# Patient Record
Sex: MALE | Race: WHITE | Employment: OTHER | URBAN - NONMETROPOLITAN AREA
[De-identification: names, ages, dates, MRNs, and addresses within clinical notes are randomized per-mention and may not be internally consistent; named-entity substitution may affect disease eponyms.]

---

## 2018-01-04 ENCOUNTER — OFFICE VISIT (OUTPATIENT)
Dept: FAMILY MEDICINE CLINIC | Age: 68
End: 2018-01-04
Payer: MEDICARE

## 2018-01-04 VITALS
DIASTOLIC BLOOD PRESSURE: 80 MMHG | HEART RATE: 58 BPM | OXYGEN SATURATION: 94 % | RESPIRATION RATE: 18 BRPM | TEMPERATURE: 98.2 F | WEIGHT: 158 LBS | SYSTOLIC BLOOD PRESSURE: 132 MMHG

## 2018-01-04 DIAGNOSIS — M79.10 MYALGIA: ICD-10-CM

## 2018-01-04 DIAGNOSIS — Z12.5 ENCOUNTER FOR PROSTATE CANCER SCREENING: ICD-10-CM

## 2018-01-04 DIAGNOSIS — I25.10 CORONARY ARTERY DISEASE INVOLVING NATIVE CORONARY ARTERY OF NATIVE HEART WITHOUT ANGINA PECTORIS: Primary | ICD-10-CM

## 2018-01-04 DIAGNOSIS — E78.01 FAMILIAL HYPERCHOLESTEROLEMIA: ICD-10-CM

## 2018-01-04 DIAGNOSIS — I10 ESSENTIAL (PRIMARY) HYPERTENSION: ICD-10-CM

## 2018-01-04 DIAGNOSIS — Z23 NEED FOR PROPHYLACTIC VACCINATION AGAINST STREPTOCOCCUS PNEUMONIAE (PNEUMOCOCCUS): ICD-10-CM

## 2018-01-04 LAB
ALBUMIN SERPL-MCNC: 4.5 G/DL (ref 3.5–5.2)
ALP BLD-CCNC: 137 U/L (ref 40–130)
ALT SERPL-CCNC: 28 U/L (ref 5–41)
ANION GAP SERPL CALCULATED.3IONS-SCNC: 10 MMOL/L (ref 7–19)
AST SERPL-CCNC: 26 U/L (ref 5–40)
BILIRUB SERPL-MCNC: 0.6 MG/DL (ref 0.2–1.2)
BUN BLDV-MCNC: 12 MG/DL (ref 8–23)
CALCIUM SERPL-MCNC: 9.2 MG/DL (ref 8.8–10.2)
CHLORIDE BLD-SCNC: 102 MMOL/L (ref 98–111)
CHOLESTEROL, TOTAL: 100 MG/DL (ref 160–199)
CO2: 29 MMOL/L (ref 22–29)
CREAT SERPL-MCNC: 1.3 MG/DL (ref 0.5–1.2)
GFR NON-AFRICAN AMERICAN: 55
GLUCOSE BLD-MCNC: 91 MG/DL (ref 74–109)
HDLC SERPL-MCNC: 41 MG/DL (ref 55–121)
LDL CHOLESTEROL CALCULATED: 41 MG/DL
POTASSIUM SERPL-SCNC: 5 MMOL/L (ref 3.5–5)
PROSTATE SPECIFIC ANTIGEN: 0.73 NG/ML (ref 0–4)
SODIUM BLD-SCNC: 141 MMOL/L (ref 136–145)
TOTAL CK: 77 U/L (ref 39–308)
TOTAL PROTEIN: 7.6 G/DL (ref 6.6–8.7)
TRIGL SERPL-MCNC: 88 MG/DL (ref 0–149)

## 2018-01-04 PROCEDURE — G8427 DOCREV CUR MEDS BY ELIG CLIN: HCPCS | Performed by: FAMILY MEDICINE

## 2018-01-04 PROCEDURE — 90670 PCV13 VACCINE IM: CPT | Performed by: FAMILY MEDICINE

## 2018-01-04 PROCEDURE — G8484 FLU IMMUNIZE NO ADMIN: HCPCS | Performed by: FAMILY MEDICINE

## 2018-01-04 PROCEDURE — 4004F PT TOBACCO SCREEN RCVD TLK: CPT | Performed by: FAMILY MEDICINE

## 2018-01-04 PROCEDURE — G8598 ASA/ANTIPLAT THER USED: HCPCS | Performed by: FAMILY MEDICINE

## 2018-01-04 PROCEDURE — G8421 BMI NOT CALCULATED: HCPCS | Performed by: FAMILY MEDICINE

## 2018-01-04 PROCEDURE — 3017F COLORECTAL CA SCREEN DOC REV: CPT | Performed by: FAMILY MEDICINE

## 2018-01-04 PROCEDURE — G0009 ADMIN PNEUMOCOCCAL VACCINE: HCPCS | Performed by: FAMILY MEDICINE

## 2018-01-04 PROCEDURE — 4040F PNEUMOC VAC/ADMIN/RCVD: CPT | Performed by: FAMILY MEDICINE

## 2018-01-04 PROCEDURE — 99204 OFFICE O/P NEW MOD 45 MIN: CPT | Performed by: FAMILY MEDICINE

## 2018-01-04 PROCEDURE — 1123F ACP DISCUSS/DSCN MKR DOCD: CPT | Performed by: FAMILY MEDICINE

## 2018-01-04 RX ORDER — NITROGLYCERIN 0.4 MG/1
0.4 TABLET SUBLINGUAL
COMMUNITY
Start: 2016-12-28 | End: 2018-07-10 | Stop reason: SDUPTHER

## 2018-01-04 RX ORDER — RAMIPRIL 10 MG/1
10 CAPSULE ORAL DAILY
Qty: 90 CAPSULE | Refills: 3 | Status: SHIPPED | OUTPATIENT
Start: 2018-01-04 | End: 2018-10-19 | Stop reason: SDUPTHER

## 2018-01-04 RX ORDER — ASPIRIN 81 MG/1
81 TABLET ORAL
COMMUNITY

## 2018-01-04 RX ORDER — ATORVASTATIN CALCIUM 80 MG/1
80 TABLET, FILM COATED ORAL DAILY
Qty: 90 TABLET | Refills: 3 | Status: SHIPPED | OUTPATIENT
Start: 2018-01-04 | End: 2019-07-11 | Stop reason: SDUPTHER

## 2018-01-04 RX ORDER — ATORVASTATIN CALCIUM 80 MG/1
80 TABLET, FILM COATED ORAL
COMMUNITY
Start: 2016-12-28 | End: 2018-01-04 | Stop reason: SDUPTHER

## 2018-01-04 RX ORDER — METOPROLOL SUCCINATE 25 MG/1
12.5 TABLET, EXTENDED RELEASE ORAL
COMMUNITY
Start: 2016-12-28 | End: 2018-01-04 | Stop reason: SDUPTHER

## 2018-01-04 RX ORDER — RAMIPRIL 10 MG/1
10 CAPSULE ORAL
COMMUNITY
Start: 2016-12-28 | End: 2018-01-04 | Stop reason: SDUPTHER

## 2018-01-04 RX ORDER — METOPROLOL SUCCINATE 25 MG/1
25 TABLET, EXTENDED RELEASE ORAL DAILY
Qty: 90 TABLET | Refills: 3 | Status: SHIPPED | OUTPATIENT
Start: 2018-01-04 | End: 2018-07-10 | Stop reason: SDUPTHER

## 2018-01-04 ASSESSMENT — ENCOUNTER SYMPTOMS
ANAL BLEEDING: 0
ABDOMINAL PAIN: 0
COUGH: 0
NAUSEA: 0
DIARRHEA: 0
CONSTIPATION: 0
SHORTNESS OF BREATH: 0
CHEST TIGHTNESS: 0

## 2018-01-04 NOTE — PROGRESS NOTES
date.      2. Familial hypercholesterolemia  He has been having some increased myalgias lately. We'll continue with Lipitor at full dose and check a CK level  - atorvastatin (LIPITOR) 80 MG tablet; Take 1 tablet by mouth daily  Dispense: 90 tablet; Refill: 3  - Comprehensive Metabolic Panel; Future  - Lipid Panel; Future    3. Essential (primary) hypertension  Stable  - metoprolol succinate (TOPROL XL) 25 MG extended release tablet; Take 1 tablet by mouth daily  Dispense: 90 tablet; Refill: 3  - ramipril (ALTACE) 10 MG capsule; Take 1 capsule by mouth daily  Dispense: 90 capsule; Refill: 3    4. Need for prophylactic vaccination against Streptococcus pneumoniae (pneumococcus)  - Pneumococcal conjugate vaccine 13-valent IM (PREVNAR 13)    5. Myalgia    - CK; Future    6. Encounter for prostate cancer screening    - Psa screening; Future      We discussed colonoscopy. He will consider. Discussed risks and benefits of a colonoscopy. Discussed benefits of colon cancer screening. He understands. Spent 5 minutes discussing smoking cessation. Discussed medication options including nicotine replacement, Wellbutrin, and Chantix. Discussed calling [employer] for further assistance. Recommended picking a quit date. Return in about 6 months (around 7/4/2018) for AWV. Discussed use, benefit, and side effects of prescribed medications. All patient questions answered. Pt voiced understanding. Reviewed health maintenance. Instructed to continue current medications, diet and exercise. Patient agreed with treatment plan. Follow up as directed.

## 2018-04-06 ENCOUNTER — OFFICE VISIT (OUTPATIENT)
Dept: FAMILY MEDICINE CLINIC | Age: 68
End: 2018-04-06
Payer: MEDICARE

## 2018-04-06 VITALS
HEIGHT: 70 IN | TEMPERATURE: 98.7 F | OXYGEN SATURATION: 97 % | HEART RATE: 71 BPM | BODY MASS INDEX: 23.45 KG/M2 | DIASTOLIC BLOOD PRESSURE: 72 MMHG | WEIGHT: 163.8 LBS | SYSTOLIC BLOOD PRESSURE: 134 MMHG

## 2018-04-06 DIAGNOSIS — H61.21 IMPACTED CERUMEN OF RIGHT EAR: Primary | ICD-10-CM

## 2018-04-06 PROCEDURE — 99213 OFFICE O/P EST LOW 20 MIN: CPT | Performed by: CLINICAL NURSE SPECIALIST

## 2018-04-06 PROCEDURE — 1123F ACP DISCUSS/DSCN MKR DOCD: CPT | Performed by: CLINICAL NURSE SPECIALIST

## 2018-04-06 PROCEDURE — G8427 DOCREV CUR MEDS BY ELIG CLIN: HCPCS | Performed by: CLINICAL NURSE SPECIALIST

## 2018-04-06 PROCEDURE — 4004F PT TOBACCO SCREEN RCVD TLK: CPT | Performed by: CLINICAL NURSE SPECIALIST

## 2018-04-06 PROCEDURE — 69209 REMOVE IMPACTED EAR WAX UNI: CPT | Performed by: CLINICAL NURSE SPECIALIST

## 2018-04-06 PROCEDURE — G8420 CALC BMI NORM PARAMETERS: HCPCS | Performed by: CLINICAL NURSE SPECIALIST

## 2018-04-06 PROCEDURE — 3017F COLORECTAL CA SCREEN DOC REV: CPT | Performed by: CLINICAL NURSE SPECIALIST

## 2018-04-06 PROCEDURE — 4040F PNEUMOC VAC/ADMIN/RCVD: CPT | Performed by: CLINICAL NURSE SPECIALIST

## 2018-04-06 PROCEDURE — G8598 ASA/ANTIPLAT THER USED: HCPCS | Performed by: CLINICAL NURSE SPECIALIST

## 2018-04-06 ASSESSMENT — ENCOUNTER SYMPTOMS
CHEST TIGHTNESS: 0
SHORTNESS OF BREATH: 0
VOMITING: 0
BACK PAIN: 1
COUGH: 0
EYE PAIN: 0
WHEEZING: 0
RHINORRHEA: 0
FACIAL SWELLING: 0
SINUS PRESSURE: 0
EYE DISCHARGE: 0
SORE THROAT: 0
NAUSEA: 0

## 2018-07-10 ENCOUNTER — OFFICE VISIT (OUTPATIENT)
Dept: FAMILY MEDICINE CLINIC | Age: 68
End: 2018-07-10
Payer: MEDICARE

## 2018-07-10 VITALS
OXYGEN SATURATION: 96 % | HEIGHT: 69 IN | DIASTOLIC BLOOD PRESSURE: 80 MMHG | TEMPERATURE: 98.2 F | SYSTOLIC BLOOD PRESSURE: 132 MMHG | HEART RATE: 48 BPM | WEIGHT: 166 LBS | BODY MASS INDEX: 24.59 KG/M2

## 2018-07-10 DIAGNOSIS — I10 ESSENTIAL (PRIMARY) HYPERTENSION: ICD-10-CM

## 2018-07-10 DIAGNOSIS — R53.83 OTHER FATIGUE: ICD-10-CM

## 2018-07-10 DIAGNOSIS — E78.01 FAMILIAL HYPERCHOLESTEROLEMIA: ICD-10-CM

## 2018-07-10 DIAGNOSIS — I25.10 CORONARY ARTERY DISEASE INVOLVING NATIVE CORONARY ARTERY OF NATIVE HEART WITHOUT ANGINA PECTORIS: ICD-10-CM

## 2018-07-10 DIAGNOSIS — Z12.5 ENCOUNTER FOR PROSTATE CANCER SCREENING: ICD-10-CM

## 2018-07-10 DIAGNOSIS — Z00.00 ANNUAL PHYSICAL EXAM: Primary | ICD-10-CM

## 2018-07-10 PROCEDURE — G8427 DOCREV CUR MEDS BY ELIG CLIN: HCPCS | Performed by: FAMILY MEDICINE

## 2018-07-10 PROCEDURE — 1101F PT FALLS ASSESS-DOCD LE1/YR: CPT | Performed by: FAMILY MEDICINE

## 2018-07-10 PROCEDURE — 1123F ACP DISCUSS/DSCN MKR DOCD: CPT | Performed by: FAMILY MEDICINE

## 2018-07-10 PROCEDURE — 3017F COLORECTAL CA SCREEN DOC REV: CPT | Performed by: FAMILY MEDICINE

## 2018-07-10 PROCEDURE — 99213 OFFICE O/P EST LOW 20 MIN: CPT | Performed by: FAMILY MEDICINE

## 2018-07-10 PROCEDURE — 93000 ELECTROCARDIOGRAM COMPLETE: CPT | Performed by: FAMILY MEDICINE

## 2018-07-10 PROCEDURE — G0439 PPPS, SUBSEQ VISIT: HCPCS | Performed by: FAMILY MEDICINE

## 2018-07-10 PROCEDURE — G8420 CALC BMI NORM PARAMETERS: HCPCS | Performed by: FAMILY MEDICINE

## 2018-07-10 PROCEDURE — G8598 ASA/ANTIPLAT THER USED: HCPCS | Performed by: FAMILY MEDICINE

## 2018-07-10 PROCEDURE — 1036F TOBACCO NON-USER: CPT | Performed by: FAMILY MEDICINE

## 2018-07-10 PROCEDURE — 4040F PNEUMOC VAC/ADMIN/RCVD: CPT | Performed by: FAMILY MEDICINE

## 2018-07-10 RX ORDER — METOPROLOL SUCCINATE 25 MG/1
12.5 TABLET, EXTENDED RELEASE ORAL DAILY
Qty: 90 TABLET | Refills: 3
Start: 2018-07-10 | End: 2019-05-21 | Stop reason: SDUPTHER

## 2018-07-10 RX ORDER — ALBUTEROL SULFATE 90 UG/1
2 AEROSOL, METERED RESPIRATORY (INHALATION) EVERY 6 HOURS PRN
Qty: 1 INHALER | Refills: 3 | Status: SHIPPED | OUTPATIENT
Start: 2018-07-10 | End: 2020-03-25 | Stop reason: SDUPTHER

## 2018-07-10 RX ORDER — NITROGLYCERIN 0.4 MG/1
0.4 TABLET SUBLINGUAL EVERY 5 MIN PRN
Qty: 25 TABLET | Refills: 5 | Status: SHIPPED | OUTPATIENT
Start: 2018-07-10 | End: 2020-01-10 | Stop reason: SDUPTHER

## 2018-07-10 RX ORDER — NITROGLYCERIN 0.4 MG/1
0.4 TABLET SUBLINGUAL EVERY 5 MIN PRN
Qty: 25 TABLET | Status: CANCELLED | OUTPATIENT
Start: 2018-07-10

## 2018-07-10 ASSESSMENT — LIFESTYLE VARIABLES: HOW OFTEN DO YOU HAVE A DRINK CONTAINING ALCOHOL: 0

## 2018-07-10 ASSESSMENT — PATIENT HEALTH QUESTIONNAIRE - PHQ9: SUM OF ALL RESPONSES TO PHQ QUESTIONS 1-9: 0

## 2018-07-10 ASSESSMENT — ANXIETY QUESTIONNAIRES: GAD7 TOTAL SCORE: 0

## 2018-07-10 NOTE — PATIENT INSTRUCTIONS
person to make decisions about your care if you are not able to. Most people ask a close friend or family member. Talk to this person about the kinds of treatments you want and those that you do not want. Make sure this person understands your wishes. These legal papers are simple to change. Tell your doctor what you want to change, and ask him or her to make a note in your medical file. Give your family updated copies of the papers. Where can you learn more? Go to [email or web address] org and sign in to your [email or web address] account. Enter P184 in the [employer] box to learn more about \"Advance Care Planning: Care Instructions. \"     If you do not have an account, please click on the \"Sign Up Now\" link. Current as of: September 24, 2016  Content Version: 11.5  © 1968-5071 Healthwise, [employer]. Care instructions adapted under license by [facility] ([facility]). If you have questions about a medical condition or this instruction, always ask your healthcare professional. [place] [address] any warranty or liability for your use of this information. Learning About Living Patriciaha Ano  What is a living will? A living will is a legal form you use to write down the kind of care you want at the end of your life. It is used by the health professionals who will treat you if you aren't able to decide for yourself. If you put your wishes in writing, your loved ones and others will know what kind of care you want. They won't need to guess. This can ease your mind and be helpful to others. A living will is not the same as an estate or property will. An estate will explains what you want to happen with your money and property after you die. Is a living will a legal document? A living will is a legal document. Each state has its own laws about living [place]. If you move to another state, make sure that your living will is legal in the state where you now live.  Or you might use a universal things would you still want to be able to do after you receive life-support methods? Would you want to be able to walk? To speak? To eat on your own? To live without the help of machines? · If you have a choice, where do you want to be cared for? In your home? At a hospital or nursing home? · Do you want certain Nondenominational practices performed if you become very ill? · If you have a choice at the end of your life, where would you prefer to die? At home? In a hospital or nursing home? Somewhere else? · Would you prefer to be buried or cremated? · Do you want your organs to be donated after you die? What should you do with your living will? · Make sure that your family members and your health care agent have copies of your living will. · Give your doctor a copy of your living will to keep in your medical record. If you have more than one doctor, make sure that each one has a copy. · You may want to put a copy of your living will where it can be easily found. Where can you learn more? Go to https://AppSpotrpeBackspaces.GreenGoose!. org and sign in to your [employer] account. Enter J027 in the Aledia box to learn more about \"Learning About Living [place]. \"     If you do not have an account, please click on the \"Sign Up Now\" link. Current as of: September 24, 2016  Content Version: 11.5  © 6612-9400 [employer]. Care instructions adapted under license by [facility] ([facility]). If you have questions about a medical condition or this instruction, always ask your healthcare professional. [place] [address] any warranty or liability for your use of this information. Learning About Durable Power of  for Health Care  What is a durable power of  for health care? A durable power of  for health care is one type of the legal forms called advance directives.  It lets you decide who you want to make treatment decisions for you if you cannot speak or decide for other tips for preventing falls. o Look at the floor in each room  o When he walks through room do you have to walk around furniture? - Ask someone to move the furniture to clear your past  o You have throw rugs on the floor? - Remove the rugs or use double-sided tape or nonslip backing on the rugs so they dont slip  o Are papers, magazines, books, shoes, boxes, blankets, towels, or other objects on the floor?  -  things that are on the floor. Always keep objects off the floor  o Do you have to walk over or around cords or wires (like cords from lamps, extension cords, or telephone cords)? - Coil or tape cords and wires next to the wall so you cant trip over them. Have an  put in another outlet. o Are papers, shoes, books, or other objects on the stairs? -  things on the stairs. Always keep objects off the stairs. o Are some steps broken or uneven?   - Fix loose or uneven steps. o Are you missing a light over the stairway?   - Have a  or an  put in an overhead light at the top and bottom of the stairs. o Has the stairway light bulb burned out?   - Have a friend or family member change the light bulb.   o Do you have only one light switch for your stairs (only at the top or at the bottom of the stairs)? - Have a  or an  put in a light switch at the top and bottom of the stairs. You can get light switches that glow  o Are the handrails loose or broken? Is there a handrail on only one side of the stairs? - Fix loose handrails or put in new ones. Make sure handrails are on both sides of the stairs and are as long as the stairs. o Is the carpet on the steps loose or torn? - Make sure the carpet is firmly attached to every step or remove the carpet and attach non-slip rubber treads on the stairs. o Look at your kitchen and eating Look at your kitchen and eating area. Are the things you use often on high shelves?    - Move items in your

## 2018-07-10 NOTE — PROGRESS NOTES
[facility]  [address] [place] [address]  Dept: [phone]  Dept Fax: [phone]  Loc: [phone]    Bang Paige is a 70 y.o. male who presents today for his medical conditions/complaints as noted below. Bang Paige is here for Medicare AWV (pt c/o sob upon exertion )        HPI:   CC: Here today to discuss the following:    He has noticed he has increased dyspnea on exertion. He had influenza B in March and hasn't fully recovered. He does continue to smoke daily. He also reports a recurrent nonproductive cough. Overall he does feel like he is doing better. No chest pain or palpitations. Hypertension  Compliant with medications. No adverse effects from medication. No lightheadedness, palpitations, or chest pain. Hyperlipidemia  Tolerating current cholesterol medication without side effects. No body aches. Attempting to reduce processed sugar and cholesterol from diet. Coronary Artery Disease  Currently no active angina symptoms. No chest pain with exertion. No orthopnea.             HPI    Past Medical History:   Diagnosis Date    Heart attack     two    Mini stroke ([address]     2      Past Surgical History:   Procedure Laterality Date    APPENDECTOMY      BACK SURGERY      TESTICLE REMOVAL      Just one       Family History   Problem Relation Age of Onset    Coronary Art Dis Father     Coronary Art Dis Brother        Social History   Substance Use Topics    Smoking status: Former Smoker     Packs/day: 0.50     Types: Cigarettes    Smokeless tobacco: Never Used    Alcohol use No      Current Outpatient Prescriptions   Medication Sig Dispense Refill    albuterol sulfate HFA (VENTOLIN HFA) 108 (90 Base) MCG/ACT inhaler Inhale 2 puffs into the lungs every 6 hours as needed for Wheezing 1 Inhaler 3    metoprolol succinate (TOPROL XL) 25 MG extended release tablet Take 0.5 tablets by mouth daily 90 tablet 3    Dispense: 90 tablet; Refill: 3    2. Familial hypercholesterolemia  Lab Results   Component Value Date    CHOL 100 (L) 01/04/2018     Lab Results   Component Value Date    TRIG 88 01/04/2018     Lab Results   Component Value Date    HDL 41 (L) 01/04/2018     Lab Results   Component Value Date    LDLCALC 41 01/04/2018     No results found for: LABVLDL, VLDL  No results found for: CHOLHDLRATIO  Stable  - Lipid Panel; Future    3. Encounter for prostate cancer screening    - Psa screening; Future    4. Other fatigue    - Comprehensive Metabolic Panel; Future  - TSH without Reflex; Future            Return in about 6 months (around 1/10/2019) for Routine follow up - 15 minute visit. Discussed use, benefit, and side effects of prescribed medications. All patient questions answered. Pt voiced understanding. Reviewed health maintenance. Instructed to continue current medications, diet and exercise. Patient agreed with treatment plan. Follow up as directed.

## 2018-07-10 NOTE — PROGRESS NOTES
Vitals:    07/10/18 1252   BP: 132/80   Pulse: (!) 48   Temp: 98.2 °F (36.8 °C)   SpO2: 96%   Weight: 166 lb (75.3 kg)   Height: 5' 9\" (1.753 m)           The following problems were reviewed today and where indicated follow up appointments were made and/or referrals ordered. Risk Factor Screenings with Interventions     Fall Risk:  2 or more falls in past year?: no  Fall with injury in past year?: no  Fall Risk Interventions:    · None indicated    Depression:  PHQ-2 Score: 0  Depression Interventions:  · None indicated    Anxiety:  Anxiety Score: 0  Anxiety Interventions:  · None indicated    Cognitive: Words recalled: 3  Clock Drawing Test (CDT) Score: Normal  Cognitive Impairment Interventions:  · None indicated    Substance Abuse:  Social History     Social History Main Topics    Smoking status: Current Every Day Smoker     Packs/day: 0.50     Types: Cigarettes    Smokeless tobacco: Never Used    Alcohol use No    Drug use: No    Sexual activity: Not on file     Audit Questionnaire: Screen for Alcohol Misuse  How often do you have a drink containing alcohol?: Never  Substance Abuse Interventions:  · None indicated    Health Risk Assessment:     General  In general, how would you say your health is?: Very Good  In the past 7 days, have you experienced any of the following?: None of These  Do you get the social and emotional support that you need?: Yes  Do you have a Living Will?: (!) No  General Health Risk Interventions:  · No Living Will: additional information provided    Health Habits/Nutrition  Do you exercise for at least 20 minutes 2-3 times per week?: Yes  Have you lost any weight without trying in the past 3 months?: No  Do you eat fewer than 2 meals per day?: No  Have you seen a dentist within the past year?: (!) No  Body mass index is 24.51 kg/m².   Health Habits/Nutrition Interventions:  · Dental exam overdue:  patient encouraged to make appointment with his/her dentist    Hearing/Vision  Do you or your family notice any trouble with your hearing?: (!) Yes  Do you have difficulty driving, watching TV, or doing any of your daily activities because of your eyesight?: No  Have you had an eye exam within the past year?: (!) No  Hearing/Vision Interventions:  · Vision concerns:  patient encouraged to make appointment with his/her eye specialist  Offered referral to audiology. He will consider and call me back. Safety  Do you have working smoke detectors?: (!) No  Have all throw rugs been removed or fastened?: Yes  Do you have non-slip mats in all bathtubs?: Yes  Do all of your stairways have a railing or banister?: Yes  Are your doorways, halls and stairs free of clutter?: Yes  Do you always fasten your seatbelt when you are in a car?: Yes  Safety Interventions:  · Home safety tips provided    ADLs  In the past 7 days, did you need help from others to perform any of the following everyday activities?: None  In the past 7 days, did you need help from others to take care of any of the following?: None  ADL Interventions:  · None indicated    Personalized Preventive Plan   Current Health Maintenance Status  Immunization History   Administered Date(s) Administered    Pneumococcal 13-valent Conjugate (Loretha Cashton) 01/04/2018        Health Maintenance   Topic Date Due    AAA screen  1950    Hepatitis C screen  1950    DTaP/Tdap/Td vaccine (1 - Tdap) 05/13/1968    Shingles Vaccine (1 of 2 - 2 Dose Series) 05/13/1999    Colon cancer screen colonoscopy  05/13/1999    Flu vaccine (1) 10/01/2018 (Originally 9/1/2018)    Pneumococcal low/med risk (2 of 2 - PPSV23) 01/04/2019    Potassium monitoring  01/04/2019    Creatinine monitoring  01/04/2019    Lipid screen  01/04/2023       Recommendations for Preventive Services Due: see orders.   Recommended screening schedule for the next 5-10 years is provided to the patient in written form: see Patient Instructions/AVS.

## 2019-01-07 DIAGNOSIS — R53.83 OTHER FATIGUE: ICD-10-CM

## 2019-01-07 DIAGNOSIS — E78.01 FAMILIAL HYPERCHOLESTEROLEMIA: ICD-10-CM

## 2019-01-07 DIAGNOSIS — Z12.5 ENCOUNTER FOR PROSTATE CANCER SCREENING: ICD-10-CM

## 2019-01-07 LAB
ALBUMIN SERPL-MCNC: 3.9 G/DL (ref 3.5–5.2)
ALP BLD-CCNC: 130 U/L (ref 40–130)
ALT SERPL-CCNC: 22 U/L (ref 5–41)
ANION GAP SERPL CALCULATED.3IONS-SCNC: 13 MMOL/L (ref 7–19)
AST SERPL-CCNC: 17 U/L (ref 5–40)
BILIRUB SERPL-MCNC: 0.4 MG/DL (ref 0.2–1.2)
BUN BLDV-MCNC: 15 MG/DL (ref 8–23)
CALCIUM SERPL-MCNC: 8.8 MG/DL (ref 8.8–10.2)
CHLORIDE BLD-SCNC: 104 MMOL/L (ref 98–111)
CHOLESTEROL, TOTAL: 132 MG/DL (ref 160–199)
CO2: 25 MMOL/L (ref 22–29)
CREAT SERPL-MCNC: 1.5 MG/DL (ref 0.5–1.2)
GFR NON-AFRICAN AMERICAN: 46
GLUCOSE BLD-MCNC: 100 MG/DL (ref 74–109)
HDLC SERPL-MCNC: 34 MG/DL (ref 55–121)
LDL CHOLESTEROL CALCULATED: 73 MG/DL
POTASSIUM SERPL-SCNC: 4.5 MMOL/L (ref 3.5–5)
PROSTATE SPECIFIC ANTIGEN: 0.79 NG/ML (ref 0–4)
SODIUM BLD-SCNC: 142 MMOL/L (ref 136–145)
TOTAL PROTEIN: 7.2 G/DL (ref 6.6–8.7)
TRIGL SERPL-MCNC: 125 MG/DL (ref 0–149)
TSH SERPL DL<=0.05 MIU/L-ACNC: 2.81 UIU/ML (ref 0.27–4.2)

## 2019-01-10 ENCOUNTER — OFFICE VISIT (OUTPATIENT)
Dept: FAMILY MEDICINE CLINIC | Age: 69
End: 2019-01-10
Payer: MEDICARE

## 2019-01-10 VITALS
HEART RATE: 82 BPM | TEMPERATURE: 98.5 F | SYSTOLIC BLOOD PRESSURE: 166 MMHG | OXYGEN SATURATION: 98 % | WEIGHT: 176 LBS | DIASTOLIC BLOOD PRESSURE: 86 MMHG | BODY MASS INDEX: 25.99 KG/M2

## 2019-01-10 DIAGNOSIS — R79.9 ABNORMAL FINDING OF BLOOD CHEMISTRY: ICD-10-CM

## 2019-01-10 DIAGNOSIS — I10 ESSENTIAL (PRIMARY) HYPERTENSION: ICD-10-CM

## 2019-01-10 DIAGNOSIS — I25.10 CORONARY ARTERY DISEASE INVOLVING NATIVE CORONARY ARTERY OF NATIVE HEART WITHOUT ANGINA PECTORIS: ICD-10-CM

## 2019-01-10 DIAGNOSIS — K21.9 GASTROESOPHAGEAL REFLUX DISEASE WITHOUT ESOPHAGITIS: ICD-10-CM

## 2019-01-10 DIAGNOSIS — N18.30 CKD (CHRONIC KIDNEY DISEASE), STAGE III (HCC): ICD-10-CM

## 2019-01-10 DIAGNOSIS — M79.641 BILATERAL HAND PAIN: ICD-10-CM

## 2019-01-10 DIAGNOSIS — M79.642 BILATERAL HAND PAIN: ICD-10-CM

## 2019-01-10 DIAGNOSIS — E78.01 FAMILIAL HYPERCHOLESTEROLEMIA: Primary | ICD-10-CM

## 2019-01-10 DIAGNOSIS — Z23 NEED FOR PNEUMOCOCCAL VACCINE: ICD-10-CM

## 2019-01-10 PROCEDURE — 4040F PNEUMOC VAC/ADMIN/RCVD: CPT | Performed by: FAMILY MEDICINE

## 2019-01-10 PROCEDURE — G0009 ADMIN PNEUMOCOCCAL VACCINE: HCPCS | Performed by: FAMILY MEDICINE

## 2019-01-10 PROCEDURE — 1101F PT FALLS ASSESS-DOCD LE1/YR: CPT | Performed by: FAMILY MEDICINE

## 2019-01-10 PROCEDURE — 99214 OFFICE O/P EST MOD 30 MIN: CPT | Performed by: FAMILY MEDICINE

## 2019-01-10 PROCEDURE — 90732 PPSV23 VACC 2 YRS+ SUBQ/IM: CPT | Performed by: FAMILY MEDICINE

## 2019-01-10 PROCEDURE — 1036F TOBACCO NON-USER: CPT | Performed by: FAMILY MEDICINE

## 2019-01-10 PROCEDURE — G8598 ASA/ANTIPLAT THER USED: HCPCS | Performed by: FAMILY MEDICINE

## 2019-01-10 PROCEDURE — 3017F COLORECTAL CA SCREEN DOC REV: CPT | Performed by: FAMILY MEDICINE

## 2019-01-10 PROCEDURE — G8419 CALC BMI OUT NRM PARAM NOF/U: HCPCS | Performed by: FAMILY MEDICINE

## 2019-01-10 PROCEDURE — 1123F ACP DISCUSS/DSCN MKR DOCD: CPT | Performed by: FAMILY MEDICINE

## 2019-01-10 PROCEDURE — G8484 FLU IMMUNIZE NO ADMIN: HCPCS | Performed by: FAMILY MEDICINE

## 2019-01-10 PROCEDURE — G8427 DOCREV CUR MEDS BY ELIG CLIN: HCPCS | Performed by: FAMILY MEDICINE

## 2019-01-12 PROBLEM — N18.30 CKD (CHRONIC KIDNEY DISEASE), STAGE III (HCC): Status: ACTIVE | Noted: 2019-01-12

## 2019-03-10 DIAGNOSIS — I10 ESSENTIAL (PRIMARY) HYPERTENSION: ICD-10-CM

## 2019-03-11 RX ORDER — RAMIPRIL 10 MG/1
CAPSULE ORAL
Qty: 90 CAPSULE | Refills: 1 | Status: SHIPPED | OUTPATIENT
Start: 2019-03-11 | End: 2019-11-25 | Stop reason: SDUPTHER

## 2019-03-27 ENCOUNTER — APPOINTMENT (OUTPATIENT)
Dept: CT IMAGING | Facility: HOSPITAL | Age: 69
End: 2019-03-27

## 2019-03-27 ENCOUNTER — APPOINTMENT (OUTPATIENT)
Dept: GENERAL RADIOLOGY | Facility: HOSPITAL | Age: 69
End: 2019-03-27

## 2019-03-27 ENCOUNTER — HOSPITAL ENCOUNTER (EMERGENCY)
Facility: HOSPITAL | Age: 69
Discharge: HOME | End: 2019-03-27
Admitting: EMERGENCY MEDICINE

## 2019-03-27 ENCOUNTER — OFFICE VISIT (OUTPATIENT)
Dept: URGENT CARE | Age: 69
End: 2019-03-27

## 2019-03-27 VITALS
HEART RATE: 60 BPM | HEIGHT: 70 IN | RESPIRATION RATE: 16 BRPM | OXYGEN SATURATION: 98 % | TEMPERATURE: 97.8 F | WEIGHT: 172.6 LBS | SYSTOLIC BLOOD PRESSURE: 102 MMHG | DIASTOLIC BLOOD PRESSURE: 54 MMHG | BODY MASS INDEX: 24.71 KG/M2

## 2019-03-27 VITALS
HEIGHT: 69 IN | TEMPERATURE: 97.9 F | BODY MASS INDEX: 25.48 KG/M2 | HEART RATE: 50 BPM | SYSTOLIC BLOOD PRESSURE: 112 MMHG | WEIGHT: 172 LBS | RESPIRATION RATE: 14 BRPM | OXYGEN SATURATION: 98 % | DIASTOLIC BLOOD PRESSURE: 59 MMHG

## 2019-03-27 DIAGNOSIS — R55 NEAR SYNCOPE: ICD-10-CM

## 2019-03-27 DIAGNOSIS — R05.9 COUGH: Primary | ICD-10-CM

## 2019-03-27 DIAGNOSIS — R51.9 NONINTRACTABLE HEADACHE, UNSPECIFIED CHRONICITY PATTERN, UNSPECIFIED HEADACHE TYPE: ICD-10-CM

## 2019-03-27 DIAGNOSIS — R53.1 WEAKNESS: Primary | ICD-10-CM

## 2019-03-27 LAB
ALBUMIN SERPL-MCNC: 4.1 G/DL (ref 3.5–5)
ALBUMIN/GLOB SERPL: 1.2 G/DL (ref 1.1–2.5)
ALP SERPL-CCNC: 106 U/L (ref 24–120)
ALT SERPL W P-5'-P-CCNC: <15 U/L (ref 0–54)
ANION GAP SERPL CALCULATED.3IONS-SCNC: 9 MMOL/L (ref 4–13)
APTT PPP: 28 SECONDS (ref 24.1–34.8)
AST SERPL-CCNC: 25 U/L (ref 7–45)
BASOPHILS # BLD AUTO: 0.04 10*3/MM3 (ref 0–0.2)
BASOPHILS NFR BLD AUTO: 0.5 % (ref 0–2)
BILIRUB SERPL-MCNC: 0.6 MG/DL (ref 0.1–1)
BUN BLD-MCNC: 23 MG/DL (ref 5–21)
BUN/CREAT SERPL: 14.3 (ref 7–25)
CALCIUM SPEC-SCNC: 9 MG/DL (ref 8.4–10.4)
CHLORIDE SERPL-SCNC: 105 MMOL/L (ref 98–110)
CO2 SERPL-SCNC: 23 MMOL/L (ref 24–31)
CREAT BLD-MCNC: 1.61 MG/DL (ref 0.5–1.4)
DEPRECATED RDW RBC AUTO: 42.7 FL (ref 40–54)
EOSINOPHIL # BLD AUTO: 0.28 10*3/MM3 (ref 0–0.7)
EOSINOPHIL NFR BLD AUTO: 3.8 % (ref 0–4)
ERYTHROCYTE [DISTWIDTH] IN BLOOD BY AUTOMATED COUNT: 13 % (ref 12–15)
FLUAV AG NPH QL: NEGATIVE
FLUBV AG NPH QL IA: NEGATIVE
GFR SERPL CREATININE-BSD FRML MDRD: 43 ML/MIN/1.73
GLOBULIN UR ELPH-MCNC: 3.4 GM/DL
GLUCOSE BLD-MCNC: 122 MG/DL (ref 70–100)
HCT VFR BLD AUTO: 41 % (ref 40–52)
HGB BLD-MCNC: 14.2 G/DL (ref 14–18)
IMM GRANULOCYTES # BLD AUTO: 0.01 10*3/MM3 (ref 0–0.05)
IMM GRANULOCYTES NFR BLD AUTO: 0.1 % (ref 0–5)
INR PPP: 0.88 (ref 0.91–1.09)
LYMPHOCYTES # BLD AUTO: 2.55 10*3/MM3 (ref 0.72–4.86)
LYMPHOCYTES NFR BLD AUTO: 34.7 % (ref 15–45)
MCH RBC QN AUTO: 31.1 PG (ref 28–32)
MCHC RBC AUTO-ENTMCNC: 34.6 G/DL (ref 33–36)
MCV RBC AUTO: 89.9 FL (ref 82–95)
MONOCYTES # BLD AUTO: 0.58 10*3/MM3 (ref 0.19–1.3)
MONOCYTES NFR BLD AUTO: 7.9 % (ref 4–12)
NEUTROPHILS # BLD AUTO: 3.88 10*3/MM3 (ref 1.87–8.4)
NEUTROPHILS NFR BLD AUTO: 53 % (ref 39–78)
NRBC BLD AUTO-RTO: 0 /100 WBC (ref 0–0)
PLATELET # BLD AUTO: 211 10*3/MM3 (ref 130–400)
PMV BLD AUTO: 10.4 FL (ref 6–12)
POTASSIUM BLD-SCNC: 4.4 MMOL/L (ref 3.5–5.3)
PROT SERPL-MCNC: 7.5 G/DL (ref 6.3–8.7)
PROTHROMBIN TIME: 12.2 SECONDS (ref 11.9–14.6)
RBC # BLD AUTO: 4.56 10*6/MM3 (ref 4.8–5.9)
SODIUM BLD-SCNC: 137 MMOL/L (ref 135–145)
TROPONIN I SERPL-MCNC: <0.012 NG/ML (ref 0–0.03)
WBC NRBC COR # BLD: 7.34 10*3/MM3 (ref 4.8–10.8)

## 2019-03-27 PROCEDURE — 84484 ASSAY OF TROPONIN QUANT: CPT | Performed by: NURSE PRACTITIONER

## 2019-03-27 PROCEDURE — 80053 COMPREHEN METABOLIC PANEL: CPT | Performed by: NURSE PRACTITIONER

## 2019-03-27 PROCEDURE — 85025 COMPLETE CBC W/AUTO DIFF WBC: CPT | Performed by: NURSE PRACTITIONER

## 2019-03-27 PROCEDURE — 99283 EMERGENCY DEPT VISIT LOW MDM: CPT

## 2019-03-27 PROCEDURE — 71045 X-RAY EXAM CHEST 1 VIEW: CPT

## 2019-03-27 PROCEDURE — 85610 PROTHROMBIN TIME: CPT | Performed by: NURSE PRACTITIONER

## 2019-03-27 PROCEDURE — 87804 INFLUENZA ASSAY W/OPTIC: CPT | Performed by: NURSE PRACTITIONER

## 2019-03-27 PROCEDURE — 93005 ELECTROCARDIOGRAM TRACING: CPT | Performed by: NURSE PRACTITIONER

## 2019-03-27 PROCEDURE — 70450 CT HEAD/BRAIN W/O DYE: CPT

## 2019-03-27 PROCEDURE — 93010 ELECTROCARDIOGRAM REPORT: CPT | Performed by: INTERNAL MEDICINE

## 2019-03-27 PROCEDURE — 99284 EMERGENCY DEPT VISIT MOD MDM: CPT

## 2019-03-27 PROCEDURE — 85730 THROMBOPLASTIN TIME PARTIAL: CPT | Performed by: NURSE PRACTITIONER

## 2019-03-27 RX ORDER — BENZONATATE 200 MG/1
200 CAPSULE ORAL 3 TIMES DAILY PRN
Qty: 15 CAPSULE | Refills: 0 | Status: SHIPPED | OUTPATIENT
Start: 2019-03-27

## 2019-03-27 RX ORDER — ACETAMINOPHEN 500 MG
1000 TABLET ORAL ONCE
Status: COMPLETED | OUTPATIENT
Start: 2019-03-27 | End: 2019-03-27

## 2019-03-27 RX ORDER — CEFDINIR 300 MG/1
300 CAPSULE ORAL 2 TIMES DAILY
Qty: 20 CAPSULE | Refills: 0 | Status: SHIPPED | OUTPATIENT
Start: 2019-03-27 | End: 2019-04-06

## 2019-03-27 RX ADMIN — ACETAMINOPHEN 1000 MG: 500 TABLET ORAL at 18:11

## 2019-05-21 DIAGNOSIS — I10 ESSENTIAL (PRIMARY) HYPERTENSION: ICD-10-CM

## 2019-05-22 RX ORDER — METOPROLOL SUCCINATE 25 MG/1
12.5 TABLET, EXTENDED RELEASE ORAL DAILY
Qty: 90 TABLET | Refills: 3 | Status: SHIPPED | OUTPATIENT
Start: 2019-05-22 | End: 2020-06-19

## 2019-07-05 DIAGNOSIS — N18.30 CKD (CHRONIC KIDNEY DISEASE), STAGE III (HCC): ICD-10-CM

## 2019-07-05 DIAGNOSIS — R79.9 ABNORMAL FINDING OF BLOOD CHEMISTRY: ICD-10-CM

## 2019-07-05 LAB
ALBUMIN SERPL-MCNC: 4.1 G/DL (ref 3.5–5.2)
ALP BLD-CCNC: 138 U/L (ref 40–130)
ALT SERPL-CCNC: 15 U/L (ref 5–41)
ANION GAP SERPL CALCULATED.3IONS-SCNC: 12 MMOL/L (ref 7–19)
AST SERPL-CCNC: 17 U/L (ref 5–40)
BILIRUB SERPL-MCNC: 0.3 MG/DL (ref 0.2–1.2)
BILIRUBIN URINE: NEGATIVE
BLOOD, URINE: NEGATIVE
BUN BLDV-MCNC: 16 MG/DL (ref 8–23)
CALCIUM SERPL-MCNC: 9.3 MG/DL (ref 8.8–10.2)
CHLORIDE BLD-SCNC: 105 MMOL/L (ref 98–111)
CLARITY: CLEAR
CO2: 23 MMOL/L (ref 22–29)
COLOR: YELLOW
CREAT SERPL-MCNC: 1.3 MG/DL (ref 0.5–1.2)
FERRITIN: 97 NG/ML (ref 30–400)
GFR NON-AFRICAN AMERICAN: 55
GLUCOSE BLD-MCNC: 105 MG/DL (ref 74–109)
GLUCOSE URINE: NEGATIVE MG/DL
IRON: 75 UG/DL (ref 59–158)
KETONES, URINE: NEGATIVE MG/DL
LEUKOCYTE ESTERASE, URINE: NEGATIVE
NITRITE, URINE: NEGATIVE
PARATHYROID HORMONE INTACT: 59.6 PG/ML (ref 15–65)
PH UA: 6 (ref 5–8)
PHOSPHORUS: 2.4 MG/DL (ref 2.5–4.5)
POTASSIUM SERPL-SCNC: 5 MMOL/L (ref 3.5–5)
PROTEIN UA: NEGATIVE MG/DL
SODIUM BLD-SCNC: 140 MMOL/L (ref 136–145)
SPECIFIC GRAVITY UA: 1.03 (ref 1–1.03)
TOTAL PROTEIN: 7 G/DL (ref 6.6–8.7)
URIC ACID, SERUM: 6.5 MG/DL (ref 3.4–7)
UROBILINOGEN, URINE: 0.2 E.U./DL

## 2019-07-06 LAB — TRANSFERRIN: 214 MG/DL (ref 200–400)

## 2019-07-11 ENCOUNTER — HOSPITAL ENCOUNTER (OUTPATIENT)
Dept: NON INVASIVE DIAGNOSTICS | Age: 69
Discharge: HOME | End: 2019-07-11
Payer: MEDICARE

## 2019-07-11 ENCOUNTER — OFFICE VISIT (OUTPATIENT)
Dept: FAMILY MEDICINE CLINIC | Age: 69
End: 2019-07-11
Payer: MEDICARE

## 2019-07-11 VITALS
HEIGHT: 70 IN | TEMPERATURE: 98.9 F | BODY MASS INDEX: 23.77 KG/M2 | OXYGEN SATURATION: 98 % | DIASTOLIC BLOOD PRESSURE: 80 MMHG | HEART RATE: 78 BPM | WEIGHT: 166 LBS | SYSTOLIC BLOOD PRESSURE: 128 MMHG

## 2019-07-11 DIAGNOSIS — Z00.00 ANNUAL PHYSICAL EXAM: Primary | ICD-10-CM

## 2019-07-11 DIAGNOSIS — I65.23 BILATERAL CAROTID ARTERY STENOSIS: ICD-10-CM

## 2019-07-11 DIAGNOSIS — I25.10 CORONARY ARTERY DISEASE INVOLVING NATIVE CORONARY ARTERY OF NATIVE HEART WITHOUT ANGINA PECTORIS: ICD-10-CM

## 2019-07-11 DIAGNOSIS — Z12.5 ENCOUNTER FOR PROSTATE CANCER SCREENING: ICD-10-CM

## 2019-07-11 DIAGNOSIS — I10 ESSENTIAL (PRIMARY) HYPERTENSION: ICD-10-CM

## 2019-07-11 DIAGNOSIS — E78.01 FAMILIAL HYPERCHOLESTEROLEMIA: ICD-10-CM

## 2019-07-11 DIAGNOSIS — K21.9 GASTROESOPHAGEAL REFLUX DISEASE WITHOUT ESOPHAGITIS: ICD-10-CM

## 2019-07-11 DIAGNOSIS — Z53.20 COLON CANCER SCREENING DECLINED: ICD-10-CM

## 2019-07-11 DIAGNOSIS — N18.30 CKD (CHRONIC KIDNEY DISEASE), STAGE III (HCC): ICD-10-CM

## 2019-07-11 PROCEDURE — 3017F COLORECTAL CA SCREEN DOC REV: CPT | Performed by: FAMILY MEDICINE

## 2019-07-11 PROCEDURE — 4040F PNEUMOC VAC/ADMIN/RCVD: CPT | Performed by: FAMILY MEDICINE

## 2019-07-11 PROCEDURE — G8427 DOCREV CUR MEDS BY ELIG CLIN: HCPCS | Performed by: FAMILY MEDICINE

## 2019-07-11 PROCEDURE — G8420 CALC BMI NORM PARAMETERS: HCPCS | Performed by: FAMILY MEDICINE

## 2019-07-11 PROCEDURE — 1123F ACP DISCUSS/DSCN MKR DOCD: CPT | Performed by: FAMILY MEDICINE

## 2019-07-11 PROCEDURE — 1036F TOBACCO NON-USER: CPT | Performed by: FAMILY MEDICINE

## 2019-07-11 PROCEDURE — G8598 ASA/ANTIPLAT THER USED: HCPCS | Performed by: FAMILY MEDICINE

## 2019-07-11 PROCEDURE — G0439 PPPS, SUBSEQ VISIT: HCPCS | Performed by: FAMILY MEDICINE

## 2019-07-11 PROCEDURE — 93880 EXTRACRANIAL BILAT STUDY: CPT

## 2019-07-11 PROCEDURE — 99214 OFFICE O/P EST MOD 30 MIN: CPT | Performed by: FAMILY MEDICINE

## 2019-07-11 RX ORDER — ATORVASTATIN CALCIUM 80 MG/1
80 TABLET, FILM COATED ORAL DAILY
Qty: 90 TABLET | Refills: 3 | Status: SHIPPED | OUTPATIENT
Start: 2019-07-11 | End: 2019-07-11 | Stop reason: SDUPTHER

## 2019-07-11 RX ORDER — ATORVASTATIN CALCIUM 80 MG/1
80 TABLET, FILM COATED ORAL DAILY
Qty: 90 TABLET | Refills: 3 | Status: SHIPPED | OUTPATIENT
Start: 2019-07-11 | End: 2020-07-10 | Stop reason: SDUPTHER

## 2019-07-11 ASSESSMENT — PATIENT HEALTH QUESTIONNAIRE - PHQ9
SUM OF ALL RESPONSES TO PHQ QUESTIONS 1-9: 0
SUM OF ALL RESPONSES TO PHQ QUESTIONS 1-9: 0

## 2019-07-11 ASSESSMENT — LIFESTYLE VARIABLES
AUDIT-C TOTAL SCORE: 1
HOW OFTEN DO YOU HAVE SIX OR MORE DRINKS ON ONE OCCASION: 0
HOW MANY STANDARD DRINKS CONTAINING ALCOHOL DO YOU HAVE ON A TYPICAL DAY: 0
HOW OFTEN DURING THE LAST YEAR HAVE YOU BEEN UNABLE TO REMEMBER WHAT HAPPENED THE NIGHT BEFORE BECAUSE YOU HAD BEEN DRINKING: 0
HAS A RELATIVE, FRIEND, DOCTOR, OR ANOTHER HEALTH PROFESSIONAL EXPRESSED CONCERN ABOUT YOUR DRINKING OR SUGGESTED YOU CUT DOWN: 0
HOW OFTEN DO YOU HAVE A DRINK CONTAINING ALCOHOL: 1
HOW OFTEN DURING THE LAST YEAR HAVE YOU FOUND THAT YOU WERE NOT ABLE TO STOP DRINKING ONCE YOU HAD STARTED: 0
HOW OFTEN DURING THE LAST YEAR HAVE YOU FAILED TO DO WHAT WAS NORMALLY EXPECTED FROM YOU BECAUSE OF DRINKING: 0
HAVE YOU OR SOMEONE ELSE BEEN INJURED AS A RESULT OF YOUR DRINKING: 0
HOW OFTEN DURING THE LAST YEAR HAVE YOU NEEDED AN ALCOHOLIC DRINK FIRST THING IN THE MORNING TO GET YOURSELF GOING AFTER A NIGHT OF HEAVY DRINKING: 0
HOW OFTEN DURING THE LAST YEAR HAVE YOU HAD A FEELING OF GUILT OR REMORSE AFTER DRINKING: 0
AUDIT TOTAL SCORE: 1

## 2019-07-11 NOTE — PROGRESS NOTES
on file     Attends meetings of clubs or organizations: Not on file     Relationship status: Not on file    Intimate partner violence:     Fear of current or ex partner: Not on file     Emotionally abused: Not on file     Physically abused: Not on file     Forced sexual activity: Not on file   Other Topics Concern    Not on file   Social History Narrative    Not on file     Audit Questionnaire: Screen for Alcohol Misuse  How often do you have a drink containing alcohol?: Monthly or less  How many standard drinks containing alcohol do you have on a typical day when drinking?: One or two  How often do you have six or more drinks on one occasion?: Never  Audit-C Score: 1  During the past year, how often have you found that you were not able to stop drinking once you had started?: Never  During the past year, how often have you failed to do what was normally expected of you because of drinking?: Never  During the past year, how often have you needed a drink in the morning to get yourself going after a heavy drinking session?: Never  During the past year, how often have you had a feeling of guilt or remorse after drinking?: Never  During the past year, have you been unable to remember what happened the night before because you had been drinking?: Never  Have you or someone else been injured as a result of your drinking?: No  Has a relative or friend, doctor or health worker been concerned about your drinking or suggested you cut down?: No  Total Score: 1  Substance Abuse Interventions:  · Not indicated    Health Risk Assessment:     General  In general, how would you say your health is?: Good  In the past 7 days, have you experienced any of the following?  New or Increased Pain, New or Increased Fatigue, Loneliness, Social Isolation, Stress or Anger?: None of These  Do you get the social and emotional support that you need?: Yes  Do you have a Living Will?: (!) No  General Health Risk Interventions:  · Provided

## 2019-07-11 NOTE — PATIENT INSTRUCTIONS
laws are unclear, your health history is complicated, or your family can't agree on what should be in your living will. · You can change your living will at any time. Some people find that their wishes about end-of-life care change as their health changes. · In addition to making a living will, think about completing a medical power of  form. This form lets you name the person you want to make end-of-life treatment decisions for you (your \"health care agent\") if you're not able to. Many hospitals and nursing homes will give you the forms you need to complete a living will and a medical power of . · Your living will is used only if you can't make or communicate decisions for yourself anymore. If you become able to make decisions again, you can accept or refuse any treatment, no matter what you wrote in your living will. · Your state may offer an online registry. This is a place where you can store your living will online so the doctors and nurses who need to treat you can find it right away. What should you think about when creating a living will? Talk about your end-of-life wishes with your family members and your doctor. Let them know what you want. That way the people making decisions for you won't be surprised by your choices. Think about these questions as you make your living will:  · Do you know enough about life support methods that might be used? If not, talk to your doctor so you know what might be done if you can't breathe on your own, your heart stops, or you're unable to swallow. · What things would you still want to be able to do after you receive life-support methods? Would you want to be able to walk? To speak? To eat on your own? To live without the help of machines? · If you have a choice, where do you want to be cared for? In your home? At a hospital or nursing home? · Do you want certain Quaker practices performed if you become very ill?   · If you have a choice at the

## 2019-08-30 ENCOUNTER — OFFICE VISIT (OUTPATIENT)
Dept: URGENT CARE | Age: 69
End: 2019-08-30
Payer: MEDICARE

## 2019-08-30 VITALS
RESPIRATION RATE: 16 BRPM | TEMPERATURE: 97.8 F | HEART RATE: 54 BPM | HEIGHT: 70 IN | SYSTOLIC BLOOD PRESSURE: 171 MMHG | WEIGHT: 162 LBS | DIASTOLIC BLOOD PRESSURE: 78 MMHG | BODY MASS INDEX: 23.19 KG/M2 | OXYGEN SATURATION: 98 %

## 2019-08-30 DIAGNOSIS — H66.91 ACUTE RIGHT OTITIS MEDIA: ICD-10-CM

## 2019-08-30 DIAGNOSIS — H61.21 HEARING LOSS OF RIGHT EAR DUE TO CERUMEN IMPACTION: Primary | ICD-10-CM

## 2019-08-30 PROCEDURE — 99213 OFFICE O/P EST LOW 20 MIN: CPT | Performed by: NURSE PRACTITIONER

## 2019-08-30 PROCEDURE — 69209 REMOVE IMPACTED EAR WAX UNI: CPT | Performed by: NURSE PRACTITIONER

## 2019-08-30 PROCEDURE — 69210 REMOVE IMPACTED EAR WAX UNI: CPT | Performed by: NURSE PRACTITIONER

## 2019-08-30 RX ORDER — AMOXICILLIN 500 MG/1
500 CAPSULE ORAL 2 TIMES DAILY
Qty: 20 CAPSULE | Refills: 0 | Status: SHIPPED | OUTPATIENT
Start: 2019-08-30 | End: 2019-09-09

## 2019-08-30 ASSESSMENT — ENCOUNTER SYMPTOMS
COUGH: 0
RHINORRHEA: 1
EYES NEGATIVE: 1
ALLERGIC/IMMUNOLOGIC NEGATIVE: 1
SINUS PRESSURE: 0
SORE THROAT: 0

## 2019-08-30 NOTE — PROGRESS NOTES
pain 25 tablet 5    aspirin EC 81 MG EC tablet Take 81 mg by mouth       No current facility-administered medications for this visit. No Known Allergies    Health Maintenance   Topic Date Due    AAA screen  1950    Hepatitis C screen  1950    DTaP/Tdap/Td vaccine (1 - Tdap) 05/13/1968    Shingles Vaccine (1 of 2) 05/13/1999    Colon cancer screen colonoscopy  05/13/1999    Flu vaccine (1) 09/01/2019    Potassium monitoring  07/05/2020    Creatinine monitoring  07/05/2020    Annual Wellness Visit (AWV)  07/10/2020    Lipid screen  01/07/2024    Pneumococcal 65+ years Vaccine  Completed       Subjective:     Review of Systems   Constitutional: Negative for activity change, appetite change, chills and fever. HENT: Positive for hearing loss and rhinorrhea. Negative for congestion, ear discharge, ear pain, sinus pressure and sore throat. Right ear   Eyes: Negative. Respiratory: Negative for cough. Cardiovascular: Negative. Endocrine: Negative. Skin: Negative for rash. Allergic/Immunologic: Negative. Neurological: Negative for headaches. Hematological: Negative. Psychiatric/Behavioral: Negative.        :Objective      Physical Exam   Constitutional: He is oriented to person, place, and time. Vital signs are normal. He appears well-developed and well-nourished. He is active and cooperative. No distress. HENT:   Head: Normocephalic. Right Ear: External ear normal. Tympanic membrane is erythematous and bulging. A middle ear effusion is present. Left Ear: Hearing, tympanic membrane, external ear and ear canal normal.   Nose: Nose normal. Right sinus exhibits no maxillary sinus tenderness and no frontal sinus tenderness. Left sinus exhibits no maxillary sinus tenderness and no frontal sinus tenderness. Mouth/Throat: Uvula is midline, oropharynx is clear and moist and mucous membranes are normal. Tonsils are 0 on the right. Tonsils are 0 on the left.  No tonsillar exudate. Right ear with complete cerumen impaction, complete removal of cerumen with warm water irrigation  Right TM red and bulging, poor cone of light noted after removal of cerumen   Eyes: Pupils are equal, round, and reactive to light. Conjunctivae and lids are normal.   Neck: Neck supple. Cardiovascular: Normal rate, regular rhythm, S1 normal, S2 normal and normal heart sounds. Exam reveals no gallop and no friction rub. No murmur heard. Pulmonary/Chest: Effort normal and breath sounds normal. No respiratory distress. He has no wheezes. He has no rhonchi. He has no rales. He exhibits no tenderness. Abdominal: Soft. Normal appearance. Musculoskeletal: Normal range of motion. He exhibits no edema, tenderness or deformity. Neurological: He is alert and oriented to person, place, and time. He has normal strength. Skin: Skin is warm, dry and intact. Capillary refill takes less than 2 seconds. No cyanosis. Psychiatric: He has a normal mood and affect. His speech is normal and behavior is normal.   Nursing note and vitals reviewed. BP (!) 171/78   Pulse 54   Temp 97.8 °F (36.6 °C)   Resp 16   Ht 5' 9.5\" (1.765 m)   Wt 162 lb (73.5 kg)   SpO2 98%   BMI 23.58 kg/m²     :Assessment       Diagnosis Orders   1. Hearing loss of right ear due to cerumen impaction  amoxicillin (AMOXIL) 500 MG capsule   2. Acute right otitis media  amoxicillin (AMOXIL) 500 MG capsule       :Plan    No orders of the defined types were placed in this encounter. Return if symptoms worsen or fail to improve.     Orders Placed This Encounter   Medications    amoxicillin (AMOXIL) 500 MG capsule     Sig: Take 1 capsule by mouth 2 times daily for 10 days     Dispense:  20 capsule     Refill:  0        Patient Instructions     Plenty of fluids  Rest  OTC Tylenol or Motrin as needed  Complete antibiotic as prescribed  Follow-up with PCP or return to  as needed for worsening or unresolved symptoms    Patient Education        Ear Infection (Otitis Media): Care Instructions  Your Care Instructions    An ear infection may start with a cold and affect the middle ear (otitis media). It can hurt a lot. Most ear infections clear up on their own in a couple of days. Most often you will not need antibiotics. This is because many ear infections are caused by a virus. Antibiotics don't work against a virus. Regular doses of pain medicines are the best way to reduce your fever and help you feel better. Follow-up care is a key part of your treatment and safety. Be sure to make and go to all appointments, and call your doctor if you are having problems. It's also a good idea to know your test results and keep a list of the medicines you take. How can you care for yourself at home? · Take pain medicines exactly as directed. ? If the doctor gave you a prescription medicine for pain, take it as prescribed. ? If you are not taking a prescription pain medicine, take an over-the-counter medicine, such as acetaminophen (Tylenol), ibuprofen (Advil, Motrin), or naproxen (Aleve). Read and follow all instructions on the label. ? Do not take two or more pain medicines at the same time unless the doctor told you to. Many pain medicines have acetaminophen, which is Tylenol. Too much acetaminophen (Tylenol) can be harmful. · Plan to take a full dose of pain reliever before bedtime. Getting enough sleep will help you get better. · Try a warm, moist washcloth on the ear. It may help relieve pain. · If your doctor prescribed antibiotics, take them as directed. Do not stop taking them just because you feel better. You need to take the full course of antibiotics. When should you call for help?   Call your doctor now or seek immediate medical care if:    · You have new or increasing ear pain.     · You have new or increasing pus or blood draining from your ear.     · You have a fever with a stiff neck or a severe headache.    Watch closely for the ear two times a day for up to 5 days. ? Once the wax is loose and soft, all that is usually needed to remove it from the ear canal is a gentle, warm shower. Direct the water into the ear, then tip your head to let the earwax drain out. Dry your ear thoroughly with a hair dryer set on low. Hold the dryer several inches from your ear. ? If the warm mineral oil and shower do not work, use an over-the-counter wax softener. Read and follow all instructions on the label. After using the wax softener, use an ear syringe to gently flush the ear. Make sure the flushing solution is body temperature. Cool or hot fluids in the ear can cause dizziness. When should you call for help? Call your doctor now or seek immediate medical care if:    · Pus or blood drains from your ear.     · Your ears are ringing or feel full.     · You have a loss of hearing.    Watch closely for changes in your health, and be sure to contact your doctor if:    · You have pain or reduced hearing after 1 week of home treatment.     · You have any new symptoms, such as nausea or balance problems. Where can you learn more? Go to https://5173.com.[employer]. org and sign in to your [employer] account. Enter A989 in the [facility] box to learn more about \"Earwax Blockage: Care Instructions. \"     If you do not have an account, please click on the \"Sign Up Now\" link. Current as of: September 23, 2018  Content Version: 12.1  © 4927-7186 Healthwise, Incorporated. Care instructions adapted under license by [facility] [place] ([facility]). If you have questions about a medical condition or this instruction, always ask your healthcare professional. [place] [address] any warranty or liability for your use of this information. Patient given educational materials- see patient instructions. Discussed use, benefit, and side effects of prescribedmedications. All patient questions answered. Pt voiced understanding.

## 2019-08-30 NOTE — PATIENT INSTRUCTIONS
loosen the earwax with warm mineral oil. You also can try hydrogen peroxide mixed with an equal amount of room temperature water. Place 2 drops of the fluid, warmed to body temperature, in the ear two times a day for up to 5 days. ? Once the wax is loose and soft, all that is usually needed to remove it from the ear canal is a gentle, warm shower. Direct the water into the ear, then tip your head to let the earwax drain out. Dry your ear thoroughly with a hair dryer set on low. Hold the dryer several inches from your ear. ? If the warm mineral oil and shower do not work, use an over-the-counter wax softener. Read and follow all instructions on the label. After using the wax softener, use an ear syringe to gently flush the ear. Make sure the flushing solution is body temperature. Cool or hot fluids in the ear can cause dizziness. When should you call for help? Call your doctor now or seek immediate medical care if:    · Pus or blood drains from your ear.     · Your ears are ringing or feel full.     · You have a loss of hearing.    Watch closely for changes in your health, and be sure to contact your doctor if:    · You have pain or reduced hearing after 1 week of home treatment.     · You have any new symptoms, such as nausea or balance problems. Where can you learn more? Go to https://Dexter[employer]buzzing and Teads.[employer]rocket. org and sign in to your [employer] account. Enter N797 in the [facility] box to learn more about \"Earwax Blockage: Care Instructions. \"     If you do not have an account, please click on the \"Sign Up Now\" link. Current as of: September 23, 2018  Content Version: 12.1  © 3441-2062 Healthwise, Incorporated. Care instructions adapted under license by [facility] [employer] [facility] ([facility]). If you have questions about a medical condition or this instruction, always ask your healthcare professional. [address] any warranty or liability for your use of this information.

## 2019-09-17 ENCOUNTER — OFFICE VISIT (OUTPATIENT)
Dept: FAMILY MEDICINE CLINIC | Age: 69
End: 2019-09-17
Payer: MEDICARE

## 2019-09-17 ENCOUNTER — TELEPHONE (OUTPATIENT)
Dept: FAMILY MEDICINE CLINIC | Age: 69
End: 2019-09-17

## 2019-09-17 ENCOUNTER — TELEPHONE (OUTPATIENT)
Dept: OTOLARYNGOLOGY | Age: 69
End: 2019-09-17

## 2019-09-17 VITALS
DIASTOLIC BLOOD PRESSURE: 64 MMHG | SYSTOLIC BLOOD PRESSURE: 108 MMHG | OXYGEN SATURATION: 98 % | TEMPERATURE: 97.8 F | HEIGHT: 69 IN | HEART RATE: 60 BPM | RESPIRATION RATE: 18 BRPM | WEIGHT: 165 LBS | BODY MASS INDEX: 24.44 KG/M2

## 2019-09-17 DIAGNOSIS — H91.91 HEARING LOSS OF RIGHT EAR, UNSPECIFIED HEARING LOSS TYPE: ICD-10-CM

## 2019-09-17 DIAGNOSIS — I10 ESSENTIAL (PRIMARY) HYPERTENSION: ICD-10-CM

## 2019-09-17 DIAGNOSIS — H91.92 HEARING LOSS OF LEFT EAR, UNSPECIFIED HEARING LOSS TYPE: ICD-10-CM

## 2019-09-17 DIAGNOSIS — H72.91 RUPTURE OF RIGHT TYMPANIC MEMBRANE: Primary | ICD-10-CM

## 2019-09-17 PROCEDURE — 1036F TOBACCO NON-USER: CPT | Performed by: NURSE PRACTITIONER

## 2019-09-17 PROCEDURE — 4040F PNEUMOC VAC/ADMIN/RCVD: CPT | Performed by: NURSE PRACTITIONER

## 2019-09-17 PROCEDURE — 1123F ACP DISCUSS/DSCN MKR DOCD: CPT | Performed by: NURSE PRACTITIONER

## 2019-09-17 PROCEDURE — 3017F COLORECTAL CA SCREEN DOC REV: CPT | Performed by: NURSE PRACTITIONER

## 2019-09-17 PROCEDURE — G8420 CALC BMI NORM PARAMETERS: HCPCS | Performed by: NURSE PRACTITIONER

## 2019-09-17 PROCEDURE — G8598 ASA/ANTIPLAT THER USED: HCPCS | Performed by: NURSE PRACTITIONER

## 2019-09-17 PROCEDURE — 99213 OFFICE O/P EST LOW 20 MIN: CPT | Performed by: NURSE PRACTITIONER

## 2019-09-17 PROCEDURE — G8427 DOCREV CUR MEDS BY ELIG CLIN: HCPCS | Performed by: NURSE PRACTITIONER

## 2019-09-17 RX ORDER — FLUTICASONE PROPIONATE 50 MCG
2 SPRAY, SUSPENSION (ML) NASAL DAILY
Qty: 1 BOTTLE | Refills: 3 | Status: SHIPPED | OUTPATIENT
Start: 2019-09-17 | End: 2021-07-15 | Stop reason: SDUPTHER

## 2019-09-17 RX ORDER — OFLOXACIN 3 MG/ML
5 SOLUTION AURICULAR (OTIC) 2 TIMES DAILY
Qty: 10 ML | Refills: 0 | Status: SHIPPED | OUTPATIENT
Start: 2019-09-17 | End: 2019-09-27

## 2019-09-17 ASSESSMENT — ENCOUNTER SYMPTOMS
WHEEZING: 0
ABDOMINAL PAIN: 0
SHORTNESS OF BREATH: 0
NAUSEA: 0
SORE THROAT: 0
COUGH: 0
DIARRHEA: 0
CHEST TIGHTNESS: 0

## 2019-09-18 ENCOUNTER — TELEPHONE (OUTPATIENT)
Dept: OTOLARYNGOLOGY | Age: 69
End: 2019-09-18

## 2019-09-18 ENCOUNTER — TELEPHONE (OUTPATIENT)
Dept: FAMILY MEDICINE CLINIC | Age: 69
End: 2019-09-18

## 2019-09-18 NOTE — TELEPHONE ENCOUNTER
Mariano Ajayellie left a VM on the backline stating she wanted to get Raymond Landaverde in next week instead of the 15th. Left her a message to call the office.

## 2019-09-23 ENCOUNTER — TELEPHONE (OUTPATIENT)
Dept: FAMILY MEDICINE CLINIC | Age: 69
End: 2019-09-23

## 2019-09-24 ENCOUNTER — OFFICE VISIT (OUTPATIENT)
Dept: OTOLARYNGOLOGY | Age: 69
End: 2019-09-24
Payer: MEDICARE

## 2019-09-24 ENCOUNTER — PROCEDURE VISIT (OUTPATIENT)
Dept: OTOLARYNGOLOGY | Age: 69
End: 2019-09-24
Payer: MEDICARE

## 2019-09-24 VITALS
BODY MASS INDEX: 23.72 KG/M2 | HEART RATE: 61 BPM | WEIGHT: 160.13 LBS | DIASTOLIC BLOOD PRESSURE: 68 MMHG | SYSTOLIC BLOOD PRESSURE: 110 MMHG | HEIGHT: 69 IN | TEMPERATURE: 98.2 F

## 2019-09-24 DIAGNOSIS — H90.6 MIXED CONDUCTIVE AND SENSORINEURAL HEARING LOSS OF BOTH EARS: Primary | ICD-10-CM

## 2019-09-24 DIAGNOSIS — H69.83 EUSTACHIAN TUBE DYSFUNCTION, BILATERAL: ICD-10-CM

## 2019-09-24 DIAGNOSIS — H90.6 MIXED HEARING LOSS, BILATERAL: ICD-10-CM

## 2019-09-24 DIAGNOSIS — H65.493 CHRONIC MIDDLE EAR EFFUSION, BILATERAL: Primary | ICD-10-CM

## 2019-09-24 PROCEDURE — 92553 AUDIOMETRY AIR & BONE: CPT | Performed by: AUDIOLOGIST

## 2019-09-24 PROCEDURE — 92511 NASOPHARYNGOSCOPY: CPT | Performed by: OTOLARYNGOLOGY

## 2019-09-24 PROCEDURE — G8598 ASA/ANTIPLAT THER USED: HCPCS | Performed by: OTOLARYNGOLOGY

## 2019-09-24 PROCEDURE — 4040F PNEUMOC VAC/ADMIN/RCVD: CPT | Performed by: OTOLARYNGOLOGY

## 2019-09-24 PROCEDURE — 99202 OFFICE O/P NEW SF 15 MIN: CPT | Performed by: OTOLARYNGOLOGY

## 2019-09-24 PROCEDURE — G8427 DOCREV CUR MEDS BY ELIG CLIN: HCPCS | Performed by: OTOLARYNGOLOGY

## 2019-09-24 PROCEDURE — 92567 TYMPANOMETRY: CPT | Performed by: AUDIOLOGIST

## 2019-09-24 PROCEDURE — G8420 CALC BMI NORM PARAMETERS: HCPCS | Performed by: OTOLARYNGOLOGY

## 2019-09-24 PROCEDURE — 1123F ACP DISCUSS/DSCN MKR DOCD: CPT | Performed by: OTOLARYNGOLOGY

## 2019-09-24 PROCEDURE — 1036F TOBACCO NON-USER: CPT | Performed by: OTOLARYNGOLOGY

## 2019-09-24 PROCEDURE — 3017F COLORECTAL CA SCREEN DOC REV: CPT | Performed by: OTOLARYNGOLOGY

## 2019-09-24 NOTE — PROGRESS NOTES
None    Intimate partner violence:     Fear of current or ex partner: None     Emotionally abused: None     Physically abused: None     Forced sexual activity: None   Other Topics Concern    None   Social History Narrative    None     Past Medical History:   Diagnosis Date    Heart attack ([address]     two    Mini stroke ([address]     2     Past Surgical History:   Procedure Laterality Date    APPENDECTOMY      BACK SURGERY      TESTICLE REMOVAL      Just one         REVIEW OF SYSTEMS:  all other systems reviewed and are negative  General Health: see HPI/ problem list  Vestibular: denies related complaints  Ears: ear pain No Bilateral drainage No  Bilateral wax impaction History of wax accumulations in the past ear popping/ fullness Yes  Intermittent symptoms  Hearing: no change Right, hearing loss: Yes and worse:  Yes and hearing loss worse on left  Tinnitus: Yes Bilateral and constant  Nose: congestion: Yes  Allergic/ Immunologic: seasonal allergies: Yes  Throat: denies related complaints  Neck: lump(s)/ mass: No and thyroid problem: No       Comments:     PHYSICAL EXAM:    /68   Pulse 61   Temp 98.2 °F (36.8 °C)   Ht 5' 9\" (1.753 m)   Wt 160 lb 2 oz (72.6 kg)   BMI 23.65 kg/m²   Body mass index is 23.65 kg/m².     General Appearance: well developed  and well nourished  Head/ Face: normocephalic and atraumatic  Vocal Quality: hyponasal  Ears: Right Ear: External: external ears normal Otoscopy Ear Canal: canal clear Otoscopy TM: TM's dull, TM's sluggish and Mild attic retraction Left Ear: External: external ears normal Otoscopy Ear Canal: canal clear Otoscopy TM: TM's dull and TM's sluggish  Hearing: Rinne A<B: Left, Rinne A<B: Right, Air R<L and see audiogram  Nose: septum deviated Yes and Posterior spur to left and  Left and see endoscopy report  Oral:lips: normal teeth: edentulous full Oropharynx:normal tongue: normal   Tonsils: s/p tonsillectomy  Neck: negative and supple  Neuro: alert and oriented x3 and cranial nerves II- XII grossly intact  Psych/ Mood: cooperative and no depression, anxiety or agitation    Assessment & Plan:    Problem List Items Addressed This Visit        ENT Problems    Chronic middle ear effusion, bilateral - Primary     Bilateral middle ear fluid. By history right side mainly more long-standing. Mixed hearing loss with significant conductive component in both ears. Recommend BMT which can be performed as office procedure. Relevant Orders    [place] Nasopharyngoscopy (Completed)    Mixed hearing loss, bilateral     Significant sensorineural component seen likely due to patient's history of occupational noise exposure having worked for many years in a [address] [place] [address] [employer]. Large conductive component due to middle ear fluid  We will repeat hearing testing after both tubes have been placed               Orders Placed This Encounter   Procedures    OH Nasopharyngoscopy       No orders of the defined types were placed in this encounter. Please note that this chart was generated using dragon dictation software. Although every effort was made to ensure the accuracy of this automated transcription, some errors in transcription may have occurred.

## 2019-09-24 NOTE — ASSESSMENT & PLAN NOTE
Bilateral middle ear fluid. By history right side mainly more long-standing. Mixed hearing loss with significant conductive component in both ears. Recommend BMT which can be performed as office procedure.

## 2019-10-04 ENCOUNTER — TELEPHONE (OUTPATIENT)
Dept: FAMILY MEDICINE CLINIC | Age: 69
End: 2019-10-04

## 2019-10-08 ENCOUNTER — PROCEDURE VISIT (OUTPATIENT)
Dept: OTOLARYNGOLOGY | Age: 69
End: 2019-10-08
Payer: MEDICARE

## 2019-10-08 DIAGNOSIS — H65.493 CHRONIC MIDDLE EAR EFFUSION, BILATERAL: ICD-10-CM

## 2019-10-08 PROCEDURE — 69433 CREATE EARDRUM OPENING: CPT | Performed by: OTOLARYNGOLOGY

## 2019-10-21 ENCOUNTER — PROCEDURE VISIT (OUTPATIENT)
Dept: OTOLARYNGOLOGY | Age: 69
End: 2019-10-21
Payer: MEDICARE

## 2019-10-21 VITALS
TEMPERATURE: 98.4 F | DIASTOLIC BLOOD PRESSURE: 60 MMHG | SYSTOLIC BLOOD PRESSURE: 90 MMHG | HEIGHT: 69 IN | BODY MASS INDEX: 24.14 KG/M2 | HEART RATE: 55 BPM | WEIGHT: 163 LBS

## 2019-10-21 DIAGNOSIS — H65.493 CHRONIC MIDDLE EAR EFFUSION, BILATERAL: ICD-10-CM

## 2019-10-21 DIAGNOSIS — H90.6 MIXED HEARING LOSS, BILATERAL: ICD-10-CM

## 2019-10-21 PROCEDURE — G8598 ASA/ANTIPLAT THER USED: HCPCS | Performed by: OTOLARYNGOLOGY

## 2019-10-21 PROCEDURE — G8484 FLU IMMUNIZE NO ADMIN: HCPCS | Performed by: OTOLARYNGOLOGY

## 2019-10-21 PROCEDURE — G8427 DOCREV CUR MEDS BY ELIG CLIN: HCPCS | Performed by: OTOLARYNGOLOGY

## 2019-10-21 PROCEDURE — G8420 CALC BMI NORM PARAMETERS: HCPCS | Performed by: OTOLARYNGOLOGY

## 2019-10-21 PROCEDURE — 1036F TOBACCO NON-USER: CPT | Performed by: OTOLARYNGOLOGY

## 2019-10-21 PROCEDURE — 99212 OFFICE O/P EST SF 10 MIN: CPT | Performed by: OTOLARYNGOLOGY

## 2019-10-21 PROCEDURE — 1123F ACP DISCUSS/DSCN MKR DOCD: CPT | Performed by: OTOLARYNGOLOGY

## 2019-10-21 PROCEDURE — 4040F PNEUMOC VAC/ADMIN/RCVD: CPT | Performed by: OTOLARYNGOLOGY

## 2019-10-21 PROCEDURE — 3017F COLORECTAL CA SCREEN DOC REV: CPT | Performed by: OTOLARYNGOLOGY

## 2019-11-05 ENCOUNTER — OFFICE VISIT (OUTPATIENT)
Dept: OTOLARYNGOLOGY | Age: 69
End: 2019-11-05
Payer: MEDICARE

## 2019-11-05 ENCOUNTER — PROCEDURE VISIT (OUTPATIENT)
Dept: OTOLARYNGOLOGY | Age: 69
End: 2019-11-05
Payer: MEDICARE

## 2019-11-05 VITALS
BODY MASS INDEX: 25.03 KG/M2 | SYSTOLIC BLOOD PRESSURE: 100 MMHG | DIASTOLIC BLOOD PRESSURE: 60 MMHG | WEIGHT: 169 LBS | HEIGHT: 69 IN

## 2019-11-05 DIAGNOSIS — H90.3 SENSORINEURAL HEARING LOSS (SNHL) OF BOTH EARS: ICD-10-CM

## 2019-11-05 DIAGNOSIS — H65.493 CHRONIC MIDDLE EAR EFFUSION, BILATERAL: ICD-10-CM

## 2019-11-05 DIAGNOSIS — H65.493 CHRONIC MIDDLE EAR EFFUSION, BILATERAL: Primary | ICD-10-CM

## 2019-11-05 DIAGNOSIS — H90.6 MIXED HEARING LOSS, BILATERAL: ICD-10-CM

## 2019-11-05 PROCEDURE — 1036F TOBACCO NON-USER: CPT | Performed by: OTOLARYNGOLOGY

## 2019-11-05 PROCEDURE — G8484 FLU IMMUNIZE NO ADMIN: HCPCS | Performed by: OTOLARYNGOLOGY

## 2019-11-05 PROCEDURE — 92567 TYMPANOMETRY: CPT | Performed by: AUDIOLOGIST

## 2019-11-05 PROCEDURE — 92552 PURE TONE AUDIOMETRY AIR: CPT | Performed by: AUDIOLOGIST

## 2019-11-05 PROCEDURE — G8598 ASA/ANTIPLAT THER USED: HCPCS | Performed by: OTOLARYNGOLOGY

## 2019-11-05 PROCEDURE — 1123F ACP DISCUSS/DSCN MKR DOCD: CPT | Performed by: OTOLARYNGOLOGY

## 2019-11-05 PROCEDURE — G8420 CALC BMI NORM PARAMETERS: HCPCS | Performed by: OTOLARYNGOLOGY

## 2019-11-05 PROCEDURE — 4040F PNEUMOC VAC/ADMIN/RCVD: CPT | Performed by: OTOLARYNGOLOGY

## 2019-11-05 PROCEDURE — G8427 DOCREV CUR MEDS BY ELIG CLIN: HCPCS | Performed by: OTOLARYNGOLOGY

## 2019-11-05 PROCEDURE — 99212 OFFICE O/P EST SF 10 MIN: CPT | Performed by: OTOLARYNGOLOGY

## 2019-11-05 PROCEDURE — 3017F COLORECTAL CA SCREEN DOC REV: CPT | Performed by: OTOLARYNGOLOGY

## 2019-11-25 DIAGNOSIS — I10 ESSENTIAL (PRIMARY) HYPERTENSION: ICD-10-CM

## 2019-11-25 RX ORDER — RAMIPRIL 10 MG/1
CAPSULE ORAL
Qty: 90 CAPSULE | Refills: 0 | Status: SHIPPED | OUTPATIENT
Start: 2019-11-25 | End: 2020-06-16 | Stop reason: SDUPTHER

## 2020-01-08 DIAGNOSIS — E78.01 FAMILIAL HYPERCHOLESTEROLEMIA: ICD-10-CM

## 2020-01-08 DIAGNOSIS — Z12.5 ENCOUNTER FOR PROSTATE CANCER SCREENING: ICD-10-CM

## 2020-01-08 LAB
ALBUMIN SERPL-MCNC: 4.2 G/DL (ref 3.5–5.2)
ALP BLD-CCNC: 149 U/L (ref 40–130)
ALT SERPL-CCNC: 12 U/L (ref 5–41)
ANION GAP SERPL CALCULATED.3IONS-SCNC: 13 MMOL/L (ref 7–19)
AST SERPL-CCNC: 14 U/L (ref 5–40)
BILIRUB SERPL-MCNC: 0.3 MG/DL (ref 0.2–1.2)
BUN BLDV-MCNC: 17 MG/DL (ref 8–23)
CALCIUM SERPL-MCNC: 9 MG/DL (ref 8.8–10.2)
CHLORIDE BLD-SCNC: 105 MMOL/L (ref 98–111)
CHOLESTEROL, TOTAL: 118 MG/DL (ref 160–199)
CO2: 23 MMOL/L (ref 22–29)
CREAT SERPL-MCNC: 1.4 MG/DL (ref 0.5–1.2)
GFR NON-AFRICAN AMERICAN: 50
GLUCOSE BLD-MCNC: 104 MG/DL (ref 74–109)
HDLC SERPL-MCNC: 36 MG/DL (ref 55–121)
LDL CHOLESTEROL CALCULATED: 58 MG/DL
POTASSIUM SERPL-SCNC: 4.2 MMOL/L (ref 3.5–5)
PROSTATE SPECIFIC ANTIGEN: 1 NG/ML (ref 0–4)
SODIUM BLD-SCNC: 141 MMOL/L (ref 136–145)
TOTAL PROTEIN: 7.7 G/DL (ref 6.6–8.7)
TRIGL SERPL-MCNC: 121 MG/DL (ref 0–149)

## 2020-01-10 ENCOUNTER — OFFICE VISIT (OUTPATIENT)
Dept: FAMILY MEDICINE CLINIC | Age: 70
End: 2020-01-10
Payer: MEDICARE

## 2020-01-10 VITALS
HEART RATE: 90 BPM | DIASTOLIC BLOOD PRESSURE: 82 MMHG | BODY MASS INDEX: 25.03 KG/M2 | TEMPERATURE: 98.8 F | OXYGEN SATURATION: 98 % | WEIGHT: 169.5 LBS | SYSTOLIC BLOOD PRESSURE: 132 MMHG

## 2020-01-10 PROCEDURE — 1036F TOBACCO NON-USER: CPT | Performed by: FAMILY MEDICINE

## 2020-01-10 PROCEDURE — 99214 OFFICE O/P EST MOD 30 MIN: CPT | Performed by: FAMILY MEDICINE

## 2020-01-10 PROCEDURE — G8484 FLU IMMUNIZE NO ADMIN: HCPCS | Performed by: FAMILY MEDICINE

## 2020-01-10 PROCEDURE — 3017F COLORECTAL CA SCREEN DOC REV: CPT | Performed by: FAMILY MEDICINE

## 2020-01-10 PROCEDURE — G8427 DOCREV CUR MEDS BY ELIG CLIN: HCPCS | Performed by: FAMILY MEDICINE

## 2020-01-10 PROCEDURE — 1123F ACP DISCUSS/DSCN MKR DOCD: CPT | Performed by: FAMILY MEDICINE

## 2020-01-10 PROCEDURE — 4040F PNEUMOC VAC/ADMIN/RCVD: CPT | Performed by: FAMILY MEDICINE

## 2020-01-10 PROCEDURE — G8417 CALC BMI ABV UP PARAM F/U: HCPCS | Performed by: FAMILY MEDICINE

## 2020-01-10 RX ORDER — NITROGLYCERIN 0.4 MG/1
0.4 TABLET SUBLINGUAL EVERY 5 MIN PRN
Qty: 25 TABLET | Refills: 5 | Status: SHIPPED | OUTPATIENT
Start: 2020-01-10 | End: 2021-07-15 | Stop reason: SDUPTHER

## 2020-01-10 RX ORDER — TRAZODONE HYDROCHLORIDE 50 MG/1
TABLET ORAL
Qty: 60 TABLET | Refills: 5 | Status: SHIPPED | OUTPATIENT
Start: 2020-01-10 | End: 2020-03-11 | Stop reason: SDUPTHER

## 2020-01-10 NOTE — PATIENT INSTRUCTIONS
We are committed to providing you with the best care possible. In order to help us achieve these goals please remember to bring all medications, herbal products, and over the counter supplements with you to each visit. If your provider has ordered testing for you, please be sure to follow up with our office if you have not received results within 7 days after the testing took place. *If you receive a survey after visiting one of our offices, please take time to share your experience concerning your physician office visit. These surveys are confidential and no health information about you is shared.   We are eager to improve for you and we are counting on your feedback to help make that happen.    _______________________________________________________________

## 2020-01-10 NOTE — PROGRESS NOTES
quit: 2018     Years since quittin.9    Smokeless tobacco: Never Used   Substance Use Topics    Alcohol use: Yes     Comment: occasionally     Current Outpatient Medications   Medication Sig Dispense Refill    nitroGLYCERIN (NITROSTAT) 0.4 MG SL tablet Place 1 tablet under the tongue every 5 minutes as needed for Chest pain 25 tablet 5    traZODone (DESYREL) 50 MG tablet 1-2 tablets po qhs prn insomnia. 60 tablet 5    ramipril (ALTACE) 10 MG capsule TAKE 1 CAPSULE EVERY DAY 90 capsule 0    fluticasone (FLONASE) 50 MCG/ACT nasal spray 2 sprays by Each Nostril route daily 1 Bottle 3    atorvastatin (LIPITOR) 80 MG tablet Take 1 tablet by mouth daily 90 tablet 3    metoprolol succinate (TOPROL XL) 25 MG extended release tablet Take 0.5 tablets by mouth daily 90 tablet 3    albuterol sulfate HFA (VENTOLIN HFA) 108 (90 Base) MCG/ACT inhaler Inhale 2 puffs into the lungs every 6 hours as needed for Wheezing 1 Inhaler 3    aspirin EC 81 MG EC tablet Take 81 mg by mouth       No current facility-administered medications for this visit. No Known Allergies    Health Maintenance   Topic Date Due    AAA screen  1950    Hepatitis C screen  1950    Diabetes screen  1989    Colon cancer screen colonoscopy  1999    Flu vaccine (1) 2019    DTaP/Tdap/Td vaccine (1 - Tdap) 01/10/2021 (Originally 1961)    Shingles Vaccine (1 of 2) 01/10/2021 (Originally 1999)    Annual Wellness Visit (AWV)  07/10/2020    Lipid screen  2021    Potassium monitoring  2021    Creatinine monitoring  2021    Pneumococcal 65+ years Vaccine  Completed       Subjective:      Review of Systems  Review of Systems   Constitutional: Negative for chills and fever. HENT: Negative for congestion. Respiratory: Negative for cough, chest tightness and shortness of breath. Cardiovascular: Negative for chest pain, palpitations and leg swelling.    Gastrointestinal: Negative for abdominal pain, anal bleeding, constipation, diarrhea and nausea. Genitourinary: Negative for difficulty urinating. Psychiatric/Behavioral: Negative. SeeHPI for visit specific review of symptoms. All others negative      Objective:   /82   Pulse 90   Temp 98.8 °F (37.1 °C)   Wt 169 lb 8 oz (76.9 kg)   SpO2 98%   BMI 25.03 kg/m²   Physical Exam  Physical Exam   Constitutional: He appears well-developed. Does not appear ill. Eyes: Pupils are equal, round, and reactive to light. Conjunctiva and Lids normal.  Neck: Normal range of motion. Neck supple. No masses. Neck Symmetric. Normal tracheal position. No thyroid enlargement  Cardiovascular: Normal rate and regular rhythm. Exam reveals no friction rub. Carotid arteries: no bruit observed. No murmur heard. Respiratory:  Effort normal and breath sounds normal. No respiratory distress. No wheezes. No rales. No use of accessory muscles or intercostal retractions. Abdominal: Soft. Bowel sounds are normal. exhibits no distension. There is no tenderness. There is no rebound and no guarding. Musculoskeletal: exhibits no edema. Normal gait. Neurological: alert. Psychiatric: normal mood and affect. His behavior is normal. Normal judgement and insight observed.       Recent Results (from the past 672 hour(s))   Psa screening    Collection Time: 01/08/20 10:45 AM   Result Value Ref Range    PSA 1.00 0.00 - 4.00 ng/mL   Lipid Panel    Collection Time: 01/08/20 10:45 AM   Result Value Ref Range    Cholesterol, Total 118 (L) 160 - 199 mg/dL    Triglycerides 121 0 - 149 mg/dL    HDL 36 (L) 55 - 121 mg/dL    LDL Calculated 58 <100 mg/dL   Comprehensive Metabolic Panel    Collection Time: 01/08/20 10:45 AM   Result Value Ref Range    Sodium 141 136 - 145 mmol/L    Potassium 4.2 3.5 - 5.0 mmol/L    Chloride 105 98 - 111 mmol/L    CO2 23 22 - 29 mmol/L    Anion Gap 13 7 - 19 mmol/L    Glucose 104 74 - 109 mg/dL    BUN 17 8 - 23 mg/dL    CREATININE 1.4 (H) 0.5 - 1.2 mg/dL    GFR Non-African American 50 (A) >60    Calcium 9.0 8.8 - 10.2 mg/dL    Total Protein 7.7 6.6 - 8.7 g/dL    Alb 4.2 3.5 - 5.2 g/dL    Total Bilirubin 0.3 0.2 - 1.2 mg/dL    Alkaline Phosphatase 149 (H) 40 - 130 U/L    ALT 12 5 - 41 U/L    AST 14 5 - 40 U/L               Assessment & Plan: The following diagnoses and conditions are stable with no further orders unless indicated:  1. Familial hypercholesterolemia  Lab Results   Component Value Date    CHOL 118 (L) 01/08/2020    CHOL 132 (L) 01/07/2019    CHOL 100 (L) 01/04/2018     Lab Results   Component Value Date    TRIG 121 01/08/2020    TRIG 125 01/07/2019    TRIG 88 01/04/2018     Lab Results   Component Value Date    HDL 36 (L) 01/08/2020    HDL 34 (L) 01/07/2019    HDL 41 (L) 01/04/2018     Lab Results   Component Value Date    LDLCALC 58 01/08/2020    LDLCALC 73 01/07/2019    LDLCALC 41 01/04/2018     No results found for: LABVLDL, VLDL  No results found for: CHOLHDLRATIO  Cholesterol improved since last visit. Encouraged proper diet. Continue with Lipitor    2. Essential (primary) hypertension  BP Readings from Last 3 Encounters:   01/10/20 132/82   11/05/19 100/60   10/21/19 90/60     Stable    3. Coronary artery disease involving native coronary artery of native heart without angina pectoris  Has not needed nitroglycerin since his last visit. Continue with cardiovascular risk reduction. 4. CKD (chronic kidney disease), stage III [facility])  Lab Results   Component Value Date    CREATININE 1.4 (H) 01/08/2020     Lab Results   Component Value Date    BUN 17 01/08/2020         Reinforced goals of adequate hydration. Recommended he avoid NSAIDs such as naproxen and ibuprofen. Check the following labs prior to next visit  - Comprehensive Metabolic Panel; Future  - CBC Auto Differential; Future  - PTH, Intact; Future  - Phosphorus; Future  - Iron; Future  - Ferritin; Future  - Uric Acid; Future  - Urinalysis;  Future  -

## 2020-03-11 ENCOUNTER — PATIENT MESSAGE (OUTPATIENT)
Dept: FAMILY MEDICINE CLINIC | Age: 70
End: 2020-03-11

## 2020-03-11 RX ORDER — TRAZODONE HYDROCHLORIDE 50 MG/1
TABLET ORAL
Qty: 180 TABLET | Refills: 1 | Status: SHIPPED | OUTPATIENT
Start: 2020-03-11 | End: 2020-12-18

## 2020-03-24 ENCOUNTER — PATIENT MESSAGE (OUTPATIENT)
Dept: FAMILY MEDICINE CLINIC | Age: 70
End: 2020-03-24

## 2020-03-25 RX ORDER — ALBUTEROL SULFATE 90 UG/1
2 AEROSOL, METERED RESPIRATORY (INHALATION) EVERY 6 HOURS PRN
Qty: 3 INHALER | Refills: 2 | Status: SHIPPED | OUTPATIENT
Start: 2020-03-25 | End: 2021-07-15 | Stop reason: SDUPTHER

## 2020-03-25 NOTE — TELEPHONE ENCOUNTER
From: Arabella Malave  To: Diogo Webber MD  Sent: 3/24/2020 5:42 PM CDT  Subject: Prescription Question    I need a new prescription for my Ventolin HFA inhaler Sent to [address] please.  Thank you

## 2020-05-05 ENCOUNTER — OFFICE VISIT (OUTPATIENT)
Dept: OTOLARYNGOLOGY | Age: 70
End: 2020-05-05
Payer: MEDICARE

## 2020-05-05 VITALS
HEIGHT: 70 IN | SYSTOLIC BLOOD PRESSURE: 138 MMHG | WEIGHT: 170 LBS | BODY MASS INDEX: 24.34 KG/M2 | DIASTOLIC BLOOD PRESSURE: 70 MMHG

## 2020-05-05 PROBLEM — Z96.22 S/P BILATERAL MYRINGOTOMY WITH TUBE PLACEMENT: Status: ACTIVE | Noted: 2020-05-05

## 2020-05-05 PROBLEM — H65.493 CHRONIC MIDDLE EAR EFFUSION, BILATERAL: Status: RESOLVED | Noted: 2019-09-24 | Resolved: 2020-05-05

## 2020-05-05 PROCEDURE — 1036F TOBACCO NON-USER: CPT | Performed by: OTOLARYNGOLOGY

## 2020-05-05 PROCEDURE — 3017F COLORECTAL CA SCREEN DOC REV: CPT | Performed by: OTOLARYNGOLOGY

## 2020-05-05 PROCEDURE — 1123F ACP DISCUSS/DSCN MKR DOCD: CPT | Performed by: OTOLARYNGOLOGY

## 2020-05-05 PROCEDURE — 4040F PNEUMOC VAC/ADMIN/RCVD: CPT | Performed by: OTOLARYNGOLOGY

## 2020-05-05 PROCEDURE — 99212 OFFICE O/P EST SF 10 MIN: CPT | Performed by: OTOLARYNGOLOGY

## 2020-05-05 PROCEDURE — G8427 DOCREV CUR MEDS BY ELIG CLIN: HCPCS | Performed by: OTOLARYNGOLOGY

## 2020-05-05 PROCEDURE — G8420 CALC BMI NORM PARAMETERS: HCPCS | Performed by: OTOLARYNGOLOGY

## 2020-05-05 NOTE — ASSESSMENT & PLAN NOTE
Status post BMT here in office October 2019  Tube still patent dry in good position and apparently functioning well

## 2020-05-05 NOTE — PROGRESS NOTES
TESTICLE REMOVAL      Just one         REVIEW OF SYSTEMS:  all other systems reviewed and are negative  General Health: no change in health status since last visit  Ears: ear pain No Bilateral recent drainage No  Bilateral       Comments:     PHYSICAL EXAM:    /70   Ht 5' 10\" (1.778 m)   Wt 170 lb (77.1 kg)   BMI 24.39 kg/m²   Body mass index is 24.39 kg/m². General Appearance: well developed , well nourished and no distress  Head/ Face: normocephalic and atraumatic  Vocal Quality: good/ normal  Ears: Right Ear: External: external ears normal Otoscopy Ear Canal: cerumen Otoscopy TM: TM's normal and ear tubes:  patent dry good position Left Ear: External: external ears normal Otoscopy Ear Canal: cerumen Otoscopy TM: TM's normal and ear tubes:  patent dry good position  Hearing: grossly intact  Psych/ Mood: cooperative and no depression, anxiety or agitation    Assessment & Plan:    Problem List Items Addressed This Visit        ENT Problems    RESOLVED: Chronic middle ear effusion, bilateral     Both middle ear spaces remain clear status post BMT            Other    S/p bilateral myringotomy with tube placement     Status post BMT here in office October 2019  Tube still patent dry in good position and apparently functioning well               No orders of the defined types were placed in this encounter. No orders of the defined types were placed in this encounter. Please note that this chart was generated using dragon dictation software. Although every effort was made to ensure the accuracy of this automated transcription, some errors in transcription may have occurred.

## 2020-06-16 RX ORDER — RAMIPRIL 10 MG/1
CAPSULE ORAL
Qty: 90 CAPSULE | Refills: 0 | Status: SHIPPED | OUTPATIENT
Start: 2020-06-16 | End: 2020-09-11

## 2020-06-19 RX ORDER — METOPROLOL SUCCINATE 25 MG/1
TABLET, EXTENDED RELEASE ORAL
Qty: 45 TABLET | Refills: 3 | Status: SHIPPED | OUTPATIENT
Start: 2020-06-19 | End: 2021-07-15 | Stop reason: SDUPTHER

## 2020-07-06 DIAGNOSIS — R79.9 ABNORMAL FINDING OF BLOOD CHEMISTRY, UNSPECIFIED: ICD-10-CM

## 2020-07-06 DIAGNOSIS — Z11.59 NEED FOR HEPATITIS C SCREENING TEST: ICD-10-CM

## 2020-07-06 DIAGNOSIS — N18.30 CKD (CHRONIC KIDNEY DISEASE), STAGE III (HCC): ICD-10-CM

## 2020-07-06 LAB
ALBUMIN SERPL-MCNC: 4.2 G/DL (ref 3.5–5.2)
ALP BLD-CCNC: 150 U/L (ref 40–130)
ALT SERPL-CCNC: 18 U/L (ref 5–41)
ANION GAP SERPL CALCULATED.3IONS-SCNC: 15 MMOL/L (ref 7–19)
AST SERPL-CCNC: 17 U/L (ref 5–40)
BASOPHILS ABSOLUTE: 0 K/UL (ref 0–0.2)
BASOPHILS RELATIVE PERCENT: 0.5 % (ref 0–1)
BILIRUB SERPL-MCNC: 0.4 MG/DL (ref 0.2–1.2)
BILIRUBIN URINE: NEGATIVE
BLOOD, URINE: NEGATIVE
BUN BLDV-MCNC: 25 MG/DL (ref 8–23)
CALCIUM SERPL-MCNC: 8.7 MG/DL (ref 8.8–10.2)
CHLORIDE BLD-SCNC: 104 MMOL/L (ref 98–111)
CLARITY: CLEAR
CO2: 19 MMOL/L (ref 22–29)
COLOR: YELLOW
CREAT SERPL-MCNC: 1.4 MG/DL (ref 0.5–1.2)
EOSINOPHILS ABSOLUTE: 0.2 K/UL (ref 0–0.6)
EOSINOPHILS RELATIVE PERCENT: 3 % (ref 0–5)
FERRITIN: 138.4 NG/ML (ref 30–400)
GFR NON-AFRICAN AMERICAN: 50
GLUCOSE BLD-MCNC: 91 MG/DL (ref 74–109)
GLUCOSE URINE: NEGATIVE MG/DL
HCT VFR BLD CALC: 40.8 % (ref 42–52)
HEMOGLOBIN: 13.5 G/DL (ref 14–18)
HEPATITIS C ANTIBODY INTERPRETATION: NORMAL
IMMATURE GRANULOCYTES #: 0 K/UL
IRON: 101 UG/DL (ref 59–158)
KETONES, URINE: NEGATIVE MG/DL
LEUKOCYTE ESTERASE, URINE: NEGATIVE
LYMPHOCYTES ABSOLUTE: 2 K/UL (ref 1.1–4.5)
LYMPHOCYTES RELATIVE PERCENT: 35.7 % (ref 20–40)
MCH RBC QN AUTO: 30.8 PG (ref 27–31)
MCHC RBC AUTO-ENTMCNC: 33.1 G/DL (ref 33–37)
MCV RBC AUTO: 93.2 FL (ref 80–94)
MONOCYTES ABSOLUTE: 0.5 K/UL (ref 0–0.9)
MONOCYTES RELATIVE PERCENT: 8 % (ref 0–10)
NEUTROPHILS ABSOLUTE: 3 K/UL (ref 1.5–7.5)
NEUTROPHILS RELATIVE PERCENT: 52.6 % (ref 50–65)
NITRITE, URINE: NEGATIVE
PARATHYROID HORMONE INTACT: 71.2 PG/ML (ref 15–65)
PDW BLD-RTO: 13.2 % (ref 11.5–14.5)
PH UA: 6 (ref 5–8)
PHOSPHORUS: 2.2 MG/DL (ref 2.5–4.5)
PLATELET # BLD: 174 K/UL (ref 130–400)
PMV BLD AUTO: 10.2 FL (ref 9.4–12.4)
POTASSIUM SERPL-SCNC: 4.4 MMOL/L (ref 3.5–5)
PROTEIN UA: ABNORMAL MG/DL
RBC # BLD: 4.38 M/UL (ref 4.7–6.1)
SODIUM BLD-SCNC: 138 MMOL/L (ref 136–145)
SPECIFIC GRAVITY UA: 1.02 (ref 1–1.03)
TOTAL PROTEIN: 7.1 G/DL (ref 6.6–8.7)
URIC ACID, SERUM: 8.3 MG/DL (ref 3.4–7)
UROBILINOGEN, URINE: 0.2 E.U./DL
WBC # BLD: 5.7 K/UL (ref 4.8–10.8)

## 2020-07-08 LAB — TRANSFERRIN: 206 MG/DL (ref 200–400)

## 2020-07-10 ENCOUNTER — VIRTUAL VISIT (OUTPATIENT)
Dept: FAMILY MEDICINE CLINIC | Age: 70
End: 2020-07-10
Payer: MEDICARE

## 2020-07-10 ENCOUNTER — VIRTUAL VISIT (OUTPATIENT)
Dept: FAMILY MEDICINE CLINIC | Age: 70
End: 2020-07-10

## 2020-07-10 VITALS
SYSTOLIC BLOOD PRESSURE: 118 MMHG | HEIGHT: 69 IN | WEIGHT: 168 LBS | BODY MASS INDEX: 24.88 KG/M2 | DIASTOLIC BLOOD PRESSURE: 68 MMHG | OXYGEN SATURATION: 99 % | HEART RATE: 72 BPM

## 2020-07-10 PROCEDURE — G0439 PPPS, SUBSEQ VISIT: HCPCS | Performed by: FAMILY MEDICINE

## 2020-07-10 PROCEDURE — 99443 PR PHYS/QHP TELEPHONE EVALUATION 21-30 MIN: CPT | Performed by: FAMILY MEDICINE

## 2020-07-10 PROCEDURE — 3017F COLORECTAL CA SCREEN DOC REV: CPT | Performed by: FAMILY MEDICINE

## 2020-07-10 PROCEDURE — 4040F PNEUMOC VAC/ADMIN/RCVD: CPT | Performed by: FAMILY MEDICINE

## 2020-07-10 PROCEDURE — 1123F ACP DISCUSS/DSCN MKR DOCD: CPT | Performed by: FAMILY MEDICINE

## 2020-07-10 RX ORDER — ATORVASTATIN CALCIUM 80 MG/1
80 TABLET, FILM COATED ORAL DAILY
Qty: 90 TABLET | Refills: 3 | Status: SHIPPED | OUTPATIENT
Start: 2020-07-10 | End: 2021-07-15 | Stop reason: SDUPTHER

## 2020-07-10 ASSESSMENT — PATIENT HEALTH QUESTIONNAIRE - PHQ9
SUM OF ALL RESPONSES TO PHQ QUESTIONS 1-9: 0
SUM OF ALL RESPONSES TO PHQ QUESTIONS 1-9: 0

## 2020-07-10 NOTE — PATIENT INSTRUCTIONS
We are committed to providing you with the best care possible. In order to help us achieve these goals please remember to bring all medications, herbal products, and over the counter supplements with you to each visit. If your provider has ordered testing for you, please be sure to follow up with our office if you have not received results within 7 days after the testing took place. *If you receive a survey after visiting one of our offices, please take time to share your experience concerning your physician office visit. These surveys are confidential and no health information about you is shared. We are eager to improve for you and we are counting on your feedback to help make that happen.  _______________________________________________________________         Advance Care Planning: Care Instructions  Your Care Instructions    It can be hard to live with an illness that cannot be cured. But if your health is getting worse, you may want to make decisions about end-of-life care. Planning for the end of your life does not mean that you are giving up. It is a way to make sure that your wishes are met. Clearly stating your wishes can make it easier for your loved ones. Making plans while you are still able may also ease your mind and make your final days less stressful and more meaningful. Follow-up care is a key part of your treatment and safety. Be sure to make and go to all appointments, and call your doctor if you are having problems. It's also a good idea to know your test results and keep a list of the medicines you take. What can you do to plan for the end of life? · Visit:  https://[place].gov/consumer-protection/livingwills  · You can bring these issues up with your doctor. You do not need to wait until your doctor starts the conversation. You might start with \"I would not be willing to live with . Maki Pee Maki Pee Maki Pee \" When you complete this sentence it helps your doctor understand your wishes.   · Talk openly and honestly with your doctor. This is the best way to understand the decisions you will need to make as your health changes. Know that you can always change your mind. · Ask your doctor about commonly used life-support measures. These include tube feedings, breathing machines, and fluids given through a vein (IV). Understanding these treatments will help you decide whether you want them. · You may choose to have these life-supporting treatments for a limited time. This allows a trial period to see whether they will help you. You may also decide that you want your doctor to take only certain measures to keep you alive. It is important to spell out these conditions so that your doctor and family understand them. · Talk to your doctor about how long you are likely to live. He or she may be able to give you an idea of what usually happens with your specific illness. · Think about preparing papers that state your wishes. This way there will not be any confusion about what you want. You can change your instructions at any time. Which papers should you prepare? Advance directives are legal papers that tell doctors how you want to be cared for at the end of your life. You do not need a  to write these papers. Ask your doctor or your state health department for information on how to write your advance directives. They may have the forms for each of these types of papers. Make sure your doctor has a copy of these on file, and give a copy to a family member or close friend. · Consider a do-not-resuscitate order (DNR). This order asks that no extra treatments be done if your heart stops or you stop breathing. Extra treatments may include cardiopulmonary resuscitation (CPR), electrical shock to restart your heart, or a machine to breathe for you. If you decide to have a DNR order, ask your doctor to explain and write it. Place the order in your home where everyone can easily see it. · Consider a living will.  A living will explains your wishes about life support and other treatments at the end of your life if you become unable to speak for yourself. Living hinson tell doctors to use or not use treatments that would keep you alive. You must have one or two witnesses or a notary present when you sign this form. · Consider a durable power of  for health care. This allows you to name a person to make decisions about your care if you are not able to. Most people ask a close friend or family member. Talk to this person about the kinds of treatments you want and those that you do not want. Make sure this person understands your wishes. These legal papers are simple to change. Tell your doctor what you want to change, and ask him or her to make a note in your medical file. Give your family updated copies of the papers. Where can you learn more? Go to https://chpepiceweb.[employer] org and sign in to your A[employer] account. Enter P184 in the Sellobuy box to learn more about \"Advance Care Planning: Care Instructions. \"     If you do not have an account, please click on the \"Sign Up Now\" link. Current as of: September 24, 2016  Content Version: 11.5  © 8697-6583 Healthwise, [employer]. Care instructions adapted under license by [facility] ([facility]). If you have questions about a medical condition or this instruction, always ask your healthcare professional. [place] [address] any warranty or liability for your use of this information. Learning About Living Perroy  What is a living will? A living will is a legal form you use to write down the kind of care you want at the end of your life. It is used by the health professionals who will treat you if you aren't able to decide for yourself. If you put your wishes in writing, your loved ones and others will know what kind of care you want. They won't need to guess. This can ease your mind and be helpful to others.   A living will is not the same as an estate or property will. An estate will explains what you want to happen with your money and property after you die. Is a living will a legal document? A living will is a legal document. Each state has its own laws about living hinson. If you move to another state, make sure that your living will is legal in the state where you now live. Or you might use a universal form that has been approved by many states. This kind of form can sometimes be completed and stored online. Your electronic copy will then be available wherever you have a connection to the Internet. In most cases, doctors will respect your wishes even if you have a form from a different state. · You don't need an  to complete a living will. But legal advice can be helpful if your state's laws are unclear, your health history is complicated, or your family can't agree on what should be in your living will. · You can change your living will at any time. Some people find that their wishes about end-of-life care change as their health changes. · In addition to making a living will, think about completing a medical power of  form. This form lets you name the person you want to make end-of-life treatment decisions for you (your \"health care agent\") if you're not able to. Many hospitals and nursing homes will give you the forms you need to complete a living will and a medical power of . · Your living will is used only if you can't make or communicate decisions for yourself anymore. If you become able to make decisions again, you can accept or refuse any treatment, no matter what you wrote in your living will. · Your state may offer an online registry. This is a place where you can store your living will online so the doctors and nurses who need to treat you can find it right away. What should you think about when creating a living will? Talk about your end-of-life wishes with your family members and your doctor.  Let them know what you want. That way the people making decisions for you won't be surprised by your choices. Think about these questions as you make your living will:  · Do you know enough about life support methods that might be used? If not, talk to your doctor so you know what might be done if you can't breathe on your own, your heart stops, or you're unable to swallow. · What things would you still want to be able to do after you receive life-support methods? Would you want to be able to walk? To speak? To eat on your own? To live without the help of machines? · If you have a choice, where do you want to be cared for? In your home? At a hospital or nursing home? · Do you want certain Restorationism practices performed if you become very ill? · If you have a choice at the end of your life, where would you prefer to die? At home? In a hospital or nursing home? Somewhere else? · Would you prefer to be buried or cremated? · Do you want your organs to be donated after you die? What should you do with your living will? · Make sure that your family members and your health care agent have copies of your living will. · Give your doctor a copy of your living will to keep in your medical record. If you have more than one doctor, make sure that each one has a copy. · You may want to put a copy of your living will where it can be easily found. Where can you learn more? Go to https://Cash'o [employer] Butcherpepauletteewsera.Postcron. org and sign in to your [employer] account. Enter M897 in the [facility] [place] box to learn more about \"Learning About Living Perroy. \"     If you do not have an account, please click on the \"Sign Up Now\" link. Current as of: September 24, 2016  Content Version: 11.5  © 2099-8315 Healthwise, Incorporated. Care instructions adapted under license by [facility] ([facility]).  If you have questions about a medical condition or this instruction, always ask your healthcare professional. James Richards any warranty or liability for your use of this information. Learning About Durable Power of  for Health Care  What is a durable power of  for health care? A durable power of  for health care is one type of the legal forms called advance directives. It lets you decide who you want to make treatment decisions for you if you cannot speak or decide for yourself. The person you choose is called your health care agent. Another type of advance directive is a living will. It lets you write down what kinds of treatment or life support you want or do not want. What should you think about when choosing a health care agent? Choose your health care agent carefully. This person may or may not be a family member. Talk to the person before you make your final decision. Make sure he or she is comfortable with this responsibility. It's a good idea to choose someone who:  · Is at least 25years old. · Knows you well and understands what makes life meaningful for you. · Understands your Gnosticism and moral values. · Will do what you want, not what he or she wants. · Will be able to make difficult choices at a stressful time. · Will be able to refuse or stop treatment, if that is what you would want, even if you could die. · Will be firm and confident with health professionals if needed. · Will ask questions to get necessary information. · Lives near you or agrees to travel to you if needed. Your family may help you make medical decisions while you can still be part of that process. But it is important to choose one person to be your health care agent in case you are not able to make decisions for yourself. If you don't fill out the legal form and name a health care agent, the decisions your family can make may be limited. Who will make decisions for you if you do not have a health care agent?   If you don't have a health care agent or a living will, your family members may disagree about your medical care. And then some medical professionals who may not know you as well might have to make decisions for you. In some cases, a  makes the decisions. When you name a health care agent, it is very clear who has the power to make health decisions for you. How do you name a health care agent? You name your health care agent on a legal form. It is usually called a durable power of  for health care. Ask your hospital, state bar association, or office on aging where to find these forms. You must sign the form to make it legal. Some states require you to get the form notarized. This means that a person called a  watches you sign the form and then he or she signs the form. Some states also require that two or more witnesses sign the form. Be sure to tell your family members and doctors who your health care agent is. Keep your forms in a safe place. But make sure that your loved ones know where the forms are. This could be in your desk where you keep other important papers. Make sure your doctor has a copy of your forms. Where can you learn more? Go to https://chpepiceweb.Abloomy. org and sign in to your [employer] account. Enter [id number] in the Plibber box to learn more about \"Learning About Durable Power of  for Health Care. \"     If you do not have an account, please click on the \"Sign Up Now\" link. Current as of: September 24, 2016  Content Version: 11.5  © 3108-4576 Healthwise, Incorporated. Care instructions adapted under license by [facility] ([facility]). If you have questions about a medical condition or this instruction, always ask your healthcare professional. [place] [address] any warranty or liability for your use of this information. _______________________________________________________________    Home Safety Tips    Each year, thousands of older Americans fall at home. Many of them are seriously injured, and some are disabled.  In , more than 8,500 people over age 72  because of falls. Falls are often due to hazards that are easy to overlook but easy to fix. This checklist will help you find and fix those hazards in your home. The checklist asks about hazards found in each room of your home. For each hazard, the checklist tells you how to fix the problem. At the end of the checklist, you will find other tips for preventing falls. o Look at the floor in each room  o When he walks through room do you have to walk around furniture? - Ask someone to move the furniture to clear your past  o You have throw rugs on the floor? - Remove the rugs or use double-sided tape or nonslip backing on the rugs so they dont slip  o Are papers, magazines, books, shoes, boxes, blankets, towels, or other objects on the floor?  -  things that are on the floor. Always keep objects off the floor  o Do you have to walk over or around cords or wires (like cords from lamps, extension cords, or telephone cords)? - Coil or tape cords and wires next to the wall so you cant trip over them. Have an  put in another outlet. o Are papers, shoes, books, or other objects on the stairs? -  things on the stairs. Always keep objects off the stairs. o Are some steps broken or uneven?   - Fix loose or uneven steps. o Are you missing a light over the stairway?   - Have a  or an  put in an overhead light at the top and bottom of the stairs. o Has the stairway light bulb burned out?   - Have a friend or family member change the light bulb.   o Do you have only one light switch for your stairs (only at the top or at the bottom of the stairs)? - Have a  or an  put in a light switch at the top and bottom of the stairs. You can get light switches that glow  o Are the handrails loose or broken? Is there a handrail on only one side of the stairs? - Fix loose handrails or put in new ones.  Make sure handrails are on both sides of the stairs and are as long as the stairs. o Is the carpet on the steps loose or torn? - Make sure the carpet is firmly attached to every step or remove the carpet and attach non-slip rubber treads on the stairs. o Look at your kitchen and eating Look at your kitchen and eating area. Are the things you use often on high shelves? - Move items in your cabinets. Keep things you use often on the lower shelves (about waist high). - Is your step stool unsteady? - Get a new, steady step stool with a bar to hold on to. Never use a chair as a step stool.   - Is the light near the bed hard to reach?   - Place a lamp close to the bed where it is easy to reach.  o Is the path from your bed to the bathroom dark?   - Put in a night-light so you can see where youre walking. Some nightlights go on by themselves after dark  o Is the tub or shower floor slippery?   - Put a non-slip rubber mat or selfstick strips on the floor of the tub or shower. o Do you have some support when you get in and out of the tub or up from the toilet?   - Have a  or a marquis put in a grab bar inside the tub and next to the toilet.  o Exercise regularly. Exercise makes you stronger and improves your balance and coordination. o Have your doctor or pharmacist look at all the medicines you take, even over-the-counter medicines. Some medicines can make you sleepy or dizzy. o Have your vision checked at least once a year by an eye doctor. Poor vision can increase your risk of falling.   o Get up slowly after you sit or lie down.  o Wear sturdy shoes with thin, non-slip soles. Avoid slippers and running shoes with thick soles. o Improve the lighting in your home. Use brighter light bulbs (at least 60 [place]). Use lamp shades or frosted bulbs to reduce glare. o Use reflecting tape at the top and bottom of the stairs so you can see them better.    o Paint doorsills a different color to prevent tripping.  o Keep emergency numbers in large print near each phone.   o Put a phone near the floor in case you fall and cant get up.   o Think about wearing an alarm device that will bring help in case you fall and cant get up.    www.cdc.gov/safeusa

## 2020-07-10 NOTE — PROGRESS NOTES
Suellen De Luna is a 69 y.o. male evaluated via telephone on 7/10/2020. Consent:  He and/or health care decision maker is aware that that he may receive a bill for this telephone service, depending on his insurance coverage, and has provided verbal consent to proceed: Yes      Documentation:  I communicated with the patient and/or health care decision maker about the following:  Details of this discussion including any medical advice provided:    S: Hyperlipidemia  Tolerating current cholesterol medication without side effects. No body aches. Attempting to reduce processed sugar and cholesterol from diet. Hypertension  Compliant with medications. No adverse effects from medication. No lightheadedness, palpitations, or chest pain. Insomnia  Currently stable. Satisfied with current treatment regimen. No adverse effects from medication. Review of Systems   Constitutional: Negative for chills and fever. HENT: Negative for congestion. Respiratory: Negative for cough, chest tightness and shortness of breath. Cardiovascular: Negative for chest pain, palpitations and leg swelling. Gastrointestinal: Negative for abdominal pain, anal bleeding, constipation, diarrhea and nausea. Genitourinary: Negative for difficulty urinating. Psychiatric/Behavioral: Negative. O: no verbal distress    Assessment/Plan  1. Hypercholesterolemia  Lab Results   Component Value Date    CHOL 118 (L) 01/08/2020    CHOL 132 (L) 01/07/2019    CHOL 100 (L) 01/04/2018     Lab Results   Component Value Date    TRIG 121 01/08/2020    TRIG 125 01/07/2019    TRIG 88 01/04/2018     Lab Results   Component Value Date    HDL 36 (L) 01/08/2020    HDL 34 (L) 01/07/2019    HDL 41 (L) 01/04/2018     Lab Results   Component Value Date    LDLCALC 58 01/08/2020    LDLCALC 73 01/07/2019    LDLCALC 41 01/04/2018     No results found for: LABVLDL, VLDL  No results found for: CHOLHDLRATIO  Recheck next visit    2.  CKD (chronic quit: 2018     Years since quittin.4    Smokeless tobacco: Never Used   Substance Use Topics    Alcohol use: Yes     Comment: occasionally     Current Outpatient Medications   Medication Sig Dispense Refill    atorvastatin (LIPITOR) 80 MG tablet Take 1 tablet by mouth daily 90 tablet 3    metoprolol succinate (TOPROL XL) 25 MG extended release tablet TAKE 1/2 TABLET EVERY DAY 45 tablet 3    ramipril (ALTACE) 10 MG capsule TAKE 1 CAPSULE EVERY DAY 90 capsule 0    albuterol sulfate HFA (VENTOLIN HFA) 108 (90 Base) MCG/ACT inhaler Inhale 2 puffs into the lungs every 6 hours as needed for Wheezing 3 Inhaler 2    traZODone (DESYREL) 50 MG tablet 1-2 tablets po qhs prn insomnia. 180 tablet 1    nitroGLYCERIN (NITROSTAT) 0.4 MG SL tablet Place 1 tablet under the tongue every 5 minutes as needed for Chest pain 25 tablet 5    fluticasone (FLONASE) 50 MCG/ACT nasal spray 2 sprays by Each Nostril route daily 1 Bottle 3    aspirin EC 81 MG EC tablet Take 81 mg by mouth       No current facility-administered medications for this visit.       No Known Allergies    Recent Results (from the past 672 hour(s))   Hepatitis C Antibody    Collection Time: 20 11:17 AM   Result Value Ref Range    Hep C Ab Interp Non-Reactive    Transferrin    Collection Time: 20 11:17 AM   Result Value Ref Range    Transferrin 206 200 - 400 mg/dL   Urinalysis    Collection Time: 20 11:17 AM   Result Value Ref Range    Color, UA YELLOW Straw/Yellow    Clarity, UA Clear Clear    Glucose, Ur Negative Negative mg/dL    Bilirubin Urine Negative Negative    Ketones, Urine Negative Negative mg/dL    Specific Gravity, UA 1.025 1.005 - 1.030    Blood, Urine Negative Negative    pH, UA 6.0 5.0 - 8.0    Protein, UA TRACE (A) Negative mg/dL    Urobilinogen, Urine 0.2 <2.0 E.U./dL    Nitrite, Urine Negative Negative    Leukocyte Esterase, Urine Negative Negative   Uric Acid    Collection Time: 20 11:17 AM   Result Value Ref

## 2020-07-10 NOTE — PROGRESS NOTES
providing care):   Patient Care Team:  Drew Vega MD as PCP - General (Family Medicine)  Drew Vega MD as PCP - [facility] Empaneled Provider  Leena White MD as Consulting Physician (Otolaryngology)    Wt Readings from Last 3 Encounters:   07/10/20 168 lb (76.2 kg)   20 170 lb (77.1 kg)   01/10/20 169 lb 8 oz (76.9 kg)     Vitals:    07/10/20 1326   BP: 118/68   Pulse: 72   SpO2: 99%   Weight: 168 lb (76.2 kg)   Height: 5' 9\" (1.753 m)           The following problems were reviewed today and where indicated follow up appointments were made and/or referrals ordered.     Risk Factor Screenings with Interventions     Fall Risk:  2 or more falls in past year?: no  Fall with injury in past year?: no  Fall Risk Interventions:    · Not indicated    Depression:  PHQ-2 Score: 0  Depression Interventions:  · Not indicated    Anxiety:     Anxiety Interventions:  · Not indicated    Cognitive:     Cognitive Impairment Interventions:  · Not indicated    Substance Abuse:  Social History     Socioeconomic History    Marital status:      Spouse name: Not on file    Number of children: Not on file    Years of education: Not on file    Highest education level: Not on file   Occupational History    Not on file   Social Needs    Financial resource strain: Not on file    Food insecurity     Worry: Not on file     Inability: Not on file    Transportation needs     Medical: Not on file     Non-medical: Not on file   Tobacco Use    Smoking status: Former Smoker     Packs/day: 0.50     Years: 55.00     Pack years: 27.50     Types: Cigarettes     Last attempt to quit: 2018     Years since quittin.4    Smokeless tobacco: Never Used   Substance and Sexual Activity    Alcohol use: Yes     Comment: occasionally    Drug use: No    Sexual activity: Not on file   Lifestyle    Physical activity     Days per week: Not on file     Minutes per session: Not on file    Stress: Not on file   Relationships    Social connections     Talks on phone: Not on file     Gets together: Not on file     Attends Adventism service: Not on file     Active member of club or organization: Not on file     Attends meetings of clubs or organizations: Not on file     Relationship status: Not on file    Intimate partner violence     Fear of current or ex partner: Not on file     Emotionally abused: Not on file     Physically abused: Not on file     Forced sexual activity: Not on file   Other Topics Concern    Not on file   Social History Narrative    Not on file        Substance Abuse Interventions:  · Not indicated    Health Risk Assessment:     General  In general, how would you say your health is?: Good  In the past 7 days, have you experienced any of the following? New or Increased Pain, New or Increased Fatigue, Loneliness, Social Isolation, Stress or Anger?: None of These  Do you get the social and emotional support that you need?: Yes  Do you have a Living Will?: (!) No  General Health Risk Interventions:  · Given a handout on how to complete a living will. Directed to the website Visit:  https://[place].gov/consumer-protection/livingwills for assistance as well. ·     Health Habits/Nutrition  Do you exercise for at least 20 minutes 2-3 times per week?: Yes  Have you lost any weight without trying in the past 3 months?: No  Do you eat fewer than 2 meals per day?: No  Have you seen a dentist within the past year?: Yes  Body mass index is 24.81 kg/m².   Health Habits/Nutrition Interventions:  · Not indicated    Hearing/Vision  Do you or your family notice any trouble with your hearing?: No  Do you have difficulty driving, watching TV, or doing any of your daily activities because of your eyesight?: No  Have you had an eye exam within the past year?: (!) No  Hearing/Vision Interventions:  · Recommended eye exam    Safety  Do you have working smoke detectors?: (!) No  Have all throw rugs been removed or fastened?: (!) No  Do you have

## 2020-09-11 RX ORDER — RAMIPRIL 10 MG/1
CAPSULE ORAL
Qty: 90 CAPSULE | Refills: 0 | Status: SHIPPED | OUTPATIENT
Start: 2020-09-11 | End: 2021-01-14 | Stop reason: SDUPTHER

## 2020-11-12 ENCOUNTER — OFFICE VISIT (OUTPATIENT)
Dept: OTOLARYNGOLOGY | Age: 70
End: 2020-11-12
Payer: MEDICARE

## 2020-11-12 ENCOUNTER — PROCEDURE VISIT (OUTPATIENT)
Dept: OTOLARYNGOLOGY | Age: 70
End: 2020-11-12
Payer: MEDICARE

## 2020-11-12 VITALS
HEIGHT: 69 IN | SYSTOLIC BLOOD PRESSURE: 140 MMHG | WEIGHT: 168 LBS | BODY MASS INDEX: 24.88 KG/M2 | DIASTOLIC BLOOD PRESSURE: 82 MMHG

## 2020-11-12 PROBLEM — H90.3 BILATERAL SENSORINEURAL HEARING LOSS: Status: ACTIVE | Noted: 2020-11-12

## 2020-11-12 PROCEDURE — 92552 PURE TONE AUDIOMETRY AIR: CPT | Performed by: AUDIOLOGIST

## 2020-11-12 PROCEDURE — 1123F ACP DISCUSS/DSCN MKR DOCD: CPT | Performed by: OTOLARYNGOLOGY

## 2020-11-12 PROCEDURE — 99212 OFFICE O/P EST SF 10 MIN: CPT | Performed by: OTOLARYNGOLOGY

## 2020-11-12 PROCEDURE — G8484 FLU IMMUNIZE NO ADMIN: HCPCS | Performed by: OTOLARYNGOLOGY

## 2020-11-12 PROCEDURE — G8427 DOCREV CUR MEDS BY ELIG CLIN: HCPCS | Performed by: OTOLARYNGOLOGY

## 2020-11-12 PROCEDURE — 3017F COLORECTAL CA SCREEN DOC REV: CPT | Performed by: OTOLARYNGOLOGY

## 2020-11-12 PROCEDURE — 4040F PNEUMOC VAC/ADMIN/RCVD: CPT | Performed by: OTOLARYNGOLOGY

## 2020-11-12 PROCEDURE — G8420 CALC BMI NORM PARAMETERS: HCPCS | Performed by: OTOLARYNGOLOGY

## 2020-11-12 PROCEDURE — 92567 TYMPANOMETRY: CPT | Performed by: AUDIOLOGIST

## 2020-11-12 PROCEDURE — 1036F TOBACCO NON-USER: CPT | Performed by: OTOLARYNGOLOGY

## 2020-11-12 NOTE — PROGRESS NOTES
69 y.o.  male presents today with chronic middle ear problems. He has had tubes placed in both ears and returns today for routine follow-up. He has no complaints of fullness pain or change in his hearing. No discharges reported.   Overall he feels he is doing well since the tubes were placed    Family History   Problem Relation Age of Onset    Coronary Art Dis Father     Coronary Art Dis Brother      Social History     Socioeconomic History    Marital status:      Spouse name: None    Number of children: None    Years of education: None    Highest education level: None   Occupational History    None   Social Needs    Financial resource strain: None    Food insecurity     Worry: None     Inability: None    Transportation needs     Medical: None     Non-medical: None   Tobacco Use    Smoking status: Former Smoker     Packs/day: 0.50     Years: 55.00     Pack years: 27.50     Types: Cigarettes     Last attempt to quit: 2018     Years since quittin.7    Smokeless tobacco: Never Used   Substance and Sexual Activity    Alcohol use: Yes     Comment: occasionally    Drug use: No    Sexual activity: None   Lifestyle    Physical activity     Days per week: None     Minutes per session: None    Stress: None   Relationships    Social connections     Talks on phone: None     Gets together: None     Attends Nondenominational service: None     Active member of club or organization: None     Attends meetings of clubs or organizations: None     Relationship status: None    Intimate partner violence     Fear of current or ex partner: None     Emotionally abused: None     Physically abused: None     Forced sexual activity: None   Other Topics Concern    None   Social History Narrative    None     Past Medical History:   Diagnosis Date    Heart attack ([address]     two    Mini stroke [address] [address]     2     Past Surgical History:   Procedure Laterality Date    APPENDECTOMY      BACK SURGERY      TESTICLE REMOVAL Just one         REVIEW OF SYSTEMS:  all other systems reviewed and are negative  General Health: no change in health status since last visit  Ears: ear pain No Bilateral recent drainage No  Bilateral ear popping/ fullness No  No complaints of fullness in either ear  Hearing: no change Bilateral       Comments:     PHYSICAL EXAM:    BP (!) 140/82   Ht 5' 9\" (1.753 m)   Wt 168 lb (76.2 kg)   BMI 24.81 kg/m²   Body mass index is 24.81 kg/m². General Appearance: well developed , well nourished and no distress  Head/ Face: normocephalic and atraumatic  Vocal Quality: good/ normal  Ears: Right Ear: External: external ears normal Otoscopy Ear Canal: canal clear Otoscopy TM: ear tubes:  patent dry good position Left Ear: External: external ears normal Otoscopy Ear Canal: canal clear Otoscopy TM: ear tubes:  patent dry good position  Hearing: see audiogram  Psych/ Mood: cooperative and no depression, anxiety or agitation    Assessment & Plan:    Problem List Items Addressed This Visit        ENT Problems    Bilateral sensorineural hearing loss     Stable audiogram.            Other    S/p bilateral myringotomy with tube placement     Tube still patent in good position and apparently functioning well               No orders of the defined types were placed in this encounter. No orders of the defined types were placed in this encounter. Please note that this chart was generated using dragon dictation software. Although every effort was made to ensure the accuracy of this automated transcription, some errors in transcription may have occurred.

## 2020-11-12 NOTE — PROGRESS NOTES
History   Nik Noel is a 69 y.o. male who presented to the clinic this date for a tube check and hearing evaluation. He denied problems or concerns since his last visit. Summary   Tympanometry consistent with patent PE tubes bilaterally. Pure tone testing indicates normal to severe SNHL bilaterally. When compared to audiogram obtained in November 2019, thresholds remained stable bilaterally. Results   Otoscopy:    Right: PE tube in TM   Left: PE tube in TM    Audiometry:    Right: Normal to severe precipitous SNHL (previously established)   Left: Normal to severe precipitous SNHL (previously established)         Tympanometry:     Right: Type B large volume   Left: Type B large volume    Plan   Results of today's testing were discussed with  Lorene Jain and the following recommendations were made:    1. Follow up with ENT as scheduled. 2. Monitor hearing yearly, sooner with changes. 3. Hearing aid evaluation as desired. 4. Hearing protection as warranted.         Audiogram and Acoustic Immittance

## 2020-12-18 RX ORDER — TRAZODONE HYDROCHLORIDE 50 MG/1
TABLET ORAL
Qty: 180 TABLET | Refills: 1 | Status: SHIPPED | OUTPATIENT
Start: 2020-12-18 | End: 2021-06-14

## 2021-01-07 DIAGNOSIS — I10 ESSENTIAL (PRIMARY) HYPERTENSION: ICD-10-CM

## 2021-01-07 DIAGNOSIS — E78.00 HYPERCHOLESTEROLEMIA: ICD-10-CM

## 2021-01-07 DIAGNOSIS — Z12.5 ENCOUNTER FOR PROSTATE CANCER SCREENING: ICD-10-CM

## 2021-01-07 LAB
ALBUMIN SERPL-MCNC: 4.2 G/DL (ref 3.5–5.2)
ALP BLD-CCNC: 153 U/L (ref 40–130)
ALT SERPL-CCNC: 10 U/L (ref 5–41)
ANION GAP SERPL CALCULATED.3IONS-SCNC: 8 MMOL/L (ref 7–19)
AST SERPL-CCNC: 13 U/L (ref 5–40)
BASOPHILS ABSOLUTE: 0 K/UL (ref 0–0.2)
BASOPHILS RELATIVE PERCENT: 0.5 % (ref 0–1)
BILIRUB SERPL-MCNC: 0.5 MG/DL (ref 0.2–1.2)
BUN BLDV-MCNC: 13 MG/DL (ref 8–23)
CALCIUM SERPL-MCNC: 9.4 MG/DL (ref 8.8–10.2)
CHLORIDE BLD-SCNC: 103 MMOL/L (ref 98–111)
CHOLESTEROL, TOTAL: 129 MG/DL (ref 160–199)
CO2: 28 MMOL/L (ref 22–29)
CREAT SERPL-MCNC: 1.3 MG/DL (ref 0.5–1.2)
EOSINOPHILS ABSOLUTE: 0.2 K/UL (ref 0–0.6)
EOSINOPHILS RELATIVE PERCENT: 2.9 % (ref 0–5)
GFR AFRICAN AMERICAN: >59
GFR NON-AFRICAN AMERICAN: 54
GLUCOSE BLD-MCNC: 98 MG/DL (ref 74–109)
HCT VFR BLD CALC: 43.8 % (ref 42–52)
HDLC SERPL-MCNC: 37 MG/DL (ref 55–121)
HEMOGLOBIN: 14.3 G/DL (ref 14–18)
IMMATURE GRANULOCYTES #: 0 K/UL
LDL CHOLESTEROL CALCULATED: 75 MG/DL
LYMPHOCYTES ABSOLUTE: 2 K/UL (ref 1.1–4.5)
LYMPHOCYTES RELATIVE PERCENT: 34.1 % (ref 20–40)
MCH RBC QN AUTO: 29.9 PG (ref 27–31)
MCHC RBC AUTO-ENTMCNC: 32.6 G/DL (ref 33–37)
MCV RBC AUTO: 91.4 FL (ref 80–94)
MONOCYTES ABSOLUTE: 0.4 K/UL (ref 0–0.9)
MONOCYTES RELATIVE PERCENT: 7.2 % (ref 0–10)
NEUTROPHILS ABSOLUTE: 3.2 K/UL (ref 1.5–7.5)
NEUTROPHILS RELATIVE PERCENT: 55 % (ref 50–65)
PDW BLD-RTO: 13.4 % (ref 11.5–14.5)
PLATELET # BLD: 192 K/UL (ref 130–400)
PMV BLD AUTO: 9.6 FL (ref 9.4–12.4)
POTASSIUM SERPL-SCNC: 4.1 MMOL/L (ref 3.5–5)
PROSTATE SPECIFIC ANTIGEN: 0.74 NG/ML (ref 0–4)
RBC # BLD: 4.79 M/UL (ref 4.7–6.1)
SODIUM BLD-SCNC: 139 MMOL/L (ref 136–145)
TOTAL PROTEIN: 7.6 G/DL (ref 6.6–8.7)
TRIGL SERPL-MCNC: 84 MG/DL (ref 0–149)
WBC # BLD: 5.9 K/UL (ref 4.8–10.8)

## 2021-01-14 ENCOUNTER — OFFICE VISIT (OUTPATIENT)
Dept: FAMILY MEDICINE CLINIC | Age: 71
End: 2021-01-14
Payer: MEDICARE

## 2021-01-14 VITALS
TEMPERATURE: 98.2 F | OXYGEN SATURATION: 98 % | WEIGHT: 158 LBS | HEART RATE: 64 BPM | SYSTOLIC BLOOD PRESSURE: 110 MMHG | BODY MASS INDEX: 23.33 KG/M2 | DIASTOLIC BLOOD PRESSURE: 66 MMHG

## 2021-01-14 DIAGNOSIS — I10 ESSENTIAL (PRIMARY) HYPERTENSION: ICD-10-CM

## 2021-01-14 DIAGNOSIS — F51.04 CHRONIC INSOMNIA: ICD-10-CM

## 2021-01-14 DIAGNOSIS — J30.89 CHRONIC NONSEASONAL ALLERGIC RHINITIS DUE TO POLLEN: ICD-10-CM

## 2021-01-14 DIAGNOSIS — E78.00 HYPERCHOLESTEROLEMIA: Primary | ICD-10-CM

## 2021-01-14 PROCEDURE — G8420 CALC BMI NORM PARAMETERS: HCPCS | Performed by: FAMILY MEDICINE

## 2021-01-14 PROCEDURE — 1123F ACP DISCUSS/DSCN MKR DOCD: CPT | Performed by: FAMILY MEDICINE

## 2021-01-14 PROCEDURE — G8427 DOCREV CUR MEDS BY ELIG CLIN: HCPCS | Performed by: FAMILY MEDICINE

## 2021-01-14 PROCEDURE — 1036F TOBACCO NON-USER: CPT | Performed by: FAMILY MEDICINE

## 2021-01-14 PROCEDURE — G8484 FLU IMMUNIZE NO ADMIN: HCPCS | Performed by: FAMILY MEDICINE

## 2021-01-14 PROCEDURE — 4040F PNEUMOC VAC/ADMIN/RCVD: CPT | Performed by: FAMILY MEDICINE

## 2021-01-14 PROCEDURE — 99214 OFFICE O/P EST MOD 30 MIN: CPT | Performed by: FAMILY MEDICINE

## 2021-01-14 PROCEDURE — 3017F COLORECTAL CA SCREEN DOC REV: CPT | Performed by: FAMILY MEDICINE

## 2021-01-14 RX ORDER — RAMIPRIL 10 MG/1
CAPSULE ORAL
Qty: 90 CAPSULE | Refills: 3 | Status: SHIPPED | OUTPATIENT
Start: 2021-01-14 | End: 2022-07-18 | Stop reason: SDUPTHER

## 2021-01-14 NOTE — PROGRESS NOTES
[facility] PHYSICIAN SERVICES  [facility] FAMILY MEDICINE  [address]  [facility] [place] [address]  Dept: [phone]  Dept Fax: [phone]  Loc: [phone]    Mari Norton is a 69 y.o. male who presents today for his medical conditions/complaints as noted below. Mari Norton is here for 6 Month Follow-Up        HPI:   CC: Here today to discuss the following:    Hypertension  Compliant with medications. No adverse effects from medication. No lightheadedness, palpitations, or chest pain. Hyperlipidemia  Tolerating current cholesterol medication without side effects. No body aches. Attempting to reduce processed sugar and cholesterol from diet. Insomnia  Currently stable. Satisfied with current treatment regimen. No adverse effects from medication. Allergies  Allergies are currently stable.             HPI    Past Medical History:   Diagnosis Date    Heart attack ([address]     two    Mini stroke [address] [address]     2      Past Surgical History:   Procedure Laterality Date    APPENDECTOMY      BACK SURGERY      TESTICLE REMOVAL      Just one       Family History   Problem Relation Age of Onset    Coronary Art Dis Father     Coronary Art Dis Brother        Social History     Tobacco Use    Smoking status: Former Smoker     Packs/day: 0.50     Years: 55.00     Pack years: 27.50     Types: Cigarettes     Quit date: 2018     Years since quittin.9    Smokeless tobacco: Never Used   Substance Use Topics    Alcohol use: Yes     Comment: occasionally     Current Outpatient Medications   Medication Sig Dispense Refill    ramipril (ALTACE) 10 MG capsule Take 1 capsule by mouth once daily 90 capsule 3    traZODone (DESYREL) 50 MG tablet TAKE 1 TO 2 TABLETS AT BEDTIME AS NEEDED FOR INSOMNIA 180 tablet 1    atorvastatin (LIPITOR) 80 MG tablet Take 1 tablet by mouth daily 90 tablet 3    metoprolol succinate (TOPROL XL) 25 MG extended release tablet TAKE 1/2 TABLET EVERY DAY 45 tablet 3    albuterol sulfate HFA (VENTOLIN HFA) 108 (90 Base) MCG/ACT inhaler Inhale 2 puffs into the lungs every 6 hours as needed for Wheezing 3 Inhaler 2    nitroGLYCERIN (NITROSTAT) 0.4 MG SL tablet Place 1 tablet under the tongue every 5 minutes as needed for Chest pain 25 tablet 5    fluticasone (FLONASE) 50 MCG/ACT nasal spray 2 sprays by Each Nostril route daily 1 Bottle 3    aspirin EC 81 MG EC tablet Take 81 mg by mouth       No current facility-administered medications for this visit. No Known Allergies    Health Maintenance   Topic Date Due    AAA screen  1950    COVID-19 Vaccine (1 of 2) 05/13/1965    Colon cancer screen colonoscopy  05/13/1999    DTaP/Tdap/Td vaccine (1 - Tdap) 01/14/2022 (Originally 5/13/1968)    Flu vaccine (1) 01/14/2022 (Originally 9/1/2020)    Shingles Vaccine (1 of 2) 01/14/2022 (Originally 5/13/1999)    Annual Wellness Visit (AWV)  07/11/2021    Lipid screen  01/07/2022    Potassium monitoring  01/07/2022    Creatinine monitoring  01/07/2022    Pneumococcal 65+ years Vaccine  Completed    Hepatitis C screen  Completed    Hepatitis A vaccine  Aged Out    Hepatitis B vaccine  Aged Out    Hib vaccine  Aged Out    Meningococcal (ACWY) vaccine  Aged Out       Subjective:      Review of Systems  Review of Systems   Constitutional: Negative for chills and fever. HENT: Negative for congestion. Respiratory: Negative for cough, chest tightness and shortness of breath. Cardiovascular: Negative for chest pain, palpitations and leg swelling. Gastrointestinal: Negative for abdominal pain, anal bleeding, constipation, diarrhea and nausea. Genitourinary: Negative for difficulty urinating. Psychiatric/Behavioral: Negative. SeeHPI for visit specific review of symptoms.   All others negative      Objective:   /66   Pulse 64   Temp 98.2 °F (36.8 °C)   Wt 158 lb (71.7 kg)   SpO2 98%   BMI 23.33 kg/m²   Physical Exam  Physical Exam   Constitutional: He appears well-developed. Does not appear ill. Eyes: Pupils are equal, round, and reactive to light. Conjunctiva and Lids normal.  Neck: Normal range of motion. Neck supple. No masses. Neck Symmetric. Normal tracheal position. No thyroid enlargement  Cardiovascular: Normal rate and regular rhythm. Exam reveals no friction rub. Carotid arteries: no bruit observed. No murmur heard. Respiratory:  Effort normal and breath sounds normal. No respiratory distress. No wheezes. No rales. No use of accessory muscles or intercostal retractions. Abdominal: Soft. Bowel sounds are normal. exhibits no distension. There is no tenderness. There is no rebound and no guarding. Musculoskeletal: exhibits no edema. Normal gait. Neurological: alert. Psychiatric: normal mood and affect. His behavior is normal. Normal judgement and insight observed.       Recent Results (from the past 672 hour(s))   CBC Auto Differential    Collection Time: 01/07/21  1:05 PM   Result Value Ref Range    WBC 5.9 4.8 - 10.8 K/uL    RBC 4.79 4.70 - 6.10 M/uL    Hemoglobin 14.3 14.0 - 18.0 g/dL    Hematocrit 43.8 42.0 - 52.0 %    MCV 91.4 80.0 - 94.0 fL    MCH 29.9 27.0 - 31.0 pg    MCHC 32.6 (L) 33.0 - 37.0 g/dL    RDW 13.4 11.5 - 14.5 %    Platelets 143 618 - 266 K/uL    MPV 9.6 9.4 - 12.4 fL    Neutrophils % 55.0 50.0 - 65.0 %    Lymphocytes % 34.1 20.0 - 40.0 %    Monocytes % 7.2 0.0 - 10.0 %    Eosinophils % 2.9 0.0 - 5.0 %    Basophils % 0.5 0.0 - 1.0 %    Neutrophils Absolute 3.2 1.5 - 7.5 K/uL    Immature Granulocytes # 0.0 K/uL    Lymphocytes Absolute 2.0 1.1 - 4.5 K/uL    Monocytes Absolute 0.40 0.00 - 0.90 K/uL    Eosinophils Absolute 0.20 0.00 - 0.60 K/uL    Basophils Absolute 0.00 0.00 - 0.20 K/uL   Lipid Panel    Collection Time: 01/07/21  1:05 PM   Result Value Ref Range    Cholesterol, Total 129 (L) 160 - 199 mg/dL    Triglycerides 84 0 - 149 mg/dL    HDL 37 (L) 55 - 121 mg/dL    LDL Calculated 75 <100 mg/dL   Comprehensive Metabolic Panel    Collection Time: 01/07/21  1:05 PM   Result Value Ref Range    Sodium 139 136 - 145 mmol/L    Potassium 4.1 3.5 - 5.0 mmol/L    Chloride 103 98 - 111 mmol/L    CO2 28 22 - 29 mmol/L    Anion Gap 8 7 - 19 mmol/L    Glucose 98 74 - 109 mg/dL    BUN 13 8 - 23 mg/dL    CREATININE 1.3 (H) 0.5 - 1.2 mg/dL    GFR Non-African American 54 (A) >60    GFR African American >59 >59    Calcium 9.4 8.8 - 10.2 mg/dL    Total Protein 7.6 6.6 - 8.7 g/dL    Alb 4.2 3.5 - 5.2 g/dL    Total Bilirubin 0.5 0.2 - 1.2 mg/dL    Alkaline Phosphatase 153 (H) 40 - 130 U/L    ALT 10 5 - 41 U/L    AST 13 5 - 40 U/L   Psa screening    Collection Time: 01/07/21  1:05 PM   Result Value Ref Range    PSA 0.74 0.00 - 4.00 ng/mL               Assessment & Plan: The following diagnoses and conditions are stable with no further orders unless indicated:  1. Essential (primary) hypertension  BP Readings from Last 3 Encounters:   01/14/21 110/66   11/12/20 (!) 140/82   07/10/20 118/68       Blood pressure stable- ramipril (ALTACE) 10 MG capsule; Take 1 capsule by mouth once daily  Dispense: 90 capsule; Refill: 3    2. Hypercholesterolemia  Lab Results   Component Value Date    CHOL 129 (L) 01/07/2021    CHOL 118 (L) 01/08/2020    CHOL 132 (L) 01/07/2019     Lab Results   Component Value Date    TRIG 84 01/07/2021    TRIG 121 01/08/2020    TRIG 125 01/07/2019     Lab Results   Component Value Date    HDL 37 (L) 01/07/2021    HDL 36 (L) 01/08/2020    HDL 34 (L) 01/07/2019     Lab Results   Component Value Date    LDLCALC 75 01/07/2021    LDLCALC 58 01/08/2020    LDLCALC 73 01/07/2019     No results found for: LABVLDL, VLDL  No results found for: CHOLHDLRATIO  Stable    3. Chronic insomnia  Stable    4. Chronic nonseasonal allergic rhinitis due to pollen  Stable            Return in about 6 months (around 7/14/2021) for AWV - 30 minute visit. Discussed use, benefit, and side effects of prescribed medications.   All patient questions answered. Pt voiced understanding. Reviewed health maintenance. Instructedto continue current medications, diet and exercise. Patient agreed with treatmentplan. Follow up as directed. Note dictated using Dragon Dictation software  Sometimes this dictation software makes erroneous transcriptions.

## 2021-05-13 ENCOUNTER — OFFICE VISIT (OUTPATIENT)
Dept: ENT CLINIC | Age: 71
End: 2021-05-13
Payer: MEDICARE

## 2021-05-13 VITALS
SYSTOLIC BLOOD PRESSURE: 134 MMHG | DIASTOLIC BLOOD PRESSURE: 72 MMHG | BODY MASS INDEX: 23.11 KG/M2 | WEIGHT: 156 LBS | HEIGHT: 69 IN

## 2021-05-13 DIAGNOSIS — H61.23 BILATERAL IMPACTED CERUMEN: Primary | ICD-10-CM

## 2021-05-13 PROCEDURE — 69210 REMOVE IMPACTED EAR WAX UNI: CPT | Performed by: PHYSICIAN ASSISTANT

## 2021-05-13 PROCEDURE — G8427 DOCREV CUR MEDS BY ELIG CLIN: HCPCS | Performed by: PHYSICIAN ASSISTANT

## 2021-05-13 PROCEDURE — 4040F PNEUMOC VAC/ADMIN/RCVD: CPT | Performed by: PHYSICIAN ASSISTANT

## 2021-05-13 PROCEDURE — G8420 CALC BMI NORM PARAMETERS: HCPCS | Performed by: PHYSICIAN ASSISTANT

## 2021-05-13 PROCEDURE — 3017F COLORECTAL CA SCREEN DOC REV: CPT | Performed by: PHYSICIAN ASSISTANT

## 2021-05-13 PROCEDURE — 1123F ACP DISCUSS/DSCN MKR DOCD: CPT | Performed by: PHYSICIAN ASSISTANT

## 2021-05-13 PROCEDURE — 1036F TOBACCO NON-USER: CPT | Performed by: PHYSICIAN ASSISTANT

## 2021-05-13 NOTE — PROGRESS NOTES
Mr. Ivonne Acosta is a pleasant 69-year-old  male that presents for a 6-month tube check due to myringotomy tube being present in the left ear. He reports that about a year and a half ago bilateral myotomy tubes were placed by Dr. Nallely Gonzalez. Since then the right tube has extruded with the patient still having the left side. Currently he denies any drainage or discomfort. Physical examination with the microscope revealed the patient to have a normal-appearing TM. He was noted to have a moderate amount of cerumen that also had occluded the myringotomy tube. With suction, the myringotomy tube was corrected and is fully patent. The peripheral cerumen was also removed without any complications. The tube appeared to be in proper position and functional.  Examination of the right side demonstrated the patient to have a normal external canal.  Examination of the TM revealed a perforation that appears to be chronic to the 12 o'clock position of approximate 10%. The area is dry and appears to be with no evidence of infection. In addition prior to this exam the cerumen impaction was removed from the right ear with assistance of suction and alligator graspers. Impression: Successful removal of cerumen impaction to both ears, doing well from myringotomy tube to the left ear chronic middle ear effusion    Plan: The patient is to follow-up in 6 months for reevaluation. He is reminded to call if he has any questions or problems.       Electronically signed by Niall Gabriel PA-C on 5/13/21 at 10:54 AM CDT

## 2021-06-12 DIAGNOSIS — F51.04 CHRONIC INSOMNIA: ICD-10-CM

## 2021-06-14 RX ORDER — TRAZODONE HYDROCHLORIDE 50 MG/1
TABLET ORAL
Qty: 180 TABLET | Refills: 1 | Status: SHIPPED | OUTPATIENT
Start: 2021-06-14 | End: 2022-01-19

## 2021-07-15 ENCOUNTER — OFFICE VISIT (OUTPATIENT)
Dept: FAMILY MEDICINE CLINIC | Age: 71
End: 2021-07-15
Payer: MEDICARE

## 2021-07-15 VITALS
WEIGHT: 154 LBS | TEMPERATURE: 98 F | HEIGHT: 70 IN | HEART RATE: 58 BPM | OXYGEN SATURATION: 99 % | BODY MASS INDEX: 22.05 KG/M2 | SYSTOLIC BLOOD PRESSURE: 132 MMHG | DIASTOLIC BLOOD PRESSURE: 82 MMHG

## 2021-07-15 DIAGNOSIS — F51.04 CHRONIC INSOMNIA: ICD-10-CM

## 2021-07-15 DIAGNOSIS — Z12.11 ENCOUNTER FOR SCREENING COLONOSCOPY: ICD-10-CM

## 2021-07-15 DIAGNOSIS — E78.00 HYPERCHOLESTEROLEMIA: ICD-10-CM

## 2021-07-15 DIAGNOSIS — J30.89 CHRONIC NONSEASONAL ALLERGIC RHINITIS DUE TO POLLEN: ICD-10-CM

## 2021-07-15 DIAGNOSIS — Z12.5 ENCOUNTER FOR PROSTATE CANCER SCREENING: ICD-10-CM

## 2021-07-15 DIAGNOSIS — I25.10 CORONARY ARTERY DISEASE INVOLVING NATIVE CORONARY ARTERY OF NATIVE HEART WITHOUT ANGINA PECTORIS: ICD-10-CM

## 2021-07-15 DIAGNOSIS — I10 ESSENTIAL (PRIMARY) HYPERTENSION: ICD-10-CM

## 2021-07-15 DIAGNOSIS — Z00.00 ANNUAL PHYSICAL EXAM: Primary | ICD-10-CM

## 2021-07-15 DIAGNOSIS — E78.01 FAMILIAL HYPERCHOLESTEROLEMIA: ICD-10-CM

## 2021-07-15 PROCEDURE — 1036F TOBACCO NON-USER: CPT | Performed by: FAMILY MEDICINE

## 2021-07-15 PROCEDURE — G8428 CUR MEDS NOT DOCUMENT: HCPCS | Performed by: FAMILY MEDICINE

## 2021-07-15 PROCEDURE — 3017F COLORECTAL CA SCREEN DOC REV: CPT | Performed by: FAMILY MEDICINE

## 2021-07-15 PROCEDURE — 99214 OFFICE O/P EST MOD 30 MIN: CPT | Performed by: FAMILY MEDICINE

## 2021-07-15 PROCEDURE — G0439 PPPS, SUBSEQ VISIT: HCPCS | Performed by: FAMILY MEDICINE

## 2021-07-15 PROCEDURE — 1123F ACP DISCUSS/DSCN MKR DOCD: CPT | Performed by: FAMILY MEDICINE

## 2021-07-15 PROCEDURE — G8420 CALC BMI NORM PARAMETERS: HCPCS | Performed by: FAMILY MEDICINE

## 2021-07-15 PROCEDURE — 4040F PNEUMOC VAC/ADMIN/RCVD: CPT | Performed by: FAMILY MEDICINE

## 2021-07-15 RX ORDER — NITROGLYCERIN 0.4 MG/1
0.4 TABLET SUBLINGUAL EVERY 5 MIN PRN
Qty: 25 TABLET | Refills: 5 | Status: SHIPPED | OUTPATIENT
Start: 2021-07-15

## 2021-07-15 RX ORDER — FLUTICASONE PROPIONATE 50 MCG
2 SPRAY, SUSPENSION (ML) NASAL DAILY
Qty: 3 BOTTLE | Refills: 3 | Status: SHIPPED | OUTPATIENT
Start: 2021-07-15 | End: 2022-07-18 | Stop reason: SDUPTHER

## 2021-07-15 RX ORDER — ALBUTEROL SULFATE 90 UG/1
2 AEROSOL, METERED RESPIRATORY (INHALATION) EVERY 6 HOURS PRN
Qty: 3 INHALER | Refills: 2 | Status: SHIPPED | OUTPATIENT
Start: 2021-07-15 | End: 2022-07-18 | Stop reason: SDUPTHER

## 2021-07-15 RX ORDER — METOPROLOL SUCCINATE 25 MG/1
12.5 TABLET, EXTENDED RELEASE ORAL DAILY
Qty: 45 TABLET | Refills: 3 | Status: SHIPPED | OUTPATIENT
Start: 2021-07-15 | End: 2022-07-18 | Stop reason: SDUPTHER

## 2021-07-15 RX ORDER — ATORVASTATIN CALCIUM 80 MG/1
80 TABLET, FILM COATED ORAL DAILY
Qty: 90 TABLET | Refills: 3 | Status: SHIPPED | OUTPATIENT
Start: 2021-07-15 | End: 2022-07-18 | Stop reason: SDUPTHER

## 2021-07-15 ASSESSMENT — PATIENT HEALTH QUESTIONNAIRE - PHQ9
SUM OF ALL RESPONSES TO PHQ QUESTIONS 1-9: 0
SUM OF ALL RESPONSES TO PHQ9 QUESTIONS 1 & 2: 0
SUM OF ALL RESPONSES TO PHQ QUESTIONS 1-9: 0
1. LITTLE INTEREST OR PLEASURE IN DOING THINGS: 0
2. FEELING DOWN, DEPRESSED OR HOPELESS: 0
SUM OF ALL RESPONSES TO PHQ QUESTIONS 1-9: 0

## 2021-07-15 ASSESSMENT — LIFESTYLE VARIABLES: HOW OFTEN DO YOU HAVE A DRINK CONTAINING ALCOHOL: 0

## 2021-07-15 NOTE — PROGRESS NOTES
Medicare Annual Wellness Visit - Subsequent    Name: Elvis Funes Date: 7/15/2021   MRN: [id number] Sex: Male   Age: 66 y.o. Ethnicity: Non-/Non    : 1950 Race: Joey Sena is here for   Chief Complaint   Patient presents with   [facility]        Screenings for behavioral, psychosocial and functional/safety risks, and cognitive dysfunction are all negative except as indicated below. These results, as well as other patient data from the [address] form, are documented in Flowsheets linked to this Encounter. No Known Allergies    Prior to Visit Medications    Medication Sig Taking?  Authorizing Provider   traZODone (DESYREL) 50 MG tablet TAKE 1 TO 2 TABLETS AT BEDTIME AS NEEDED FOR INSOMNIA Yes Soy Landon MD   ramipril (ALTACE) 10 MG capsule Take 1 capsule by mouth once daily Yes Soy Landon MD   atorvastatin (LIPITOR) 80 MG tablet Take 1 tablet by mouth daily Yes Soy Landon MD   metoprolol succinate (TOPROL XL) 25 MG extended release tablet TAKE 1/2 TABLET EVERY DAY Yes Soy Landon MD   albuterol sulfate HFA (VENTOLIN HFA) 108 (90 Base) MCG/ACT inhaler Inhale 2 puffs into the lungs every 6 hours as needed for Wheezing Yes Soy Landon MD   nitroGLYCERIN (NITROSTAT) 0.4 MG SL tablet Place 1 tablet under the tongue every 5 minutes as needed for Chest pain Yes Soy Landon MD   fluticasone (FLONASE) 50 MCG/ACT nasal spray 2 sprays by Each Nostril route daily Yes AGUSTINA Raymond   aspirin EC 81 MG EC tablet Take 81 mg by mouth Yes Historical Provider, MD       Past Medical History:   Diagnosis Date    Heart attack ([address]     two    Mini stroke ([facility] [address]     2       Past Surgical History:   Procedure Laterality Date    APPENDECTOMY      BACK SURGERY      TESTICLE REMOVAL      Just one    TYMPANOSTOMY TUBE PLACEMENT         Family History   Problem Relation Age of Onset    Coronary Art Dis Father     Coronary Art Dis Brother        CareTeam (Including outside providers/suppliers regularly involved in providing care):   Patient Care Team:  Hero Desouza MD as PCP - General (Family Medicine)  Hero Desouza MD as PCP - [facility] Empaneled Provider  Barbara Bales MD as Consulting Physician (Otolaryngology)    Wt Readings from Last 3 Encounters:   07/15/21 154 lb (69.9 kg)   05/13/21 156 lb (70.8 kg)   01/14/21 158 lb (71.7 kg)     Vitals:    07/15/21 1333   BP: 132/82   Pulse: 58   Temp: 98 °F (36.7 °C)   SpO2: 99%   Weight: 154 lb (69.9 kg)   Height: 5' 9.5\" (1.765 m)           The following problems were reviewed today and where indicated follow up appointments were made and/or referrals ordered.     Risk Factor Screenings with Interventions     Fall Risk:  2 or more falls in past year?: no  Fall with injury in past year?: no  Fall Risk Interventions:    · Not indicated    Depression:  PHQ-2 Score: 0  Depression Interventions:  · Not indicated    Anxiety:     Anxiety Interventions:  · Not indicated    Cognitive:  Clock Drawing Test (CDT) Score: Normal  Cognitive Impairment Interventions:  · Not indicated    Substance Abuse:  Social History     Socioeconomic History    Marital status:      Spouse name: Not on file    Number of children: Not on file    Years of education: Not on file    Highest education level: Not on file   Occupational History    Not on file   Tobacco Use    Smoking status: Former Smoker     Packs/day: 0.50     Years: 55.00     Pack years: 27.50     Types: Cigarettes     Quit date: 2/1/2018     Years since quitting: 3.4    Smokeless tobacco: Never Used   Substance and Sexual Activity    Alcohol use: Yes     Comment: occasionally    Drug use: No    Sexual activity: Not on file   Other Topics Concern    Not on file   Social History Narrative    Not on file     Social Determinants of Health     Financial Resource Strain:     Difficulty of Paying Living Expenses:    Food Insecurity:     Worried About [address] [facility] in the Last Year:    [address] in the Last Year:    Transportation Needs:     Lack of Transportation (Medical):  Lack of Transportation (Non-Medical):    Physical Activity:     Days of Exercise per Week:     Minutes of Exercise per Session:    Stress:     Feeling of Stress :    Social Connections:     Frequency of Communication with Friends and Family:     Frequency of Social Gatherings with Friends and Family:     Attends Yazidism Services:     Active Member of Clubs or Organizations:     Attends Club or Organization Meetings:     Marital Status:    Intimate Partner Violence:     Fear of Current or Ex-Partner:     Emotionally Abused:     Physically Abused:     Sexually Abused: Audit Questionnaire: Screen for Alcohol Misuse  How often do you have a drink containing alcohol?: Never  Substance Abuse Interventions:  · Not indicated    Health Risk Assessment:     General  In general, how would you say your health is?: Very Good  In the past 7 days, have you experienced any of the following? New or Increased Pain, New or Increased Fatigue, Loneliness, Social Isolation, Stress or Anger?: None of These  Do you get the social and emotional support that you need?: Yes  Do you have a Living Will?: (!) No  General Health Risk Interventions:  · Given a handout on how to complete a living will. Directed to the website Visit:  https://[employer].gov/consumer-protection/livingwills for assistance as well. ·     Health Habits/Nutrition  Do you exercise for at least 20 minutes 2-3 times per week?: Yes  Have you lost any weight without trying in the past 3 months?: No  Do you eat only one meal per day?: No  Have you seen the dentist within the past year?: (!) No  Body mass index is 22.42 kg/m².   Health Habits/Nutrition Interventions:  · Recommend he see a dentist    Hearing/Vision  Do you or your family notice any trouble with your hearing that hasn't been managed with hearing aids?: (!) Yes  Do you have difficulty driving, watching TV, or doing any of your daily activities because of your eyesight?: No  Have you had an eye exam within the past year?: (!) No  Hearing/Vision Interventions:  · Suggested he see a hearing specialist and an eye specialist    Safety  Do you have working smoke detectors?: Yes  Have all throw rugs been removed or fastened?: Yes  Do you have non-slip mats or surfaces in all bathtubs/showers?: Yes  Do all of your stairways have a railing or banister?: Yes  Are your doorways, halls and stairs free of clutter?: Yes  Do you always fasten your seatbelt when you are in a car?: Yes  Safety Interventions:  · Not indicated    ADLs  In the past 7 days, did you need help from others to perform any of the following everyday activities? Eating, dressing, grooming, bathing, toileting, or walking/balance?: None  In the past 7 days, did you need help from others to take care of any of the following?  Laundry, housekeeping, banking/finances, shopping, telephone use, food preparation, transportation, or taking medications?: None  ADL Interventions:  · Not indicated    Personalized Preventive Plan   Current Health Maintenance Status  Immunization History   Administered Date(s) Administered    Pneumococcal Conjugate 13-valent ([id number]) 01/04/2018    Pneumococcal Polysaccharide ([id number]) 01/10/2019        Health Maintenance   Topic Date Due    AAA screen  Never done    COVID-19 Vaccine (1) Never done    Colon cancer screen colonoscopy  Never done    Low dose CT lung screening  Never done    Annual Wellness Visit (AWV)  07/11/2021    DTaP/Tdap/Td vaccine (1 - Tdap) 01/14/2022 (Originally 5/13/1969)    Shingles Vaccine (1 of 2) 01/14/2022 (Originally 5/13/1999)    Flu vaccine (1) 09/01/2021    Lipid screen  01/07/2022    Potassium monitoring  01/07/2022    Creatinine monitoring  01/07/2022    Pneumococcal 65+ years Vaccine  Completed    Hepatitis C screen Completed    Hepatitis A vaccine  Aged Out    Hepatitis B vaccine  Aged Out    Hib vaccine  Aged Out    Meningococcal (ACWY) vaccine  Aged Out       Recommendations for IRIS-RFID Due: see orders.   Recommended screening schedule for the next 5-10 years is provided to the patient in written form: see Patient Instructions/AVS.

## 2021-07-15 NOTE — PROGRESS NOTES
[address] [id number] [address] [address]  Dept: [phone]  Dept Fax: [phone]  Loc: [phone]    Gaudencio Ni is a 66 y.o. male who presents today for his medical conditions/complaints as noted below. Gaudencio Ni is here for Medicare AWV        HPI:   CC: Here today to discuss the following: Allergies  Allergies are currently stable. Hypertension  Compliant with medications. No adverse effects from medication. No lightheadedness, palpitations, or chest pain. Insomnia  Currently stable. Satisfied with current treatment regimen. No adverse effects from medication. He is also due for colonoscopy. Hyperlipidemia  Tolerating current cholesterol medication without side effects. No body aches. Attempting to reduce processed sugar and cholesterol from diet. Coronary Artery Disease  Currently no active angina symptoms. No chest pain with exertion. No orthopnea.               HPI    Past Medical History:   Diagnosis Date    Heart attack ([address]     two    Mini stroke ([facility] [address]     2      Past Surgical History:   Procedure Laterality Date    APPENDECTOMY      BACK SURGERY      TESTICLE REMOVAL      Just one    TYMPANOSTOMY TUBE PLACEMENT         Family History   Problem Relation Age of Onset    Coronary Art Dis Father     Coronary Art Dis Brother        Social History     Tobacco Use    Smoking status: Former Smoker     Packs/day: 0.50     Years: 55.00     Pack years: 27.50     Types: Cigarettes     Quit date: 2/1/2018     Years since quitting: 3.4    Smokeless tobacco: Never Used   Substance Use Topics    Alcohol use: Yes     Comment: occasionally     Current Outpatient Medications   Medication Sig Dispense Refill    atorvastatin (LIPITOR) 80 MG tablet Take 1 tablet by mouth daily 90 tablet 3    metoprolol succinate (TOPROL XL) 25 MG extended release tablet Take 0.5 tablets by mouth daily 45 tablet 3    symptoms. All others negative      Objective:   /82   Pulse 58   Temp 98 °F (36.7 °C)   Ht 5' 9.5\" (1.765 m)   Wt 154 lb (69.9 kg)   SpO2 99%   BMI 22.42 kg/m²   Physical Exam  Physical Exam   Constitutional: He appears well-developed. Does not appear ill. Neck: Normal range of motion. Neck supple. No masses. Neck Symmetric. Normal tracheal position. No thyroid enlargement  Cardiovascular: Normal rate and regular rhythm. Exam reveals no friction rub. Carotid arteries: no bruit observed. No murmur heard. Respiratory:  Effort normal and breath sounds normal. No respiratory distress. No wheezes. No rales. No use of accessory muscles or intercostal retractions. Neurological: alert. Psychiatric: normal mood and affect. His behavior is normal. Normal judgement and insight observed. No results found for this or any previous visit (from the past 672 hour(s)). Assessment & Plan: The following diagnoses and conditions are stable with no further orders unless indicated:  1. Familial hypercholesterolemia  Lab Results   Component Value Date    CHOL 129 (L) 01/07/2021    CHOL 118 (L) 01/08/2020    CHOL 132 (L) 01/07/2019     Lab Results   Component Value Date    TRIG 84 01/07/2021    TRIG 121 01/08/2020    TRIG 125 01/07/2019     Lab Results   Component Value Date    HDL 37 (L) 01/07/2021    HDL 36 (L) 01/08/2020    HDL 34 (L) 01/07/2019     Lab Results   Component Value Date    LDLCALC 75 01/07/2021    LDLCALC 58 01/08/2020    [facility] 73 01/07/2019     No results found for: LABVLDL, VLDL  No results found for: CHOLHDLRATIO  Discussed lifestyle changes such as diet and exercise. Recommended eliminate processed food from diet such as sugar and fried foods. Recommended exercising at least 150 minutes/week. Try to do full body resistance training twice a week as well. Offered suggestions for calorie counting such as phone apps and online resources such as My fitness pal and Lose it. labs prior to appointment. Discussed use, benefit, and side effects of prescribed medications. All patient questions answered. Pt voiced understanding. Reviewed health maintenance. Instructedto continue current medications, diet and exercise. Patient agreed with treatmentplan. Follow up as directed. Note dictated using Dragon Dictation software  Sometimes this dictation software makes erroneous transcriptions.

## 2021-07-15 NOTE — PATIENT INSTRUCTIONS
We are committed to providing you with the best care possible. In order to help us achieve these goals please remember to bring all medications, herbal products, and over the counter supplements with you to each visit. If your provider has ordered testing for you, please be sure to follow up with our office if you have not received results within 7 days after the testing took place. *If you receive a survey after visiting one of our offices, please take time to share your experience concerning your physician office visit. These surveys are confidential and no health information about you is shared. We are eager to improve for you and we are counting on your feedback to help make that happen.  _______________________________________________________________         Advance Care Planning: Care Instructions  Your Care Instructions    It can be hard to live with an illness that cannot be cured. But if your health is getting worse, you may want to make decisions about end-of-life care. Planning for the end of your life does not mean that you are giving up. It is a way to make sure that your wishes are met. Clearly stating your wishes can make it easier for your loved ones. Making plans while you are still able may also ease your mind and make your final days less stressful and more meaningful. Follow-up care is a key part of your treatment and safety. Be sure to make and go to all appointments, and call your doctor if you are having problems. It's also a good idea to know your test results and keep a list of the medicines you take. What can you do to plan for the end of life? · Visit:  https://ag.[place].gov/consumer-protection/livingwills  · You can bring these issues up with your doctor. You do not need to wait until your doctor starts the conversation. You might start with \"I would not be willing to live with . Galt Hilt Seng Hilt Galt Hilt \" When you complete this sentence it helps your doctor understand your wishes.   · Talk openly and honestly with your doctor. This is the best way to understand the decisions you will need to make as your health changes. Know that you can always change your mind. · Ask your doctor about commonly used life-support measures. These include tube feedings, breathing machines, and fluids given through a vein (IV). Understanding these treatments will help you decide whether you want them. · You may choose to have these life-supporting treatments for a limited time. This allows a trial period to see whether they will help you. You may also decide that you want your doctor to take only certain measures to keep you alive. It is important to spell out these conditions so that your doctor and family understand them. · Talk to your doctor about how long you are likely to live. He or she may be able to give you an idea of what usually happens with your specific illness. · Think about preparing papers that state your wishes. This way there will not be any confusion about what you want. You can change your instructions at any time. Which papers should you prepare? Advance directives are legal papers that tell doctors how you want to be cared for at the end of your life. You do not need a  to write these papers. Ask your doctor or your state health department for information on how to write your advance directives. They may have the forms for each of these types of papers. Make sure your doctor has a copy of these on file, and give a copy to a family member or close friend. · Consider a do-not-resuscitate order (DNR). This order asks that no extra treatments be done if your heart stops or you stop breathing. Extra treatments may include cardiopulmonary resuscitation (CPR), electrical shock to restart your heart, or a machine to breathe for you. If you decide to have a DNR order, ask your doctor to explain and write it. Place the order in your home where everyone can easily see it. · Consider a living will.  A living will explains your wishes about life support and other treatments at the end of your life if you become unable to speak for yourself. Living hinson tell doctors to use or not use treatments that would keep you alive. You must have one or two witnesses or a notary present when you sign this form. · Consider a durable power of  for health care. This allows you to name a person to make decisions about your care if you are not able to. Most people ask a close friend or family member. Talk to this person about the kinds of treatments you want and those that you do not want. Make sure this person understands your wishes. These legal papers are simple to change. Tell your doctor what you want to change, and ask him or her to make a note in your medical file. Give your family updated copies of the papers. Where can you learn more? Go to https://[email or web address]. org and sign in to your Shotlst account. Enter P184 in the Snip2Code box to learn more about \"Advance Care Planning: Care Instructions. \"     If you do not have an account, please click on the \"Sign Up Now\" link. Current as of: September 24, 2016  Content Version: 11.5  © 8822-8990 Healthwise, [employer]. Care instructions adapted under license by [facility] ([facility]). If you have questions about a medical condition or this instruction, always ask your healthcare professional. [place] [address] any warranty or liability for your use of this information. Learning About Living Malen Young  What is a living will? A living will is a legal form you use to write down the kind of care you want at the end of your life. It is used by the health professionals who will treat you if you aren't able to decide for yourself. If you put your wishes in writing, your loved ones and others will know what kind of care you want. They won't need to guess. This can ease your mind and be helpful to others.   A living will is not the same as an estate or property will. An estate will explains what you want to happen with your money and property after you die. Is a living will a legal document? A living will is a legal document. Each state has its own laws about living hinson. If you move to another state, make sure that your living will is legal in the state where you now live. Or you might use a universal form that has been approved by many states. This kind of form can sometimes be completed and stored online. Your electronic copy will then be available wherever you have a connection to the Internet. In most cases, doctors will respect your wishes even if you have a form from a different state. · You don't need an  to complete a living will. But legal advice can be helpful if your state's laws are unclear, your health history is complicated, or your family can't agree on what should be in your living will. · You can change your living will at any time. Some people find that their wishes about end-of-life care change as their health changes. · In addition to making a living will, think about completing a medical power of  form. This form lets you name the person you want to make end-of-life treatment decisions for you (your \"health care agent\") if you're not able to. Many hospitals and nursing homes will give you the forms you need to complete a living will and a medical power of . · Your living will is used only if you can't make or communicate decisions for yourself anymore. If you become able to make decisions again, you can accept or refuse any treatment, no matter what you wrote in your living will. · Your state may offer an online registry. This is a place where you can store your living will online so the doctors and nurses who need to treat you can find it right away. What should you think about when creating a living will? Talk about your end-of-life wishes with your family members and your doctor.  Let them know what you want. That way the people making decisions for you won't be surprised by your choices. Think about these questions as you make your living will:  · Do you know enough about life support methods that might be used? If not, talk to your doctor so you know what might be done if you can't breathe on your own, your heart stops, or you're unable to swallow. · What things would you still want to be able to do after you receive life-support methods? Would you want to be able to walk? To speak? To eat on your own? To live without the help of machines? · If you have a choice, where do you want to be cared for? In your home? At a hospital or nursing home? · Do you want certain Protestant practices performed if you become very ill? · If you have a choice at the end of your life, where would you prefer to die? At home? In a hospital or nursing home? Somewhere else? · Would you prefer to be buried or cremated? · Do you want your organs to be donated after you die? What should you do with your living will? · Make sure that your family members and your health care agent have copies of your living will. · Give your doctor a copy of your living will to keep in your medical record. If you have more than one doctor, make sure that each one has a copy. · You may want to put a copy of your living will where it can be easily found. Where can you learn more? Go to https://MedicinapepauletteSepaton.Zynga. org and sign in to your Propanc account. Enter V519 in the Paired Health box to learn more about \"Learning About Living Saurabh Perera. \"     If you do not have an account, please click on the \"Sign Up Now\" link. Current as of: September 24, 2016  Content Version: 11.5  © 7161-9238 Healthwise, Incorporated. Care instructions adapted under license by [facility] ([facility]).  If you have questions about a medical condition or this instruction, always ask your healthcare professional. James Richards any your medical care. And then some medical professionals who may not know you as well might have to make decisions for you. In some cases, a  makes the decisions. When you name a health care agent, it is very clear who has the power to make health decisions for you. How do you name a health care agent? You name your health care agent on a legal form. It is usually called a durable power of  for health care. Ask your hospital, state bar association, or office on aging where to find these forms. You must sign the form to make it legal. Some states require you to get the form notarized. This means that a person called a  watches you sign the form and then he or she signs the form. Some states also require that two or more witnesses sign the form. Be sure to tell your family members and doctors who your health care agent is. Keep your forms in a safe place. But make sure that your loved ones know where the forms are. This could be in your desk where you keep other important papers. Make sure your doctor has a copy of your forms. Where can you learn more? Go to https://chpepiceweb.Fashiolista. org and sign in to your uBiome account. Enter [id number] in the LivePerson box to learn more about \"Learning About Durable Power of  for Health Care. \"     If you do not have an account, please click on the \"Sign Up Now\" link. Current as of: September 24, 2016  Content Version: 11.5  © 5874-4831 Healthwise, Incorporated. Care instructions adapted under license by [facility] ([facility]). If you have questions about a medical condition or this instruction, always ask your healthcare professional. [place] [address] any warranty or liability for your use of this information. _______________________________________________________________    Home Safety Tips    Each year, thousands of older Americans fall at home. Many of them are seriously injured, and some are disabled.  In , more than 8,500 people over age 72  because of falls. Falls are often due to hazards that are easy to overlook but easy to fix. This checklist will help you find and fix those hazards in your home. The checklist asks about hazards found in each room of your home. For each hazard, the checklist tells you how to fix the problem. At the end of the checklist, you will find other tips for preventing falls. o Look at the floor in each room  o When he walks through room do you have to walk around furniture? - Ask someone to move the furniture to clear your past  o You have throw rugs on the floor? - Remove the rugs or use double-sided tape or nonslip backing on the rugs so they dont slip  o Are papers, magazines, books, shoes, boxes, blankets, towels, or other objects on the floor?  -  things that are on the floor. Always keep objects off the floor  o Do you have to walk over or around cords or wires (like cords from lamps, extension cords, or telephone cords)? - Coil or tape cords and wires next to the wall so you cant trip over them. Have an  put in another outlet. o Are papers, shoes, books, or other objects on the stairs? -  things on the stairs. Always keep objects off the stairs. o Are some steps broken or uneven?   - Fix loose or uneven steps. o Are you missing a light over the stairway?   - Have a  or an  put in an overhead light at the top and bottom of the stairs. o Has the stairway light bulb burned out?   - Have a friend or family member change the light bulb.   o Do you have only one light switch for your stairs (only at the top or at the bottom of the stairs)? - Have a  or an  put in a light switch at the top and bottom of the stairs. You can get light switches that glow  o Are the handrails loose or broken? Is there a handrail on only one side of the stairs? - Fix loose handrails or put in new ones.  Make sure handrails are on both sides of the stairs and are as long as the stairs. o Is the carpet on the steps loose or torn? - Make sure the carpet is firmly attached to every step or remove the carpet and attach non-slip rubber treads on the stairs. o Look at your kitchen and eating Look at your kitchen and eating area. Are the things you use often on high shelves? - Move items in your cabinets. Keep things you use often on the lower shelves (about waist high). - Is your step stool unsteady? - Get a new, steady step stool with a bar to hold on to. Never use a chair as a step stool.   - Is the light near the bed hard to reach?   - Place a lamp close to the bed where it is easy to reach.  o Is the path from your bed to the bathroom dark?   - Put in a night-light so you can see where youre walking. Some nightlights go on by themselves after dark  o Is the tub or shower floor slippery?   - Put a non-slip rubber mat or selfstick strips on the floor of the tub or shower. o Do you have some support when you get in and out of the tub or up from the toilet?   - Have a  or a marquis put in a grab bar inside the tub and next to the toilet.  o Exercise regularly. Exercise makes you stronger and improves your balance and coordination. o Have your doctor or pharmacist look at all the medicines you take, even over-the-counter medicines. Some medicines can make you sleepy or dizzy. o Have your vision checked at least once a year by an eye doctor. Poor vision can increase your risk of falling.   o Get up slowly after you sit or lie down.  o Wear sturdy shoes with thin, non-slip soles. Avoid slippers and running shoes with thick soles. o Improve the lighting in your home. Use brighter light bulbs (at least 60 [place]). Use lamp shades or frosted bulbs to reduce glare. o Use reflecting tape at the top and bottom of the stairs so you can see them better.    o Paint doorsills a different color to prevent tripping.  o Keep emergency numbers in large print near each phone. o Put a phone near the floor in case you fall and cant get up.   o Think about wearing an alarm device that will bring help in case you fall and cant get up.    www.cdc.gov/safeusa    Recommend seeing an audiologist for hearing issues. Recommended getting an eye exam once a year.

## 2021-10-20 ENCOUNTER — OFFICE VISIT (OUTPATIENT)
Age: 71
End: 2021-10-20

## 2021-10-20 DIAGNOSIS — Z11.59 SCREENING FOR VIRAL DISEASE: Primary | ICD-10-CM

## 2021-10-20 LAB — SARS-COV-2, PCR: NOT DETECTED

## 2021-10-20 PROCEDURE — 99999 PR OFFICE/OUTPT VISIT,PROCEDURE ONLY: CPT | Performed by: NURSE PRACTITIONER

## 2021-10-22 ENCOUNTER — ANESTHESIA (OUTPATIENT)
Dept: ENDOSCOPY | Age: 71
End: 2021-10-22
Payer: MEDICARE

## 2021-10-22 ENCOUNTER — ANESTHESIA EVENT (OUTPATIENT)
Dept: ENDOSCOPY | Age: 71
End: 2021-10-22
Payer: MEDICARE

## 2021-10-22 ENCOUNTER — HOSPITAL ENCOUNTER (OUTPATIENT)
Age: 71
Setting detail: OUTPATIENT SURGERY
Discharge: HOME | End: 2021-10-22
Attending: INTERNAL MEDICINE | Admitting: INTERNAL MEDICINE
Payer: MEDICARE

## 2021-10-22 VITALS
WEIGHT: 165 LBS | OXYGEN SATURATION: 96 % | HEART RATE: 71 BPM | RESPIRATION RATE: 18 BRPM | HEIGHT: 70 IN | DIASTOLIC BLOOD PRESSURE: 71 MMHG | SYSTOLIC BLOOD PRESSURE: 129 MMHG | TEMPERATURE: 97 F | BODY MASS INDEX: 23.62 KG/M2

## 2021-10-22 VITALS
DIASTOLIC BLOOD PRESSURE: 75 MMHG | SYSTOLIC BLOOD PRESSURE: 142 MMHG | RESPIRATION RATE: 19 BRPM | OXYGEN SATURATION: 97 %

## 2021-10-22 PROCEDURE — 2709999900 HC NON-CHARGEABLE SUPPLY: Performed by: INTERNAL MEDICINE

## 2021-10-22 PROCEDURE — 6360000002 HC RX W HCPCS: Performed by: NURSE ANESTHETIST, CERTIFIED REGISTERED

## 2021-10-22 PROCEDURE — 7100000011 HC PHASE II RECOVERY - ADDTL 15 MIN: Performed by: INTERNAL MEDICINE

## 2021-10-22 PROCEDURE — 3700000001 HC ADD 15 MINUTES (ANESTHESIA): Performed by: INTERNAL MEDICINE

## 2021-10-22 PROCEDURE — 45385 COLONOSCOPY W/LESION REMOVAL: CPT | Performed by: INTERNAL MEDICINE

## 2021-10-22 PROCEDURE — 2500000003 HC RX 250 WO HCPCS: Performed by: NURSE ANESTHETIST, CERTIFIED REGISTERED

## 2021-10-22 PROCEDURE — 7100000010 HC PHASE II RECOVERY - FIRST 15 MIN: Performed by: INTERNAL MEDICINE

## 2021-10-22 PROCEDURE — 3609010600 HC COLONOSCOPY POLYPECTOMY SNARE/COLD BIOPSY: Performed by: INTERNAL MEDICINE

## 2021-10-22 PROCEDURE — 2580000003 HC RX 258: Performed by: INTERNAL MEDICINE

## 2021-10-22 PROCEDURE — 88305 TISSUE EXAM BY PATHOLOGIST: CPT

## 2021-10-22 PROCEDURE — 3700000000 HC ANESTHESIA ATTENDED CARE: Performed by: INTERNAL MEDICINE

## 2021-10-22 RX ORDER — GLYCOPYRROLATE 0.2 MG/ML
INJECTION INTRAMUSCULAR; INTRAVENOUS PRN
Status: DISCONTINUED | OUTPATIENT
Start: 2021-10-22 | End: 2021-10-22 | Stop reason: SDUPTHER

## 2021-10-22 RX ORDER — PROPOFOL 10 MG/ML
INJECTION, EMULSION INTRAVENOUS PRN
Status: DISCONTINUED | OUTPATIENT
Start: 2021-10-22 | End: 2021-10-22 | Stop reason: SDUPTHER

## 2021-10-22 RX ORDER — SODIUM CHLORIDE, SODIUM LACTATE, POTASSIUM CHLORIDE, CALCIUM CHLORIDE 600; 310; 30; 20 MG/100ML; MG/100ML; MG/100ML; MG/100ML
INJECTION, SOLUTION INTRAVENOUS CONTINUOUS
Status: DISCONTINUED | OUTPATIENT
Start: 2021-10-22 | End: 2021-10-22 | Stop reason: HOSPADM

## 2021-10-22 RX ORDER — LIDOCAINE HYDROCHLORIDE 10 MG/ML
INJECTION, SOLUTION EPIDURAL; INFILTRATION; INTRACAUDAL; PERINEURAL PRN
Status: DISCONTINUED | OUTPATIENT
Start: 2021-10-22 | End: 2021-10-22 | Stop reason: SDUPTHER

## 2021-10-22 RX ORDER — EPHEDRINE SULFATE/0.9% NACL/PF 50 MG/5 ML
SYRINGE (ML) INTRAVENOUS PRN
Status: DISCONTINUED | OUTPATIENT
Start: 2021-10-22 | End: 2021-10-22 | Stop reason: SDUPTHER

## 2021-10-22 RX ADMIN — LIDOCAINE HYDROCHLORIDE 50 MG: 10 INJECTION, SOLUTION EPIDURAL; INFILTRATION; INTRACAUDAL; PERINEURAL at 10:23

## 2021-10-22 RX ADMIN — GLYCOPYRROLATE 0.2 MG: 0.2 INJECTION, SOLUTION INTRAMUSCULAR; INTRAVENOUS at 10:22

## 2021-10-22 RX ADMIN — PROPOFOL 50 MG: 10 INJECTION, EMULSION INTRAVENOUS at 10:23

## 2021-10-22 RX ADMIN — PROPOFOL 50 MG: 10 INJECTION, EMULSION INTRAVENOUS at 10:40

## 2021-10-22 RX ADMIN — Medication 10 MG: at 10:30

## 2021-10-22 RX ADMIN — PROPOFOL 50 MG: 10 INJECTION, EMULSION INTRAVENOUS at 10:36

## 2021-10-22 RX ADMIN — PROPOFOL 50 MG: 10 INJECTION, EMULSION INTRAVENOUS at 10:28

## 2021-10-22 RX ADMIN — SODIUM CHLORIDE, SODIUM LACTATE, POTASSIUM CHLORIDE, AND CALCIUM CHLORIDE: 600; 310; 30; 20 INJECTION, SOLUTION INTRAVENOUS at 10:19

## 2021-10-22 RX ADMIN — PROPOFOL 50 MG: 10 INJECTION, EMULSION INTRAVENOUS at 10:26

## 2021-10-22 RX ADMIN — PROPOFOL 50 MG: 10 INJECTION, EMULSION INTRAVENOUS at 10:32

## 2021-10-22 ASSESSMENT — PAIN - FUNCTIONAL ASSESSMENT: PAIN_FUNCTIONAL_ASSESSMENT: 0-10

## 2021-10-22 NOTE — H&P
History:  Past Surgical History:   Procedure Laterality Date    APPENDECTOMY      BACK SURGERY      TESTICLE REMOVAL      Just one    TYMPANOSTOMY TUBE PLACEMENT         Social History:  Social History     Tobacco Use    Smoking status: Former Smoker     Packs/day: 0.50     Years: 55.00     Pack years: 27.50     Types: Cigarettes     Quit date: 2/1/2018     Years since quitting: 3.7    Smokeless tobacco: Never Used   Substance Use Topics    Alcohol use: Yes     Comment: occasionally    Drug use: No       Vital Signs: There were no vitals filed for this visit. Physical Exam:  Cardiac:  [x]WNL  []Comments:  Pulmonary:  [x]WNL   []Comments:  Neuro/Mental Status:  [x]WNL  []Comments:  Abdominal:  [x]WNL    []Comments:  Other:   []WNL  []Comments:    Informed Consent:  The risks and benefits of the procedure have been discussed with either the patient or if they cannot consent, their representative. Assessment:  Patient examined and appropriate for planned sedation and procedure. Plan:  Proceed with planned sedation and procedure as above.     Giovanny Delarosa MD

## 2021-10-22 NOTE — OP NOTE
Patient: Martha Ricketts : 1950  Med Rec#: [id number] Acc#: [id number]   Primary Care Provider Cassi Carvajal MD  f  Date of Procedure:  10/22/2021    Endoscopist: Bianka Irby MD    Referring Provider: Cassi Carvajal MD,     Operation Performed: Colonoscopy with snare polypectomy    Indications: screening    Anesthesia:  Sedation was administered by anesthesia who monitored the patient during the procedure. I met with Martha Ricketts prior to procedure. We discussed the procedure itself, and I have discussed the risks of endoscopy (including-- but not limited to-- pain, discomfort, bleeding potentially requiring second endoscopic procedure and/or blood transfusion, organ perforation requiring operative repair, damage to organs near the colon, infection, aspiration, cardiopulmonary/allergic reaction), benefits, indications to endoscopy. Additionally, we discussed options other than colonoscopy. The patient expressed understanding. All questions answered. The patient decided to proceed with the procedure. Signed informed consent was placed on the chart. Blood Loss: minimal    Withdrawal time: > 6 minutes  Bowel Prep: adequate     Complications: no immediate complications    DESCRIPTION OF PROCEDURE:     A time out was performed. After written informed consent was obtained, the patient was placed in the left lateral position. The perianal area was inspected, and a digital rectal exam was performed. A rectal exam was performed: normal tone, no palpable lesions. At this point, a forward viewing Olympus colonoscope was inserted into the anus and carefully advanced to the cecum. The cecum was identified by the ileocecal valve and the appendiceal orifice. The colonoscope was then slowly withdrawn with careful inspection of the mucosa in a linear and circumferential fashion. The scope was retroflexed in the rectum.  Suction was utilized during the procedure to remove as much air as possible from the bowel. The colonoscope was removed from the patient, and the procedure was terminated. Findings are listed below. Findings: The mucosa appeared normal throughout the entire examined colon     In the the cecum, a 5 mm sessile polyp was removed completely with cold snare polypectomy. In the the ascending colon, a 5 mm sessile polyp was removed completely with cold snare polypectomy. In the the sigmoid, a 7 mm sessile polyp was removed completely with cold snare polypectomy. Retroflexion in the rectum was normal and revealed no further abnormalities           Recommendations:  1. Repeat colonoscopy: pending pathology - max of 3 yrs for screening  2. Await biopsy results-you will receive a letter with your results within 7-10 days    Findings and recommendations were discussed w/ the patient. A copy of the images was provided.     Lisa Shannon MD  10/22/2021  10:39 AM

## 2021-10-22 NOTE — ANESTHESIA PRE PROCEDURE
disease), stage III ([facility] N18.30    Colon cancer screening declined Z53.20    Mixed hearing loss, bilateral H90.6    S/p bilateral myringotomy with tube placement Z96.22    Bilateral sensorineural hearing loss H90.3       Past Medical History:        Diagnosis Date    COPD (chronic obstructive pulmonary disease) ([facility]     Heart attack ([facility]     two    Hypertension     Mini stroke ([facility]     2       Past Surgical History:        Procedure Laterality Date    APPENDECTOMY      BACK SURGERY      TESTICLE REMOVAL      Just one    TYMPANOSTOMY TUBE PLACEMENT         Social History:    Social History     Tobacco Use    Smoking status: Current Every Day Smoker     Packs/day: 0.50     Years: 55.00     Pack years: 27.50     Types: Cigarettes     Last attempt to quit: 2/1/2018     Years since quitting: 3.7    Smokeless tobacco: Never Used   Substance Use Topics    Alcohol use: Yes     Comment: occasionally                                Ready to quit: Not Answered  Counseling given: Not Answered      Vital Signs (Current):   Vitals:    10/22/21 0853   BP: 135/70   Pulse: 58   Resp: 14   Temp: 98.2 °F (36.8 °C)   TempSrc: Tympanic   SpO2: 97%   Weight: 165 lb (74.8 kg)   Height: 5' 9.5\" (1.765 m)                                              BP Readings from Last 3 Encounters:   10/22/21 135/70   07/15/21 132/82   05/13/21 134/72       NPO Status: Time of last liquid consumption: 1230                        Time of last solid consumption: 1230                        Date of last liquid consumption: 10/21/21                        Date of last solid food consumption: 10/21/21    BMI:   Wt Readings from Last 3 Encounters:   10/22/21 165 lb (74.8 kg)   07/15/21 154 lb (69.9 kg)   05/13/21 156 lb (70.8 kg)     Body mass index is 24.02 kg/m².     CBC:   Lab Results   Component Value Date    WBC 5.9 01/07/2021    RBC 4.79 01/07/2021    HGB 14.3 01/07/2021    HCT 43.8 01/07/2021    MCV 91.4 01/07/2021    RDW 13.4 01/07/2021     01/07/2021       CMP:   Lab Results   Component Value Date     01/07/2021    K 4.1 01/07/2021     01/07/2021    CO2 28 01/07/2021    BUN 13 01/07/2021    CREATININE 1.3 01/07/2021    GFRAA >59 01/07/2021    LABGLOM 54 01/07/2021    GLUCOSE 98 01/07/2021    PROT 7.6 01/07/2021    CALCIUM 9.4 01/07/2021    BILITOT 0.5 01/07/2021    ALKPHOS 153 01/07/2021    AST 13 01/07/2021    ALT 10 01/07/2021       POC Tests: No results for input(s): POCGLU, POCNA, POCK, POCCL, POCBUN, POCHEMO, POCHCT in the last 72 hours. Coags: No results found for: PROTIME, INR, APTT    HCG (If Applicable): No results found for: PREGTESTUR, PREGSERUM, HCG, HCGQUANT     ABGs: No results found for: PHART, PO2ART, TCO5WWC, PNP4EJC, BEART, X6GHSNYS     Type & Screen (If Applicable):  No results found for: LABABO, LABRH    Drug/Infectious Status (If Applicable):  No results found for: HIV, HEPCAB    COVID-19 Screening (If Applicable):   Lab Results   Component Value Date    COVID19 Not Detected 10/20/2021           Anesthesia Evaluation    Airway: Mallampati: I  TM distance: >3 FB     Mouth opening: > = 3 FB Dental:          Pulmonary:   (+) COPD:                             Cardiovascular:    (+) hypertension:, past MI:, CAD:,                   Neuro/Psych:   (+) TIA,             GI/Hepatic/Renal: Neg GI/Hepatic/Renal ROS            Endo/Other: Negative Endo/Other ROS                    Abdominal:             Vascular: Other Findings:           Anesthesia Plan      general and TIVA     ASA 3       Induction: intravenous. Anesthetic plan and risks discussed with patient.                       AGUSTINA Gamez CRNA   10/22/2021

## 2021-10-22 NOTE — ANESTHESIA POSTPROCEDURE EVALUATION
Department of Anesthesiology  Postprocedure Note    Patient: Manuel Harris  MRN: [id number]  YOB: 1950  Date of evaluation: 10/22/2021  Time:  10:43 AM     Procedure Summary     Date: 10/22/21 Room / Location: [address]    Anesthesia Start: 3367 Anesthesia Stop: 9381    Procedure: COLONOSCOPY POLYPECTOMY SNARE/COLD BIOPSY (N/A Abdomen) Diagnosis: (SCREEN (OA))    Surgeons: Radha Cole MD Responsible Provider: AGUSTINA Gamez CRNA    Anesthesia Type: general, TIVA ASA Status: 3          Anesthesia Type: general, TIVA    Reshma Phase I: Reshma Score: 10    Reshma Phase II:      Last vitals: Reviewed and per EMR flowsheets.        Anesthesia Post Evaluation    Patient location during evaluation: bedside  Patient participation: complete - patient participated  Level of consciousness: awake and alert  Pain score: 0  Airway patency: patent  Nausea & Vomiting: no nausea and no vomiting  Complications: no  Cardiovascular status: blood pressure returned to baseline  Respiratory status: acceptable  Hydration status: stable

## 2021-11-15 ENCOUNTER — OFFICE VISIT (OUTPATIENT)
Dept: ENT CLINIC | Age: 71
End: 2021-11-15
Payer: MEDICARE

## 2021-11-15 VITALS — BODY MASS INDEX: 23.48 KG/M2 | WEIGHT: 164 LBS | HEIGHT: 70 IN | TEMPERATURE: 97.2 F

## 2021-11-15 DIAGNOSIS — S00.451A NON-PENETRATING FOREIGN BODY IN EAR CANAL, RIGHT, INITIAL ENCOUNTER: ICD-10-CM

## 2021-11-15 DIAGNOSIS — Z96.22 S/P BILATERAL MYRINGOTOMY WITH TUBE PLACEMENT: ICD-10-CM

## 2021-11-15 PROCEDURE — 1123F ACP DISCUSS/DSCN MKR DOCD: CPT | Performed by: OTOLARYNGOLOGY

## 2021-11-15 PROCEDURE — 3017F COLORECTAL CA SCREEN DOC REV: CPT | Performed by: OTOLARYNGOLOGY

## 2021-11-15 PROCEDURE — 69200 CLEAR OUTER EAR CANAL: CPT | Performed by: OTOLARYNGOLOGY

## 2021-11-15 PROCEDURE — 4004F PT TOBACCO SCREEN RCVD TLK: CPT | Performed by: OTOLARYNGOLOGY

## 2021-11-15 PROCEDURE — G8420 CALC BMI NORM PARAMETERS: HCPCS | Performed by: OTOLARYNGOLOGY

## 2021-11-15 PROCEDURE — 92504 EAR MICROSCOPY EXAMINATION: CPT | Performed by: OTOLARYNGOLOGY

## 2021-11-15 PROCEDURE — G8484 FLU IMMUNIZE NO ADMIN: HCPCS | Performed by: OTOLARYNGOLOGY

## 2021-11-15 PROCEDURE — G8427 DOCREV CUR MEDS BY ELIG CLIN: HCPCS | Performed by: OTOLARYNGOLOGY

## 2021-11-15 PROCEDURE — 4040F PNEUMOC VAC/ADMIN/RCVD: CPT | Performed by: OTOLARYNGOLOGY

## 2021-11-15 PROCEDURE — 99212 OFFICE O/P EST SF 10 MIN: CPT | Performed by: OTOLARYNGOLOGY

## 2021-11-15 NOTE — PROGRESS NOTES
66 y.o.  male presents today for routine follow-up and tube check. He reports no ear symptoms of any kind. He reports no problems or treatment for ear disease or infections since his last visit with me. Family History   Problem Relation Age of Onset    Coronary Art Dis Father     Coronary Art Dis Brother      Social History     Socioeconomic History    Marital status:      Spouse name: None    Number of children: None    Years of education: None    Highest education level: None   Occupational History    None   Tobacco Use    Smoking status: Current Every Day Smoker     Packs/day: 0.50     Years: 55.00     Pack years: 27.50     Types: Cigarettes     Last attempt to quit: 2/1/2018     Years since quitting: 3.7    Smokeless tobacco: Never Used   Vaping Use    Vaping Use: Never used   Substance and Sexual Activity    Alcohol use: Yes     Comment: occasionally    Drug use: No    Sexual activity: None   Other Topics Concern    None   Social History Narrative    None     Social Determinants of Health     Financial Resource Strain:     Difficulty of Paying Living Expenses: Not on file   Food Insecurity:     Worried About Running Out of Food in the Last Year: Not on file    Dragan of Food in the Last Year: Not on file   Transportation Needs:     Lack of Transportation (Medical): Not on file    Lack of Transportation (Non-Medical):  Not on file   Physical Activity:     Days of Exercise per Week: Not on file    Minutes of Exercise per Session: Not on file   Stress:     Feeling of Stress : Not on file   Social Connections:     Frequency of Communication with Friends and Family: Not on file    Frequency of Social Gatherings with Friends and Family: Not on file    Attends [employer] Services: Not on file    Active Member of Clubs or Organizations: Not on file    Attends Club or Organization Meetings: Not on file    Marital Status: Not on file   Intimate Partner Violence:     Fear of Current or Ex-Partner: Not on file    Emotionally Abused: Not on file    Physically Abused: Not on file    Sexually Abused: Not on file   Housing Stability:     Unable to Pay for Housing in the Last Year: Not on file    Number of Places Lived in the Last Year: Not on file    Unstable Housing in the Last Year: Not on file     Past Medical History:   Diagnosis Date    [facility] [facility]     Heart attack ([facility] [facility]     two    Hypertension     Mini stroke ([facility] [address] [facility]     2     Past Surgical History:   Procedure Laterality Date    APPENDECTOMY      BACK SURGERY      COLONOSCOPY N/A 10/22/2021    Dr Gayathri Cano-AP x 1, BCM x 1, 3 yr recall    TESTICLE REMOVAL      Just one    TYMPANOSTOMY TUBE PLACEMENT           REVIEW OF SYSTEMS:  all other systems reviewed and are negative  General Health: no change in health status since last visit  Ears: ear pain No Bilateral recent infection No  Bilateral recent drainage No  Bilateral  Hearing: no change Bilateral, wears hearing aids:  Yes and  Bilateral and patient is currently wearing a hearing aid       Comments:     PHYSICAL EXAM:    Temp 97.2 °F (36.2 °C)   Ht 5' 9.5\" (1.765 m)   Wt 164 lb (74.4 kg)   BMI 23.87 kg/m²   Body mass index is 23.87 kg/m².     General Appearance: well developed , well nourished and no distress  Head/ Face: normocephalic and atraumatic  Vocal Quality: good/ normal  Ears: Right Ear: External: external ears normal Otoscopy Ear Canal: cerumen impaction, extruded tube in canal and foreign body Otoscopy TM: TM's normal, TM's mobile and TM's intact Left Ear: External: external ears normal Otoscopy Ear Canal: extruded tube in canal Otoscopy TM: TM's normal, TM's mobile and TM's intact  Hearing: diminished  Neuro: alert and oriented x3 and cranial nerves II- XII grossly intact  Psych/ Mood: cooperative and no depression, anxiety or agitation    Assessment & Plan:    Problem List Items Addressed This Visit        ENT Problems Non-penetrating foreign body in ear canal, right, initial encounter     Hearing aid dome embedded in outer canal.  Removed atraumatically         Relevant Orders    86966 - AK REMV EXT CANAL FOREIGN BODY    AK EAR MICROSCOPY EXAMINATION       Other    S/p bilateral myringotomy with tube placement     Both tubes extruded  Underlying TMs intact  No evidence of conductive hearing loss in either ear         Relevant Orders    AK EAR MICROSCOPY EXAMINATION          Orders Placed This Encounter   Procedures    01978 - AK REMV EXT CANAL FOREIGN BODY     Under microscopic guidance the right ear canal was inspected. There was some type of foreign body embedded in cerumen. It was easily removed with forceps. On inspection it appeared to be the dome of a hearing aid. Beneath this there was an extruded tube which was also grasped with forceps and removed. Underlying TM was intact    AK EAR MICROSCOPY EXAMINATION     Office microscope utilized for extraction of right ear canal foreign body       No orders of the defined types were placed in this encounter. Please note that this chart was generated using dragon dictation software. Although every effort was made to ensure the accuracy of this automated transcription, some errors in transcription may have occurred.

## 2022-01-10 DIAGNOSIS — E78.01 FAMILIAL HYPERCHOLESTEROLEMIA: ICD-10-CM

## 2022-01-10 DIAGNOSIS — I10 ESSENTIAL (PRIMARY) HYPERTENSION: ICD-10-CM

## 2022-01-10 DIAGNOSIS — Z12.5 ENCOUNTER FOR PROSTATE CANCER SCREENING: ICD-10-CM

## 2022-01-10 LAB
ALBUMIN SERPL-MCNC: 4.2 G/DL (ref 3.5–5.2)
ALP BLD-CCNC: 132 U/L (ref 40–130)
ALT SERPL-CCNC: 13 U/L (ref 5–41)
ANION GAP SERPL CALCULATED.3IONS-SCNC: 8 MMOL/L (ref 7–19)
AST SERPL-CCNC: 14 U/L (ref 5–40)
BASOPHILS ABSOLUTE: 0 K/UL (ref 0–0.2)
BASOPHILS RELATIVE PERCENT: 0.6 % (ref 0–1)
BILIRUB SERPL-MCNC: 0.4 MG/DL (ref 0.2–1.2)
BUN BLDV-MCNC: 10 MG/DL (ref 8–23)
CALCIUM SERPL-MCNC: 9.6 MG/DL (ref 8.8–10.2)
CHLORIDE BLD-SCNC: 103 MMOL/L (ref 98–111)
CHOLESTEROL, TOTAL: 145 MG/DL (ref 160–199)
CO2: 29 MMOL/L (ref 22–29)
CREAT SERPL-MCNC: 1.3 MG/DL (ref 0.5–1.2)
EOSINOPHILS ABSOLUTE: 0.1 K/UL (ref 0–0.6)
EOSINOPHILS RELATIVE PERCENT: 1.3 % (ref 0–5)
GFR AFRICAN AMERICAN: >59
GFR NON-AFRICAN AMERICAN: 54
GLUCOSE BLD-MCNC: 110 MG/DL (ref 74–109)
HCT VFR BLD CALC: 45.5 % (ref 42–52)
HDLC SERPL-MCNC: 37 MG/DL (ref 55–121)
HEMOGLOBIN: 14.9 G/DL (ref 14–18)
IMMATURE GRANULOCYTES #: 0 K/UL
LDL CHOLESTEROL CALCULATED: 92 MG/DL
LYMPHOCYTES ABSOLUTE: 2 K/UL (ref 1.1–4.5)
LYMPHOCYTES RELATIVE PERCENT: 29.6 % (ref 20–40)
MCH RBC QN AUTO: 30.9 PG (ref 27–31)
MCHC RBC AUTO-ENTMCNC: 32.7 G/DL (ref 33–37)
MCV RBC AUTO: 94.4 FL (ref 80–94)
MONOCYTES ABSOLUTE: 0.4 K/UL (ref 0–0.9)
MONOCYTES RELATIVE PERCENT: 6.1 % (ref 0–10)
NEUTROPHILS ABSOLUTE: 4.2 K/UL (ref 1.5–7.5)
NEUTROPHILS RELATIVE PERCENT: 62.1 % (ref 50–65)
PDW BLD-RTO: 12.9 % (ref 11.5–14.5)
PLATELET # BLD: 189 K/UL (ref 130–400)
PMV BLD AUTO: 9.9 FL (ref 9.4–12.4)
POTASSIUM SERPL-SCNC: 4.8 MMOL/L (ref 3.5–5)
PROSTATE SPECIFIC ANTIGEN: 0.61 NG/ML (ref 0–4)
RBC # BLD: 4.82 M/UL (ref 4.7–6.1)
SODIUM BLD-SCNC: 140 MMOL/L (ref 136–145)
TOTAL PROTEIN: 7.5 G/DL (ref 6.6–8.7)
TRIGL SERPL-MCNC: 78 MG/DL (ref 0–149)
WBC # BLD: 6.7 K/UL (ref 4.8–10.8)

## 2022-01-17 ENCOUNTER — OFFICE VISIT (OUTPATIENT)
Dept: FAMILY MEDICINE CLINIC | Age: 72
End: 2022-01-17
Payer: MEDICARE

## 2022-01-17 VITALS
BODY MASS INDEX: 23.87 KG/M2 | DIASTOLIC BLOOD PRESSURE: 72 MMHG | SYSTOLIC BLOOD PRESSURE: 122 MMHG | WEIGHT: 164 LBS | TEMPERATURE: 97.9 F | HEART RATE: 76 BPM | OXYGEN SATURATION: 96 %

## 2022-01-17 DIAGNOSIS — J30.89 CHRONIC NONSEASONAL ALLERGIC RHINITIS DUE TO POLLEN: ICD-10-CM

## 2022-01-17 DIAGNOSIS — I65.23 ATHEROSCLEROSIS OF BOTH CAROTID ARTERIES: ICD-10-CM

## 2022-01-17 DIAGNOSIS — F51.04 CHRONIC INSOMNIA: ICD-10-CM

## 2022-01-17 DIAGNOSIS — N18.31 STAGE 3A CHRONIC KIDNEY DISEASE (HCC): ICD-10-CM

## 2022-01-17 DIAGNOSIS — E78.00 HYPERCHOLESTEROLEMIA: ICD-10-CM

## 2022-01-17 DIAGNOSIS — I10 ESSENTIAL (PRIMARY) HYPERTENSION: Primary | ICD-10-CM

## 2022-01-17 DIAGNOSIS — I25.10 CORONARY ARTERY DISEASE INVOLVING NATIVE CORONARY ARTERY OF NATIVE HEART WITHOUT ANGINA PECTORIS: ICD-10-CM

## 2022-01-17 PROCEDURE — 4004F PT TOBACCO SCREEN RCVD TLK: CPT | Performed by: FAMILY MEDICINE

## 2022-01-17 PROCEDURE — 99214 OFFICE O/P EST MOD 30 MIN: CPT | Performed by: FAMILY MEDICINE

## 2022-01-17 PROCEDURE — 4040F PNEUMOC VAC/ADMIN/RCVD: CPT | Performed by: FAMILY MEDICINE

## 2022-01-17 PROCEDURE — 3017F COLORECTAL CA SCREEN DOC REV: CPT | Performed by: FAMILY MEDICINE

## 2022-01-17 PROCEDURE — G8427 DOCREV CUR MEDS BY ELIG CLIN: HCPCS | Performed by: FAMILY MEDICINE

## 2022-01-17 PROCEDURE — G8420 CALC BMI NORM PARAMETERS: HCPCS | Performed by: FAMILY MEDICINE

## 2022-01-17 PROCEDURE — 1123F ACP DISCUSS/DSCN MKR DOCD: CPT | Performed by: FAMILY MEDICINE

## 2022-01-17 PROCEDURE — G8484 FLU IMMUNIZE NO ADMIN: HCPCS | Performed by: FAMILY MEDICINE

## 2022-01-17 PROCEDURE — 93000 ELECTROCARDIOGRAM COMPLETE: CPT | Performed by: FAMILY MEDICINE

## 2022-01-17 NOTE — PROGRESS NOTES
[address] [address] [place] [address]  Dept: [phone]  Dept Fax: [phone]  Loc: [phone]    Mimi Correa is a 66 y.o. male who presents today for his medical conditions/complaints as noted below. Mimi Correa is here for 6 Month Follow-Up        HPI:   CC: Here today to discuss the following:          Hypertension  Compliant with medications. No adverse effects from medication. No \ palpitations, or chest pain. Hyperlipidemia  Tolerating current cholesterol medication without side effects. No body aches. Attempting to reduce processed sugar and cholesterol from diet. Allergies  Allergies are currently stable. Says he has had dizziness since late November. Describes it more of a \"lightheadedness\". He also had blurry vision initially but that has not been recurrent. He has had no chest pain or palpitations. No shortness of breath. He has scheduled an appointment with his eye doctor. Seeing [facility] for middle ear issues. He has had myringotomy tubes placed in the past.. He is denying any ear pain or pressure today. He does have hearing loss and wears hearing aids.       HPI    Past Medical History:   Diagnosis Date    COPD (chronic obstructive pulmonary disease) ([address]     Heart attack ([address]     two    Hypertension     Mini stroke ([address]     2      Past Surgical History:   Procedure Laterality Date    APPENDECTOMY      BACK SURGERY      COLONOSCOPY N/A 10/22/2021    Dr Paty Cano-AP x 1, BCM x 1, 3 yr recall    TESTICLE REMOVAL      Just one    TYMPANOSTOMY TUBE PLACEMENT         Family History   Problem Relation Age of Onset    Coronary Art Dis Father     Coronary Art Dis Brother        Social History     Tobacco Use    Smoking status: Current Every Day Smoker     Packs/day: 0.50     Years: 55.00     Pack years: 27.50     Types: Cigarettes     Last attempt to quit: 2/1/2018     Years since quitting: 3.9    Smokeless tobacco: Never Used   Substance Use Topics    Alcohol use: Yes     Comment: occasionally     Current Outpatient Medications   Medication Sig Dispense Refill    atorvastatin (LIPITOR) 80 MG tablet Take 1 tablet by mouth daily 90 tablet 3    metoprolol succinate (TOPROL XL) 25 MG extended release tablet Take 0.5 tablets by mouth daily 45 tablet 3    fluticasone (FLONASE) 50 MCG/ACT nasal spray 2 sprays by Each Nostril route daily 3 Bottle 3    albuterol sulfate HFA (VENTOLIN HFA) 108 (90 Base) MCG/ACT inhaler Inhale 2 puffs into the lungs every 6 hours as needed for Wheezing 3 Inhaler 2    nitroGLYCERIN (NITROSTAT) 0.4 MG SL tablet Place 1 tablet under the tongue every 5 minutes as needed for Chest pain 25 tablet 5    traZODone (DESYREL) 50 MG tablet TAKE 1 TO 2 TABLETS AT BEDTIME AS NEEDED FOR INSOMNIA 180 tablet 1    ramipril (ALTACE) 10 MG capsule Take 1 capsule by mouth once daily 90 capsule 3    aspirin EC 81 MG EC tablet Take 81 mg by mouth       No current facility-administered medications for this visit. No Known Allergies    Health Maintenance   Topic Date Due    AAA screen  Never done    COVID-19 Vaccine (1) Never done    DTaP/Tdap/Td vaccine (1 - Tdap) Never done    Shingles Vaccine (1 of 2) Never done    Low dose CT lung screening  Never done    Flu vaccine (1) Never done    Depression Screen  07/15/2022    Annual Wellness Visit (AWV)  07/16/2022    Lipid screen  01/10/2023    Potassium monitoring  01/10/2023    Creatinine monitoring  01/10/2023    Colon cancer screen colonoscopy  10/22/2024    Pneumococcal 65+ years Vaccine  Completed    Hepatitis C screen  Completed    Hepatitis A vaccine  Aged Out    Hepatitis B vaccine  Aged Out    Hib vaccine  Aged Out    Meningococcal (ACWY) vaccine  Aged Out       Subjective:      Review of Systems      SeeHPI for visit specific review of symptoms.   All others negative      Objective:   /72   Pulse 76 Temp 97.9 °F (36.6 °C)   Wt 164 lb (74.4 kg)   SpO2 96%   BMI 23.87 kg/m²   Physical Exam      Recent Results (from the past 672 hour(s))   PSA screening    Collection Time: 01/10/22  1:53 PM   Result Value Ref Range    PSA 0.61 0.00 - 4.00 ng/mL   CBC Auto Differential    Collection Time: 01/10/22  1:53 PM   Result Value Ref Range    WBC 6.7 4.8 - 10.8 K/uL    RBC 4.82 4.70 - 6.10 M/uL    Hemoglobin 14.9 14.0 - 18.0 g/dL    Hematocrit 45.5 42.0 - 52.0 %    MCV 94.4 (H) 80.0 - 94.0 fL    MCH 30.9 27.0 - 31.0 pg    MCHC 32.7 (L) 33.0 - 37.0 g/dL    RDW 12.9 11.5 - 14.5 %    Platelets 502 814 - 402 K/uL    MPV 9.9 9.4 - 12.4 fL    Neutrophils % 62.1 50.0 - 65.0 %    Lymphocytes % 29.6 20.0 - 40.0 %    Monocytes % 6.1 0.0 - 10.0 %    Eosinophils % 1.3 0.0 - 5.0 %    Basophils % 0.6 0.0 - 1.0 %    Neutrophils Absolute 4.2 1.5 - 7.5 K/uL    Immature Granulocytes # 0.0 K/uL    Lymphocytes Absolute 2.0 1.1 - 4.5 K/uL    Monocytes Absolute 0.40 0.00 - 0.90 K/uL    Eosinophils Absolute 0.10 0.00 - 0.60 K/uL    Basophils Absolute 0.00 0.00 - 0.20 K/uL   Lipid Panel    Collection Time: 01/10/22  1:53 PM   Result Value Ref Range    Cholesterol, Total 145 (L) 160 - 199 mg/dL    Triglycerides 78 0 - 149 mg/dL    HDL 37 (L) 55 - 121 mg/dL    LDL Calculated 92 <100 mg/dL   Comprehensive Metabolic Panel    Collection Time: 01/10/22  1:53 PM   Result Value Ref Range    Sodium 140 136 - 145 mmol/L    Potassium 4.8 3.5 - 5.0 mmol/L    Chloride 103 98 - 111 mmol/L    CO2 29 22 - 29 mmol/L    Anion Gap 8 7 - 19 mmol/L    Glucose 110 (H) 74 - 109 mg/dL    BUN 10 8 - 23 mg/dL    CREATININE 1.3 (H) 0.5 - 1.2 mg/dL    GFR Non-African American 54 (A) >60    GFR African American >59 >59    Calcium 9.6 8.8 - 10.2 mg/dL    Total Protein 7.5 6.6 - 8.7 g/dL    Albumin 4.2 3.5 - 5.2 g/dL    Total Bilirubin 0.4 0.2 - 1.2 mg/dL    Alkaline Phosphatase 132 (H) 40 - 130 U/L    ALT 13 5 - 41 U/L    AST 14 5 - 40 U/L       EKG:    Normal sinus studies  - VL DUP CAROTID BILATERAL; Future    7. Coronary artery disease involving native coronary artery of native heart without angina pectoris  EKG shows no acute ST or T wave changes. He is having no chest pain or palpitations  - EKG 12 Lead            Return in about 6 months (around 7/17/2022) for AWV - 30 minute visit. Discussed use, benefit, and side effects of prescribed medications. All patient questions answered. Pt voiced understanding. Reviewed health maintenance. Instructedto continue current medications, diet and exercise. Patient agreed with treatmentplan. Follow up as directed. Note dictated using Dragon Dictation software  Sometimes this dictation software makes erroneous transcriptions.

## 2022-01-31 ENCOUNTER — HOSPITAL ENCOUNTER (OUTPATIENT)
Dept: VASCULAR LAB | Age: 72
Discharge: HOME | End: 2022-01-31
Payer: MEDICARE

## 2022-01-31 DIAGNOSIS — I65.23 ATHEROSCLEROSIS OF BOTH CAROTID ARTERIES: ICD-10-CM

## 2022-01-31 PROCEDURE — 93880 EXTRACRANIAL BILAT STUDY: CPT

## 2022-07-18 ENCOUNTER — OFFICE VISIT (OUTPATIENT)
Dept: FAMILY MEDICINE CLINIC | Age: 72
End: 2022-07-18
Payer: MEDICARE

## 2022-07-18 VITALS
HEIGHT: 70 IN | WEIGHT: 149 LBS | OXYGEN SATURATION: 98 % | HEART RATE: 48 BPM | TEMPERATURE: 98.7 F | DIASTOLIC BLOOD PRESSURE: 62 MMHG | BODY MASS INDEX: 21.33 KG/M2 | SYSTOLIC BLOOD PRESSURE: 122 MMHG

## 2022-07-18 DIAGNOSIS — Z00.00 ANNUAL PHYSICAL EXAM: Primary | ICD-10-CM

## 2022-07-18 DIAGNOSIS — E78.01 FAMILIAL HYPERCHOLESTEROLEMIA: ICD-10-CM

## 2022-07-18 DIAGNOSIS — N18.31 STAGE 3A CHRONIC KIDNEY DISEASE (HCC): ICD-10-CM

## 2022-07-18 DIAGNOSIS — F51.04 CHRONIC INSOMNIA: ICD-10-CM

## 2022-07-18 DIAGNOSIS — I25.10 CORONARY ARTERY DISEASE INVOLVING NATIVE CORONARY ARTERY OF NATIVE HEART WITHOUT ANGINA PECTORIS: ICD-10-CM

## 2022-07-18 DIAGNOSIS — I10 ESSENTIAL (PRIMARY) HYPERTENSION: ICD-10-CM

## 2022-07-18 DIAGNOSIS — J30.89 CHRONIC NONSEASONAL ALLERGIC RHINITIS DUE TO POLLEN: ICD-10-CM

## 2022-07-18 DIAGNOSIS — R79.9 ABNORMAL BLOOD CHEMISTRY: ICD-10-CM

## 2022-07-18 DIAGNOSIS — Z12.5 ENCOUNTER FOR PROSTATE CANCER SCREENING: ICD-10-CM

## 2022-07-18 PROCEDURE — 1123F ACP DISCUSS/DSCN MKR DOCD: CPT | Performed by: FAMILY MEDICINE

## 2022-07-18 PROCEDURE — G0439 PPPS, SUBSEQ VISIT: HCPCS | Performed by: FAMILY MEDICINE

## 2022-07-18 PROCEDURE — 99214 OFFICE O/P EST MOD 30 MIN: CPT | Performed by: FAMILY MEDICINE

## 2022-07-18 RX ORDER — ATORVASTATIN CALCIUM 80 MG/1
80 TABLET, FILM COATED ORAL DAILY
Qty: 90 TABLET | Refills: 3 | Status: SHIPPED | OUTPATIENT
Start: 2022-07-18

## 2022-07-18 RX ORDER — TRAZODONE HYDROCHLORIDE 50 MG/1
100 TABLET ORAL NIGHTLY PRN
Qty: 180 TABLET | Refills: 3 | Status: SHIPPED | OUTPATIENT
Start: 2022-07-18

## 2022-07-18 RX ORDER — FLUTICASONE PROPIONATE 50 MCG
2 SPRAY, SUSPENSION (ML) NASAL DAILY
Qty: 3 EACH | Refills: 3 | Status: SHIPPED | OUTPATIENT
Start: 2022-07-18

## 2022-07-18 RX ORDER — ALBUTEROL SULFATE 90 UG/1
2 AEROSOL, METERED RESPIRATORY (INHALATION) EVERY 6 HOURS PRN
Qty: 3 EACH | Refills: 2 | Status: SHIPPED | OUTPATIENT
Start: 2022-07-18

## 2022-07-18 RX ORDER — RAMIPRIL 10 MG/1
CAPSULE ORAL
Qty: 90 CAPSULE | Refills: 3 | Status: SHIPPED | OUTPATIENT
Start: 2022-07-18

## 2022-07-18 RX ORDER — METOPROLOL SUCCINATE 25 MG/1
12.5 TABLET, EXTENDED RELEASE ORAL DAILY
Qty: 45 TABLET | Refills: 3 | Status: SHIPPED | OUTPATIENT
Start: 2022-07-18

## 2022-07-18 ASSESSMENT — PATIENT HEALTH QUESTIONNAIRE - PHQ9
SUM OF ALL RESPONSES TO PHQ QUESTIONS 1-9: 0
SUM OF ALL RESPONSES TO PHQ QUESTIONS 1-9: 0
1. LITTLE INTEREST OR PLEASURE IN DOING THINGS: 0
2. FEELING DOWN, DEPRESSED OR HOPELESS: 0
SUM OF ALL RESPONSES TO PHQ9 QUESTIONS 1 & 2: 0
SUM OF ALL RESPONSES TO PHQ QUESTIONS 1-9: 0
SUM OF ALL RESPONSES TO PHQ QUESTIONS 1-9: 0

## 2022-07-18 ASSESSMENT — LIFESTYLE VARIABLES
HOW MANY STANDARD DRINKS CONTAINING ALCOHOL DO YOU HAVE ON A TYPICAL DAY: PATIENT DOES NOT DRINK
HOW OFTEN DO YOU HAVE A DRINK CONTAINING ALCOHOL: NEVER

## 2022-07-18 NOTE — PROGRESS NOTES
[facility] PHYSICIAN SERVICES  [facility]  [address] [facility] [address] [place] [address]  Dept: [phone]  Dept Fax: [phone]  Loc: [phone]    Silverio Souza is a 67 y.o. male who presents today for his medical conditions/complaints as noted below. Silverio Souza is here for Medicare AWV        HPI:   CC: Here today to discuss the following:    Hyperlipidemia  Tolerating current cholesterol medication without side effects. . Attempting to reduce processed sugar and cholesterol from diet. Hypertension  Compliant with medications. No adverse effects from medication. No lightheadedness, palpitations, or chest pain. Coronary Artery Disease  Currently no active angina symptoms. No chest pain with exertion. No orthopnea. Allergies  Allergies are currently stable. Insomnia  Currently stable. Satisfied with current treatment regimen. No adverse effects from medication. HPI    Subjective:      Review of Systems  Review of Systems   Constitutional: Negative for chills and fever. HENT: Negative for congestion. Respiratory: Negative for cough, chest tightness and shortness of breath. Cardiovascular: Negative for chest pain, palpitations and leg swelling. Gastrointestinal: Negative for abdominal pain, anal bleeding, constipation, diarrhea and nausea. Genitourinary: Negative for difficulty urinating. Psychiatric/Behavioral: Negative. SeeHPI for visit specific review of symptoms. All others negative      Objective:   /62   Pulse (!) 48   Temp 98.7 °F (37.1 °C)   Ht 5' 9.5\" (1.765 m)   Wt 149 lb (67.6 kg)   SpO2 98%   BMI 21.69 kg/m²   Physical Exam  Physical Exam   Constitutional: He appears well-developed. Does not appear ill. Neck: Neck supple. No masses. Neck Symmetric. Normal tracheal position. No thyroid enlargement  Cardiovascular: Normal rate and regular rhythm. Exam reveals no friction rub. No murmur heard.   Respiratory:  Effort normal and breath sounds normal. No respiratory distress. No wheezes. No rales. No use of accessory muscles or intercostal retractions. Neurological: alert. Psychiatric: normal mood and affect. His behavior is normal.       No results found for this or any previous visit (from the past 672 hour(s)). Carotid ultrasound January 2022:  Less than 50% stenosis in the right and left internal carotid. Assessment & Plan: The following diagnoses and conditions are stable with no further orders unless indicated:  1. Annual physical exam  Completed annual wellness    2. Familial hypercholesterolemia  Refilled medication check lipid panel prior to next visit  - Comprehensive Metabolic Panel; Future  - Lipid Panel; Future  - atorvastatin (LIPITOR) 80 MG tablet; Take 1 tablet by mouth in the morning. Dispense: 90 tablet; Refill: 3  Ordered lipid panel for next visit  3. Essential (primary) hypertension  Blood pressure stable  - CBC with Auto Differential; Future  - metoprolol succinate (TOPROL XL) 25 MG extended release tablet; Take 0.5 tablets by mouth in the morning. Dispense: 45 tablet; Refill: 3  - ramipril (ALTACE) 10 MG capsule; Take 1 capsule by mouth once daily  Dispense: 90 capsule; Refill: 3  Blood pressures are stable today. However, he does have some asymptomatic bradycardia. Blood pressures and heart rate have dropped with his weight loss. His weight loss is intentional as he has eliminated \"junk food\"  4. Coronary artery disease involving native coronary artery of native heart without angina pectoris  No chest pain or palpitations. Continue with aspirin 81 mg daily  ACE inhibitor and beta-blocker  High intensity statin    5. Stage 3a chronic kidney disease ([facility] [facility]  Lab Results   Component Value Date    CREATININE 1.3 (H) 01/10/2022     Lab Results   Component Value Date    BUN 10 01/10/2022         Reinforced goals of adequate hydration.  Recommended he avoid NSAIDs such as naproxen and ibuprofen. Recheck renal function prior to next visit    6. Chronic nonseasonal allergic rhinitis due to pollen  Chronic issue stable  - fluticasone (FLONASE) 50 MCG/ACT nasal spray; 2 sprays by Each Nostril route in the morning. Dispense: 3 each; Refill: 3    7. Chronic insomnia  Chronic issue stable  - traZODone (DESYREL) 50 MG tablet; Take 2 tablets by mouth nightly as needed for Sleep  Dispense: 180 tablet; Refill: 3    8. Encounter for prostate cancer screening  - PSA Screening; Future          Return in about 6 months (around 2023) for Routine follow up - 20 minutes. Discussed use, benefit, and side effects of prescribed medications. All patient questions answered. Pt voiced understanding. Reviewed health maintenance. Instructedto continue current medications, diet and exercise. Patient agreed with treatmentplan.  Follow up as directed.     _______________________________________________________________      Past Medical History:   Diagnosis Date    COPD (chronic obstructive pulmonary disease) ([facility]     Heart attack ([facility] [facility]     two    Hypertension     Mini stroke ([facility] [facility]     2      Past Surgical History:   Procedure Laterality Date    APPENDECTOMY      BACK SURGERY      COLONOSCOPY N/A 10/22/2021    Dr Mathias Brunner Jones-AP x 1, BCM x 1, 3 yr recall    TESTICLE REMOVAL      Just one    TYMPANOSTOMY TUBE PLACEMENT         Family History   Problem Relation Age of Onset    Coronary Art Dis Father     Coronary Art Dis Brother        Social History     Tobacco Use    Smoking status: Every Day     Packs/day: 0.50     Years: 55.00     Pack years: 27.50     Types: Cigarettes     Last attempt to quit: 2018     Years since quittin.4    Smokeless tobacco: Never   Substance Use Topics    Alcohol use: Yes     Comment: occasionally     Current Outpatient Medications   Medication Sig Dispense Refill    albuterol sulfate HFA (VENTOLIN HFA) 108 (90 Base) MCG/ACT inhaler Inhale 2 puffs into the lungs every 6 hours as needed for Wheezing 3 each 2    atorvastatin (LIPITOR) 80 MG tablet Take 1 tablet by mouth in the morning. 90 tablet 3    fluticasone (FLONASE) 50 MCG/ACT nasal spray 2 sprays by Each Nostril route in the morning. 3 each 3    metoprolol succinate (TOPROL XL) 25 MG extended release tablet Take 0.5 tablets by mouth in the morning. 45 tablet 3    ramipril (ALTACE) 10 MG capsule Take 1 capsule by mouth once daily 90 capsule 3    traZODone (DESYREL) 50 MG tablet Take 2 tablets by mouth nightly as needed for Sleep 180 tablet 3    aspirin EC 81 MG EC tablet Take 81 mg by mouth      nitroGLYCERIN (NITROSTAT) 0.4 MG SL tablet Place 1 tablet under the tongue every 5 minutes as needed for Chest pain 25 tablet 5     No current facility-administered medications for this visit. No Known Allergies    Health Maintenance   Topic Date Due    Low dose CT lung screening  Never done    AAA screen  Never done    Annual Wellness Visit (AWV)  07/16/2022    DTaP/Tdap/Td vaccine (1 - Tdap) 07/18/2023 (Originally 5/13/1969)    Shingles vaccine (1 of 2) 07/18/2023 (Originally 5/13/1999)    Flu vaccine (1) 09/01/2022    Lipids  01/10/2023    Depression Screen  07/18/2023    Colorectal Cancer Screen  10/22/2024    Pneumococcal 65+ years Vaccine  Completed    COVID-19 Vaccine  Completed    Hepatitis C screen  Completed    Hepatitis A vaccine  Aged Out    Hepatitis B vaccine  Aged Out    Hib vaccine  Aged Out    Meningococcal (ACWY) vaccine  Aged Out       _______________________________________________________________    Note dictated using [id number]  Sometimes this dictation software makes erroneous transcriptions.

## 2022-07-18 NOTE — PROGRESS NOTES
Medicare Annual Wellness Visit - Subsequent    Name: Isaias Joe Date: 2022   MRN: [id number] Sex: Male   Age: 67 y.o. Ethnicity: Non- / Non    : 1950 Race: White (non-)      Gabriella eL is here for   Chief Complaint   Patient presents with    Medicare AWV        Screenings for behavioral, psychosocial and functional/safety risks, and cognitive dysfunction are all negative except as indicated below. These results, as well as other patient data from the [address] form, are documented in Flowsheets linked to this Encounter. No Known Allergies    Prior to Visit Medications    Medication Sig Taking? Authorizing Provider   albuterol sulfate HFA (VENTOLIN HFA) 108 (90 Base) MCG/ACT inhaler Inhale 2 puffs into the lungs every 6 hours as needed for Wheezing Yes Gerhardt Bass, MD   atorvastatin (LIPITOR) 80 MG tablet Take 1 tablet by mouth in the morning. Yes Gerhardt Bass, MD   fluticasone [facility]) 50 MCG/ACT nasal spray 2 sprays by Each Nostril route in the morning. Yes Gerhardt Bass, MD   metoprolol succinate (TOPROL XL) 25 MG extended release tablet Take 0.5 tablets by mouth in the morning.  Yes Gerhardt Bass, MD   ramipril (ALTACE) 10 MG capsule Take 1 capsule by mouth once daily Yes Gerhardt Bass, MD   traZODone (DESYREL) 50 MG tablet Take 2 tablets by mouth nightly as needed for Sleep Yes Gerhardt Bass, MD   aspirin EC 81 MG EC tablet Take 81 mg by mouth Yes Historical Provider, MD   nitroGLYCERIN (NITROSTAT) 0.4 MG SL tablet Place 1 tablet under the tongue every 5 minutes as needed for Chest pain  Gerhardt Bass, MD         Past Medical History:   Diagnosis Date    COPD (chronic obstructive pulmonary disease) ([facility] [address]     Heart attack ([facility] [address]     two    Hypertension     Mini stroke ([facility] [address]     2         Past Surgical History:   Procedure Laterality Date    APPENDECTOMY      BACK SURGERY      COLONOSCOPY N/A 10/22/2021    Dr Mason Garcia x 1, [facility] x 1, 3 yr recall    TESTICLE REMOVAL      Just one    TYMPANOSTOMY TUBE PLACEMENT         Family History   Problem Relation Age of Onset    Coronary Art Dis Father     Coronary Art Dis Brother        CareTeam (Including outside providers/suppliers regularly involved in providing care):   Patient Care Team:  Julia Hoffman MD as PCP - General (Family Medicine)  Julia Hoffman MD as PCP - [facility] Empaneled Provider  Gentry Neri MD as Consulting Physician (Otolaryngology)    Wt Readings from Last 3 Encounters:   22 149 lb (67.6 kg)   22 164 lb (74.4 kg)   11/15/21 164 lb (74.4 kg)     Vitals:    22 1141   BP: 122/62   Pulse: (!) 48   Temp: 98.7 °F (37.1 °C)   SpO2: 98%   Weight: 149 lb (67.6 kg)   Height: 5' 9.5\" (1.765 m)           The following problems were reviewed today and where indicated follow up appointments were made and/or referrals ordered.     Risk Factor Screenings with Interventions     Fall Risk:  2 or more falls in past year?: no  Fall with injury in past year?: no  Fall Risk Interventions:    Not indicated    Depression:  PHQ-2 Score: 0  Depression Interventions:  Not indicated    Anxiety:     Anxiety Interventions:  Not indicated    Cognitive:  Clock Drawing Test (CDT): Normal  Cognitive Impairment Interventions:  Not indicated    Substance Abuse:  Social History     Socioeconomic History    Marital status:      Spouse name: Not on file    Number of children: Not on file    Years of education: Not on file    Highest education level: Not on file   Occupational History    Not on file   Tobacco Use    Smoking status: Every Day     Packs/day: 0.50     Years: 55.00     Pack years: 27.50     Types: Cigarettes     Last attempt to quit: 2018     Years since quittin.4    Smokeless tobacco: Never   Vaping Use    Vaping Use: Never used   Substance and Sexual Activity    Alcohol use: Yes     Comment: occasionally    Drug use: No    Sexual activity: Not on file   Other Topics Concern    Not on file   Social History Narrative    Not on file     Social Determinants of Health     Financial Resource Strain: Not on file   Food Insecurity: Not on file   Transportation Needs: Not on file   Physical Activity: Sufficiently Active    Days of Exercise per Week: 4 days    Minutes of Exercise per Session: 40 min   Stress: Not on file   Social Connections: Not on file   Intimate Partner Violence: Not on file   Housing Stability: Not on file        Substance Abuse Interventions:  Not indicated    Health Risk Assessment:     General  In general, how would you say your health is?: Very Good  In the past 7 days, have you experienced any of the following: New or Increased Pain, New or Increased Fatigue, Loneliness, Social Isolation, Stress or Anger?: No  Do you get the social and emotional support that you need?: Yes  General Health Risk Interventions:  Not indicated    Health Habits/Nutrition  On average, how many days per week do you engage in moderate to strenuous exercise (like a brisk walk)?: 4 days  On average, how many minutes do you engage in exercise at this level?: 40 min  Have you lost any weight without trying in the past 3 months?: No  Have you seen the dentist within the past year?: N/A - wear dentures  Body mass index is 21.69 kg/m².   Health Habits/Nutrition Interventions:  Not indicated    Hearing/Vision  Do you or your family notice any trouble with your hearing that hasn't been managed with hearing aids?: No  Do you have difficulty driving, watching TV, or doing any of your daily activities because of your eyesight?: No  Have you had an eye exam within the past year?: Yes  Hearing/Vision Interventions:  Not indicated    Safety  Do you have working smoke detectors?: Yes  Do you have any tripping hazards - loose or unsecured carpets or rugs?: No  Do you have any tripping hazards - clutter in doorways, halls, or stairs?: No  Do you have either shower bars, grab bars, non-slip mats or Instructions/AVS.

## 2022-07-18 NOTE — PATIENT INSTRUCTIONS
We are committed to providing you with the best care possible. In order to help us achieve these goals please remember to bring all medications, herbal products, and over the counter supplements with you to each visit. If your provider has ordered testing for you, please be sure to follow up with our office if you have not received results within 7 days after the testing took place. *If you receive a survey after visiting one of our offices, please take time to share your experience concerning your physician office visit. These surveys are confidential and no health information about you is shared. We are eager to improve for you and we are counting on your feedback to help make that happen.     _______________________________________________________________       Advance Care Planning: Care Instructions  Your Care Instructions    It can be hard to live with an illness that cannot be cured. But if your health is getting worse, you may want to make decisions about end-of-life care. Planning for the end of your life does not mean that you are giving up. It is a way to make sure that your wishes are met. Clearly stating your wishes can make it easier for your loved ones. Making plans while you are still able may also ease your mind and make your final days less stressful and more meaningful. Follow-up care is a key part of your treatment and safety. Be sure to make and go to all appointments, and call your doctor if you are having problems. It's also a good idea to know your test results and keep a list of the medicines you take. What can you do to plan for the end of life? Visit:  https://ag.[place].gov/consumer-protection/livingwills  You can bring these issues up with your doctor. You do not need to wait until your doctor starts the conversation. You might start with \"I would not be willing to live with . Wallace Menchaca \" When you complete this sentence it helps your doctor understand your wishes.   Talk openly and honestly with your doctor. This is the best way to understand the decisions you will need to make as your health changes. Know that you can always change your mind. Ask your doctor about commonly used life-support measures. These include tube feedings, breathing machines, and fluids given through a vein (IV). Understanding these treatments will help you decide whether you want them. You may choose to have these life-supporting treatments for a limited time. This allows a trial period to see whether they will help you. You may also decide that you want your doctor to take only certain measures to keep you alive. It is important to spell out these conditions so that your doctor and family understand them. Talk to your doctor about how long you are likely to live. He or she may be able to give you an idea of what usually happens with your specific illness. Think about preparing papers that state your wishes. This way there will not be any confusion about what you want. You can change your instructions at any time. Which papers should you prepare? Advance directives are legal papers that tell doctors how you want to be cared for at the end of your life. You do not need a  to write these papers. Ask your doctor or your state [facility] department for information on how to write your advance directives. They may have the forms for each of these types of papers. Make sure your doctor has a copy of these on file, and give a copy to a family member or close friend. Consider a do-not-resuscitate order (DNR). This order asks that no extra treatments be done if your heart stops or you stop breathing. Extra treatments may include cardiopulmonary resuscitation (CPR), electrical shock to restart your heart, or a machine to breathe for you. If you decide to have a DNR order, ask your doctor to explain and write it. Place the order in your home where everyone can easily see it. Consider a living will.  A living will explains your wishes about life support and other treatments at the end of your life if you become unable to speak for yourself. Living hinson tell doctors to use or not use treatments that would keep you alive. You must have one or two witnesses or a notary present when you sign this form. Consider a durable power of  for health care. This allows you to name a person to make decisions about your care if you are not able to. Most people ask a close friend or family member. Talk to this person about the kinds of treatments you want and those that you do not want. Make sure this person understands your wishes. These legal papers are simple to change. Tell your doctor what you want to change, and ask him or her to make a note in your medical file. Give your family updated copies of the papers. Where can you learn more? Go to https://chpepiceweb.Tesco. org and sign in to your [employer] account. Enter P184 in the [employer] box to learn more about \"Advance Care Planning: Care Instructions. \"     If you do not have an account, please click on the \"Sign Up Now\" link. Current as of: September 24, 2016  Content Version: 11.5  © 7772-5625 [employer]. Care instructions adapted under license by [facility] ([facility] [place]). If you have questions about a medical condition or this instruction, always ask your healthcare professional. [place] [address] any warranty or liability for your use of this information. Learning About Living Perroy  What is a living will? A living will is a legal form you use to write down the kind of care you want at the end of your life. It is used by the health professionals who will treat you if you aren't able to decide for yourself. If you put your wishes in writing, your loved ones and others will know what kind of care you want. They won't need to guess. This can ease your mind and be helpful to others.   A living will is not the same as an estate or property will. An estate will explains what you want to happen with your money and property after you die. Is a living will a legal document? A living will is a legal document. Each state has its own laws about living hinson. If you move to another state, make sure that your living will is legal in the state where you now live. Or you might use a universal form that has been approved by many states. This kind of form can sometimes be completed and stored online. Your electronic copy will then be available wherever you have a connection to the Internet. In most cases, doctors will respect your wishes even if you have a form from a different state. You don't need an  to complete a living will. But legal advice can be helpful if your state's laws are unclear, your health history is complicated, or your family can't agree on what should be in your living will. You can change your living will at any time. Some people find that their wishes about end-of-life care change as their health changes. In addition to making a living will, think about completing a medical power of  form. This form lets you name the person you want to make end-of-life treatment decisions for you (your \"health care agent\") if you're not able to. Many hospitals and nursing homes will give you the forms you need to complete a living will and a medical power of . Your living will is used only if you can't make or communicate decisions for yourself anymore. If you become able to make decisions again, you can accept or refuse any treatment, no matter what you wrote in your living will. Your state may offer an online registry. This is a place where you can store your living will online so the doctors and nurses who need to treat you can find it right away. What should you think about when creating a living will? Talk about your end-of-life wishes with your family members and your doctor.  Let them know what you want. That way the people making decisions for you won't be surprised by your choices. Think about these questions as you make your living will:  Do you know enough about life support methods that might be used? If not, talk to your doctor so you know what might be done if you can't breathe on your own, your heart stops, or you're unable to swallow. What things would you still want to be able to do after you receive life-support methods? Would you want to be able to walk? To speak? To eat on your own? To live without the help of machines? If you have a choice, where do you want to be cared for? In your home? At a hospital or nursing home? Do you want certain Muslim practices performed if you become very ill? If you have a choice at the end of your life, where would you prefer to die? At home? In a hospital or nursing home? Somewhere else? Would you prefer to be buried or cremated? Do you want your organs to be donated after you die? What should you do with your living will? Make sure that your family members and your health care agent have copies of your living will. Give your doctor a copy of your living will to keep in your medical record. If you have more than one doctor, make sure that each one has a copy. You may want to put a copy of your living will where it can be easily found. Where can you learn more? Go to https://Vidmindpepauletteeweb.eventblimp. org and sign in to your WeOwe account. Enter V937 in the [facility] box to learn more about \"Learning About Living Perromonie. \"     If you do not have an account, please click on the \"Sign Up Now\" link. Current as of: September 24, 2016  Content Version: 11.5  © 1234-3352 Healthwise, Incorporated. Care instructions adapted under license by [facility] ([facility]).  If you have questions about a medical condition or this instruction, always ask your healthcare professional. [address] any warranty or liability for your use of this information. Learning About Durable Power of  for Health Care  What is a durable power of  for health care? A durable power of  for health care is one type of the legal forms called advance directives. It lets you decide who you want to make treatment decisions for you if you cannot speak or decide for yourself. The person you choose is called your health care agent. Another type of advance directive is a living will. It lets you write down what kinds of treatment or life support you want or do not want. What should you think about when choosing a health care agent? Choose your health care agent carefully. This person may or may not be a family member. Talk to the person before you make your final decision. Make sure he or she is comfortable with this responsibility. It's a good idea to choose someone who:  Is at least 25years old. Knows you well and understands what makes life meaningful for you. Understands your Jewish and moral values. Will do what you want, not what he or she wants. Will be able to make difficult choices at a stressful time. Will be able to refuse or stop treatment, if that is what you would want, even if you could die. Will be firm and confident with health professionals if needed. Will ask questions to get necessary information. Lives near you or agrees to travel to you if needed. Your family may help you make medical decisions while you can still be part of that process. But it is important to choose one person to be your health care agent in case you are not able to make decisions for yourself. If you don't fill out the legal form and name a health care agent, the decisions your family can make may be limited. Who will make decisions for you if you do not have a health care agent? If you don't have a health care agent or a living will, your family members may disagree about your medical care.  And then some medical professionals who may not know you as well might have to make decisions for you. In some cases, a  makes the decisions. When you name a health care agent, it is very clear who has the power to make health decisions for you. How do you name a health care agent? You name your health care agent on a legal form. It is usually called a durable power of  for health care. Ask your hospital, state bar association, or office on aging where to find these forms. You must sign the form to make it legal. Some states require you to get the form notarized. This means that a person called a  watches you sign the form and then he or she signs the form. Some states also require that two or more witnesses sign the form. Be sure to tell your family members and doctors who your health care agent is. Keep your forms in a safe place. But make sure that your loved ones know where the forms are. This could be in your desk where you keep other important papers. Make sure your doctor has a copy of your forms. Where can you learn more? Go to https://chpepiceweb.VIPstore.com. org and sign in to your [employer] account. Enter [id number] in the FUJIAN HAIYUAN box to learn more about \"Learning About Durable Power of  for Health Care. \"     If you do not have an account, please click on the \"Sign Up Now\" link. Current as of: 2016  Content Version: 11.5  © 4330-1634 Healthwise, [employer]. Care instructions adapted under license by [facility] ([facility]). If you have questions about a medical condition or this instruction, always ask your healthcare professional. [address] any warranty or liability for your use of this information. _______________________________________________________________    Home Safety Tips    Each year, thousands of older Americans fall at home. Many of them are seriously injured, and some are disabled.  In , more than 8,500 people over age 72  because of falls. Falls are often due to hazards that are easy to overlook but easy to fix. This checklist will help you find and fix those hazards in your home. The checklist asks about hazards found in each room of your home. For each hazard, the checklist tells you how to fix the problem. At the end of the checklist, you will find other tips for preventing falls. Look at the floor in each room  When he walks through room do you have to walk around furniture? Ask someone to move the furniture to clear your past  You have throw rugs on the floor? Remove the rugs or use double-sided tape or nonslip backing on the rugs so they dont slip  Are papers, magazines, books, shoes, boxes, blankets, towels, or other objects on the floor?  things that are on the floor. Always keep objects off the floor  Do you have to walk over or around cords or wires (like cords from lamps, extension cords, or telephone cords)? Coil or tape cords and wires next to the wall so you cant trip over them. Have an  put in another outlet. Are papers, shoes, books, or other objects on the stairs?  things on the stairs. Always keep objects off the stairs. Are some steps broken or uneven? Fix loose or uneven steps. Are you missing a light over the stairway? Have a  or an  put in an overhead light at the top and bottom of the stairs. Has the stairway light bulb burned out? Have a friend or family member change the light bulb. Do you have only one light switch for your stairs (only at the top or at the bottom of the stairs)? Have a  or an  put in a light switch at the top and bottom of the stairs. You can get light switches that glow  Are the handrails loose or broken? Is there a handrail on only one side of the stairs? Fix loose handrails or put in new ones. Make sure handrails are on both sides of the stairs and are as long as the stairs.   Is the carpet on the steps loose device that will bring help in case you fall and cant get up.    www.cdc.gov/safeusa       _______________________________________________________________  Labs before next appointment:  Go to room 405 for labs prior to next visit. Be sure to fast for at least 10 hours prior to laboratory appointment. Would recommend getting labs about 1 week prior to the appointment time to ensure they are available at the time of the appointment. No appointment is necessary for laboratory work as the orders have already been placed.     Lab opens at 6:30 am and closes at 4:30 pm.

## 2022-07-24 DIAGNOSIS — I10 ESSENTIAL (PRIMARY) HYPERTENSION: ICD-10-CM

## 2022-07-25 RX ORDER — METOPROLOL SUCCINATE 25 MG/1
TABLET, EXTENDED RELEASE ORAL
Qty: 45 TABLET | Refills: 3 | OUTPATIENT
Start: 2022-07-25

## 2023-01-12 DIAGNOSIS — Z12.5 ENCOUNTER FOR PROSTATE CANCER SCREENING: ICD-10-CM

## 2023-01-12 DIAGNOSIS — E78.01 FAMILIAL HYPERCHOLESTEROLEMIA: ICD-10-CM

## 2023-01-12 DIAGNOSIS — I10 ESSENTIAL (PRIMARY) HYPERTENSION: ICD-10-CM

## 2023-01-12 DIAGNOSIS — R79.9 ABNORMAL BLOOD CHEMISTRY: ICD-10-CM

## 2023-01-12 DIAGNOSIS — N18.31 STAGE 3A CHRONIC KIDNEY DISEASE (HCC): ICD-10-CM

## 2023-01-12 LAB
ALBUMIN SERPL-MCNC: 4.3 G/DL (ref 3.5–5.2)
ALP BLD-CCNC: 134 U/L (ref 40–130)
ALT SERPL-CCNC: 10 U/L (ref 5–41)
ANION GAP SERPL CALCULATED.3IONS-SCNC: 5 MMOL/L (ref 7–19)
AST SERPL-CCNC: 15 U/L (ref 5–40)
BASOPHILS ABSOLUTE: 0 K/UL (ref 0–0.2)
BASOPHILS RELATIVE PERCENT: 0.4 % (ref 0–1)
BILIRUB SERPL-MCNC: 0.3 MG/DL (ref 0.2–1.2)
BILIRUBIN URINE: NEGATIVE
BLOOD, URINE: NEGATIVE
BUN BLDV-MCNC: 11 MG/DL (ref 8–23)
CALCIUM SERPL-MCNC: 9.4 MG/DL (ref 8.8–10.2)
CHLORIDE BLD-SCNC: 103 MMOL/L (ref 98–111)
CHOLESTEROL, TOTAL: 171 MG/DL (ref 160–199)
CLARITY: CLEAR
CO2: 30 MMOL/L (ref 22–29)
COLOR: YELLOW
CREAT SERPL-MCNC: 1.3 MG/DL (ref 0.5–1.2)
EOSINOPHILS ABSOLUTE: 0.2 K/UL (ref 0–0.6)
EOSINOPHILS RELATIVE PERCENT: 2.4 % (ref 0–5)
FERRITIN: 134.2 NG/ML (ref 30–400)
GFR SERPL CREATININE-BSD FRML MDRD: 58 ML/MIN/{1.73_M2}
GLUCOSE BLD-MCNC: 97 MG/DL (ref 74–109)
GLUCOSE URINE: NEGATIVE MG/DL
HCT VFR BLD CALC: 46.6 % (ref 42–52)
HDLC SERPL-MCNC: 38 MG/DL (ref 55–121)
HEMOGLOBIN: 14.9 G/DL (ref 14–18)
IMMATURE GRANULOCYTES #: 0 K/UL
IRON: 99 UG/DL (ref 59–158)
KETONES, URINE: NEGATIVE MG/DL
LDL CHOLESTEROL CALCULATED: 113 MG/DL
LEUKOCYTE ESTERASE, URINE: NEGATIVE
LYMPHOCYTES ABSOLUTE: 2 K/UL (ref 1.1–4.5)
LYMPHOCYTES RELATIVE PERCENT: 27.5 % (ref 20–40)
MCH RBC QN AUTO: 29.9 PG (ref 27–31)
MCHC RBC AUTO-ENTMCNC: 32 G/DL (ref 33–37)
MCV RBC AUTO: 93.6 FL (ref 80–94)
MONOCYTES ABSOLUTE: 0.4 K/UL (ref 0–0.9)
MONOCYTES RELATIVE PERCENT: 5.5 % (ref 0–10)
NEUTROPHILS ABSOLUTE: 4.7 K/UL (ref 1.5–7.5)
NEUTROPHILS RELATIVE PERCENT: 64.1 % (ref 50–65)
NITRITE, URINE: NEGATIVE
PARATHYROID HORMONE INTACT: 57.4 PG/ML (ref 15–65)
PDW BLD-RTO: 13.4 % (ref 11.5–14.5)
PH UA: 7.5 (ref 5–8)
PHOSPHORUS: 2.3 MG/DL (ref 2.5–4.5)
PLATELET # BLD: 201 K/UL (ref 130–400)
PMV BLD AUTO: 10.1 FL (ref 9.4–12.4)
POTASSIUM SERPL-SCNC: 4.8 MMOL/L (ref 3.5–5)
PROSTATE SPECIFIC ANTIGEN: 0.66 NG/ML (ref 0–4)
PROTEIN UA: NEGATIVE MG/DL
RBC # BLD: 4.98 M/UL (ref 4.7–6.1)
SODIUM BLD-SCNC: 138 MMOL/L (ref 136–145)
SPECIFIC GRAVITY UA: 1.01 (ref 1–1.03)
TOTAL PROTEIN: 7.1 G/DL (ref 6.6–8.7)
TRIGL SERPL-MCNC: 100 MG/DL (ref 0–149)
URIC ACID, SERUM: 6.1 MG/DL (ref 3.4–7)
UROBILINOGEN, URINE: 0.2 E.U./DL
WBC # BLD: 7.4 K/UL (ref 4.8–10.8)

## 2023-01-13 LAB — TRANSFERRIN: 224 MG/DL (ref 200–360)

## 2023-01-19 ENCOUNTER — OFFICE VISIT (OUTPATIENT)
Dept: PRIMARY CARE CLINIC | Age: 73
End: 2023-01-19
Payer: MEDICARE

## 2023-01-19 VITALS
DIASTOLIC BLOOD PRESSURE: 72 MMHG | OXYGEN SATURATION: 95 % | TEMPERATURE: 97.8 F | WEIGHT: 150 LBS | HEART RATE: 72 BPM | BODY MASS INDEX: 21.47 KG/M2 | HEIGHT: 70 IN | SYSTOLIC BLOOD PRESSURE: 118 MMHG | RESPIRATION RATE: 16 BRPM

## 2023-01-19 DIAGNOSIS — E83.39 HYPOPHOSPHATEMIA: ICD-10-CM

## 2023-01-19 DIAGNOSIS — F51.01 PRIMARY INSOMNIA: ICD-10-CM

## 2023-01-19 DIAGNOSIS — I10 ESSENTIAL (PRIMARY) HYPERTENSION: ICD-10-CM

## 2023-01-19 DIAGNOSIS — E78.00 HYPERCHOLESTEROLEMIA: Primary | ICD-10-CM

## 2023-01-19 DIAGNOSIS — N18.31 STAGE 3A CHRONIC KIDNEY DISEASE (HCC): ICD-10-CM

## 2023-01-19 DIAGNOSIS — J30.89 CHRONIC NONSEASONAL ALLERGIC RHINITIS DUE TO POLLEN: ICD-10-CM

## 2023-01-19 PROCEDURE — 3078F DIAST BP <80 MM HG: CPT | Performed by: FAMILY MEDICINE

## 2023-01-19 PROCEDURE — G8427 DOCREV CUR MEDS BY ELIG CLIN: HCPCS | Performed by: FAMILY MEDICINE

## 2023-01-19 PROCEDURE — 3074F SYST BP LT 130 MM HG: CPT | Performed by: FAMILY MEDICINE

## 2023-01-19 PROCEDURE — 3017F COLORECTAL CA SCREEN DOC REV: CPT | Performed by: FAMILY MEDICINE

## 2023-01-19 PROCEDURE — 1123F ACP DISCUSS/DSCN MKR DOCD: CPT | Performed by: FAMILY MEDICINE

## 2023-01-19 PROCEDURE — G8484 FLU IMMUNIZE NO ADMIN: HCPCS | Performed by: FAMILY MEDICINE

## 2023-01-19 PROCEDURE — G8420 CALC BMI NORM PARAMETERS: HCPCS | Performed by: FAMILY MEDICINE

## 2023-01-19 PROCEDURE — 99214 OFFICE O/P EST MOD 30 MIN: CPT | Performed by: FAMILY MEDICINE

## 2023-01-19 PROCEDURE — 4004F PT TOBACCO SCREEN RCVD TLK: CPT | Performed by: FAMILY MEDICINE

## 2023-01-19 ASSESSMENT — PATIENT HEALTH QUESTIONNAIRE - PHQ9
1. LITTLE INTEREST OR PLEASURE IN DOING THINGS: 0
SUM OF ALL RESPONSES TO PHQ QUESTIONS 1-9: 0
2. FEELING DOWN, DEPRESSED OR HOPELESS: 0
SUM OF ALL RESPONSES TO PHQ9 QUESTIONS 1 & 2: 0
SUM OF ALL RESPONSES TO PHQ QUESTIONS 1-9: 0

## 2023-01-19 NOTE — PROGRESS NOTES
[address] [facility]  [id number] [place] [address] James Vasquez [address]  Dept: [phone]  Dept Fax: [phone]  Loc: [phone]    Jeannette Sharp is a 67 y.o. male who presents today for his medical conditions/complaints as noted below. Jeannette Sharp is here for 6 Month Follow-Up        HPI:   CC: Here today to discuss the following:    Hyperlipidemia  Tolerating current cholesterol medication without side effects. . Attempting to reduce processed sugar and cholesterol from diet. Hypertension  Compliant with medications. No adverse effects from medication. No lightheadedness, palpitations, or chest pain. Insomnia  Currently stable. Satisfied with current treatment regimen. No adverse effects from medication. HPI    Subjective:      Review of Systems  Review of Systems   Constitutional: Negative for chills and fever. HENT: Negative for congestion. Respiratory: Negative for cough, chest tightness and shortness of breath. Cardiovascular: Negative for chest pain, palpitations and leg swelling. Gastrointestinal: Negative for abdominal pain, anal bleeding, constipation, diarrhea and nausea. SeeHPI for visit specific review of symptoms. All others negative      Objective:   /72   Pulse 72   Temp 97.8 °F (36.6 °C) (Temporal)   Resp 16   Ht 5' 9.5\" (1.765 m)   Wt 150 lb (68 kg)   SpO2 95%   BMI 21.83 kg/m²   Physical Exam    Physical Exam   Constitutional: He appears well. Does not appear ill. Neck: Neck supple. No masses. Neck Symmetric. Normal tracheal position. No thyroid enlargement  Cardiovascular: Normal rate and regular rhythm. Exam reveals no friction rub. No murmur heard. Respiratory:  Effort normal and breath sounds normal. No respiratory distress. No wheezes. No rales. No use of accessory muscles or intercostal retractions. Neurological: alert. Psychiatric: normal mood and affect.  His behavior is normal. Recent Results (from the past 672 hour(s))   PTH, Intact    Collection Time: 01/12/23 10:25 AM   Result Value Ref Range    PTH 57.4 15.0 - 65.0 pg/mL   Transferrin    Collection Time: 01/12/23 10:25 AM   Result Value Ref Range    Transferrin 224 200 - 360 mg/dL   Uric Acid    Collection Time: 01/12/23 10:25 AM   Result Value Ref Range    Uric Acid, Serum 6.1 3.4 - 7.0 mg/dL   Phosphorus    Collection Time: 01/12/23 10:25 AM   Result Value Ref Range    Phosphorus 2.3 (L) 2.5 - 4.5 mg/dL   Ferritin    Collection Time: 01/12/23 10:25 AM   Result Value Ref Range    Ferritin 134.2 30.0 - 400.0 ng/mL   Iron    Collection Time: 01/12/23 10:25 AM   Result Value Ref Range    Iron 99 59 - 158 ug/dL   CBC with Auto Differential    Collection Time: 01/12/23 10:25 AM   Result Value Ref Range    WBC 7.4 4.8 - 10.8 K/uL    RBC 4.98 4.70 - 6.10 M/uL    Hemoglobin 14.9 14.0 - 18.0 g/dL    Hematocrit 46.6 42.0 - 52.0 %    MCV 93.6 80.0 - 94.0 fL    MCH 29.9 27.0 - 31.0 pg    MCHC 32.0 (L) 33.0 - 37.0 g/dL    RDW 13.4 11.5 - 14.5 %    Platelets 291 975 - 540 K/uL    MPV 10.1 9.4 - 12.4 fL    Neutrophils % 64.1 50.0 - 65.0 %    Lymphocytes % 27.5 20.0 - 40.0 %    Monocytes % 5.5 0.0 - 10.0 %    Eosinophils % 2.4 0.0 - 5.0 %    Basophils % 0.4 0.0 - 1.0 %    Neutrophils Absolute 4.7 1.5 - 7.5 K/uL    Immature Granulocytes # 0.0 K/uL    Lymphocytes Absolute 2.0 1.1 - 4.5 K/uL    Monocytes Absolute 0.40 0.00 - 0.90 K/uL    Eosinophils Absolute 0.20 0.00 - 0.60 K/uL    Basophils Absolute 0.00 0.00 - 0.20 K/uL   Lipid Panel    Collection Time: 01/12/23 10:25 AM   Result Value Ref Range    Cholesterol, Total 171 160 - 199 mg/dL    Triglycerides 100 0 - 149 mg/dL    HDL 38 (L) 55 - 121 mg/dL    LDL Calculated 113 <100 mg/dL   Comprehensive Metabolic Panel    Collection Time: 01/12/23 10:25 AM   Result Value Ref Range    Sodium 138 136 - 145 mmol/L    Potassium 4.8 3.5 - 5.0 mmol/L    Chloride 103 98 - 111 mmol/L    CO2 30 (H) 22 - 29 mmol/L    Anion Gap 5 (L) 7 - 19 mmol/L    Glucose 97 74 - 109 mg/dL    BUN 11 8 - 23 mg/dL    Creatinine 1.3 (H) 0.5 - 1.2 mg/dL    Est Glom Filt Rate 58 (A) >60    Calcium 9.4 8.8 - 10.2 mg/dL    Total Protein 7.1 6.6 - 8.7 g/dL    Albumin 4.3 3.5 - 5.2 g/dL    Total Bilirubin 0.3 0.2 - 1.2 mg/dL    Alkaline Phosphatase 134 (H) 40 - 130 U/L    ALT 10 5 - 41 U/L    AST 15 5 - 40 U/L   PSA Screening    Collection Time: 01/12/23 10:25 AM   Result Value Ref Range    PSA 0.66 0.00 - 4.00 ng/mL   Urinalysis    Collection Time: 01/12/23 12:05 PM   Result Value Ref Range    Color, UA YELLOW Straw/Yellow    Clarity, UA Clear Clear    Glucose, Ur Negative Negative mg/dL    Bilirubin Urine Negative Negative    Ketones, Urine Negative Negative mg/dL    Specific Gravity, UA 1.014 1.005 - 1.030    Blood, Urine Negative Negative    pH, UA 7.5 5.0 - 8.0    Protein, UA Negative Negative mg/dL    Urobilinogen, Urine 0.2 <2.0 E.U./dL    Nitrite, Urine Negative Negative    Leukocyte Esterase, Urine Negative Negative               Assessment & Plan: The following diagnoses and conditions are stable with no further orders unless indicated:  1. Hypercholesterolemia  Current medication:  Atorvastatin 80 mg daily  Lab Results   Component Value Date    CHOL 171 01/12/2023    CHOL 145 (L) 01/10/2022    CHOL 129 (L) 01/07/2021     Lab Results   Component Value Date    TRIG 100 01/12/2023    TRIG 78 01/10/2022    TRIG 84 01/07/2021     Lab Results   Component Value Date    HDL 38 (L) 01/12/2023    HDL 37 (L) 01/10/2022    HDL 37 (L) 01/07/2021     Lab Results   Component Value Date    LDLCALC 113 01/12/2023    LDLCALC 92 01/10/2022    LDLCALC 75 01/07/2021     No results found for: LABVLDL, VLDL  No results found for: CHOLHDLRATIO  Continue with atorvastatin  2.  Stage 3a chronic kidney disease ([facility] [address]  Lab Results   Component Value Date    CREATININE 1.3 (H) 01/12/2023     Lab Results   Component Value Date    BUN 11 01/12/2023     GFR stable over the past year. Reinforced goals of adequate hydration. Recommended he avoid NSAIDs such as naproxen and ibuprofen. 3. Essential (primary) hypertension  Current medication:  Toprol-XL 25 mg: Half a tablet daily  Ramipril 10 mg daily    BP Readings from Last 3 Encounters:   23 118/72   22 122/62   22 122/72     Stable    4. Primary insomnia  Trazodone 50 m tablets at night as needed  Stable  5. Chronic nonseasonal allergic rhinitis due to pollen  Flonase  Stable  6. Hypophosphatemia  Given handout on foods high in phosphorus. No orders of the defined types were placed in this encounter. No follow-ups on file. Discussed use, benefit, and side effects of prescribed medications. All patient questions answered. Pt voiced understanding. Reviewed health maintenance. Instructedto continue current medications, diet and exercise. Patient agreed with treatmentplan.  Follow up as directed.     _______________________________________________________________      Past Medical History:   Diagnosis Date    COPD (chronic obstructive pulmonary disease) ([facility] [address]     Heart attack ([facility] [address]     two    Hypertension     Mini stroke     2      Past Surgical History:   Procedure Laterality Date    APPENDECTOMY      BACK SURGERY      COLONOSCOPY N/A 10/22/2021    Dr Kesha Cano-AP x 1, BCM x 1, 3 yr recall    TESTICLE REMOVAL      Just one    TYMPANOSTOMY TUBE PLACEMENT         Family History   Problem Relation Age of Onset    Coronary Art Dis Father     Coronary Art Dis Brother        Social History     Tobacco Use    Smoking status: Every Day     Packs/day: 0.50     Years: 55.00     Pack years: 27.50     Types: Cigarettes     Last attempt to quit: 2018     Years since quittin.9    Smokeless tobacco: Never   Substance Use Topics    Alcohol use: Yes     Comment: occasionally     Current Outpatient Medications   Medication Sig Dispense Refill    albuterol sulfate HFA (VENTOLIN HFA) 108 (90 Base) MCG/ACT inhaler Inhale 2 puffs into the lungs every 6 hours as needed for Wheezing 3 each 2    atorvastatin (LIPITOR) 80 MG tablet Take 1 tablet by mouth in the morning. 90 tablet 3    fluticasone (FLONASE) 50 MCG/ACT nasal spray 2 sprays by Each Nostril route in the morning. 3 each 3    metoprolol succinate (TOPROL XL) 25 MG extended release tablet Take 0.5 tablets by mouth in the morning. 45 tablet 3    ramipril (ALTACE) 10 MG capsule Take 1 capsule by mouth once daily 90 capsule 3    traZODone (DESYREL) 50 MG tablet Take 2 tablets by mouth nightly as needed for Sleep 180 tablet 3    nitroGLYCERIN (NITROSTAT) 0.4 MG SL tablet Place 1 tablet under the tongue every 5 minutes as needed for Chest pain 25 tablet 5    aspirin EC 81 MG EC tablet Take 81 mg by mouth       No current facility-administered medications for this visit. No Known Allergies    Health Maintenance   Topic Date Due    AAA screen  Never done    Flu vaccine (1) Never done    DTaP/Tdap/Td vaccine (1 - Tdap) 07/18/2023 (Originally 5/13/1969)    Shingles vaccine (1 of 2) 07/18/2023 (Originally 5/13/1999)    Depression Screen  07/18/2023    Annual Wellness Visit (AWV)  07/19/2023    Lipids  01/12/2024    GFR test (Diabetes, CKD 3-4, OR last GFR 15-59)  01/12/2024    Colorectal Cancer Screen  10/22/2024    Pneumococcal 65+ years Vaccine  Completed    COVID-19 Vaccine  Completed    Hepatitis C screen  Completed    Hepatitis A vaccine  Aged Out    Hib vaccine  Aged Out    Meningococcal (ACWY) vaccine  Aged Out       _______________________________________________________________    Note dictated using [address]  Sometimes this dictation software makes erroneous transcriptions.

## 2023-07-14 DIAGNOSIS — N18.31 STAGE 3A CHRONIC KIDNEY DISEASE (HCC): ICD-10-CM

## 2023-07-14 LAB
ALBUMIN SERPL-MCNC: 4.1 G/DL (ref 3.5–5.2)
ALP SERPL-CCNC: 132 U/L (ref 40–130)
ALT SERPL-CCNC: 10 U/L (ref 5–41)
ANION GAP SERPL CALCULATED.3IONS-SCNC: 7 MMOL/L (ref 7–19)
AST SERPL-CCNC: 14 U/L (ref 5–40)
BILIRUB SERPL-MCNC: 0.4 MG/DL (ref 0.2–1.2)
BUN SERPL-MCNC: 13 MG/DL (ref 8–23)
CALCIUM SERPL-MCNC: 9.3 MG/DL (ref 8.8–10.2)
CHLORIDE SERPL-SCNC: 105 MMOL/L (ref 98–111)
CO2 SERPL-SCNC: 28 MMOL/L (ref 22–29)
CREAT SERPL-MCNC: 1.4 MG/DL (ref 0.5–1.2)
GLUCOSE SERPL-MCNC: 98 MG/DL (ref 74–109)
POTASSIUM SERPL-SCNC: 4.4 MMOL/L (ref 3.5–5)
PROT SERPL-MCNC: 7.4 G/DL (ref 6.6–8.7)
SODIUM SERPL-SCNC: 140 MMOL/L (ref 136–145)

## 2023-07-20 ENCOUNTER — OFFICE VISIT (OUTPATIENT)
Dept: PRIMARY CARE CLINIC | Age: 73
End: 2023-07-20

## 2023-07-20 VITALS
DIASTOLIC BLOOD PRESSURE: 81 MMHG | TEMPERATURE: 97 F | SYSTOLIC BLOOD PRESSURE: 145 MMHG | OXYGEN SATURATION: 96 % | WEIGHT: 153 LBS | HEART RATE: 70 BPM | HEIGHT: 70 IN | BODY MASS INDEX: 21.9 KG/M2

## 2023-07-20 DIAGNOSIS — I10 ESSENTIAL (PRIMARY) HYPERTENSION: ICD-10-CM

## 2023-07-20 DIAGNOSIS — J30.89 CHRONIC NONSEASONAL ALLERGIC RHINITIS DUE TO POLLEN: ICD-10-CM

## 2023-07-20 DIAGNOSIS — E78.00 HYPERCHOLESTEROLEMIA: ICD-10-CM

## 2023-07-20 DIAGNOSIS — Z00.00 ANNUAL PHYSICAL EXAM: Primary | ICD-10-CM

## 2023-07-20 DIAGNOSIS — Z12.5 ENCOUNTER FOR PROSTATE CANCER SCREENING: ICD-10-CM

## 2023-07-20 DIAGNOSIS — F51.04 CHRONIC INSOMNIA: ICD-10-CM

## 2023-07-20 DIAGNOSIS — R53.83 OTHER FATIGUE: ICD-10-CM

## 2023-07-20 DIAGNOSIS — I25.10 CORONARY ARTERY DISEASE INVOLVING NATIVE CORONARY ARTERY OF NATIVE HEART WITHOUT ANGINA PECTORIS: ICD-10-CM

## 2023-07-20 DIAGNOSIS — I25.119 ATHEROSCLEROSIS OF NATIVE CORONARY ARTERY OF NATIVE HEART WITH ANGINA PECTORIS (HCC): ICD-10-CM

## 2023-07-20 DIAGNOSIS — R74.8 ELEVATED ALKALINE PHOSPHATASE LEVEL: ICD-10-CM

## 2023-07-20 DIAGNOSIS — N18.31 STAGE 3A CHRONIC KIDNEY DISEASE (HCC): ICD-10-CM

## 2023-07-20 PROBLEM — I20.9 ANGINA PECTORIS, UNSPECIFIED (HCC): Status: ACTIVE | Noted: 2023-07-20

## 2023-07-20 RX ORDER — TRAZODONE HYDROCHLORIDE 50 MG/1
100 TABLET ORAL NIGHTLY PRN
Qty: 180 TABLET | Refills: 3 | Status: SHIPPED | OUTPATIENT
Start: 2023-07-20

## 2023-07-20 RX ORDER — RAMIPRIL 10 MG/1
CAPSULE ORAL
Qty: 90 CAPSULE | Refills: 3 | Status: SHIPPED | OUTPATIENT
Start: 2023-07-20

## 2023-07-20 RX ORDER — ATORVASTATIN CALCIUM 80 MG/1
80 TABLET, FILM COATED ORAL DAILY
Qty: 90 TABLET | Refills: 3 | Status: SHIPPED | OUTPATIENT
Start: 2023-07-20

## 2023-07-20 RX ORDER — NITROGLYCERIN 0.4 MG/1
0.4 TABLET SUBLINGUAL EVERY 5 MIN PRN
Qty: 25 TABLET | Refills: 5 | Status: SHIPPED | OUTPATIENT
Start: 2023-07-20

## 2023-07-20 RX ORDER — METOPROLOL SUCCINATE 25 MG/1
12.5 TABLET, EXTENDED RELEASE ORAL DAILY
Qty: 45 TABLET | Refills: 3 | Status: SHIPPED | OUTPATIENT
Start: 2023-07-20

## 2023-07-20 SDOH — ECONOMIC STABILITY: INCOME INSECURITY: HOW HARD IS IT FOR YOU TO PAY FOR THE VERY BASICS LIKE FOOD, HOUSING, MEDICAL CARE, AND HEATING?: NOT HARD AT ALL

## 2023-07-20 SDOH — ECONOMIC STABILITY: FOOD INSECURITY: WITHIN THE PAST 12 MONTHS, THE FOOD YOU BOUGHT JUST DIDN'T LAST AND YOU DIDN'T HAVE MONEY TO GET MORE.: NEVER TRUE

## 2023-07-20 SDOH — ECONOMIC STABILITY: FOOD INSECURITY: WITHIN THE PAST 12 MONTHS, YOU WORRIED THAT YOUR FOOD WOULD RUN OUT BEFORE YOU GOT MONEY TO BUY MORE.: NEVER TRUE

## 2023-07-20 SDOH — ECONOMIC STABILITY: HOUSING INSECURITY
IN THE LAST 12 MONTHS, WAS THERE A TIME WHEN YOU DID NOT HAVE A STEADY PLACE TO SLEEP OR SLEPT IN A SHELTER (INCLUDING NOW)?: NO

## 2023-07-20 ASSESSMENT — PATIENT HEALTH QUESTIONNAIRE - PHQ9
SUM OF ALL RESPONSES TO PHQ QUESTIONS 1-9: 0
1. LITTLE INTEREST OR PLEASURE IN DOING THINGS: 0
SUM OF ALL RESPONSES TO PHQ QUESTIONS 1-9: 0
SUM OF ALL RESPONSES TO PHQ9 QUESTIONS 1 & 2: 0
SUM OF ALL RESPONSES TO PHQ QUESTIONS 1-9: 0
2. FEELING DOWN, DEPRESSED OR HOPELESS: 0
SUM OF ALL RESPONSES TO PHQ QUESTIONS 1-9: 0

## 2023-07-20 ASSESSMENT — LIFESTYLE VARIABLES
HOW OFTEN DO YOU HAVE A DRINK CONTAINING ALCOHOL: NEVER
HOW MANY STANDARD DRINKS CONTAINING ALCOHOL DO YOU HAVE ON A TYPICAL DAY: PATIENT DOES NOT DRINK

## 2023-07-20 NOTE — PROGRESS NOTES
albuterol sulfate HFA (VENTOLIN HFA) 108 (90 Base) MCG/ACT inhaler Inhale 2 puffs into the lungs every 6 hours as needed for Wheezing 3 each 2    fluticasone (FLONASE) 50 MCG/ACT nasal spray 2 sprays by Each Nostril route in the morning. 3 each 3    aspirin EC 81 MG EC tablet Take 1 tablet by mouth       No current facility-administered medications for this visit. Return in about 6 months (around 2024) for Routine follow up - 20 minutes. Discussed use, benefit, and side effects of prescribed medications. History, Medications, Allergies     No Known Allergies  _______________________________________________________________  Past Medical History:   Diagnosis Date    COPD (chronic obstructive pulmonary disease) ([address])     Heart attack ([address])     two    Hypertension     Mini stroke     2     Past Surgical History:   Procedure Laterality Date    APPENDECTOMY      BACK SURGERY      COLONOSCOPY N/A 10/22/2021    Dr Esperanza Cano-AP x 1, BCM x 1, 3 yr recall    TESTICLE REMOVAL      Just one    TYMPANOSTOMY TUBE PLACEMENT        Family History   Problem Relation Age of Onset    Coronary Art Dis Father     Coronary Art Dis Brother     Social History     Tobacco Use    Smoking status: Every Day     Packs/day: 0.50     Years: 55.00     Pack years: 27.50     Types: Cigarettes     Last attempt to quit: 2018     Years since quittin.4    Smokeless tobacco: Never   Substance Use Topics    Alcohol use: Yes     Comment: occasionally        _______________________________________________________________    Note dictated using Dragon Dictation software  Sometimes this dictation software makes erroneous transcriptions.
last GFR 15-59)  07/14/2024    Depression Screen  07/20/2024    Annual Wellness Visit (AWV)  07/20/2024    Colorectal Cancer Screen  10/22/2024    Pneumococcal 65+ years Vaccine  Completed    COVID-19 Vaccine  Completed    Hepatitis C screen  Completed    Hepatitis A vaccine  Aged Out    Hib vaccine  Aged Out    Meningococcal (ACWY) vaccine  Aged Out       Recommendations for [employer] Due: see orders.   Recommended screening schedule for the next 5-10 years is provided to the patient in written form: see Patient Instructions/AVS.

## 2023-09-01 ENCOUNTER — HOSPITAL ENCOUNTER (OUTPATIENT)
Dept: ULTRASOUND IMAGING | Age: 73
Discharge: HOME | End: 2023-09-01
Payer: MEDICARE

## 2023-09-01 DIAGNOSIS — N18.31 STAGE 3A CHRONIC KIDNEY DISEASE (HCC): ICD-10-CM

## 2023-09-01 DIAGNOSIS — R74.8 ELEVATED ALKALINE PHOSPHATASE LEVEL: ICD-10-CM

## 2023-09-01 PROCEDURE — 76705 ECHO EXAM OF ABDOMEN: CPT

## 2023-09-01 PROCEDURE — 76770 US EXAM ABDO BACK WALL COMP: CPT

## 2023-09-15 ENCOUNTER — TELEPHONE (OUTPATIENT)
Dept: PRIMARY CARE CLINIC | Age: 73
End: 2023-09-15

## 2023-09-15 NOTE — TELEPHONE ENCOUNTER
----- Message from Edmundo Rubio MD sent at 9/13/2023 10:42 AM CDT -----  Renal ultrasound shows no acute kidney disease. No evidence of kidney stone or mass.   Continue with adequate hydration  Would recommend checking blood pressure at least once a week and return to clinic if consistently greater than 130/80

## 2024-01-17 DIAGNOSIS — Z12.5 ENCOUNTER FOR PROSTATE CANCER SCREENING: ICD-10-CM

## 2024-01-17 DIAGNOSIS — E78.00 HYPERCHOLESTEROLEMIA: ICD-10-CM

## 2024-01-17 DIAGNOSIS — R53.83 OTHER FATIGUE: ICD-10-CM

## 2024-01-17 LAB
ALBUMIN SERPL-MCNC: 4.2 G/DL (ref 3.5–5.2)
ALP SERPL-CCNC: 116 U/L (ref 40–130)
ALT SERPL-CCNC: 13 U/L (ref 5–41)
ANION GAP SERPL CALCULATED.3IONS-SCNC: 13 MMOL/L (ref 7–19)
AST SERPL-CCNC: 17 U/L (ref 5–40)
BASOPHILS # BLD: 0 K/UL (ref 0–0.2)
BASOPHILS NFR BLD: 0.6 % (ref 0–1)
BILIRUB SERPL-MCNC: 0.4 MG/DL (ref 0.2–1.2)
BUN SERPL-MCNC: 15 MG/DL (ref 8–23)
CALCIUM SERPL-MCNC: 9.2 MG/DL (ref 8.8–10.2)
CHLORIDE SERPL-SCNC: 104 MMOL/L (ref 98–111)
CHOLEST SERPL-MCNC: 185 MG/DL (ref 160–199)
CO2 SERPL-SCNC: 25 MMOL/L (ref 22–29)
CREAT SERPL-MCNC: 1.3 MG/DL (ref 0.5–1.2)
EOSINOPHIL # BLD: 0.3 K/UL (ref 0–0.6)
EOSINOPHIL NFR BLD: 4.1 % (ref 0–5)
ERYTHROCYTE [DISTWIDTH] IN BLOOD BY AUTOMATED COUNT: 13.1 % (ref 11.5–14.5)
GLUCOSE SERPL-MCNC: 122 MG/DL (ref 74–109)
HCT VFR BLD AUTO: 45 % (ref 42–52)
HDLC SERPL-MCNC: 35 MG/DL (ref 55–121)
HGB BLD-MCNC: 14.5 G/DL (ref 14–18)
IMM GRANULOCYTES # BLD: 0 K/UL
LDLC SERPL CALC-MCNC: 134 MG/DL
LYMPHOCYTES # BLD: 2.2 K/UL (ref 1.1–4.5)
LYMPHOCYTES NFR BLD: 35.2 % (ref 20–40)
MCH RBC QN AUTO: 30.5 PG (ref 27–31)
MCHC RBC AUTO-ENTMCNC: 32.2 G/DL (ref 33–37)
MCV RBC AUTO: 94.7 FL (ref 80–94)
MONOCYTES # BLD: 0.5 K/UL (ref 0–0.9)
MONOCYTES NFR BLD: 7.3 % (ref 0–10)
NEUTROPHILS # BLD: 3.3 K/UL (ref 1.5–7.5)
NEUTS SEG NFR BLD: 52.6 % (ref 50–65)
PLATELET # BLD AUTO: 205 K/UL (ref 130–400)
PMV BLD AUTO: 10.3 FL (ref 9.4–12.4)
POTASSIUM SERPL-SCNC: 4.6 MMOL/L (ref 3.5–5)
PROT SERPL-MCNC: 7.4 G/DL (ref 6.6–8.7)
PSA SERPL-MCNC: 0.84 NG/ML (ref 0–4)
RBC # BLD AUTO: 4.75 M/UL (ref 4.7–6.1)
SODIUM SERPL-SCNC: 142 MMOL/L (ref 136–145)
TRIGL SERPL-MCNC: 81 MG/DL (ref 0–149)
WBC # BLD AUTO: 6.2 K/UL (ref 4.8–10.8)

## 2024-01-22 ENCOUNTER — OFFICE VISIT (OUTPATIENT)
Dept: PRIMARY CARE CLINIC | Age: 74
End: 2024-01-22
Payer: MEDICARE

## 2024-01-22 VITALS
BODY MASS INDEX: 22.1 KG/M2 | WEIGHT: 154 LBS | OXYGEN SATURATION: 96 % | TEMPERATURE: 96.9 F | HEART RATE: 64 BPM | SYSTOLIC BLOOD PRESSURE: 136 MMHG | DIASTOLIC BLOOD PRESSURE: 70 MMHG

## 2024-01-22 DIAGNOSIS — I25.119 ATHEROSCLEROSIS OF NATIVE CORONARY ARTERY OF NATIVE HEART WITH ANGINA PECTORIS (HCC): Primary | ICD-10-CM

## 2024-01-22 DIAGNOSIS — R73.9 HYPERGLYCEMIA: ICD-10-CM

## 2024-01-22 DIAGNOSIS — G43.909 MIGRAINE WITHOUT STATUS MIGRAINOSUS, NOT INTRACTABLE, UNSPECIFIED MIGRAINE TYPE: ICD-10-CM

## 2024-01-22 DIAGNOSIS — E78.01 FAMILIAL HYPERCHOLESTEROLEMIA: ICD-10-CM

## 2024-01-22 DIAGNOSIS — I10 ESSENTIAL (PRIMARY) HYPERTENSION: ICD-10-CM

## 2024-01-22 DIAGNOSIS — N18.31 STAGE 3A CHRONIC KIDNEY DISEASE (HCC): ICD-10-CM

## 2024-01-22 DIAGNOSIS — F51.01 PRIMARY INSOMNIA: ICD-10-CM

## 2024-01-22 PROBLEM — I20.9 ANGINA PECTORIS, UNSPECIFIED (HCC): Status: RESOLVED | Noted: 2023-07-20 | Resolved: 2024-01-22

## 2024-01-22 PROCEDURE — 3017F COLORECTAL CA SCREEN DOC REV: CPT | Performed by: FAMILY MEDICINE

## 2024-01-22 PROCEDURE — G8420 CALC BMI NORM PARAMETERS: HCPCS | Performed by: FAMILY MEDICINE

## 2024-01-22 PROCEDURE — G8484 FLU IMMUNIZE NO ADMIN: HCPCS | Performed by: FAMILY MEDICINE

## 2024-01-22 PROCEDURE — 3075F SYST BP GE 130 - 139MM HG: CPT | Performed by: FAMILY MEDICINE

## 2024-01-22 PROCEDURE — 3078F DIAST BP <80 MM HG: CPT | Performed by: FAMILY MEDICINE

## 2024-01-22 PROCEDURE — G8427 DOCREV CUR MEDS BY ELIG CLIN: HCPCS | Performed by: FAMILY MEDICINE

## 2024-01-22 PROCEDURE — 1123F ACP DISCUSS/DSCN MKR DOCD: CPT | Performed by: FAMILY MEDICINE

## 2024-01-22 PROCEDURE — 4004F PT TOBACCO SCREEN RCVD TLK: CPT | Performed by: FAMILY MEDICINE

## 2024-01-22 PROCEDURE — 99214 OFFICE O/P EST MOD 30 MIN: CPT | Performed by: FAMILY MEDICINE

## 2024-01-22 RX ORDER — RAMIPRIL 10 MG/1
10 CAPSULE ORAL 2 TIMES DAILY
Qty: 180 CAPSULE | Refills: 3 | Status: SHIPPED | OUTPATIENT
Start: 2024-01-22

## 2024-01-22 ASSESSMENT — ENCOUNTER SYMPTOMS
ANAL BLEEDING: 0
ABDOMINAL PAIN: 0
CHEST TIGHTNESS: 0
COUGH: 0
DIARRHEA: 0
CONSTIPATION: 0
SHORTNESS OF BREATH: 0
NAUSEA: 0

## 2024-01-22 ASSESSMENT — PATIENT HEALTH QUESTIONNAIRE - PHQ9
2. FEELING DOWN, DEPRESSED OR HOPELESS: 0
1. LITTLE INTEREST OR PLEASURE IN DOING THINGS: 0
SUM OF ALL RESPONSES TO PHQ QUESTIONS 1-9: 0
SUM OF ALL RESPONSES TO PHQ9 QUESTIONS 1 & 2: 0
SUM OF ALL RESPONSES TO PHQ QUESTIONS 1-9: 0

## 2024-01-22 NOTE — PROGRESS NOTES
[facility]  [address]  [address]  [place] [address]  Dept: [phone]  Dept Fax: [phone]  Loc: [phone]    Nick Jain is a 73 y.o. male who presents today for his medical conditions/complaints as noted below.  Nick Jain is here for 6 Month Follow-Up and Headache (Tried OTC med it seems to help but doesn't go away. Dull Headache)        Subjective:   CC:  Here today to discuss the following:    Coronary artery disease:  -Cardiologist: Dr. Eckert at [facility].  Last appointment 2016  - History of myocardial infarction in 2007 stenting to the right coronary artery.    Migraine headaches:  -Generally infrequent and only a few times per year.  -He has had more frequent headaches over the past week or so.  -Headache is located bilateral frontal.  No obvious trigger.  No associated nausea.  No blurry vision.  -No recent injury.  -Generally improved with taking over-the-counter anti-inflammatory such as ibuprofen and Aleve.  -Today, the headache is more manageable.    Hypertension  Compliant with medications.  No adverse effects from medication.  No lightheadedness, palpitations, or chest pain.      Hyperlipidemia  -States he forgot to take his cholesterol medication prior to his labs    States he has trazodone at home for insomnia but rarely takes it.  Overall he feels his sleep is well-controlled    Review of Systems   Constitutional:  Negative for chills and fever.   HENT:  Negative for congestion.    Respiratory:  Negative for cough, chest tightness and shortness of breath.    Cardiovascular:  Negative for chest pain, palpitations and leg swelling.   Gastrointestinal:  Negative for abdominal pain, anal bleeding, constipation, diarrhea and nausea.   Genitourinary:  Negative for difficulty urinating.   Neurological:  Positive for dizziness and headaches. Negative for tremors, seizures, syncope, speech difficulty, weakness and numbness.

## 2024-01-22 NOTE — PATIENT INSTRUCTIONS
Blood pressure  Increase ramipril from once a day to twice a day.     Cholesterol  Remember to take your atorvastatin for cholesterol    Placed referral to [facility] at [facility] before next appointment:  Go to room 405 for labs prior to next visit.   Be sure to fast for at least 10 hours prior to laboratory appointment.   Would recommend getting labs about 1 week prior to the appointment time to ensure they are available at the time of the appointment.   No appointment is necessary for laboratory work as the orders have already been placed.    Lab opens at 6:30 am and closes at 4:30 pm.                       Examine your lifestyle and the barriers to bad and good habits and how you can design your life to make better choices      Make it EASY to do the RIGHT THINGS.    1)  You can choose to Get 150 min/week of moderate exercise (can talk but can't sing) or 75 min/week of vigorous exercise (can't talk)   Examples: Walking, treadmill, bicycling, attending a gym.    2)  You can choose to Get 7-9 hours of sleep per night    Allow enough time each night to get adequate sleep.  Keep regular evening hours, same to bed and getting up every day.    3)  You can choose to Strength Train 2 x a week on non-consecutive days   Bodyweight exercises such push ups, sit ups, pilates or yoga   Purchase resistance bands to perform exercises   Utilize phone apps such as: [employer] or [employer]   Contact your local gym for a     4)  You can choose good nutrition.  Only eat your goal weight (in lbs) x 10 calories/day and get 5 servings of Vegetables/day   Choose to eat a healthy diet such as the Mediterranean diet.    Plant based diets reduce risk of heart attack/stroke and will help you feel full on less food.    Avoid highly processed foods and processed carbohydrates.   Utilize apps to track your food:  Examples: Loseit, Myfitnesspal, or CalorieCounter by [employer]   Choose to follow a program.

## 2024-03-12 ENCOUNTER — HOSPITAL ENCOUNTER (INPATIENT)
Facility: HOSPITAL | Age: 74
LOS: 1 days | Discharge: HOME | End: 2024-03-14
Attending: EMERGENCY MEDICINE | Admitting: FAMILY MEDICINE
Payer: MEDICARE

## 2024-03-12 DIAGNOSIS — I20.0 UNSTABLE ANGINA: Primary | ICD-10-CM

## 2024-03-12 DIAGNOSIS — I25.10 ARTERIOSCLEROSIS OF CORONARY ARTERY: ICD-10-CM

## 2024-03-12 DIAGNOSIS — I21.4 NSTEMI (NON-ST ELEVATED MYOCARDIAL INFARCTION): ICD-10-CM

## 2024-03-12 PROBLEM — Z95.5 PRESENCE OF STENT IN CORONARY ARTERY: Status: ACTIVE | Noted: 2024-03-12

## 2024-03-12 LAB
ALBUMIN SERPL-MCNC: 4.2 G/DL (ref 3.5–5.2)
ALBUMIN/GLOB SERPL: 1.2 G/DL
ALP SERPL-CCNC: 115 U/L (ref 39–117)
ALT SERPL W P-5'-P-CCNC: 25 U/L (ref 1–41)
ANION GAP SERPL CALCULATED.3IONS-SCNC: 8 MMOL/L (ref 5–15)
AST SERPL-CCNC: 19 U/L (ref 1–40)
BASOPHILS # BLD AUTO: 0.04 10*3/MM3 (ref 0–0.2)
BASOPHILS NFR BLD AUTO: 0.5 % (ref 0–1.5)
BILIRUB SERPL-MCNC: 0.2 MG/DL (ref 0–1.2)
BUN SERPL-MCNC: 12 MG/DL (ref 8–23)
BUN/CREAT SERPL: 9.8 (ref 7–25)
CALCIUM SPEC-SCNC: 8.7 MG/DL (ref 8.6–10.5)
CHLORIDE SERPL-SCNC: 103 MMOL/L (ref 98–107)
CO2 SERPL-SCNC: 27 MMOL/L (ref 22–29)
CREAT SERPL-MCNC: 1.23 MG/DL (ref 0.76–1.27)
DEPRECATED RDW RBC AUTO: 45.7 FL (ref 37–54)
EGFRCR SERPLBLD CKD-EPI 2021: 61.6 ML/MIN/1.73
EOSINOPHIL # BLD AUTO: 0.36 10*3/MM3 (ref 0–0.4)
EOSINOPHIL NFR BLD AUTO: 4.9 % (ref 0.3–6.2)
ERYTHROCYTE [DISTWIDTH] IN BLOOD BY AUTOMATED COUNT: 13.5 % (ref 12.3–15.4)
GEN 5 2HR TROPONIN T REFLEX: 52 NG/L
GLOBULIN UR ELPH-MCNC: 3.5 GM/DL
GLUCOSE SERPL-MCNC: 88 MG/DL (ref 65–99)
HCT VFR BLD AUTO: 42.3 % (ref 37.5–51)
HGB BLD-MCNC: 14 G/DL (ref 13–17.7)
HOLD SPECIMEN: NORMAL
HOLD SPECIMEN: NORMAL
IMM GRANULOCYTES # BLD AUTO: 0.01 10*3/MM3 (ref 0–0.05)
IMM GRANULOCYTES NFR BLD AUTO: 0.1 % (ref 0–0.5)
LYMPHOCYTES # BLD AUTO: 2.37 10*3/MM3 (ref 0.7–3.1)
LYMPHOCYTES NFR BLD AUTO: 32.5 % (ref 19.6–45.3)
MCH RBC QN AUTO: 30.6 PG (ref 26.6–33)
MCHC RBC AUTO-ENTMCNC: 33.1 G/DL (ref 31.5–35.7)
MCV RBC AUTO: 92.6 FL (ref 79–97)
MONOCYTES # BLD AUTO: 0.5 10*3/MM3 (ref 0.1–0.9)
MONOCYTES NFR BLD AUTO: 6.8 % (ref 5–12)
NEUTROPHILS NFR BLD AUTO: 4.02 10*3/MM3 (ref 1.7–7)
NEUTROPHILS NFR BLD AUTO: 55.2 % (ref 42.7–76)
NRBC BLD AUTO-RTO: 0 /100 WBC (ref 0–0.2)
PLATELET # BLD AUTO: 190 10*3/MM3 (ref 140–450)
PMV BLD AUTO: 10.3 FL (ref 6–12)
POTASSIUM SERPL-SCNC: 4.8 MMOL/L (ref 3.5–5.2)
PROT SERPL-MCNC: 7.7 G/DL (ref 6–8.5)
RBC # BLD AUTO: 4.57 10*6/MM3 (ref 4.14–5.8)
SODIUM SERPL-SCNC: 138 MMOL/L (ref 136–145)
TROPONIN T DELTA: 4 NG/L
TROPONIN T SERPL HS-MCNC: 48 NG/L
WBC NRBC COR # BLD AUTO: 7.3 10*3/MM3 (ref 3.4–10.8)
WHOLE BLOOD HOLD COAG: NORMAL
WHOLE BLOOD HOLD SPECIMEN: NORMAL

## 2024-03-12 PROCEDURE — 85025 COMPLETE CBC W/AUTO DIFF WBC: CPT

## 2024-03-12 PROCEDURE — 93005 ELECTROCARDIOGRAM TRACING: CPT | Performed by: EMERGENCY MEDICINE

## 2024-03-12 PROCEDURE — 99285 EMERGENCY DEPT VISIT HI MDM: CPT

## 2024-03-12 PROCEDURE — 93005 ELECTROCARDIOGRAM TRACING: CPT

## 2024-03-12 PROCEDURE — 93010 ELECTROCARDIOGRAM REPORT: CPT | Performed by: STUDENT IN AN ORGANIZED HEALTH CARE EDUCATION/TRAINING PROGRAM

## 2024-03-12 PROCEDURE — 84484 ASSAY OF TROPONIN QUANT: CPT | Performed by: EMERGENCY MEDICINE

## 2024-03-12 PROCEDURE — 36415 COLL VENOUS BLD VENIPUNCTURE: CPT

## 2024-03-12 PROCEDURE — 80053 COMPREHEN METABOLIC PANEL: CPT | Performed by: EMERGENCY MEDICINE

## 2024-03-12 RX ORDER — ALBUTEROL SULFATE 90 UG/1
2 AEROSOL, METERED RESPIRATORY (INHALATION) EVERY 4 HOURS PRN
Status: ON HOLD | COMMUNITY

## 2024-03-13 ENCOUNTER — APPOINTMENT (OUTPATIENT)
Dept: ULTRASOUND IMAGING | Facility: HOSPITAL | Age: 74
End: 2024-03-13
Payer: MEDICARE

## 2024-03-13 ENCOUNTER — APPOINTMENT (OUTPATIENT)
Dept: GENERAL RADIOLOGY | Facility: HOSPITAL | Age: 74
End: 2024-03-13
Payer: MEDICARE

## 2024-03-13 ENCOUNTER — APPOINTMENT (OUTPATIENT)
Dept: CT IMAGING | Facility: HOSPITAL | Age: 74
End: 2024-03-13
Payer: MEDICARE

## 2024-03-13 ENCOUNTER — APPOINTMENT (OUTPATIENT)
Dept: CARDIOLOGY | Facility: HOSPITAL | Age: 74
End: 2024-03-13
Payer: MEDICARE

## 2024-03-13 PROBLEM — Z72.0 TOBACCO ABUSE: Status: ACTIVE | Noted: 2024-03-13

## 2024-03-13 PROBLEM — J44.9 COPD (CHRONIC OBSTRUCTIVE PULMONARY DISEASE): Status: ACTIVE | Noted: 2024-03-13

## 2024-03-13 LAB
ANION GAP SERPL CALCULATED.3IONS-SCNC: 8 MMOL/L (ref 5–15)
BH CV ECHO MEAS - AO MAX PG: 5.6 MMHG
BH CV ECHO MEAS - AO MEAN PG: 3 MMHG
BH CV ECHO MEAS - AO ROOT DIAM: 3 CM
BH CV ECHO MEAS - AO V2 MAX: 118 CM/SEC
BH CV ECHO MEAS - AO V2 VTI: 24.1 CM
BH CV ECHO MEAS - AVA(I,D): 3.4 CM2
BH CV ECHO MEAS - EDV(CUBED): 42.9 ML
BH CV ECHO MEAS - EDV(MOD-SP2): 106 ML
BH CV ECHO MEAS - EDV(MOD-SP4): 112 ML
BH CV ECHO MEAS - EF(MOD-BP): 70 %
BH CV ECHO MEAS - EF(MOD-SP2): 72.8 %
BH CV ECHO MEAS - EF(MOD-SP4): 66.3 %
BH CV ECHO MEAS - ESV(CUBED): 24.4 ML
BH CV ECHO MEAS - ESV(MOD-SP2): 28.8 ML
BH CV ECHO MEAS - ESV(MOD-SP4): 37.7 ML
BH CV ECHO MEAS - FS: 17.1 %
BH CV ECHO MEAS - IVS/LVPW: 0.88 CM
BH CV ECHO MEAS - IVSD: 0.7 CM
BH CV ECHO MEAS - LA DIMENSION: 2.6 CM
BH CV ECHO MEAS - LAT PEAK E' VEL: 9.3 CM/SEC
BH CV ECHO MEAS - LV DIASTOLIC VOL/BSA (35-75): 60.3 CM2
BH CV ECHO MEAS - LV MASS(C)D: 68.9 GRAMS
BH CV ECHO MEAS - LV MAX PG: 3.8 MMHG
BH CV ECHO MEAS - LV MEAN PG: 2 MMHG
BH CV ECHO MEAS - LV SYSTOLIC VOL/BSA (12-30): 20.3 CM2
BH CV ECHO MEAS - LV V1 MAX: 97.1 CM/SEC
BH CV ECHO MEAS - LV V1 VTI: 21.4 CM
BH CV ECHO MEAS - LVIDD: 3.5 CM
BH CV ECHO MEAS - LVIDS: 2.9 CM
BH CV ECHO MEAS - LVOT AREA: 3.8 CM2
BH CV ECHO MEAS - LVOT DIAM: 2.2 CM
BH CV ECHO MEAS - LVPWD: 0.8 CM
BH CV ECHO MEAS - MED PEAK E' VEL: 6.6 CM/SEC
BH CV ECHO MEAS - MV A MAX VEL: 113 CM/SEC
BH CV ECHO MEAS - MV DEC TIME: 0.15 SEC
BH CV ECHO MEAS - MV E MAX VEL: 93 CM/SEC
BH CV ECHO MEAS - MV E/A: 0.82
BH CV ECHO MEAS - SI(MOD-SP2): 41.6 ML/M2
BH CV ECHO MEAS - SI(MOD-SP4): 40 ML/M2
BH CV ECHO MEAS - SV(LVOT): 81.3 ML
BH CV ECHO MEAS - SV(MOD-SP2): 77.2 ML
BH CV ECHO MEAS - SV(MOD-SP4): 74.3 ML
BH CV ECHO MEAS - TR MAX PG: 10.9 MMHG
BH CV ECHO MEAS - TR MAX VEL: 165 CM/SEC
BH CV ECHO MEASUREMENTS AVERAGE E/E' RATIO: 11.7
BUN SERPL-MCNC: 16 MG/DL (ref 8–23)
BUN/CREAT SERPL: 11.9 (ref 7–25)
CALCIUM SPEC-SCNC: 8.5 MG/DL (ref 8.6–10.5)
CHLORIDE SERPL-SCNC: 105 MMOL/L (ref 98–107)
CHOLEST SERPL-MCNC: 178 MG/DL (ref 0–200)
CO2 SERPL-SCNC: 28 MMOL/L (ref 22–29)
CREAT SERPL-MCNC: 1.34 MG/DL (ref 0.76–1.27)
DEPRECATED RDW RBC AUTO: 45.8 FL (ref 37–54)
EGFRCR SERPLBLD CKD-EPI 2021: 55.6 ML/MIN/1.73
ERYTHROCYTE [DISTWIDTH] IN BLOOD BY AUTOMATED COUNT: 13.5 % (ref 12.3–15.4)
GLUCOSE SERPL-MCNC: 96 MG/DL (ref 65–99)
HBA1C MFR BLD: 5.5 % (ref 4.8–5.6)
HCT VFR BLD AUTO: 39.7 % (ref 37.5–51)
HDLC SERPL-MCNC: 29 MG/DL (ref 40–60)
HGB BLD-MCNC: 13.3 G/DL (ref 13–17.7)
HOLD SPECIMEN: NORMAL
INR PPP: 0.95 (ref 0.91–1.09)
LDLC SERPL CALC-MCNC: 127 MG/DL (ref 0–100)
LDLC/HDLC SERPL: 4.3 {RATIO}
LEFT ATRIUM VOLUME INDEX: 14.1 ML/M2
LEFT ATRIUM VOLUME: 26.2 ML
MCH RBC QN AUTO: 31.1 PG (ref 26.6–33)
MCHC RBC AUTO-ENTMCNC: 33.5 G/DL (ref 31.5–35.7)
MCV RBC AUTO: 92.8 FL (ref 79–97)
PLATELET # BLD AUTO: 172 10*3/MM3 (ref 140–450)
PMV BLD AUTO: 10.4 FL (ref 6–12)
POTASSIUM SERPL-SCNC: 4.3 MMOL/L (ref 3.5–5.2)
PROTHROMBIN TIME: 13.1 SECONDS (ref 11.8–14.8)
QT INTERVAL: 416 MS
QTC INTERVAL: 394 MS
RBC # BLD AUTO: 4.28 10*6/MM3 (ref 4.14–5.8)
SODIUM SERPL-SCNC: 141 MMOL/L (ref 136–145)
TRIGL SERPL-MCNC: 121 MG/DL (ref 0–150)
TROPONIN T SERPL HS-MCNC: 68 NG/L
TROPONIN T SERPL HS-MCNC: 90 NG/L
TSH SERPL DL<=0.05 MIU/L-ACNC: 3.79 UIU/ML (ref 0.27–4.2)
VLDLC SERPL-MCNC: 22 MG/DL (ref 5–40)
WBC NRBC COR # BLD AUTO: 7.03 10*3/MM3 (ref 3.4–10.8)

## 2024-03-13 PROCEDURE — 99152 MOD SED SAME PHYS/QHP 5/>YRS: CPT | Performed by: INTERNAL MEDICINE

## 2024-03-13 PROCEDURE — 83036 HEMOGLOBIN GLYCOSYLATED A1C: CPT | Performed by: NURSE PRACTITIONER

## 2024-03-13 PROCEDURE — 80061 LIPID PANEL: CPT | Performed by: NURSE PRACTITIONER

## 2024-03-13 PROCEDURE — 25010000002 HEPARIN (PORCINE) 1000-0.9 UT/500ML-% SOLUTION: Performed by: INTERNAL MEDICINE

## 2024-03-13 PROCEDURE — 93970 EXTREMITY STUDY: CPT

## 2024-03-13 PROCEDURE — 71045 X-RAY EXAM CHEST 1 VIEW: CPT

## 2024-03-13 PROCEDURE — 25010000002 HEPARIN (PORCINE) 2000-0.9 UNIT/L-% SOLUTION: Performed by: INTERNAL MEDICINE

## 2024-03-13 PROCEDURE — 71250 CT THORAX DX C-: CPT

## 2024-03-13 PROCEDURE — 36415 COLL VENOUS BLD VENIPUNCTURE: CPT | Performed by: NURSE PRACTITIONER

## 2024-03-13 PROCEDURE — 85610 PROTHROMBIN TIME: CPT | Performed by: NURSE PRACTITIONER

## 2024-03-13 PROCEDURE — B2111ZZ FLUOROSCOPY OF MULTIPLE CORONARY ARTERIES USING LOW OSMOLAR CONTRAST: ICD-10-PCS | Performed by: INTERNAL MEDICINE

## 2024-03-13 PROCEDURE — 25810000003 SODIUM CHLORIDE 0.9 % SOLUTION: Performed by: NURSE PRACTITIONER

## 2024-03-13 PROCEDURE — 94799 UNLISTED PULMONARY SVC/PX: CPT

## 2024-03-13 PROCEDURE — 85027 COMPLETE CBC AUTOMATED: CPT | Performed by: NURSE PRACTITIONER

## 2024-03-13 PROCEDURE — 93970 EXTREMITY STUDY: CPT | Performed by: SURGERY

## 2024-03-13 PROCEDURE — C1760 CLOSURE DEV, VASC: HCPCS | Performed by: INTERNAL MEDICINE

## 2024-03-13 PROCEDURE — 93306 TTE W/DOPPLER COMPLETE: CPT

## 2024-03-13 PROCEDURE — 93306 TTE W/DOPPLER COMPLETE: CPT | Performed by: INTERNAL MEDICINE

## 2024-03-13 PROCEDURE — 25010000002 ENOXAPARIN PER 10 MG: Performed by: INTERNAL MEDICINE

## 2024-03-13 PROCEDURE — 25010000002 MIDAZOLAM PER 1 MG: Performed by: INTERNAL MEDICINE

## 2024-03-13 PROCEDURE — 94640 AIRWAY INHALATION TREATMENT: CPT

## 2024-03-13 PROCEDURE — 93458 L HRT ARTERY/VENTRICLE ANGIO: CPT | Performed by: INTERNAL MEDICINE

## 2024-03-13 PROCEDURE — 84443 ASSAY THYROID STIM HORMONE: CPT | Performed by: NURSE PRACTITIONER

## 2024-03-13 PROCEDURE — 84484 ASSAY OF TROPONIN QUANT: CPT | Performed by: NURSE PRACTITIONER

## 2024-03-13 PROCEDURE — 94761 N-INVAS EAR/PLS OXIMETRY MLT: CPT

## 2024-03-13 PROCEDURE — C1769 GUIDE WIRE: HCPCS | Performed by: INTERNAL MEDICINE

## 2024-03-13 PROCEDURE — 25010000002 HYDRALAZINE PER 20 MG: Performed by: INTERNAL MEDICINE

## 2024-03-13 PROCEDURE — 93880 EXTRACRANIAL BILAT STUDY: CPT

## 2024-03-13 PROCEDURE — 93880 EXTRACRANIAL BILAT STUDY: CPT | Performed by: SURGERY

## 2024-03-13 PROCEDURE — 99222 1ST HOSP IP/OBS MODERATE 55: CPT | Performed by: SURGERY

## 2024-03-13 PROCEDURE — 25510000001 PERFLUTREN 6.52 MG/ML SUSPENSION: Performed by: FAMILY MEDICINE

## 2024-03-13 PROCEDURE — 25010000002 ENOXAPARIN PER 10 MG: Performed by: NURSE PRACTITIONER

## 2024-03-13 PROCEDURE — 80048 BASIC METABOLIC PNL TOTAL CA: CPT | Performed by: NURSE PRACTITIONER

## 2024-03-13 PROCEDURE — 25510000001 IOPAMIDOL PER 1 ML: Performed by: INTERNAL MEDICINE

## 2024-03-13 PROCEDURE — 99222 1ST HOSP IP/OBS MODERATE 55: CPT | Performed by: INTERNAL MEDICINE

## 2024-03-13 PROCEDURE — C1894 INTRO/SHEATH, NON-LASER: HCPCS | Performed by: INTERNAL MEDICINE

## 2024-03-13 PROCEDURE — 25010000002 DIPHENHYDRAMINE PER 50 MG: Performed by: INTERNAL MEDICINE

## 2024-03-13 PROCEDURE — 4A023N7 MEASUREMENT OF CARDIAC SAMPLING AND PRESSURE, LEFT HEART, PERCUTANEOUS APPROACH: ICD-10-PCS | Performed by: INTERNAL MEDICINE

## 2024-03-13 PROCEDURE — 25010000002 FENTANYL CITRATE (PF) 50 MCG/ML SOLUTION: Performed by: INTERNAL MEDICINE

## 2024-03-13 PROCEDURE — B41F1ZZ FLUOROSCOPY OF RIGHT LOWER EXTREMITY ARTERIES USING LOW OSMOLAR CONTRAST: ICD-10-PCS | Performed by: INTERNAL MEDICINE

## 2024-03-13 RX ORDER — IPRATROPIUM BROMIDE AND ALBUTEROL SULFATE 2.5; .5 MG/3ML; MG/3ML
3 SOLUTION RESPIRATORY (INHALATION)
Status: DISCONTINUED | OUTPATIENT
Start: 2024-03-13 | End: 2024-03-13

## 2024-03-13 RX ORDER — NITROGLYCERIN 0.4 MG/1
0.4 TABLET SUBLINGUAL
Status: DISCONTINUED | OUTPATIENT
Start: 2024-03-13 | End: 2024-03-13 | Stop reason: SDUPTHER

## 2024-03-13 RX ORDER — HYDRALAZINE HYDROCHLORIDE 20 MG/ML
INJECTION INTRAMUSCULAR; INTRAVENOUS
Status: DISCONTINUED | OUTPATIENT
Start: 2024-03-13 | End: 2024-03-13 | Stop reason: HOSPADM

## 2024-03-13 RX ORDER — TEMAZEPAM 7.5 MG/1
7.5 CAPSULE ORAL NIGHTLY PRN
Status: DISCONTINUED | OUTPATIENT
Start: 2024-03-13 | End: 2024-03-14 | Stop reason: HOSPADM

## 2024-03-13 RX ORDER — NITROGLYCERIN 0.4 MG/1
0.4 TABLET SUBLINGUAL
Status: DISCONTINUED | OUTPATIENT
Start: 2024-03-12 | End: 2024-03-14 | Stop reason: HOSPADM

## 2024-03-13 RX ORDER — DIPHENHYDRAMINE HCL 25 MG
25 CAPSULE ORAL EVERY 6 HOURS PRN
Status: DISCONTINUED | OUTPATIENT
Start: 2024-03-13 | End: 2024-03-14 | Stop reason: HOSPADM

## 2024-03-13 RX ORDER — LISINOPRIL 20 MG/1
20 TABLET ORAL EVERY 12 HOURS SCHEDULED
Status: DISCONTINUED | OUTPATIENT
Start: 2024-03-13 | End: 2024-03-14 | Stop reason: HOSPADM

## 2024-03-13 RX ORDER — ASPIRIN 81 MG/1
81 TABLET ORAL DAILY
Status: DISCONTINUED | OUTPATIENT
Start: 2024-03-13 | End: 2024-03-14 | Stop reason: HOSPADM

## 2024-03-13 RX ORDER — FENTANYL CITRATE 50 UG/ML
INJECTION, SOLUTION INTRAMUSCULAR; INTRAVENOUS
Status: DISCONTINUED | OUTPATIENT
Start: 2024-03-13 | End: 2024-03-13 | Stop reason: HOSPADM

## 2024-03-13 RX ORDER — ASPIRIN 81 MG/1
TABLET, CHEWABLE ORAL
Status: DISCONTINUED | OUTPATIENT
Start: 2024-03-13 | End: 2024-03-13 | Stop reason: HOSPADM

## 2024-03-13 RX ORDER — BISACODYL 5 MG/1
5 TABLET, DELAYED RELEASE ORAL DAILY PRN
Status: DISCONTINUED | OUTPATIENT
Start: 2024-03-13 | End: 2024-03-14 | Stop reason: HOSPADM

## 2024-03-13 RX ORDER — ACETAMINOPHEN 325 MG/1
650 TABLET ORAL EVERY 4 HOURS PRN
Status: DISCONTINUED | OUTPATIENT
Start: 2024-03-13 | End: 2024-03-13 | Stop reason: SDUPTHER

## 2024-03-13 RX ORDER — MORPHINE SULFATE 2 MG/ML
2 INJECTION, SOLUTION INTRAMUSCULAR; INTRAVENOUS EVERY 4 HOURS PRN
Status: DISCONTINUED | OUTPATIENT
Start: 2024-03-13 | End: 2024-03-14

## 2024-03-13 RX ORDER — SODIUM CHLORIDE 9 MG/ML
75 INJECTION, SOLUTION INTRAVENOUS CONTINUOUS
Status: DISCONTINUED | OUTPATIENT
Start: 2024-03-13 | End: 2024-03-14 | Stop reason: HOSPADM

## 2024-03-13 RX ORDER — ONDANSETRON 4 MG/1
4 TABLET, ORALLY DISINTEGRATING ORAL EVERY 6 HOURS PRN
Status: DISCONTINUED | OUTPATIENT
Start: 2024-03-13 | End: 2024-03-13 | Stop reason: SDUPTHER

## 2024-03-13 RX ORDER — ONDANSETRON 4 MG/1
4 TABLET, ORALLY DISINTEGRATING ORAL EVERY 6 HOURS PRN
Status: DISCONTINUED | OUTPATIENT
Start: 2024-03-13 | End: 2024-03-14 | Stop reason: HOSPADM

## 2024-03-13 RX ORDER — CALCIUM CARBONATE 500 MG/1
2 TABLET, CHEWABLE ORAL 2 TIMES DAILY PRN
Status: DISCONTINUED | OUTPATIENT
Start: 2024-03-13 | End: 2024-03-14 | Stop reason: HOSPADM

## 2024-03-13 RX ORDER — DIPHENHYDRAMINE HYDROCHLORIDE 50 MG/ML
INJECTION INTRAMUSCULAR; INTRAVENOUS
Status: DISCONTINUED | OUTPATIENT
Start: 2024-03-13 | End: 2024-03-13 | Stop reason: HOSPADM

## 2024-03-13 RX ORDER — ONDANSETRON 2 MG/ML
4 INJECTION INTRAMUSCULAR; INTRAVENOUS EVERY 6 HOURS PRN
Status: DISCONTINUED | OUTPATIENT
Start: 2024-03-13 | End: 2024-03-14 | Stop reason: HOSPADM

## 2024-03-13 RX ORDER — ALPRAZOLAM 0.5 MG/1
0.5 TABLET ORAL 3 TIMES DAILY PRN
Status: DISCONTINUED | OUTPATIENT
Start: 2024-03-13 | End: 2024-03-14 | Stop reason: HOSPADM

## 2024-03-13 RX ORDER — ACETAMINOPHEN 160 MG/5ML
650 SOLUTION ORAL EVERY 4 HOURS PRN
Status: DISCONTINUED | OUTPATIENT
Start: 2024-03-13 | End: 2024-03-14 | Stop reason: HOSPADM

## 2024-03-13 RX ORDER — ATORVASTATIN CALCIUM 40 MG/1
80 TABLET, FILM COATED ORAL DAILY
Status: DISCONTINUED | OUTPATIENT
Start: 2024-03-13 | End: 2024-03-14 | Stop reason: HOSPADM

## 2024-03-13 RX ORDER — METOPROLOL SUCCINATE 25 MG/1
12.5 TABLET, EXTENDED RELEASE ORAL 2 TIMES DAILY
Status: ON HOLD | COMMUNITY
End: 2024-03-14

## 2024-03-13 RX ORDER — MIDAZOLAM HYDROCHLORIDE 1 MG/ML
INJECTION INTRAMUSCULAR; INTRAVENOUS
Status: DISCONTINUED | OUTPATIENT
Start: 2024-03-13 | End: 2024-03-13 | Stop reason: HOSPADM

## 2024-03-13 RX ORDER — IPRATROPIUM BROMIDE AND ALBUTEROL SULFATE 2.5; .5 MG/3ML; MG/3ML
3 SOLUTION RESPIRATORY (INHALATION)
Status: DISPENSED | OUTPATIENT
Start: 2024-03-13 | End: 2024-03-13

## 2024-03-13 RX ORDER — HEPARIN SODIUM 200 [USP'U]/100ML
INJECTION, SOLUTION INTRAVENOUS
Status: DISCONTINUED | OUTPATIENT
Start: 2024-03-13 | End: 2024-03-13 | Stop reason: HOSPADM

## 2024-03-13 RX ORDER — LIDOCAINE HYDROCHLORIDE 20 MG/ML
INJECTION, SOLUTION INFILTRATION; PERINEURAL
Status: DISCONTINUED | OUTPATIENT
Start: 2024-03-13 | End: 2024-03-13 | Stop reason: HOSPADM

## 2024-03-13 RX ORDER — ONDANSETRON 2 MG/ML
4 INJECTION INTRAMUSCULAR; INTRAVENOUS EVERY 6 HOURS PRN
Status: DISCONTINUED | OUTPATIENT
Start: 2024-03-13 | End: 2024-03-13 | Stop reason: SDUPTHER

## 2024-03-13 RX ORDER — BISACODYL 10 MG
10 SUPPOSITORY, RECTAL RECTAL DAILY PRN
Status: DISCONTINUED | OUTPATIENT
Start: 2024-03-13 | End: 2024-03-14 | Stop reason: HOSPADM

## 2024-03-13 RX ORDER — SODIUM CHLORIDE 0.9 % (FLUSH) 0.9 %
10 SYRINGE (ML) INJECTION EVERY 12 HOURS SCHEDULED
Status: DISCONTINUED | OUTPATIENT
Start: 2024-03-13 | End: 2024-03-14 | Stop reason: HOSPADM

## 2024-03-13 RX ORDER — AMOXICILLIN 250 MG
2 CAPSULE ORAL 2 TIMES DAILY PRN
Status: DISCONTINUED | OUTPATIENT
Start: 2024-03-13 | End: 2024-03-14 | Stop reason: HOSPADM

## 2024-03-13 RX ORDER — POLYETHYLENE GLYCOL 3350 17 G/17G
17 POWDER, FOR SOLUTION ORAL DAILY PRN
Status: DISCONTINUED | OUTPATIENT
Start: 2024-03-13 | End: 2024-03-14 | Stop reason: HOSPADM

## 2024-03-13 RX ORDER — NALOXONE HCL 0.4 MG/ML
0.4 VIAL (ML) INJECTION
Status: DISCONTINUED | OUTPATIENT
Start: 2024-03-13 | End: 2024-03-14

## 2024-03-13 RX ORDER — SODIUM CHLORIDE 0.9 % (FLUSH) 0.9 %
10 SYRINGE (ML) INJECTION AS NEEDED
Status: DISCONTINUED | OUTPATIENT
Start: 2024-03-12 | End: 2024-03-14 | Stop reason: HOSPADM

## 2024-03-13 RX ORDER — ACETAMINOPHEN 325 MG/1
650 TABLET ORAL EVERY 4 HOURS PRN
Status: DISCONTINUED | OUTPATIENT
Start: 2024-03-13 | End: 2024-03-14 | Stop reason: HOSPADM

## 2024-03-13 RX ORDER — ENOXAPARIN SODIUM 100 MG/ML
1 INJECTION SUBCUTANEOUS EVERY 12 HOURS
Status: DISCONTINUED | OUTPATIENT
Start: 2024-03-13 | End: 2024-03-14 | Stop reason: HOSPADM

## 2024-03-13 RX ORDER — SODIUM CHLORIDE 9 MG/ML
40 INJECTION, SOLUTION INTRAVENOUS AS NEEDED
Status: DISCONTINUED | OUTPATIENT
Start: 2024-03-12 | End: 2024-03-14 | Stop reason: HOSPADM

## 2024-03-13 RX ORDER — MAGNESIUM HYDROXIDE 1200 MG/15ML
LIQUID ORAL
Status: DISCONTINUED | OUTPATIENT
Start: 2024-03-13 | End: 2024-03-13 | Stop reason: HOSPADM

## 2024-03-13 RX ORDER — ACETAMINOPHEN 650 MG/1
650 SUPPOSITORY RECTAL EVERY 4 HOURS PRN
Status: DISCONTINUED | OUTPATIENT
Start: 2024-03-13 | End: 2024-03-14 | Stop reason: HOSPADM

## 2024-03-13 RX ADMIN — LISINOPRIL 20 MG: 20 TABLET ORAL at 12:41

## 2024-03-13 RX ADMIN — Medication 10 ML: at 02:27

## 2024-03-13 RX ADMIN — IPRATROPIUM BROMIDE AND ALBUTEROL SULFATE 3 ML: .5; 3 SOLUTION RESPIRATORY (INHALATION) at 05:54

## 2024-03-13 RX ADMIN — PERFLUTREN 1.17 MG: 6.52 INJECTION, SUSPENSION INTRAVENOUS at 11:23

## 2024-03-13 RX ADMIN — Medication 10 ML: at 20:40

## 2024-03-13 RX ADMIN — IPRATROPIUM BROMIDE AND ALBUTEROL SULFATE 3 ML: .5; 3 SOLUTION RESPIRATORY (INHALATION) at 14:56

## 2024-03-13 RX ADMIN — ENOXAPARIN SODIUM 80 MG: 100 INJECTION SUBCUTANEOUS at 17:38

## 2024-03-13 RX ADMIN — SODIUM CHLORIDE 75 ML/HR: 9 INJECTION, SOLUTION INTRAVENOUS at 02:35

## 2024-03-13 RX ADMIN — LISINOPRIL 20 MG: 20 TABLET ORAL at 20:39

## 2024-03-13 RX ADMIN — ENOXAPARIN SODIUM 80 MG: 100 INJECTION SUBCUTANEOUS at 06:20

## 2024-03-13 NOTE — CONSULTS
LOS: 0 days   Patient Care Team:  Provider, No Known as PCP - General  Tin Llamas MD (Inactive) as Cardiologist (Cardiology)    Chief Complaint: chest pain      Subjective    Rogerio Barksdale is a 73 y.o. male who is being seen in consultation.  Patient was transferred from [facility] emergency room  Presented there with chest pain  Elevated troponin   Chest pain off-and-on since yesterday  Longest lasting 2 hours  Currently asymptomatic  Radiation to left arm  No nausea  No sweating  No bleeding issues  No falls  No anticipated or endoscopic procedures    Telemetry: no malignant arrhythmia. No significant pauses.    Review of Systems   Constitutional: No chills   Has fatigue   No fever.   HENT: Negative.    Eyes: Negative.    Respiratory: Negative for cough,   No chest wall soreness,   Shortness of breath,   no wheezing, no stridor.    Cardiovascular: As above  Gastrointestinal: Negative for abdominal distention,  No abdominal pain,   No blood in stool,   No constipation,   No diarrhea,   No nausea   No vomiting.   Endocrine: Negative.    Genitourinary: Negative for difficulty urinating, dysuria, flank pain and hematuria.   Musculoskeletal: Negative.    Skin: Negative for rash and wound.   Allergic/Immunologic: Negative.    Neurological: Negative for dizziness, syncope, weakness,   No light-headedness  No  headaches.   Hematological: Does not bruise/bleed easily.   Psychiatric/Behavioral: Negative for agitation or behavioral problems,   No confusion,   the patient is  nervous/anxious.       History:   Past Medical History:   Diagnosis Date    Arteriosclerosis of coronary artery     CAD (coronary artery disease)     Cardiac device in situ     ED (erectile dysfunction)     Hyperlipidemia     Hypertension     Myocardial infarction     TIA (transient ischemic attack)      Past Surgical History:   Procedure Laterality Date    APPENDECTOMY      CARDIAC CATHETERIZATION      CAROTID STENT       Social  History     Socioeconomic History    Marital status:    Tobacco Use    Smoking status: Some Days     Current packs/day: 0.50     Types: Cigarettes   Vaping Use    Vaping status: Never Used   Substance and Sexual Activity    Alcohol use: No    Drug use: No     Family History   Problem Relation Age of Onset    Diabetes Mother     COPD Mother     Coronary artery disease Father     Coronary artery disease Brother        Labs:  WBC WBC   Date Value Ref Range Status   03/13/2024 7.03 3.40 - 10.80 10*3/mm3 Final   03/12/2024 7.30 3.40 - 10.80 10*3/mm3 Final      HGB Hemoglobin   Date Value Ref Range Status   03/13/2024 13.3 13.0 - 17.7 g/dL Final   03/12/2024 14.0 13.0 - 17.7 g/dL Final      HCT Hematocrit   Date Value Ref Range Status   03/13/2024 39.7 37.5 - 51.0 % Final   03/12/2024 42.3 37.5 - 51.0 % Final      Platelets Platelets   Date Value Ref Range Status   03/13/2024 172 140 - 450 10*3/mm3 Final   03/12/2024 190 140 - 450 10*3/mm3 Final      MCV MCV   Date Value Ref Range Status   03/13/2024 92.8 79.0 - 97.0 fL Final   03/12/2024 92.6 79.0 - 97.0 fL Final        Results from last 7 days   Lab Units 03/13/24  0553 03/12/24  2127   SODIUM mmol/L 141 138   POTASSIUM mmol/L 4.3 4.8   CHLORIDE mmol/L 105 103   CO2 mmol/L 28.0 27.0   BUN mg/dL 16 12   CREATININE mg/dL 1.34* 1.23   CALCIUM mg/dL 8.5* 8.7   BILIRUBIN mg/dL  --  0.2   ALK PHOS U/L  --  115   ALT (SGPT) U/L  --  25   AST (SGOT) U/L  --  19   GLUCOSE mg/dL 96 88     Lab Results   Component Value Date    CKTOTAL 77 01/04/2018    TROPONINI <0.012 03/27/2019    TROPONINT 90 (C) 03/13/2024     PT/INR:    Protime   Date Value Ref Range Status   03/13/2024 13.1 11.8 - 14.8 Seconds Final   /  INR   Date Value Ref Range Status   03/13/2024 0.95 0.91 - 1.09 Final       Imaging Results (Last 72 Hours)       Procedure Component Value Units Date/Time    XR Chest 1 View [[id number]] Collected: 03/13/24 0602     Updated: 03/13/24 0609    Narrative:       EXAMINATION: XR CHEST 1 VW-     3/12/2024 11:23 PM     HISTORY: copd; I20.0-Unstable angina     A frontal projection of the chest is compared with the previous study  dated 3/27/2019.     The lungs are well-expanded.     Mild elevation of right diaphragm is similar to the previous study.     Few scattered small partially calcified granulomas are seen,  predominantly in the left lower lung, similar to the previous study.     There is no evidence of recent infiltrate, pleural effusion, pulmonary  congestion or pneumothorax.     The heart size is in the normal range.     There is no acute bony abnormality.       Impression:      1. No active cardiopulmonary disease.     This report was signed and finalized on 3/13/2024 6:06 AM by Dr. Mike Lund MD.               Objective     No Known Allergies    Medication Review: Performed  Current Facility-Administered Medications   Medication Dose Route Frequency Provider Last Rate Last Admin    acetaminophen (TYLENOL) tablet 650 mg  650 mg Oral Q4H PRN Lorena Holguin APRN        Or    acetaminophen (TYLENOL) 160 MG/5ML oral solution 650 mg  650 mg Oral Q4H PRN Lorena Holguin APRN        Or    acetaminophen (TYLENOL) suppository 650 mg  650 mg Rectal Q4H PRN Lorena Holguin APRN        aspirin EC tablet 81 mg  81 mg Oral Daily Lorena Holguin APRN        atorvastatin (LIPITOR) tablet 80 mg  80 mg Oral Daily Lorena Holguin APRN        sennosides-docusate (PERICOLACE) 8.6-50 MG per tablet 2 tablet  2 tablet Oral BID PRN Lorena Holguin APRN        And    polyethylene glycol (MIRALAX) packet 17 g  17 g Oral Daily PRN Lorena Holguin APRN        And    bisacodyl (DULCOLAX) EC tablet 5 mg  5 mg Oral Daily PRN Lorena Holguin APRN        And    bisacodyl (DULCOLAX) suppository 10 mg  10 mg Rectal Daily PRN Lorena Holguin APRN        Enoxaparin Sodium (LOVENOX) syringe 80 mg  1 mg/kg Subcutaneous Q12H Lorena Holguin APRN   80 mg at 03/13/24 0620  "   ipratropium-albuterol (DUO-NEB) nebulizer solution 3 mL  3 mL Nebulization 4x Daily - RT Helphenstine, Neptali TIPTON, DO   3 mL at 03/13/24 0554    lisinopril (PRINIVIL,ZESTRIL) tablet 20 mg  20 mg Oral Q12H Lorena Holguin APRN        Morphine sulfate (PF) injection 2 mg  2 mg Intravenous Q4H PRN Lorena Holguin APRN        And    naloxone (NARCAN) injection 0.4 mg  0.4 mg Intravenous Q5 Min PRN Lorena Holguin, APRMINDY        nitroglycerin (NITROSTAT) SL tablet 0.4 mg  0.4 mg Sublingual Q5 Min PRN Lorena Holguin APRN        ondansetron ODT (ZOFRAN-ODT) disintegrating tablet 4 mg  4 mg Oral Q6H PRN Lorena Holguin, APRMINDY        Or    ondansetron (ZOFRAN) injection 4 mg  4 mg Intravenous Q6H PRN Lorena Holguin APRN        sodium chloride 0.9 % flush 10 mL  10 mL Intravenous Q12H Lorena Holguin, APRN   10 mL at 03/13/24 0227    sodium chloride 0.9 % flush 10 mL  10 mL Intravenous PRN Lorena Holguin APRN        sodium chloride 0.9 % infusion 40 mL  40 mL Intravenous PRN Lorena Holguin, APRMINDY        sodium chloride 0.9 % infusion  75 mL/hr Intravenous Continuous Lorena Holguin APRN 75 mL/hr at 03/13/24 0235 75 mL/hr at 03/13/24 0235       Vital Sign Min/Max for last 24 hours  Temp  Min: 97.5 °F (36.4 °C)  Max: 98 °F (36.7 °C)   BP  Min: 123/56  Max: 156/70   Pulse  Min: 51  Max: 64   Resp  Min: 16  Max: 20   SpO2  Min: 94 %  Max: 97 %   No data recorded   Weight  Min: 69.3 kg (152 lb 12.8 oz)  Max: 78 kg (172 lb)     Flowsheet Rows      Flowsheet Row First Filed Value   Admission Height 175.3 cm (69\") Documented at 03/12/2024 2129   Admission Weight 78 kg (172 lb) Documented at 03/12/2024 2129                Physical Exam:    General Appearance: Awake, alert, in no acute distress  Eyes: Pupils equal and reactive    Ears: Appear intact with no abnormalities noted  Nose: Nares normal, no drainage  Neck: supple, trachea midline, no carotid bruit and no JVD  Back: no kyphosis present,    Lungs: " respirations regular, respirations even and respirations unlabored  Heart: normal S1, S2, no significant murmurs   No gallops or rubs  no rub and no click  Abdomen: normal bowel sounds, no tenderness   Skin: no bleeding, bruising or rash  Extremities: no cyanosis  Psychiatric/Behavioral: Negative for agitation, behavioral problems, confusion, the patient does  appear to be nervous/anxious.       Results Review:   I reviewed the patient's new clinical results.  I reviewed the patient's new imaging results and agree with the interpretation.  I reviewed the patient's other test results and agree with the interpretation  I personally viewed and interpreted the patient's EKG/Telemetry data    Discussed with patient  Updated patient regarding any new or relevant abnormalities on review of records or any new findings on physical exam.   Mentioned to patient about purpose of visit and desirable health short and long term goals and objectives.     Reviewed available prior notes, consults, prior visits, laboratory findings, radiology and cardiology relevant reports.   Updated chart as applicable.   I have reviewed the patient's medical history in detail and updated the computerized patient record as relevant.          Assessment & Plan       NSTEMI (non-ST elevated myocardial infarction)    CAD (coronary artery disease)    Hypertension    Hyperlipidemia    Presence of stent in coronary artery    Tobacco abuse    COPD (chronic obstructive pulmonary disease)      Plan    Recommend cardiac catheterization, selective coronary angiography, left ventriculography and percutaneous coronary intervention with application of arteriotomy hemostatic closure device.    I discussed cardiac catheterization, the procedure, risks (including bleeding, infection, vascular damage [including minor oozing, bruising, bleeding, and up to and including but not limited to the need for vascular surgery, emergency cardiothoracic surgery, contrast  reaction, renal failure, respiratory failure, heart attack, stroke, arrhythmia and even death), benefits, and alternatives and the patient has voiced understanding and is willing to proceed.    Adequate pre-hydration and post cardiac catheterization hydration.  Premedications as required and indicated for cardiac catheterization.    No contraindication to drug eluting stent placement if required  Further recommendations pending results of cardiac catheterization     Telemetry  Deep vein thrombosis prophylaxis/precautions  Appropriate diet, fluid, sodium, caffeine, stimulants intake   Questions were encouraged, asked and answered to the patient's  understanding and satisfaction.  Compliance to diet and medications       Lenin Stanley MD  03/13/24  07:14 CDT    EMR Dragon/Transcription was used to dictate part of this note

## 2024-03-13 NOTE — ED NOTES
"Nursing report ED to floor  Rogerio Barksdale  73 y.o.  male    HPI:   Chief Complaint   Patient presents with    Chest Pain       Admitting doctor:   Noemi Andujar DO    Consulting provider(s):  Consults       Date and Time Order Name Status Description    3/13/2024 12:05 AM Inpatient Cardiology Consult               Admitting diagnosis:   The encounter diagnosis was Unstable angina.    Code status:   Current Code Status       Date Active Code Status Order ID Comments User Context       3/13/2024 0005 CPR (Attempt to Resuscitate) [id number]  Lorena Holguin, [facility]        Question Answer    Code Status (Patient has no pulse and is not breathing) CPR (Attempt to Resuscitate)    Medical Interventions (Patient has pulse or is breathing) Full Support    Level Of Support Discussed With Patient                    Allergies:   Patient has no known allergies.    Intake and Output  No intake or output data in the 24 hours ending 03/13/24 0145    Weight:       03/12/24 2129   Weight: 78 kg (172 lb)       Most recent vitals:   Vitals:    03/12/24 2129 03/12/24 2213 03/12/24 2344 03/13/24 0130   BP:  143/75 155/75 123/56   BP Location:   Right arm    Patient Position:   Sitting    Pulse:  55 54 51   Resp: 20  20 20   Temp: 98 °F (36.7 °C)  98 °F (36.7 °C) 98 °F (36.7 °C)   TempSrc: Oral      SpO2:  95% 96% 94%   Weight: 78 kg (172 lb)      Height: 175.3 cm (69\")        Oxygen Therapy: .    Active LDAs/IV Access:   Lines, Drains & Airways       Active LDAs       Name Placement date Placement time Site Days    Peripheral IV 03/12/24 2128 Left Antecubital 03/12/24 2128  Antecubital  less than 1                    Labs (abnormal labs have a star):   Labs Reviewed   TROPONIN - Abnormal; Notable for the following components:       Result Value    HS Troponin T 48 (*)     All other components within normal limits    Narrative:     High Sensitive Troponin T Reference Range:  <14.0 ng/L- Negative Female for AMI  <22.0 ng/L- " Negative Male for AMI  >=14 - Abnormal Female indicating possible myocardial injury.  >=22 - Abnormal Male indicating possible myocardial injury.   Clinicians would have to utilize clinical acumen, EKG, Troponin, and serial changes to determine if it is an Acute Myocardial Infarction or myocardial injury due to an underlying chronic condition.        HIGH SENSITIVITIY TROPONIN T 2HR - Abnormal; Notable for the following components:    HS Troponin T 52 (*)     Troponin T Delta 4 (*)     All other components within normal limits    Narrative:     High Sensitive Troponin T Reference Range:  <14.0 ng/L- Negative Female for AMI  <22.0 ng/L- Negative Male for AMI  >=14 - Abnormal Female indicating possible myocardial injury.  >=22 - Abnormal Male indicating possible myocardial injury.   Clinicians would have to utilize clinical acumen, EKG, Troponin, and serial changes to determine if it is an Acute Myocardial Infarction or myocardial injury due to an underlying chronic condition.        CBC WITH AUTO DIFFERENTIAL - Normal   RAINBOW DRAW    Narrative:     The following orders were created for panel order [place] Draw.  Procedure                               Abnormality         Status                     ---------                               -----------         ------                     Green Top (Gel)[[id number]]                                  Final result               Lavender Top[[id number]]                                     Final result               Red Top[[id number]]                                          Final result               Gray Top[[id number]]                                         Final result               Light Blue Top[[id number]]                                   Final result                 Please view results for these tests on the individual orders.   COMPREHENSIVE METABOLIC PANEL    Narrative:     GFR Normal >60  Chronic Kidney Disease <60  Kidney Failure <15    The GFR formula is only valid  for adults with stable renal function between ages 18 and 70.   BASIC METABOLIC PANEL   CBC (NO DIFF)   TROPONIN   TSH   LIPID PANEL   HEMOGLOBIN A1C   TROPONIN   PROTIME-INR   GREEN TOP   LAVENDER TOP   RED TOP   GRAY TOP   LIGHT BLUE TOP   CBC AND DIFFERENTIAL    Narrative:     The following orders were created for panel order CBC & Differential.  Procedure                               Abnormality         Status                     ---------                               -----------         ------                     CBC Auto Differential[[id number]]        Normal              Final result                 Please view results for these tests on the individual orders.       Meds given in ED:   Medications   nitroglycerin (NITROSTAT) SL tablet 0.4 mg (has no administration in time range)   sodium chloride 0.9 % flush 10 mL (has no administration in time range)   sodium chloride 0.9 % flush 10 mL (has no administration in time range)   sodium chloride 0.9 % infusion 40 mL (has no administration in time range)   Enoxaparin Sodium (LOVENOX) syringe 80 mg (has no administration in time range)   sodium chloride 0.9 % infusion (has no administration in time range)   acetaminophen (TYLENOL) tablet 650 mg (has no administration in time range)     Or   acetaminophen (TYLENOL) 160 MG/5ML oral solution 650 mg (has no administration in time range)     Or   acetaminophen (TYLENOL) suppository 650 mg (has no administration in time range)   Morphine sulfate (PF) injection 2 mg (has no administration in time range)     And   naloxone (NARCAN) injection 0.4 mg (has no administration in time range)   sennosides-docusate (PERICOLACE) 8.6-50 MG per tablet 2 tablet (has no administration in time range)     And   polyethylene glycol (MIRALAX) packet 17 g (has no administration in time range)     And   bisacodyl (DULCOLAX) EC tablet 5 mg (has no administration in time range)     And   bisacodyl (DULCOLAX) suppository 10 mg (has no  administration in time range)   ondansetron ODT (ZOFRAN-ODT) disintegrating tablet 4 mg (has no administration in time range)     Or   ondansetron (ZOFRAN) injection 4 mg (has no administration in time range)   ipratropium-albuterol (DUO-NEB) nebulizer solution 3 mL (has no administration in time range)     sodium chloride, 75 mL/hr         NIH Stroke Scale:       Isolation/Infection(s):  No active isolations   No active infections     COVID Testing  Collected .  Resulted .    Nursing report ED to floor:  Mental status: .  Ambulatory status: .  Precautions: .    ED nurse phone extentsion- ..

## 2024-03-13 NOTE — H&P
"    [facility] Medicine Services  HISTORY AND PHYSICAL    Date of Admission: 3/12/2024  Primary Care Physician: Provider, No Known    Subjective   Primary Historian: Patient and wife    Chief Complaint: Chest pain    History of Present Illness  Rogerio Barksdale is a 73-year-old male with a past medical history of coronary artery disease status post stent placement and MI, hypertension, hyperlipidemia, TIA.  Patient presented to outlying facility with complaints of sudden onset chest pain while walking from his garage to his front door which is approximately 35 feet.  Patient states pain felt like stabbing, substernal with radiation to left arm and causing jaw tightness.  He did have profound weakness with episode that \"brought me to my knees\".  He denies nausea, shortness of breathing or diaphoresis during episode.  He did have some shortness of breathing when raking leaves earlier today.  He smokes 1/2 pack of cigarettes per day since age 12.  He reports taking 2 - 81 mg ASA at home.  Could not open NTG bottle.  His wife drove to [facility] ED for evaluation.  He received 162mg ASA and 1 NTG with complete relief.  Due to elevated troponins he was transferred to [facility] ED for further evaluation.  Troponins continue to rise, not EKG changes, no pain.  He is admitted for further evaluation and treatment.     Review of Systems   Otherwise complete ROS reviewed and negative except as mentioned in the HPI.    Past Medical History:   Past Medical History:   Diagnosis Date    Arteriosclerosis of coronary artery     CAD (coronary artery disease)     Cardiac device in situ     ED (erectile dysfunction)     Hyperlipidemia     Hypertension     Myocardial infarction     TIA (transient ischemic attack)      Past Surgical History:  Past Surgical History:   Procedure Laterality Date    APPENDECTOMY      CARDIAC CATHETERIZATION      CAROTID STENT       Social History:  reports that he has been " "smoking cigarettes. He does not have any smokeless tobacco history on file. He reports that he does not drink alcohol and does not use drugs.    Family History: family history includes COPD in his mother; Coronary artery disease in his brother and father; Diabetes in his mother.       Allergies:  No Known Allergies    Medications:  No current facility-administered medications on file prior to encounter.     Current Outpatient Medications on File Prior to Encounter   Medication Sig Dispense Refill    albuterol sulfate  (90 Base) MCG/ACT inhaler Inhale 2 puffs Every 4 (Four) Hours As Needed for Wheezing.      aspirin 81 MG EC tablet Take 81 mg by mouth Daily.      atorvastatin (LIPITOR) 80 MG tablet Take 1 tablet by mouth Daily. 90 tablet 5    metoprolol succinate XL (TOPROL-XL) 25 MG 24 hr tablet Take 0.5 tablets by mouth 2 (Two) Times a Day. (Patient taking differently: Take 0.5 tablets by mouth Daily.) 45 tablet 5    nitroglycerin (NITROSTAT) 0.4 MG SL tablet Place 1 tablet under the tongue Every 5 (Five) Minutes As Needed for chest pain. Take no more than 3 doses in 15 minutes. 50 tablet 5    ramipril (ALTACE) 10 MG capsule Take 1 capsule by mouth 2 (Two) Times a Day. 90 capsule 5      I have utilized all available immediate resources to obtain, update, or review the patient's current medications (including all prescriptions, over-the-counter products, herbals, cannabis/cannabidiol products, and vitamin/mineral/dietary (nutritional) supplements).    Objective     Vital Signs: /75 (BP Location: Right arm, Patient Position: Sitting)   Pulse 54   Temp 98 °F (36.7 °C)   Resp 20   Ht 175.3 cm (69\")   Wt 78 kg (172 lb)   SpO2 96%   BMI 25.40 kg/m²   Physical Exam  Vitals reviewed.   Constitutional:       Appearance: Normal appearance.   HENT:      Head: Normocephalic and atraumatic.      Mouth/Throat:      Mouth: Mucous membranes are moist.      Pharynx: Oropharynx is clear.      Comments: " Edentulous  Eyes:      Extraocular Movements: Extraocular movements intact.      Conjunctiva/sclera: Conjunctivae normal.   Cardiovascular:      Rate and Rhythm: Regular rhythm. Bradycardia present.   Pulmonary:      Effort: Pulmonary effort is normal.      Comments: Diminished throughout without advantageous breath sounds   Abdominal:      General: There is no distension.      Palpations: Abdomen is soft.   Musculoskeletal:         General: Normal range of motion.      Cervical back: Normal range of motion and neck supple.      Right lower leg: No edema.      Left lower leg: No edema.   Skin:     General: Skin is warm and dry.   Neurological:      General: No focal deficit present.      Mental Status: He is alert and oriented to person, place, and time.   Psychiatric:         Mood and Affect: Mood normal.         Behavior: Behavior normal.      Results Reviewed:  Lab Results (last 24 hours)       Procedure Component Value Units Date/Time    High Sensitivity Troponin T 2Hr [[id number]]  (Abnormal) Collected: 03/12/24 2246    Specimen: Blood Updated: 03/12/24 2315     HS Troponin T 52 ng/L      Troponin T Delta 4 ng/L     Comprehensive Metabolic Panel [[id number]] Collected: 03/12/24 2127    Specimen: Blood Updated: 03/12/24 2202     Glucose 88 mg/dL      BUN 12 mg/dL      Creatinine 1.23 mg/dL      Sodium 138 mmol/L      Potassium 4.8 mmol/L      Chloride 103 mmol/L      CO2 27.0 mmol/L      Calcium 8.7 mg/dL      Total Protein 7.7 g/dL      Albumin 4.2 g/dL      ALT (SGPT) 25 U/L      AST (SGOT) 19 U/L      Alkaline Phosphatase 115 U/L      Total Bilirubin 0.2 mg/dL      Globulin 3.5 gm/dL      A/G Ratio 1.2 g/dL      BUN/Creatinine Ratio 9.8     Anion Gap 8.0 mmol/L      eGFR 61.6 mL/min/1.73     High Sensitivity Troponin T [[id number]]  (Abnormal) Collected: 03/12/24 2127    Specimen: Blood Updated: 03/12/24 2158     HS Troponin T 48 ng/L     CBC Auto Differential [[id number]]  (Normal) Collected: 03/12/24 2127     Specimen: Blood Updated: 03/12/24 2138     WBC 7.30 10*3/mm3      RBC 4.57 10*6/mm3      Hemoglobin 14.0 g/dL      Hematocrit 42.3 %      MCV 92.6 fL      MCH 30.6 pg      MCHC 33.1 g/dL      RDW 13.5 %      RDW-SD 45.7 fl      MPV 10.3 fL      Platelets 190 10*3/mm3      Neutrophil % 55.2 %      Lymphocyte % 32.5 %      Monocyte % 6.8 %      Eosinophil % 4.9 %      Basophil % 0.5 %      Immature Grans % 0.1 %      Neutrophils, Absolute 4.02 10*3/mm3      Lymphocytes, Absolute 2.37 10*3/mm3      Monocytes, Absolute 0.50 10*3/mm3      Eosinophils, Absolute 0.36 10*3/mm3      Basophils, Absolute 0.04 10*3/mm3      Immature Grans, Absolute 0.01 10*3/mm3      nRBC 0.0 /100 WBC           Imaging Results (Last 24 Hours)       ** No results found for the last 24 hours. **            Assessment / Plan   Assessment:   Active Hospital Problems    Diagnosis     **NSTEMI (non-ST elevated myocardial infarction)     Tobacco abuse     COPD (chronic obstructive pulmonary disease)     Presence of stent in coronary artery     Hyperlipidemia     Hypertension     CAD (coronary artery disease)        Treatment Plan  The patient will be admitted to Dr. Andujar's service here at [facility].   Consult cardiology in am  NPO after midnight, oral care  Home medications reviewed and restarted as appropriate  Lovenox 1mg/kg sq every 12 hours, first dose given at [facility] at 4:54PM  Duoneb 4 x day x 4, supplemental O2 as needed, incentive spirometry, abg's as needed  NS at 75mg/hour  Follow ACS protocol   Repeat Troponin at 1am and in am; labs in am  Patient declines NicoDerm patch, smoking cessation before discharge     Medical Decision Making  Number and Complexity of problems: 7  Differential Diagnosis: none    Conditions and Status        Condition is unchanged.     [facility] Data  External documents reviewed: records from [facility]  Cardiac tracing (EKG, telemetry) interpretation: reviewed, sinus bradycardia 54  Radiology interpretation: ordered  "stat chest xray  Labs reviewed: yes  Any tests that were considered but not ordered: no     Decision rules/scores evaluated (example [id number], Wells, etc): no     Discussed with: patient, wife, daughter and Dr. Andujar  Patient presented to [facility] facility with complaints of sudden onset chest pain while walking from his garage to his front door which is approximately 35 feet.  Patient states pain felt like stabbing, substernal with radiation to left arm and causing jaw tightness.  He did have profound weakness with episode that \"brought me to my knees\". Cardiology consulted. Trend troponin. Case and plan discussed with NP and agree.      Care Planning  Shared decision making: patient, wife, daughter and Dr. Andujar  Code status and discussions: full    Disposition  Social Determinants of Health that impact treatment or disposition: none  Estimated length of stay is 1-2 days.     I confirmed that the patient's advanced care plan is present, code status is documented, and a surrogate decision maker is listed in the patient's medical record.     The patient's surrogate decision maker is wife.     The patient was seen and examined by me on 3/12/2023 at 11:16pm.    Electronically signed by YOSEF Leroy, 03/13/24, 00:22 CDT.             "

## 2024-03-13 NOTE — PLAN OF CARE
Goal Outcome Evaluation:           Progress: no change  Outcome Evaluation: VSS Sinus Albaro 53-56 per tele.  Pt arrived to floor this shift from ER.  Denies pain at this time, no c/o sob or nausea.  Has been NPO since arrival to floor for cardiology consult.  will continue to monitor.       Critical troponin of 68 noted, Dr. Andujar aware

## 2024-03-13 NOTE — CASE MANAGEMENT/SOCIAL WORK
Continued Stay Note   [place]     Patient Name: Rogerio Barksdale  MRN: [id number]  Today's Date: 3/13/2024    Admit Date: 3/12/2024        Discharge Plan       Row Name 03/13/24 0937       Plan    Patient/Family in Agreement with Plan unable to assess    Plan Comments Pt in cath lab; SW will screen when he returns to room.    Final Discharge Disposition Code 01 - home or self-care                   Discharge Codes    No documentation.                 Expected Discharge Date and Time       Expected Discharge Date Expected Discharge Time    Mar 14, 2024               ROHIT JimenezW

## 2024-03-13 NOTE — PROGRESS NOTES
"    [facility] Medicine Services  INPATIENT PROGRESS NOTE    Patient Name: Rogerio Barksdale  Date of Admission: 3/12/2024  Today's Date: 03/13/24  Length of Stay: 0  Primary Care Physician: Richy Mendiola MD    Subjective   Chief Complaint: Chest pain  HPI   Mr. Barksdale presented to [facility] emergency room as transfer from [facility] for chest pain and possible NSTEMI.  Patient reported sudden onset of chest pain while walking approximately 35 feet from the garage to his home.  He reported sharp, substernal pain left arm, jaw tightness and complained of profound weakness \"brought me to my knees\".  He denied nausea, shortness of breath or diaphoresis.  Patient also reported an episode of shortness of breath earlier in the day when raking leaves.  Patient took two 81 mg aspirin as he could not get his nitroglycerin bottle open.  At the outside facility he received 162 mg aspirin and 1 nitroglycerin with relief of pain.  Due to elevated troponin, he was transferred to our facility.  No EKG changes noted. He does smoke 1/2 pack cigarettes per day.  He has a history of stent placed by Dr. Andrews in the remote past.  Troponin 48, 52, T delta 4, troponin 68.  Lovenox 1 mg/kg every 12 hours given at outside facility at 4:54 PM.    Today  Discussed with Dr. Stanley and plans to proceed with cardiac catheterization today.  Patient was seen prior to going down for heart cath.  He tells me Dr. Andrews put his stents in several years ago.  He is not complaining of chest pain at present.  Troponin trended up to 90 today.  Has nausea, vomiting or abdominal pain.  Cardiac cath shows multivessel coronary artery disease CTS referral requested.      Review of Systems   Constitutional:  Positive for activity change and fatigue.   HENT:  Negative for congestion and trouble swallowing.    Eyes:  Negative for photophobia and visual disturbance.   Respiratory:  Negative for choking.  "   Cardiovascular:  Positive for chest pain (Radiated to jaw and left arm).   Gastrointestinal:  Negative for diarrhea, nausea and vomiting.   Endocrine: Negative for cold intolerance, heat intolerance and polyuria.   Genitourinary:  Negative for dysuria, frequency and urgency.   Musculoskeletal:  Negative for back pain and gait problem.   Skin:  Negative for color change, pallor, rash and wound.   Allergic/Immunologic: Negative for immunocompromised state.   Neurological:  Positive for weakness. Negative for light-headedness.   Hematological:  Negative for adenopathy. Does not bruise/bleed easily.   Psychiatric/Behavioral:  Negative for agitation, behavioral problems and confusion.       All pertinent negatives and positives are as above. All other systems have been reviewed and are negative unless otherwise stated.     Objective    Temp:  [97.5 °F (36.4 °C)-98 °F (36.7 °C)] 97.7 °F (36.5 °C)  Heart Rate:  [49-77] 77  Resp:  [16-20] 16  BP: (123-156)/() 150/72  Physical Exam  Vitals and nursing note reviewed.   Constitutional:       Comments: Sitting up in bed.  No oxygen use.  Wife in room.   HENT:      Head: Normocephalic and atraumatic.      Nose: No congestion.      Mouth/Throat:      Pharynx: Oropharynx is clear. No oropharyngeal exudate or posterior oropharyngeal erythema.   Eyes:      Extraocular Movements: Extraocular movements intact.      Pupils: Pupils are equal, round, and reactive to light.   Cardiovascular:      Rate and Rhythm: Normal rate and regular rhythm.      Heart sounds: No murmur heard.     Comments: Normal sinus rhythm 56 on telemetry  Pulmonary:      Breath sounds: No wheezing, rhonchi or rales.      Comments: No oxygen use.  Abdominal:      Palpations: Abdomen is soft.      Tenderness: There is no abdominal tenderness.   Genitourinary:     Comments: Voiding.  Musculoskeletal:         General: No swelling or tenderness.      Cervical back: Normal range of motion and neck supple.  "  Skin:     General: Skin is warm and dry.   Neurological:      General: No focal deficit present.      Mental Status: He is alert and oriented to person, place, and time.   Psychiatric:         Mood and Affect: Mood normal.         Behavior: Behavior normal.         Thought Content: Thought content normal.         Judgment: Judgment normal.       Results Review:  I have reviewed the labs, radiology results, and diagnostic studies.    Laboratory Data:   Results from last 7 days   Lab Units 03/13/24  0552 03/12/24 2127   WBC 10*3/mm3 7.03 7.30   HEMOGLOBIN g/dL 13.3 14.0   HEMATOCRIT % 39.7 42.3   PLATELETS 10*3/mm3 172 190        Results from last 7 days   Lab Units 03/13/24  0553 03/12/24  2127   SODIUM mmol/L 141 138   POTASSIUM mmol/L 4.3 4.8   CHLORIDE mmol/L 105 103   CO2 mmol/L 28.0 27.0   BUN mg/dL 16 12   CREATININE mg/dL 1.34* 1.23   CALCIUM mg/dL 8.5* 8.7   BILIRUBIN mg/dL  --  0.2   ALK PHOS U/L  --  115   ALT (SGPT) U/L  --  25   AST (SGOT) U/L  --  19   GLUCOSE mg/dL 96 88       Culture Data:   No results found for: \"BLOODCX\", \"URINECX\", \"WOUNDCX\", \"MRSACX\", \"RESPCX\", \"STOOLCX\"    Radiology Data:   Imaging Results (Last 24 Hours)       Procedure Component Value Units Date/Time    XR Chest 1 View [[id number]] Collected: 03/13/24 0602     Updated: 03/13/24 0609    Narrative:      EXAMINATION: XR CHEST 1 VW-     3/12/2024 11:23 PM     HISTORY: copd; I20.0-Unstable angina     A frontal projection of the chest is compared with the previous study  dated 3/27/2019.     The lungs are well-expanded.     Mild elevation of right diaphragm is similar to the previous study.     Few scattered small partially calcified granulomas are seen,  predominantly in the left lower lung, similar to the previous study.     There is no evidence of recent infiltrate, pleural effusion, pulmonary  congestion or pneumothorax.     The heart size is in the normal range.     There is no acute bony abnormality.       Impression:      1. " No active cardiopulmonary disease.     This report was signed and finalized on 3/13/2024 6:06 AM by Dr. Mike Lund MD.             Conclusion of cardiac catheterization    3/13/2024        Normal left main coronary artery  Proximal left anterior descending coronary artery has 50% stenosis  Left anterior descending coronary artery [place] of the origin of moderate-sized diagonal branch has 70% stenosis followed by a 60% stenosis  The diagonal branch itself has moderate atherosclerotic changes without high-grade obstructive disease  Collaterals noted from the left anterior descending coronary artery reconstituting the distal right coronary artery and the right posterior descending coronary artery  First obtuse marginal branch has a sequential 70% stenoses  Second obtuse marginal branch   rest of left circumflex coronary artery does not have significant disease  Multiple coronary stents in the mid right coronary artery with 95% stenosis  Cut off noted in the very distal segment of the right posterior descending coronary artery which may be an embolized atherosclerotic plaque.  Distal to this cutoff however there is collateral flow from the anterior septals that reconstitutes this vessel     Severely elevated LVEDP of 30 mmHg with no gradient across aortic valve on pullback  Subclavian and bilateral internal mammary arteries are patent that can be used as a surgical conduit     Summary     Multivessel coronary artery disease as described above  Severely elevated EDP  LV gram not performed      Scheduled medications:  aspirin, 81 mg, Oral, Daily  atorvastatin, 80 mg, Oral, Daily  enoxaparin, 1 mg/kg, Subcutaneous, Q12H  ipratropium-albuterol, 3 mL, Nebulization, 4x Daily - RT  lisinopril, 20 mg, Oral, Q12H  sodium chloride, 10 mL, Intravenous, Q12H       I have reviewed the patient's current medications.     Assessment/Plan   Assessment  Active Hospital Problems    Diagnosis     **NSTEMI (non-ST elevated myocardial  infarction)     Multivessel coronary artery disease involving native coronary artery of native heart     Tobacco abuse     COPD (chronic obstructive pulmonary disease)     Presence of stent in coronary artery     Hyperlipidemia     Hypertension        Treatment Plan    1.  NSTEMI presented to outside facility with chest pain not relieved by aspirin.  Received aspirin nitroglycerin at outside facility.  Troponin elevated.  Troponin our facility 48, 52, 68, 90.  T delta 4.  Aspirin, Lovenox, statin, ACE continued on admission.    2.  Multivessel coronary artery disease.  Cardiology consulted.  Discussed with Dr. Stanley today.  Heart cath 3/13/24 shows 50% LAD, 70% OM, 95% RCA.  Previous stents by Dr. Andrews.  Follow-up echocardiogram.  Cardiothoracic surgery consult, Dr. Avalos to see.  High intensity statin, aspirin, Lovenox, ACE    3.  Primary hypertension.  Blood pressure 150/72.  Continue lisinopril (substituted for Altace), no beta-blocker at present as heart rate upper 40s and 50s.  Normally takes metoprolol XL 12.5 mg daily    4.  Mixed hyperlipidemia.   with goal less than 70.  Cholesterol 178, triglycerides 121.  Atorvastatin 80 mg orally nightly.    5.  Tobacco use.  Nicotine patch.  Discussed smoking cessation.    6.  History of COPD.  No exacerbation.  Not requiring oxygen.    7.  Creatinine 1.23 on admission, creatinine 1.34 today.  Patient was n.p.o. for cardiac catheterization.  Hydrate with IV fluids at 75 mL/h.  Repeat BMP in AM.  Patient had contrast today.    8.  Lovenox 1 mg/kg every 12 hours    Medical Decision Making  Number and Complexity of problems: 6  NSTEMI: Acute, high complexity poses threat to life and bodily function  Multivessel CAD: Acute, high complexity poses threat to life and bodily function  Primary hypertension: Chronic, moderate complexity, stable  Mixed hyperlipidemia: Chronic, moderate complexity, not at baseline  Tobacco use: Chronic, moderate complexity  COPD without  exacerbation: Chronic, moderate complexity, stable      Differential Diagnosis: None    Conditions and Status        Condition is unchanged.     [facility] Data  External documents reviewed: Transfer records [facility]  Cardiac tracing (EKG, telemetry) interpretation: Normal sinus rhythm 46-56 on telemetry  Radiology interpretation: Radiology interpretation chest x-ray no acute abnormality  Labs reviewed:   BMP 3/13/2024.  Repeat BMP in AM.  CBC 3/13/2024.  Repeat CBC in AM.  Hemoglobin A1c, TSH, lipid panel      Any tests that were considered but not ordered: None     Decision rules/scores evaluated (example [id number], Wells, etc): None     Discussed with: Dr. Lobo, Dr. Stanley, patient, and wife.     Care Planning  Shared decision making: Dr. Lobo, Dr. Stanley, patient, and wife.  Patient and wife agreed to cardiology consult and cardiac catheterization.  Agrees to CTS consult for multivessel coronary artery disease.  Notified nursing staff for pain.  Repeat lab work in AM.   Code status and discussions: Full code  Patient surrogate decision maker is his wife, Kita Beasley  Social Determinants of Health that impact treatment or disposition: None  I expect the patient to be discharged to home to be determined pending CTS consultation, recommendations and plans for surgery.    Electronically signed by YOSEF Shabazz, 03/13/24, 11:04 CDT.     The above documentation resulted from a face-to-face encounter by me Radha NAVAS, [facility].

## 2024-03-13 NOTE — ED PROVIDER NOTES
Subjective   History of Present Illness  Pt transferred from [facility] for possible NSTEMI.  Pt reports episodes of chest pain yesterday and again today - pressure/discomfort in mid chest.  + SOB with this.  No n/v/f/c.  No LE swelling/pain.  Pt has hx of CAD.  + smoker.  Trop 170/165 at         Review of Systems   Constitutional:  Negative for fever.   HENT:  Negative for congestion.    Respiratory:  Positive for shortness of breath.    Cardiovascular:  Positive for chest pain.   Gastrointestinal:  Negative for abdominal pain.   Skin:  Negative for rash.   All other systems reviewed and are negative.      Past Medical History:   Diagnosis Date    Arteriosclerosis of coronary artery     CAD (coronary artery disease)     Cardiac device in situ     ED (erectile dysfunction)     Hyperlipidemia     Hypertension     Myocardial infarction     TIA (transient ischemic attack)        No Known Allergies    Past Surgical History:   Procedure Laterality Date    APPENDECTOMY      CARDIAC CATHETERIZATION      CAROTID STENT         Family History   Problem Relation Age of Onset    Coronary artery disease Father     Coronary artery disease Brother     No Known Problems Mother        Social History     Socioeconomic History    Marital status:    Tobacco Use    Smoking status: Some Days     Current packs/day: 0.50     Types: Cigarettes   Substance and Sexual Activity    Alcohol use: No    Drug use: No           Objective   Physical Exam  Vitals and nursing note reviewed.   Constitutional:       General: He is not in acute distress.     Appearance: He is well-developed.   HENT:      Head: Normocephalic.   Cardiovascular:      Rate and Rhythm: Normal rate and regular rhythm.      Heart sounds: Normal heart sounds.   Pulmonary:      Effort: Pulmonary effort is normal.      Breath sounds: Normal breath sounds.   Abdominal:      General: Bowel sounds are normal.      Palpations: Abdomen is soft.   Musculoskeletal:       Right lower leg: No edema.      Left lower leg: No edema.   Skin:     General: Skin is warm and dry.      Capillary Refill: Capillary refill takes less than 2 seconds.   Neurological:      Mental Status: He is alert.         Procedures           ED Course      Labs Reviewed   TROPONIN - Abnormal; Notable for the following components:       Result Value    HS Troponin T 48 (*)     All other components within normal limits    Narrative:     High Sensitive Troponin T Reference Range:  <14.0 ng/L- Negative Female for AMI  <22.0 ng/L- Negative Male for AMI  >=14 - Abnormal Female indicating possible myocardial injury.  >=22 - Abnormal Male indicating possible myocardial injury.   Clinicians would have to utilize clinical acumen, EKG, Troponin, and serial changes to determine if it is an Acute Myocardial Infarction or myocardial injury due to an underlying chronic condition.        HIGH SENSITIVITIY TROPONIN T 2HR - Abnormal; Notable for the following components:    HS Troponin T 52 (*)     Troponin T Delta 4 (*)     All other components within normal limits    Narrative:     High Sensitive Troponin T Reference Range:  <14.0 ng/L- Negative Female for AMI  <22.0 ng/L- Negative Male for AMI  >=14 - Abnormal Female indicating possible myocardial injury.  >=22 - Abnormal Male indicating possible myocardial injury.   Clinicians would have to utilize clinical acumen, EKG, Troponin, and serial changes to determine if it is an Acute Myocardial Infarction or myocardial injury due to an underlying chronic condition.        CBC WITH AUTO DIFFERENTIAL - Normal   COMPREHENSIVE METABOLIC PANEL    Narrative:     GFR Normal >60  Chronic Kidney Disease <60  Kidney Failure <15    The GFR formula is only valid for adults with stable renal function between ages 18 and 70.   RAINBOW DRAW    Narrative:     The following orders were created for panel order [facility] Draw.  Procedure                               Abnormality         Status                      ---------                               -----------         ------                     Green Top (Gel)[[id number]]                                  Final result               Lavender Top[[id number]]                                     Final result               Red Top[[id number]]                                          Final result               Gray Top[[id number]]                                         In process                 Light Blue Top[[id number]]                                   Final result                 Please view results for these tests on the individual orders.   GREEN TOP   LAVENDER TOP   RED TOP   LIGHT BLUE TOP   CBC AND DIFFERENTIAL    Narrative:     The following orders were created for panel order CBC & Differential.  Procedure                               Abnormality         Status                     ---------                               -----------         ------                     CBC Auto Differential[[id number]]        Normal              Final result                 Please view results for these tests on the individual orders.   GRAY TOP     No orders to display                                              Medical Decision Making  Pt stable in EC - resting comfortably and in NAD.  No evid of SBI/sepsis.  Dbt PE/TAD.  Pt was given asa/ntg/lovenox at .  Given his sx - suspect UA.  Have d/w Dr. Andujar for admit/further mgmt.      Amount and/or Complexity of Data Reviewed  Labs: ordered.  ECG/medicine tests: ordered and independent interpretation performed.     Details: NSR, Nml axis/int/STT        Final diagnoses:   Unstable angina       ED Disposition  ED Disposition       ED Disposition   Decision to Admit    Condition   --    Comment   --               No follow-up provider specified.       Medication List      No changes were made to your prescriptions during this visit.            Neptali Padgett DO  03/12/24 7431

## 2024-03-13 NOTE — CONSULTS
Referring Provider: Dr. Stanley  Reason for Consultation: Coronary artery disease    Patient Care Team:  Provider, No Known as PCP - General  Tin Llamas MD (Inactive) as Cardiologist (Cardiology)    Chief complaint chest pain    Subjective .     History of present illness: Mr. Barksdale is a 73-year-old male with known coronary artery disease with previous PCI, hyperlipidemia, hypertension, TIA, and tobacco use.  He presented to an outside hospital with complaints of sudden onset chest pain while walking.  Pain was described as stabbing substernal and did radiate to the left arm.  He also complained of weakness during this episode.  Given the symptoms he presented to [facility] ER and was found to have elevated troponins therefore was transferred to [facility] ER for further evaluation.  He was ultimately admitted to the hospital service and cardiology was consulted.  He underwent coronary angiography today revealing multivessel coronary artery disease.  Cardiothoracic surgery has been consulted for consideration for CABG.  Creatinine this admission is 1.34.  He is not diabetic, hemoglobin A1c this admission is 5.5.  He has had TIA x 3 in the past.  He is currently chest pain-free.  Patient is a current 1/2 ppd smoker and states he has no intentions to quit.    History  Code Status and Medical Interventions:   Ordered at: 03/13/24 0005     Level Of Support Discussed With:    Patient     Code Status (Patient has no pulse and is not breathing):    CPR (Attempt to Resuscitate)     Medical Interventions (Patient has pulse or is breathing):    Full Support         Past Medical History:   Diagnosis Date    Arteriosclerosis of coronary artery     CAD (coronary artery disease)     Cardiac device in situ     ED (erectile dysfunction)     Hyperlipidemia     Hypertension     Myocardial infarction     TIA (transient ischemic attack)    ,   Past Surgical History:   Procedure Laterality Date     APPENDECTOMY      CARDIAC CATHETERIZATION      CAROTID STENT     ,   Family History   Problem Relation Age of Onset    Diabetes Mother     COPD Mother     Coronary artery disease Father     Coronary artery disease Brother    ,   Social History     Tobacco Use    Smoking status: Some Days     Current packs/day: 0.50     Types: Cigarettes   Vaping Use    Vaping status: Never Used   Substance Use Topics    Alcohol use: No    Drug use: No   ,   Medications Prior to Admission   Medication Sig Dispense Refill Last Dose    albuterol sulfate  (90 Base) MCG/ACT inhaler Inhale 2 puffs Every 4 (Four) Hours As Needed for Wheezing.       aspirin 81 MG EC tablet Take 81 mg by mouth Daily.       atorvastatin (LIPITOR) 80 MG tablet Take 1 tablet by mouth Daily. 90 tablet 5     metoprolol succinate XL (TOPROL-XL) 25 MG 24 hr tablet Take 0.5 tablets by mouth 2 (Two) Times a Day. (Patient taking differently: Take 0.5 tablets by mouth Daily.) 45 tablet 5     nitroglycerin (NITROSTAT) 0.4 MG SL tablet Place 1 tablet under the tongue Every 5 (Five) Minutes As Needed for chest pain. Take no more than 3 doses in 15 minutes. 50 tablet 5     ramipril (ALTACE) 10 MG capsule Take 1 capsule by mouth 2 (Two) Times a Day. 90 capsule 5    , Allergies: Patient has no known allergies.    Review of Systems  Review of Systems   Constitutional:  Positive for activity change and fatigue. Negative for diaphoresis and fever.   HENT:  Negative for trouble swallowing and voice change.    Eyes:  Negative for visual disturbance.   Respiratory:  Positive for chest tightness and shortness of breath.    Cardiovascular:  Positive for chest pain. Negative for palpitations and leg swelling.   Gastrointestinal:  Negative for abdominal pain, diarrhea, nausea and vomiting.   Genitourinary:  Negative for difficulty urinating, dysuria and hematuria.   Musculoskeletal:  Negative for arthralgias and myalgias.   Skin:  Negative for color change, pallor, rash and  "wound.   Neurological:  Negative for dizziness, syncope and light-headedness.   Hematological:  Does not bruise/bleed easily.   Psychiatric/Behavioral:  Negative for agitation, confusion and sleep disturbance.         Objective     Vital Signs   Visit Vitals  /72   Pulse 77   Temp 97.7 °F (36.5 °C) (Oral)   Resp 16   Ht 175.3 cm (69\")   Wt 69.3 kg (152 lb 12.8 oz)   SpO2 98%   BMI 22.56 kg/m²       Physical Exam  Vitals reviewed.   Constitutional:       General: He is not in acute distress.  HENT:      Head: Normocephalic.   Eyes:      Pupils: Pupils are equal, round, and reactive to light.   Cardiovascular:      Rate and Rhythm: Normal rate and regular rhythm.      Heart sounds: Normal heart sounds. No murmur heard.  Pulmonary:      Breath sounds: Normal breath sounds. No wheezing or rales.   Abdominal:      General: There is no distension.      Palpations: Abdomen is soft.      Tenderness: There is no abdominal tenderness.   Musculoskeletal:         General: No swelling or tenderness.   Skin:     General: Skin is warm and dry.   Neurological:      General: No focal deficit present.      Mental Status: He is alert and oriented to person, place, and time.   Psychiatric:         Mood and Affect: Mood normal.         Behavior: Behavior normal.         Thought Content: Thought content normal.         Judgment: Judgment normal.           LAB:   CBC:  Results from last 7 days   Lab Units 03/13/24  0552 03/12/24 2127   WBC 10*3/mm3 7.03 7.30   HEMATOCRIT % 39.7 42.3   PLATELETS 10*3/mm3 172 190          BMP:)  Results from last 7 days   Lab Units 03/13/24  0553 03/12/24 2127   SODIUM mmol/L 141 138   POTASSIUM mmol/L 4.3 4.8   CHLORIDE mmol/L 105 103   CO2 mmol/L 28.0 27.0   GLUCOSE mg/dL 96 88   BUN mg/dL 16 12   CREATININE mg/dL 1.34* 1.23           COAG:  Results from last 7 days   Lab Units 03/13/24  0553   INR  0.95           IMAGES:       Imaging Results (Last 24 Hours)       Procedure Component Value Units " Date/Time    XR Chest 1 View [[id number]] Collected: 03/13/24 0602     Updated: 03/13/24 0609    Narrative:      EXAMINATION: XR CHEST 1 VW-     3/12/2024 11:23 PM     HISTORY: copd; I20.0-Unstable angina     A frontal projection of the chest is compared with the previous study  dated 3/27/2019.     The lungs are well-expanded.     Mild elevation of right diaphragm is similar to the previous study.     Few scattered small partially calcified granulomas are seen,  predominantly in the left lower lung, similar to the previous study.     There is no evidence of recent infiltrate, pleural effusion, pulmonary  congestion or pneumothorax.     The heart size is in the normal range.     There is no acute bony abnormality.       Impression:      1. No active cardiopulmonary disease.     This report was signed and finalized on 3/13/2024 6:06 AM by Dr. Mike Lund MD.                            Assessment & Plan        NSTEMI (non-ST elevated myocardial infarction)    CAD (coronary artery disease)    Hypertension    Hyperlipidemia    Presence of stent in coronary artery    Tobacco abuse    COPD (chronic obstructive pulmonary disease)      Dr. Avalos and I discussed the patient's findings and my recommendations with the patient.  We discussed the options for treatment of coronary artery disease to include medical therapy, coronary stenting, and surgical revascularization.  The STS Risk score was calculated using the STS Risk Calculator and discussed with the patient.  We discussed the operative conduct and expected hospital and outpatient recovery.  Risks were discussed to include but not limited to bleeding, infection, stroke, heart attack, need for additional procedures, anesthesia risk, organ dysfunction and/or death, prolonged mechanical ventilation, prolonged ICU stay, chronic pain syndromes, sternal nonunion, and/or death.  We discussed the need to utilize all medical treatments additionally prescribed post surgery.        The patient and family understands the risks, benefits, and alternatives and he wishes to proceed forward with surgery.  We will complete his workup with CT chest without contrast, bilateral carotid ultrasound, and vein mapping.  Transthoracic echo has been ordered and is pending.  Dr. Avalos has discussed with Dr. Stanley and will likely plan on continued observation overnight and completing preoperative testing with plans to discharge home with tentative plans for surgery on 3/25/2024  Notify nursing of any chest pain.      Chioma Martin, YOSEF  03/13/24  10:24 CDT

## 2024-03-14 ENCOUNTER — TELEPHONE (OUTPATIENT)
Dept: CARDIAC SURGERY | Facility: CLINIC | Age: 74
End: 2024-03-14
Payer: MEDICARE

## 2024-03-14 ENCOUNTER — PREP FOR SURGERY (OUTPATIENT)
Dept: OTHER | Facility: HOSPITAL | Age: 74
End: 2024-03-14
Payer: MEDICARE

## 2024-03-14 ENCOUNTER — READMISSION MANAGEMENT (OUTPATIENT)
Dept: CALL CENTER | Facility: HOSPITAL | Age: 74
End: 2024-03-14
Payer: MEDICARE

## 2024-03-14 VITALS
HEIGHT: 69 IN | RESPIRATION RATE: 18 BRPM | OXYGEN SATURATION: 96 % | HEART RATE: 77 BPM | BODY MASS INDEX: 22.63 KG/M2 | SYSTOLIC BLOOD PRESSURE: 127 MMHG | TEMPERATURE: 98 F | WEIGHT: 152.8 LBS | DIASTOLIC BLOOD PRESSURE: 54 MMHG

## 2024-03-14 DIAGNOSIS — I21.4 NSTEMI (NON-ST ELEVATED MYOCARDIAL INFARCTION): Primary | ICD-10-CM

## 2024-03-14 LAB
ANION GAP SERPL CALCULATED.3IONS-SCNC: 8 MMOL/L (ref 5–15)
BUN SERPL-MCNC: 16 MG/DL (ref 8–23)
BUN/CREAT SERPL: 11.3 (ref 7–25)
CALCIUM SPEC-SCNC: 7.9 MG/DL (ref 8.6–10.5)
CHLORIDE SERPL-SCNC: 108 MMOL/L (ref 98–107)
CO2 SERPL-SCNC: 24 MMOL/L (ref 22–29)
CREAT SERPL-MCNC: 1.41 MG/DL (ref 0.76–1.27)
DEPRECATED RDW RBC AUTO: 46.4 FL (ref 37–54)
EGFRCR SERPLBLD CKD-EPI 2021: 52.3 ML/MIN/1.73
ERYTHROCYTE [DISTWIDTH] IN BLOOD BY AUTOMATED COUNT: 13.7 % (ref 12.3–15.4)
GLUCOSE SERPL-MCNC: 90 MG/DL (ref 65–99)
HCT VFR BLD AUTO: 34.9 % (ref 37.5–51)
HGB BLD-MCNC: 11.4 G/DL (ref 13–17.7)
MCH RBC QN AUTO: 30.6 PG (ref 26.6–33)
MCHC RBC AUTO-ENTMCNC: 32.7 G/DL (ref 31.5–35.7)
MCV RBC AUTO: 93.8 FL (ref 79–97)
PLATELET # BLD AUTO: 150 10*3/MM3 (ref 140–450)
PMV BLD AUTO: 10.3 FL (ref 6–12)
POTASSIUM SERPL-SCNC: 4.1 MMOL/L (ref 3.5–5.2)
RBC # BLD AUTO: 3.72 10*6/MM3 (ref 4.14–5.8)
SODIUM SERPL-SCNC: 140 MMOL/L (ref 136–145)
WBC NRBC COR # BLD AUTO: 7.2 10*3/MM3 (ref 3.4–10.8)

## 2024-03-14 PROCEDURE — 25010000002 ENOXAPARIN PER 10 MG: Performed by: INTERNAL MEDICINE

## 2024-03-14 PROCEDURE — 85027 COMPLETE CBC AUTOMATED: CPT | Performed by: INTERNAL MEDICINE

## 2024-03-14 PROCEDURE — 80048 BASIC METABOLIC PNL TOTAL CA: CPT | Performed by: INTERNAL MEDICINE

## 2024-03-14 PROCEDURE — 99232 SBSQ HOSP IP/OBS MODERATE 35: CPT | Performed by: INTERNAL MEDICINE

## 2024-03-14 PROCEDURE — 99232 SBSQ HOSP IP/OBS MODERATE 35: CPT | Performed by: SURGERY

## 2024-03-14 RX ORDER — NICOTINE POLACRILEX 4 MG
15 LOZENGE BUCCAL
OUTPATIENT
Start: 2024-03-14

## 2024-03-14 RX ORDER — SODIUM CHLORIDE 0.9 % (FLUSH) 0.9 %
30 SYRINGE (ML) INJECTION ONCE AS NEEDED
OUTPATIENT
Start: 2024-03-14

## 2024-03-14 RX ORDER — DEXTROSE MONOHYDRATE 25 G/50ML
10-50 INJECTION, SOLUTION INTRAVENOUS
OUTPATIENT
Start: 2024-03-14

## 2024-03-14 RX ORDER — SODIUM CHLORIDE 0.9 % (FLUSH) 0.9 %
10 SYRINGE (ML) INJECTION EVERY 12 HOURS SCHEDULED
OUTPATIENT
Start: 2024-03-14

## 2024-03-14 RX ORDER — SODIUM CHLORIDE 0.9 % (FLUSH) 0.9 %
10 SYRINGE (ML) INJECTION AS NEEDED
OUTPATIENT
Start: 2024-03-14

## 2024-03-14 RX ORDER — ACETAMINOPHEN 500 MG
1000 TABLET ORAL ONCE
OUTPATIENT
Start: 2024-03-25

## 2024-03-14 RX ORDER — NITROGLYCERIN 0.4 MG/1
0.4 TABLET SUBLINGUAL
Qty: 25 TABLET | Refills: 0 | Status: ON HOLD | OUTPATIENT
Start: 2024-03-14

## 2024-03-14 RX ORDER — METOPROLOL SUCCINATE 25 MG/1
12.5 TABLET, EXTENDED RELEASE ORAL DAILY
Qty: 15 TABLET | Refills: 0 | Status: ON HOLD | OUTPATIENT
Start: 2024-03-14

## 2024-03-14 RX ORDER — SODIUM CHLORIDE 9 MG/ML
30 INJECTION, SOLUTION INTRAVENOUS CONTINUOUS PRN
OUTPATIENT
Start: 2024-03-14

## 2024-03-14 RX ORDER — IBUPROFEN 600 MG/1
1 TABLET ORAL
OUTPATIENT
Start: 2024-03-14

## 2024-03-14 RX ORDER — SODIUM CHLORIDE 9 MG/ML
40 INJECTION, SOLUTION INTRAVENOUS AS NEEDED
OUTPATIENT
Start: 2024-03-14

## 2024-03-14 RX ADMIN — ASPIRIN 81 MG: 81 TABLET, COATED ORAL at 08:39

## 2024-03-14 RX ADMIN — ATORVASTATIN CALCIUM 80 MG: 40 TABLET ORAL at 08:39

## 2024-03-14 RX ADMIN — ENOXAPARIN SODIUM 80 MG: 100 INJECTION SUBCUTANEOUS at 05:26

## 2024-03-14 RX ADMIN — LISINOPRIL 20 MG: 20 TABLET ORAL at 08:39

## 2024-03-14 NOTE — PLAN OF CARE
Goal Outcome Evaluation:  no co pain.  No injury this shift.  Call light in reach, spouse at bedside.

## 2024-03-14 NOTE — PROGRESS NOTES
"Patient name: Rogerio Barksdale  Patient : 1950  VISIT # [id number]  MR #[id number]    Procedure:Procedure(s):  Cardiac Catheterization/Vascular Study  Procedure Date:3/13/2024  POD:1 Day Post-Op    Subjective   Chief Complaint   Patient presents with    Chest Pain     Tolerating room air.  No overnight events.  No chest pain.  Preoperative testing has been completed.  Creatinine today is 1.4    ROS: No fevers or chills.  No chest pain.  No shortness of breath.       Objective     Visit Vitals  /51 (BP Location: Right arm, Patient Position: Lying)   Pulse 70   Temp 98.7 °F (37.1 °C) (Oral)   Resp 16   Ht 175.3 cm (69\")   Wt 69.3 kg (152 lb 12.8 oz)   SpO2 94%   BMI 22.56 kg/m²       Intake/Output Summary (Last 24 hours) at 3/14/2024 0713  Last data filed at 3/13/2024 1700  Gross per 24 hour   Intake 120 ml   Output 300 ml   Net -180 ml       Lab:     CBC:  Results from last 7 days   Lab Units 24  0324 24  0552 24  2127   WBC 10*3/mm3 7.20 7.03 7.30   HEMATOCRIT % 34.9* 39.7 42.3   PLATELETS 10*3/mm3 150 172 190          BMP:  Results from last 7 days   Lab Units 24  0324 24  0553 24  2127   SODIUM mmol/L 140 141 138   POTASSIUM mmol/L 4.1 4.3 4.8   CHLORIDE mmol/L 108* 105 103   CO2 mmol/L 24.0 28.0 27.0   GLUCOSE mg/dL 90 96 88   BUN mg/dL 16 16 12   CREATININE mg/dL 1.41* 1.34* 1.23          COAG:  Results from last 7 days   Lab Units 24  0553   INR  0.95       IMAGES:       Imaging Results (Last 24 Hours)       Procedure Component Value Units Date/Time    US Vein Mapping Bilateral [[id number]] Collected: 24 153     Updated: 24 154    Narrative:      History:  Pre-operative planning for CABG.      Comments: Duplex ultrasound was used to image and map bilateral lower  extremity greater saphenous veins.       The right greater saphenous vein is patent.  The diameters are 4.7 mm  proximally, 1.6 mm at the level of the knee, and 1.9 mm distally.      The " left greater saphenous vein is patent.   The diameters are 2.9 mm  proximally, 3 mm at the level of the knee, and 1.9 mm distally.        Impression:      Impression: Patent bilateral greater saphenous veins with measurements  as described in comments.           This report was signed and finalized on 3/13/2024 3:39 PM by Dr. Jamey Ennis MD.       US Carotid Bilateral [[id number]] Collected: 03/13/24 1535     Updated: 03/13/24 1539    Narrative:      History: Carotid occlusive disease       Impression:      Impression:  1. There is less than 50% stenosis of the right internal carotid artery.  2. There is less than 50% stenosis of the left internal carotid artery.  3. Antegrade flow is demonstrated in bilateral vertebral arteries.     Comments: Bilateral carotid vertebral arterial duplex scan was  performed.     Grayscale imaging shows intimal thickening and calcified elements at the  carotid bifurcation. The right internal carotid artery peak systolic  velocity is 78.5 cm/sec. The end-diastolic velocity is 21.4 cm/sec. The  right ICA/CCA ratio is approximately 0.9. These findings correlate with  less than 50% stenosis of the right internal carotid artery.     Grayscale imaging shows intimal thickening and calcified elements at the  carotid bifurcation. The left internal carotid artery peak systolic  velocity is 67.1 cm/sec. The end-diastolic velocity is 18.5 cm/sec. The  left ICA/CCA ratio is approximately 0.6. These findings correlate with  less than 50% stenosis of the left internal carotid artery.     Antegrade flow is demonstrated in bilateral vertebral arteries.  There is greater than 50% stenosis in bilateral external carotid  arteries.     This report was signed and finalized on 3/13/2024 3:36 PM by Dr. Jamey Ennis MD.       CT Chest Without Contrast Diagnostic [[id number]] Collected: 03/13/24 1502     Updated: 03/13/24 1508    Narrative:      EXAM/TECHNIQUE: CT chest without contrast      INDICATION: pre cardiac surgery planning; I20.0-Unstable angina;  I21.4-Non-ST elevation (NSTEMI) myocardial infarction     COMPARISON: None     DLP: 170.28 mGy.cm. Automated exposure control was also utilized to  decrease patient radiation dose.     FINDINGS:     Central airways are clear. No consolidation or pleural effusion. Mild  centrilobular emphysema. Biapical pleural/parenchymal scarring. 4 mm  right upper lobe pulmonary nodule, image 51. 4 mm right upper lobe  pulmonary nodule, image 77.     No enlarged axillary, supraclavicular, or mediastinal lymph nodes. Main  pulmonary artery is nondilated. Thoracic aorta is nonaneurysmal and  contains mild atherosclerotic calcification. Heavy coronary artery  calcification. No pericardial effusion.     No large thyroid nodule. No acute chest wall soft tissue abnormality.  Iodinated contrast is present in both renal collecting systems. No acute  osseous finding.       Impression:      1.  No acute findings. Heavy three-vessel coronary artery  atherosclerotic calcification.      2.  Mild emphysema with a few small 4 mm pulmonary nodules which warrant  follow-up chest CT in 1 year to document stability or resolution based  on Fleischner criteria.        This report was signed and finalized on 3/13/2024 3:05 PM by Dr. Fransico Llamas MD.                 Physical Exam:  General: Alert, oriented. No apparent distress.   Cardiovascular: Regular rate and rhythm without murmur, rubs, or gallops.    Pulmonary: Clear to auscultation bilaterally without wheezing, rubs, or rales.  Abdomen: Soft, nondistended, and nontender.  Extremities: Warm, moves all extremities. No edema.   Neurologic:  Grossly intact with no focal deficits.            Impression:  NSTEMI  Coronary artery disease  Hypertension, well-controlled  Hyperlipidemia, on statin  Presence of coronary artery stent  Tobacco use  COPD        Plan:  Preoperative testing has been reviewed  Dr. Avalos has discussed with   Lizeth and we will plan on discharge home with plans to return as an outpatient for CABG on 3/25/2024.  Medical optimization per cardiology  Patient is to call with any new or worsening symptoms in the interim.      Chioma Martin, APRN  03/14/24  07:13 CDT

## 2024-03-14 NOTE — PROGRESS NOTES
LOS: 1 day   Patient Care Team:  Richy Mendiola MD as PCP - General (Family Medicine)  Tin Llamas MD (Inactive) as Cardiologist (Cardiology)    Chief Complaint: Chest pain, shortness of breath, elevated cardiac biomarkers     Subjective    Rogerio Barksdale is a 73 y.o. male who is being seen  Overnight feels well  No chest pain  No palpitation  No presyncope  No syncope  No orthopnea  No paroxysmal nocturnal dyspnea  Hemodynamically stable  Labs reviewed  No new issues or events  Tolerating current medications well  Satisfactory bowel and bladder activity  Satisfactory oral intake  Resting well  No groin pain redness swelling or discharge  Labs as referenced below  Wife by bedside  Overall he feels improved  Ambulating without difficulty and with no complaints    Telemetry: no malignant arrhythmia. No significant pauses.    Review of Systems   Constitutional: No chills   Has fatigue   No fever.   HENT: Negative.    Eyes: Negative.    Respiratory: Negative for cough,   No chest wall soreness,   Shortness of breath,   no wheezing, no stridor.    Cardiovascular: As above  Gastrointestinal: Negative for abdominal distention,  No abdominal pain,   No blood in stool,   No constipation,   No diarrhea,   No nausea   No vomiting.   Endocrine: Negative.    Genitourinary: Negative for difficulty urinating, dysuria, flank pain and hematuria.   Musculoskeletal: Negative.    Skin: Negative for rash and wound.   Allergic/Immunologic: Negative.    Neurological: Negative for dizziness, syncope, weakness,   No light-headedness  No  headaches.   Hematological: Does not bruise/bleed easily.   Psychiatric/Behavioral: Negative for agitation or behavioral problems,   No confusion,   the patient is  nervous/anxious.       History:   Past Medical History:   Diagnosis Date    Arteriosclerosis of coronary artery     CAD (coronary artery disease)     Cardiac device in situ     ED (erectile dysfunction)     Hyperlipidemia      Hypertension     Myocardial infarction     TIA (transient ischemic attack)      Past Surgical History:   Procedure Laterality Date    APPENDECTOMY      CARDIAC CATHETERIZATION      CARDIAC CATHETERIZATION Left 3/13/2024    Procedure: Cardiac Catheterization/Vascular Study;  Surgeon: Lenin Stanley MD;  Location:  [facility];  Service: Cardiology;  Laterality: Left;    CAROTID STENT       Social History     Socioeconomic History    Marital status:    Tobacco Use    Smoking status: Some Days     Current packs/day: 0.50     Types: Cigarettes   Vaping Use    Vaping status: Never Used   Substance and Sexual Activity    Alcohol use: No    Drug use: No     Family History   Problem Relation Age of Onset    Diabetes Mother     COPD Mother     Coronary artery disease Father     Coronary artery disease Brother        Labs:  WBC WBC   Date Value Ref Range Status   03/14/2024 7.20 3.40 - 10.80 10*3/mm3 Final   03/13/2024 7.03 3.40 - 10.80 10*3/mm3 Final   03/12/2024 7.30 3.40 - 10.80 10*3/mm3 Final      HGB Hemoglobin   Date Value Ref Range Status   03/14/2024 11.4 (L) 13.0 - 17.7 g/dL Final   03/13/2024 13.3 13.0 - 17.7 g/dL Final   03/12/2024 14.0 13.0 - 17.7 g/dL Final      HCT Hematocrit   Date Value Ref Range Status   03/14/2024 34.9 (L) 37.5 - 51.0 % Final   03/13/2024 39.7 37.5 - 51.0 % Final   03/12/2024 42.3 37.5 - 51.0 % Final      Platelets Platelets   Date Value Ref Range Status   03/14/2024 150 140 - 450 10*3/mm3 Final   03/13/2024 172 140 - 450 10*3/mm3 Final   03/12/2024 190 140 - 450 10*3/mm3 Final      MCV MCV   Date Value Ref Range Status   03/14/2024 93.8 79.0 - 97.0 fL Final   03/13/2024 92.8 79.0 - 97.0 fL Final   03/12/2024 92.6 79.0 - 97.0 fL Final        Results from last 7 days   Lab Units 03/14/24  0324 03/13/24  0553 03/12/24  2127   SODIUM mmol/L 140 141 138   POTASSIUM mmol/L 4.1 4.3 4.8   CHLORIDE mmol/L 108* 105 103   CO2 mmol/L 24.0 28.0 27.0   BUN mg/dL 16 16 12    CREATININE mg/dL 1.41* 1.34* 1.23   CALCIUM mg/dL 7.9* 8.5* 8.7   BILIRUBIN mg/dL  --   --  0.2   ALK PHOS U/L  --   --  115   ALT (SGPT) U/L  --   --  25   AST (SGOT) U/L  --   --  19   GLUCOSE mg/dL 90 96 88     Lab Results   Component Value Date    CKTOTAL 77 01/04/2018    TROPONINI <0.012 03/27/2019    TROPONINT 90 (C) 03/13/2024     PT/INR:    Protime   Date Value Ref Range Status   03/13/2024 13.1 11.8 - 14.8 Seconds Final   /  INR   Date Value Ref Range Status   03/13/2024 0.95 0.91 - 1.09 Final       Imaging Results (Last 72 Hours)       Procedure Component Value Units Date/Time    US Vein Mapping Bilateral [[id number]] Collected: 03/13/24 1539     Updated: 03/13/24 1542    Narrative:      History:  Pre-operative planning for CABG.      Comments: Duplex ultrasound was used to image and map bilateral lower  extremity greater saphenous veins.       The right greater saphenous vein is patent.  The diameters are 4.7 mm  proximally, 1.6 mm at the level of the knee, and 1.9 mm distally.      The left greater saphenous vein is patent.   The diameters are 2.9 mm  proximally, 3 mm at the level of the knee, and 1.9 mm distally.        Impression:      Impression: Patent bilateral greater saphenous veins with measurements  as described in comments.           This report was signed and finalized on 3/13/2024 3:39 PM by Dr. Jamey Ennis MD.       US Carotid Bilateral [[id number]] Collected: 03/13/24 1535     Updated: 03/13/24 1539    Narrative:      History: Carotid occlusive disease       Impression:      Impression:  1. There is less than 50% stenosis of the right internal carotid artery.  2. There is less than 50% stenosis of the left internal carotid artery.  3. Antegrade flow is demonstrated in bilateral vertebral arteries.     Comments: Bilateral carotid vertebral arterial duplex scan was  performed.     Grayscale imaging shows intimal thickening and calcified elements at the  carotid bifurcation. The right  internal carotid artery peak systolic  velocity is 78.5 cm/sec. The end-diastolic velocity is 21.4 cm/sec. The  right ICA/CCA ratio is approximately 0.9. These findings correlate with  less than 50% stenosis of the right internal carotid artery.     Grayscale imaging shows intimal thickening and calcified elements at the  carotid bifurcation. The left internal carotid artery peak systolic  velocity is 67.1 cm/sec. The end-diastolic velocity is 18.5 cm/sec. The  left ICA/CCA ratio is approximately 0.6. These findings correlate with  less than 50% stenosis of the left internal carotid artery.     Antegrade flow is demonstrated in bilateral vertebral arteries.  There is greater than 50% stenosis in bilateral external carotid  arteries.     This report was signed and finalized on 3/13/2024 3:36 PM by Dr. Jamey Ennis MD.       CT Chest Without Contrast Diagnostic [[id number]] Collected: 03/13/24 1502     Updated: 03/13/24 1508    Narrative:      EXAM/TECHNIQUE: CT chest without contrast     INDICATION: pre cardiac surgery planning; I20.0-Unstable angina;  I21.4-Non-ST elevation (NSTEMI) myocardial infarction     COMPARISON: None     DLP: 170.28 mGy.cm. Automated exposure control was also utilized to  decrease patient radiation dose.     FINDINGS:     Central airways are clear. No consolidation or pleural effusion. Mild  centrilobular emphysema. Biapical pleural/parenchymal scarring. 4 mm  right upper lobe pulmonary nodule, image 51. 4 mm right upper lobe  pulmonary nodule, image 77.     No enlarged axillary, supraclavicular, or mediastinal lymph nodes. Main  pulmonary artery is nondilated. Thoracic aorta is nonaneurysmal and  contains mild atherosclerotic calcification. Heavy coronary artery  calcification. No pericardial effusion.     No large thyroid nodule. No acute chest wall soft tissue abnormality.  Iodinated contrast is present in both renal collecting systems. No acute  osseous finding.       Impression:       1.  No acute findings. Heavy three-vessel coronary artery  atherosclerotic calcification.      2.  Mild emphysema with a few small 4 mm pulmonary nodules which warrant  follow-up chest CT in 1 year to document stability or resolution based  on Fleischner criteria.        This report was signed and finalized on 3/13/2024 3:05 PM by Dr. Fransico Llamas MD.       XR Chest 1 View [[id number]] Collected: 03/13/24 0602     Updated: 03/13/24 0609    Narrative:      EXAMINATION: XR CHEST 1 VW-     3/12/2024 11:23 PM     HISTORY: copd; I20.0-Unstable angina     A frontal projection of the chest is compared with the previous study  dated 3/27/2019.     The lungs are well-expanded.     Mild elevation of right diaphragm is similar to the previous study.     Few scattered small partially calcified granulomas are seen,  predominantly in the left lower lung, similar to the previous study.     There is no evidence of recent infiltrate, pleural effusion, pulmonary  congestion or pneumothorax.     The heart size is in the normal range.     There is no acute bony abnormality.       Impression:      1. No active cardiopulmonary disease.     This report was signed and finalized on 3/13/2024 6:06 AM by Dr. Mike Lund MD.               Objective     No Known Allergies    Medication Review: Performed  Current Facility-Administered Medications   Medication Dose Route Frequency Provider Last Rate Last Admin    acetaminophen (TYLENOL) 160 MG/5ML oral solution 650 mg  650 mg Oral Q4H PRN Lenin Stanley MD        Or    acetaminophen (TYLENOL) suppository 650 mg  650 mg Rectal Q4H PRN Lenin Stanley MD        acetaminophen (TYLENOL) tablet 650 mg  650 mg Oral Q4H PRN Lenin Stanley MD        ALPRAZolam (XANAX) tablet 0.5 mg  0.5 mg Oral TID PRN Lenin Stanley MD        aspirin EC tablet 81 mg  81 mg Oral Daily Lenin Stanley MD        atorvastatin (LIPITOR) tablet 80 mg  80 mg Oral Daily Lenin Stanley MD        sennosides-docusate  (PERICOLACE) 8.6-50 MG per tablet 2 tablet  2 tablet Oral BID PRN Lenin Stanley MD        And    polyethylene glycol (MIRALAX) packet 17 g  17 g Oral Daily PRN Lenin Stanley MD        And    bisacodyl (DULCOLAX) EC tablet 5 mg  5 mg Oral Daily PRN Lenin Stanley MD        And    bisacodyl (DULCOLAX) suppository 10 mg  10 mg Rectal Daily PRN Lenin Stanley MD        calcium carbonate (TUMS) chewable tablet 500 mg (200 mg elemental)  2 tablet Oral BID PRN Lenin Stanley MD        diphenhydrAMINE (BENADRYL) capsule 25 mg  25 mg Oral Q6H PRN Lenin Stanley MD        Enoxaparin Sodium (LOVENOX) syringe 80 mg  1 mg/kg Subcutaneous Q12H Lenin Stanley MD   80 mg at 03/14/24 0526    lisinopril (PRINIVIL,ZESTRIL) tablet 20 mg  20 mg Oral Q12H Lenin Stanley MD   20 mg at 03/13/24 2039    Morphine sulfate (PF) injection 2 mg  2 mg Intravenous Q4H PRN Lenin Stanley MD        And    naloxone (NARCAN) injection 0.4 mg  0.4 mg Intravenous Q5 Min PRN Lenin Stanley MD        nitroglycerin (NITROSTAT) SL tablet 0.4 mg  0.4 mg Sublingual Q5 Min PRLenin Landa MD        ondansetron ODT (ZOFRAN-ODT) disintegrating tablet 4 mg  4 mg Oral Q6H PRLenin Landa MD        Or    ondansetron (ZOFRAN) injection 4 mg  4 mg Intravenous Q6H PRN Lenin Stanley MD        sodium chloride 0.9 % flush 10 mL  10 mL Intravenous Q12H Lenin Stanley MD   10 mL at 03/13/24 2040    sodium chloride 0.9 % flush 10 mL  10 mL Intravenous PRN Lenin Stanley MD        sodium chloride 0.9 % infusion 40 mL  40 mL Intravenous PRN Lenin Stanley MD        sodium chloride 0.9 % infusion  75 mL/hr Intravenous Continuous Lenin Stanley MD 75 mL/hr at 03/13/24 0235 75 mL/hr at 03/13/24 0235    sodium chloride 0.9 % infusion  75 mL/hr Intravenous Continuous Lenin Stanley MD 75 mL/hr at 03/13/24 1237 75 mL/hr at 03/13/24 1237    temazepam (RESTORIL) capsule 7.5 mg  7.5 mg Oral Nightly PRN Lenin Stanley MD           Vital Sign Min/Max for last 24 hours  Temp  Min: 97.4 °F (36.3  "°C)  Max: 98.9 °F (37.2 °C)   BP  Min: 109/47  Max: 160/76   Pulse  Min: 49  Max: 84   Resp  Min: 14  Max: 16   SpO2  Min: 94 %  Max: 98 %   No data recorded   No data recorded     Flowsheet Rows      Flowsheet Row First Filed Value   Admission Height 175.3 cm (69\") Documented at 03/12/2024 2129   Admission Weight 78 kg (172 lb) Documented at 03/12/2024 2129            Results for orders placed during the hospital encounter of 03/12/24    Adult Transthoracic Echo Complete W/ Cont if Necessary Per Protocol    Interpretation Summary    Left ventricular ejection fraction appears to be 66 - 70%.    Left ventricular diastolic function was normal.    Estimated right ventricular systolic pressure from tricuspid regurgitation is normal (<35 mmHg).    There is a small (<1cm) pericardial effusion. There is no evidence of cardiac tamponade.      Physical Exam:    General Appearance: Awake, alert, in no acute distress  Eyes: Pupils equal and reactive    Ears: Appear intact with no abnormalities noted  Nose: Nares normal, no drainage  Neck: supple, trachea midline, no carotid bruit and no JVD  Back: no kyphosis present,    Lungs: respirations regular, respirations even and respirations unlabored  Heart: normal S1, S2, no significant murmurs   No gallops or rubs  no rub and no click  Abdomen: normal bowel sounds, no tenderness   Skin: no bleeding, bruising or rash  Extremities: no cyanosis  Psychiatric/Behavioral: Negative for agitation, behavioral problems, confusion, the patient does  appear to be nervous/anxious.     Arteriotomy site healthy,  No bleeding, swelling or excessive bruising. Preserved distal perfusion.     Results Review:   I reviewed the patient's new clinical results.  I reviewed the patient's new imaging results and agree with the interpretation.  I reviewed the patient's other test results and agree with the interpretation  I personally viewed and interpreted the patient's EKG/Telemetry data    Discussed with " patient  Updated patient regarding any new or relevant abnormalities on review of records or any new findings on physical exam.   Mentioned to patient about purpose of visit and desirable health short and long term goals and objectives.     Reviewed available prior notes, consults, prior visits, laboratory findings, radiology and cardiology relevant reports.   Updated chart as applicable.   I have reviewed the patient's medical history in detail and updated the computerized patient record as relevant.          Assessment & Plan       NSTEMI (non-ST elevated myocardial infarction)    Multivessel coronary artery disease involving native coronary artery of native heart    Hypertension    Hyperlipidemia    Presence of stent in coronary artery    Tobacco abuse    COPD (chronic obstructive pulmonary disease)      Plan    Care plan discussed with him as well as his wife  CT surgery has seen the patient  Recommend outpatient aortocoronary bypass surgery within the next week or 2  Overall he is improved  Denies any chest pain  He is ambulating  Okay to discharge home  Sublingual nitroglycerin as needed for chest pain  If symptoms worsen go to the emergency room  No heavy lifting for 5-7 days greater than 10 pounds.  No heavy or strenuous pushing or pulling.    No tub baths, hot tubs, swimming pools, or submerging groin underwater for 1 week.  Wash groin site daily with antibacterial soap and water. Rinse and keep clean and dry.    No lotions, powders, or topical ointments to site. Keep open to air and dry.  Call our office with any concerns or if you notice redness, swelling, drainage, warmth, or pain at the site.   Telemetry while in hospital  Deep vein thrombosis prophylaxis/precautions  Appropriate diet, fluid, sodium, caffeine, stimulants intake   Questions were encouraged, asked and answered to the patient's  understanding and satisfaction.  Compliance to diet and medications       Lenin Stanley MD  03/14/24  06:57  CDT    EMR Dragon/Transcription was used to dictate part of this note

## 2024-03-14 NOTE — TELEPHONE ENCOUNTER
Patient aware of prework date/time and OR date/arrival time 0500/npo/no meds. Aware to  Bactroban for use on 3/24 @ 1700 - instructions given. Confirmed patient does not take any anticoagulation other than 81 mg aspirin.

## 2024-03-14 NOTE — NURSING NOTE
Pt discharged to home with wife at side. Given discharge instructions written and verbally. Discussed Pt medications, follow-up appts, pain, smoking cessation, plan of care, diet, activity restrictions, wound care, when to return if wosening symptoms. Pt and wife verbalized understanding to all instructions provided. Pt denied questions. Pt taken from unit via wheelchair to private vehicle for DC to home with wife. Pt to follow-up with PCP 3/22/24, and cardiology as scheduled. Wife at side for all teaching/education

## 2024-03-14 NOTE — H&P (VIEW-ONLY)
"Patient name: Rogerio Barksdale  Patient : 1950  VISIT # [id number]  MR #[id number]    Procedure:Procedure(s):  Cardiac Catheterization/Vascular Study  Procedure Date:3/13/2024  POD:1 Day Post-Op    Subjective   Chief Complaint   Patient presents with    Chest Pain     Tolerating room air.  No overnight events.  No chest pain.  Preoperative testing has been completed.  Creatinine today is 1.4    ROS: No fevers or chills.  No chest pain.  No shortness of breath.       Objective     Visit Vitals  /51 (BP Location: Right arm, Patient Position: Lying)   Pulse 70   Temp 98.7 °F (37.1 °C) (Oral)   Resp 16   Ht 175.3 cm (69\")   Wt 69.3 kg (152 lb 12.8 oz)   SpO2 94%   BMI 22.56 kg/m²       Intake/Output Summary (Last 24 hours) at 3/14/2024 0713  Last data filed at 3/13/2024 1700  Gross per 24 hour   Intake 120 ml   Output 300 ml   Net -180 ml       Lab:     CBC:  Results from last 7 days   Lab Units 24  0324 24  0552 24  2127   WBC 10*3/mm3 7.20 7.03 7.30   HEMATOCRIT % 34.9* 39.7 42.3   PLATELETS 10*3/mm3 150 172 190          BMP:  Results from last 7 days   Lab Units 24  0324 24  0553 24  2127   SODIUM mmol/L 140 141 138   POTASSIUM mmol/L 4.1 4.3 4.8   CHLORIDE mmol/L 108* 105 103   CO2 mmol/L 24.0 28.0 27.0   GLUCOSE mg/dL 90 96 88   BUN mg/dL 16 16 12   CREATININE mg/dL 1.41* 1.34* 1.23          COAG:  Results from last 7 days   Lab Units 24  0553   INR  0.95       IMAGES:       Imaging Results (Last 24 Hours)       Procedure Component Value Units Date/Time    US Vein Mapping Bilateral [[id number]] Collected: 24 153     Updated: 24 154    Narrative:      History:  Pre-operative planning for CABG.      Comments: Duplex ultrasound was used to image and map bilateral lower  extremity greater saphenous veins.       The right greater saphenous vein is patent.  The diameters are 4.7 mm  proximally, 1.6 mm at the level of the knee, and 1.9 mm distally.      The " left greater saphenous vein is patent.   The diameters are 2.9 mm  proximally, 3 mm at the level of the knee, and 1.9 mm distally.        Impression:      Impression: Patent bilateral greater saphenous veins with measurements  as described in comments.           This report was signed and finalized on 3/13/2024 3:39 PM by Dr. Jamey Ennis MD.       US Carotid Bilateral [[id number]] Collected: 03/13/24 1535     Updated: 03/13/24 1539    Narrative:      History: Carotid occlusive disease       Impression:      Impression:  1. There is less than 50% stenosis of the right internal carotid artery.  2. There is less than 50% stenosis of the left internal carotid artery.  3. Antegrade flow is demonstrated in bilateral vertebral arteries.     Comments: Bilateral carotid vertebral arterial duplex scan was  performed.     Grayscale imaging shows intimal thickening and calcified elements at the  carotid bifurcation. The right internal carotid artery peak systolic  velocity is 78.5 cm/sec. The end-diastolic velocity is 21.4 cm/sec. The  right ICA/CCA ratio is approximately 0.9. These findings correlate with  less than 50% stenosis of the right internal carotid artery.     Grayscale imaging shows intimal thickening and calcified elements at the  carotid bifurcation. The left internal carotid artery peak systolic  velocity is 67.1 cm/sec. The end-diastolic velocity is 18.5 cm/sec. The  left ICA/CCA ratio is approximately 0.6. These findings correlate with  less than 50% stenosis of the left internal carotid artery.     Antegrade flow is demonstrated in bilateral vertebral arteries.  There is greater than 50% stenosis in bilateral external carotid  arteries.     This report was signed and finalized on 3/13/2024 3:36 PM by Dr. Jamey Ennis MD.       CT Chest Without Contrast Diagnostic [[id number]] Collected: 03/13/24 1502     Updated: 03/13/24 1508    Narrative:      EXAM/TECHNIQUE: CT chest without contrast      INDICATION: pre cardiac surgery planning; I20.0-Unstable angina;  I21.4-Non-ST elevation (NSTEMI) myocardial infarction     COMPARISON: None     DLP: 170.28 mGy.cm. Automated exposure control was also utilized to  decrease patient radiation dose.     FINDINGS:     Central airways are clear. No consolidation or pleural effusion. Mild  centrilobular emphysema. Biapical pleural/parenchymal scarring. 4 mm  right upper lobe pulmonary nodule, image 51. 4 mm right upper lobe  pulmonary nodule, image 77.     No enlarged axillary, supraclavicular, or mediastinal lymph nodes. Main  pulmonary artery is nondilated. Thoracic aorta is nonaneurysmal and  contains mild atherosclerotic calcification. Heavy coronary artery  calcification. No pericardial effusion.     No large thyroid nodule. No acute chest wall soft tissue abnormality.  Iodinated contrast is present in both renal collecting systems. No acute  osseous finding.       Impression:      1.  No acute findings. Heavy three-vessel coronary artery  atherosclerotic calcification.      2.  Mild emphysema with a few small 4 mm pulmonary nodules which warrant  follow-up chest CT in 1 year to document stability or resolution based  on Fleischner criteria.        This report was signed and finalized on 3/13/2024 3:05 PM by Dr. Fransico Llamas MD.                 Physical Exam:  General: Alert, oriented. No apparent distress.   Cardiovascular: Regular rate and rhythm without murmur, rubs, or gallops.    Pulmonary: Clear to auscultation bilaterally without wheezing, rubs, or rales.  Abdomen: Soft, nondistended, and nontender.  Extremities: Warm, moves all extremities. No edema.   Neurologic:  Grossly intact with no focal deficits.            Impression:  NSTEMI  Coronary artery disease  Hypertension, well-controlled  Hyperlipidemia, on statin  Presence of coronary artery stent  Tobacco use  COPD        Plan:  Preoperative testing has been reviewed  Dr. Avalos has discussed with   Lizeth and we will plan on discharge home with plans to return as an outpatient for CABG on 3/25/2024.  Medical optimization per cardiology  Patient is to call with any new or worsening symptoms in the interim.      Chioma Martin, APRN  03/14/24  07:13 CDT

## 2024-03-14 NOTE — DISCHARGE SUMMARY
[facility] Medicine Services  DISCHARGE SUMMARY       Date of Admission: 3/12/2024  Date of Discharge:  3/14/2024  Primary Care Physician: Richy Mendiola MD    Presenting Problem/History of Present Illness:  Chest pain, NSTEMI    Final Discharge Diagnoses:  Active Hospital Problems    Diagnosis     **NSTEMI (non-ST elevated myocardial infarction)     Tobacco abuse     COPD (chronic obstructive pulmonary disease)     Presence of stent in coronary artery     Hyperlipidemia     Hypertension     Multivessel coronary artery disease involving native coronary artery of native heart        Consults: Dr. Stanley with cardiology  Dr. Avalos with cardiothoracic surgery    Procedures Performed: Left heart catheterization    Pertinent Test Results:   Results for orders placed during the hospital encounter of 03/12/24    Adult Transthoracic Echo Complete W/ Cont if Necessary Per Protocol    Interpretation Summary    Left ventricular ejection fraction appears to be 66 - 70%.    Left ventricular diastolic function was normal.    Estimated right ventricular systolic pressure from tricuspid regurgitation is normal (<35 mmHg).    There is a small (<1cm) pericardial effusion. There is no evidence of cardiac tamponade.      Imaging Results (All)       Procedure Component Value Units Date/Time    US Vein Mapping Bilateral [[id number]] Collected: 03/13/24 1539     Updated: 03/13/24 1542    Narrative:      History:  Pre-operative planning for CABG.      Comments: Duplex ultrasound was used to image and map bilateral lower  extremity greater saphenous veins.       The right greater saphenous vein is patent.  The diameters are 4.7 mm  proximally, 1.6 mm at the level of the knee, and 1.9 mm distally.      The left greater saphenous vein is patent.   The diameters are 2.9 mm  proximally, 3 mm at the level of the knee, and 1.9 mm distally.        Impression:      Impression: Patent bilateral greater  saphenous veins with measurements  as described in comments.           This report was signed and finalized on 3/13/2024 3:39 PM by Dr. Jamey Ennis MD.       US Carotid Bilateral [[id number]] Collected: 03/13/24 1535     Updated: 03/13/24 1539    Narrative:      History: Carotid occlusive disease       Impression:      Impression:  1. There is less than 50% stenosis of the right internal carotid artery.  2. There is less than 50% stenosis of the left internal carotid artery.  3. Antegrade flow is demonstrated in bilateral vertebral arteries.     Comments: Bilateral carotid vertebral arterial duplex scan was  performed.     Grayscale imaging shows intimal thickening and calcified elements at the  carotid bifurcation. The right internal carotid artery peak systolic  velocity is 78.5 cm/sec. The end-diastolic velocity is 21.4 cm/sec. The  right ICA/CCA ratio is approximately 0.9. These findings correlate with  less than 50% stenosis of the right internal carotid artery.     Grayscale imaging shows intimal thickening and calcified elements at the  carotid bifurcation. The left internal carotid artery peak systolic  velocity is 67.1 cm/sec. The end-diastolic velocity is 18.5 cm/sec. The  left ICA/CCA ratio is approximately 0.6. These findings correlate with  less than 50% stenosis of the left internal carotid artery.     Antegrade flow is demonstrated in bilateral vertebral arteries.  There is greater than 50% stenosis in bilateral external carotid  arteries.     This report was signed and finalized on 3/13/2024 3:36 PM by Dr. Jamey Ennis MD.       CT Chest Without Contrast Diagnostic [[id number]] Collected: 03/13/24 1502     Updated: 03/13/24 1508    Narrative:      EXAM/TECHNIQUE: CT chest without contrast     INDICATION: pre cardiac surgery planning; I20.0-Unstable angina;  I21.4-Non-ST elevation (NSTEMI) myocardial infarction     COMPARISON: None     DLP: 170.28 mGy.cm. Automated exposure control was also  utilized to  decrease patient radiation dose.     FINDINGS:     Central airways are clear. No consolidation or pleural effusion. Mild  centrilobular emphysema. Biapical pleural/parenchymal scarring. 4 mm  right upper lobe pulmonary nodule, image 51. 4 mm right upper lobe  pulmonary nodule, image 77.     No enlarged axillary, supraclavicular, or mediastinal lymph nodes. Main  pulmonary artery is nondilated. Thoracic aorta is nonaneurysmal and  contains mild atherosclerotic calcification. Heavy coronary artery  calcification. No pericardial effusion.     No large thyroid nodule. No acute chest wall soft tissue abnormality.  Iodinated contrast is present in both renal collecting systems. No acute  osseous finding.       Impression:      1.  No acute findings. Heavy three-vessel coronary artery  atherosclerotic calcification.      2.  Mild emphysema with a few small 4 mm pulmonary nodules which warrant  follow-up chest CT in 1 year to document stability or resolution based  on Fleischner criteria.        This report was signed and finalized on 3/13/2024 3:05 PM by Dr. Fransico Llamas MD.       XR Chest 1 View [[id number]] Collected: 03/13/24 0602     Updated: 03/13/24 0609    Narrative:      EXAMINATION: XR CHEST 1 VW-     3/12/2024 11:23 PM     HISTORY: copd; I20.0-Unstable angina     A frontal projection of the chest is compared with the previous study  dated 3/27/2019.     The lungs are well-expanded.     Mild elevation of right diaphragm is similar to the previous study.     Few scattered small partially calcified granulomas are seen,  predominantly in the left lower lung, similar to the previous study.     There is no evidence of recent infiltrate, pleural effusion, pulmonary  congestion or pneumothorax.     The heart size is in the normal range.     There is no acute bony abnormality.       Impression:      1. No active cardiopulmonary disease.     This report was signed and finalized on 3/13/2024 6:06 AM by  "Dr. Mike Lund MD.             LAB RESULTS:      Lab 03/14/24  0324 03/13/24  0553 03/13/24  0552 03/12/24 2127   WBC 7.20  --  7.03 7.30   HEMOGLOBIN 11.4*  --  13.3 14.0   HEMATOCRIT 34.9*  --  39.7 42.3   PLATELETS 150  --  172 190   NEUTROS ABS  --   --   --  4.02   IMMATURE GRANS (ABS)  --   --   --  0.01   LYMPHS ABS  --   --   --  2.37   MONOS ABS  --   --   --  0.50   EOS ABS  --   --   --  0.36   MCV 93.8  --  92.8 92.6   PROTIME  --  13.1  --   --          Lab 03/14/24  0324 03/13/24  0553 03/13/24  0552 03/12/24 2127   SODIUM 140 141  --  138   POTASSIUM 4.1 4.3  --  4.8   CHLORIDE 108* 105  --  103   CO2 24.0 28.0  --  27.0   ANION GAP 8.0 8.0  --  8.0   BUN 16 16  --  12   CREATININE 1.41* 1.34*  --  1.23   EGFR 52.3* 55.6*  --  61.6   GLUCOSE 90 96  --  88   CALCIUM 7.9* 8.5*  --  8.7   HEMOGLOBIN A1C  --   --  5.50  --    TSH  --  3.790  --   --          Lab 03/12/24 2127   TOTAL PROTEIN 7.7   ALBUMIN 4.2   GLOBULIN 3.5   ALT (SGPT) 25   AST (SGOT) 19   BILIRUBIN 0.2   ALK PHOS 115         Lab 03/13/24  0553 03/13/24  0214 03/12/24  2246 03/12/24 2127   HSTROP T 90* 68* 52* 48*   PROTIME 13.1  --   --   --    INR 0.95  --   --   --          Lab 03/13/24  0553   CHOLESTEROL 178   LDL CHOL 127*   HDL CHOL 29*   TRIGLYCERIDES 121             Brief Urine Lab Results       None          Microbiology Results (last 10 days)       ** No results found for the last 240 hours. **            Hospital Course:   Mr. Barksdale presented to [facility] emergency room as transfer from [facility] for chest pain and possible NSTEMI.  Patient reported sudden onset of chest pain while walking approximately 35 feet from the garage to his home.  He reported sharp, substernal pain left arm, jaw tightness and complained of profound weakness \"brought me to my knees\".  He denied nausea, shortness of breath or diaphoresis.  Patient also reported an episode of shortness of breath earlier in the day when " "raking leaves.  Patient took two 81 mg aspirin as he could not get his nitroglycerin bottle open.  At the outside facility he received 162 mg aspirin and 1 nitroglycerin with relief of pain.  Due to elevated troponin, he was transferred to our facility.  No EKG changes noted. He does smoke 1/2 pack cigarettes per day.  He has a history of stent placed by Dr. Andrews in the remote past.  Troponin 48, 52, T delta 4, troponin 68.  Lovenox 1 mg/kg every 12 hours given at outside facility at 4:54 PM.     Dr. Stanley performed left heart catheterization on 3/13/2024.  Multivessel disease was identified.  Dr. Avalos with cardiothoracic surgery was consulted and evaluated patient.  He discussed need for CABG with patient and patient was agreeable.  As part of preoperative workup, CT chest, bilateral carotid ultrasound, bilateral lower extremity vein mapping was performed.  Dr. Avalos and Dr. Stanley believe patient is appropriate to discharge at this time.  Tentative plan for CABG on 3/25/2024.    Beta-blocker dose reduced to 12.5 mg Toprol daily secondary to bradycardia observed slightly after arrival.  Patient has remained stable otherwise on remote telemetry.    Dr. Stanley recommending as needed nitroglycerin tablet at discharge.    Day of discharge, patient is on room air in no acute distress and denies chest pain or shortness of breath.  He believes he is returned to his baseline feelings of health.  His wife is at bedside.  They are eager for discharge home and have no further concerns or questions at this time.  They tell me they have all needed DME at home.    Follow-up with PCP in 1 week  Plan to return for outpatient CABG on 3/25/2024    Physical Exam on Discharge:  /62 (BP Location: Right arm, Patient Position: Lying)   Pulse 59   Temp 97.7 °F (36.5 °C) (Oral)   Resp 16   Ht 175.3 cm (69\")   Wt 69.3 kg (152 lb 12.8 oz)   SpO2 96%   BMI 22.56 kg/m²   Physical Exam  Vitals and nursing note reviewed. "   Constitutional:       Comments: Up in bed, room air, no acute distress, wife at bedside  HENT:      Head: Normocephalic and atraumatic.      Nose: No congestion.      Mouth/Throat:      Pharynx: Oropharynx is clear. No oropharyngeal exudate or posterior oropharyngeal erythema.   Eyes:      Extraocular Movements: Extraocular movements intact.      Pupils: Pupils are equal, round, and reactive to light.   Cardiovascular:      Rate and Rhythm: Normal rate and regular rhythm.      Heart sounds: No murmur heard.     Comments: Normal sinus in the 60s on remote telemetry.  Pulmonary:      Breath sounds: No wheezing, rhonchi or rales.      Comments: No oxygen use.  Abdominal:      Palpations: Abdomen is soft.      Tenderness: There is no abdominal tenderness.   Genitourinary:     Comments: Voiding.  Musculoskeletal:         General: No swelling or tenderness.      Cervical back: Normal range of motion and neck supple.   Skin:     General: Skin is warm and dry.   Neurological:      General: No focal deficit present.      Mental Status: He is alert and oriented to person, place, and time.   Psychiatric:         Mood and Affect: Mood normal.         Behavior: Behavior normal.         Thought Content: Thought content normal.         Judgment: Judgment normal.     Condition on Discharge: Stable    Discharge Disposition:  Home or Self Care    Discharge Medications:     Discharge Medications        Changes to Medications        Instructions Start Date   metoprolol succinate XL 25 MG 24 hr tablet  Commonly known as: TOPROL-XL  What changed: when to take this   12.5 mg, Oral, Daily             Continue These Medications        Instructions Start Date   albuterol sulfate  (90 Base) MCG/ACT inhaler  Commonly known as: PROVENTIL HFA;VENTOLIN HFA;PROAIR HFA   2 puffs, Inhalation, Every 4 Hours PRN      aspirin 81 MG EC tablet   81 mg, Oral, Daily      atorvastatin 80 MG tablet  Commonly known as: LIPITOR   80 mg, Oral, Daily       nitroglycerin 0.4 MG SL tablet  Commonly known as: NITROSTAT   0.4 mg, Sublingual, Every 5 Minutes PRN, Take no more than 3 doses in 15 minutes.       ramipril 10 MG capsule  Commonly known as: ALTACE   10 mg, Oral, 2 Times Daily             Discharge Diet:   Diet Instructions       Diet: Cardiac Diets, Diabetic Diets; Healthy Heart (2-3 Na+); Regular (IDDSI 7); Thin (IDDSI 0); Consistent Carbohydrate      Discharge Diet:  Cardiac Diets  Diabetic Diets       Cardiac Diet: Healthy Heart (2-3 Na+)    Texture: Regular (IDDSI 7)    Fluid Consistency: Thin (IDDSI 0)    Diabetic Diet: Consistent Carbohydrate            Activity at Discharge:     Follow-up Appointments:   No future appointments.    Test Results Pending at Discharge: None    Electronically signed by YOSEF Cook, 03/14/24, 09:06 CDT.    Time: 35 minutes.

## 2024-03-15 ENCOUNTER — TELEPHONE (OUTPATIENT)
Dept: INTERNAL MEDICINE | Age: 74
End: 2024-03-15

## 2024-03-15 NOTE — OUTREACH NOTE
Prep Survey      Flowsheet Row Responses   [facility] facility patient discharged from? [facility]   Is LACE score < 7 ? No   Eligibility Readm Mgmt   Discharge diagnosis NSTEMI   Does the patient have one of the following disease processes/diagnoses(primary or secondary)? Acute MI (STEMI,NSTEMI)   Does the patient have Home health ordered? No   Is there a DME ordered? No   Prep survey completed? Yes            LISA JEAN - Registered Nurse

## 2024-03-15 NOTE — TELEPHONE ENCOUNTER
Care Transitions Initial Follow Up Call    Outreach made within 2 business days of discharge: Yes  Workman's comp claim?No  Patient: Nick Jain   Patient : 1950   MRN: [id number]   Reason for Admission: NSTEMI  Discharge Date: 3/14/24       Spoke with: Nick Jain    Discharge department/facility: [facility]    TCM Interactive Patient Contact:  Was patient able to fill all prescriptions: Yes  Was patient instructed to bring all medications to the follow-up visit: Yes  Is patient taking all medications as directed in the discharge summary? Yes  Does patient understand their discharge instructions: Yes  Does patient have questions or concerns that need addressed prior to 7-14 day follow up office visit: no    The patient denies any chest, jaw or arm pain. He also denies any SOB or nausea/vomiting. I advised the patient that if he has anymore chest pain he will need to return to the ED to be evaluated. The patient is scheduled for a CABG on 3/25/24 at [facility].    Scheduled appointment with PCP within 7-14 days    Follow Up  Future Appointments   Date Time Provider Department Center   3/22/2024  3:20 PM Leonela, Tesfaye B, MD [facility]   2024  1:20 PM Tesfaye Gipson MD [facility]       Samantha Garay MA

## 2024-03-18 ENCOUNTER — READMISSION MANAGEMENT (OUTPATIENT)
Dept: CALL CENTER | Facility: HOSPITAL | Age: 74
End: 2024-03-18
Payer: MEDICARE

## 2024-03-18 NOTE — OUTREACH NOTE
AMI Week 1 Survey      Flowsheet Row Responses   [facility] facility patient discharged from? [place]   Does the patient have one of the following disease processes/diagnoses(primary or secondary)? Acute MI (STEMI,NSTEMI)   Week 1 attempt successful? No   Unsuccessful attempts Attempt 1            Emely LOPEZ - Registered Nurse

## 2024-03-21 ENCOUNTER — OFFICE VISIT (OUTPATIENT)
Dept: PRIMARY CARE CLINIC | Age: 74
End: 2024-03-21

## 2024-03-21 VITALS
TEMPERATURE: 96.9 F | OXYGEN SATURATION: 97 % | SYSTOLIC BLOOD PRESSURE: 144 MMHG | BODY MASS INDEX: 22.96 KG/M2 | HEART RATE: 47 BPM | WEIGHT: 160 LBS | DIASTOLIC BLOOD PRESSURE: 70 MMHG

## 2024-03-21 DIAGNOSIS — Z72.0 TOBACCO ABUSE: ICD-10-CM

## 2024-03-21 DIAGNOSIS — R91.1 PULMONARY NODULE: ICD-10-CM

## 2024-03-21 DIAGNOSIS — E78.01 FAMILIAL HYPERCHOLESTEROLEMIA: ICD-10-CM

## 2024-03-21 DIAGNOSIS — I25.10 CORONARY ARTERY DISEASE INVOLVING NATIVE CORONARY ARTERY OF NATIVE HEART WITHOUT ANGINA PECTORIS: ICD-10-CM

## 2024-03-21 DIAGNOSIS — I21.4 NSTEMI (NON-ST ELEVATED MYOCARDIAL INFARCTION) (HCC): Primary | ICD-10-CM

## 2024-03-21 DIAGNOSIS — E83.51 HYPOCALCEMIA: ICD-10-CM

## 2024-03-21 DIAGNOSIS — J43.2 CENTRILOBULAR EMPHYSEMA (HCC): ICD-10-CM

## 2024-03-21 DIAGNOSIS — I10 ESSENTIAL (PRIMARY) HYPERTENSION: ICD-10-CM

## 2024-03-21 DIAGNOSIS — N18.31 STAGE 3A CHRONIC KIDNEY DISEASE (HCC): ICD-10-CM

## 2024-03-21 PROBLEM — J44.9 COPD (CHRONIC OBSTRUCTIVE PULMONARY DISEASE) (HCC): Status: ACTIVE | Noted: 2024-03-13

## 2024-03-21 PROBLEM — I25.119 ATHEROSCLEROTIC HEART DISEASE OF NATIVE CORONARY ARTERY WITH UNSPECIFIED ANGINA PECTORIS (HCC): Status: RESOLVED | Noted: 2023-07-20 | Resolved: 2024-03-21

## 2024-03-21 NOTE — PROGRESS NOTES
[facility]  [address]  [address]  [place] [address]  Dept: [phone]  Dept Fax: [phone]  Loc: [phone]    Nick Jain is a 73 y.o. male who presents today for his medical conditions/complaints asnoted below.  Nick Jain is here for Follow-up (HFU)      Patient History:     Coronary artery disease.  - Cardiology at [facility]: Dr. Reddy  - Status post CABG March 25, 2024.    Pulmonary nodules:  -March 2024: CT chest without right upper lobe: 4 mm nodule.      pthx           Post-Discharge Transitional Care Management Services    Date of Admission: 3/12/2024  Date of Discharge: 3/14/2024  Primary Care Physician: Tesfaye Gipson MD    Presenting Problem/History of Present Illness:  Chest pain, NSTEMI    Final Discharge Diagnoses:  Active Hospital Problems   Diagnosis   **NSTEMI (non-ST elevated myocardial infarction)   Tobacco abuse   COPD (chronic obstructive pulmonary disease)   Presence of stent in coronary artery   Hyperlipidemia   Hypertension   Multivessel coronary artery disease involving native coronary artery of native heart         Inpatient course: Discharge summary reviewed- see chart for full details.  Brought to [facility] emergency department as a transfer from [facility] for the above dates.  Concern for possible non-STEMI.  He had developed chest pain while walking at home.    He had an elevated troponin and was transferred from [facility] to [facility].  Left heart catheterization on March 13 showed multivessel disease.  CT surgery was consulted.  -Scheduled for CABG March 25, 2024.    [facility] Risk Score (risk of hospital readmission):No data recorded  Active Problems:    * No active hospital problems. *      Care management risk score Rising risk (score 2-5) and Complex Care (Scores >=6): No Risk Score On File     Non face to face  following discharge, date last encounter closed

## 2024-03-21 NOTE — PROGRESS NOTES
Medicare Annual Wellness Visit - Subsequent    Name: Nick Jain Today’s Date: 3/21/2024   MRN: [id number] Sex: Male   Age: 73 y.o. Ethnicity: Non- / Non    : 1950 Race: White (non-)      Nick Jain is here for No chief complaint on file.       Screenings for behavioral, psychosocial and functional/safety risks, and cognitive dysfunction are all negative except as indicated below. These results, as well as other patient data from the Health Risk Assessment form, are documented in Flowsheets linked to this Encounter.    No Known Allergies    Prior to Visit Medications    Medication Sig Taking? Authorizing Provider   ramipril (ALTACE) 10 MG capsule Take 1 capsule by mouth in the morning and at bedtime Take 1 capsule by mouth once daily  Tesfaye Gipson MD   atorvastatin (LIPITOR) 80 MG tablet Take 1 tablet by mouth daily  Tesfaye Gipson MD   metoprolol succinate (TOPROL XL) 25 MG extended release tablet Take 0.5 tablets by mouth daily  Tesfaye Gipson MD   nitroGLYCERIN (NITROSTAT) 0.4 MG SL tablet Place 1 tablet under the tongue every 5 minutes as needed for Chest pain  Tesfaye Gipson MD   traZODone (DESYREL) 50 MG tablet Take 2 tablets by mouth nightly as needed for Sleep  Tesfaye Gipson MD   albuterol sulfate HFA (VENTOLIN HFA) 108 (90 Base) MCG/ACT inhaler Inhale 2 puffs into the lungs every 6 hours as needed for Wheezing  Tesfaye Giposn MD   fluticasone (FLONASE) 50 MCG/ACT nasal spray 2 sprays by Each Nostril route in the morning.  Tesfaye Gipson MD   aspirin EC 81 MG EC tablet Take 1 tablet by mouth  Provider, MD Louis         Past Medical History:   Diagnosis Date    COPD (chronic obstructive pulmonary disease) (HCC)     Heart attack (HCC)     two    Hypertension     Mini stroke     2         Past Surgical History:   Procedure Laterality Date    APPENDECTOMY      BACK SURGERY      COLONOSCOPY N/A 10/22/2021    Dr KHALIDA Cano-AP x 1, BCM x 1, 3 yr

## 2024-03-22 ENCOUNTER — TELEPHONE (OUTPATIENT)
Dept: CARDIAC SURGERY | Facility: CLINIC | Age: 74
End: 2024-03-22
Payer: MEDICARE

## 2024-03-22 ENCOUNTER — PRE-ADMISSION TESTING (OUTPATIENT)
Dept: PREADMISSION TESTING | Facility: HOSPITAL | Age: 74
DRG: 235 | End: 2024-03-22
Payer: MEDICARE

## 2024-03-22 ENCOUNTER — HOSPITAL ENCOUNTER (OUTPATIENT)
Dept: PULMONOLOGY | Facility: HOSPITAL | Age: 74
Discharge: HOME | End: 2024-03-22
Payer: MEDICARE

## 2024-03-22 ENCOUNTER — ANESTHESIA EVENT (OUTPATIENT)
Dept: PERIOP | Facility: HOSPITAL | Age: 74
End: 2024-03-22
Payer: MEDICARE

## 2024-03-22 ENCOUNTER — HOSPITAL ENCOUNTER (OUTPATIENT)
Dept: GENERAL RADIOLOGY | Facility: HOSPITAL | Age: 74
Discharge: HOME | End: 2024-03-22
Payer: MEDICARE

## 2024-03-22 ENCOUNTER — READMISSION MANAGEMENT (OUTPATIENT)
Dept: CALL CENTER | Facility: HOSPITAL | Age: 74
End: 2024-03-22
Payer: MEDICARE

## 2024-03-22 VITALS
DIASTOLIC BLOOD PRESSURE: 62 MMHG | HEIGHT: 69 IN | OXYGEN SATURATION: 98 % | RESPIRATION RATE: 18 BRPM | BODY MASS INDEX: 23.8 KG/M2 | WEIGHT: 160.72 LBS | HEART RATE: 47 BPM | SYSTOLIC BLOOD PRESSURE: 140 MMHG

## 2024-03-22 DIAGNOSIS — I21.4 NSTEMI (NON-ST ELEVATED MYOCARDIAL INFARCTION): ICD-10-CM

## 2024-03-22 LAB
ABO GROUP BLD: NORMAL
ALBUMIN SERPL-MCNC: 3.8 G/DL (ref 3.5–5.2)
ALBUMIN/GLOB SERPL: 1.2 G/DL
ALP SERPL-CCNC: 156 U/L (ref 39–117)
ALT SERPL W P-5'-P-CCNC: 26 U/L (ref 1–41)
ANION GAP SERPL CALCULATED.3IONS-SCNC: 5 MMOL/L (ref 5–15)
APTT PPP: 28.9 SECONDS (ref 24.5–36)
ARTERIAL PATENCY WRIST A: ABNORMAL
AST SERPL-CCNC: 15 U/L (ref 1–40)
ATMOSPHERIC PRESS: 748 MMHG
BASE EXCESS BLDA CALC-SCNC: 0.9 MMOL/L (ref 0–2)
BASOPHILS # BLD AUTO: 0.04 10*3/MM3 (ref 0–0.2)
BASOPHILS NFR BLD AUTO: 0.6 % (ref 0–1.5)
BDY SITE: ABNORMAL
BILIRUB SERPL-MCNC: 0.4 MG/DL (ref 0–1.2)
BLD GP AB SCN SERPL QL: NEGATIVE
BODY TEMPERATURE: 37
BUN SERPL-MCNC: 18 MG/DL (ref 8–23)
BUN/CREAT SERPL: 13.8 (ref 7–25)
CALCIUM SPEC-SCNC: 9 MG/DL (ref 8.6–10.5)
CHLORIDE SERPL-SCNC: 107 MMOL/L (ref 98–107)
CO2 SERPL-SCNC: 30 MMOL/L (ref 22–29)
CREAT SERPL-MCNC: 1.3 MG/DL (ref 0.76–1.27)
DEPRECATED RDW RBC AUTO: 45 FL (ref 37–54)
EGFRCR SERPLBLD CKD-EPI 2021: 57.6 ML/MIN/1.73
EOSINOPHIL # BLD AUTO: 0.43 10*3/MM3 (ref 0–0.4)
EOSINOPHIL NFR BLD AUTO: 6.5 % (ref 0.3–6.2)
ERYTHROCYTE [DISTWIDTH] IN BLOOD BY AUTOMATED COUNT: 13.2 % (ref 12.3–15.4)
GLOBULIN UR ELPH-MCNC: 3.2 GM/DL
GLUCOSE SERPL-MCNC: 89 MG/DL (ref 65–99)
HCO3 BLDA-SCNC: 26.2 MMOL/L (ref 20–26)
HCT VFR BLD AUTO: 39.6 % (ref 37.5–51)
HGB BLD-MCNC: 13 G/DL (ref 13–17.7)
IMM GRANULOCYTES # BLD AUTO: 0.02 10*3/MM3 (ref 0–0.05)
IMM GRANULOCYTES NFR BLD AUTO: 0.3 % (ref 0–0.5)
INR PPP: 0.95 (ref 0.91–1.09)
LYMPHOCYTES # BLD AUTO: 2.29 10*3/MM3 (ref 0.7–3.1)
LYMPHOCYTES NFR BLD AUTO: 34.7 % (ref 19.6–45.3)
Lab: ABNORMAL
MCH RBC QN AUTO: 30.7 PG (ref 26.6–33)
MCHC RBC AUTO-ENTMCNC: 32.8 G/DL (ref 31.5–35.7)
MCV RBC AUTO: 93.4 FL (ref 79–97)
MODALITY: ABNORMAL
MONOCYTES # BLD AUTO: 0.55 10*3/MM3 (ref 0.1–0.9)
MONOCYTES NFR BLD AUTO: 8.3 % (ref 5–12)
NEUTROPHILS NFR BLD AUTO: 3.27 10*3/MM3 (ref 1.7–7)
NEUTROPHILS NFR BLD AUTO: 49.6 % (ref 42.7–76)
NRBC BLD AUTO-RTO: 0 /100 WBC (ref 0–0.2)
PCO2 BLDA: 43.5 MM HG (ref 35–45)
PCO2 TEMP ADJ BLD: 43.5 MM HG (ref 35–45)
PH BLDA: 7.39 PH UNITS (ref 7.35–7.45)
PH, TEMP CORRECTED: 7.39 PH UNITS (ref 7.35–7.45)
PLATELET # BLD AUTO: 211 10*3/MM3 (ref 140–450)
PMV BLD AUTO: 10.4 FL (ref 6–12)
PO2 BLDA: 94 MM HG (ref 83–108)
PO2 TEMP ADJ BLD: 94 MM HG (ref 83–108)
POTASSIUM SERPL-SCNC: 4.6 MMOL/L (ref 3.5–5.2)
PROT SERPL-MCNC: 7 G/DL (ref 6–8.5)
PROTHROMBIN TIME: 13.1 SECONDS (ref 11.8–14.8)
RBC # BLD AUTO: 4.24 10*6/MM3 (ref 4.14–5.8)
RH BLD: POSITIVE
SAO2 % BLDCOA: 97.8 % (ref 94–99)
SODIUM SERPL-SCNC: 142 MMOL/L (ref 136–145)
T&S EXPIRATION DATE: NORMAL
VENTILATOR MODE: ABNORMAL
WBC NRBC COR # BLD AUTO: 6.6 10*3/MM3 (ref 3.4–10.8)

## 2024-03-22 PROCEDURE — 85610 PROTHROMBIN TIME: CPT

## 2024-03-22 PROCEDURE — 36415 COLL VENOUS BLD VENIPUNCTURE: CPT

## 2024-03-22 PROCEDURE — 80053 COMPREHEN METABOLIC PANEL: CPT

## 2024-03-22 PROCEDURE — 93005 ELECTROCARDIOGRAM TRACING: CPT

## 2024-03-22 PROCEDURE — 93010 ELECTROCARDIOGRAM REPORT: CPT | Performed by: INTERNAL MEDICINE

## 2024-03-22 PROCEDURE — 85730 THROMBOPLASTIN TIME PARTIAL: CPT

## 2024-03-22 PROCEDURE — 85025 COMPLETE CBC W/AUTO DIFF WBC: CPT

## 2024-03-22 PROCEDURE — 36600 WITHDRAWAL OF ARTERIAL BLOOD: CPT

## 2024-03-22 PROCEDURE — 86901 BLOOD TYPING SEROLOGIC RH(D): CPT

## 2024-03-22 PROCEDURE — 71046 X-RAY EXAM CHEST 2 VIEWS: CPT

## 2024-03-22 PROCEDURE — 82803 BLOOD GASES ANY COMBINATION: CPT

## 2024-03-22 PROCEDURE — 86850 RBC ANTIBODY SCREEN: CPT

## 2024-03-22 PROCEDURE — 86900 BLOOD TYPING SEROLOGIC ABO: CPT

## 2024-03-22 RX ORDER — OMEPRAZOLE 20 MG/1
40 CAPSULE, DELAYED RELEASE ORAL DAILY PRN
COMMUNITY

## 2024-03-22 NOTE — TELEPHONE ENCOUNTER
Prework calling today to report that pt's HR today was 42 and 44. Nurse states pt has been taking a full tablet of Toprol 25 mg since discharge. Reviewed After Visit Summary from hospital discharge, as well as DC note. Both instructions state to take only 0.5 tablet per day. Informed the nurse about this and I would put in a message to make providers aware.

## 2024-03-22 NOTE — OUTREACH NOTE
AMI Week 1 Survey      Flowsheet Row Responses   [facility] patient discharged from? Call   Does the patient have one of the following disease processes/diagnoses(primary or secondary)? Acute MI (STEMI,NSTEMI)   Week 1 attempt successful? Yes   Call start time 1123   Call end time 1127   Discharge diagnosis NSTEMI   Meds reviewed with patient/caregiver? Yes   Is the patient having any side effects they believe may be caused by any medication additions or changes? No   Does the patient have all prescriptions related to this admission filled (includes statins,anticoagulants,HTN meds,anti-arrhythmia meds) Yes   Is the patient taking all medications as directed (includes completed medication regime)? Yes   Does the patient have a primary care provider?  Yes   Does the patient have an appointment with their PCP,cardiologist,or clinic within 7 days of discharge? Yes   Has the patient kept scheduled appointments due by today? Yes   Has home health visited the patient within 72 hours of discharge? N/A   Psychosocial issues? No   Did the patient receive a copy of their discharge instructions? Yes   Nursing interventions Reviewed instructions with patient   What is the patient's perception of their health status since discharge? Improving   Nursing interventions Nurse provided patient education   Is the patient/caregiver able to teach back signs and symptoms of when to call for help immediately: Sudden chest discomfort, Sudden discomfort in arms, back, neck or jaw, Shortness of breath at any time, Sudden sweating or clammy skin, Nausea or vomiting, Dizziness or lightheadedness, Irregular or rapid heart rate   Nursing interventions Nurse provided patient education   Is the patient/caregiver able to teach back lifestyle changes to help prevent MIs Quit smoking, Regular exercise as approved by provider, Heart healthy diet, Maintaining a healthy weight, Reducing stress   Is the patient/caregiver able to teach back ways  to prevent a second heart attack: Take medications, Follow up with MD, Manage risk factors   If the patient is a current smoker, are they able to teach back resources for cessation? --  [has not smoked since hospital stay]   Is the patient/caregiver able to teach back the hierarchy of who to call/visit for symptoms/problems? PCP, Specialist, Home health nurse, Urgent Care, ED, 911 Yes   Week 1 call completed? Yes   Call end time 1127            Rocío TIPTON - Registered Nurse

## 2024-03-22 NOTE — TELEPHONE ENCOUNTER
He had issues with bradycardia during his last admission also which is why Toprol was decreased to 12.5 mg.  Please advise patient to only take Toprol 12.5 mg (half tablet) as prescribed.

## 2024-03-22 NOTE — DISCHARGE INSTRUCTIONS
Preparing for Surgery  Follow these instructions before the procedure:  Several days or weeks before your procedure    Ask your health care provider about:  Changing or stopping your regular medicines. This is especially important if you are taking diabetes medicines or blood thinners.  Taking medicines such as aspirin and non-steroidal anti-inflammatory drugs (NSAIDS) such as ibuprofen that can thin your blood. Do not take these medicines unless your health care provider tells you to take them.  Taking over-the-counter medicines, vitamins, herbs, and supplements.  Contact your surgeon if you:  Develop a fever of more than 100.4°F (38°C) or other feelings of illness during the 48 hours before your surgery.  Have symptoms that get worse.  Have questions or concerns about your surgery.  If you are going home the same day of your surgery you will need to arrange for a responsible adult, age 18 years old or older, to drive you home from the hospital and stay with you for 24 hours. Verification of the  will be made prior to any procedure requiring sedation. You may not go home in a taxi or any form of public transportation by yourself.     Day before your procedure  Medication(s) you need to stop the day before your surgery: DO NOT TAKE RAMIPRIL FOR 24 HOURS PRIOR TO SURGERY    24 hours before your procedure DO NOT drink alcoholic beverages or smoke.  24 hours before your procedure STOP taking Erectile Dysfunction medication (i.e.,Cialis, Viagra)   You may be asked to shower with a germ-killing soap.  Day of your procedure   You may take the following medication(s) the morning of surgery with a sip of water: NONE PER DR BLACKWELL'S ORDERS      8 hours before your procedure STOP all food, any dairy products, and full liquids. This includes hard candy, chewing gum or mints. This is extremely important to prevent serious complications.   Up to 2 hours before your scheduled arrival time, you may have clear liquids no cream,  powder, or pulp of any kind. Safe options are water, black coffee, plain tea, soda, Gatorade/Powerade, clear broth, apple juice.  2 hours before your scheduled arrival time, STOP drinking clear liquids.  You may need to take another shower with a germ-killing soap before you leave home in the morning. Do not use perfumes, colognes, or body lotions.  Wear comfortable loose-fitting clothing.  Remove all jewelry including body piercing and rings, dark colored nail polish, and make up prior to arrival at the hospital. Leave all valuables at home.   Bring your hearing aids if you rely on them.  Do not wear contact lenses. If you wear eyeglasses remember to bring a case to store them in while you are in surgery.  Do not use denture adhesives since you will be asked to remove them during your surgery.    You do not need to bring your home medications into the hospital.   Bring your sleep apnea device with you on the day of your surgery (if this applies to you).  If you wear portable oxygen, bring it with you.   If you are staying overnight, you may bring a bag of items you may need such as slippers, robe and a change of clothes for your discharge. You may want to leave these items in the car until you are ready for them since your family will take your belongings when you leave the pre-operative area.  Arrive at the hospital as scheduled by the office. You will be asked to arrive 2 hours prior to your surgery time in order to prepare for your procedure.  When you arrive at the hospital  Go to the registration desk located at the main entrance of the hospital.  After registration is completed, you will be given a beeper and a sticker sheet. Take the stickers to Outpatient Surgery and place in the tray at the end of the desk to notify the staff that you have arrived and registered.   Return to the lobby to wait. You are not always called back according to the time of arrival but rather the time your doctor will be  ready.  When your beeper lights up and vibrates proceed through the double doors, under the stairs, and a member of the Outpatient Surgery staff will escort you to your preoperative room.     How to Use Chlorhexidine Before Surgery  Chlorhexidine gluconate (CHG) is a germ-killing (antiseptic) solution that is used to clean the skin. It can get rid of the bacteria that normally live on the skin and can keep them away for about 24 hours. To clean your skin with CHG, you may be given:  A CHG solution to use in the shower or as part of a sponge bath.  A prepackaged cloth that contains CHG.  Cleaning your skin with CHG may help lower the risk for infection:  While you are staying in the intensive care unit of the hospital.  If you have a vascular access, such as a central line, to provide short-term or long-term access to your veins.  If you have a catheter to drain urine from your bladder.  If you are on a ventilator. A ventilator is a machine that helps you breathe by moving air in and out of your lungs.  After surgery.  What are the risks?  Risks of using CHG include:  A skin reaction.  Hearing loss, if CHG gets in your ears and you have a perforated eardrum.  Eye injury, if CHG gets in your eyes and is not rinsed out.  The CHG product catching fire.  Make sure that you avoid smoking and flames after applying CHG to your skin.  Do not use CHG:  If you have a chlorhexidine allergy or have previously reacted to chlorhexidine.  On babies younger than 2 months of age.  How to use CHG solution  Use CHG only as told by your health care provider, and follow the instructions on the label.  Use the full amount of CHG as directed. Usually, this is one bottle.  During a shower    Follow these steps when using CHG solution during a shower (unless your health care provider gives you different instructions):  Start the shower.  Use your normal soap and shampoo to wash your face and hair.  Turn off the shower or move out of the  shower stream.  Pour the CHG onto a clean washcloth. Do not use any type of brush or rough-edged sponge.  Starting at your neck, lather your body down to your toes. Make sure you follow these instructions:  If you will be having surgery, pay special attention to the part of your body where you will be having surgery. Scrub this area for at least 1 minute.  Do not use CHG on your head or face. If the solution gets into your ears or eyes, rinse them well with water.  Avoid your genital area.  Avoid any areas of skin that have broken skin, cuts, or scrapes.  Scrub your back and under your arms. Make sure to wash skin folds.  Let the lather sit on your skin for 1-2 minutes or as long as told by your health care provider.  Thoroughly rinse your entire body in the shower. Make sure that all body creases and crevices are rinsed well.  Dry off with a clean towel. Do not put any substances on your body afterward--such as powder, lotion, or perfume--unless you are told to do so by your health care provider. Only use lotions that are recommended by the .  Put on clean clothes or pajamas.  If it is the night before your surgery, sleep in clean sheets.     During a sponge bath  Follow these steps when using CHG solution during a sponge bath (unless your health care provider gives you different instructions):  Use your normal soap and shampoo to wash your face and hair.  Pour the CHG onto a clean washcloth.  Starting at your neck, lather your body down to your toes. Make sure you follow these instructions:  If you will be having surgery, pay special attention to the part of your body where you will be having surgery. Scrub this area for at least 1 minute.  Do not use CHG on your head or face. If the solution gets into your ears or eyes, rinse them well with water.  Avoid your genital area.  Avoid any areas of skin that have broken skin, cuts, or scrapes.  Scrub your back and under your arms. Make sure to wash skin  folds.  Let the lather sit on your skin for 1-2 minutes or as long as told by your health care provider.  Using a different clean, wet washcloth, thoroughly rinse your entire body. Make sure that all body creases and crevices are rinsed well.  Dry off with a clean towel. Do not put any substances on your body afterward--such as powder, lotion, or perfume--unless you are told to do so by your health care provider. Only use lotions that are recommended by the .  Put on clean clothes or pajamas.  If it is the night before your surgery, sleep in clean sheets.  How to use CHG prepackaged cloths  Only use CHG cloths as told by your health care provider, and follow the instructions on the label.  Use the CHG cloth on clean, dry skin.  Do not use the CHG cloth on your head or face unless your health care provider tells you to.  When washing with the CHG cloth:  Avoid your genital area.  Avoid any areas of skin that have broken skin, cuts, or scrapes.  Before surgery    Follow these steps when using a CHG cloth to clean before surgery (unless your health care provider gives you different instructions):  Using the CHG cloth, vigorously scrub the part of your body where you will be having surgery. Scrub using a back-and-forth motion for 3 minutes. The area on your body should be completely wet with CHG when you are done scrubbing.  Do not rinse. Discard the cloth and let the area air-dry. Do not put any substances on the area afterward, such as powder, lotion, or perfume.  Put on clean clothes or pajamas.  If it is the night before your surgery, sleep in clean sheets.     For general bathing  Follow these steps when using CHG cloths for general bathing (unless your health care provider gives you different instructions).  Use a separate CHG cloth for each area of your body. Make sure you wash between any folds of skin and between your fingers and toes. Wash your body in the following order, switching to a new cloth  after each step:  The front of your neck, shoulders, and chest.  Both of your arms, under your arms, and your hands.  Your stomach and groin area, avoiding the genitals.  Your right leg and foot.  Your left leg and foot.  The back of your neck, your back, and your buttocks.  Do not rinse. Discard the cloth and let the area air-dry. Do not put any substances on your body afterward--such as powder, lotion, or perfume--unless you are told to do so by your health care provider. Only use lotions that are recommended by the .  Put on clean clothes or pajamas.  Contact a health care provider if:  Your skin gets irritated after scrubbing.  You have questions about using your solution or cloth.  You swallow any chlorhexidine. Call your local poison control center (1-969.773.5931 in the [place]  Get help right away if:  Your eyes itch badly, or they become very red or swollen.  Your skin itches badly and is red or swollen.  Your hearing changes.  You have trouble seeing.  You have swelling or tingling in your mouth or throat.  You have trouble breathing.  These symptoms may represent a serious problem that is an emergency. Do not wait to see if the symptoms will go away. Get medical help right away. Call your local emergency services (007 in the [place] Do not drive yourself to the hospital.  Summary  Chlorhexidine gluconate (CHG) is a germ-killing (antiseptic) solution that is used to clean the skin. Cleaning your skin with CHG may help to lower your risk for infection.  You may be given CHG to use for bathing. It may be in a bottle or in a prepackaged cloth to use on your skin. Carefully follow your health care provider's instructions and the instructions on the product label.  Do not use CHG if you have a chlorhexidine allergy.  Contact your health care provider if your skin gets irritated after scrubbing.  This information is not intended to replace advice given to you by your health care provider. Make sure you  discuss any questions you have with your health care provider.  Document Revised: 04/17/2023 Document Reviewed: 02/28/2022  Elsevier Patient Education © 2023 [employer]

## 2024-03-22 NOTE — ANESTHESIA PREPROCEDURE EVALUATION
Anesthesia Evaluation     Patient summary reviewed   NPO Solid Status: > 8 hours             Airway   Mallampati: II  TM distance: >3 FB  Neck ROM: full  Dental    (+) edentulous    Pulmonary    (+) a smoker Former, COPD,  Cardiovascular   Exercise tolerance: good (4-7 METS)    Patient on routine beta blocker    (+) hypertension, past MI , CAD, cardiac stents , dysrhythmias Bradycardia, PVD, hyperlipidemia    ROS comment:   ·  Left ventricular ejection fraction appears to be 66 - 70%.  ·  Left ventricular diastolic function was normal.  ·  Estimated right ventricular systolic pressure from tricuspid regurgitation is normal (<35 mmHg).  ·  There is a small (<1cm) pericardial effusion. There is no evidence of cardiac tamponade.      Normal left main coronary artery  Proximal left anterior descending coronary artery has 50% stenosis  Left anterior descending coronary artery [place] of the origin of moderate-sized diagonal branch has 70% stenosis followed by a 60% stenosis  The diagonal branch itself has moderate atherosclerotic changes without high-grade obstructive disease  Collaterals noted from the left anterior descending coronary artery reconstituting the distal right coronary artery and the right posterior descending coronary artery  First obtuse marginal branch has a sequential 70% stenoses  Second obtuse marginal branch   rest of left circumflex coronary artery does not have significant disease  Multiple coronary stents in the mid right coronary artery with 95% stenosis      Neuro/Psych  (+) TIA  GI/Hepatic/Renal/Endo    (+) GERD poorly controlled, renal disease- CRI    Musculoskeletal     Abdominal    Substance History      OB/GYN          Other                          Anesthesia Plan    ASA 4     general     (Concerned about poorly controlled GERD; discussed risk/implications, still appropriate to proceed. No c/I to deangelo though; denies any cardiac device )  intravenous induction     Anesthetic plan, risks,  benefits, and alternatives have been provided, discussed and informed consent has been obtained with: patient.    Use of blood products discussed with patient  Consented to blood products.        CODE STATUS:

## 2024-03-24 LAB
QT INTERVAL: 446 MS
QTC INTERVAL: 381 MS

## 2024-03-25 ENCOUNTER — APPOINTMENT (OUTPATIENT)
Dept: CARDIOLOGY | Facility: HOSPITAL | Age: 74
DRG: 235 | End: 2024-03-25
Payer: MEDICARE

## 2024-03-25 ENCOUNTER — ANESTHESIA (OUTPATIENT)
Dept: PERIOP | Facility: HOSPITAL | Age: 74
End: 2024-03-25
Payer: MEDICARE

## 2024-03-25 ENCOUNTER — APPOINTMENT (OUTPATIENT)
Dept: GENERAL RADIOLOGY | Facility: HOSPITAL | Age: 74
DRG: 235 | End: 2024-03-25
Payer: MEDICARE

## 2024-03-25 ENCOUNTER — READMISSION MANAGEMENT (OUTPATIENT)
Dept: CALL CENTER | Facility: HOSPITAL | Age: 74
End: 2024-03-25
Payer: MEDICARE

## 2024-03-25 ENCOUNTER — HOSPITAL ENCOUNTER (INPATIENT)
Facility: HOSPITAL | Age: 74
LOS: 4 days | Discharge: HOME | DRG: 235 | End: 2024-03-29
Attending: SURGERY | Admitting: SURGERY
Payer: MEDICARE

## 2024-03-25 DIAGNOSIS — I25.118 CORONARY ARTERY DISEASE INVOLVING NATIVE CORONARY ARTERY OF NATIVE HEART WITH OTHER FORM OF ANGINA PECTORIS: ICD-10-CM

## 2024-03-25 DIAGNOSIS — Z74.09 IMPAIRED MOBILITY: Primary | ICD-10-CM

## 2024-03-25 DIAGNOSIS — I21.4 NSTEMI (NON-ST ELEVATED MYOCARDIAL INFARCTION): ICD-10-CM

## 2024-03-25 PROBLEM — I25.10 CAD (CORONARY ARTERY DISEASE), NATIVE CORONARY ARTERY: Status: ACTIVE | Noted: 2024-03-25

## 2024-03-25 PROBLEM — I25.10 CAD (CORONARY ARTERY DISEASE): Status: ACTIVE | Noted: 2024-03-25

## 2024-03-25 LAB
A-A DO2: 150.7 MMHG
ALBUMIN SERPL-MCNC: 3.2 G/DL (ref 3.5–5.2)
ALBUMIN SERPL-MCNC: 4 G/DL (ref 3.5–5.2)
ANION GAP SERPL CALCULATED.3IONS-SCNC: 12 MMOL/L (ref 5–15)
ANION GAP SERPL CALCULATED.3IONS-SCNC: 9 MMOL/L (ref 5–15)
APTT PPP: 35.2 SECONDS (ref 24.5–36)
ARTERIAL PATENCY WRIST A: ABNORMAL
ATMOSPHERIC PRESS: 739 MMHG
ATMOSPHERIC PRESS: 744 MMHG
ATMOSPHERIC PRESS: 745 MMHG
ATMOSPHERIC PRESS: 746 MMHG
BASE EXCESS BLDA CALC-SCNC: -0.2 MMOL/L (ref 0–2)
BASE EXCESS BLDA CALC-SCNC: -0.2 MMOL/L (ref 0–2)
BASE EXCESS BLDA CALC-SCNC: 0.9 MMOL/L (ref 0–2)
BASE EXCESS BLDA CALC-SCNC: 1.3 MMOL/L (ref 0–2)
BASE EXCESS BLDA CALC-SCNC: 1.7 MMOL/L (ref 0–2)
BASE EXCESS BLDA CALC-SCNC: 2.4 MMOL/L (ref 0–2)
BASE EXCESS BLDA CALC-SCNC: 5 MMOL/L (ref 0–2)
BASE EXCESS BLDA CALC-SCNC: 5.1 MMOL/L (ref 0–2)
BASOPHILS # BLD AUTO: 0.03 10*3/MM3 (ref 0–0.2)
BASOPHILS NFR BLD AUTO: 0.3 % (ref 0–1.5)
BDY SITE: ABNORMAL
BODY TEMPERATURE: 37
BUN SERPL-MCNC: 13 MG/DL (ref 8–23)
BUN SERPL-MCNC: 14 MG/DL (ref 8–23)
BUN/CREAT SERPL: 10.3 (ref 7–25)
BUN/CREAT SERPL: 11.7 (ref 7–25)
CA-I BLD-MCNC: 3.86 MG/DL (ref 4.6–5.4)
CA-I BLD-MCNC: 3.93 MG/DL (ref 4.6–5.4)
CA-I BLD-MCNC: 4.37 MG/DL (ref 4.6–5.4)
CA-I BLD-MCNC: 4.61 MG/DL (ref 4.6–5.4)
CA-I BLD-MCNC: 5.06 MG/DL (ref 4.6–5.4)
CA-I BLD-MCNC: 5.29 MG/DL (ref 4.6–5.4)
CALCIUM SPEC-SCNC: 9 MG/DL (ref 8.6–10.5)
CALCIUM SPEC-SCNC: 9.2 MG/DL (ref 8.6–10.5)
CHLORIDE SERPL-SCNC: 104 MMOL/L (ref 98–107)
CHLORIDE SERPL-SCNC: 104 MMOL/L (ref 98–107)
CO2 SERPL-SCNC: 25 MMOL/L (ref 22–29)
CO2 SERPL-SCNC: 27 MMOL/L (ref 22–29)
COHGB MFR BLD: 0.6 % (ref 0–5)
COHGB MFR BLD: 0.6 % (ref 0–5)
COHGB MFR BLD: 0.7 % (ref 0–5)
COHGB MFR BLD: 0.8 % (ref 0–5)
COHGB MFR BLD: 0.8 % (ref 0–5)
COHGB MFR BLD: 1 % (ref 0–5)
CREAT SERPL-MCNC: 1.2 MG/DL (ref 0.76–1.27)
CREAT SERPL-MCNC: 1.26 MG/DL (ref 0.76–1.27)
D-LACTATE SERPL-SCNC: 1.9 MMOL/L (ref 0.5–2)
D-LACTATE SERPL-SCNC: 3.1 MMOL/L (ref 0.5–2)
DEPRECATED RDW RBC AUTO: 41.8 FL (ref 37–54)
DEPRECATED RDW RBC AUTO: 42.5 FL (ref 37–54)
EGFRCR SERPLBLD CKD-EPI 2021: 59.8 ML/MIN/1.73
EGFRCR SERPLBLD CKD-EPI 2021: 63.5 ML/MIN/1.73
EOSINOPHIL # BLD AUTO: 0.36 10*3/MM3 (ref 0–0.4)
EOSINOPHIL NFR BLD AUTO: 3 % (ref 0.3–6.2)
ERYTHROCYTE [DISTWIDTH] IN BLOOD BY AUTOMATED COUNT: 12.7 % (ref 12.3–15.4)
ERYTHROCYTE [DISTWIDTH] IN BLOOD BY AUTOMATED COUNT: 12.9 % (ref 12.3–15.4)
FIBRINOGEN PPP-MCNC: 339 MG/DL (ref 240–460)
GAS FLOW AIRWAY: 2.5 LPM
GAS FLOW AIRWAY: 2.5 LPM
GLUCOSE BLDC GLUCOMTR-MCNC: 102 MG/DL (ref 70–130)
GLUCOSE BLDC GLUCOMTR-MCNC: 110 MG/DL (ref 70–130)
GLUCOSE BLDC GLUCOMTR-MCNC: 110 MG/DL (ref 70–130)
GLUCOSE BLDC GLUCOMTR-MCNC: 122 MG/DL (ref 70–130)
GLUCOSE BLDC GLUCOMTR-MCNC: 123 MG/DL (ref 70–130)
GLUCOSE BLDC GLUCOMTR-MCNC: 133 MG/DL (ref 70–130)
GLUCOSE BLDC GLUCOMTR-MCNC: 146 MG/DL (ref 70–130)
GLUCOSE BLDC GLUCOMTR-MCNC: 149 MG/DL (ref 70–130)
GLUCOSE BLDC GLUCOMTR-MCNC: 149 MG/DL (ref 70–130)
GLUCOSE BLDC GLUCOMTR-MCNC: 152 MG/DL (ref 70–130)
GLUCOSE BLDC GLUCOMTR-MCNC: 165 MG/DL (ref 70–130)
GLUCOSE SERPL-MCNC: 128 MG/DL (ref 65–99)
GLUCOSE SERPL-MCNC: 163 MG/DL (ref 65–99)
HCO3 BLDA-SCNC: 24.1 MMOL/L (ref 20–26)
HCO3 BLDA-SCNC: 25.8 MMOL/L (ref 20–26)
HCO3 BLDA-SCNC: 25.9 MMOL/L (ref 20–26)
HCO3 BLDA-SCNC: 26.1 MMOL/L (ref 20–26)
HCO3 BLDA-SCNC: 27.3 MMOL/L (ref 20–26)
HCO3 BLDA-SCNC: 27.4 MMOL/L (ref 20–26)
HCO3 BLDA-SCNC: 27.5 MMOL/L (ref 20–26)
HCO3 BLDA-SCNC: 27.6 MMOL/L (ref 20–26)
HCT VFR BLD AUTO: 32 % (ref 37.5–51)
HCT VFR BLD AUTO: 32.5 % (ref 37.5–51)
HCT VFR BLD CALC: 21.3 % (ref 38–51)
HCT VFR BLD CALC: 22.9 % (ref 38–51)
HCT VFR BLD CALC: 28.1 % (ref 38–51)
HCT VFR BLD CALC: 32.9 % (ref 38–51)
HCT VFR BLD CALC: 34.1 % (ref 38–51)
HCT VFR BLD CALC: 35.6 % (ref 38–51)
HGB BLD-MCNC: 10.7 G/DL (ref 13–17.7)
HGB BLD-MCNC: 11 G/DL (ref 13–17.7)
HGB BLDA-MCNC: 10.7 G/DL (ref 14–18)
HGB BLDA-MCNC: 11.1 G/DL (ref 14–18)
HGB BLDA-MCNC: 11.6 G/DL (ref 14–18)
HGB BLDA-MCNC: 6.9 G/DL (ref 14–18)
HGB BLDA-MCNC: 7.5 G/DL (ref 14–18)
HGB BLDA-MCNC: 9.2 G/DL (ref 14–18)
IMM GRANULOCYTES # BLD AUTO: 0.04 10*3/MM3 (ref 0–0.05)
IMM GRANULOCYTES NFR BLD AUTO: 0.3 % (ref 0–0.5)
INHALED O2 CONCENTRATION: 100 %
INHALED O2 CONCENTRATION: 30 %
INHALED O2 CONCENTRATION: 30 %
INHALED O2 CONCENTRATION: 60 %
INR PPP: 1.21 (ref 0.91–1.09)
LYMPHOCYTES # BLD AUTO: 3.31 10*3/MM3 (ref 0.7–3.1)
LYMPHOCYTES NFR BLD AUTO: 27.7 % (ref 19.6–45.3)
Lab: ABNORMAL
Lab: NORMAL
MCH RBC QN AUTO: 30.3 PG (ref 26.6–33)
MCH RBC QN AUTO: 30.8 PG (ref 26.6–33)
MCHC RBC AUTO-ENTMCNC: 33.4 G/DL (ref 31.5–35.7)
MCHC RBC AUTO-ENTMCNC: 33.8 G/DL (ref 31.5–35.7)
MCV RBC AUTO: 90.7 FL (ref 79–97)
MCV RBC AUTO: 91 FL (ref 79–97)
METHGB BLD QL: 0.6 % (ref 0–3)
METHGB BLD QL: 0.7 % (ref 0–3)
METHGB BLD QL: 0.7 % (ref 0–3)
METHGB BLD QL: 0.8 % (ref 0–3)
METHGB BLD QL: 1 % (ref 0–3)
METHGB BLD QL: 1.2 % (ref 0–3)
MODALITY: ABNORMAL
MONOCYTES # BLD AUTO: 0.44 10*3/MM3 (ref 0.1–0.9)
MONOCYTES NFR BLD AUTO: 3.7 % (ref 5–12)
NEUTROPHILS NFR BLD AUTO: 65 % (ref 42.7–76)
NEUTROPHILS NFR BLD AUTO: 7.78 10*3/MM3 (ref 1.7–7)
NOTIFIED BY: ABNORMAL
NOTIFIED WHO: ABNORMAL
NRBC BLD AUTO-RTO: 0 /100 WBC (ref 0–0.2)
OXYHGB MFR BLDV: 97.9 % (ref 94–99)
OXYHGB MFR BLDV: 98.7 % (ref 94–99)
OXYHGB MFR BLDV: 98.9 % (ref 94–99)
OXYHGB MFR BLDV: 99 % (ref 94–99)
OXYHGB MFR BLDV: 99 % (ref 94–99)
OXYHGB MFR BLDV: 99.1 % (ref 94–99)
PCO2 BLDA: 30.5 MM HG (ref 35–45)
PCO2 BLDA: 31.3 MM HG (ref 35–45)
PCO2 BLDA: 37.2 MM HG (ref 35–45)
PCO2 BLDA: 39.6 MM HG (ref 35–45)
PCO2 BLDA: 42 MM HG (ref 35–45)
PCO2 BLDA: 42.8 MM HG (ref 35–45)
PCO2 BLDA: 47 MM HG (ref 35–45)
PCO2 BLDA: 49.8 MM HG (ref 35–45)
PCO2 TEMP ADJ BLD: 30.5 MM HG (ref 35–45)
PCO2 TEMP ADJ BLD: 31.3 MM HG (ref 35–45)
PCO2 TEMP ADJ BLD: 37.2 MM HG (ref 35–45)
PCO2 TEMP ADJ BLD: 39.6 MM HG (ref 35–45)
PCO2 TEMP ADJ BLD: 42 MM HG (ref 35–45)
PCO2 TEMP ADJ BLD: 42.8 MM HG (ref 35–45)
PCO2 TEMP ADJ BLD: 47 MM HG (ref 35–45)
PCO2 TEMP ADJ BLD: 49.8 MM HG (ref 35–45)
PEEP RESPIRATORY: 10 CM[H2O]
PEEP RESPIRATORY: 10 CM[H2O]
PEEP RESPIRATORY: 5 CM[H2O]
PH BLDA: 7.35 PH UNITS (ref 7.35–7.45)
PH BLDA: 7.35 PH UNITS (ref 7.35–7.45)
PH BLDA: 7.4 PH UNITS (ref 7.35–7.45)
PH BLDA: 7.41 PH UNITS (ref 7.35–7.45)
PH BLDA: 7.42 PH UNITS (ref 7.35–7.45)
PH BLDA: 7.43 PH UNITS (ref 7.35–7.45)
PH BLDA: 7.55 PH UNITS (ref 7.35–7.45)
PH BLDA: 7.56 PH UNITS (ref 7.35–7.45)
PH, TEMP CORRECTED: 7.35 PH UNITS (ref 7.35–7.45)
PH, TEMP CORRECTED: 7.35 PH UNITS (ref 7.35–7.45)
PH, TEMP CORRECTED: 7.4 PH UNITS (ref 7.35–7.45)
PH, TEMP CORRECTED: 7.41 PH UNITS (ref 7.35–7.45)
PH, TEMP CORRECTED: 7.42 PH UNITS (ref 7.35–7.45)
PH, TEMP CORRECTED: 7.43 PH UNITS (ref 7.35–7.45)
PH, TEMP CORRECTED: 7.55 PH UNITS (ref 7.35–7.45)
PH, TEMP CORRECTED: 7.56 PH UNITS (ref 7.35–7.45)
PHOSPHATE SERPL-MCNC: 3 MG/DL (ref 2.5–4.5)
PHOSPHATE SERPL-MCNC: 3.2 MG/DL (ref 2.5–4.5)
PLATELET # BLD AUTO: 133 10*3/MM3 (ref 140–450)
PLATELET # BLD AUTO: 143 10*3/MM3 (ref 140–450)
PMV BLD AUTO: 9.6 FL (ref 6–12)
PMV BLD AUTO: 9.7 FL (ref 6–12)
PO2 BLDA: 116 MM HG (ref 83–108)
PO2 BLDA: 223 MM HG (ref 83–108)
PO2 BLDA: 462 MM HG (ref 83–108)
PO2 BLDA: 540 MM HG (ref 83–108)
PO2 BLDA: 94.1 MM HG (ref 83–108)
PO2 BLDA: >547 MM HG (ref 83–108)
PO2 TEMP ADJ BLD: 116 MM HG (ref 83–108)
PO2 TEMP ADJ BLD: 223 MM HG (ref 83–108)
PO2 TEMP ADJ BLD: 462 MM HG (ref 83–108)
PO2 TEMP ADJ BLD: 540 MM HG (ref 83–108)
PO2 TEMP ADJ BLD: 94.1 MM HG (ref 83–108)
PO2 TEMP ADJ BLD: >547 MM HG (ref 83–108)
PO2 TEMP ADJ BLD: >547 MM HG (ref 83–108)
PO2 TEMP ADJ BLD: ABNORMAL MM[HG]
POTASSIUM BLDA-SCNC: 3.9 MMOL/L (ref 3.5–5.2)
POTASSIUM BLDA-SCNC: 4 MMOL/L (ref 3.5–5.2)
POTASSIUM BLDA-SCNC: 4.3 MMOL/L (ref 3.5–5.2)
POTASSIUM BLDA-SCNC: 4.5 MMOL/L (ref 3.5–5.2)
POTASSIUM BLDA-SCNC: 5 MMOL/L (ref 3.5–5.2)
POTASSIUM BLDA-SCNC: 5.1 MMOL/L (ref 3.5–5.2)
POTASSIUM SERPL-SCNC: 4.1 MMOL/L (ref 3.5–5.2)
POTASSIUM SERPL-SCNC: 4.5 MMOL/L (ref 3.5–5.2)
PROTHROMBIN TIME: 15.8 SECONDS (ref 11.8–14.8)
PSV: 10 CMH2O
PSV: 10 CMH2O
PSV: 5 CMH2O
RBC # BLD AUTO: 3.53 10*6/MM3 (ref 4.14–5.8)
RBC # BLD AUTO: 3.57 10*6/MM3 (ref 4.14–5.8)
SAO2 % BLDCOA: 97.6 % (ref 94–99)
SAO2 % BLDCOA: 98.3 % (ref 94–99)
SAO2 % BLDCOA: 99.7 % (ref 94–99)
SAO2 % BLDCOA: >100.1 % (ref 94–99)
SET MECH RESP RATE: 20
SET MECH RESP RATE: 20
SODIUM BLDA-SCNC: 138 MMOL/L (ref 136–145)
SODIUM BLDA-SCNC: 138 MMOL/L (ref 136–145)
SODIUM BLDA-SCNC: 139 MMOL/L (ref 136–145)
SODIUM BLDA-SCNC: 140 MMOL/L (ref 136–145)
SODIUM SERPL-SCNC: 140 MMOL/L (ref 136–145)
SODIUM SERPL-SCNC: 141 MMOL/L (ref 136–145)
VENTILATOR MODE: ABNORMAL
VT ON VENT VENT: 500 ML
VT ON VENT VENT: 500 ML
WBC NRBC COR # BLD AUTO: 11.96 10*3/MM3 (ref 3.4–10.8)
WBC NRBC COR # BLD AUTO: 8.06 10*3/MM3 (ref 3.4–10.8)

## 2024-03-25 PROCEDURE — C1713 ANCHOR/SCREW BN/BN,TIS/BN: HCPCS | Performed by: SURGERY

## 2024-03-25 PROCEDURE — 25010000002 PAPAVERINE PER 60 MG: Performed by: SURGERY

## 2024-03-25 PROCEDURE — P9041 ALBUMIN (HUMAN),5%, 50ML: HCPCS | Performed by: SURGERY

## 2024-03-25 PROCEDURE — 94002 VENT MGMT INPAT INIT DAY: CPT

## 2024-03-25 PROCEDURE — 25010000002 CEFAZOLIN PER 500 MG: Performed by: NURSE PRACTITIONER

## 2024-03-25 PROCEDURE — 25810000003 LACTATED RINGERS PER 1000 ML: Performed by: ANESTHESIOLOGY

## 2024-03-25 PROCEDURE — 25010000002 HEPARIN (PORCINE) PER 1000 UNITS: Performed by: NURSE ANESTHETIST, CERTIFIED REGISTERED

## 2024-03-25 PROCEDURE — 02100Z9 BYPASS CORONARY ARTERY, ONE ARTERY FROM LEFT INTERNAL MAMMARY, OPEN APPROACH: ICD-10-PCS | Performed by: SURGERY

## 2024-03-25 PROCEDURE — 80069 RENAL FUNCTION PANEL: CPT | Performed by: SURGERY

## 2024-03-25 PROCEDURE — 25010000002 CEFAZOLIN PER 500 MG: Performed by: SURGERY

## 2024-03-25 PROCEDURE — 0 BUPIVACAINE LIPOSOME 1.3 % SUSPENSION 20 ML VIAL: Performed by: SURGERY

## 2024-03-25 PROCEDURE — 25010000002 VASOPRESSIN 20 UNIT/ML SOLUTION: Performed by: NURSE ANESTHETIST, CERTIFIED REGISTERED

## 2024-03-25 PROCEDURE — 25810000003 SODIUM CHLORIDE 0.9 % SOLUTION 250 ML FLEX CONT: Performed by: NURSE ANESTHETIST, CERTIFIED REGISTERED

## 2024-03-25 PROCEDURE — 25010000002 MIDAZOLAM PER 1 MG: Performed by: ANESTHESIOLOGY

## 2024-03-25 PROCEDURE — 25010000002 HEPARIN (PORCINE) PER 1000 UNITS

## 2024-03-25 PROCEDURE — 33519 CABG ARTERY-VEIN THREE: CPT | Performed by: SURGERY

## 2024-03-25 PROCEDURE — 5A1221Z PERFORMANCE OF CARDIAC OUTPUT, CONTINUOUS: ICD-10-PCS | Performed by: SURGERY

## 2024-03-25 PROCEDURE — 85730 THROMBOPLASTIN TIME PARTIAL: CPT | Performed by: SURGERY

## 2024-03-25 PROCEDURE — C1751 CATH, INF, PER/CENT/MIDLINE: HCPCS | Performed by: NURSE ANESTHETIST, CERTIFIED REGISTERED

## 2024-03-25 PROCEDURE — 25010000002 PROPOFOL 10 MG/ML EMULSION: Performed by: NURSE ANESTHETIST, CERTIFIED REGISTERED

## 2024-03-25 PROCEDURE — 25010000002 ONDANSETRON PER 1 MG: Performed by: SURGERY

## 2024-03-25 PROCEDURE — 25010000002 ACETAMINOPHEN 10 MG/ML SOLUTION: Performed by: SURGERY

## 2024-03-25 PROCEDURE — 94761 N-INVAS EAR/PLS OXIMETRY MLT: CPT

## 2024-03-25 PROCEDURE — 82375 ASSAY CARBOXYHB QUANT: CPT

## 2024-03-25 PROCEDURE — 82803 BLOOD GASES ANY COMBINATION: CPT

## 2024-03-25 PROCEDURE — 33533 CABG ARTERIAL SINGLE: CPT | Performed by: SURGERY

## 2024-03-25 PROCEDURE — 94640 AIRWAY INHALATION TREATMENT: CPT

## 2024-03-25 PROCEDURE — 93312 ECHO TRANSESOPHAGEAL: CPT | Performed by: INTERNAL MEDICINE

## 2024-03-25 PROCEDURE — 86900 BLOOD TYPING SEROLOGIC ABO: CPT

## 2024-03-25 PROCEDURE — 25010000002 MANNITOL PER 50 ML

## 2024-03-25 PROCEDURE — 82948 REAGENT STRIP/BLOOD GLUCOSE: CPT

## 2024-03-25 PROCEDURE — 25810000003 LACTATED RINGERS PER 1000 ML: Performed by: SURGERY

## 2024-03-25 PROCEDURE — 25010000002 MORPHINE PER 10 MG: Performed by: SURGERY

## 2024-03-25 PROCEDURE — 83050 HGB METHEMOGLOBIN QUAN: CPT

## 2024-03-25 PROCEDURE — 25010000002 ALBUMIN HUMAN 5% PER 50 ML: Performed by: SURGERY

## 2024-03-25 PROCEDURE — 25010000002 VANCOMYCIN 1 G RECONSTITUTED SOLUTION 1 EACH VIAL: Performed by: SURGERY

## 2024-03-25 PROCEDURE — 25810000003 SODIUM CHLORIDE 0.9 % SOLUTION

## 2024-03-25 PROCEDURE — 94799 UNLISTED PULMONARY SVC/PX: CPT

## 2024-03-25 PROCEDURE — 25810000003 LACTATED RINGERS PER 1000 ML

## 2024-03-25 PROCEDURE — 25810000003 LACTATED RINGERS PER 1000 ML: Performed by: NURSE ANESTHETIST, CERTIFIED REGISTERED

## 2024-03-25 PROCEDURE — 25010000002 NITROGLYCERIN 200 MCG/ML SOLUTION: Performed by: NURSE ANESTHETIST, CERTIFIED REGISTERED

## 2024-03-25 PROCEDURE — 85025 COMPLETE CBC W/AUTO DIFF WBC: CPT | Performed by: SURGERY

## 2024-03-25 PROCEDURE — 25010000002 FUROSEMIDE PER 20 MG

## 2024-03-25 PROCEDURE — 85384 FIBRINOGEN ACTIVITY: CPT | Performed by: SURGERY

## 2024-03-25 PROCEDURE — 25010000002 ALBUMIN HUMAN 25% PER 50 ML

## 2024-03-25 PROCEDURE — 25010000002 PROTAMINE SULFATE PER 10 MG

## 2024-03-25 PROCEDURE — 25010000002 HEPARIN (PORCINE) PER 1000 UNITS: Performed by: SURGERY

## 2024-03-25 PROCEDURE — 83605 ASSAY OF LACTIC ACID: CPT | Performed by: SURGERY

## 2024-03-25 PROCEDURE — 86901 BLOOD TYPING SEROLOGIC RH(D): CPT

## 2024-03-25 PROCEDURE — 93010 ELECTROCARDIOGRAM REPORT: CPT | Performed by: INTERNAL MEDICINE

## 2024-03-25 PROCEDURE — 93325 DOPPLER ECHO COLOR FLOW MAPG: CPT | Performed by: INTERNAL MEDICINE

## 2024-03-25 PROCEDURE — 82805 BLOOD GASES W/O2 SATURATION: CPT

## 2024-03-25 PROCEDURE — 06BQ4ZZ EXCISION OF LEFT SAPHENOUS VEIN, PERCUTANEOUS ENDOSCOPIC APPROACH: ICD-10-PCS | Performed by: SURGERY

## 2024-03-25 PROCEDURE — 25010000002 PHENYLEPHRINE 10 MG/ML SOLUTION

## 2024-03-25 PROCEDURE — 85610 PROTHROMBIN TIME: CPT | Performed by: SURGERY

## 2024-03-25 PROCEDURE — 021209W BYPASS CORONARY ARTERY, THREE ARTERIES FROM AORTA WITH AUTOLOGOUS VENOUS TISSUE, OPEN APPROACH: ICD-10-PCS | Performed by: SURGERY

## 2024-03-25 PROCEDURE — 71045 X-RAY EXAM CHEST 1 VIEW: CPT

## 2024-03-25 PROCEDURE — 0 INSULIN REGULAR HUMAN PER 5 UNITS: Performed by: SURGERY

## 2024-03-25 PROCEDURE — 25010000002 NITROGLYCERIN 200 MCG/ML SOLUTION: Performed by: SURGERY

## 2024-03-25 PROCEDURE — P9047 ALBUMIN (HUMAN), 25%, 50ML: HCPCS

## 2024-03-25 PROCEDURE — 25010000002 LABETALOL 5 MG/ML SOLUTION: Performed by: NURSE ANESTHETIST, CERTIFIED REGISTERED

## 2024-03-25 PROCEDURE — C9290 INJ, BUPIVACAINE LIPOSOME: HCPCS | Performed by: SURGERY

## 2024-03-25 PROCEDURE — 25810000003 DEXTROSE 5 % WITH KCL 20 MEQ 20 MEQ/L SOLUTION: Performed by: SURGERY

## 2024-03-25 PROCEDURE — 33508 ENDOSCOPIC VEIN HARVEST: CPT | Performed by: SURGERY

## 2024-03-25 PROCEDURE — A4648 IMPLANTABLE TISSUE MARKER: HCPCS | Performed by: SURGERY

## 2024-03-25 PROCEDURE — 85027 COMPLETE CBC AUTOMATED: CPT | Performed by: SURGERY

## 2024-03-25 PROCEDURE — 86920 COMPATIBILITY TEST SPIN: CPT

## 2024-03-25 PROCEDURE — 82330 ASSAY OF CALCIUM: CPT

## 2024-03-25 PROCEDURE — 93318 ECHO TRANSESOPHAGEAL INTRAOP: CPT

## 2024-03-25 PROCEDURE — 25010000002 NICARDIPINE 2.5 MG/ML SOLUTION 10 ML VIAL: Performed by: NURSE ANESTHETIST, CERTIFIED REGISTERED

## 2024-03-25 PROCEDURE — 93005 ELECTROCARDIOGRAM TRACING: CPT | Performed by: SURGERY

## 2024-03-25 PROCEDURE — B24BZZ4 ULTRASONOGRAPHY OF HEART WITH AORTA, TRANSESOPHAGEAL: ICD-10-PCS | Performed by: ANESTHESIOLOGY

## 2024-03-25 DEVICE — KT HEMOST ABS SURGIFOAM PORCN 1GRAM: Type: IMPLANTABLE DEVICE | Site: CHEST | Status: FUNCTIONAL

## 2024-03-25 DEVICE — WAX BONE HEMO NAT 2.5G: Type: IMPLANTABLE DEVICE | Site: CHEST | Status: FUNCTIONAL

## 2024-03-25 DEVICE — LIGACLIP EXTRA LIGATING CLIP CARTRIDGES: 6 TITANIUM CLIPS/ CARTRIDGE (MEDIUM)
Type: IMPLANTABLE DEVICE | Site: HEART | Status: FUNCTIONAL
Brand: LIGACLIP

## 2024-03-25 DEVICE — DISK-SHAPED STYLE, SILICONE (1 PER STERILE PKG)
Type: IMPLANTABLE DEVICE | Site: HEART | Status: FUNCTIONAL
Brand: SCANLAN® RADIOMARK® GRAFT MARKERS

## 2024-03-25 DEVICE — IMPLANTABLE DEVICE: Type: IMPLANTABLE DEVICE | Site: HEART | Status: FUNCTIONAL

## 2024-03-25 DEVICE — WR SUT NONABS MF SS V40 1/2CIR TC 6/0 18IN M649G: Type: IMPLANTABLE DEVICE | Site: STERNUM | Status: FUNCTIONAL

## 2024-03-25 DEVICE — PLEDGET INCISIONLINE REINF TFE SFT PTFE 1.5X3X7MM WHT: Type: IMPLANTABLE DEVICE | Site: HEART | Status: FUNCTIONAL

## 2024-03-25 RX ORDER — ALBUTEROL SULFATE 2.5 MG/3ML
2.5 SOLUTION RESPIRATORY (INHALATION) EVERY 4 HOURS PRN
Status: DISCONTINUED | OUTPATIENT
Start: 2024-03-25 | End: 2024-03-26

## 2024-03-25 RX ORDER — FAMOTIDINE 10 MG/ML
20 INJECTION, SOLUTION INTRAVENOUS
Status: COMPLETED | OUTPATIENT
Start: 2024-03-25 | End: 2024-03-25

## 2024-03-25 RX ORDER — ACETAMINOPHEN 325 MG/1
650 TABLET ORAL EVERY 6 HOURS SCHEDULED
Status: DISCONTINUED | OUTPATIENT
Start: 2024-03-26 | End: 2024-03-29 | Stop reason: HOSPADM

## 2024-03-25 RX ORDER — CHLORHEXIDINE GLUCONATE ORAL RINSE 1.2 MG/ML
15 SOLUTION DENTAL EVERY 12 HOURS SCHEDULED
Status: DISCONTINUED | OUTPATIENT
Start: 2024-03-25 | End: 2024-03-25 | Stop reason: SDUPTHER

## 2024-03-25 RX ORDER — ONDANSETRON 2 MG/ML
4 INJECTION INTRAMUSCULAR; INTRAVENOUS EVERY 6 HOURS PRN
Status: DISCONTINUED | OUTPATIENT
Start: 2024-03-25 | End: 2024-03-29 | Stop reason: HOSPADM

## 2024-03-25 RX ORDER — NOREPINEPHRINE BITARTRATE 0.03 MG/ML
.02-.3 INJECTION, SOLUTION INTRAVENOUS CONTINUOUS PRN
Status: DISCONTINUED | OUTPATIENT
Start: 2024-03-25 | End: 2024-03-27 | Stop reason: HOSPADM

## 2024-03-25 RX ORDER — CLOPIDOGREL BISULFATE 75 MG/1
75 TABLET ORAL DAILY
Status: DISCONTINUED | OUTPATIENT
Start: 2024-03-26 | End: 2024-03-29 | Stop reason: HOSPADM

## 2024-03-25 RX ORDER — ROCURONIUM BROMIDE 10 MG/ML
INJECTION, SOLUTION INTRAVENOUS AS NEEDED
Status: DISCONTINUED | OUTPATIENT
Start: 2024-03-25 | End: 2024-03-25 | Stop reason: SURG

## 2024-03-25 RX ORDER — EPHEDRINE SULFATE 50 MG/ML
INJECTION INTRAVENOUS AS NEEDED
Status: DISCONTINUED | OUTPATIENT
Start: 2024-03-25 | End: 2024-03-25 | Stop reason: SURG

## 2024-03-25 RX ORDER — LIDOCAINE HYDROCHLORIDE 20 MG/ML
INJECTION, SOLUTION EPIDURAL; INFILTRATION; INTRACAUDAL; PERINEURAL AS NEEDED
Status: DISCONTINUED | OUTPATIENT
Start: 2024-03-25 | End: 2024-03-25 | Stop reason: SURG

## 2024-03-25 RX ORDER — PROPOFOL 10 MG/ML
VIAL (ML) INTRAVENOUS AS NEEDED
Status: DISCONTINUED | OUTPATIENT
Start: 2024-03-25 | End: 2024-03-25 | Stop reason: SURG

## 2024-03-25 RX ORDER — MEPERIDINE HYDROCHLORIDE 50 MG/ML
25 INJECTION INTRAMUSCULAR; INTRAVENOUS; SUBCUTANEOUS
Status: ACTIVE | OUTPATIENT
Start: 2024-03-25 | End: 2024-03-25

## 2024-03-25 RX ORDER — NICARDIPINE HYDROCHLORIDE 2.5 MG/ML
INJECTION INTRAVENOUS AS NEEDED
Status: DISCONTINUED | OUTPATIENT
Start: 2024-03-25 | End: 2024-03-25 | Stop reason: SURG

## 2024-03-25 RX ORDER — LIDOCAINE 4 G/G
2 PATCH TOPICAL EVERY 24 HOURS
Status: DISCONTINUED | OUTPATIENT
Start: 2024-03-25 | End: 2024-03-29 | Stop reason: HOSPADM

## 2024-03-25 RX ORDER — NICOTINE POLACRILEX 4 MG
15 LOZENGE BUCCAL
Status: DISCONTINUED | OUTPATIENT
Start: 2024-03-25 | End: 2024-03-25 | Stop reason: HOSPADM

## 2024-03-25 RX ORDER — ASPIRIN 81 MG/1
81 TABLET ORAL DAILY
Status: DISCONTINUED | OUTPATIENT
Start: 2024-03-27 | End: 2024-03-29 | Stop reason: HOSPADM

## 2024-03-25 RX ORDER — ASPIRIN 81 MG/1
162 TABLET, CHEWABLE ORAL ONCE
Status: COMPLETED | OUTPATIENT
Start: 2024-03-26 | End: 2024-03-26

## 2024-03-25 RX ORDER — MORPHINE SULFATE 2 MG/ML
2 INJECTION, SOLUTION INTRAMUSCULAR; INTRAVENOUS
Status: DISCONTINUED | OUTPATIENT
Start: 2024-03-25 | End: 2024-03-26 | Stop reason: ALTCHOICE

## 2024-03-25 RX ORDER — SODIUM CHLORIDE 0.9 % (FLUSH) 0.9 %
10 SYRINGE (ML) INJECTION AS NEEDED
Status: DISCONTINUED | OUTPATIENT
Start: 2024-03-25 | End: 2024-03-25 | Stop reason: HOSPADM

## 2024-03-25 RX ORDER — POTASSIUM CHLORIDE, DEXTROSE MONOHYDRATE 150; 5 MG/100ML; G/100ML
30 INJECTION, SOLUTION INTRAVENOUS CONTINUOUS
Status: DISCONTINUED | OUTPATIENT
Start: 2024-03-25 | End: 2024-03-26

## 2024-03-25 RX ORDER — CHLORHEXIDINE GLUCONATE ORAL RINSE 1.2 MG/ML
15 SOLUTION DENTAL EVERY 12 HOURS
Status: DISCONTINUED | OUTPATIENT
Start: 2024-03-25 | End: 2024-03-27

## 2024-03-25 RX ORDER — OXYCODONE HYDROCHLORIDE 5 MG/1
5 TABLET ORAL EVERY 4 HOURS PRN
Status: DISCONTINUED | OUTPATIENT
Start: 2024-03-25 | End: 2024-03-29 | Stop reason: HOSPADM

## 2024-03-25 RX ORDER — DEXMEDETOMIDINE HYDROCHLORIDE 4 UG/ML
INJECTION, SOLUTION INTRAVENOUS CONTINUOUS PRN
Status: DISCONTINUED | OUTPATIENT
Start: 2024-03-25 | End: 2024-03-25 | Stop reason: SURG

## 2024-03-25 RX ORDER — CALCIUM CHLORIDE 100 MG/ML
INJECTION INTRAVENOUS; INTRAVENTRICULAR AS NEEDED
Status: DISCONTINUED | OUTPATIENT
Start: 2024-03-25 | End: 2024-03-25 | Stop reason: SURG

## 2024-03-25 RX ORDER — SODIUM CHLORIDE 0.9 % (FLUSH) 0.9 %
10 SYRINGE (ML) INJECTION EVERY 12 HOURS SCHEDULED
Status: DISCONTINUED | OUTPATIENT
Start: 2024-03-25 | End: 2024-03-25 | Stop reason: HOSPADM

## 2024-03-25 RX ORDER — SODIUM CHLORIDE 9 MG/ML
40 INJECTION, SOLUTION INTRAVENOUS AS NEEDED
Status: DISCONTINUED | OUTPATIENT
Start: 2024-03-25 | End: 2024-03-25 | Stop reason: HOSPADM

## 2024-03-25 RX ORDER — ACETAMINOPHEN 650 MG/1
650 SUPPOSITORY RECTAL EVERY 4 HOURS PRN
Status: DISCONTINUED | OUTPATIENT
Start: 2024-03-26 | End: 2024-03-25

## 2024-03-25 RX ORDER — BISACODYL 5 MG/1
10 TABLET, DELAYED RELEASE ORAL 2 TIMES DAILY
Status: DISCONTINUED | OUTPATIENT
Start: 2024-03-26 | End: 2024-03-29 | Stop reason: HOSPADM

## 2024-03-25 RX ORDER — VECURONIUM BROMIDE 1 MG/ML
INJECTION, POWDER, LYOPHILIZED, FOR SOLUTION INTRAVENOUS AS NEEDED
Status: DISCONTINUED | OUTPATIENT
Start: 2024-03-25 | End: 2024-03-25 | Stop reason: SURG

## 2024-03-25 RX ORDER — SODIUM CHLORIDE, SODIUM LACTATE, POTASSIUM CHLORIDE, CALCIUM CHLORIDE 600; 310; 30; 20 MG/100ML; MG/100ML; MG/100ML; MG/100ML
100 INJECTION, SOLUTION INTRAVENOUS CONTINUOUS
Status: DISCONTINUED | OUTPATIENT
Start: 2024-03-25 | End: 2024-03-25

## 2024-03-25 RX ORDER — ACETAMINOPHEN 10 MG/ML
1000 INJECTION, SOLUTION INTRAVENOUS EVERY 8 HOURS
Qty: 200 ML | Refills: 0 | Status: COMPLETED | OUTPATIENT
Start: 2024-03-25 | End: 2024-03-25

## 2024-03-25 RX ORDER — SODIUM CHLORIDE 9 MG/ML
30 INJECTION, SOLUTION INTRAVENOUS CONTINUOUS PRN
Status: DISCONTINUED | OUTPATIENT
Start: 2024-03-25 | End: 2024-03-25 | Stop reason: HOSPADM

## 2024-03-25 RX ORDER — POLYETHYLENE GLYCOL 3350 17 G/17G
17 POWDER, FOR SOLUTION ORAL DAILY
Status: DISCONTINUED | OUTPATIENT
Start: 2024-03-26 | End: 2024-03-29 | Stop reason: HOSPADM

## 2024-03-25 RX ORDER — SODIUM CHLORIDE, SODIUM LACTATE, POTASSIUM CHLORIDE, CALCIUM CHLORIDE 600; 310; 30; 20 MG/100ML; MG/100ML; MG/100ML; MG/100ML
INJECTION, SOLUTION INTRAVENOUS CONTINUOUS PRN
Status: DISCONTINUED | OUTPATIENT
Start: 2024-03-25 | End: 2024-03-25 | Stop reason: SURG

## 2024-03-25 RX ORDER — SODIUM CHLORIDE 0.9 % (FLUSH) 0.9 %
3-10 SYRINGE (ML) INJECTION AS NEEDED
Status: DISCONTINUED | OUTPATIENT
Start: 2024-03-25 | End: 2024-03-25 | Stop reason: HOSPADM

## 2024-03-25 RX ORDER — ATORVASTATIN CALCIUM 10 MG/1
20 TABLET, FILM COATED ORAL NIGHTLY
Status: DISCONTINUED | OUTPATIENT
Start: 2024-03-26 | End: 2024-03-29 | Stop reason: HOSPADM

## 2024-03-25 RX ORDER — ENOXAPARIN SODIUM 100 MG/ML
40 INJECTION SUBCUTANEOUS DAILY
Status: DISCONTINUED | OUTPATIENT
Start: 2024-03-26 | End: 2024-03-29 | Stop reason: HOSPADM

## 2024-03-25 RX ORDER — MIDAZOLAM HYDROCHLORIDE 1 MG/ML
2 INJECTION INTRAMUSCULAR; INTRAVENOUS ONCE
Status: COMPLETED | OUTPATIENT
Start: 2024-03-25 | End: 2024-03-25

## 2024-03-25 RX ORDER — DEXMEDETOMIDINE HYDROCHLORIDE 4 UG/ML
.2-1.5 INJECTION, SOLUTION INTRAVENOUS
Status: DISCONTINUED | OUTPATIENT
Start: 2024-03-25 | End: 2024-03-27 | Stop reason: HOSPADM

## 2024-03-25 RX ORDER — NICOTINE POLACRILEX 4 MG
15 LOZENGE BUCCAL
Status: DISCONTINUED | OUTPATIENT
Start: 2024-03-25 | End: 2024-03-26

## 2024-03-25 RX ORDER — ACETAMINOPHEN 325 MG/1
650 TABLET ORAL EVERY 4 HOURS PRN
Status: DISCONTINUED | OUTPATIENT
Start: 2024-03-26 | End: 2024-03-25

## 2024-03-25 RX ORDER — METHOCARBAMOL 500 MG/1
500 TABLET, FILM COATED ORAL EVERY 8 HOURS SCHEDULED
Status: DISCONTINUED | OUTPATIENT
Start: 2024-03-25 | End: 2024-03-29 | Stop reason: HOSPADM

## 2024-03-25 RX ORDER — DEXTROSE MONOHYDRATE 25 G/50ML
10-50 INJECTION, SOLUTION INTRAVENOUS
Status: DISCONTINUED | OUTPATIENT
Start: 2024-03-25 | End: 2024-03-26

## 2024-03-25 RX ORDER — SUFENTANIL CITRATE 50 UG/ML
INJECTION EPIDURAL; INTRAVENOUS AS NEEDED
Status: DISCONTINUED | OUTPATIENT
Start: 2024-03-25 | End: 2024-03-25 | Stop reason: SURG

## 2024-03-25 RX ORDER — MIDAZOLAM HYDROCHLORIDE 1 MG/ML
0.5 INJECTION INTRAMUSCULAR; INTRAVENOUS
Status: COMPLETED | OUTPATIENT
Start: 2024-03-25 | End: 2024-03-25

## 2024-03-25 RX ORDER — ACETAMINOPHEN 500 MG
1000 TABLET ORAL ONCE
Status: COMPLETED | OUTPATIENT
Start: 2024-03-25 | End: 2024-03-25

## 2024-03-25 RX ORDER — DEXTROSE MONOHYDRATE 25 G/50ML
10-50 INJECTION, SOLUTION INTRAVENOUS
Status: DISCONTINUED | OUTPATIENT
Start: 2024-03-25 | End: 2024-03-25 | Stop reason: HOSPADM

## 2024-03-25 RX ORDER — NITROGLYCERIN 20 MG/100ML
5 INJECTION INTRAVENOUS CONTINUOUS
Status: DISCONTINUED | OUTPATIENT
Start: 2024-03-25 | End: 2024-03-27 | Stop reason: HOSPADM

## 2024-03-25 RX ORDER — IPRATROPIUM BROMIDE AND ALBUTEROL SULFATE 2.5; .5 MG/3ML; MG/3ML
3 SOLUTION RESPIRATORY (INHALATION)
Status: DISCONTINUED | OUTPATIENT
Start: 2024-03-25 | End: 2024-03-26

## 2024-03-25 RX ORDER — IBUPROFEN 600 MG/1
1 TABLET ORAL
Status: DISCONTINUED | OUTPATIENT
Start: 2024-03-25 | End: 2024-03-25 | Stop reason: HOSPADM

## 2024-03-25 RX ORDER — AMINOCAPROIC ACID 250 MG/ML
INJECTION, SOLUTION INTRAVENOUS AS NEEDED
Status: DISCONTINUED | OUTPATIENT
Start: 2024-03-25 | End: 2024-03-25 | Stop reason: SURG

## 2024-03-25 RX ORDER — LABETALOL HYDROCHLORIDE 5 MG/ML
INJECTION, SOLUTION INTRAVENOUS AS NEEDED
Status: DISCONTINUED | OUTPATIENT
Start: 2024-03-25 | End: 2024-03-25 | Stop reason: SURG

## 2024-03-25 RX ORDER — SODIUM CHLORIDE 0.9 % (FLUSH) 0.9 %
3 SYRINGE (ML) INJECTION EVERY 12 HOURS SCHEDULED
Status: DISCONTINUED | OUTPATIENT
Start: 2024-03-25 | End: 2024-03-25 | Stop reason: HOSPADM

## 2024-03-25 RX ORDER — SODIUM CHLORIDE, SODIUM LACTATE, POTASSIUM CHLORIDE, CALCIUM CHLORIDE 600; 310; 30; 20 MG/100ML; MG/100ML; MG/100ML; MG/100ML
30 INJECTION, SOLUTION INTRAVENOUS CONTINUOUS
Status: DISCONTINUED | OUTPATIENT
Start: 2024-03-25 | End: 2024-03-25

## 2024-03-25 RX ORDER — OXYCODONE HYDROCHLORIDE 10 MG/1
10 TABLET ORAL EVERY 4 HOURS PRN
Status: DISCONTINUED | OUTPATIENT
Start: 2024-03-25 | End: 2024-03-29 | Stop reason: HOSPADM

## 2024-03-25 RX ORDER — MAGNESIUM HYDROXIDE 1200 MG/15ML
LIQUID ORAL AS NEEDED
Status: DISCONTINUED | OUTPATIENT
Start: 2024-03-25 | End: 2024-03-25 | Stop reason: HOSPADM

## 2024-03-25 RX ORDER — SODIUM CHLORIDE 0.9 % (FLUSH) 0.9 %
30 SYRINGE (ML) INJECTION ONCE AS NEEDED
Status: DISCONTINUED | OUTPATIENT
Start: 2024-03-25 | End: 2024-03-25 | Stop reason: HOSPADM

## 2024-03-25 RX ORDER — ACETAMINOPHEN 160 MG/5ML
650 SOLUTION ORAL EVERY 4 HOURS PRN
Status: DISCONTINUED | OUTPATIENT
Start: 2024-03-26 | End: 2024-03-25

## 2024-03-25 RX ORDER — NITROGLYCERIN 20 MG/100ML
INJECTION INTRAVENOUS CONTINUOUS PRN
Status: DISCONTINUED | OUTPATIENT
Start: 2024-03-25 | End: 2024-03-25 | Stop reason: SURG

## 2024-03-25 RX ORDER — ALBUMIN, HUMAN INJ 5% 5 %
500 SOLUTION INTRAVENOUS ONCE
Status: COMPLETED | OUTPATIENT
Start: 2024-03-25 | End: 2024-03-25

## 2024-03-25 RX ORDER — ALBUMIN, HUMAN INJ 5% 5 %
500 SOLUTION INTRAVENOUS AS NEEDED
Status: DISCONTINUED | OUTPATIENT
Start: 2024-03-25 | End: 2024-03-27

## 2024-03-25 RX ORDER — HEPARIN SODIUM 1000 [USP'U]/ML
INJECTION, SOLUTION INTRAVENOUS; SUBCUTANEOUS AS NEEDED
Status: DISCONTINUED | OUTPATIENT
Start: 2024-03-25 | End: 2024-03-25 | Stop reason: SURG

## 2024-03-25 RX ADMIN — SUFENTANIL CITRATE 100 MCG: 50 INJECTION, SOLUTION EPIDURAL; INTRAVENOUS at 07:45

## 2024-03-25 RX ADMIN — Medication 1 APPLICATION: at 05:56

## 2024-03-25 RX ADMIN — POTASSIUM CHLORIDE AND DEXTROSE MONOHYDRATE 30 ML/HR: 150; 5 INJECTION, SOLUTION INTRAVENOUS at 11:48

## 2024-03-25 RX ADMIN — NITROGLYCERIN 5 MCG/MIN: 20 INJECTION INTRAVENOUS at 19:17

## 2024-03-25 RX ADMIN — NITROGLYCERIN 15 MCG/MIN: 20 INJECTION INTRAVENOUS at 07:49

## 2024-03-25 RX ADMIN — AMINOCAPROIC ACID 1 G/HR: 250 INJECTION, SOLUTION INTRAVENOUS at 07:40

## 2024-03-25 RX ADMIN — METHOCARBAMOL 500 MG: 500 TABLET, FILM COATED ORAL at 22:11

## 2024-03-25 RX ADMIN — SODIUM CHLORIDE, POTASSIUM CHLORIDE, SODIUM LACTATE AND CALCIUM CHLORIDE 100 ML/HR: 600; 310; 30; 20 INJECTION, SOLUTION INTRAVENOUS at 06:07

## 2024-03-25 RX ADMIN — LABETALOL HYDROCHLORIDE 10 MG: 5 INJECTION INTRAVENOUS at 08:25

## 2024-03-25 RX ADMIN — ALBUMIN (HUMAN) 500 ML: 12.5 INJECTION, SOLUTION INTRAVENOUS at 21:20

## 2024-03-25 RX ADMIN — ROCURONIUM BROMIDE 100 MG: 10 INJECTION, SOLUTION INTRAVENOUS at 07:13

## 2024-03-25 RX ADMIN — PROPOFOL 100 MG: 10 INJECTION, EMULSION INTRAVENOUS at 07:41

## 2024-03-25 RX ADMIN — MIDAZOLAM HYDROCHLORIDE 2 MG: 1 INJECTION, SOLUTION INTRAMUSCULAR; INTRAVENOUS at 10:00

## 2024-03-25 RX ADMIN — CHLORHEXIDINE GLUCONATE 0.12% ORAL RINSE 15 ML: 1.2 LIQUID ORAL at 18:18

## 2024-03-25 RX ADMIN — CEFAZOLIN 2000 MG: 2 INJECTION, POWDER, FOR SOLUTION INTRAMUSCULAR; INTRAVENOUS at 10:17

## 2024-03-25 RX ADMIN — ONDANSETRON 4 MG: 2 INJECTION INTRAMUSCULAR; INTRAVENOUS at 18:12

## 2024-03-25 RX ADMIN — VECURONIUM BROMIDE 5 MG: 1 INJECTION, POWDER, LYOPHILIZED, FOR SOLUTION INTRAVENOUS at 10:27

## 2024-03-25 RX ADMIN — FAMOTIDINE 20 MG: 10 INJECTION INTRAVENOUS at 06:21

## 2024-03-25 RX ADMIN — MIDAZOLAM HYDROCHLORIDE 2 MG: 1 INJECTION, SOLUTION INTRAMUSCULAR; INTRAVENOUS at 06:32

## 2024-03-25 RX ADMIN — SODIUM CHLORIDE 5 MG/HR: 9 INJECTION, SOLUTION INTRAVENOUS at 10:40

## 2024-03-25 RX ADMIN — EPHEDRINE SULFATE 25 MG: 50 INJECTION INTRAVENOUS at 07:29

## 2024-03-25 RX ADMIN — DEXMEDETOMIDINE HYDROCHLORIDE 0.8 MCG/KG/HR: 4 INJECTION, SOLUTION INTRAVENOUS at 14:11

## 2024-03-25 RX ADMIN — CEFAZOLIN 2000 MG: 2 INJECTION, POWDER, FOR SOLUTION INTRAMUSCULAR; INTRAVENOUS at 07:15

## 2024-03-25 RX ADMIN — VECURONIUM BROMIDE 5 MG: 1 INJECTION, POWDER, LYOPHILIZED, FOR SOLUTION INTRAVENOUS at 09:32

## 2024-03-25 RX ADMIN — ACETAMINOPHEN 1000 MG: 500 TABLET, FILM COATED ORAL at 05:56

## 2024-03-25 RX ADMIN — MORPHINE SULFATE 2 MG: 2 INJECTION, SOLUTION INTRAMUSCULAR; INTRAVENOUS at 15:50

## 2024-03-25 RX ADMIN — SUFENTANIL CITRATE 50 MCG: 50 INJECTION, SOLUTION EPIDURAL; INTRAVENOUS at 10:36

## 2024-03-25 RX ADMIN — LIDOCAINE HYDROCHLORIDE 100 MG: 20 INJECTION, SOLUTION EPIDURAL; INFILTRATION; INTRACAUDAL; PERINEURAL at 07:13

## 2024-03-25 RX ADMIN — ALBUMIN (HUMAN) 500 ML: 12.5 INJECTION, SOLUTION INTRAVENOUS at 14:52

## 2024-03-25 RX ADMIN — VECURONIUM BROMIDE 10 MG: 1 INJECTION, POWDER, LYOPHILIZED, FOR SOLUTION INTRAVENOUS at 08:26

## 2024-03-25 RX ADMIN — OXYCODONE HYDROCHLORIDE 10 MG: 5 TABLET ORAL at 20:30

## 2024-03-25 RX ADMIN — AMINOCAPROIC ACID 5 G: 250 INJECTION, SOLUTION INTRAVENOUS at 07:40

## 2024-03-25 RX ADMIN — ALBUMIN (HUMAN) 500 ML: 12.5 INJECTION, SOLUTION INTRAVENOUS at 11:58

## 2024-03-25 RX ADMIN — ACETAMINOPHEN 1000 MG: 10 INJECTION, SOLUTION INTRAVENOUS at 21:19

## 2024-03-25 RX ADMIN — MIDAZOLAM HYDROCHLORIDE 3 MG: 1 INJECTION, SOLUTION INTRAMUSCULAR; INTRAVENOUS at 07:07

## 2024-03-25 RX ADMIN — SODIUM CHLORIDE, POTASSIUM CHLORIDE, SODIUM LACTATE AND CALCIUM CHLORIDE: 600; 310; 30; 20 INJECTION, SOLUTION INTRAVENOUS at 07:35

## 2024-03-25 RX ADMIN — DEXMEDETOMIDINE HYDROCHLORIDE 0.5 MCG/KG/HR: 4 INJECTION, SOLUTION INTRAVENOUS at 08:46

## 2024-03-25 RX ADMIN — NICARDIPINE HYDROCHLORIDE 500 MCG: 2.5 INJECTION INTRAVENOUS at 08:18

## 2024-03-25 RX ADMIN — Medication 1 APPLICATION: at 18:18

## 2024-03-25 RX ADMIN — CALCIUM CHLORIDE 0.5 G: 100 INJECTION INTRAVENOUS; INTRAVENTRICULAR at 10:25

## 2024-03-25 RX ADMIN — CALCIUM CHLORIDE 0.25 G: 100 INJECTION INTRAVENOUS; INTRAVENTRICULAR at 10:31

## 2024-03-25 RX ADMIN — IPRATROPIUM BROMIDE AND ALBUTEROL SULFATE 3 ML: .5; 3 SOLUTION RESPIRATORY (INHALATION) at 19:23

## 2024-03-25 RX ADMIN — MORPHINE SULFATE 2 MG: 2 INJECTION, SOLUTION INTRAMUSCULAR; INTRAVENOUS at 18:12

## 2024-03-25 RX ADMIN — LIDOCAINE 2 PATCH: 4 PATCH TOPICAL at 22:41

## 2024-03-25 RX ADMIN — HEPARIN SODIUM 30000 UNITS: 1000 INJECTION, SOLUTION INTRAVENOUS; SUBCUTANEOUS at 08:37

## 2024-03-25 RX ADMIN — ACETAMINOPHEN 1000 MG: 10 INJECTION, SOLUTION INTRAVENOUS at 13:08

## 2024-03-25 RX ADMIN — SODIUM CHLORIDE 4.2 UNITS/HR: 9 INJECTION, SOLUTION INTRAVENOUS at 12:51

## 2024-03-25 RX ADMIN — IPRATROPIUM BROMIDE AND ALBUTEROL SULFATE 3 ML: .5; 3 SOLUTION RESPIRATORY (INHALATION) at 14:10

## 2024-03-25 RX ADMIN — NICARDIPINE HYDROCHLORIDE 500 MCG: 2.5 INJECTION INTRAVENOUS at 08:42

## 2024-03-25 RX ADMIN — CEFAZOLIN 2 G: 2 INJECTION, POWDER, FOR SOLUTION INTRAMUSCULAR; INTRAVENOUS at 18:18

## 2024-03-25 RX ADMIN — SUFENTANIL CITRATE 50 MCG: 50 INJECTION, SOLUTION EPIDURAL; INTRAVENOUS at 10:16

## 2024-03-25 RX ADMIN — NICARDIPINE HYDROCHLORIDE 500 MCG: 2.5 INJECTION INTRAVENOUS at 08:07

## 2024-03-25 RX ADMIN — SUFENTANIL CITRATE 100 MCG: 50 INJECTION, SOLUTION EPIDURAL; INTRAVENOUS at 07:13

## 2024-03-25 RX ADMIN — SODIUM CHLORIDE, POTASSIUM CHLORIDE, SODIUM LACTATE AND CALCIUM CHLORIDE 30 ML/HR: 600; 310; 30; 20 INJECTION, SOLUTION INTRAVENOUS at 06:07

## 2024-03-25 RX ADMIN — HEPARIN SODIUM 5000 UNITS: 1000 INJECTION, SOLUTION INTRAVENOUS; SUBCUTANEOUS at 08:28

## 2024-03-25 NOTE — ANESTHESIA POSTPROCEDURE EVALUATION
Patient: Rogerio Barksdale    Procedure Summary       Date: 03/25/24 Room / Location: [facility] OR  /  [facility] OR    Anesthesia Start: 0703 Anesthesia Stop: 1129    Procedure: CORONARY ARTERY BYPASS GRAFTING X4, LEFT INTERNAL MAMMARY ARTERY GRAFT, LEFT LEG ENDOSCOPIC VEIN HARVEST, TRANSESOPHAGEAL ECHOCARDIOGRAM (Chest) Diagnosis:       NSTEMI (non-ST elevated myocardial infarction)      (NSTEMI (non-ST elevated myocardial infarction) [I21.4])    Surgeons: Humberto Avalos MD Provider: Niko Gray CRNA    Anesthesia Type: general ASA Status: 4            Anesthesia Type: general    Vitals  Vitals Value Taken Time   BP     Temp     Pulse 101 03/25/24 1129   Resp     SpO2 99 % 03/25/24 1129   Vitals shown include unfiled device data.        Post Anesthesia Care and Evaluation    Patient location during evaluation: ICU  Patient participation: complete - patient cannot participate  Level of consciousness: obtunded/minimal responses  Pain score: 0  Pain management: adequate  Anesthetic complications: No anesthetic complications  PONV Status: none  Cardiovascular status: acceptable  Respiratory status: acceptable, ETT, intubated and ventilator  Hydration status: acceptable

## 2024-03-25 NOTE — ANESTHESIA PROCEDURE NOTES
Arterial Line      Patient reassessed immediately prior to procedure    Patient location during procedure: pre-op  Start time: 3/25/2024 6:45 AM   Line placed for hemodynamic monitoring, ABGs/Labs/ISTAT and MD/Surgeon request.  Performed By   Anesthesiologist: Elvia Loo MD  CRNA/CAA: Niko Gray CRNA   Preanesthetic Checklist  Completed: patient identified, IV checked, site marked, risks and benefits discussed, surgical consent, monitors and equipment checked, pre-op evaluation and timeout performed  Arterial Line Prep    Sterile Tech: gloves and sterile barriers  Prep: ChloraPrep  Patient monitoring: blood pressure monitoring, continuous pulse oximetry and EKG  Arterial Line Procedure   Laterality:left  Location:  radial artery  Catheter size: 20 G   Guidance: palpation technique  Number of attempts: 1  Successful placement: yes   Post Assessment   Dressing Type: occlusive dressing applied, secured with tape and wrist guard applied.   Complications no  Circ/Move/Sens Assessment: normal and unchanged.   Patient Tolerance: patient tolerated the procedure well with no apparent complications

## 2024-03-25 NOTE — PROGRESS NOTES
RT EQUIPMENT DEVICE RELATED - SKIN ASSESSMENT    James Score:        RT Medical Equipment/Device:     ETT Larry/Anchorfast    Skin Assessment:      Cheek:  Intact  Lips:  Intact  Mouth:  Intact    Device Skin Pressure Protection:  Skin-to-device areas padded:  Anchorfast    Nurse Notification:  Nina Fernandez, RRT

## 2024-03-25 NOTE — INTERVAL H&P NOTE
No significant change since last seen in hospital, plan for CABG.  Discussed again the risks and benefits as well as operative and post operative expectations, he understands and agrees to proceed.    Humberto Avalos M.D.  Cardiothoracic Surgeon

## 2024-03-25 NOTE — SIGNIFICANT NOTE
03/25/24 1138   Readings   Plateau Pressure (cm H2O) 20 cm H2O   Driving Pressure (cm H2O) 10 cm H2O   Static Compliance (L/cm H2O) 50   Dynamic Compliance (L/cm H2O) 56 L/cm H2O

## 2024-03-25 NOTE — OP NOTE
Cardiac Surgery Operative Note     Date of Procedure:  3/25/2024     Preoperative Diagnosis:   Coronary artery disease involving native coronary artery of native heart with NSTEMI  Hyperlipidemia  Hypertension  Former Smoker  COPD  CKD  GERD     Postoperative Diagnosis:  Same     Procedures Performed:  1. Coronary Artery Bypass, using arterial graft; single arterial graft, CPT 00151  2. Coronary Artery bypass, using venous grafts and arterial grafts, 3 venous graft, CPT +82737     Procedure Summary: CABG x4 (LIMA to LAD, SVG to RCA, SVG to OM, SVG to Diagonal Artery)     Surgeon:  Humberto Avalos MD  Assistants:  Mary Ellen Camarena CFA  Anesthesia Staff:  Elvia Loo MD  Anesthesia Type:  General     Estimated Blood Loss:  Minimal (Cell Saver)     Drains:  1. 24 Setswana Victor M drain-left pleural space  2. 24 Setswana Victor M drains x2-anterior and posterior mediastinum     Specimens:  None     Operative Indications: Mr. Barksdale is a 73-year-old male who presented to the hospital last week with new onset chest pain, diagnosed with NSTEMI.  He had a history of PCI in the past continue current smoker.  With this he underwent coronary angiography that showed severe LAD disease, severe disease of the ostium of the diagonal artery, severe disease in the OM as well as severe focal RCA disease right dominant system.  He had normal LV function.  He was discharged home and returns today for CABG.  Discussed with him the risk and benefits he understands and agrees to proceed.      Operative Findings:    LIMA was small but with good flow.    The LAD at site of grafting measured 2 mm in size and had mild atherosclerotic disease burden, grafted using LIMA.   OM1 with mild atherosclerotic burden and measured 1 mm in size at site of grafting, grafted using SVG.  Diagonal artery measured 1.5 mm in size and had moderate atherosclerotic disease burden, grafted using SVG.  R-PDA measured 1.8 mm in size at site of grafting, had severe  atherosclerotic disease burden and was grafted and the only soft portion of the R-PDA, had good outflow at end of grafting with SVG.       Transesophageal echocardiography salient findings include stable normal left ventricular function with normal wall motion.       Total aortic cross-clamp time:  64 minutes  Total cardiac bypass time:  75 minutes     Operative description in detail:  The patient was taken to the operative suite where the patient was placed in a supine position.  Induction of general anesthesia and placement of a single-lumen endotracheal tube was performed without remark.  Appropriate arterial and venous access was established without remark.  A right IJ central venous catheter was placed followed by a Bow pulmonary artery catheter by anesthesia staff. The patient was then prepped and draped in the usual and sterile surgical fashion.  A timeout was performed.  Perioperative antibiotics were administered. Beta blocker was given.     A simultaneous team approach was used to harvest both the left internal mammary artery as well as the left saphenous vein.  Left saphenous vein was harvested using endoscopic technique in the standard fashion.  There were some sclerotic and varicose segments that were excised.  With this there was enough conduit.  Side branches were controlled with a combination of suture and clips.  The leg wounds were hemostatic and were closed in anatomic layers using absorbable suture.     Median sternotomy was performed in standard fashion. Hemostasis was ensured along sternal edges.  A Rultract device was used to expose the posterior sternal table.  The left internal mammary artery was taken down using a standard pedicle technique with a combination of electrocautery and clips to control all side branches.  At that time, the patient was systemically anticoagulated with IV heparin.  A 24 Japanese Victor M drain was placed in the left pleural space.  After suitable time of circulating  heparin, clips were placed doubly onto the mammary artery distally and it was divided proximal to the previously placed clips.  It had excellent arterial inflow.  The mammary artery was controlled distally.  The mammary artery harvest bed was hemostatic.  The Rultract device was removed from the sterile field and a sternal retractor was used for exposure.         Midline mediastinal fat and thymus were divided and pericardium opened.  A pericardial well was created.  The distal ascending aorta and right atrial appendage were cannulated for cardiopulmonary bypass in standard fashion.  Aortic line was tested and was satisfactory.  A combined cardioplegia/aortic root vent set was secured with a horizontal mattress 4-0 Prolene suture.  With an appropriate ACT cardiopulmonary bypass was commenced.  I dissected out the LAD for suitable sites for bypass grafting.  The diagonal artery was examined and dissected out for suitable bypass.  The OM was examined and dissected out for suitable bypass sites.  The R-PDA was dissected out and suitable site found for grafting found.       With that, I proceeded to apply the aortic cross-clamp and administered cardioplegia utilizing delNido cardioplegia.  This was given in an antegrade fashion.  I ensured during the entire cardioplegia administration the LV did not distend.  Upon achieving electrical-mechanical arrest, I applied topical hypothermia to the ventricle and total standard dose was administered.       I then directed my attention to the R-PDA.  Coronary arteriotomy was made and augmented to size with Kat scissors.  The saphenous vein graft was prepared, it was anastomosed in end-to-side fashion using a running 7-0 Prolene suture.  Anastomosis was assessed for hemostasis which was excellent.  It was not tense or torsed.  I then measured the vein graft with heart full for proximal anastomosis.  Aortotomy was made and augmented to size using a 4 mm punch.  Proximal  anastomosis was made in an end-to-side fashion using a running 6-0 Prolene suture.  Hemostasis was excellent and the aortic root was de-aired while tying the suture.       I then directed my attention to the OM artery.  Coronary arteriotomy was made and augmented to size with Kat scissors.  The saphenous vein graft was prepared, it was anastomosed in end-to-side fashion using a running 7-0 Prolene suture.  Anastomosis was assessed for hemostasis which was excellent.  It was not tense or torsed.  I then measured the vein graft with heart full for proximal anastomosis.  Aortotomy was made and augmented to size using a 4 mm punch.  Proximal anastomosis was made in an end-to-side fashion using a running 6-0 Prolene suture.  Hemostasis was excellent and the aortic root was de-aired while tying the suture.       I then directed my attention to the diagonal artery.  Coronary arteriotomy was made and augmented to size with Kat scissors.  The saphenous vein graft was prepared, it was anastomosed in end-to-side fashion using a running 7-0 Prolene suture.  Anastomosis was assessed for hemostasis which was excellent.  It was not tense or torsed.  I then measured the vein graft with heart full for proximal anastomosis.  Aortotomy was made and augmented to size using a 4 mm punch.  Proximal anastomosis was made in an end-to-side fashion using a running 6-0 Prolene suture.  Hemostasis was excellent and the aortic root was de-aired while tying the suture.     I directed my attention towards the LAD.  A coronary arteriotomy was made and augmented to size with Kat scissors.  It is as per operative findings.  The LIMA was prepared.  The anastomosis was constructed in an end-to-side orientation with running 7-0 Prolene suture.  The anastomosis was assessed for hemostasis which was excellent. It is without tension or torsion.  I did tack the mammary artery pedicle to the anterior aspect of the heart with 6-0 Prolene suture.      With that being accomplished, a terminal hotshot was administered.  The patient was placed in Trendelenburg position.  Upon completion of terminal hotshot and placement of temporary epicardial pacing wires, with the aortic vent on high and pump flows diminished, the aortic cross-clamp was released.  With that, full support was implemented.  A nonworking beating phase was implemented.  Ventilation restored.  I surveyed each graft and each anastomosis was hemostatic and had excellent lay.  With all in readiness, the heart was allowed to fill and then weaned off cardiopulmonary bypass.  After ensuring no intracardiac air the aortic root vent/cardioplegia set was removed and site reinforced as a matter routine.  I decannulated the venous line and snared down its associated pursestring suture.  Systemic intravenous protamine was administered.  All associated blood volume was returned to the patient. With continued good hemodynamics, I decannulated the arterial line and tied down its associated pursestring sutures.  Aortic cannulation site was reinforced as per routine.  At this time I tied down the previously snared pursestring suture.  The mediastinum was drained with 24 Moz[place] Victor M drains placed anteriorly and posteriorly.  I surveyed the chest and hemostasis was pristine.  The sternum was reapproximated with interrupted stainless steel wires.  In layers anatomically the soft tissue planes were reapproximated. Instruments, sharps, and sponge counts were reported as correct.      Complications: None     Disposition: Transferred to ICU in stable and guarded condition.     Humberto Avalos MD  Cardiothoracic Surgeon

## 2024-03-25 NOTE — ANESTHESIA PROCEDURE NOTES
Airway  Urgency: elective    Date/Time: 3/25/2024 7:16 AM  Airway not difficult    General Information and Staff    Patient location during procedure: OR  CRNA/CAA: Niko Gray CRNA    Indications and Patient Condition  Indications for airway management: airway protection    Preoxygenated: yes  Mask difficulty assessment: 1 - vent by mask    Final Airway Details  Final airway type: endotracheal airway      Successful airway: ETT     Successful intubation technique: direct laryngoscopy  Endotracheal tube insertion site: oral  Blade: Juancarlos  Blade size: 3.5 (3.5)  ETT size (mm): 7.5  Cormack-Lehane Classification: grade I - full view of glottis  Placement verified by: chest auscultation and capnometry   Measured from: gums  Number of attempts at approach: 1  Assessment: lips, teeth, and gum same as pre-op and atraumatic intubation

## 2024-03-25 NOTE — ANESTHESIA PROCEDURE NOTES
Central Line      Patient reassessed immediately prior to procedure    Patient location during procedure: OR  Start time: 3/25/2024 7:25 AM  Indications: vascular access, MD/Surgeon request and central pressure monitoring  Staff  Anesthesiologist: Elvia Loo MD  Preanesthetic Checklist  Completed: patient identified, IV checked, site marked, risks and benefits discussed, surgical consent, monitors and equipment checked, pre-op evaluation and timeout performed  Central Line Prep  Sterile Tech:cap, gloves, gown, mask and sterile barriers  Prep: chloraprep  Patient monitoring: blood pressure monitoring, continuous pulse oximetry and EKG  Central Line Procedure  Laterality:right  Location:internal jugular  Catheter Type:MAC and Frazee-Rustam  Catheter Size:9 Fr  Guidance:ultrasound guided and wire visualized in collapsible vessel and traced to chest prior to dilation  PROCEDURE NOTE/ULTRASOUND INTERPRETATION.  Using ultrasound guidance the potential vascular sites for insertion of the catheter were visualized to determine the patency of the vessel to be used for vascular access.  After selecting the appropriate site for insertion, the needle was visualized under ultrasound being inserted into the internal jugular vein, followed by ultrasound confirmation of wire and catheter placement. There were no abnormalities seen on ultrasound; an image was taken; and the patient tolerated the procedure with no complications.   Assessment  Post procedure:biopatch applied, line sutured and occlusive dressing applied  Assessement:blood return through all ports, chest x-ray ordered and free fluid flow  Complications:no  Patient Tolerance:patient tolerated the procedure well with no apparent complications  Additional Notes  Ectopy with swan attempts  Frazee secured at 54 cm

## 2024-03-25 NOTE — ANESTHESIA PROCEDURE NOTES
Intra-Op Anesthesia ROMEO    Procedure Performed: Intra-Op Anesthesia ROMEO       Start Time:  3/25/2024 7:40 AM       End Time:      Preanesthesia Checklist:  Patient identified, IV assessed, risks and benefits discussed, monitors and equipment assessed, procedure being performed at surgeon's request and anesthesia consent obtained.    General Procedure Information  ROMEO Placed for monitoring purposes only -- This is not a diagnostic ROMEO  Diagnostic Indications for Echo:  hemodynamic monitoring  Physician Requesting Echo: Humberto Avalos MD  Location performed:  OR  Intubated  Bite block not placed  Heart visualized  Probe Insertion:  Easy  Probe Type:  Multiplane  Modalities:  2D only, color flow mapping and continuous wave Doppler        Anesthesia Information  Performed Personally  Anesthesiologist:  Elvia Loo MD      Echocardiogram Comments:       ROMEO placed at surgeon request.     Please see cardiology diagnostic evaluation for complete report.

## 2024-03-26 ENCOUNTER — APPOINTMENT (OUTPATIENT)
Dept: GENERAL RADIOLOGY | Facility: HOSPITAL | Age: 74
DRG: 235 | End: 2024-03-26
Payer: MEDICARE

## 2024-03-26 PROBLEM — N18.31 STAGE 3A CHRONIC KIDNEY DISEASE: Status: ACTIVE | Noted: 2024-03-26

## 2024-03-26 LAB
ALBUMIN SERPL-MCNC: 4.2 G/DL (ref 3.5–5.2)
ANION GAP SERPL CALCULATED.3IONS-SCNC: 8 MMOL/L (ref 5–15)
ANION GAP SERPL CALCULATED.3IONS-SCNC: 9 MMOL/L (ref 5–15)
BASOPHILS # BLD AUTO: 0.01 10*3/MM3 (ref 0–0.2)
BASOPHILS NFR BLD AUTO: 0.1 % (ref 0–1.5)
BH BB BLOOD EXPIRATION DATE: NORMAL
BH BB BLOOD EXPIRATION DATE: NORMAL
BH BB BLOOD TYPE BARCODE: 6200
BH BB BLOOD TYPE BARCODE: 6200
BH BB DISPENSE STATUS: NORMAL
BH BB DISPENSE STATUS: NORMAL
BH BB PRODUCT CODE: NORMAL
BH BB PRODUCT CODE: NORMAL
BH BB UNIT NUMBER: NORMAL
BH BB UNIT NUMBER: NORMAL
BUN SERPL-MCNC: 14 MG/DL (ref 8–23)
BUN SERPL-MCNC: 16 MG/DL (ref 8–23)
BUN/CREAT SERPL: 10.3 (ref 7–25)
BUN/CREAT SERPL: 11.6 (ref 7–25)
CALCIUM SPEC-SCNC: 8.3 MG/DL (ref 8.6–10.5)
CALCIUM SPEC-SCNC: 9.1 MG/DL (ref 8.6–10.5)
CHLORIDE SERPL-SCNC: 102 MMOL/L (ref 98–107)
CHLORIDE SERPL-SCNC: 105 MMOL/L (ref 98–107)
CO2 SERPL-SCNC: 25 MMOL/L (ref 22–29)
CO2 SERPL-SCNC: 26 MMOL/L (ref 22–29)
CREAT SERPL-MCNC: 1.36 MG/DL (ref 0.76–1.27)
CREAT SERPL-MCNC: 1.38 MG/DL (ref 0.76–1.27)
CROSSMATCH INTERPRETATION: NORMAL
CROSSMATCH INTERPRETATION: NORMAL
DEPRECATED RDW RBC AUTO: 45.2 FL (ref 37–54)
DEPRECATED RDW RBC AUTO: 46.5 FL (ref 37–54)
EGFRCR SERPLBLD CKD-EPI 2021: 53.7 ML/MIN/1.73
EGFRCR SERPLBLD CKD-EPI 2021: 54.6 ML/MIN/1.73
EOSINOPHIL # BLD AUTO: 0 10*3/MM3 (ref 0–0.4)
EOSINOPHIL NFR BLD AUTO: 0 % (ref 0.3–6.2)
ERYTHROCYTE [DISTWIDTH] IN BLOOD BY AUTOMATED COUNT: 13.3 % (ref 12.3–15.4)
ERYTHROCYTE [DISTWIDTH] IN BLOOD BY AUTOMATED COUNT: 13.5 % (ref 12.3–15.4)
GLUCOSE BLDC GLUCOMTR-MCNC: 149 MG/DL (ref 70–130)
GLUCOSE BLDC GLUCOMTR-MCNC: 152 MG/DL (ref 70–130)
GLUCOSE BLDC GLUCOMTR-MCNC: 154 MG/DL (ref 70–130)
GLUCOSE BLDC GLUCOMTR-MCNC: 158 MG/DL (ref 70–130)
GLUCOSE SERPL-MCNC: 133 MG/DL (ref 65–99)
GLUCOSE SERPL-MCNC: 144 MG/DL (ref 65–99)
HCT VFR BLD AUTO: 27.9 % (ref 37.5–51)
HCT VFR BLD AUTO: 30.1 % (ref 37.5–51)
HGB BLD-MCNC: 9.2 G/DL (ref 13–17.7)
HGB BLD-MCNC: 9.9 G/DL (ref 13–17.7)
INR PPP: 1.1 (ref 0.91–1.09)
LYMPHOCYTES # BLD AUTO: 0.98 10*3/MM3 (ref 0.7–3.1)
LYMPHOCYTES NFR BLD AUTO: 12.1 % (ref 19.6–45.3)
MAGNESIUM SERPL-MCNC: 2 MG/DL (ref 1.6–2.4)
MCH RBC QN AUTO: 30.7 PG (ref 26.6–33)
MCH RBC QN AUTO: 30.9 PG (ref 26.6–33)
MCHC RBC AUTO-ENTMCNC: 32.9 G/DL (ref 31.5–35.7)
MCHC RBC AUTO-ENTMCNC: 33 G/DL (ref 31.5–35.7)
MCV RBC AUTO: 93 FL (ref 79–97)
MCV RBC AUTO: 94.1 FL (ref 79–97)
MONOCYTES # BLD AUTO: 0.53 10*3/MM3 (ref 0.1–0.9)
MONOCYTES NFR BLD AUTO: 6.5 % (ref 5–12)
NEUTROPHILS NFR BLD AUTO: 6.57 10*3/MM3 (ref 1.7–7)
NEUTROPHILS NFR BLD AUTO: 81.1 % (ref 42.7–76)
PHOSPHATE SERPL-MCNC: 3.9 MG/DL (ref 2.5–4.5)
PLATELET # BLD AUTO: 135 10*3/MM3 (ref 140–450)
PLATELET # BLD AUTO: 147 10*3/MM3 (ref 140–450)
PMV BLD AUTO: 10.2 FL (ref 6–12)
PMV BLD AUTO: 10.2 FL (ref 6–12)
POTASSIUM SERPL-SCNC: 4.8 MMOL/L (ref 3.5–5.2)
POTASSIUM SERPL-SCNC: 5.3 MMOL/L (ref 3.5–5.2)
PROTHROMBIN TIME: 14.7 SECONDS (ref 11.8–14.8)
QT INTERVAL: 372 MS
QT INTERVAL: 410 MS
QTC INTERVAL: 420 MS
QTC INTERVAL: 484 MS
RBC # BLD AUTO: 3 10*6/MM3 (ref 4.14–5.8)
RBC # BLD AUTO: 3.2 10*6/MM3 (ref 4.14–5.8)
SODIUM SERPL-SCNC: 136 MMOL/L (ref 136–145)
SODIUM SERPL-SCNC: 139 MMOL/L (ref 136–145)
UNIT  ABO: NORMAL
UNIT  ABO: NORMAL
UNIT  RH: NORMAL
UNIT  RH: NORMAL
WBC NRBC COR # BLD AUTO: 12.03 10*3/MM3 (ref 3.4–10.8)
WBC NRBC COR # BLD AUTO: 8.11 10*3/MM3 (ref 3.4–10.8)

## 2024-03-26 PROCEDURE — 25010000002 METOCLOPRAMIDE PER 10 MG: Performed by: NURSE PRACTITIONER

## 2024-03-26 PROCEDURE — 94799 UNLISTED PULMONARY SVC/PX: CPT

## 2024-03-26 PROCEDURE — 80069 RENAL FUNCTION PANEL: CPT | Performed by: SURGERY

## 2024-03-26 PROCEDURE — 82948 REAGENT STRIP/BLOOD GLUCOSE: CPT

## 2024-03-26 PROCEDURE — 85025 COMPLETE CBC W/AUTO DIFF WBC: CPT | Performed by: SURGERY

## 2024-03-26 PROCEDURE — 85610 PROTHROMBIN TIME: CPT | Performed by: SURGERY

## 2024-03-26 PROCEDURE — 94761 N-INVAS EAR/PLS OXIMETRY MLT: CPT

## 2024-03-26 PROCEDURE — 71045 X-RAY EXAM CHEST 1 VIEW: CPT

## 2024-03-26 PROCEDURE — 25010000002 CEFAZOLIN PER 500 MG: Performed by: SURGERY

## 2024-03-26 PROCEDURE — 25010000002 NICARDIPINE 2.5 MG/ML SOLUTION: Performed by: SURGERY

## 2024-03-26 PROCEDURE — 93010 ELECTROCARDIOGRAM REPORT: CPT | Performed by: INTERNAL MEDICINE

## 2024-03-26 PROCEDURE — 25010000002 ONDANSETRON PER 1 MG: Performed by: SURGERY

## 2024-03-26 PROCEDURE — 25010000002 ENOXAPARIN PER 10 MG: Performed by: SURGERY

## 2024-03-26 PROCEDURE — 0 DEXTROSE 5 % SOLUTION: Performed by: SURGERY

## 2024-03-26 PROCEDURE — 83735 ASSAY OF MAGNESIUM: CPT | Performed by: SURGERY

## 2024-03-26 PROCEDURE — 97162 PT EVAL MOD COMPLEX 30 MIN: CPT

## 2024-03-26 PROCEDURE — 80048 BASIC METABOLIC PNL TOTAL CA: CPT | Performed by: NURSE PRACTITIONER

## 2024-03-26 PROCEDURE — 97116 GAIT TRAINING THERAPY: CPT

## 2024-03-26 PROCEDURE — 85027 COMPLETE CBC AUTOMATED: CPT | Performed by: NURSE PRACTITIONER

## 2024-03-26 PROCEDURE — 25810000003 SODIUM CHLORIDE 0.9 % SOLUTION: Performed by: SURGERY

## 2024-03-26 PROCEDURE — 93005 ELECTROCARDIOGRAM TRACING: CPT | Performed by: SURGERY

## 2024-03-26 RX ORDER — LISINOPRIL 5 MG/1
5 TABLET ORAL
Status: DISCONTINUED | OUTPATIENT
Start: 2024-03-26 | End: 2024-03-27

## 2024-03-26 RX ORDER — DEXTROSE MONOHYDRATE 50 MG/ML
30 INJECTION, SOLUTION INTRAVENOUS CONTINUOUS
Status: DISCONTINUED | OUTPATIENT
Start: 2024-03-26 | End: 2024-03-29 | Stop reason: HOSPADM

## 2024-03-26 RX ORDER — PANTOPRAZOLE SODIUM 40 MG/1
40 TABLET, DELAYED RELEASE ORAL
Status: DISCONTINUED | OUTPATIENT
Start: 2024-03-26 | End: 2024-03-29 | Stop reason: HOSPADM

## 2024-03-26 RX ORDER — METOCLOPRAMIDE HYDROCHLORIDE 5 MG/ML
10 INJECTION INTRAMUSCULAR; INTRAVENOUS EVERY 6 HOURS
Status: COMPLETED | OUTPATIENT
Start: 2024-03-26 | End: 2024-03-27

## 2024-03-26 RX ADMIN — CEFAZOLIN 2 G: 2 INJECTION, POWDER, FOR SOLUTION INTRAMUSCULAR; INTRAVENOUS at 11:08

## 2024-03-26 RX ADMIN — METOPROLOL TARTRATE 25 MG: 25 TABLET, FILM COATED ORAL at 08:59

## 2024-03-26 RX ADMIN — BISACODYL 10 MG: 5 TABLET ORAL at 08:58

## 2024-03-26 RX ADMIN — LIDOCAINE 2 PATCH: 4 PATCH TOPICAL at 21:04

## 2024-03-26 RX ADMIN — ACETAMINOPHEN 650 MG: 325 TABLET, FILM COATED ORAL at 17:40

## 2024-03-26 RX ADMIN — CALCIUM CHLORIDE 1000 MG: 100 INJECTION INTRAVENOUS; INTRAVENTRICULAR at 12:03

## 2024-03-26 RX ADMIN — Medication 1 APPLICATION: at 17:40

## 2024-03-26 RX ADMIN — IPRATROPIUM BROMIDE AND ALBUTEROL SULFATE 3 ML: .5; 3 SOLUTION RESPIRATORY (INHALATION) at 06:29

## 2024-03-26 RX ADMIN — OXYCODONE HYDROCHLORIDE 5 MG: 5 TABLET ORAL at 15:15

## 2024-03-26 RX ADMIN — ASPIRIN 162 MG: 81 TABLET, CHEWABLE ORAL at 08:59

## 2024-03-26 RX ADMIN — ACETAMINOPHEN 650 MG: 325 TABLET, FILM COATED ORAL at 05:40

## 2024-03-26 RX ADMIN — Medication 1 APPLICATION: at 05:40

## 2024-03-26 RX ADMIN — NICARDIPINE HYDROCHLORIDE 5 MG/HR: 2.5 INJECTION, SOLUTION INTRAVENOUS at 05:40

## 2024-03-26 RX ADMIN — CLOPIDOGREL BISULFATE 75 MG: 75 TABLET, FILM COATED ORAL at 17:40

## 2024-03-26 RX ADMIN — METOCLOPRAMIDE HYDROCHLORIDE 10 MG: 5 INJECTION INTRAMUSCULAR; INTRAVENOUS at 18:15

## 2024-03-26 RX ADMIN — OXYCODONE HYDROCHLORIDE 10 MG: 5 TABLET ORAL at 05:41

## 2024-03-26 RX ADMIN — ENOXAPARIN SODIUM 40 MG: 100 INJECTION SUBCUTANEOUS at 08:59

## 2024-03-26 RX ADMIN — METHOCARBAMOL 500 MG: 500 TABLET, FILM COATED ORAL at 13:56

## 2024-03-26 RX ADMIN — OXYCODONE HYDROCHLORIDE 10 MG: 5 TABLET ORAL at 01:44

## 2024-03-26 RX ADMIN — CHLORHEXIDINE GLUCONATE 0.12% ORAL RINSE 15 ML: 1.2 LIQUID ORAL at 05:40

## 2024-03-26 RX ADMIN — METOCLOPRAMIDE HYDROCHLORIDE 10 MG: 5 INJECTION INTRAMUSCULAR; INTRAVENOUS at 12:09

## 2024-03-26 RX ADMIN — POLYETHYLENE GLYCOL 3350 17 G: 17 POWDER, FOR SOLUTION ORAL at 08:59

## 2024-03-26 RX ADMIN — DEXTROSE MONOHYDRATE 30 ML/HR: 50 INJECTION, SOLUTION INTRAVENOUS at 02:58

## 2024-03-26 RX ADMIN — CEFAZOLIN 2 G: 2 INJECTION, POWDER, FOR SOLUTION INTRAMUSCULAR; INTRAVENOUS at 02:08

## 2024-03-26 RX ADMIN — PANTOPRAZOLE SODIUM 40 MG: 40 TABLET, DELAYED RELEASE ORAL at 08:59

## 2024-03-26 RX ADMIN — ATORVASTATIN CALCIUM 20 MG: 10 TABLET, FILM COATED ORAL at 20:00

## 2024-03-26 RX ADMIN — METHOCARBAMOL 500 MG: 500 TABLET, FILM COATED ORAL at 05:40

## 2024-03-26 RX ADMIN — CEFAZOLIN 2 G: 2 INJECTION, POWDER, FOR SOLUTION INTRAMUSCULAR; INTRAVENOUS at 17:40

## 2024-03-26 RX ADMIN — METHOCARBAMOL 500 MG: 500 TABLET, FILM COATED ORAL at 21:04

## 2024-03-26 RX ADMIN — CHLORHEXIDINE GLUCONATE 0.12% ORAL RINSE 15 ML: 1.2 LIQUID ORAL at 17:39

## 2024-03-26 RX ADMIN — ACETAMINOPHEN 650 MG: 325 TABLET, FILM COATED ORAL at 11:05

## 2024-03-26 RX ADMIN — OXYCODONE HYDROCHLORIDE 10 MG: 5 TABLET ORAL at 09:49

## 2024-03-26 RX ADMIN — METOPROLOL TARTRATE 12.5 MG: 25 TABLET, FILM COATED ORAL at 21:04

## 2024-03-26 RX ADMIN — ONDANSETRON 4 MG: 2 INJECTION INTRAMUSCULAR; INTRAVENOUS at 03:35

## 2024-03-26 NOTE — PLAN OF CARE
Problem: Adult Inpatient Plan of Care  Goal: Plan of Care Review  Outcome: Ongoing, Progressing  Goal: Patient-Specific Goal (Individualized)  Outcome: Ongoing, Progressing  Goal: Absence of Hospital-Acquired Illness or Injury  Outcome: Ongoing, Progressing  Intervention: Identify and Manage Fall Risk  Intervention: Prevent Skin Injury  Intervention: Prevent and Manage VTE (Venous Thromboembolism) Risk  Goal: Optimal Comfort and Wellbeing  Outcome: Ongoing, Progressing  Goal: Readiness for Transition of Care  Outcome: Ongoing, Progressing  Intervention: Mutually Develop Transition Plan     Problem: Communication Impairment (Mechanical Ventilation, Invasive)  Goal: Effective Communication  Outcome: Ongoing, Progressing     Problem: Device-Related Complication Risk (Mechanical Ventilation, Invasive)  Goal: Optimal Device Function  Outcome: Ongoing, Progressing     Problem: Inability to Wean (Mechanical Ventilation, Invasive)  Goal: Mechanical Ventilation Liberation  Outcome: Ongoing, Progressing     Problem: Skin and Tissue Injury (Mechanical Ventilation, Invasive)  Goal: Absence of Device-Related Skin and Tissue Injury  Outcome: Ongoing, Progressing  Intervention: Maintain Skin and Tissue Health     Problem: Ventilator-Induced Lung Injury (Mechanical Ventilation, Invasive)  Goal: Absence of Ventilator-Induced Lung Injury  Outcome: Ongoing, Progressing  Intervention: Prevent Ventilator-Associated Pneumonia     Problem: Skin Injury Risk Increased  Goal: Skin Health and Integrity  Outcome: Ongoing, Progressing  Intervention: Optimize Skin Protection     Problem: COPD (Chronic Obstructive Pulmonary Disease) Comorbidity  Goal: Maintenance of COPD Symptom Control  Outcome: Ongoing, Progressing     Problem: Hypertension Comorbidity  Goal: Blood Pressure in Desired Range  Outcome: Ongoing, Progressing   Goal Outcome Evaluation:

## 2024-03-26 NOTE — PLAN OF CARE
Goal Outcome Evaluation:      Patient stood and ambulated 200' with Min assist. Patient has a narrow base of support with decreased weight shifting to left. Decreased step length. SATs on 2 lpm 98 -96%. HR 48 -54. /65 - 127/58. Pain at 2/10.

## 2024-03-26 NOTE — THERAPY EVALUATION
Patient Name: Rogerio Barksdale  : 1950    MRN: [id number]                              Today's Date: 3/26/2024       Admit Date: 3/25/2024    Visit Dx:     ICD-10-CM ICD-9-CM   1. Impaired mobility [Z74.09]  Z74.09 799.89   2. NSTEMI (non-ST elevated myocardial infarction)  I21.4 410.70     Patient Active Problem List   Diagnosis    Multivessel coronary artery disease involving native coronary artery of native heart    TIA (transient ischemic attack)    ED (erectile dysfunction)    Cardiac device in situ    Arteriosclerosis of coronary artery    Hypertension    Hyperlipidemia    NSTEMI (non-ST elevated myocardial infarction)    Presence of stent in coronary artery    Tobacco abuse    COPD (chronic obstructive pulmonary disease)    CAD (coronary artery disease), native coronary artery    CAD (coronary artery disease)    Stage 3a chronic kidney disease     Past Medical History:   Diagnosis Date    Arteriosclerosis of coronary artery     CAD (coronary artery disease)     Cardiac device in situ     COPD (chronic obstructive pulmonary disease)     ED (erectile dysfunction)     Heartburn     Hyperlipidemia     Hypertension     Myocardial infarction 03/10/2024    pt states also had one in     TIA (transient ischemic attack)      Past Surgical History:   Procedure Laterality Date    APPENDECTOMY      CARDIAC CATHETERIZATION      CARDIAC CATHETERIZATION Left 2024    Procedure: Cardiac Catheterization/Vascular Study;  Surgeon: Lenin Stanley MD;  Location:  [facility];  Service: Cardiology;  Laterality: Left;    CORONARY ARTERY BYPASS GRAFT N/A 3/25/2024    Procedure: CORONARY ARTERY BYPASS GRAFTING X4, LEFT INTERNAL MAMMARY ARTERY GRAFT, LEFT LEG ENDOSCOPIC VEIN HARVEST, TRANSESOPHAGEAL ECHOCARDIOGRAM;  Surgeon: Humberto Avalos MD;  Location:  [facility] OR;  Service: Cardiothoracic;  Laterality: N/A;    CORONARY STENT PLACEMENT      total of 3 heart stents    FOOT SURGERY Right     crushed  foot repair. pt states no hardware present.    TESTICLE SURGERY      left testicle removed      General Information       Row Name 03/26/24 0814          Physical Therapy Time and Intention    Document Type evaluation  Dx; MI, CAD, and chest pain resulting in planned CABGx4 on 3/25.  -ALYSSA (r) AJ (t) ALYSSA (c)     Mode of Treatment physical therapy  -ALYSSA (r) AJ (t) ALYSSA (c)       Row Name 03/26/24 0814          General Information    Patient Profile Reviewed yes  -ALYSSA (r) AJ (t) ALYSSA (c)     Prior Level of Function independent:;all household mobility;community mobility;gait;transfer;bed mobility;ADL's;home management  -ALYSSA (r) AJ (t) ALYSSA (c)     Existing Precautions/Restrictions sternal;fall;oxygen therapy device and L/min  2L/min, chest tube  -ALYSSA (r) AJ (t) ALYSSA (c)     Barriers to Rehab physical barrier  -ALYSSA (r) AJ (t) ALYSSA (c)       Row Name 03/26/24 0814          Living Environment    People in Home spouse  -ALYSSA (r) AJ (t) ALYSSA (c)       Row Name 03/26/24 0814          Home Main Entrance    Number of Stairs, Main Entrance two  -ALYSSA (r) AJ (t) ALYSSA (c)     Stair Railings, Main Entrance none  -ALYSSA (r) AJ (t) ALYSSA (c)       Row Name 03/26/24 0814          Stairs Within Home, Primary    Number of Stairs, Within Home, Primary four  pt states he does not have to use stair inside home  -ALYSSA (r) AJ (t) ALYSSA (c)     Stair Railings, Within Home, Primary railings on both sides of stairs  -ALYSSA (r) AJ (t) ALYSSA (c)       Row Name 03/26/24 0814          Cognition    Orientation Status (Cognition) oriented x 4  -ALYSSA (r) AJ (t) ALYSSA (c)       Row Name 03/26/24 0814          Safety Issues, Functional Mobility    Impairments Affecting Function (Mobility) balance;shortness of breath;endurance/activity tolerance;pain  -ALYSSA (r) AJ (t) ALYSSA (c)               User Key  (r) = Recorded By, (t) = Taken By, (c) = Cosigned By      Initials Name Provider Type    Melchor Galeano, PT DPT Physical Therapist    Humberto Parks, PT Student PT Student                   Mobility        Row Name 03/26/24 0814          Sit-Stand Transfer    Sit-Stand [place] (Transfers) minimum assist (75% patient effort);1 person assist;1 person to manage equipment  -ALYSSA (r) AJ (t) ALYSSA (c)       Row Name 03/26/24 0814          Gait/Stairs (Locomotion)    [place] Level (Gait) minimum assist (75% patient effort);1 person assist;1 person to manage equipment  -ALYSSA     Distance in Feet (Gait) 100  -ALYSSA (r) AJ (t) ALYSSA (c)     Deviations/Abnormal Patterns (Gait) base of support, narrow;gait speed decreased;stride length decreased  -ALYSSA (r) AJ (t) ALYSSA (c)               User Key  (r) = Recorded By, (t) = Taken By, (c) = Cosigned By      Initials Name Provider Type    Melchor Galeano, PT DPT Physical Therapist    Humberto Parks, PT Student PT Student                   Obj/Interventions       Row Name 03/26/24 0814          Range of Motion Comprehensive    General Range of Motion bilateral lower extremity ROM WFL  -ALYSSA (r) AJ (t) ALYSSA (c)       Row Name 03/26/24 0814          Strength Comprehensive (MMT)    General Manual Muscle Testing (MMT) Assessment no strength deficits identified  -ALYSSA (r) AJ (t) ALYSSA (c)     Comment, General Manual Muscle Testing (MMT) Assessment grossly WFL B LE  -ALYSSA (r) AJ (t) ALYSSA (c)       Row Name 03/26/24 0814          Motor Skills    Motor Skills functional endurance  -ALYSSA (r) AJ (t) ALYSSA (c)       Row Name 03/26/24 0814          Balance    Balance Assessment sitting static balance;standing static balance;standing dynamic balance  -ALYSSA (r) AJ (t) ALYSSA (c)     Static Sitting Balance independent  -ALYSSA (r) AJ (t) ALYSSA (c)     Position, Sitting Balance supported;sitting in chair  -ALYSSA (r) AJ (t) ALYSSA (c)     Static Standing Balance standby assist  -ALYSSA (r) AJ (t) ALYSSA (c)     Dynamic Standing Balance standby assist  -ALYSSA (r) AJ (t) ALYSSA (c)     Balance Interventions sitting;standing;sit to stand;supported;static;dynamic;occupation based/functional task  -ALYSSA (r) AJ (t) ALYSSA (c)       Row Name 03/26/24 0814           Sensory Assessment (Somatosensory)    Sensory Assessment (Somatosensory) not tested  -ALYSSA (r) AJ (t) ALYSSA (c)               User Key  (r) = Recorded By, (t) = Taken By, (c) = Cosigned By      Initials Name Provider Type    Melchor Galeano, PT DPT Physical Therapist    Humberto Parks, PT Student PT Student                   Goals/Plan       Row Name 03/26/24 0814          Bed Mobility Goal 1 (PT)    Activity/Assistive Device (Bed Mobility Goal 1, PT) bridging;rolling to left;rolling to right;sit to supine;supine to sit  -ALYSSA (r) AJ (t) ALYSSA (c)     [place] Level/Cues Needed (Bed Mobility Goal 1, PT) contact guard required  -ALYSSA     Time Frame (Bed Mobility Goal 1, PT) long term goal (LTG);10 days  -ALYSSA (r) AJ (t) ALYSSA (c)     Progress/Outcomes (Bed Mobility Goal 1, PT) new goal  -ALYSSA (r) AJ (t) ALYSSA (c)       Row Name 03/26/24 0814          Transfer Goal 1 (PT)    Activity/Assistive Device (Transfer Goal 1, PT) sit-to-stand/stand-to-sit;bed-to-chair/chair-to-bed;toilet  -ALYSSA (r) AJ (t) ALYSSA (c)     [place] Level/Cues Needed (Transfer Goal 1, PT) standby assist  -ALYSSA     Time Frame (Transfer Goal 1, PT) long term goal (LTG);10 days  -ALYSSA (r) AJ (t) ALYSSA (c)     Progress/Outcome (Transfer Goal 1, PT) new goal  -ALYSSA (r) AJ (t) ALYSSA (c)       Row Name 03/26/24 0814          Gait Training Goal 1 (PT)    Activity/Assistive Device (Gait Training Goal 1, PT) gait (walking locomotion);decrease asymmetrical patterns;decrease fall risk;diminish gait deviation;improve balance and speed  -ALYSSA (r) AJ (t) ALYSSA (c)     [place] Level (Gait Training Goal 1, PT) standby assist  -ALYSSA     Distance (Gait Training Goal 1, PT) 200'  -ALYSSA (r) AJ (t) ALYSSA (c)     Time Frame (Gait Training Goal 1, PT) long term goal (LTG);10 days  -ALYSSA (r) AJ (t) ALYSSA (c)     Progress/Outcome (Gait Training Goal 1, PT) new goal  -ALYSSA (r) AJ (t) ALYSSA (c)       Row Name 03/26/24 0814          Stairs Goal 1 (PT)    Activity/Assistive Device (Stairs Goal 1, PT) ascending  stairs;descending stairs;step-to-step;decrease fall risk;improve balance and speed  -ALYSSA (r) AJ (t) ALYSSA (c)     [place] Level/Cues Needed (Stairs Goal 1, PT) contact guard required;minimum assist (75% or more patient effort)  -ALYSSA (r) AJ (t) ALYSSA (c)     Number of Stairs (Stairs Goal 1, PT) 4  -ALYSSA (r) AJ (t) ALYSSA (c)     Time Frame (Stairs Goal 1, PT) long term goal (LTG);10 days  -ALYSSA (r) AJ (t) ALYSSA (c)     Progress/Outcome (Stairs Goal 1, PT) new goal  -ALYSSA (r) AJ (t) ALYSSA (c)       Row Name 03/26/24 0814          Therapy Assessment/Plan (PT)    Planned Therapy Interventions (PT) balance training;bed mobility training;gait training;home exercise program;patient/family education;transfer training;stair training;strengthening;postural re-education  -ALYSSA               User Key  (r) = Recorded By, (t) = Taken By, (c) = Cosigned By      Initials Name Provider Type    Melchor Galeano, PT DPT Physical Therapist    Humberto Parks, PT Student PT Student                   Clinical Impression       Row Name 03/26/24 0814          Pain    Pretreatment Pain Rating 3/10  -ALYSSA (r) AJ (t) ALYSSA (c)     Posttreatment Pain Rating 5/10  -ALYSSA (r) AJ (t) ALYSSA (c)     Pain Location incisional  -ALYSSA (r) AJ (t) ALYSSA (c)     Pain Location - chest  -ALYSSA (r) AJ (t) ALYSSA (c)     Pain Intervention(s) Medication (See MAR);Ambulation/increased activity;Repositioned;Nursing Notified  -ALYSSA (r) AJ (t) ALYSSA (c)       Row Name 03/26/24 0814          Plan of Care Review    Plan of Care Reviewed With patient  -ALYSSA (r) AJ (t) ALYSSA (c)     Outcome Evaluation PT eval completed. Pt up in bedside chair AOx4, s/p CABG x4. Overall pt states his pain is currently 3/10 and is directly over incision and currently requiring 2L/min. Pt pre vitals HR 77, SpO2 95%, RR 29, /58. Pt demo sit to stand with Min Ax2 and tolerated gait 50' prior to requiring standing rest break. Pt tolerated 50' post standing rest break with mild complaints of pain increased over incision and chest  tube. Pt demo shortened step length and decreased gait speed but responded well to verbal cues in order to improve gait cycle. Pt will benefit from skilled PT services to improve functional tolerance, improve gait sequence and safety with stair negotiation. Pt post vitals HR 63, RR 25, SpO2 93%, /57. Pt educated on sternal precautions to prevent post op complications with incision. Anticipate d/c to home with assist from wife when medically stable.  -ALYSSA (r) AJ (t) ALYSSA (c)       Row Name 03/26/24 0814          Therapy Assessment/Plan (PT)    Patient/Family Therapy Goals Statement (PT) go home  -ALYSSA (r) AJ (t) ALYSSA (c)     Rehab Potential (PT) good, to achieve stated therapy goals  -ALYSSA (r) AJ (t) ALYSSA (c)     Criteria for Skilled Interventions Met (PT) yes;meets criteria;skilled treatment is necessary  -ALYSSA (r) AJ (t) ALYSSA (c)     Therapy Frequency (PT) 2 times/day  -ALYSSA (r) AJ (t) ALYSSA (c)     Predicted Duration of Therapy Intervention (PT) until d/c  -ALYSSA (r) AJ (t) ALYSSA (c)       Row Name 03/26/24 0814          Vital Signs    Pre Systolic BP Rehab 123  -ALYSSA (r) AJ (t) ALYSSA (c)     Pre Treatment Diastolic BP 58  -ALYSSA (r) AJ (t) ALYSSA (c)     Post Systolic BP Rehab 118  -ALYSSA (r) AJ (t) ALYSSA (c)     Post Treatment Diastolic BP 57  -ALYSSA (r) AJ (t) ALYSSA (c)     Pretreatment Heart Rate (beats/min) 77  -ALYSSA (r) AJ (t) ALYSSA (c)     Posttreatment Heart Rate (beats/min) 63  -ALYSSA (r) AJ (t) ALYSSA (c)     Pretreatment Resp Rate (breaths/min) 29  -ALYSSA (r) AJ (t) ALYSSA (c)     Posttreatment Resp Rate (breaths/min) 25  -ALYSSA (r) AJ (t) ALYSSA (c)     Pre SpO2 (%) 95  -ALYSSA (r) AJ (t) ALYSSA (c)     O2 Delivery Pre Treatment nasal cannula  2L  -ALYSSA (r) AJ (t) ALYSSA (c)     Post SpO2 (%) 93  -ALYSSA (r) AJ (t) ALYSSA (c)     O2 Delivery Post Treatment nasal cannula  -ALYSSA (r) AJ (t) ALYSSA (c)     Pre Patient Position Sitting  -ALYSSA (r) KATRINA (t) ALYSSA (c)     Intra Patient Position Standing  -ALYSSA (r) KATRINA (t) ALYSSA (c)     Post Patient Position Sitting  -ALYSSA (r) KATRINA (t) ALYSSA (c)       Row Name 03/26/24 0814           Positioning and Restraints    Pre-Treatment Position sitting in chair/recliner  -ALYSSA (r) AJ (t) ALYSSA (c)     Post Treatment Position chair  -ALYSSA (r) AJ (t) ALYSSA (c)     In Chair notified nsg;sitting;call light within reach;encouraged to call for assist;patient within staff view  -ALYSSA (r) AJ (t) ALYSSA (c)               User Key  (r) = Recorded By, (t) = Taken By, (c) = Cosigned By      Initials Name Provider Type    Melchor Galeano, PT DPT Physical Therapist    Humberto Parks, PT Student PT Student                   Outcome Measures       Row Name 03/26/24 0814 03/26/24 0800       How much help from another person do you currently need...    Turning from your back to your side while in flat bed without using bedrails? 3  -ALYSSA (r) AJ (t) ALYSSA (c) 3  -HT    Moving from lying on back to sitting on the side of a flat bed without bedrails? 2  -ALYSSA (r) AJ (t) ALYSSA (c) 3  -HT    Moving to and from a bed to a chair (including a wheelchair)? 3  -ALYSSA (r) AJ (t) ALYSSA (c) 3  -HT    Standing up from a chair using your arms (e.g., wheelchair, bedside chair)? 3  -ALYSSA (r) AJ (t) ALYSSA (c) 3  -HT    Climbing 3-5 steps with a railing? 2  -ALYSSA (r) AJ (t) ALYSSA (c) 2  -HT    To walk in hospital room? 3  -ALYSSA (r) AJ (t) ALYSSA (c) 3  -HT    AM-PAC 6 Clicks Score (PT) 16  -ALYSSA (r) AJ (t) 17  -HT    Highest Level of Mobility Goal 5 --> Static standing  -ALYSSA (r) AJ (t) 5 --> Static standing  -HT      Row Name 03/26/24 0814          Functional Assessment    Outcome Measure Options AM-PAC 6 Clicks Basic Mobility (PT)  -ALYSSA               User Key  (r) = Recorded By, (t) = Taken By, (c) = Cosigned By      Initials Name Provider Type    Melchor Galeano, PT DPT Physical Therapist    Daisy Caballero, RN Registered Nurse    Humberto Parks, PT Student PT Student                                 Physical Therapy Education       Title: PT OT SLP Therapies (Done)       Topic: Physical Therapy (Done)       Point: Mobility training (Done)       Learning Progress  Summary             Patient Acceptance, E, VU by KATRINA at 3/26/2024 0915    Comment: sternal and fall precautions, POC progression.                         Point: Home exercise program (Done)       Learning Progress Summary             Patient Acceptance, E, VU by KATRINA at 3/26/2024 0915    Comment: sternal and fall precautions, POC progression.                         Point: Body mechanics (Done)       Learning Progress Summary             Patient Acceptance, E, VU by KATRINA at 3/26/2024 0915    Comment: sternal and fall precautions, POC progression.                         Point: Precautions (Done)       Learning Progress Summary             Patient Acceptance, E, VU by KATRINA at 3/26/2024 0915    Comment: sternal and fall precautions, POC progression.                                         User Key       Initials Effective Dates Name Provider Type Discipline     02/22/24 -  Humberto Lara, PT Student PT Student PT                  PT Recommendation and Plan     Plan of Care Reviewed With: patient  Outcome Evaluation: PT eval completed. Pt up in bedside chair AOx4, s/p CABG x4. Overall pt states his pain is currently 3/10 and is directly over incision and currently requiring 2L/min. Pt pre vitals HR 77, SpO2 95%, RR 29, /58. Pt demo sit to stand with Min Ax2 and tolerated gait 50' prior to requiring standing rest break. Pt tolerated 50' post standing rest break with mild complaints of pain increased over incision and chest tube. Pt demo shortened step length and decreased gait speed but responded well to verbal cues in order to improve gait cycle. Pt will benefit from skilled PT services to improve functional tolerance, improve gait sequence and safety with stair negotiation. Pt post vitals HR 63, RR 25, SpO2 93%, /57. Pt educated on sternal precautions to prevent post op complications with incision. Anticipate d/c to home with assist from wife when medically stable.     Time Calculation:         PT Charges        Row Name 03/26/24 1431 03/26/24 0814          Time Calculation    Start Time 1345  -ADI 0814  -ALYSSA (r) KATRINA (t) ALYSSA (c)     Stop Time 1410  -ADI 0840  +7 min for chart review  -ALYSSA (r) KATRINA (t) ALYSSA (c)     Time Calculation (min) 25 min  -ADI 26 min  -ALYSSA (r) KATRINA (t)     PT Received On -- 03/26/24  -ALYSSA (r) KATRINA (t) ALYSSA (c)     PT Goal Re-Cert Due Date -- 04/05/24  -ALYSSA (r) AJ (t) ALYSSA (c)        Time Calculation- PT    Total Timed Code Minutes- PT -- 33 minute(s)  -ALYSSA (r) KATRINA (t) ALYSSA (c)        Timed Charges    62668 - Gait Training Minutes  25  -ADI --        Total Minutes    Timed Charges Total Minutes 25  -ADI --      Total Minutes 25  -ADI --               User Key  (r) = Recorded By, (t) = Taken By, (c) = Cosigned By      Initials Name Provider Type    Melchor Galeano, PT DPT Physical Therapist    Latoya Payne, PTA Physical Therapist Assistant    Humberto Parks, PT Student PT Student                      PT G-Codes  Outcome Measure Options: AM-PAC 6 Clicks Basic Mobility (PT)  AM-PAC 6 Clicks Score (PT): 16  PT Discharge Summary  Anticipated Discharge Disposition (PT): home with assist    Humberto Lara, PT Student  3/26/2024

## 2024-03-26 NOTE — PLAN OF CARE
Goal Outcome Evaluation:  Plan of Care Reviewed With: patient           Outcome Evaluation: PT eval completed. Pt up in bedside chair AOx4, s/p CABG x4. Overall pt states his pain is currently 3/10 and is directly over incision and currently requiring 2L/min. Pt pre vitals HR 77, SpO2 95%, RR 29, /58. Pt demo sit to stand with Min Ax2 and tolerated gait 50' prior to requiring standing rest break. Pt tolerated 50' post standing rest break with mild complaints of pain increased over incision and chest tube. Pt demo shortened step length and decreased gait speed but responded well to verbal cues in order to improve gait cycle. Pt will benefit from skilled PT services to improve functional tolerance, improve gait sequence and safety with stair negotiation. Pt post vitals HR 63, RR 25, SpO2 93%, /57. Pt educated on sternal precautions to prevent post op complications with incision. Anticipate d/c to home with assist from wife when medically stable.      Anticipated Discharge Disposition (PT): home with assist

## 2024-03-26 NOTE — PROGRESS NOTES
"Patient name: Rogerio Bakrsdale  Patient : 1950  VISIT # [id number]  MR #[id number]    Procedure:Procedure(s):  CORONARY ARTERY BYPASS GRAFTING X4, LEFT INTERNAL MAMMARY ARTERY GRAFT, LEFT LEG ENDOSCOPIC VEIN HARVEST, TRANSESOPHAGEAL ECHOCARDIOGRAM  Procedure Date:3/25/2024  POD:1 Day Post-Op    Subjective     Extubated overnight tolerating 2 L nasal cannula.  Weight is up 6 pounds from baseline.  Creatinine stable today at 1.36.  Complains of nausea this morning and has been given Zofran.  He does complain of sternal pain.  Due to walk with physical therapy.    Telemetry: Sinus rhythm 80s  IV drips: Insulin, nitro, Cardene, IV fluids       Objective     Visit Vitals  /58   Pulse 65   Temp 99.5 °F (37.5 °C) (Oral)   Resp 20   Ht 174 cm (68.5\")   Wt 73.5 kg (162 lb)   SpO2 95%   BMI 24.27 kg/m²   Baseline weight 156 pounds    Intake/Output Summary (Last 24 hours) at 3/26/2024 0754  Last data filed at 3/26/2024 0752  Gross per 24 hour   Intake 5634 ml   Output 4808 ml   Net 826 ml     MCT: 340 mL since surgery, serosanguineous, no airleak  Left pleural drain: 40 mL since surgery, serosanguineous, no airleak    Lab:     CBC:  Results from last 7 days   Lab Units 24  0210 24  1453 24  1129   WBC 10*3/mm3 8.11 8.06 11.96*   HEMATOCRIT % 27.9* 32.0* 32.5*   PLATELETS 10*3/mm3 135* 133* 143          BMP:  Results from last 7 days   Lab Units 24  0210 24  1453 24  1136 24  1129   SODIUM mmol/L 139 140  --  141   SODIUM, ARTERIAL mmol/L  --   --  139  --    POTASSIUM mmol/L 5.3* 4.1  --  4.5   CHLORIDE mmol/L 105 104  --  104   CO2 mmol/L 26.0 27.0  --  25.0   GLUCOSE mg/dL 133* 128*  --  163*   BUN mg/dL 14 14  --  13   CREATININE mg/dL 1.36* 1.20  --  1.26          COAG:  Results from last 7 days   Lab Units 24  0210 24  1129   INR  1.10* 1.21*   APTT seconds  --  35.2       IMAGES:       Imaging Results (Last 24 Hours)       Procedure Component Value Units " Date/Time    XR Chest 1 View [[id number]] Collected: 03/26/24 0654     Updated: 03/26/24 0700    Narrative:      EXAMINATION: XR CHEST 1 VW-     3/26/2024 2:05 AM     HISTORY: Post-Op Heart Surgery     A frontal projection of the chest is compared with the previous study  dated 3/25/2024.     The lungs are poorly expanded.     There is interval removal of the [place]-Rustam catheter and the chest tube.  The mediastinal drain and left chest tube are in place. The right  internal jugular vascular sheath is in place. Epicardial pacer leads in  place.     There is moderate elevation of right diaphragm which was not noted in  the previous study. This may partly be due to poor lung expansion.     There are atelectatic changes in the lower lungs, left more than the  right. This is similar to the previous study.     The heart size is not optimally evaluated due to the AP projection.     There is no pulmonary congestion or pneumothorax.     There is no acute bony abnormality.       Impression:      1. Interval removal of the [place]-Rustam catheter and endotracheal tube. The  mediastinal drain and left chest tube are in stable and unchanged  position.  2. The lungs are poorly expanded. Atelectatic change in the lower lungs.           This report was signed and finalized on 3/26/2024 6:57 AM by Dr. Mike Lund MD.       XR Chest 1 View [[id number]] Collected: 03/25/24 1239     Updated: 03/25/24 1244    Narrative:      EXAMINATION: XR CHEST 1 VW-  3/25/2024 12:39 PM 1 view     HISTORY: Post-Op Check Line & Tube Placement; I21.4-Non-ST elevation  (NSTEMI) myocardial infarction     COMPARISON: 3/22/2024     TECHNIQUE: A single frontal view of the chest was obtained.     FINDINGS:  No pneumothorax or large effusion. No airspace consolidation. Calcified  granulomas project over the lung bases. [place]-Rustam catheter is in place  with tip projecting over the RIGHT pulmonary artery. Chest tubes and  mediastinal drains are in place. Median  sternotomy wires are noted.  Endotracheal tube tip projects approximately 6 cm above the karon. No  acute osseous abnormality. No large pleural effusion.       Impression:         1.  Postoperative changes of median sternotomy with tubes and lines as  described above. No definite acute cardiopulmonary process or unexpected  postprocedural complication identified.           This report was signed and finalized on 3/25/2024 12:40 PM by Dr. Nilson Christensen MD.             CXR: Chest tube in stable position with no pneumothorax.  Hypoventilation, bilateral basilar atelectasis    Physical Exam:  General: Alert, oriented. No apparent distress.   Cardiovascular: Regular rate and rhythm without murmur, rubs, or gallops.    Pulmonary: Clear to auscultation bilaterally without wheezing, rubs, or rales.  Chest: Sternotomy incision clean, dry, and intact. Sternum stable. No clicks. Chest tubes to 20 cm suction. No air leak. Fluid is serosanguineous.   Abdomen: Soft, nondistended, and nontender.  Extremities: Warm, moves all extremities. No edema. Saphenectomy site clean, dry, and intact with Ace wrap  Neurologic:  Grossly intact with no focal deficits.            Impression:  Coronary artery disease  NSTEMI  Hyperlipidemia, on statin  Hypertension, well-controlled  Former smoker  CKD, stage IIIa        Plan:  Begin bowel regimen  Discontinue insulin and nitro  Give metoprolol 25 mg this morning  Wean Cardene as tolerated  Repeat labs today at 1300  Routine postcardiac surgery care  Encourage pulmonary toilet and ambulation  Keep chest tubes today  Remain in ICU      YOSEF Coates  03/26/24  07:54 CDT

## 2024-03-26 NOTE — CASE MANAGEMENT/SOCIAL WORK
Discharge Planning Assessment  [facility]     Patient Name: Rogerio Barksdale  MRN: [id number]  Today's Date: 3/26/2024    Admit Date: 3/25/2024        Discharge Needs Assessment       Row Name 03/26/24 1105       Living Environment    People in Home spouse    Name(s) of People in Home Kita    Current Living Arrangements home    Primary Care Provided by self    Provides Primary Care For no one    Family Caregiver if Needed spouse    Family Caregiver Names Kita    Able to Return to Prior Arrangements yes       Resource/Environmental Concerns    Resource/Environmental Concerns none       Transition Planning    Patient/Family Anticipates Transition to home with family    Transportation Anticipated family or friend will provide       Discharge Needs Assessment    Readmission Within the Last 30 Days no previous admission in last 30 days    Equipment Currently Used at Home none    Concerns to be Addressed no discharge needs identified    Equipment Needed After Discharge none    Discharge Coordination/Progress spoke to patient who lives with spouse and is independent at home prior to surgery; has RX coverage and PCP; will follow for DC needs                   Discharge Plan    No documentation.                 Continued Care and Services - Admitted Since 3/25/2024    No active coordination exists for this encounter.          Demographic Summary    No documentation.                  Functional Status    No documentation.                  Psychosocial    No documentation.                  Abuse/Neglect    No documentation.                  Legal    No documentation.                  Substance Abuse    No documentation.                  Patient Forms    No documentation.                     Chioma Porter RN

## 2024-03-26 NOTE — OUTREACH NOTE
AMI Week 2 Survey      Flowsheet Row Responses   Denominational facility patient discharged from? [facility]   Does the patient have one of the following disease processes/diagnoses(primary or secondary)? Acute MI (STEMI,NSTEMI)   Week 2 attempt successful? No   Unsuccessful attempts Attempt 1   Revoke Readmitted            JIMENEZ DE GUZMAN - Registered Nurse

## 2024-03-27 ENCOUNTER — APPOINTMENT (OUTPATIENT)
Dept: GENERAL RADIOLOGY | Facility: HOSPITAL | Age: 74
DRG: 235 | End: 2024-03-27
Payer: MEDICARE

## 2024-03-27 LAB
ANION GAP SERPL CALCULATED.3IONS-SCNC: 10 MMOL/L (ref 5–15)
ANION GAP SERPL CALCULATED.3IONS-SCNC: 10 MMOL/L (ref 5–15)
BUN SERPL-MCNC: 19 MG/DL (ref 8–23)
BUN SERPL-MCNC: 19 MG/DL (ref 8–23)
BUN/CREAT SERPL: 13.3 (ref 7–25)
BUN/CREAT SERPL: 14.2 (ref 7–25)
CALCIUM SPEC-SCNC: 9 MG/DL (ref 8.6–10.5)
CALCIUM SPEC-SCNC: 9 MG/DL (ref 8.6–10.5)
CHLORIDE SERPL-SCNC: 100 MMOL/L (ref 98–107)
CHLORIDE SERPL-SCNC: 97 MMOL/L (ref 98–107)
CO2 SERPL-SCNC: 26 MMOL/L (ref 22–29)
CO2 SERPL-SCNC: 28 MMOL/L (ref 22–29)
CREAT SERPL-MCNC: 1.34 MG/DL (ref 0.76–1.27)
CREAT SERPL-MCNC: 1.43 MG/DL (ref 0.76–1.27)
DEPRECATED RDW RBC AUTO: 45.1 FL (ref 37–54)
DEPRECATED RDW RBC AUTO: 47.4 FL (ref 37–54)
EGFRCR SERPLBLD CKD-EPI 2021: 51.4 ML/MIN/1.73
EGFRCR SERPLBLD CKD-EPI 2021: 55.6 ML/MIN/1.73
ERYTHROCYTE [DISTWIDTH] IN BLOOD BY AUTOMATED COUNT: 13.2 % (ref 12.3–15.4)
ERYTHROCYTE [DISTWIDTH] IN BLOOD BY AUTOMATED COUNT: 13.4 % (ref 12.3–15.4)
GLUCOSE SERPL-MCNC: 108 MG/DL (ref 65–99)
GLUCOSE SERPL-MCNC: 129 MG/DL (ref 65–99)
HCT VFR BLD AUTO: 32.9 % (ref 37.5–51)
HCT VFR BLD AUTO: 33.6 % (ref 37.5–51)
HGB BLD-MCNC: 10.5 G/DL (ref 13–17.7)
HGB BLD-MCNC: 11 G/DL (ref 13–17.7)
MCH RBC QN AUTO: 30.5 PG (ref 26.6–33)
MCH RBC QN AUTO: 30.5 PG (ref 26.6–33)
MCHC RBC AUTO-ENTMCNC: 31.9 G/DL (ref 31.5–35.7)
MCHC RBC AUTO-ENTMCNC: 32.7 G/DL (ref 31.5–35.7)
MCV RBC AUTO: 93.1 FL (ref 79–97)
MCV RBC AUTO: 95.6 FL (ref 79–97)
PLATELET # BLD AUTO: 147 10*3/MM3 (ref 140–450)
PLATELET # BLD AUTO: 170 10*3/MM3 (ref 140–450)
PMV BLD AUTO: 10.5 FL (ref 6–12)
PMV BLD AUTO: 11 FL (ref 6–12)
POTASSIUM SERPL-SCNC: 4.7 MMOL/L (ref 3.5–5.2)
POTASSIUM SERPL-SCNC: 5.6 MMOL/L (ref 3.5–5.2)
QT INTERVAL: 370 MS
QTC INTERVAL: 407 MS
RBC # BLD AUTO: 3.44 10*6/MM3 (ref 4.14–5.8)
RBC # BLD AUTO: 3.61 10*6/MM3 (ref 4.14–5.8)
SODIUM SERPL-SCNC: 135 MMOL/L (ref 136–145)
SODIUM SERPL-SCNC: 136 MMOL/L (ref 136–145)
WBC NRBC COR # BLD AUTO: 14.65 10*3/MM3 (ref 3.4–10.8)
WBC NRBC COR # BLD AUTO: 15.48 10*3/MM3 (ref 3.4–10.8)

## 2024-03-27 PROCEDURE — 80048 BASIC METABOLIC PNL TOTAL CA: CPT | Performed by: NURSE PRACTITIONER

## 2024-03-27 PROCEDURE — 25010000002 FUROSEMIDE PER 20 MG: Performed by: NURSE PRACTITIONER

## 2024-03-27 PROCEDURE — 85027 COMPLETE CBC AUTOMATED: CPT | Performed by: NURSE PRACTITIONER

## 2024-03-27 PROCEDURE — 25010000002 ONDANSETRON PER 1 MG: Performed by: NURSE PRACTITIONER

## 2024-03-27 PROCEDURE — 25010000002 CEFAZOLIN PER 500 MG: Performed by: SURGERY

## 2024-03-27 PROCEDURE — 93010 ELECTROCARDIOGRAM REPORT: CPT | Performed by: HOSPITALIST

## 2024-03-27 PROCEDURE — 25010000002 ENOXAPARIN PER 10 MG: Performed by: NURSE PRACTITIONER

## 2024-03-27 PROCEDURE — 71045 X-RAY EXAM CHEST 1 VIEW: CPT

## 2024-03-27 PROCEDURE — 97116 GAIT TRAINING THERAPY: CPT

## 2024-03-27 PROCEDURE — 93005 ELECTROCARDIOGRAM TRACING: CPT | Performed by: SURGERY

## 2024-03-27 PROCEDURE — 25010000002 METOCLOPRAMIDE PER 10 MG: Performed by: NURSE PRACTITIONER

## 2024-03-27 PROCEDURE — 25810000003 SODIUM CHLORIDE 0.9 % SOLUTION: Performed by: NURSE PRACTITIONER

## 2024-03-27 RX ORDER — FUROSEMIDE 10 MG/ML
20 INJECTION INTRAMUSCULAR; INTRAVENOUS ONCE
Status: COMPLETED | OUTPATIENT
Start: 2024-03-27 | End: 2024-03-27

## 2024-03-27 RX ORDER — BISACODYL 10 MG
10 SUPPOSITORY, RECTAL RECTAL ONCE
Status: COMPLETED | OUTPATIENT
Start: 2024-03-27 | End: 2024-03-27

## 2024-03-27 RX ORDER — LISINOPRIL 10 MG/1
10 TABLET ORAL
Status: DISCONTINUED | OUTPATIENT
Start: 2024-03-27 | End: 2024-03-29 | Stop reason: HOSPADM

## 2024-03-27 RX ORDER — GUAIFENESIN 600 MG/1
600 TABLET, EXTENDED RELEASE ORAL EVERY 12 HOURS SCHEDULED
Status: DISCONTINUED | OUTPATIENT
Start: 2024-03-27 | End: 2024-03-29 | Stop reason: HOSPADM

## 2024-03-27 RX ADMIN — PANTOPRAZOLE SODIUM 40 MG: 40 TABLET, DELAYED RELEASE ORAL at 06:01

## 2024-03-27 RX ADMIN — ACETAMINOPHEN 650 MG: 325 TABLET, FILM COATED ORAL at 06:01

## 2024-03-27 RX ADMIN — OXYCODONE HYDROCHLORIDE 5 MG: 5 TABLET ORAL at 07:31

## 2024-03-27 RX ADMIN — CHLORHEXIDINE GLUCONATE 0.12% ORAL RINSE 15 ML: 1.2 LIQUID ORAL at 17:13

## 2024-03-27 RX ADMIN — CEFAZOLIN 2 G: 2 INJECTION, POWDER, FOR SOLUTION INTRAMUSCULAR; INTRAVENOUS at 02:34

## 2024-03-27 RX ADMIN — OXYCODONE HYDROCHLORIDE 5 MG: 5 TABLET ORAL at 20:41

## 2024-03-27 RX ADMIN — CHLORHEXIDINE GLUCONATE 0.12% ORAL RINSE 15 ML: 1.2 LIQUID ORAL at 06:01

## 2024-03-27 RX ADMIN — METOPROLOL TARTRATE 12.5 MG: 25 TABLET, FILM COATED ORAL at 09:10

## 2024-03-27 RX ADMIN — OXYCODONE HYDROCHLORIDE 5 MG: 5 TABLET ORAL at 13:57

## 2024-03-27 RX ADMIN — ACETAMINOPHEN 650 MG: 325 TABLET, FILM COATED ORAL at 00:12

## 2024-03-27 RX ADMIN — POLYETHYLENE GLYCOL 3350 17 G: 17 POWDER, FOR SOLUTION ORAL at 09:11

## 2024-03-27 RX ADMIN — BISACODYL 10 MG: 10 SUPPOSITORY RECTAL at 09:20

## 2024-03-27 RX ADMIN — ACETAMINOPHEN 650 MG: 325 TABLET, FILM COATED ORAL at 17:14

## 2024-03-27 RX ADMIN — SODIUM CHLORIDE 500 ML: 9 INJECTION, SOLUTION INTRAVENOUS at 09:15

## 2024-03-27 RX ADMIN — METOPROLOL TARTRATE 12.5 MG: 25 TABLET, FILM COATED ORAL at 20:32

## 2024-03-27 RX ADMIN — METOCLOPRAMIDE HYDROCHLORIDE 10 MG: 5 INJECTION INTRAMUSCULAR; INTRAVENOUS at 00:12

## 2024-03-27 RX ADMIN — CLOPIDOGREL BISULFATE 75 MG: 75 TABLET, FILM COATED ORAL at 17:14

## 2024-03-27 RX ADMIN — ASPIRIN 81 MG: 81 TABLET, COATED ORAL at 09:10

## 2024-03-27 RX ADMIN — METHOCARBAMOL 500 MG: 500 TABLET, FILM COATED ORAL at 13:06

## 2024-03-27 RX ADMIN — METHOCARBAMOL 500 MG: 500 TABLET, FILM COATED ORAL at 06:01

## 2024-03-27 RX ADMIN — LIDOCAINE 2 PATCH: 4 PATCH TOPICAL at 23:43

## 2024-03-27 RX ADMIN — FUROSEMIDE 20 MG: 10 INJECTION, SOLUTION INTRAMUSCULAR; INTRAVENOUS at 09:16

## 2024-03-27 RX ADMIN — ONDANSETRON 4 MG: 2 INJECTION INTRAMUSCULAR; INTRAVENOUS at 20:41

## 2024-03-27 RX ADMIN — ACETAMINOPHEN 650 MG: 325 TABLET, FILM COATED ORAL at 13:06

## 2024-03-27 RX ADMIN — GUAIFENESIN 600 MG: 600 TABLET, EXTENDED RELEASE ORAL at 20:32

## 2024-03-27 RX ADMIN — GUAIFENESIN 600 MG: 600 TABLET, EXTENDED RELEASE ORAL at 09:24

## 2024-03-27 RX ADMIN — BISACODYL 10 MG: 5 TABLET ORAL at 20:31

## 2024-03-27 RX ADMIN — LISINOPRIL 10 MG: 10 TABLET ORAL at 09:11

## 2024-03-27 RX ADMIN — BISACODYL 10 MG: 5 TABLET ORAL at 09:20

## 2024-03-27 RX ADMIN — ATORVASTATIN CALCIUM 20 MG: 10 TABLET, FILM COATED ORAL at 20:32

## 2024-03-27 RX ADMIN — METOCLOPRAMIDE HYDROCHLORIDE 10 MG: 5 INJECTION INTRAMUSCULAR; INTRAVENOUS at 06:01

## 2024-03-27 RX ADMIN — ENOXAPARIN SODIUM 40 MG: 100 INJECTION SUBCUTANEOUS at 09:20

## 2024-03-27 NOTE — PLAN OF CARE
Goal Outcome Evaluation:      Patient  stood and ambulated 210' with CGA and 1 standing rest. SATS on 2 lpm 94-95%.

## 2024-03-27 NOTE — THERAPY TREATMENT NOTE
Acute Care - Physical Therapy Treatment Note  [facility]     Patient Name: Rogerio Barksdale  : 1950  MRN: [id number]  Today's Date: 3/27/2024      Visit Dx:     ICD-10-CM ICD-9-CM   1. Impaired mobility [Z74.09]  Z74.09 799.89   2. NSTEMI (non-ST elevated myocardial infarction)  I21.4 410.70     Patient Active Problem List   Diagnosis    Multivessel coronary artery disease involving native coronary artery of native heart    TIA (transient ischemic attack)    ED (erectile dysfunction)    Cardiac device in situ    Arteriosclerosis of coronary artery    Hypertension    Hyperlipidemia    NSTEMI (non-ST elevated myocardial infarction)    Presence of stent in coronary artery    Tobacco abuse    COPD (chronic obstructive pulmonary disease)    CAD (coronary artery disease), native coronary artery    CAD (coronary artery disease)    Stage 3a chronic kidney disease     Past Medical History:   Diagnosis Date    Arteriosclerosis of coronary artery     CAD (coronary artery disease)     Cardiac device in situ     COPD (chronic obstructive pulmonary disease)     ED (erectile dysfunction)     Heartburn     Hyperlipidemia     Hypertension     Myocardial infarction 03/10/2024    pt states also had one in     TIA (transient ischemic attack)      Past Surgical History:   Procedure Laterality Date    APPENDECTOMY      CARDIAC CATHETERIZATION      CARDIAC CATHETERIZATION Left 2024    Procedure: Cardiac Catheterization/Vascular Study;  Surgeon: Lenin Stanley MD;  Location:  [facility] CATH [facility]ASIVE [facility];  Service: Cardiology;  Laterality: Left;    CORONARY ARTERY BYPASS GRAFT N/A 3/25/2024    Procedure: CORONARY ARTERY BYPASS GRAFTING X4, LEFT INTERNAL MAMMARY ARTERY GRAFT, LEFT LEG ENDOSCOPIC VEIN HARVEST, TRANSESOPHAGEAL ECHOCARDIOGRAM;  Surgeon: Humberto Avalos MD;  Location:  [facility] OR;  Service: Cardiothoracic;  Laterality: N/A;    CORONARY STENT PLACEMENT      total of 3 heart stents    FOOT SURGERY Right      crushed foot repair. pt states no hardware present.    TESTICLE SURGERY      left testicle removed     PT Assessment (Last 12 Hours)       PT Evaluation and Treatment       Row Name 03/27/24 0950          Physical Therapy Time and Intention    Subjective Information no complaints  -ADI     Document Type therapy note (daily note)  -ADI     Mode of Treatment physical therapy  -ADI       Row Name 03/27/24 0950          General Information    Existing Precautions/Restrictions fall;oxygen therapy device and L/min;sternal  -ADI       Row Name 03/27/24 0950          Pain    Posttreatment Pain Rating 0/10 - no pain  -ADI       Row Name 03/27/24 0950          Sit-Stand Transfer    Sit-Stand Bee (Transfers) contact guard  -ADI       Row Name 03/27/24 0950          Stand-Sit Transfer    Stand-Sit Bee (Transfers) standby assist  -ADI       Row Name 03/27/24 0950          Gait/Stairs (Locomotion)    Bee Level (Gait) contact guard  -ADI     Distance in Feet (Gait) 210  -ADI     Deviations/Abnormal Patterns (Gait) marion decreased  -ADI     Bilateral Gait Deviations forward flexed posture  -ADI       Row Name 03/27/24 0950          Motor Skills    Comments, Therapeutic Exercise warm ups x15  -ADI       Row Name             Wound 03/25/24 0815 midline sternal Incision    Wound - Properties Group Placement Date: 03/25/24  -TJ Placement Time: 0815  -TJ Orientation: midline  -TJ Location: sternal  -TJ Primary Wound Type: Incision  -TJ    Retired Wound - Properties Group Placement Date: 03/25/24  -TJ Placement Time: 0815  -TJ Orientation: midline  -TJ Location: sternal  -TJ Primary Wound Type: Incision  -TJ    Retired Wound - Properties Group Date first assessed: 03/25/24  -TJ Time first assessed: 0815  -TJ Location: sternal  -TJ Primary Wound Type: Incision  -TJ      Row Name             Wound 03/25/24 0806 Left leg Incision    Wound - Properties Group Placement Date: 03/25/24  -TJ Placement Time: 0806  -TJ Side: Left   -TJ Location: leg  -TJ Primary Wound Type: Incision  -TJ    Retired Wound - Properties Group Placement Date: 03/25/24  -TJ Placement Time: 0806  -TJ Side: Left  -TJ Location: leg  -TJ Primary Wound Type: Incision  -TJ    Retired Wound - Properties Group Date first assessed: 03/25/24  -TJ Time first assessed: 0806  -TJ Side: Left  -TJ Location: leg  -TJ Primary Wound Type: Incision  -TJ      Row Name 03/27/24 0950          Positioning and Restraints    Pre-Treatment Position sitting in chair/recliner  -ADI     In Chair reclined;call light within reach;encouraged to call for assist  -ADI               User Key  (r) = Recorded By, (t) = Taken By, (c) = Cosigned By      Initials Name Provider Type    Latoya Payne PTA Physical Therapist Assistant    Candice Kelly, RN Registered Nurse                    Physical Therapy Education       Title: PT OT SLP Therapies (Done)       Topic: Physical Therapy (Done)       Point: Mobility training (Done)       Learning Progress Summary             Patient Acceptance, E, VU by KATRINA at 3/26/2024 0915    Comment: sternal and fall precautions, POC progression.                         Point: Home exercise program (Done)       Learning Progress Summary             Patient Acceptance, E, VU by KATRINA at 3/26/2024 0915    Comment: sternal and fall precautions, POC progression.                         Point: Body mechanics (Done)       Learning Progress Summary             Patient Acceptance, E, VU by KATRINA at 3/26/2024 0915    Comment: sternal and fall precautions, POC progression.                         Point: Precautions (Done)       Learning Progress Summary             Patient Acceptance, E, VU by KATRINA at 3/26/2024 0915    Comment: sternal and fall precautions, POC progression.                                         User Key       Initials Effective Dates Name Provider Type Lizbeth HERNDON 02/22/24 -  Humberto Lara, PT Student PT Student PT                  PT Recommendation and  Plan             Time Calculation:    PT Charges       Row Name 03/27/24 1108             Time Calculation    Start Time 0950  -ADI      Stop Time 1015  -ADI      Time Calculation (min) 25 min  -ADI         Timed Charges    56605 - Gait Training Minutes  25  -ADI         Total Minutes    Timed Charges Total Minutes 25  -ADI       Total Minutes 25  -ADI                User Key  (r) = Recorded By, (t) = Taken By, (c) = Cosigned By      Initials Name Provider Type    Latoya Payne, PTA Physical Therapist Assistant                  Therapy Charges for Today       Code Description Service Date Service Provider Modifiers Qty    83088391805 HC GAIT TRAINING EA 15 MIN 3/26/2024 Latoya Faust, YANCI GP 2    62164043885 HC GAIT TRAINING EA 15 MIN 3/27/2024 Latoya Faust, PTA GP 2            PT G-Codes  Outcome Measure Options: AM-PAC 6 Clicks Basic Mobility (PT)  AM-PAC 6 Clicks Score (PT): 16    Latoya Faust PTA  3/27/2024

## 2024-03-27 NOTE — PLAN OF CARE
Goal Outcome Evaluation:   Patient ambulated 250' with CGA. SATs on 1 lpm 95 -91%. Worked on purse lip breathing. Worked on incentive spirometer. Patient has poor inspiration with spirometer, barely hit 500.

## 2024-03-27 NOTE — PLAN OF CARE
Goal Outcome Evaluation:              Outcome Evaluation: VSS, on 2L/NC, HR NSR 63-86 with HR once up to 167, 2.56 sec PAT/PAC, c/o of incisional chest pain, prn pain med given, walked in hallway today, MST and left pleural tube dc'd, pt had bolus IVF an lasix today,  large urine output, paredes now dc'd, pt DTV, midsternal and left leg incision with steristrips, c/d/intact, safety maintained, pt completed his walks today, pt was up to void after supper and voided 200 ml

## 2024-03-27 NOTE — PROGRESS NOTES
"Patient name: Rogerio Barksdale  Patient : 1950  VISIT # [id number]  MR #[id number]    Procedure:Procedure(s):  CORONARY ARTERY BYPASS GRAFTING X4, LEFT INTERNAL MAMMARY ARTERY GRAFT, LEFT LEG ENDOSCOPIC VEIN HARVEST, TRANSESOPHAGEAL ECHOCARDIOGRAM  Procedure Date:3/25/2024  POD:2 Days Post-Op    Subjective     Patient is tolerating 2 L nasal cannula.  He is 7 pounds above his baseline.  Creatinine today is 1.4, potassium 5.6.  No bowel movement.  Walking 100 feet with physical therapy.  Complains of choking episode overnight and difficulty coughing up phlegm.    Telemetry: Sinus rhythm 60s  IV drips: None       Objective     Visit Vitals  /66 (BP Location: Right arm, Patient Position: Lying)   Pulse 73   Temp 97.9 °F (36.6 °C) (Oral)   Resp 18   Ht 174 cm (68.5\")   Wt 74.1 kg (163 lb 6.4 oz)   SpO2 96%   BMI 24.48 kg/m²   Baseline weight 156 pounds    Intake/Output Summary (Last 24 hours) at 3/27/2024 0707  Last data filed at 3/27/2024 0600  Gross per 24 hour   Intake 2132.4 ml   Output 1647 ml   Net 485.4 ml     MCT: 135 mL in 24 hours, serosanguineous, no airleak  Left pleural drain: 82 mL in 24 hours, serosanguineous, no airleak    Lab:     CBC:  Results from last 7 days   Lab Units 24  0409 24  1232 24  0210   WBC 10*3/mm3 14.65* 12.03* 8.11   HEMATOCRIT % 32.9* 30.1* 27.9*   PLATELETS 10*3/mm3 147 147 135*          BMP:  Results from last 7 days   Lab Units 24  0409 24  1232 24  0210   SODIUM mmol/L 136 136 139   POTASSIUM mmol/L 5.6* 4.8 5.3*   CHLORIDE mmol/L 100 102 105   CO2 mmol/L 26.0 25.0 26.0   GLUCOSE mg/dL 129* 144* 133*   BUN mg/dL 19 16 14   CREATININE mg/dL 1.43* 1.38* 1.36*          COAG:  Results from last 7 days   Lab Units 24  0210 24  1129   INR  1.10* 1.21*   APTT seconds  --  35.2       IMAGES:       Imaging Results (Last 24 Hours)       Procedure Component Value Units Date/Time    XR Chest 1 View [[id number]] Collected: 24 0649 "     Updated: 03/27/24 0654    Narrative:      EXAMINATION: XR CHEST 1 VW-     3/27/2024 2:40 AM     HISTORY: Post-Op Heart Surgery     A frontal projection of the chest is compared with the previous study  dated 3/26/2024.     The lungs are moderately well-expanded, moderately better than the  previous study.     There is removal of the right IJ vascular sheath since the previous  study. The mediastinal drain and left chest tube are in place.     There is persistent moderate elevation of the right diaphragm. No  change.     There are atelectatic changes in the right lower lung.     There is no pleural effusion, pulm congestion or pneumothorax.     There is persistent moderate cardiomegaly. There is evidence of prior  cardiac surgery.     No acute bony abnormality.       Impression:      1. Removal of the right IJ vascular sheath since the previous study. No  significant change in the cardiopulmonary status since the previous  study.     This report was signed and finalized on 3/27/2024 6:51 AM by Dr. Mike Lund MD.             CXR: Chest tubes in stable position with no pneumothorax.  Hypoventilation, bilateral basilar atelectasis.  Stable exam.    Physical Exam:  General: Alert, oriented. No apparent distress.   Cardiovascular: Regular rate and rhythm without murmur, rubs, or gallops.    Pulmonary: Clear to auscultation bilaterally without wheezing, rubs, or rales.  Chest: Sternotomy incision clean, dry, and intact. Sternum stable. No clicks. Chest tubes to 20 cm suction. No air leak. Fluid is serosanguineous.   Abdomen: Soft, nondistended, and nontender.  Extremities: Warm, moves all extremities.  Mild lower extremity edema. Saphenectomy site clean, dry, and intact.  Neurologic:  Grossly intact with no focal deficits.            Impression:  Coronary artery disease  NSTEMI  Hyperlipidemia, on statin  Hypertension, well-controlled  Former smoker  CKD, stage IIIa          Plan:  Continue bowel regimen,  suppository today  Increase lisinopril to 10 mg daily  Discontinue mediastinal chest tubes and left pleural drain  Discontinue Claire catheter  Lasix 20 mg IV x 1 along with 500 mL bolus of normal saline over 2 to 3 hours  Start Mucinex twice daily  Repeat labs today at 1300  Routine postcardiac surgery care  Encourage pulmonary toilet and ambulation  Discussed with patient and nursing        Chioma Martin, YOSEF  03/27/24  07:07 CDT

## 2024-03-27 NOTE — PLAN OF CARE
Goal Outcome Evaluation:           Progress: improving  Outcome Evaluation: SB/S 53-63 per tele. some c/o of incinsional pain given pain meds per mar. Pt did get choked up while having a coughing spell around 0000, was put on 4 L and weened back down to 2 throughout the night. vss since then. x ray done. ekg to be done. will continue to monitor.

## 2024-03-28 ENCOUNTER — APPOINTMENT (OUTPATIENT)
Dept: GENERAL RADIOLOGY | Facility: HOSPITAL | Age: 74
DRG: 235 | End: 2024-03-28
Payer: MEDICARE

## 2024-03-28 LAB
ANION GAP SERPL CALCULATED.3IONS-SCNC: 11 MMOL/L (ref 5–15)
BUN SERPL-MCNC: 22 MG/DL (ref 8–23)
BUN/CREAT SERPL: 16.4 (ref 7–25)
CALCIUM SPEC-SCNC: 8.7 MG/DL (ref 8.6–10.5)
CHLORIDE SERPL-SCNC: 101 MMOL/L (ref 98–107)
CO2 SERPL-SCNC: 26 MMOL/L (ref 22–29)
CREAT SERPL-MCNC: 1.34 MG/DL (ref 0.76–1.27)
DEPRECATED RDW RBC AUTO: 44.7 FL (ref 37–54)
EGFRCR SERPLBLD CKD-EPI 2021: 55.6 ML/MIN/1.73
ERYTHROCYTE [DISTWIDTH] IN BLOOD BY AUTOMATED COUNT: 13 % (ref 12.3–15.4)
GLUCOSE SERPL-MCNC: 109 MG/DL (ref 65–99)
HCT VFR BLD AUTO: 32.2 % (ref 37.5–51)
HGB BLD-MCNC: 10.7 G/DL (ref 13–17.7)
MCH RBC QN AUTO: 31 PG (ref 26.6–33)
MCHC RBC AUTO-ENTMCNC: 33.2 G/DL (ref 31.5–35.7)
MCV RBC AUTO: 93.3 FL (ref 79–97)
PLATELET # BLD AUTO: 149 10*3/MM3 (ref 140–450)
PMV BLD AUTO: 10.5 FL (ref 6–12)
POTASSIUM SERPL-SCNC: 4.7 MMOL/L (ref 3.5–5.2)
RBC # BLD AUTO: 3.45 10*6/MM3 (ref 4.14–5.8)
SODIUM SERPL-SCNC: 138 MMOL/L (ref 136–145)
WBC NRBC COR # BLD AUTO: 11.94 10*3/MM3 (ref 3.4–10.8)

## 2024-03-28 PROCEDURE — 25010000002 ENOXAPARIN PER 10 MG: Performed by: NURSE PRACTITIONER

## 2024-03-28 PROCEDURE — 97116 GAIT TRAINING THERAPY: CPT

## 2024-03-28 PROCEDURE — 71046 X-RAY EXAM CHEST 2 VIEWS: CPT

## 2024-03-28 PROCEDURE — 25010000002 FUROSEMIDE PER 20 MG: Performed by: NURSE PRACTITIONER

## 2024-03-28 PROCEDURE — 85027 COMPLETE CBC AUTOMATED: CPT | Performed by: NURSE PRACTITIONER

## 2024-03-28 PROCEDURE — 80048 BASIC METABOLIC PNL TOTAL CA: CPT | Performed by: NURSE PRACTITIONER

## 2024-03-28 RX ORDER — BISACODYL 10 MG
10 SUPPOSITORY, RECTAL RECTAL ONCE
Status: DISCONTINUED | OUTPATIENT
Start: 2024-03-28 | End: 2024-03-29 | Stop reason: HOSPADM

## 2024-03-28 RX ORDER — FUROSEMIDE 10 MG/ML
20 INJECTION INTRAMUSCULAR; INTRAVENOUS ONCE
Status: COMPLETED | OUTPATIENT
Start: 2024-03-28 | End: 2024-03-28

## 2024-03-28 RX ADMIN — BISACODYL 10 MG: 5 TABLET ORAL at 09:05

## 2024-03-28 RX ADMIN — OXYCODONE HYDROCHLORIDE 5 MG: 5 TABLET ORAL at 00:36

## 2024-03-28 RX ADMIN — OXYCODONE HYDROCHLORIDE 5 MG: 5 TABLET ORAL at 12:05

## 2024-03-28 RX ADMIN — METOPROLOL TARTRATE 12.5 MG: 25 TABLET, FILM COATED ORAL at 09:05

## 2024-03-28 RX ADMIN — GUAIFENESIN 600 MG: 600 TABLET, EXTENDED RELEASE ORAL at 21:09

## 2024-03-28 RX ADMIN — ACETAMINOPHEN 650 MG: 325 TABLET, FILM COATED ORAL at 23:45

## 2024-03-28 RX ADMIN — LISINOPRIL 10 MG: 10 TABLET ORAL at 09:05

## 2024-03-28 RX ADMIN — ACETAMINOPHEN 650 MG: 325 TABLET, FILM COATED ORAL at 00:36

## 2024-03-28 RX ADMIN — METHOCARBAMOL 500 MG: 500 TABLET, FILM COATED ORAL at 21:08

## 2024-03-28 RX ADMIN — FUROSEMIDE 20 MG: 10 INJECTION, SOLUTION INTRAMUSCULAR; INTRAVENOUS at 09:05

## 2024-03-28 RX ADMIN — LIDOCAINE 2 PATCH: 4 PATCH TOPICAL at 21:10

## 2024-03-28 RX ADMIN — CLOPIDOGREL BISULFATE 75 MG: 75 TABLET, FILM COATED ORAL at 17:09

## 2024-03-28 RX ADMIN — ATORVASTATIN CALCIUM 20 MG: 10 TABLET, FILM COATED ORAL at 21:08

## 2024-03-28 RX ADMIN — METHOCARBAMOL 500 MG: 500 TABLET, FILM COATED ORAL at 05:24

## 2024-03-28 RX ADMIN — OXYCODONE HYDROCHLORIDE 5 MG: 5 TABLET ORAL at 21:13

## 2024-03-28 RX ADMIN — ACETAMINOPHEN 650 MG: 325 TABLET, FILM COATED ORAL at 17:09

## 2024-03-28 RX ADMIN — METHOCARBAMOL 500 MG: 500 TABLET, FILM COATED ORAL at 13:04

## 2024-03-28 RX ADMIN — ENOXAPARIN SODIUM 40 MG: 100 INJECTION SUBCUTANEOUS at 09:05

## 2024-03-28 RX ADMIN — METOPROLOL TARTRATE 12.5 MG: 25 TABLET, FILM COATED ORAL at 21:08

## 2024-03-28 RX ADMIN — GUAIFENESIN 600 MG: 600 TABLET, EXTENDED RELEASE ORAL at 09:05

## 2024-03-28 RX ADMIN — POLYETHYLENE GLYCOL 3350 17 G: 17 POWDER, FOR SOLUTION ORAL at 09:05

## 2024-03-28 RX ADMIN — ASPIRIN 81 MG: 81 TABLET, COATED ORAL at 09:05

## 2024-03-28 RX ADMIN — PANTOPRAZOLE SODIUM 40 MG: 40 TABLET, DELAYED RELEASE ORAL at 05:24

## 2024-03-28 RX ADMIN — ACETAMINOPHEN 650 MG: 325 TABLET, FILM COATED ORAL at 13:04

## 2024-03-28 NOTE — THERAPY TREATMENT NOTE
Acute Care - Physical Therapy Treatment Note  [facility]     Patient Name: Rogerio Barksdale  : 1950  MRN: [id number]  Today's Date: 3/28/2024      Visit Dx:     ICD-10-CM ICD-9-CM   1. Impaired mobility [Z74.09]  Z74.09 799.89   2. NSTEMI (non-ST elevated myocardial infarction)  I21.4 410.70   3. Coronary artery disease involving native coronary artery of native heart with other form of angina pectoris  I25.118 414.01     413.9     Patient Active Problem List   Diagnosis    Multivessel coronary artery disease involving native coronary artery of native heart    TIA (transient ischemic attack)    ED (erectile dysfunction)    Cardiac device in situ    Arteriosclerosis of coronary artery    Hypertension    Hyperlipidemia    NSTEMI (non-ST elevated myocardial infarction)    Presence of stent in coronary artery    Tobacco abuse    COPD (chronic obstructive pulmonary disease)    CAD (coronary artery disease), native coronary artery    CAD (coronary artery disease)    Stage 3a chronic kidney disease     Past Medical History:   Diagnosis Date    Arteriosclerosis of coronary artery     CAD (coronary artery disease)     Cardiac device in situ     COPD (chronic obstructive pulmonary disease)     ED (erectile dysfunction)     Heartburn     Hyperlipidemia     Hypertension     Myocardial infarction 03/10/2024    pt states also had one in     TIA (transient ischemic attack)      Past Surgical History:   Procedure Laterality Date    APPENDECTOMY      CARDIAC CATHETERIZATION      CARDIAC CATHETERIZATION Left 2024    Procedure: Cardiac Catheterization/Vascular Study;  Surgeon: Lenin Stanley MD;  Location: [facility] CATH INVASIVE LOCATION;  Service: Cardiology;  Laterality: Left;    CORONARY ARTERY BYPASS GRAFT N/A 3/25/2024    Procedure: CORONARY ARTERY BYPASS GRAFTING X4, LEFT INTERNAL MAMMARY ARTERY GRAFT, LEFT LEG ENDOSCOPIC VEIN HARVEST, TRANSESOPHAGEAL ECHOCARDIOGRAM;  Surgeon: Humberto Avalos MD;  Location: [facility]  OR;  Service: Cardiothoracic;  Laterality: N/A;    CORONARY STENT PLACEMENT      total of 3 heart stents    FOOT SURGERY Right 1965    crushed foot repair. pt states no hardware present.    TESTICLE SURGERY      left testicle removed     PT Assessment (Last 12 Hours)       PT Evaluation and Treatment       Row Name 03/28/24 1524 03/28/24 1052       Physical Therapy Time and Intention    Subjective Information no complaints  - complains of;pain  -    Document Type therapy note (daily note)  - therapy note (daily note)  -    Mode of Treatment physical therapy  - physical therapy  -      Row Name 03/28/24 1524 03/28/24 1052       General Information    Existing Precautions/Restrictions fall;sternal  - fall;sternal  -      Row Name 03/28/24 1524 03/28/24 1052       Pain    Pretreatment Pain Rating 0/10 - no pain  - 2/10  -    Posttreatment Pain Rating 0/10 - no pain  - 3/10  -    Pain Location -- incisional  -    Pain Location - -- chest  -    Pre/Posttreatment Pain Comment -- also left hip pain near end of walk  -    Pain Intervention(s) -- Repositioned;Ambulation/increased activity  -      Row Name 03/28/24 1524 03/28/24 1052       Bed Mobility    Comment, (Bed Mobility) up in chair  - up in chair  -      Row Name 03/28/24 1524 03/28/24 1052       Sit-Stand Transfer    Sit-Stand Grainger (Transfers) standby assist  - standby assist  -      Row Name 03/28/24 1524 03/28/24 1052       Stand-Sit Transfer    Stand-Sit Grainger (Transfers) standby assist  - standby assist  -      Row Name 03/28/24 1524 03/28/24 1052       Gait/Stairs (Locomotion)    Grainger Level (Gait) contact guard;standby assist  - contact guard  -    Distance in Feet (Gait) 375  1 standing rest  - 375  1 standing rest  -    Deviations/Abnormal Patterns (Gait) antalgic;marion decreased;stride length decreased  - antalgic;marion decreased;stride length decreased  -    Bilateral Gait  Deviations forward flexed posture  at times  -MF forward flexed posture  -MF    Right Sided Gait Deviations heel strike decreased  -MF heel strike decreased  -MF    Cabell Level (Stairs) verbal cues;contact guard  -MF --    Handrail Location (Stairs) right side (ascending)  -MF --    Number of Steps (Stairs) 4  also practiced up and down 1 step without hand railing-CGA  -MF --    Ascending Technique (Stairs) step-over-step  -MF --    Descending Technique (Stairs) step-over-step  -MF --    Comment, (Gait/Stairs) LOB x 2 due to unsteadiness most likely due to leg length difference. Shoes were forgotten this tx.  - pt reports having leg length difference that affects his gait when not wearing shoes.  -      Row Name 03/28/24 1524 03/28/24 1052       Motor Skills    Comments, Therapeutic Exercise cardiac warm ups  - cardiac warm ups  -      Row Name             Wound 03/25/24 0815 midline sternal Incision    Wound - Properties Group Placement Date: 03/25/24  -TJ Placement Time: 0815  -TJ Orientation: midline  -TJ Location: sternal  -TJ Primary Wound Type: Incision  -TJ    Retired Wound - Properties Group Placement Date: 03/25/24  -TJ Placement Time: 0815  -TJ Orientation: midline  -TJ Location: sternal  -TJ Primary Wound Type: Incision  -TJ    Retired Wound - Properties Group Date first assessed: 03/25/24  -TJ Time first assessed: 0815  -TJ Location: sternal  -TJ Primary Wound Type: Incision  -TJ      Row Name             Wound 03/25/24 0806 Left leg Incision    Wound - Properties Group Placement Date: 03/25/24  -TJ Placement Time: 0806  -TJ Side: Left  -TJ Location: leg  -TJ Primary Wound Type: Incision  -TJ    Retired Wound - Properties Group Placement Date: 03/25/24  -TJ Placement Time: 0806  -TJ Side: Left  -TJ Location: leg  -TJ Primary Wound Type: Incision  -TJ    Retired Wound - Properties Group Date first assessed: 03/25/24  -TJ Time first assessed: 0806  -TJ Side: Left  -TJ Location: leg  -TJ  Primary Wound Type: Incision  -TJ      Row Name 03/28/24 1052          Plan of Care Review    Plan of Care Reviewed With patient  -MF     Progress improving  -     Outcome Evaluation Pt. agreeable to therapy. He reports minimal pain in chest. He was SBA for transfers and CGA for gait. He was able to walk 375' with 1 standing rest. Walks with an antalgic gait due to leg length difference. Will attempt to walk with shoes if available next time. Will also practice steps. O2 sats were 93% at rest on RA, but did drop to 88% briefly before increasing to 90% with rest. Will work on progressive activity.  -       Row Name 03/28/24 1524 03/28/24 1052       Vital Signs    Pre SpO2 (%) 91  -MF 93  -MF    O2 Delivery Pre Treatment room air  -MF room air  -MF    Intra SpO2 (%) 88  breifly, increased to 90% within 1- 2 minutes  - 88  increased to 90% while resting  -    O2 Delivery Intra Treatment room air  - room air  -    Post SpO2 (%) 93  -MF 91  -MF    O2 Delivery Post Treatment room air  - room air  -    Pre Patient Position Sitting  -MF Sitting  -    Intra Patient Position Standing  - Standing  -MF    Post Patient Position Sitting  -MF Sitting  -      Row Name 03/28/24 1524 03/28/24 1052       Positioning and Restraints    Pre-Treatment Position sitting in chair/recliner  - sitting in chair/recliner  -MF    Post Treatment Position chair  - chair  -MF    In Chair reclined;call light within reach;encouraged to call for assist;with family/caregiver  - reclined;call light within reach;encouraged to call for assist;with family/caregiver  -              User Key  (r) = Recorded By, (t) = Taken By, (c) = Cosigned By      Initials Name Provider Type    Krystal Thurman PTA Physical Therapist Assistant    Candice Kelly, RN Registered Nurse                    Physical Therapy Education       Title: PT OT SLP Therapies (Done)       Topic: Physical Therapy (Done)       Point: Mobility training  (Done)       Learning Progress Summary             Patient Acceptance, E, VU by KATRINA at 3/26/2024 0915    Comment: sternal and fall precautions, POC progression.                         Point: Home exercise program (Done)       Learning Progress Summary             Patient Acceptance, E, VU by KATRINA at 3/26/2024 0915    Comment: sternal and fall precautions, POC progression.                         Point: Body mechanics (Done)       Learning Progress Summary             Patient Acceptance, E, VU by KATRINA at 3/26/2024 0915    Comment: sternal and fall precautions, POC progression.                         Point: Precautions (Done)       Learning Progress Summary             Patient Acceptance, E, VU by KATRINA at 3/26/2024 0915    Comment: sternal and fall precautions, POC progression.                                         User Key       Initials Effective Dates Name Provider Type Discipline     02/22/24 -  Humberto Lara, PT Student PT Student PT                  PT Recommendation and Plan     Plan of Care Reviewed With: patient  Progress: improving  Outcome Evaluation: Pt. agreeable to therapy. He reports minimal pain in chest. He was SBA for transfers and CGA for gait. He was able to walk 375' with 1 standing rest. Walks with an antalgic gait due to leg length difference. Will attempt to walk with shoes if available next time. Will also practice steps. O2 sats were 93% at rest on RA, but did drop to 88% briefly before increasing to 90% with rest. Will work on progressive activity.   Outcome Measures       Row Name 03/28/24 1052             How much help from another person do you currently need...    Turning from your back to your side while in flat bed without using bedrails? 3  -MF      Moving from lying on back to sitting on the side of a flat bed without bedrails? 3  -MF      Moving to and from a bed to a chair (including a wheelchair)? 3  -MF      Standing up from a chair using your arms (e.g., wheelchair, bedside  chair)? 3  -MF      Climbing 3-5 steps with a railing? 3  -MF      To walk in hospital room? 3  -MF      AM-PAC 6 Clicks Score (PT) 18  -MF      Highest Level of Mobility Goal 6 --> Walk 10 steps or more  -         Functional Assessment    Outcome Measure Options AM-PAC 6 Clicks Basic Mobility (PT)  -MF                User Key  (r) = Recorded By, (t) = Taken By, (c) = Cosigned By      Initials Name Provider Type    Krystal Thurman PTA Physical Therapist Assistant                     Time Calculation:    PT Charges       Row Name 03/28/24 1543 03/28/24 1124          Time Calculation    Start Time 1524  -MF 1052  -MF     Stop Time 1540  -MF 1120  -MF     Time Calculation (min) 16 min  -MF 28 min  -MF     PT Received On -- 03/28/24  -MF        Time Calculation- PT    Total Timed Code Minutes- PT 16 minute(s)  -MF 28 minute(s)  -MF        Timed Charges    79783 - Gait Training Minutes  16  -MF 28  -MF        Total Minutes    Timed Charges Total Minutes 16  -MF 28  -MF      Total Minutes 16  -MF 28  -MF               User Key  (r) = Recorded By, (t) = Taken By, (c) = Cosigned By      Initials Name Provider Type    Krystal Thurman PTA Physical Therapist Assistant                  Therapy Charges for Today       Code Description Service Date Service Provider Modifiers Qty    85100889531 HC GAIT TRAINING EA 15 MIN 3/28/2024 Krystal White PTA GP 2    59549998379 HC GAIT TRAINING EA 15 MIN 3/28/2024 Krystal White PTA GP 1            PT G-Codes  Outcome Measure Options: AM-PAC 6 Clicks Basic Mobility (PT)  AM-PAC 6 Clicks Score (PT): 18    Krystal White PTA  3/28/2024

## 2024-03-28 NOTE — DISCHARGE SUMMARY
[facility] Cardiothoracic Surgery  DISCHARGE SUMMARY        Date of Admission: 3/25/2024  Date of Discharge:  3/29/2024  Primary Care Physician: Richy Mendiola MD    Discharge Diagnoses:  Active Hospital Problems    Diagnosis     **NSTEMI (non-ST elevated myocardial infarction)     Stage 3a chronic kidney disease     CAD (coronary artery disease), native coronary artery     CAD (coronary artery disease)        Procedures Performed:   CABG x4 (LIMA to LAD, SVG to RCA, SVG to OM, SVG to Diagonal Artery) by Dr. Avalos on 3/25/2024    HPI:  Mr. Rogerio Barksdale is a 73 y.o. male who presented to the hospital last week with new onset chest pain and was diagnosed with NSTEMI.  He has a history of PCI.  Given chest pain he underwent coronary angiography revealing multivessel coronary artery disease.  LV function was normal.  Cardiothoracic surgery was consulted at that time for consideration for CABG.  He was deemed a suitable surgical candidate and he was ultimately discharged home with plans to return as an outpatient for CABG.  The risks and benefits were discussed at length with the patient and his wife, they agreed to proceed.    Hospital Course: On 3/25/2024, Mr. Barksdale was taken to the operating room for coronary artery bypass grafting.  See separate op note by Dr. Avalos detailing the operation.  Following surgery he was transferred to the ICU in stable but guarded condition.  He remained hemodynamically stable and was extubated without remark during the evening hours following surgery.  He demonstrated an intact neurological exam.  He was kept in ICU for close monitoring of renal function on postop day 1.  This did remain stable and he was ultimately transferred to  on the afternoon of postop day 1.  Mediastinal chest tubes and left pleural drain were removed without remark on postop day 2.  The remainder of his hospital stay was significant for encouraging pulmonary toilet and  ambulation, diuresis, and pain control.  He is eating well and is demonstrated adequate bowel function.  He is tolerating room air and has met all physical therapy goals.  Pacing wires were removed and he meets criteria for discharge home on postop day 4. The patient is agreeable to go home with his wife.    He will take Lasix 20 mg daily for 3 days at discharge.  Routine follow-up with me in 1 week with labs prior to appointment.    Condition on Discharge:  Neurologically intact and has good pain control.  He is eating well and has demonstrable good bowel function.  The sternum is stable without clicks and the saphenectomy incisions are healing nicely.  The heart is in normal sinus rhythm.  He has met all physical therapy criteria and verbalizes understanding of sternotomy precautions.   He verbalizes understanding of a separately handed out cardiac surgery handout.       Discharge Disposition:  Home or Self Care [1]    Discharge Medications:     Discharge Medications        New Medications        Instructions Start Date   clopidogrel 75 MG tablet  Commonly known as: PLAVIX   75 mg, Oral, Daily      furosemide 20 MG tablet  Commonly known as: LASIX   20 mg, Oral, Daily      HYDROcodone-acetaminophen 5-325 MG per tablet  Commonly known as: Norco   1 tablet, Oral, Every 6 Hours PRN      metoprolol tartrate 25 MG tablet  Commonly known as: LOPRESSOR   12.5 mg, Oral, Every 12 Hours Scheduled      potassium chloride 10 MEQ CR capsule  Commonly known as: MICRO-K   10 mEq, Oral, Daily             Continue These Medications        Instructions Start Date   albuterol sulfate  (90 Base) MCG/ACT inhaler  Commonly known as: PROVENTIL HFA;VENTOLIN HFA;PROAIR HFA   2 puffs, Inhalation, Every 4 Hours PRN      aspirin 81 MG EC tablet   81 mg, Oral, Daily      atorvastatin 80 MG tablet  Commonly known as: LIPITOR   80 mg, Oral, Daily      omeprazole 20 MG capsule  Commonly known as: priLOSEC   40 mg, Oral, Daily PRN       ramipril 10 MG capsule  Commonly known as: ALTACE   10 mg, Oral, 2 Times Daily             Stop These Medications      metoprolol succinate XL 25 MG 24 hr tablet  Commonly known as: TOPROL-XL     mupirocin 2 % ointment  Commonly known as: BACTROBAN     nitroglycerin 0.4 MG SL tablet  Commonly known as: NITROSTAT              Discharge Diet: Regular diet       Discharge Care Plan / Instructions: Please see the separately handed out cardiac surgery handout.  He will not be referred to cardiac rehab at this time due to recent sternotomy.  We will reassess at his postop follow-up visit.    Activity at Discharge:   No heavy lifting greater than 5 pounds or a gallon of milk while maintaining sternal precautions.  Rogerio Barksdale has been instructed on an exercise  regiment as detailed in a handed out cardiac surgery handout.    Tobacco:  The patient does not use tobacco products and therefore does not need tobacco cessation education.    BMI: BMI is within normal parameters. No other follow-up for BMI required.    Follow-up Appointments: Rogerio Barksdale  is requested to see Richy Mendiola MD within 1-2 weeks from time of discharge, to see Chioma NAVAS in 1 week with labs prior to appointment, to follow-up with Dr. Avalos in 6 weeks,  and to follow-up with Dr. Stanley of the cardiology service in 6 weeks.

## 2024-03-28 NOTE — PLAN OF CARE
Goal Outcome Evaluation:  Plan of Care Reviewed With: patient        Progress: improving  Outcome Evaluation: VSS. C/o pain this shift, medicated per MAR with relief obtained. NSR on tele. x1 dose IV Lasix given today, pt back to baseline weight. Pt did have multiple BM's today. Ambulated x4. DC home tomorrow? Safety maintained. Plan of care ongoing.

## 2024-03-28 NOTE — PLAN OF CARE
Goal Outcome Evaluation:  Plan of Care Reviewed With: patient        Progress: improving  Outcome Evaluation: Pt. agreeable to therapy. He reports minimal pain in chest. He was SBA for transfers and CGA for gait. He was able to walk 375' with 1 standing rest. Walks with an antalgic gait due to leg length difference. Will attempt to walk with shoes if available next time. Will also practice steps. O2 sats were 93% at rest on RA, but did drop to 88% briefly before increasing to 90% with rest. Will work on progressive activity.

## 2024-03-28 NOTE — THERAPY TREATMENT NOTE
Acute Care - Physical Therapy Treatment Note  [facility]     Patient Name: Rogerio Barksdale  : 1950  MRN: [id number]  Today's Date: 3/28/2024      Visit Dx:     ICD-10-CM ICD-9-CM   1. Impaired mobility [Z74.09]  Z74.09 799.89   2. NSTEMI (non-ST elevated myocardial infarction)  I21.4 410.70   3. Coronary artery disease involving native coronary artery of native heart with other form of angina pectoris  I25.118 414.01     413.9     Patient Active Problem List   Diagnosis    Multivessel coronary artery disease involving native coronary artery of native heart    TIA (transient ischemic attack)    ED (erectile dysfunction)    Cardiac device in situ    Arteriosclerosis of coronary artery    Hypertension    Hyperlipidemia    NSTEMI (non-ST elevated myocardial infarction)    Presence of stent in coronary artery    Tobacco abuse    COPD (chronic obstructive pulmonary disease)    CAD (coronary artery disease), native coronary artery    CAD (coronary artery disease)    Stage 3a chronic kidney disease     Past Medical History:   Diagnosis Date    Arteriosclerosis of coronary artery     CAD (coronary artery disease)     Cardiac device in situ     COPD (chronic obstructive pulmonary disease)     ED (erectile dysfunction)     Heartburn     Hyperlipidemia     Hypertension     Myocardial infarction 03/10/2024    pt states also had one in     TIA (transient ischemic attack)      Past Surgical History:   Procedure Laterality Date    APPENDECTOMY      CARDIAC CATHETERIZATION      CARDIAC CATHETERIZATION Left 2024    Procedure: Cardiac Catheterization/Vascular Study;  Surgeon: Lenin Stanley MD;  Location: [facility] CATH INVASIVE LOCATION;  Service: Cardiology;  Laterality: Left;    CORONARY ARTERY BYPASS GRAFT N/A 3/25/2024    Procedure: CORONARY ARTERY BYPASS GRAFTING X4, LEFT INTERNAL MAMMARY ARTERY GRAFT, LEFT LEG ENDOSCOPIC VEIN HARVEST, TRANSESOPHAGEAL ECHOCARDIOGRAM;  Surgeon: Humberto Avalos MD;  Location: [facility]  OR;  Service: Cardiothoracic;  Laterality: N/A;    CORONARY STENT PLACEMENT      total of 3 heart stents    FOOT SURGERY Right 1965    crushed foot repair. pt states no hardware present.    TESTICLE SURGERY      left testicle removed     PT Assessment (Last 12 Hours)       PT Evaluation and Treatment       Row Name 03/28/24 1052          Physical Therapy Time and Intention    Subjective Information complains of;pain  -     Document Type therapy note (daily note)  -     Mode of Treatment physical therapy  -       Row Name 03/28/24 1052          General Information    Existing Precautions/Restrictions fall;sternal  -[facility] Name 03/28/24 1052          Pain    Pretreatment Pain Rating 2/10  -     Posttreatment Pain Rating 3/10  -     Pain Location incisional  -     Pain Location - chest  -     Pre/Posttreatment Pain Comment also left hip pain near end of walk  -     Pain Intervention(s) Repositioned;Ambulation/increased activity  -       Row Name 03/28/24 1052          Bed Mobility    Comment, (Bed Mobility) up in chair  -[facility] Name 03/28/24 1052          Sit-Stand Transfer    Sit-Stand [place] (Transfers) standby assist  -       Row Name 03/28/24 1052          Stand-Sit Transfer    Stand-Sit [place] (Transfers) standby assist  -[facility] Name 03/28/24 1052          Gait/Stairs (Locomotion)    [place] Level (Gait) contact guard  -     Distance in Feet (Gait) 375  1 standing rest  -     Deviations/Abnormal Patterns (Gait) antalgic;marion decreased;stride length decreased  -     Bilateral Gait Deviations forward flexed posture  -     Right Sided Gait Deviations heel strike decreased  -     Comment, (Gait/Stairs) pt reports having leg length difference that affects his gait when not wearing shoes.  -       Row Name 03/28/24 1052          Motor Skills    Comments, Therapeutic Exercise cardiac warm ups  -       Row Name             Wound 03/25/24 0815 midline  sternal Incision    Wound - Properties Group Placement Date: 03/25/24  -TJ Placement Time: 0815  -TJ Orientation: midline  -TJ Location: sternal  -TJ Primary Wound Type: Incision  -TJ    Retired Wound - Properties Group Placement Date: 03/25/24  -TJ Placement Time: 0815  -TJ Orientation: midline  -TJ Location: sternal  -TJ Primary Wound Type: Incision  -TJ    Retired Wound - Properties Group Date first assessed: 03/25/24  -TJ Time first assessed: 0815  -TJ Location: sternal  -TJ Primary Wound Type: Incision  -TJ      Row Name             Wound 03/25/24 0806 Left leg Incision    Wound - Properties Group Placement Date: 03/25/24  -TJ Placement Time: 0806  -TJ Side: Left  -TJ Location: leg  -TJ Primary Wound Type: Incision  -TJ    Retired Wound - Properties Group Placement Date: 03/25/24  -TJ Placement Time: 0806  -TJ Side: Left  -TJ Location: leg  -TJ Primary Wound Type: Incision  -TJ    Retired Wound - Properties Group Date first assessed: 03/25/24  -TJ Time first assessed: 0806  -TJ Side: Left  -TJ Location: leg  -TJ Primary Wound Type: Incision  -TJ      Row Name 03/28/24 1052          Plan of Care Review    Plan of Care Reviewed With patient  -MF     Progress improving  -     Outcome Evaluation Pt. agreeable to therapy. He reports minimal pain in chest. He was SBA for transfers and CGA for gait. He was able to walk 375' with 1 standing rest. Walks with an antalgic gait due to leg length difference. Will attempt to walk with shoes if available next time. Will also practice steps. O2 sats were 93% at rest on RA, but did drop to 88% briefly before increasing to 90% with rest. Will work on progressive activity.  -       Row Name 03/28/24 1052          Vital Signs    Pre SpO2 (%) 93  -MF     O2 Delivery Pre Treatment room air  -MF     Intra SpO2 (%) 88  increased to 90% while resting  -MF     O2 Delivery Intra Treatment room air  -MF     Post SpO2 (%) 91  -MF     O2 Delivery Post Treatment room air  -MF     Pre  Patient Position Sitting  -MF     Intra Patient Position Standing  -MF     Post Patient Position Sitting  -MF       Row Name 03/28/24 1052          Positioning and Restraints    Pre-Treatment Position sitting in chair/recliner  -MF     Post Treatment Position chair  -MF     In Chair reclined;call light within reach;encouraged to call for assist;with family/caregiver  -               User Key  (r) = Recorded By, (t) = Taken By, (c) = Cosigned By      Initials Name Provider Type    Krystal Thurman PTA Physical Therapist Assistant    Candice Kelly RN Registered Nurse                    Physical Therapy Education       Title: PT OT SLP Therapies (Done)       Topic: Physical Therapy (Done)       Point: Mobility training (Done)       Learning Progress Summary             Patient Acceptance, E, VU by  at 3/26/2024 0915    Comment: sternal and fall precautions, POC progression.                         Point: Home exercise program (Done)       Learning Progress Summary             Patient Acceptance, E, VU by  at 3/26/2024 0915    Comment: sternal and fall precautions, POC progression.                         Point: Body mechanics (Done)       Learning Progress Summary             Patient Acceptance, E, VU by  at 3/26/2024 0915    Comment: sternal and fall precautions, POC progression.                         Point: Precautions (Done)       Learning Progress Summary             Patient Acceptance, E, VU by  at 3/26/2024 0915    Comment: sternal and fall precautions, POC progression.                                         User Key       Initials Effective Dates Name Provider Type [facility] 02/22/24 -  Humberto Lara, PT Student PT Student PT                  PT Recommendation and Plan     Plan of Care Reviewed With: patient  Progress: improving  Outcome Evaluation: Pt. agreeable to therapy. He reports minimal pain in chest. He was SBA for transfers and CGA for gait. He was able to walk 375' with  1 standing rest. Walks with an antalgic gait due to leg length difference. Will attempt to walk with shoes if available next time. Will also practice steps. O2 sats were 93% at rest on RA, but did drop to 88% briefly before increasing to 90% with rest. Will work on progressive activity.   Outcome Measures       Row Name 03/28/24 1052             How much help from another person do you currently need...    Turning from your back to your side while in flat bed without using bedrails? 3  -MF      Moving from lying on back to sitting on the side of a flat bed without bedrails? 3  -MF      Moving to and from a bed to a chair (including a wheelchair)? 3  -MF      Standing up from a chair using your arms (e.g., wheelchair, bedside chair)? 3  -MF      Climbing 3-5 steps with a railing? 3  -MF      To walk in hospital room? 3  -MF      AM-[facility] 6 Clicks Score (PT) 18  -MF      Highest Level of Mobility Goal 6 --> Walk 10 steps or more  -         Functional Assessment    Outcome Measure Options AM-PAC 6 Clicks Basic Mobility (PT)  -                User Key  (r) = Recorded By, (t) = Taken By, (c) = Cosigned By      Initials Name Provider Type    Krystal Thurman PTA Physical Therapist Assistant                     Time Calculation:    PT Charges       Row Name 03/28/24 1124             Time Calculation    Start Time 1052  -MF      Stop Time 1120  -MF      Time Calculation (min) 28 min  -MF      PT Received On 03/28/24  -MF         Time Calculation- PT    Total Timed Code Minutes- PT 28 minute(s)  -MF         Timed Charges    47596 - Gait Training Minutes  28  -MF         Total Minutes    Timed Charges Total Minutes 28  -MF       Total Minutes 28  -MF                User Key  (r) = Recorded By, (t) = Taken By, (c) = Cosigned By      Initials Name Provider Type    Krystal Thurman PTA Physical Therapist Assistant                  Therapy Charges for Today       Code Description Service Date Service Provider  Modifiers Qty    00955967459 HC GAIT TRAINING EA 15 MIN 3/28/2024 Krystal White, YANCI GP 2            PT G-Codes  Outcome Measure Options: AM-PAC 6 Clicks Basic Mobility (PT)  AM-PAC 6 Clicks Score (PT): 18    Krystal White PTA  3/28/2024

## 2024-03-28 NOTE — PROGRESS NOTES
"Patient name: Rogerio Barksdale  Patient : 1950  VISIT # [id number]  MR #[id number]    Procedure:Procedure(s):  CORONARY ARTERY BYPASS GRAFTING X4, LEFT INTERNAL MAMMARY ARTERY GRAFT, LEFT LEG ENDOSCOPIC VEIN HARVEST, TRANSESOPHAGEAL ECHOCARDIOGRAM  Procedure Date:3/25/2024  POD:3 Days Post-Op    Subjective     Patient is now tolerating room air.  Weight is down 7 pounds today and he is at his baseline weight.  Creatinine is stable at 1.34.  He has not yet had a bowel movement.  Walking 250 feet with physical therapy.    Telemetry: Sinus rhythm 60-91  IV drips: None       Objective     Visit Vitals  /60 (BP Location: Left arm, Patient Position: Sitting)   Pulse 73   Temp 98.2 °F (36.8 °C) (Oral)   Resp 16   Ht 174 cm (68.5\")   Wt 71 kg (156 lb 9.6 oz)   SpO2 91%   BMI 23.46 kg/m²   Baseline weight 156 pounds    Intake/Output Summary (Last 24 hours) at 3/28/2024 0820  Last data filed at 3/28/2024 0603  Gross per 24 hour   Intake 1000 ml   Output 3450 ml   Net -2450 ml       Lab:     CBC:  Results from last 7 days   Lab Units 24  0311 24  1205 24  0409   WBC 10*3/mm3 11.94* 15.48* 14.65*   HEMATOCRIT % 32.2* 33.6* 32.9*   PLATELETS 10*3/mm3 149 170 147          BMP:  Results from last 7 days   Lab Units 24  0311 24  1205 24  0409   SODIUM mmol/L 138 135* 136   POTASSIUM mmol/L 4.7 4.7 5.6*   CHLORIDE mmol/L 101 97* 100   CO2 mmol/L 26.0 28.0 26.0   GLUCOSE mg/dL 109* 108* 129*   BUN mg/dL 22 19 19   CREATININE mg/dL 1.34* 1.34* 1.43*          COAG:  Results from last 7 days   Lab Units 24  0210 24  1129   INR  1.10* 1.21*   APTT seconds  --  35.2       IMAGES:       Imaging Results (Last 24 Hours)       Procedure Component Value Units Date/Time    XR Chest PA & Lateral [[id number]] Collected: 24     Updated: 24    Narrative:      EXAMINATION: XR CHEST PA AND LATERAL-     3/28/2024 3:08 AM     HISTORY: CAD, s/p CABG     2 views of the chest " the chest are compared with the previous study  dated 3/27/2024.     The lungs are moderately well-expanded.     There is interval removal of the left chest tube and mediastinal drain.     There is left lower lobar consolidation/atelectasis which was not noted  in the previous study.     There is elevation of right diaphragm with underlying mildly dilated  hepatic flexure.     There are mild atelectatic changes in the left lower lung.     There is no pulmonary congestion or pneumothorax.     The heart size is not optimally evaluated due to obliteration of the  right cardiac border by the adjacent right lung consolidation. There is  evidence of prior cardiac surgery.     No acute bony abnormality.       Impression:      1. The interval removal of the left chest tube and the mediastinal  drain.  2. Left lower lobar consolidation/collapse which was not seen in the  previous study.                    This report was signed and finalized on 3/28/2024 7:06 AM by Dr. Mike Lund MD.             CXR: No pneumothorax.  Hypoventilation.  No pleural effusion.    Physical Exam:  General: Alert, oriented. No apparent distress.   Cardiovascular: Regular rate and rhythm without murmur, rubs, or gallops.    Pulmonary: Clear to auscultation bilaterally without wheezing, rubs, or rales.  Chest: Sternotomy incision clean, dry, and intact. Sternum stable. No clicks.   Abdomen: Soft, nondistended, and nontender.  Extremities: Warm, moves all extremities.  Mild lower extremity edema. Saphenectomy site clean, dry, and intact.  Neurologic:  Grossly intact with no focal deficits.            Impression:  Coronary artery disease  NSTEMI  Hyperlipidemia, on statin  Hypertension, well-controlled  Former smoker  CKD, stage IIIa          Plan:  Continue bowel regimen, suppository again today.  Repeat later this afternoon if no bowel movement this morning  Lasix 20 mg IV x 1 dose today  Routine postcardiac surgery care  Encourage pulmonary  toilet and ambulation  Anticipate discharge home tomorrow as long as patient is able to have a bowel movement.  Discussed with patient, his wife, and nursing        YOSEF Coates  03/28/24  08:20 CDT

## 2024-03-28 NOTE — PLAN OF CARE
Goal Outcome Evaluation:  Plan of Care Reviewed With: patient, spouse           Outcome Evaluation: VSS, NSR 60-91 Sat's WNL on RA at rest and with ambulation. O>I, all dsg removed, all incisions D/C/I, IS @ 1000, pain relieved with prn Trish 5mg, Safety maintained. Ambulate x 1 and PA/LA CRX pending

## 2024-03-29 ENCOUNTER — APPOINTMENT (OUTPATIENT)
Dept: GENERAL RADIOLOGY | Facility: HOSPITAL | Age: 74
DRG: 235 | End: 2024-03-29
Payer: MEDICARE

## 2024-03-29 VITALS
TEMPERATURE: 98.2 F | WEIGHT: 152.8 LBS | SYSTOLIC BLOOD PRESSURE: 123 MMHG | HEIGHT: 69 IN | HEART RATE: 81 BPM | RESPIRATION RATE: 16 BRPM | DIASTOLIC BLOOD PRESSURE: 56 MMHG | OXYGEN SATURATION: 93 % | BODY MASS INDEX: 22.63 KG/M2

## 2024-03-29 LAB
ANION GAP SERPL CALCULATED.3IONS-SCNC: 13 MMOL/L (ref 5–15)
BUN SERPL-MCNC: 26 MG/DL (ref 8–23)
BUN/CREAT SERPL: 18.7 (ref 7–25)
CALCIUM SPEC-SCNC: 8.7 MG/DL (ref 8.6–10.5)
CHLORIDE SERPL-SCNC: 100 MMOL/L (ref 98–107)
CO2 SERPL-SCNC: 27 MMOL/L (ref 22–29)
CREAT SERPL-MCNC: 1.39 MG/DL (ref 0.76–1.27)
DEPRECATED RDW RBC AUTO: 45.1 FL (ref 37–54)
EGFRCR SERPLBLD CKD-EPI 2021: 53.2 ML/MIN/1.73
ERYTHROCYTE [DISTWIDTH] IN BLOOD BY AUTOMATED COUNT: 13.2 % (ref 12.3–15.4)
GLUCOSE SERPL-MCNC: 97 MG/DL (ref 65–99)
HCT VFR BLD AUTO: 32.4 % (ref 37.5–51)
HGB BLD-MCNC: 10.4 G/DL (ref 13–17.7)
MCH RBC QN AUTO: 30.1 PG (ref 26.6–33)
MCHC RBC AUTO-ENTMCNC: 32.1 G/DL (ref 31.5–35.7)
MCV RBC AUTO: 93.9 FL (ref 79–97)
PLATELET # BLD AUTO: 192 10*3/MM3 (ref 140–450)
PMV BLD AUTO: 10.7 FL (ref 6–12)
POTASSIUM SERPL-SCNC: 3.7 MMOL/L (ref 3.5–5.2)
POTASSIUM SERPL-SCNC: 4.6 MMOL/L (ref 3.5–5.2)
RBC # BLD AUTO: 3.45 10*6/MM3 (ref 4.14–5.8)
SODIUM SERPL-SCNC: 140 MMOL/L (ref 136–145)
WBC NRBC COR # BLD AUTO: 8.84 10*3/MM3 (ref 3.4–10.8)

## 2024-03-29 PROCEDURE — 84132 ASSAY OF SERUM POTASSIUM: CPT | Performed by: SURGERY

## 2024-03-29 PROCEDURE — 80048 BASIC METABOLIC PNL TOTAL CA: CPT | Performed by: NURSE PRACTITIONER

## 2024-03-29 PROCEDURE — 25010000002 ENOXAPARIN PER 10 MG: Performed by: NURSE PRACTITIONER

## 2024-03-29 PROCEDURE — 85027 COMPLETE CBC AUTOMATED: CPT | Performed by: NURSE PRACTITIONER

## 2024-03-29 PROCEDURE — 71045 X-RAY EXAM CHEST 1 VIEW: CPT

## 2024-03-29 PROCEDURE — 97116 GAIT TRAINING THERAPY: CPT

## 2024-03-29 RX ORDER — CLOPIDOGREL BISULFATE 75 MG/1
75 TABLET ORAL DAILY
Qty: 30 TABLET | Refills: 2 | Status: SHIPPED | OUTPATIENT
Start: 2024-03-29

## 2024-03-29 RX ORDER — POTASSIUM CHLORIDE 750 MG/1
10 CAPSULE, EXTENDED RELEASE ORAL DAILY
Qty: 3 CAPSULE | Refills: 0 | Status: SHIPPED | OUTPATIENT
Start: 2024-03-29 | End: 2024-04-01

## 2024-03-29 RX ORDER — POTASSIUM CHLORIDE 750 MG/1
20 CAPSULE, EXTENDED RELEASE ORAL ONCE
Status: COMPLETED | OUTPATIENT
Start: 2024-03-29 | End: 2024-03-29

## 2024-03-29 RX ORDER — HYDROCODONE BITARTRATE AND ACETAMINOPHEN 5; 325 MG/1; MG/1
1 TABLET ORAL EVERY 6 HOURS PRN
Qty: 25 TABLET | Refills: 0 | Status: SHIPPED | OUTPATIENT
Start: 2024-03-29

## 2024-03-29 RX ORDER — FUROSEMIDE 20 MG/1
20 TABLET ORAL DAILY
Qty: 3 TABLET | Refills: 0 | Status: SHIPPED | OUTPATIENT
Start: 2024-03-29 | End: 2024-04-11 | Stop reason: SDUPTHER

## 2024-03-29 RX ADMIN — ACETAMINOPHEN 650 MG: 325 TABLET, FILM COATED ORAL at 12:26

## 2024-03-29 RX ADMIN — GUAIFENESIN 600 MG: 600 TABLET, EXTENDED RELEASE ORAL at 09:15

## 2024-03-29 RX ADMIN — METOPROLOL TARTRATE 12.5 MG: 25 TABLET, FILM COATED ORAL at 09:15

## 2024-03-29 RX ADMIN — ACETAMINOPHEN 650 MG: 325 TABLET, FILM COATED ORAL at 06:28

## 2024-03-29 RX ADMIN — ENOXAPARIN SODIUM 40 MG: 100 INJECTION SUBCUTANEOUS at 09:24

## 2024-03-29 RX ADMIN — ASPIRIN 81 MG: 81 TABLET, COATED ORAL at 09:16

## 2024-03-29 RX ADMIN — BISACODYL 10 MG: 5 TABLET ORAL at 09:24

## 2024-03-29 RX ADMIN — POTASSIUM CHLORIDE 20 MEQ: 10 CAPSULE, COATED, EXTENDED RELEASE ORAL at 06:27

## 2024-03-29 RX ADMIN — PANTOPRAZOLE SODIUM 40 MG: 40 TABLET, DELAYED RELEASE ORAL at 06:28

## 2024-03-29 RX ADMIN — METHOCARBAMOL 500 MG: 500 TABLET, FILM COATED ORAL at 15:07

## 2024-03-29 RX ADMIN — METHOCARBAMOL 500 MG: 500 TABLET, FILM COATED ORAL at 06:27

## 2024-03-29 RX ADMIN — OXYCODONE HYDROCHLORIDE 5 MG: 5 TABLET ORAL at 03:47

## 2024-03-29 RX ADMIN — LISINOPRIL 10 MG: 10 TABLET ORAL at 09:15

## 2024-03-29 NOTE — PROGRESS NOTES
"Patient name: Rogerio Barksdale  Patient : 1950  VISIT # [id number]  MR #[id number]    Procedure:Procedure(s):  CORONARY ARTERY BYPASS GRAFTING X4, LEFT INTERNAL MAMMARY ARTERY GRAFT, LEFT LEG ENDOSCOPIC VEIN HARVEST, TRANSESOPHAGEAL ECHOCARDIOGRAM  Procedure Date:3/25/2024  POD:4 Days Post-Op    Subjective     Patient is tolerating room air.  Weight is down an additional 4 pounds and he is 4 pounds below his baseline.  Creatinine is stable at 1.39.  He is having bowel movements.  Walking 375 feet with physical therapy.    Telemetry: Sinus rhythm 70-92  IV drips: None       Objective     Visit Vitals  /56 (BP Location: Left arm, Patient Position: Sitting)   Pulse 87   Temp 97.5 °F (36.4 °C) (Oral)   Resp 16   Ht 174 cm (68.5\")   Wt 69.3 kg (152 lb 12.8 oz)   SpO2 92%   BMI 22.89 kg/m²   Baseline weight 156 pounds    Intake/Output Summary (Last 24 hours) at 3/29/2024 0913  Last data filed at 3/28/2024 1814  Gross per 24 hour   Intake 100 ml   Output --   Net 100 ml       Lab:     CBC:  Results from last 7 days   Lab Units 24  0416 24  0311 24  1205   WBC 10*3/mm3 8.84 11.94* 15.48*   HEMATOCRIT % 32.4* 32.2* 33.6*   PLATELETS 10*3/mm3 192 149 170          BMP:  Results from last 7 days   Lab Units 24  0416 24  0311 24  1205   SODIUM mmol/L 140 138 135*   POTASSIUM mmol/L 3.7 4.7 4.7   CHLORIDE mmol/L 100 101 97*   CO2 mmol/L 27.0 26.0 28.0   GLUCOSE mg/dL 97 109* 108*   BUN mg/dL 26* 22 19   CREATININE mg/dL 1.39* 1.34* 1.34*          COAG:  Results from last 7 days   Lab Units 24  0210 24  1129   INR  1.10* 1.21*   APTT seconds  --  35.2       IMAGES:       Imaging Results (Last 24 Hours)       Procedure Component Value Units Date/Time    XR Chest 1 View [[id number]] Collected: 24 0644     Updated: 2449    Narrative:      EXAMINATION: XR CHEST 1 VW-     3/29/2024 2:55 AM     HISTORY: Post-Op Heart Surgery     A frontal projection of the chest is " compared with the previous study  dated 3/28/2024.     The lungs are moderately well-expanded.     There is improved right lower lobar consolidation/collapse since the  previous study.     There is persistent moderate elevation right diaphragm.     There is no pulmonary congestion or pneumothorax.     There is moderate cardiomegaly. There is evidence of prior cardiac  surgery. Epicardial pacer leads are in place.     No acute bony abnormality.       Impression:      1. Improved right lower lung consolidation/collapse since the previous  study.        This report was signed and finalized on 3/29/2024 6:46 AM by Dr. Mike Lund MD.             CXR: No pneumothorax.  No pleural effusion.  Lungs are well-expanded    Patient was seen and examined at bedside and there are no changes to physical exam  Physical Exam:  General: Alert, oriented. No apparent distress.   Cardiovascular: Regular rate and rhythm without murmur, rubs, or gallops.    Pulmonary: Clear to auscultation bilaterally without wheezing, rubs, or rales.  Chest: Sternotomy incision clean, dry, and intact. Sternum stable. No clicks.   Abdomen: Soft, nondistended, and nontender.  Extremities: Warm, moves all extremities.  Mild lower extremity edema. Saphenectomy site clean, dry, and intact.  Neurologic:  Grossly intact with no focal deficits.            Impression:  Coronary artery disease  NSTEMI  Hyperlipidemia, on statin  Hypertension, well-controlled  Former smoker  CKD, stage IIIa          Plan:  He is ready for discharge home today.  He will be discharged home with Lasix 20 mg daily for 3 days.  Discontinue pacing wires later today  Routine postcardiac surgery care  Encourage pulmonary toilet and ambulation  Routine follow-up with me in 1 week with labs prior to appointment  Discussed with patient, his wife, and nursing        YOSEF Coates  03/29/24  09:13 CDT

## 2024-03-29 NOTE — PLAN OF CARE
Goal Outcome Evaluation:         Patient stands with supervision. Ambulated 380' with SBA and 1 standing rest

## 2024-03-29 NOTE — THERAPY DISCHARGE NOTE
Acute Care - Physical Therapy Discharge Summary  [facility]       Patient Name: Rogerio Barksdale  : 1950  MRN: [id number]    Today's Date: 3/29/2024                 Admit Date: 3/25/2024      PT Recommendation and Plan    Visit Dx:    ICD-10-CM ICD-9-CM   1. Impaired mobility [Z74.09]  Z74.09 799.89   2. NSTEMI (non-ST elevated myocardial infarction)  I21.4 410.70   3. Coronary artery disease involving native coronary artery of native heart with other form of angina pectoris  I25.118 414.01     413.9        Outcome Measures       Row Name 24 1052             How much help from another person do you currently need...    Turning from your back to your side while in flat bed without using bedrails? 3  -MF      Moving from lying on back to sitting on the side of a flat bed without bedrails? 3  -MF      Moving to and from a bed to a chair (including a wheelchair)? 3  -MF      Standing up from a chair using your arms (e.g., wheelchair, bedside chair)? 3  -MF      Climbing 3-5 steps with a railing? 3  -MF      To walk in hospital room? 3  -MF      AM-PAC 6 Clicks Score (PT) 18  -      Highest Level of Mobility Goal 6 --> Walk 10 steps or more  -         Functional Assessment    Outcome Measure Options AM-PAC 6 Clicks Basic Mobility (PT)  -                User Key  (r) = Recorded By, (t) = Taken By, (c) = Cosigned By      Initials Name Provider Type    Krystal Thurman PTA Physical Therapist Assistant                     PT Charges       Row Name 24 1437             Time Calculation    Start Time 1122  -ADI      Stop Time 1137  -ADI      Time Calculation (min) 15 min  -ADI         Timed Charges    97421 - Gait Training Minutes  15  -ADI         Total Minutes    Timed Charges Total Minutes 15  -ADI       Total Minutes 15  -ADI                User Key  (r) = Recorded By, (t) = Taken By, (c) = Cosigned By      Initials Name Provider Type    Latoya Payne PTA Physical Therapist Assistant                      PT Rehab Goals       Row Name 03/29/24 1400             Bed Mobility Goal 1 (PT)    [place] Level/Cues Needed (Bed Mobility Goal 1, PT) minimum assist (75% or more patient effort)  Goal: CGA. Min assist per NSG  -ADI      Progress/Outcomes (Bed Mobility Goal 1, PT) goal not met  -ADI         Transfer Goal 1 (PT)    [place] Level/Cues Needed (Transfer Goal 1, PT) standby assist  -ADI      Progress/Outcome (Transfer Goal 1, PT) goal met  -ADI         Gait Training Goal 1 (PT)    [place] Level (Gait Training Goal 1, PT) contact guard required  Goal: SBA  -ADI      Distance (Gait Training Goal 1, PT) 375  Goal: 200  -ADI      Progress/Outcome (Gait Training Goal 1, PT) goal met  -ADI         Stairs Goal 1 (PT)    [place] Level/Cues Needed (Stairs Goal 1, PT) contact guard required  -ADI      Number of Stairs (Stairs Goal 1, PT) 4  -ADI      Progress/Outcome (Stairs Goal 1, PT) goal met  -ADI                User Key  (r) = Recorded By, (t) = Taken By, (c) = Cosigned By      Initials Name Provider Type Discipline    Latoya Payne PTA Physical Therapist Assistant PT                    Therapy Charges for Today       Code Description Service Date Service Provider Modifiers Qty    53158086314 HC GAIT TRAINING EA 15 MIN 3/29/2024 Latoya Faust PTA GP 1            PT Discharge Summary  Anticipated Discharge Disposition (PT): home with assist  Reason for Discharge: Discharge from facility  Outcomes Achieved: Patient able to partially acheive established goals  Discharge Destination: Home with assist      Latoya Faust PTA   3/29/2024

## 2024-03-29 NOTE — PLAN OF CARE
Goal Outcome Evaluation:           Progress: improving  Outcome Evaluation: VSS, incision pain managed with prn pain medication. Possible discharge home today. Safety maintained. S 70-92 on tele.

## 2024-03-29 NOTE — THERAPY TREATMENT NOTE
Acute Care - Physical Therapy Treatment Note  [facility]     Patient Name: Rogerio Barksdale  : 1950  MRN: [id number]  Today's Date: 3/29/2024      Visit Dx:     ICD-10-CM ICD-9-CM   1. Impaired mobility [Z74.09]  Z74.09 799.89   2. NSTEMI (non-ST elevated myocardial infarction)  I21.4 410.70   3. Coronary artery disease involving native coronary artery of native heart with other form of angina pectoris  I25.118 414.01     413.9     Patient Active Problem List   Diagnosis    Multivessel coronary artery disease involving native coronary artery of native heart    TIA (transient ischemic attack)    ED (erectile dysfunction)    Cardiac device in situ    Arteriosclerosis of coronary artery    Hypertension    Hyperlipidemia    NSTEMI (non-ST elevated myocardial infarction)    Presence of stent in coronary artery    Tobacco abuse    COPD (chronic obstructive pulmonary disease)    CAD (coronary artery disease), native coronary artery    CAD (coronary artery disease)    Stage 3a chronic kidney disease     Past Medical History:   Diagnosis Date    Arteriosclerosis of coronary artery     CAD (coronary artery disease)     Cardiac device in situ     COPD (chronic obstructive pulmonary disease)     ED (erectile dysfunction)     Heartburn     Hyperlipidemia     Hypertension     Myocardial infarction 03/10/2024    pt states also had one in     TIA (transient ischemic attack)      Past Surgical History:   Procedure Laterality Date    APPENDECTOMY      CARDIAC CATHETERIZATION      CARDIAC CATHETERIZATION Left 2024    Procedure: Cardiac Catheterization/Vascular Study;  Surgeon: Lenin Stanley MD;  Location: [facility] CATH INVASIVE LOCATION;  Service: Cardiology;  Laterality: Left;    CORONARY ARTERY BYPASS GRAFT N/A 3/25/2024    Procedure: CORONARY ARTERY BYPASS GRAFTING X4, LEFT INTERNAL MAMMARY ARTERY GRAFT, LEFT LEG ENDOSCOPIC VEIN HARVEST, TRANSESOPHAGEAL ECHOCARDIOGRAM;  Surgeon: Humberto Avalos MD;  Location: [facility]  OR;  Service: Cardiothoracic;  Laterality: N/A;    CORONARY STENT PLACEMENT      total of 3 heart stents    FOOT SURGERY Right 1965    crushed foot repair. pt states no hardware present.    TESTICLE SURGERY      left testicle removed     PT Assessment (Last 12 Hours)       PT Evaluation and Treatment       Row Name             Wound 03/25/24 0815 midline sternal Incision    Wound - Properties Group Placement Date: 03/25/24  -TJ Placement Time: 0815  -TJ Orientation: midline  -TJ Location: sternal  -TJ Primary Wound Type: Incision  -TJ    Retired Wound - Properties Group Placement Date: 03/25/24  -TJ Placement Time: 0815  -TJ Orientation: midline  -TJ Location: sternal  -TJ Primary Wound Type: Incision  -TJ    Retired Wound - Properties Group Date first assessed: 03/25/24  -TJ Time first assessed: 0815  -TJ Location: sternal  -TJ Primary Wound Type: Incision  -TJ      Row Name             Wound 03/25/24 0806 Left leg Incision    Wound - Properties Group Placement Date: 03/25/24  -TJ Placement Time: 0806  -TJ Side: Left  -TJ Location: leg  -TJ Primary Wound Type: Incision  -TJ    Retired Wound - Properties Group Placement Date: 03/25/24  -TJ Placement Time: 0806  -TJ Side: Left  -TJ Location: leg  -TJ Primary Wound Type: Incision  -TJ    Retired Wound - Properties Group Date first assessed: 03/25/24  -TJ Time first assessed: 0806  -TJ Side: Left  -TJ Location: leg  -TJ Primary Wound Type: Incision  -TJ              User Key  (r) = Recorded By, (t) = Taken By, (c) = Cosigned By      Initials Name Provider Type    Candice Kelly RN Registered Nurse                    Physical Therapy Education       Title: PT OT SLP Therapies (Done)       Topic: Physical Therapy (Done)       Point: Mobility training (Done)       Learning Progress Summary             Patient Acceptance, E, VU by KATRINA at 3/26/2024 0915    Comment: sternal and fall precautions, POC progression.                         Point: Home exercise program (Done)        Learning Progress Summary             Patient Acceptance, E, VU by  at 3/26/2024 0915    Comment: sternal and fall precautions, POC progression.                         Point: Body mechanics (Done)       Learning Progress Summary             Patient Acceptance, E, VU by  at 3/26/2024 0915    Comment: sternal and fall precautions, POC progression.                         Point: Precautions (Done)       Learning Progress Summary             Patient Acceptance, E, VU by  at 3/26/2024 0915    Comment: sternal and fall precautions, POC progression.                                         User Key       Initials Effective Dates Name Provider Type Discipline     02/22/24 -  Humberto Lara, PT Student PT Student PT                  PT Recommendation and Plan  Anticipated Discharge Disposition (PT): home with assist      Outcome Measures       Row Name 03/28/24 1052             How much help from another person do you currently need...    Turning from your back to your side while in flat bed without using bedrails? 3  -MF      Moving from lying on back to sitting on the side of a flat bed without bedrails? 3  -MF      Moving to and from a bed to a chair (including a wheelchair)? 3  -MF      Standing up from a chair using your arms (e.g., wheelchair, bedside chair)? 3  -MF      Climbing 3-5 steps with a railing? 3  -MF      To walk in hospital room? 3  -MF      AM-PAC 6 Clicks Score (PT) 18  -      Highest Level of Mobility Goal 6 --> Walk 10 steps or more  -         Functional Assessment    Outcome Measure Options AM-PAC 6 Clicks Basic Mobility (PT)  -                User Key  (r) = Recorded By, (t) = Taken By, (c) = Cosigned By      Initials Name Provider Type    Krystal Thurman, PTA Physical Therapist Assistant                     Time Calculation:         PT G-Codes  Outcome Measure Options: AM-PAC 6 Clicks Basic Mobility (PT)  AM-PAC 6 Clicks Score (PT): 19    Latoya Faust,  PTA  3/29/2024

## 2024-03-30 ENCOUNTER — READMISSION MANAGEMENT (OUTPATIENT)
Dept: CALL CENTER | Facility: HOSPITAL | Age: 74
End: 2024-03-30
Payer: MEDICARE

## 2024-03-30 NOTE — OUTREACH NOTE
Prep Survey      Flowsheet Row Responses   [facility] discharged from? [facility]   Is LACE score < 7 ? No   Eligibility Readm Mgmt   Discharge diagnosis NSTEMI (non-ST elevated myocardial infarction)   Does the patient have one of the following disease processes/diagnoses(primary or secondary)? Acute MI (STEMI,NSTEMI)   Does the patient have Home health ordered? No   Is there a DME ordered? No   Prep survey completed? Yes            Rocío TIPTON - Registered Nurse

## 2024-04-03 ENCOUNTER — OFFICE VISIT (OUTPATIENT)
Dept: PRIMARY CARE CLINIC | Age: 74
End: 2024-04-03
Payer: MEDICARE

## 2024-04-03 VITALS
OXYGEN SATURATION: 97 % | DIASTOLIC BLOOD PRESSURE: 58 MMHG | HEART RATE: 81 BPM | BODY MASS INDEX: 22.07 KG/M2 | TEMPERATURE: 98.5 F | WEIGHT: 153.8 LBS | SYSTOLIC BLOOD PRESSURE: 122 MMHG

## 2024-04-03 DIAGNOSIS — K21.9 GASTROESOPHAGEAL REFLUX DISEASE, UNSPECIFIED WHETHER ESOPHAGITIS PRESENT: ICD-10-CM

## 2024-04-03 DIAGNOSIS — Z95.1 HX OF FOUR VESSEL CORONARY ARTERY BYPASS GRAFT: Primary | ICD-10-CM

## 2024-04-03 DIAGNOSIS — F51.04 CHRONIC INSOMNIA: ICD-10-CM

## 2024-04-03 PROCEDURE — G8427 DOCREV CUR MEDS BY ELIG CLIN: HCPCS | Performed by: NURSE PRACTITIONER

## 2024-04-03 PROCEDURE — 4004F PT TOBACCO SCREEN RCVD TLK: CPT | Performed by: NURSE PRACTITIONER

## 2024-04-03 PROCEDURE — 3078F DIAST BP <80 MM HG: CPT | Performed by: NURSE PRACTITIONER

## 2024-04-03 PROCEDURE — G8420 CALC BMI NORM PARAMETERS: HCPCS | Performed by: NURSE PRACTITIONER

## 2024-04-03 PROCEDURE — 1123F ACP DISCUSS/DSCN MKR DOCD: CPT | Performed by: NURSE PRACTITIONER

## 2024-04-03 PROCEDURE — 3017F COLORECTAL CA SCREEN DOC REV: CPT | Performed by: NURSE PRACTITIONER

## 2024-04-03 PROCEDURE — 3074F SYST BP LT 130 MM HG: CPT | Performed by: NURSE PRACTITIONER

## 2024-04-03 PROCEDURE — 99215 OFFICE O/P EST HI 40 MIN: CPT | Performed by: NURSE PRACTITIONER

## 2024-04-03 RX ORDER — POTASSIUM CHLORIDE 750 MG/1
CAPSULE, EXTENDED RELEASE ORAL
COMMUNITY
Start: 2024-03-29 | End: 2024-04-03 | Stop reason: ALTCHOICE

## 2024-04-03 RX ORDER — OMEPRAZOLE 20 MG/1
40 CAPSULE, DELAYED RELEASE ORAL DAILY
Qty: 180 CAPSULE | Refills: 0 | Status: SHIPPED | OUTPATIENT
Start: 2024-04-03 | End: 2024-07-02

## 2024-04-03 RX ORDER — OMEPRAZOLE 20 MG/1
40 CAPSULE, DELAYED RELEASE ORAL DAILY PRN
COMMUNITY
End: 2024-04-03 | Stop reason: SDUPTHER

## 2024-04-03 RX ORDER — ACETAMINOPHEN 500 MG
500 TABLET ORAL EVERY 6 HOURS PRN
COMMUNITY

## 2024-04-03 RX ORDER — HYDROCODONE BITARTRATE AND ACETAMINOPHEN 5; 325 MG/1; MG/1
1 TABLET ORAL EVERY 6 HOURS PRN
COMMUNITY
Start: 2024-03-29

## 2024-04-03 RX ORDER — TRAZODONE HYDROCHLORIDE 50 MG/1
100 TABLET ORAL NIGHTLY PRN
Qty: 90 TABLET | Refills: 0 | Status: SHIPPED | OUTPATIENT
Start: 2024-04-03 | End: 2024-07-02

## 2024-04-03 RX ORDER — CLOPIDOGREL BISULFATE 75 MG/1
75 TABLET ORAL DAILY
COMMUNITY
Start: 2024-03-29

## 2024-04-03 NOTE — PROGRESS NOTES
Mr.David MARIA Jain is a 73 y.o. male who presents today for  Chief Complaint   Patient presents with    Follow-Up from Hospital     Follow up for open heart surgery        HPI:    Patient presented to [facility] on 03/12, then transferred to [facility] for:     \"new onset chest pain and was diagnosed with NSTEMI.  He has a history of PCI.  Given chest pain he underwent coronary angiography revealing multivessel coronary artery disease.  LV function was normal.  Cardiothoracic surgery was consulted at that time for consideration for CABG.  He was deemed a suitable surgical candidate and he was ultimately discharged home with plans to return as an outpatient for CABG.  The risks and benefits were discussed at length with the patient and his wife, they agreed to proceed.     Hospital Course: On 3/25/2024, Mr. Jain was taken to the operating room for coronary artery bypass grafting.  See separate op note by Dr. Matute detailing the operation.  Following surgery he was transferred to the ICU in stable but guarded condition.  He remained hemodynamically stable and was extubated without remark during the evening hours following surgery.  He demonstrated an intact neurological exam.  He was kept in ICU for close monitoring of renal function on postop day 1.  This did remain stable and he was ultimately transferred to  on the afternoon of postop day 1.  Mediastinal chest tubes and left pleural drain were removed without remark on postop day 2.  The remainder of his hospital stay was significant for encouraging pulmonary toilet and ambulation, diuresis, and pain control.  He is eating well and is demonstrated adequate bowel function.  He is tolerating room air and has met all physical therapy goals.  Pacing wires were removed and he meets criteria for discharge home on postop day 4. The patient is agreeable to go home with his wife.     He will take Lasix 20 mg daily for 3 days at discharge.

## 2024-04-03 NOTE — PROGRESS NOTES
Subjective   Chief Complaint   Patient presents with    Post-op Follow-up     Patient had CABG x 4 on 3/25       Patient ID: Rogerio Barksdale is a 73 y.o. male who is here for follow-up having had CABG x4 (LIMA to LAD, SVG to RCA, SVG to OM, SVG to Diagonal Artery) by Dr. Avalos on 3/25/2024     History of Present Illness  Mr. Barksdale is a 73-year-old male who underwent the above listed procedure by Dr. Avalos on 3/25/2024.  Postoperative recovery was unremarkable and he met criteria for discharge home on postop day 4.  Weight at the time of discharge was 4 pounds below his baseline and he was sent home with Lasix 20 mg daily for 3 days.  Weight today is up 1 pound since discharge and he remains below his baseline weight.  He is here for 1 week follow-up with labs prior to appointment.  Overall he is doing very well.  He has been drinking Gatorade to stay hydrated.      Post operative recovery was uneventful without any major complications. Pain control has been good. No fevers/sweats/chills. No drainage from incisions.  No sternal clicks. No chest pain or shortness of breath. Appetite is fair. He is not diabetic.  Denies tobacco use. Ambulating 5 minutes six times daily.     The following portions of the patient's history were reviewed and updated as appropriate: allergies, current medications, past family history, past medical history, past social history, past surgical history and problem list.    Review of Systems   Constitutional:  Negative for chills, diaphoresis, fatigue and fever.   HENT:  Negative for trouble swallowing and voice change.    Eyes:  Negative for visual disturbance.   Respiratory:  Negative for chest tightness and shortness of breath.    Cardiovascular:  Negative for chest pain, palpitations and leg swelling.   Gastrointestinal:  Negative for abdominal pain, diarrhea, nausea and vomiting.   Musculoskeletal:  Negative for arthralgias and myalgias.   Skin:  Negative for color change, pallor, rash and  "wound.   Neurological:  Negative for dizziness, syncope and light-headedness.   Psychiatric/Behavioral:  Negative for agitation, confusion and sleep disturbance.        Objective   Visit Vitals  /67 (BP Location: Right arm, Patient Position: Sitting, Cuff Size: Adult)   Pulse 85   Ht 174 cm (68.5\")   Wt 69.7 kg (153 lb 9.6 oz)   SpO2 95%   BMI 23.02 kg/m²       Physical Exam  Vitals reviewed.   Constitutional:       General: He is not in acute distress.  HENT:      Head: Normocephalic.   Eyes:      Pupils: Pupils are equal, round, and reactive to light.   Cardiovascular:      Rate and Rhythm: Normal rate and regular rhythm.      Heart sounds: Normal heart sounds. No murmur heard.  Pulmonary:      Effort: Pulmonary effort is normal.      Breath sounds: Normal breath sounds. No wheezing or rales.   Abdominal:      General: There is no distension.      Palpations: Abdomen is soft.      Tenderness: There is no abdominal tenderness.   Musculoskeletal:         General: No swelling or tenderness.   Skin:     General: Skin is warm and dry.      Comments: Sternum is stable, no clicks.  Sternal incision is clean dry and intact and healing nicely.  Saphenectomy site is clean dry and intact   Neurological:      General: No focal deficit present.      Mental Status: He is alert and oriented to person, place, and time.   Psychiatric:         Mood and Affect: Mood normal.         Behavior: Behavior normal.         Thought Content: Thought content normal.         Judgment: Judgment normal.             Assessment & Plan         Diagnoses and all orders for this visit:    1. Coronary artery disease involving native coronary artery of native heart with other form of angina pectoris (Primary)    2. Stage 3a chronic kidney disease    3. Tobacco abuse           Overall, Rogerio Barksdale is doing well.  Continue Gatorade for hydration.  Labs today are stable.  We discussed current sternotomy precautions and how these will not be " advanced yet. Following post op cardiac surgery home instructions. Provided support and encouragement. All questions have been answered to the best of my ability. Will discuss starting cardiac rehabilitation at official 1 month post op visit. Patient has follow up with Dr. Avalos in a few weeks. Patient has follow up with Cardiology in a few weeks. Patient has been instructed to contact our office with any questions or concerns should they arise prior to the next office visit.  Overall very happy with his progress.    BMI is within normal parameters. No other follow-up for BMI required.      I have congratulated Rogerio Barksdale on successful smoking cessation.     Rogerio Barksdale  reports that he quit smoking about 3 weeks ago. His smoking use included cigarettes. He started smoking about 63 years ago. He has a 31.6 pack-year smoking history. He has never used smokeless tobacco. I have educated him on the risk of diseases from using tobacco products such as cancer, COPD, and heart disease.       I spent 3  minutes counseling the patient.    Advance Care Planning   ACP discussion was held with the patient during this visit. Patient does not have an advance directive, declines further assistance.

## 2024-04-04 ENCOUNTER — OFFICE VISIT (OUTPATIENT)
Dept: CARDIAC SURGERY | Facility: CLINIC | Age: 74
End: 2024-04-04
Payer: MEDICARE

## 2024-04-04 ENCOUNTER — LAB (OUTPATIENT)
Dept: LAB | Facility: HOSPITAL | Age: 74
End: 2024-04-04
Payer: MEDICARE

## 2024-04-04 VITALS
OXYGEN SATURATION: 95 % | HEIGHT: 69 IN | BODY MASS INDEX: 22.75 KG/M2 | WEIGHT: 153.6 LBS | SYSTOLIC BLOOD PRESSURE: 130 MMHG | DIASTOLIC BLOOD PRESSURE: 67 MMHG | HEART RATE: 85 BPM

## 2024-04-04 DIAGNOSIS — I25.118 CORONARY ARTERY DISEASE INVOLVING NATIVE CORONARY ARTERY OF NATIVE HEART WITH OTHER FORM OF ANGINA PECTORIS: Primary | ICD-10-CM

## 2024-04-04 DIAGNOSIS — N18.31 STAGE 3A CHRONIC KIDNEY DISEASE: ICD-10-CM

## 2024-04-04 DIAGNOSIS — I25.118 CORONARY ARTERY DISEASE INVOLVING NATIVE CORONARY ARTERY OF NATIVE HEART WITH OTHER FORM OF ANGINA PECTORIS: ICD-10-CM

## 2024-04-04 DIAGNOSIS — Z72.0 TOBACCO ABUSE: ICD-10-CM

## 2024-04-04 LAB
ANION GAP SERPL CALCULATED.3IONS-SCNC: 9 MMOL/L (ref 5–15)
BASOPHILS # BLD AUTO: 0.02 10*3/MM3 (ref 0–0.2)
BASOPHILS NFR BLD AUTO: 0.2 % (ref 0–1.5)
BUN SERPL-MCNC: 20 MG/DL (ref 8–23)
BUN/CREAT SERPL: 16 (ref 7–25)
CALCIUM SPEC-SCNC: 9.1 MG/DL (ref 8.6–10.5)
CHLORIDE SERPL-SCNC: 102 MMOL/L (ref 98–107)
CO2 SERPL-SCNC: 27 MMOL/L (ref 22–29)
CREAT SERPL-MCNC: 1.25 MG/DL (ref 0.76–1.27)
DEPRECATED RDW RBC AUTO: 46.4 FL (ref 37–54)
EGFRCR SERPLBLD CKD-EPI 2021: 60.4 ML/MIN/1.73
EOSINOPHIL # BLD AUTO: 0.28 10*3/MM3 (ref 0–0.4)
EOSINOPHIL NFR BLD AUTO: 3.3 % (ref 0.3–6.2)
ERYTHROCYTE [DISTWIDTH] IN BLOOD BY AUTOMATED COUNT: 13.5 % (ref 12.3–15.4)
GLUCOSE SERPL-MCNC: 119 MG/DL (ref 65–99)
HCT VFR BLD AUTO: 35.5 % (ref 37.5–51)
HGB BLD-MCNC: 11.4 G/DL (ref 13–17.7)
IMM GRANULOCYTES # BLD AUTO: 0.03 10*3/MM3 (ref 0–0.05)
IMM GRANULOCYTES NFR BLD AUTO: 0.3 % (ref 0–0.5)
LYMPHOCYTES # BLD AUTO: 1.99 10*3/MM3 (ref 0.7–3.1)
LYMPHOCYTES NFR BLD AUTO: 23.2 % (ref 19.6–45.3)
MCH RBC QN AUTO: 30.5 PG (ref 26.6–33)
MCHC RBC AUTO-ENTMCNC: 32.1 G/DL (ref 31.5–35.7)
MCV RBC AUTO: 94.9 FL (ref 79–97)
MONOCYTES # BLD AUTO: 0.63 10*3/MM3 (ref 0.1–0.9)
MONOCYTES NFR BLD AUTO: 7.3 % (ref 5–12)
NEUTROPHILS NFR BLD AUTO: 5.63 10*3/MM3 (ref 1.7–7)
NEUTROPHILS NFR BLD AUTO: 65.7 % (ref 42.7–76)
NRBC BLD AUTO-RTO: 0 /100 WBC (ref 0–0.2)
PLATELET # BLD AUTO: 384 10*3/MM3 (ref 140–450)
PMV BLD AUTO: 9.2 FL (ref 6–12)
POTASSIUM SERPL-SCNC: 4.7 MMOL/L (ref 3.5–5.2)
RBC # BLD AUTO: 3.74 10*6/MM3 (ref 4.14–5.8)
SODIUM SERPL-SCNC: 138 MMOL/L (ref 136–145)
WBC NRBC COR # BLD AUTO: 8.58 10*3/MM3 (ref 3.4–10.8)

## 2024-04-04 PROCEDURE — 36415 COLL VENOUS BLD VENIPUNCTURE: CPT

## 2024-04-04 PROCEDURE — 85025 COMPLETE CBC W/AUTO DIFF WBC: CPT

## 2024-04-04 PROCEDURE — 80048 BASIC METABOLIC PNL TOTAL CA: CPT

## 2024-04-05 ENCOUNTER — READMISSION MANAGEMENT (OUTPATIENT)
Dept: CALL CENTER | Facility: HOSPITAL | Age: 74
End: 2024-04-05
Payer: MEDICARE

## 2024-04-05 NOTE — OUTREACH NOTE
AMI Week 1 Survey      Flowsheet Row Responses   [facility] patient discharged from? [place]   Does the patient have one of the following disease processes/diagnoses(primary or secondary)? Acute MI (STEMI,NSTEMI)   Week 1 attempt successful? Yes   Call start time 1158   Call end time 1209   Discharge diagnosis NSTEMI (non-ST elevated myocardial infarction)   Meds reviewed with patient/caregiver? Yes   Is the patient having any side effects they believe may be caused by any medication additions or changes? No   Does the patient have all prescriptions related to this admission filled (includes statins,anticoagulants,HTN meds,anti-arrhythmia meds) Yes   Is the patient taking all medications as directed (includes completed medication regime)? Yes   Has the patient kept scheduled appointments due by today? Yes   Comments Pt reports that he has SOA w/exertion at baseline that continues, but SOA is slightly worse than his baseline, he recovers quickly with rest. Pt reports chest incision, aches, DELIA with surg adhesive present, no s/sx infection, rates 1/10 worse with cough. Pt using IS at home. Pt reports LLL incision DELIA, no issues, RN advised to elevate when seated if possible, pt v/u. Pt reports decreased appetite but is slowly improving. Pt prefers smaller amts at meal times currently. Pt denies other issues at this time.   Did the patient receive a copy of their discharge instructions? Yes   Nursing interventions Reviewed instructions with patient   What is the patient's perception of their health status since discharge? Improving   Nursing interventions Nurse provided patient education   Is the patient/caregiver able to teach back signs and symptoms of when to call for help immediately: Sudden chest discomfort, Sudden discomfort in arms, back, neck or jaw, Shortness of breath at any time   Is the patient/caregiver able to teach back lifestyle changes to help prevent MIs Heart healthy diet, Quit smoking, Regular  exercise as approved by provider   Is the patient/caregiver able to teach back ways to prevent a second heart attack: Take medications, Follow up with MD   If the patient is a current smoker, are they able to teach back resources for cessation? Not a smoker  [has not smoked since NSTEMI]   Is the patient/caregiver able to teach back the hierarchy of who to call/visit for symptoms/problems? PCP, Specialist, Home health nurse, Urgent Care, ED, 911 Yes   Week 1 call completed? Yes   Revoked No further contact(revokes)-requires comment   Call end time 0804            Emely LOPEZ - Registered Nurse

## 2024-04-06 LAB — MV VENA CONTRACTA: 0.3 CM

## 2024-04-09 ASSESSMENT — ENCOUNTER SYMPTOMS
COUGH: 0
ABDOMINAL PAIN: 0
SORE THROAT: 0
DIARRHEA: 0
CHEST TIGHTNESS: 0
SHORTNESS OF BREATH: 0
NAUSEA: 0
VOMITING: 0

## 2024-04-11 ENCOUNTER — OFFICE VISIT (OUTPATIENT)
Dept: CARDIOLOGY | Facility: CLINIC | Age: 74
End: 2024-04-11
Payer: MEDICARE

## 2024-04-11 VITALS
RESPIRATION RATE: 18 BRPM | WEIGHT: 151 LBS | DIASTOLIC BLOOD PRESSURE: 62 MMHG | HEIGHT: 69 IN | OXYGEN SATURATION: 95 % | HEART RATE: 77 BPM | BODY MASS INDEX: 22.36 KG/M2 | SYSTOLIC BLOOD PRESSURE: 102 MMHG

## 2024-04-11 DIAGNOSIS — I25.10 CORONARY ARTERY DISEASE INVOLVING NATIVE CORONARY ARTERY OF NATIVE HEART WITHOUT ANGINA PECTORIS: Primary | ICD-10-CM

## 2024-04-11 DIAGNOSIS — Z72.0 TOBACCO ABUSE: ICD-10-CM

## 2024-04-11 DIAGNOSIS — E78.5 HYPERLIPIDEMIA, UNSPECIFIED HYPERLIPIDEMIA TYPE: ICD-10-CM

## 2024-04-11 DIAGNOSIS — Z95.1 S/P CABG (CORONARY ARTERY BYPASS GRAFT): ICD-10-CM

## 2024-04-11 DIAGNOSIS — I10 PRIMARY HYPERTENSION: ICD-10-CM

## 2024-04-11 RX ORDER — FUROSEMIDE 20 MG/1
20 TABLET ORAL DAILY PRN
Qty: 30 TABLET | Refills: 0 | Status: SHIPPED | OUTPATIENT
Start: 2024-04-11

## 2024-04-11 RX ORDER — TRAZODONE HYDROCHLORIDE 50 MG/1
100 TABLET ORAL
COMMUNITY
Start: 2024-04-03 | End: 2024-07-03

## 2024-04-11 NOTE — PROGRESS NOTES
Reason For Visit:  Arteriosclerosis of coronary artery (6 week follow up/Cough and anxiety, having trouble sleeping)     Subjective        Rogerio Barksdale is a 73 y.o. male with the below pertinent PMH who presents for follow-up of coronary artery disease.    Patient was recently hospitalized in the middle of March following transfer from [facility] for possible non-STEMI.  Dr. Stanley took the patient to the cardiac catheterization lab on 3/13  revealed multivessel coronary artery disease.  Dr. Avalos with cardiothoracic surgery was consulted and evaluated the patient.  He believe the patient will be okay for discharge and plan for outpatient CABG procedure.  CABG was performed on 3/25/2024.  Review of chart showed patient had unremarkable recovery.  Due to his kidney disease he was left in the ICU for 1 additional day, however this stabilized.  He was told to take Lasix 20 mg for 3 days at discharge and then follow-up with cardiology service.  They mention that he can start cardiac rehab after his 1 month postop visit.    Since hospital discharge has been doing reasonly well.  He is having a slow recovery but is noting some improvement day-to-day.  Denies any chest pain.  Patient still states that he is having to sleep in a recliner.  He feels like he is smothering if he lays flat.  He does have significant tobacco abuse history.  He denies any significant weight gain or lower extremity swelling.  He still feels like he is having good urinary output every day even though he is not taking Lasix anymore.  Denies any excessive dizziness or lightheadedness.  Denies any bleeding concerns with his DAPT.    ROS: Pertinent findings as noted above    Pertinent PMH  -Coronary artery disease status post four-vessel CABG on 3/25/2024  - Stage IIIa chronic kidney disease  - Hypertension  - Hyperlipidemia  - COPD  - Tobacco abuse    Pertinent past medical, surgical, family, and social history were reviewed.      Current  "Outpatient Medications:     albuterol sulfate  (90 Base) MCG/ACT inhaler, Inhale 2 puffs Every 4 (Four) Hours As Needed for Wheezing., Disp: , Rfl:     aspirin 81 MG EC tablet, Take 1 tablet by mouth Daily., Disp: , Rfl:     atorvastatin (LIPITOR) 80 MG tablet, Take 1 tablet by mouth Daily., Disp: 90 tablet, Rfl: 5    clopidogrel (PLAVIX) 75 MG tablet, Take 1 tablet by mouth Daily., Disp: 30 tablet, Rfl: 2    furosemide (LASIX) 20 MG tablet, Take 1 tablet by mouth Daily As Needed (Take weight gain of 2lbs overnight or 4lbs in a week; Take if worsening shortness of breath) for up to 30 doses., Disp: 30 tablet, Rfl: 0    HYDROcodone-acetaminophen (Norco) 5-325 MG per tablet, Take 1 tablet by mouth Every 6 (Six) Hours As Needed for Moderate Pain., Disp: 25 tablet, Rfl: 0    metoprolol tartrate (LOPRESSOR) 25 MG tablet, Take 0.5 tablets by mouth Every 12 (Twelve) Hours., Disp: 60 tablet, Rfl: 2    omeprazole (priLOSEC) 20 MG capsule, Take 2 capsules by mouth Daily As Needed (heartburn)., Disp: , Rfl:     ramipril (ALTACE) 10 MG capsule, Take 1 capsule by mouth 2 (Two) Times a Day., Disp: 90 capsule, Rfl: 5    traZODone (DESYREL) 50 MG tablet, Take 2 tablets by mouth., Disp: , Rfl:      Objective   Vital Signs:  /62   Pulse 77   Resp 18   Ht 174 cm (68.5\")   Wt 68.5 kg (151 lb)   SpO2 95%   BMI 22.63 kg/m²   Estimated body mass index is 22.63 kg/m² as calculated from the following:    Height as of this encounter: 174 cm (68.5\").    Weight as of this encounter: 68.5 kg (151 lb).      Constitutional:       Appearance: Healthy appearance. Not in distress.   Neck:      Vascular: JVD normal.   Pulmonary:      Effort: Pulmonary effort is normal.      Breath sounds: Normal breath sounds. No wheezing. No rhonchi. No rales.   Cardiovascular:      PMI at left midclavicular line. Normal rate. Regular rhythm. Normal S1. Normal S2.       Murmurs: There is no murmur.      No gallop.  No click. No rub.   Edema:     " Peripheral edema absent.   Musculoskeletal: Normal range of motion.      Cervical back: Normal range of motion. Skin:     General: Skin is warm and dry.   Neurological:      Mental Status: Alert and oriented to person, place and time.        Result Review :  The following data was reviewed by: YOSEF Tomas on 04/11/2024:  CMP   CMP          3/28/2024    03:11 3/29/2024    04:16 3/29/2024    11:55 4/4/2024    10:13   CMP   Glucose 109  97   119    BUN 22  26   20    Creatinine 1.34  1.39   1.25    EGFR 55.6  53.2   60.4    Sodium 138  140   138    Potassium 4.7  3.7  4.6  4.7    Chloride 101  100   102    Calcium 8.7  8.7   9.1    BUN/Creatinine Ratio 16.4  18.7   16.0    Anion Gap 11.0  13.0   9.0      Lipid Panel   Lipid Panel          1/17/2024    10:17 3/13/2024    05:53   Lipid Panel   Total Cholesterol  178    Total Cholesterol 185        Triglycerides 81     121    HDL Cholesterol 35     29    VLDL Cholesterol  22    LDL Cholesterol  134     127    LDL/HDL Ratio  4.30       Details          This result is from an external source.             BMP   BMP          3/28/2024    03:11 3/29/2024    04:16 3/29/2024    11:55 4/4/2024    10:13   BMP   BUN 22  26   20    Creatinine 1.34  1.39   1.25    Sodium 138  140   138    Potassium 4.7  3.7  4.6  4.7    Chloride 101  100   102    CO2 26.0  27.0   27.0    Calcium 8.7  8.7   9.1      Data reviewed : Cardiology studies echo      Results for orders placed during the hospital encounter of 03/25/24    Intra-Op TAVR Transesophageal Echo    Interpretation Summary    Unremarkable intraoperative exam.    LV systolic function appears normal on initial and concluding images.    Mild mitral regurgitation, no other valvular pathology noted.    There is a small (<1cm) pericardial effusion.         Assessment and Plan   Diagnoses and all orders for this visit:    1. Coronary artery disease involving native coronary artery of native heart without angina pectoris  (Primary)  2. S/P CABG (coronary artery bypass graft)  3. Primary hypertension  4. Hyperlipidemia, unspecified hyperlipidemia type  5. Tobacco abuse  Patient has not had any anginal symptoms since discharge.  Patient still has some shortness of breath with exertion and is unable to lie flat at night.  The patient states he has not had any formal workup for COPD, even though it is listed as a diagnosis on his chart.  He does not report ever having a pulmonary function test completed.  - Will order PFT today to evaluate patient's likely underlying COPD  - I will prescribe as needed Lasix 20 mg daily, patient does not appear to have signs of peripheral overload today and therefore I am hesitant to do daily diuretic therapy given his chronic kidney disease  - Continue on dual antiplatelet therapy of aspirin and Plavix for at least 3 months postop  - Continue Lopressor 12.5 mg twice daily, plans to consolidate at next appointment  - Continue ramipril 10 mg twice daily  - Continue Lipitor 80 mg daily, will check lipid panel at next appointment and adjust regimen as necessary  - Patient has continued to not smoke since surgery, he was congratulated on this.                     Follow Up   No follow-ups on file.  Patient was given instructions and counseling regarding his condition or for health maintenance advice. Please see specific information pulled into the AVS if appropriate.       Part of this note may be an electronic transcription/translation of spoken language to printed text using the Dragon Dictation System.

## 2024-04-23 ENCOUNTER — HOSPITAL ENCOUNTER (OUTPATIENT)
Dept: PULMONOLOGY | Facility: HOSPITAL | Age: 74
Discharge: HOME | End: 2024-04-23
Payer: MEDICARE

## 2024-04-23 DIAGNOSIS — Z72.0 TOBACCO ABUSE: ICD-10-CM

## 2024-04-23 RX ORDER — ALBUTEROL SULFATE 2.5 MG/3ML
2.5 SOLUTION RESPIRATORY (INHALATION) ONCE
Status: DISCONTINUED | OUTPATIENT
Start: 2024-04-23 | End: 2024-04-23

## 2024-05-01 ENCOUNTER — TELEPHONE (OUTPATIENT)
Dept: CARDIAC SURGERY | Facility: CLINIC | Age: 74
End: 2024-05-01
Payer: MEDICARE

## 2024-05-01 NOTE — TELEPHONE ENCOUNTER
Per verbal instruction from Chioma NAVAS, attempted to call pt to resched appt for follow up.  No answer.  VM message left for pt to call back.  If pt calls back, OK to resched with Dr Avalos at pt convenience/tushar

## 2024-05-20 ENCOUNTER — OFFICE VISIT (OUTPATIENT)
Dept: CARDIAC SURGERY | Facility: CLINIC | Age: 74
End: 2024-05-20
Payer: MEDICARE

## 2024-05-20 VITALS
WEIGHT: 145.8 LBS | BODY MASS INDEX: 21.59 KG/M2 | HEIGHT: 69 IN | HEART RATE: 83 BPM | SYSTOLIC BLOOD PRESSURE: 124 MMHG | OXYGEN SATURATION: 95 % | DIASTOLIC BLOOD PRESSURE: 64 MMHG

## 2024-05-20 DIAGNOSIS — Z95.1 S/P CABG X 4: Primary | ICD-10-CM

## 2024-05-20 DIAGNOSIS — I25.10 CORONARY ARTERY DISEASE INVOLVING NATIVE CORONARY ARTERY OF NATIVE HEART WITHOUT ANGINA PECTORIS: ICD-10-CM

## 2024-05-20 PROCEDURE — 3078F DIAST BP <80 MM HG: CPT | Performed by: SURGERY

## 2024-05-20 PROCEDURE — 99024 POSTOP FOLLOW-UP VISIT: CPT | Performed by: SURGERY

## 2024-05-20 PROCEDURE — 3074F SYST BP LT 130 MM HG: CPT | Performed by: SURGERY

## 2024-05-20 NOTE — LETTER
May 28, 2024       No Recipients    Patient: Rogerio Barksdale   YOB: 1950   Date of Visit: 5/20/2024       Dear Richy Mendiola MD,    Rogerio Barksdale was in my office today. Below are the relevant portions of my assessment and plan of care.    Mr. Barksdale is a 74-year-old male who is now 6 weeks status post CABG x4 for a NSTEMI. He presented with progressively worsening chest pain. He has had no recurrent anginal type symptoms. His heart failure symptoms are improving. He has seen cardiology in follow-up and has continued follow-up scheduled with them. He has had no problems with wound healing. Overall, I am very happy with how he has done.    We have discussed gradually resuming normal activity on a progressive basis, to which he comprehends and is cleared to resume driving. A referral to [facility] for cardiac rehabilitation will be made.I would prefer DAPT to be continued for at least 3 months postoperatively. Subsequently, he may transition to aspirin with his primary cardiologist.    Mr. Barksdale can follow-up with me on an as-needed basis.  Thank you for trusting me with the care of Mr. Lucas. Please do not hesitate to call with questions or concerns.         Sincerely,        Humberto Avalos MD        CC:   No Recipients

## 2024-05-28 NOTE — PROGRESS NOTES
[facility] Cardiothoracic Surgery  PROGRESS NOTE   Procedure Performed: CABG x4 (LIMA to LAD, SVG to RCA, SVG to OM, SVG to Diagonal Artery)   Date of Procedure: 3/25/2024      History of Present Illness  The patient is a 74-year-old male who is now 6 weeks status post CABG times 4 for a NSTEMI. He is accompanied by an adult female.    The patient reports an overall improvement in his condition. He denies experiencing chest pain, however, he does experience mild tightness during coughing episodes. He has ceased smoking since his last surgery. He perceives an improvement in his energy levels, with a desire to regain his endurance. He has consulted with Richard once and is scheduled for a pulmonary test on the morning of the upcoming appointment. He has observed that his Plavix and metoprolol prescriptions are depleted.       Objective:  PE:  Vitals:    05/20/24 1447   BP: 124/64   Pulse: 83   SpO2: 95%     GENERAL: NAD, resting comfortably, normal palor  CARDIOVASCULAR: regular, regular rate, sinus, appropriately healing sternotomy  PULMONARY: Normal bilateral breath sounds, no labored breathing  ABDOMEN: soft, nt/nd  EXTREMITIES: mild peripheral edema, normal pulses, normal ROM         Physical Exam                 Lab Results (last 72 hours)      ** No results found for the last 72 hours. **         Results         Assessment/Plan         Assessment & Plan  Mr. Barksdale is a 74-year-old male who is now 6 weeks status post CABG x4 for a NSTEMI. He presented with progressively worsening chest pain. He has had no recurrent anginal type symptoms. His heart failure symptoms are improving. He has seen cardiology in follow-up and has continued follow-up scheduled with them. He has had no problems with wound healing. Overall, I am very happy with how he has done.    We have discussed gradually resuming normal activity on a progressive basis, to which he comprehends and is cleared to resume driving. A  referral to [facility] for cardiac rehabilitation will be made.I would prefer DAPT to be continued for at least 3 months postoperatively. Subsequently, he may transition to aspirin with his primary cardiologist.    Mr. Barksdale can follow-up with me on an as-needed basis.  Thank you for trusting me with the care of Mr. Lucas. Please do not hesitate to call with questions or concerns.         Humberto Avalos MD   Cardiothoracic Surgeon      Patient or patient representative verbalized consent for the use of Ambient Listening during the visit with  Humberto Avalos MD for chart documentation. 5/28/2024  13:27 CDT

## 2024-06-12 ENCOUNTER — HOSPITAL ENCOUNTER (OUTPATIENT)
Dept: PULMONOLOGY | Facility: HOSPITAL | Age: 74
Discharge: HOME | End: 2024-06-12
Payer: MEDICARE

## 2024-06-12 ENCOUNTER — OFFICE VISIT (OUTPATIENT)
Dept: CARDIOLOGY | Facility: CLINIC | Age: 74
End: 2024-06-12
Payer: MEDICARE

## 2024-06-12 VITALS
WEIGHT: 145 LBS | SYSTOLIC BLOOD PRESSURE: 116 MMHG | OXYGEN SATURATION: 97 % | HEIGHT: 69 IN | HEART RATE: 87 BPM | DIASTOLIC BLOOD PRESSURE: 70 MMHG | BODY MASS INDEX: 21.48 KG/M2

## 2024-06-12 DIAGNOSIS — I25.10 CORONARY ARTERY DISEASE INVOLVING NATIVE CORONARY ARTERY OF NATIVE HEART WITHOUT ANGINA PECTORIS: Primary | ICD-10-CM

## 2024-06-12 DIAGNOSIS — E78.5 HYPERLIPIDEMIA, UNSPECIFIED HYPERLIPIDEMIA TYPE: ICD-10-CM

## 2024-06-12 DIAGNOSIS — Z72.0 TOBACCO ABUSE: ICD-10-CM

## 2024-06-12 DIAGNOSIS — I10 PRIMARY HYPERTENSION: ICD-10-CM

## 2024-06-12 DIAGNOSIS — Z95.1 S/P CABG (CORONARY ARTERY BYPASS GRAFT): ICD-10-CM

## 2024-06-12 DIAGNOSIS — R94.2 ABNORMAL PFT: ICD-10-CM

## 2024-06-12 LAB
ARTERIAL PATENCY WRIST A: ABNORMAL
ATMOSPHERIC PRESS: 754 MMHG
BASE EXCESS BLDA CALC-SCNC: -0.1 MMOL/L (ref 0–2)
BDY SITE: ABNORMAL
BODY TEMPERATURE: 37
HCO3 BLDA-SCNC: 24.7 MMOL/L (ref 20–26)
Lab: ABNORMAL
MODALITY: ABNORMAL
PCO2 BLDA: 40 MM HG (ref 35–45)
PCO2 TEMP ADJ BLD: 40 MM HG (ref 35–45)
PH BLDA: 7.4 PH UNITS (ref 7.35–7.45)
PH, TEMP CORRECTED: 7.4 PH UNITS (ref 7.35–7.45)
PO2 BLDA: 87.2 MM HG (ref 83–108)
PO2 TEMP ADJ BLD: 87.2 MM HG (ref 83–108)
SAO2 % BLDCOA: 97 % (ref 94–99)
VENTILATOR MODE: ABNORMAL

## 2024-06-12 PROCEDURE — 94729 DIFFUSING CAPACITY: CPT | Performed by: INTERNAL MEDICINE

## 2024-06-12 PROCEDURE — 94729 DIFFUSING CAPACITY: CPT

## 2024-06-12 PROCEDURE — 82803 BLOOD GASES ANY COMBINATION: CPT

## 2024-06-12 PROCEDURE — 94060 EVALUATION OF WHEEZING: CPT

## 2024-06-12 PROCEDURE — 94726 PLETHYSMOGRAPHY LUNG VOLUMES: CPT | Performed by: INTERNAL MEDICINE

## 2024-06-12 PROCEDURE — 94726 PLETHYSMOGRAPHY LUNG VOLUMES: CPT

## 2024-06-12 PROCEDURE — 36600 WITHDRAWAL OF ARTERIAL BLOOD: CPT

## 2024-06-12 PROCEDURE — 94060 EVALUATION OF WHEEZING: CPT | Performed by: INTERNAL MEDICINE

## 2024-06-12 RX ORDER — RAMIPRIL 10 MG/1
10 CAPSULE ORAL 2 TIMES DAILY
Qty: 180 CAPSULE | Refills: 3 | Status: SHIPPED | OUTPATIENT
Start: 2024-06-12 | End: 2024-06-12

## 2024-06-12 RX ORDER — RAMIPRIL 10 MG/1
10 CAPSULE ORAL 2 TIMES DAILY
Qty: 180 CAPSULE | Refills: 3 | Status: SHIPPED | OUTPATIENT
Start: 2024-06-12

## 2024-06-12 RX ORDER — ATORVASTATIN CALCIUM 80 MG/1
80 TABLET, FILM COATED ORAL DAILY
Qty: 90 TABLET | Refills: 3 | Status: SHIPPED | OUTPATIENT
Start: 2024-06-12

## 2024-06-12 RX ORDER — ATORVASTATIN CALCIUM 80 MG/1
80 TABLET, FILM COATED ORAL DAILY
Qty: 90 TABLET | Refills: 3 | Status: SHIPPED | OUTPATIENT
Start: 2024-06-12 | End: 2024-06-12

## 2024-06-12 RX ORDER — ALBUTEROL SULFATE 2.5 MG/3ML
2.5 SOLUTION RESPIRATORY (INHALATION) ONCE
Status: COMPLETED | OUTPATIENT
Start: 2024-06-12 | End: 2024-06-12

## 2024-06-12 RX ADMIN — ALBUTEROL SULFATE 2.5 MG: 2.5 SOLUTION RESPIRATORY (INHALATION) at 09:23

## 2024-06-12 NOTE — PATIENT INSTRUCTIONS
Stop plavix after June 25th  You will be getting a call to schedule an appointment with the lung doctors  Come back and see us in about four months

## 2024-06-12 NOTE — PROGRESS NOTES
Reason For Visit:  Coronary Artery Disease (2 month follow up) and Med Refill (Patient states he needs medication refills.)     Subjective        Rogerio Barksdale is a 74 y.o. male with the below pertinent PMH who presents for follow-up of CAD.    Patient was last seen 4/11/2024.  At that appointment he was doing reasonly well.  Still had some significant dyspnea on exertion.  No medication adjustments were made.  I did order PFT to be done prior to next appointment.  I also gave him as needed Lasix in case he showed signs of peripheral overload.  He followed up with cardiothoracic surgery on 5/20 and received good report.  He is now seeing them on a as needed basis.  Plan is to continue dual antiplatelet therapy until at least June 25, 2024 and then going to aspirin monotherapy.    Since his previous appointment has been doing well.  Denies any specific cardiac concerns.  Still has some dyspnea on exertion.  He feels like his breathing is somewhat improved since he quit smoking.  Denies any chest pain.  Tolerating his medication regimen well without any dizziness or lightheadedness.  He knows that he has not stopped taking Plavix after June 25 of this year.  He would like to follow up in [place] for the rest of his cardiology care going forward.    ROS: Pertinent findings as noted above    Pertinent PMH  -Coronary artery disease status post four-vessel CABG on 3/25/2024  - Stage IIIa chronic kidney disease  - Hypertension  - Hyperlipidemia  - COPD  - Tobacco abuse    Pertinent past medical, surgical, family, and social history were reviewed.      Current Outpatient Medications:     albuterol sulfate  (90 Base) MCG/ACT inhaler, Inhale 2 puffs Every 4 (Four) Hours As Needed for Wheezing., Disp: , Rfl:     aspirin 81 MG EC tablet, Take 1 tablet by mouth Daily., Disp: , Rfl:     atorvastatin (LIPITOR) 80 MG tablet, Take 1 tablet by mouth Daily., Disp: 90 tablet, Rfl: 3    clopidogrel (PLAVIX) 75 MG tablet,  "Take 1 tablet by mouth Daily., Disp: 30 tablet, Rfl: 2    furosemide (LASIX) 20 MG tablet, Take 1 tablet by mouth Daily As Needed (Take weight gain of 2lbs overnight or 4lbs in a week; Take if worsening shortness of breath) for up to 30 doses., Disp: 30 tablet, Rfl: 0    metoprolol tartrate (LOPRESSOR) 25 MG tablet, Take 0.5 tablets by mouth Every 12 (Twelve) Hours., Disp: 90 tablet, Rfl: 3    omeprazole (priLOSEC) 20 MG capsule, Take 2 capsules by mouth Daily As Needed (heartburn)., Disp: , Rfl:     ramipril (ALTACE) 10 MG capsule, Take 1 capsule by mouth 2 (Two) Times a Day., Disp: 180 capsule, Rfl: 3    traZODone (DESYREL) 50 MG tablet, Take 2 tablets by mouth., Disp: , Rfl:   No current facility-administered medications for this visit.     Objective   Vital Signs:  /70 (BP Location: Left arm, Patient Position: Sitting, Cuff Size: Adult)   Pulse 87   Ht 174 cm (68.5\")   Wt 65.8 kg (145 lb)   SpO2 97%   BMI 21.72 kg/m²   Estimated body mass index is 21.72 kg/m² as calculated from the following:    Height as of this encounter: 174 cm (68.5\").    Weight as of this encounter: 65.8 kg (145 lb).      Constitutional:       Appearance: Healthy appearance. Not in distress.   Neck:      Vascular: JVD normal.   Pulmonary:      Effort: Pulmonary effort is normal.      Breath sounds: Normal breath sounds. No wheezing. No rhonchi. No rales.   Cardiovascular:      PMI at left midclavicular line. Normal rate. Regular rhythm. Normal S1. Normal S2.       Murmurs: There is no murmur.      No gallop.  No click. No rub.   Edema:     Peripheral edema absent.   Musculoskeletal: Normal range of motion.      Cervical back: Normal range of motion. Skin:     General: Skin is warm and dry.   Neurological:      Mental Status: Alert and oriented to person, place and time.        Result Review :                Assessment and Plan   Diagnoses and all orders for this visit:    1. Coronary artery disease involving native coronary " artery of native heart without angina pectoris (Primary)  2. S/P CABG (coronary artery bypass graft)  3. Primary hypertension  4. Hyperlipidemia, unspecified hyperlipidemia type  -Patient doing well with no angina as of late  - Continue dual antiplatelet therapy until June 25 and then patient will be on aspirin monotherapy for the rest of his life  - Continue Lipitor 80 mg nightly  - Normotensive in the clinic, continue Lopressor 12.5 mg twice daily as well as ramipril 10 mg twice daily  - Patient will follow-up with [facility] cardiology going forward with Dr. Stanley.    5. Tobacco abuse  6. Abnormal PFT  -Patient had pulmonary function test this morning that showed potential obstruction.  Because of this I will place referral to pulmonology.  - Continue tobacco cessation             Follow Up   Return in about 4 months (around 10/12/2024) for Follow-up with Dr. Stanley in [facility] .  Patient was given instructions and counseling regarding his condition or for health maintenance advice. Please see specific information pulled into the AVS if appropriate.       Part of this note may be an electronic transcription/translation of spoken language to printed text using the Dragon Dictation System.

## 2024-06-19 ENCOUNTER — TELEPHONE (OUTPATIENT)
Dept: CARDIOLOGY | Facility: CLINIC | Age: 74
End: 2024-06-19
Payer: MEDICARE

## 2024-06-19 NOTE — TELEPHONE ENCOUNTER
Patients wife called and stated that he has been really tired and his blood pressure has been on the lower side. 85/45 91/56 his heart rates stays between 68-70's.       PER MARIANNE LOPEZ RN - He needs to check his pressure before taking the beta blocker. If his top number is less than 100 and bottom less than 60 he needs to wait before taking. Maybe wait an hour and recheck. He has been running a bit low at his last 2 visits. Encourage fluids, lay back in a recliner feet up head back       I called patient back and let him know Martha SEGOVIA's recommendations. He voiced understanding. I also encouraged him to keep a log of his blood pressure for his follow up. I told him if it didn't get no better in a week to call us back and we will find him a spot on the schedule. & if he got worse to call and let us know and we will force him on with a provider. I told him to try and drink at least a half gallon daily especially with it going to start getting hot. He voiced understanding and said he would call us if he needs us.

## 2024-06-27 DIAGNOSIS — K21.9 GASTROESOPHAGEAL REFLUX DISEASE, UNSPECIFIED WHETHER ESOPHAGITIS PRESENT: ICD-10-CM

## 2024-06-27 RX ORDER — OMEPRAZOLE 20 MG/1
40 CAPSULE, DELAYED RELEASE ORAL DAILY
Qty: 180 CAPSULE | Refills: 1 | Status: SHIPPED | OUTPATIENT
Start: 2024-06-27

## 2024-06-27 NOTE — TELEPHONE ENCOUNTER
Nick Jain called to request a refill on his medication.      Last office visit : 4/3/2024   Next office visit : 7/22/2024     Requested Prescriptions     Pending Prescriptions Disp Refills    omeprazole (PRILOSEC) 20 MG delayed release capsule [Pharmacy Med Name: Omeprazole 20 MG Oral Capsule Delayed Release] 180 capsule 0     Sig: Take 2 capsules by mouth once daily            Brianna Benavides MA

## 2024-06-28 ENCOUNTER — OFFICE VISIT (OUTPATIENT)
Dept: PULMONOLOGY | Facility: CLINIC | Age: 74
End: 2024-06-28
Payer: MEDICARE

## 2024-06-28 VITALS
WEIGHT: 144 LBS | HEART RATE: 65 BPM | DIASTOLIC BLOOD PRESSURE: 74 MMHG | HEIGHT: 69 IN | SYSTOLIC BLOOD PRESSURE: 122 MMHG | OXYGEN SATURATION: 97 % | BODY MASS INDEX: 21.33 KG/M2

## 2024-06-28 DIAGNOSIS — R94.2 DECREASED DIFFUSION CAPACITY: ICD-10-CM

## 2024-06-28 DIAGNOSIS — Z87.891 RECENTLY QUIT USING TOBACCO: Chronic | ICD-10-CM

## 2024-06-28 DIAGNOSIS — J44.89 ASTHMA WITH COPD (CHRONIC OBSTRUCTIVE PULMONARY DISEASE): Primary | ICD-10-CM

## 2024-06-28 PROCEDURE — 1159F MED LIST DOCD IN RCRD: CPT | Performed by: NURSE PRACTITIONER

## 2024-06-28 PROCEDURE — 1160F RVW MEDS BY RX/DR IN RCRD: CPT | Performed by: NURSE PRACTITIONER

## 2024-06-28 PROCEDURE — 3074F SYST BP LT 130 MM HG: CPT | Performed by: NURSE PRACTITIONER

## 2024-06-28 PROCEDURE — 3078F DIAST BP <80 MM HG: CPT | Performed by: NURSE PRACTITIONER

## 2024-06-28 PROCEDURE — 99214 OFFICE O/P EST MOD 30 MIN: CPT | Performed by: NURSE PRACTITIONER

## 2024-06-28 RX ORDER — BUDESONIDE, GLYCOPYRROLATE, AND FORMOTEROL FUMARATE 160; 9; 4.8 UG/1; UG/1; UG/1
2 AEROSOL, METERED RESPIRATORY (INHALATION) 2 TIMES DAILY
Qty: 2 EACH | Refills: 0 | COMMUNITY
Start: 2024-06-28

## 2024-06-28 NOTE — PROGRESS NOTES
"Chief Complaint  Abnormal PFTs    Subjective    History of Present Illness      Rogerio Barksdale presents to [facility] for:    History of Present Illness   New patient referral from cardiology to discuss abnormal PFT findings.  He is accompanied by his wife.  The patient had PFTs at the hospital 6/12/24 showing moderate obstruction with a mild diffusion impairment.  These findings are consistent with COPD and asthma.  He reports that he has daily shortness of breath mostly with upper body exertion, cough and poor sleep.  Smoking history: Quit in March 2024, started smoking at the age of 11.  He has coronary artery disease and is status post CABG from March.  He is currently participating in cardiac rehab.  I reviewed a chest x-ray from 3/29/2024 showing moderate cardiomegaly.  A CT scan from last March showed a small 4 mm lung nodule that we will need follow-up in March 2025.  He utilizes an albuterol rescue inhaler as needed.  I recommend an overnight pulse ox study given his poor diffusion capacity and reports of poor sleep.  He is also going to trial Breztri 2 puffs twice daily.  Proper demonstration of the Aerosphere device was shown to him and he took his first dose of Breztri in the office today.  I will see him back in a couple of weeks for follow-up.  He is up-to-date on some vaccines but does not take the flu shot.  Objective   Vital Signs:   /74   Pulse 65   Ht 174 cm (68.5\")   Wt 65.3 kg (144 lb)   SpO2 97% Comment: RA  BMI 21.58 kg/m²     Physical Exam  Vitals reviewed.   Constitutional:       Appearance: He is well-developed.   HENT:      Head: Normocephalic and atraumatic.   Eyes:      General: No scleral icterus.  Cardiovascular:      Rate and Rhythm: Normal rate and regular rhythm.   Pulmonary:      Effort: Pulmonary effort is normal.      Breath sounds: Normal breath sounds. No wheezing, rhonchi or rales.   Abdominal:      General: There " is no distension.      Palpations: Abdomen is soft.   Musculoskeletal:         General: Normal range of motion.      Cervical back: Normal range of motion and neck supple.   Skin:     General: Skin is warm and dry.      Nails: There is clubbing.   Neurological:      Mental Status: He is alert and oriented to person, place, and time.   Psychiatric:         Mood and Affect: Mood normal.         Behavior: Behavior normal.        Result Review :   The following data was reviewed by: YOSEF Corbin on 06/28/2024:  XR Chest 1 View (03/29/2024 04:00)  CT Chest Without Contrast Diagnostic (03/13/2024 14:26)  Results for orders placed during the hospital encounter of 04/23/24    Complete PFT - Pre & Post Bronchodilator    Narrative  [facility] - Pulmonary Function Test    [address] [place] [address]  [id number]  [phone]    Patient : Rogerio Barksdale  MRN : [id number]  CSN : [id number]  Pulmonologist : Anderson Dowling MD  Date : 6/12/2024    ______________________________________________________________________    Interpretation :  1.  Spirometry is consistent with a moderate obstructive ventilatory defect.  2.  There is some improvement in spirometry postbronchodilator except for a decline in peak expiratory flow.  However a moderate obstructive ventilatory defect is still present.  3.  Lung volumes reveal an elevated expiratory reserve volume with a decrease in inspiratory capacity and otherwise are within normal limits.  4.  There is a moderate diffusion impairment which when corrected for alveolar volume is a mild diffusion impairment.  5.  Arterial blood gases on room air are within normal limits.      Anderson Dowling MD             Assessment and Plan      Diagnoses and all orders for this visit:    1. Asthma with COPD (chronic obstructive pulmonary disease) (Primary)  Comments:  With daily shortness of breath and cough.  Trial Breztri 2 puffs twice daily.  Return to clinic in 2  weeks.  Orders:  -     Budeson-Glycopyrrol-Formoterol (Breztri Aerosphere) 160-9-4.8 MCG/ACT aerosol inhaler; Inhale 2 puffs 2 (Two) Times a Day.  Dispense: 2 each; Refill: 0    2. Decreased diffusion capacity  Comments:  With poor sleep quality.  Will get an overnight pulse ox study to assess for hypoxia.  Orders:  -     Overnight Sleep Oximetry Study; Future    3. Recently quit using tobacco  Comments:  He quit smoking in March after having open heart surgery.  He will need a lung cancer screening in March 2025.  Continue cardiac rehab.      Johnathan June, APRN  6/28/2024  15:11 CDT    Follow Up   Return in about 2 weeks (around 7/12/2024) for medication checkup-BREZTRI, overnight pulse ox study.    Patient was given instructions and counseling regarding his condition or for health maintenance advice. Please see specific information pulled into the AVS if appropriate.

## 2024-07-08 DIAGNOSIS — R94.2 DECREASED DIFFUSION CAPACITY: ICD-10-CM

## 2024-07-12 ENCOUNTER — OFFICE VISIT (OUTPATIENT)
Dept: PULMONOLOGY | Facility: CLINIC | Age: 74
End: 2024-07-12
Payer: MEDICARE

## 2024-07-12 VITALS
SYSTOLIC BLOOD PRESSURE: 112 MMHG | OXYGEN SATURATION: 97 % | WEIGHT: 146 LBS | HEIGHT: 69 IN | HEART RATE: 70 BPM | BODY MASS INDEX: 21.62 KG/M2 | DIASTOLIC BLOOD PRESSURE: 62 MMHG

## 2024-07-12 DIAGNOSIS — Z87.891 RECENTLY QUIT USING TOBACCO: Chronic | ICD-10-CM

## 2024-07-12 DIAGNOSIS — J44.89 ASTHMA WITH COPD (CHRONIC OBSTRUCTIVE PULMONARY DISEASE): Primary | Chronic | ICD-10-CM

## 2024-07-12 RX ORDER — TIOTROPIUM BROMIDE AND OLODATEROL 3.124; 2.736 UG/1; UG/1
2 SPRAY, METERED RESPIRATORY (INHALATION)
Qty: 1 EACH | Refills: 3 | Status: SHIPPED | OUTPATIENT
Start: 2024-07-12 | End: 2024-08-11

## 2024-07-12 NOTE — PROGRESS NOTES
"Chief Complaint  Asthma with COPD     Subjective    History of Present Illness      Rogerio Barksdale presents to [facility] for:    History of Present Illness   Follow-up after being seen as a new patient at the end of June with COPD/asthma overlap.  He quit smoking in March after having an MI and undergoing CABG.  He is currently in cardiac rehab and reports that he is doing better and is tolerating longer periods of exercise.  He was trialed on Breztri and though he had a good response in terms of his breathing he has had a lot of hoarseness.  I recommend that we trial him on something without steroid so I have prescribed Stiolto.  Proper demonstration of the Respimat device was shown to him today and he will pick Stiolto up from the pharmacy.  He had an overnight pulse ox 7/6/2024 that looked good.  He has albuterol that he uses and reports that it helps when he is having cough.  He will be due for a low-dose CT scan for lung cancer screening in March 2025.  He does not take a flu shot.  Objective   Vital Signs:   /62   Pulse 70   Ht 174 cm (68.5\")   Wt 66.2 kg (146 lb)   SpO2 97% Comment: RA  BMI 21.88 kg/m²     Physical Exam  Vitals reviewed.   Constitutional:       Appearance: He is well-developed.   HENT:      Head: Normocephalic and atraumatic.   Eyes:      General: No scleral icterus.  Cardiovascular:      Rate and Rhythm: Normal rate and regular rhythm.   Pulmonary:      Effort: Pulmonary effort is normal.      Breath sounds: Normal breath sounds. No wheezing, rhonchi or rales.   Abdominal:      General: There is no distension.      Palpations: Abdomen is soft.   Musculoskeletal:         General: Normal range of motion.      Cervical back: Normal range of motion and neck supple.   Skin:     General: Skin is warm and dry.      Nails: There is clubbing.   Neurological:      Mental Status: He is alert and oriented to person, place, and time. "   Psychiatric:         Mood and Affect: Mood normal.         Behavior: Behavior normal.        Result Review :   The following data was reviewed by: YOSEF Corbin on 07/12/2024:  PULMONARY RESULTS - SCAN - PULSE OXIMETRY, OVERNIGHT-RA-AEROCARE (07/06/2024)   Results for orders placed during the hospital encounter of 04/23/24    Complete PFT - Pre & Post Bronchodilator    Narrative  [facility] - Pulmonary Function Test    [address] [facility]  [id number]  [phone]    Patient : Rogerio Barksdale  MRN : [id number]  CSN : [id number]  Pulmonologist : Anderson Dowling MD  Date : 6/12/2024    ______________________________________________________________________    Interpretation :  1.  Spirometry is consistent with a moderate obstructive ventilatory defect.  2.  There is some improvement in spirometry postbronchodilator except for a decline in peak expiratory flow.  However a moderate obstructive ventilatory defect is still present.  3.  Lung volumes reveal an elevated expiratory reserve volume with a decrease in inspiratory capacity and otherwise are within normal limits.  4.  There is a moderate diffusion impairment which when corrected for alveolar volume is a mild diffusion impairment.  5.  Arterial blood gases on room air are within normal limits.      Anderson Dowling MD               Assessment and Plan      Diagnoses and all orders for this visit:    1. Asthma with COPD (chronic obstructive pulmonary disease) (Primary)  Comments:  Though he had a good response to Breztri he had the side effect of hoarseness.  Trial Stiolto versus Breztri.  Continue albuterol as needed.  Orders:  -     tiotropium bromide-olodaterol (Stiolto Respimat) 2.5-2.5 MCG/ACT aerosol solution inhaler; Inhale 2 puffs Daily for 30 days.  Dispense: 1 each; Refill: 3    2. Recently quit using tobacco  Comments:  He quit smoking in March of this year.  Will be due for a low-dose CT in March 2025.  Continue  cardiac rehab.        Johnathan June, APRN  7/12/2024  15:00 CDT    Follow Up   Return in about 3 months (around 10/12/2024) for medication checkup-Stiolto.    Patient was given instructions and counseling regarding his condition or for health maintenance advice. Please see specific information pulled into the AVS if appropriate.

## 2024-07-16 DIAGNOSIS — R73.9 HYPERGLYCEMIA: ICD-10-CM

## 2024-07-16 DIAGNOSIS — I10 ESSENTIAL (PRIMARY) HYPERTENSION: ICD-10-CM

## 2024-07-16 LAB
ALBUMIN SERPL-MCNC: 3.6 G/DL (ref 3.5–5.2)
ALP SERPL-CCNC: 178 U/L (ref 40–130)
ALT SERPL-CCNC: 27 U/L (ref 5–41)
ANION GAP SERPL CALCULATED.3IONS-SCNC: 13 MMOL/L (ref 7–19)
AST SERPL-CCNC: 21 U/L (ref 5–40)
BASOPHILS # BLD: 0 K/UL (ref 0–0.2)
BASOPHILS NFR BLD: 0.6 % (ref 0–1)
BILIRUB SERPL-MCNC: 0.3 MG/DL (ref 0.2–1.2)
BUN SERPL-MCNC: 17 MG/DL (ref 8–23)
CALCIUM SERPL-MCNC: 8.8 MG/DL (ref 8.8–10.2)
CHLORIDE SERPL-SCNC: 105 MMOL/L (ref 98–111)
CO2 SERPL-SCNC: 21 MMOL/L (ref 22–29)
CREAT SERPL-MCNC: 1.3 MG/DL (ref 0.5–1.2)
EOSINOPHIL # BLD: 0.3 K/UL (ref 0–0.6)
EOSINOPHIL NFR BLD: 4.5 % (ref 0–5)
ERYTHROCYTE [DISTWIDTH] IN BLOOD BY AUTOMATED COUNT: 14.4 % (ref 11.5–14.5)
GLUCOSE SERPL-MCNC: 79 MG/DL (ref 74–109)
HBA1C MFR BLD: 5.9 % (ref 4–6)
HCT VFR BLD AUTO: 39.1 % (ref 42–52)
HGB BLD-MCNC: 11.8 G/DL (ref 14–18)
IMM GRANULOCYTES # BLD: 0 K/UL
LYMPHOCYTES # BLD: 1.9 K/UL (ref 1.1–4.5)
LYMPHOCYTES NFR BLD: 27.4 % (ref 20–40)
MCH RBC QN AUTO: 28.8 PG (ref 27–31)
MCHC RBC AUTO-ENTMCNC: 30.2 G/DL (ref 33–37)
MCV RBC AUTO: 95.4 FL (ref 80–94)
MONOCYTES # BLD: 0.6 K/UL (ref 0–0.9)
MONOCYTES NFR BLD: 9 % (ref 0–10)
NEUTROPHILS # BLD: 4 K/UL (ref 1.5–7.5)
NEUTS SEG NFR BLD: 58.2 % (ref 50–65)
PLATELET # BLD AUTO: 231 K/UL (ref 130–400)
PMV BLD AUTO: 10.1 FL (ref 9.4–12.4)
POTASSIUM SERPL-SCNC: 4.5 MMOL/L (ref 3.5–5)
PROT SERPL-MCNC: 6.9 G/DL (ref 6.6–8.7)
RBC # BLD AUTO: 4.1 M/UL (ref 4.7–6.1)
SODIUM SERPL-SCNC: 139 MMOL/L (ref 136–145)
WBC # BLD AUTO: 6.9 K/UL (ref 4.8–10.8)

## 2024-08-26 ENCOUNTER — OFFICE VISIT (OUTPATIENT)
Dept: PRIMARY CARE CLINIC | Age: 74
End: 2024-08-26
Payer: MEDICARE

## 2024-08-26 VITALS
HEIGHT: 70 IN | SYSTOLIC BLOOD PRESSURE: 118 MMHG | WEIGHT: 153 LBS | HEART RATE: 83 BPM | BODY MASS INDEX: 21.9 KG/M2 | OXYGEN SATURATION: 98 % | TEMPERATURE: 97.2 F | DIASTOLIC BLOOD PRESSURE: 60 MMHG

## 2024-08-26 DIAGNOSIS — F51.01 PRIMARY INSOMNIA: ICD-10-CM

## 2024-08-26 DIAGNOSIS — I10 ESSENTIAL (PRIMARY) HYPERTENSION: ICD-10-CM

## 2024-08-26 DIAGNOSIS — K21.9 GASTROESOPHAGEAL REFLUX DISEASE WITHOUT ESOPHAGITIS: ICD-10-CM

## 2024-08-26 DIAGNOSIS — R74.8 ELEVATED ALKALINE PHOSPHATASE LEVEL: ICD-10-CM

## 2024-08-26 DIAGNOSIS — N18.31 STAGE 3A CHRONIC KIDNEY DISEASE (HCC): ICD-10-CM

## 2024-08-26 DIAGNOSIS — I25.119 ATHEROSCLEROSIS OF NATIVE CORONARY ARTERY OF NATIVE HEART WITH ANGINA PECTORIS (HCC): ICD-10-CM

## 2024-08-26 DIAGNOSIS — E78.01 FAMILIAL HYPERCHOLESTEROLEMIA: ICD-10-CM

## 2024-08-26 DIAGNOSIS — D64.9 ANEMIA, UNSPECIFIED TYPE: ICD-10-CM

## 2024-08-26 DIAGNOSIS — R53.83 OTHER FATIGUE: ICD-10-CM

## 2024-08-26 DIAGNOSIS — I20.9 ANGINA PECTORIS, UNSPECIFIED (HCC): ICD-10-CM

## 2024-08-26 DIAGNOSIS — Z12.5 ENCOUNTER FOR PROSTATE CANCER SCREENING: ICD-10-CM

## 2024-08-26 DIAGNOSIS — F51.04 CHRONIC INSOMNIA: ICD-10-CM

## 2024-08-26 DIAGNOSIS — Z00.00 ANNUAL PHYSICAL EXAM: Primary | ICD-10-CM

## 2024-08-26 DIAGNOSIS — I25.10 CORONARY ARTERY DISEASE INVOLVING NATIVE CORONARY ARTERY OF NATIVE HEART WITHOUT ANGINA PECTORIS: ICD-10-CM

## 2024-08-26 PROCEDURE — G8420 CALC BMI NORM PARAMETERS: HCPCS | Performed by: FAMILY MEDICINE

## 2024-08-26 PROCEDURE — 3078F DIAST BP <80 MM HG: CPT | Performed by: FAMILY MEDICINE

## 2024-08-26 PROCEDURE — 4004F PT TOBACCO SCREEN RCVD TLK: CPT | Performed by: FAMILY MEDICINE

## 2024-08-26 PROCEDURE — 99214 OFFICE O/P EST MOD 30 MIN: CPT | Performed by: FAMILY MEDICINE

## 2024-08-26 PROCEDURE — 1123F ACP DISCUSS/DSCN MKR DOCD: CPT | Performed by: FAMILY MEDICINE

## 2024-08-26 PROCEDURE — 3074F SYST BP LT 130 MM HG: CPT | Performed by: FAMILY MEDICINE

## 2024-08-26 PROCEDURE — G0439 PPPS, SUBSEQ VISIT: HCPCS | Performed by: FAMILY MEDICINE

## 2024-08-26 PROCEDURE — 3017F COLORECTAL CA SCREEN DOC REV: CPT | Performed by: FAMILY MEDICINE

## 2024-08-26 PROCEDURE — G8427 DOCREV CUR MEDS BY ELIG CLIN: HCPCS | Performed by: FAMILY MEDICINE

## 2024-08-26 RX ORDER — TIOTROPIUM BROMIDE AND OLODATEROL 3.124; 2.736 UG/1; UG/1
2 SPRAY, METERED RESPIRATORY (INHALATION) DAILY
COMMUNITY
Start: 2024-08-10

## 2024-08-26 RX ORDER — TRAZODONE HYDROCHLORIDE 50 MG/1
100 TABLET, FILM COATED ORAL NIGHTLY PRN
Qty: 180 TABLET | Refills: 1 | Status: SHIPPED | OUTPATIENT
Start: 2024-08-26 | End: 2025-02-22

## 2024-08-26 SDOH — ECONOMIC STABILITY: INCOME INSECURITY: HOW HARD IS IT FOR YOU TO PAY FOR THE VERY BASICS LIKE FOOD, HOUSING, MEDICAL CARE, AND HEATING?: NOT VERY HARD

## 2024-08-26 SDOH — ECONOMIC STABILITY: FOOD INSECURITY: WITHIN THE PAST 12 MONTHS, YOU WORRIED THAT YOUR FOOD WOULD RUN OUT BEFORE YOU GOT MONEY TO BUY MORE.: NEVER TRUE

## 2024-08-26 SDOH — ECONOMIC STABILITY: FOOD INSECURITY: WITHIN THE PAST 12 MONTHS, THE FOOD YOU BOUGHT JUST DIDN'T LAST AND YOU DIDN'T HAVE MONEY TO GET MORE.: NEVER TRUE

## 2024-08-26 ASSESSMENT — PATIENT HEALTH QUESTIONNAIRE - PHQ9
SUM OF ALL RESPONSES TO PHQ QUESTIONS 1-9: 0
2. FEELING DOWN, DEPRESSED OR HOPELESS: NOT AT ALL
SUM OF ALL RESPONSES TO PHQ9 QUESTIONS 1 & 2: 0
SUM OF ALL RESPONSES TO PHQ QUESTIONS 1-9: 0
1. LITTLE INTEREST OR PLEASURE IN DOING THINGS: NOT AT ALL

## 2024-08-26 NOTE — PROGRESS NOTES
[facility]  [address]  [address]  [place] [address]  Dept: [phone]  Dept Fax: [phone]  Loc: [phone]    Nick Jain is a 74 y.o. male who presents today for his medical conditions/complaints as noted below.  Nick Jain is here for Medicare AWV      Patient History:      Coronary artery disease.  - Cardiology at [facility]: Dr. Reddy  - Status post CABG March 25, 2024.     Pulmonary nodules:  -March 2024: CT chest without right upper lobe: 4 mm nodule.    COPD  -[facility] pulmonology        pthx           Subjective:   CC:  Here today to discuss the following:    Last saw me March 21, 2024.    March 25 to March 29  -Four-vessel CABG    April 3, 2024  -Follow-up with nurse practitioner Jazzy Galvez postoperatively.  No complications.    April 4, 2024  -Follow-up with CT surgery    April 11, 2024  -Follow-up with cardiology:  - -PFTs ordered  - -Lasix as needed    May 20, 2024  -Follow-up with CT surgery:    June 12, 2024:  -Cardiology follow-up    June 28, 2024  - Followed up with pulmonology: Asthma with COPD.  - -Started on Breztri.  - -Overnight pulse oximetry ordered.    July 12, 2024  -Followed up with pulmonology: Switched from Breztri to Stiolto due to hoarseness.    COPD and asthma:   -Overall he is tolerating the Stiolto.  Feeling better.  He has not smoked since his surgery.    Hypertension  Compliant with medications.  No adverse effects from medication.  No lightheadedness, palpitations, or chest pain.      Hyperlipidemia  Tolerating current cholesterol medication without side effects. . Attempting to reduce processed sugar and cholesterol from diet.    Gastroesophageal Reflux Disease  Symptoms currently under control.   Medication adequately controls symptoms.  No hematochezia or melena.     Insomnia  Currently stable.  Satisfied with current treatment regimen.  No adverse effects from medication.       Review of Systems    Constitutional: Negative for chills and fever.   HENT: Negative for congestion.    Respiratory: Negative for cough, chest tightness and shortness of breath.  Cardiovascular: Negative for chest pain, palpitations and leg swelling.   Gastrointestinal: Negative for abdominal pain, anal bleeding, constipation, diarrhea and nausea.       Review of Systems  Objective:   /60   Pulse 83   Temp 97.2 °F (36.2 °C)   Ht 1.778 m (5' 10\")   Wt 69.4 kg (153 lb)   SpO2 98%   BMI 21.95 kg/m²   BP Readings from Last 3 Encounters:   08/26/24 118/60   04/03/24 (!) 122/58   03/21/24 (!) 144/70    Wt Readings from Last 3 Encounters:   08/26/24 69.4 kg (153 lb)   04/03/24 69.8 kg (153 lb 12.8 oz)   03/21/24 72.6 kg (160 lb)         Physical Exam   Constitutional: He appears well. Does not appear ill.   Neck: Neck supple. No masses.  Neck Symmetric.  Normal tracheal position.  No thyroid enlargement  Cardiovascular: Normal rate and regular rhythm.  Exam reveals no friction rub.  No murmur heard.  Respiratory:  Effort normal and breath sounds normal. No respiratory distress. No wheezes. No rales. No use of accessory muscles or intercostal retractions.  Neurological: alert.   Psychiatric: normal mood and affect. His behavior is normal.     Physical Exam    Lab Results   Component Value Date    WBC 6.9 07/16/2024    HGB 11.8 (L) 07/16/2024    HCT 39.1 (L) 07/16/2024    MCV 95.4 (H) 07/16/2024     07/16/2024     Lab Results   Component Value Date    LABA1C 5.9 07/16/2024     Lab Results   Component Value Date    CREATININE 1.3 (H) 07/16/2024      Lab Results   Component Value Date     07/16/2024    K 4.5 07/16/2024     07/16/2024    CO2 21 (L) 07/16/2024    BUN 17 07/16/2024    CREATININE 1.3 (H) 07/16/2024    GLUCOSE 79 07/16/2024    CALCIUM 8.8 07/16/2024    BILITOT 0.3 07/16/2024    ALKPHOS 178 (H) 07/16/2024    AST 21 07/16/2024    ALT 27 07/16/2024    LABGLOM 57 (A) 07/16/2024    GFRAA >59 01/10/2022

## 2024-08-26 NOTE — PROGRESS NOTES
Medicare Annual Wellness Visit - Subsequent    Name: Nick Jain Today’s Date: 2024   MRN: [id number] Sex: Male   Age: 74 y.o. Ethnicity: Non- / Non    : 1950 Race: White (non-)      Nick Jain is here for   Chief Complaint   Patient presents with    Medicare AWV        Screenings for behavioral, psychosocial and functional/safety risks, and cognitive dysfunction are all negative except as indicated below. These results, as well as other patient data from the Health Risk Assessment form, are documented in Flowsheets linked to this Encounter.    No Known Allergies    Prior to Visit Medications    Medication Sig Taking? Authorizing Provider   STIOLTO RESPIMAT 2.5-2.5 MCG/ACT AERS Inhale 2 puffs into the lungs daily Yes Louis Carreno MD   omeprazole (PRILOSEC) 20 MG delayed release capsule Take 2 capsules by mouth once daily Yes Jazzy Galvez APRN - DALILA   metoprolol tartrate (LOPRESSOR) 25 MG tablet  Yes Louis Carreno MD   acetaminophen (TYLENOL) 500 MG tablet Take 1 tablet by mouth every 6 hours as needed for Pain Yes Louis Carreno MD   traZODone (DESYREL) 50 MG tablet Take 2 tablets by mouth nightly as needed for Sleep Yes Jazzy Galvez APRN - CNP   ramipril (ALTACE) 10 MG capsule Take 1 capsule by mouth in the morning and at bedtime Take 1 capsule by mouth once daily Yes Tesfaye Gipson MD   atorvastatin (LIPITOR) 80 MG tablet Take 1 tablet by mouth daily Yes Tesfaye Gipson MD   albuterol sulfate HFA (VENTOLIN HFA) 108 (90 Base) MCG/ACT inhaler Inhale 2 puffs into the lungs every 6 hours as needed for Wheezing Yes Tesfaye Gipson MD   fluticasone (FLONASE) 50 MCG/ACT nasal spray 2 sprays by Each Nostril route in the morning. Yes Tesfaye Gipson MD   aspirin EC 81 MG EC tablet Take 1 tablet by mouth Yes Louis Carreno MD   clopidogrel (PLAVIX) 75 MG tablet Take 1 tablet by mouth daily  Louis Carreno MD  the following everyday activities: Eating, dressing, grooming, bathing, toileting, or walking/balance?: No  In the past 7 days, did you need help from others to take care of any of the following: Laundry, housekeeping, banking/finances, shopping, telephone use, food preparation, transportation, or taking medications?: No  ADL Interventions:  Not indicated  - -     Personalized Preventive Plan   Current Health Maintenance Status  Immunization History   Administered Date(s) Administered    COVID-19, MODERNA BLUE border, Primary or Immunocompromised, (age 12y+), IM, 100 mcg/0.5mL 02/25/2021, 03/25/2021    COVID-19, MODERNA Bivalent, (age 12y+), IM, 50 mcg/0.5 mL 10/11/2022    COVID-19, MODERNA Booster BLUE border, (age 18y+), IM, 50mcg/0.25mL 12/06/2021, 04/12/2022    Pneumococcal, PCV-13, PREVNAR 13, (age 6w+), IM, 0.5mL 01/04/2018    Pneumococcal, PPSV23, PNEUMOVAX 23, (age 2y+), SC/IM, 0.5mL 01/10/2019        Health Maintenance   Topic Date Due    DTaP/Tdap/Td vaccine (1 - Tdap) Never done    Shingles vaccine (1 of 2) Never done    Respiratory Syncytial Virus (RSV) Pregnant or age 60 yrs+ (1 - 1-dose 60+ series) Never done    AAA screen  Never done    COVID-19 Vaccine (6 - 2023-24 season) 09/01/2023    Annual Wellness Visit (Medicare)  07/20/2024    Flu vaccine (1) Never done    Colorectal Cancer Screen  10/22/2024    Lipids  01/17/2025    Depression Screen  01/22/2025    Lung Cancer Screening &/or Counseling  03/13/2025    A1C test (Diabetic or Prediabetic)  07/16/2025    Pneumococcal 65+ years Vaccine  Completed    Hepatitis C screen  Completed    Hepatitis A vaccine  Aged Out    Hepatitis B vaccine  Aged Out    Hib vaccine  Aged Out    Polio vaccine  Aged Out    Meningococcal (ACWY) vaccine  Aged Out    GFR test (Diabetes, CKD 3-4, OR last GFR 15-59)  Discontinued       Recommendations for Preventive Services Due: see orders.  Recommended screening schedule for the next 5-10 years is provided to the patient in

## 2024-08-26 NOTE — PATIENT INSTRUCTIONS
of your life, where would you prefer to die? At home? In a hospital or nursing home? Somewhere else?  Would you prefer to be buried or cremated?  Do you want your organs to be donated after you die?  What should you do with your living will?  Make sure that your family members and your health care agent have copies of your living will.  Give your doctor a copy of your living will to keep in your medical record. If you have more than one doctor, make sure that each one has a copy.  You may want to put a copy of your living will where it can be easily found.  Where can you learn more?  Go to [email or web address] and sign in to your [employer] account. Enter K356 in the Search Health Information box to learn more about \"Learning About Living [place].\"     If you do not have an account, please click on the \"Sign Up Now\" link.  Current as of: September 24, 2016  Content Version: 11.5  © 0751-9546 [email or web address]. Care instructions adapted under license by [employer]. If you have questions about a medical condition or this instruction, always ask your healthcare professional. [email or web address] disclaims any warranty or liability for your use of this information.         Learning About Durable Power of  for Health Care  What is a durable power of  for health care?    A durable power of  for health care is one type of the legal forms called advance directives. It lets you decide who you want to make treatment decisions for you if you cannot speak or decide for yourself. The person you choose is called your health care agent.  Another type of advance directive is a living will. It lets you write down what kinds of treatment or life support you want or do not want.  What should you think about when choosing a health care agent?  Choose your health care agent carefully. This person may or may not be a family member.  Talk to the person before you make your final decision.

## 2024-09-25 DIAGNOSIS — R53.83 OTHER FATIGUE: ICD-10-CM

## 2024-09-25 DIAGNOSIS — D64.9 ANEMIA, UNSPECIFIED TYPE: ICD-10-CM

## 2024-09-25 DIAGNOSIS — E78.01 FAMILIAL HYPERCHOLESTEROLEMIA: ICD-10-CM

## 2024-09-25 LAB
ALBUMIN SERPL-MCNC: 4 G/DL (ref 3.5–5.2)
ALP SERPL-CCNC: 208 U/L (ref 40–129)
ALT SERPL-CCNC: 25 U/L (ref 5–41)
ANION GAP SERPL CALCULATED.3IONS-SCNC: 11 MMOL/L (ref 7–19)
AST SERPL-CCNC: 23 U/L (ref 5–40)
BASOPHILS # BLD: 0 K/UL (ref 0–0.2)
BASOPHILS NFR BLD: 0.5 % (ref 0–1)
BILIRUB SERPL-MCNC: 0.3 MG/DL (ref 0.2–1.2)
BUN SERPL-MCNC: 14 MG/DL (ref 8–23)
CALCIUM SERPL-MCNC: 9.1 MG/DL (ref 8.8–10.2)
CHLORIDE SERPL-SCNC: 103 MMOL/L (ref 98–111)
CHOLEST SERPL-MCNC: 102 MG/DL (ref 0–199)
CK SERPL-CCNC: 68 U/L (ref 39–308)
CO2 SERPL-SCNC: 25 MMOL/L (ref 22–29)
CREAT SERPL-MCNC: 1.2 MG/DL (ref 0.7–1.2)
EOSINOPHIL # BLD: 0.4 K/UL (ref 0–0.6)
EOSINOPHIL NFR BLD: 5 % (ref 0–5)
ERYTHROCYTE [DISTWIDTH] IN BLOOD BY AUTOMATED COUNT: 15 % (ref 11.5–14.5)
FERRITIN SERPL-MCNC: 81.5 NG/ML (ref 30–400)
FOLATE SERPL-MCNC: 8.9 NG/ML (ref 4.5–32.2)
GLUCOSE SERPL-MCNC: 93 MG/DL (ref 70–99)
HCT VFR BLD AUTO: 42.1 % (ref 42–52)
HDLC SERPL-MCNC: 41 MG/DL (ref 40–60)
HGB BLD-MCNC: 13 G/DL (ref 14–18)
IMM GRANULOCYTES # BLD: 0 K/UL
IRON SERPL-MCNC: 66 UG/DL (ref 59–158)
LDLC SERPL CALC-MCNC: 47 MG/DL
LYMPHOCYTES # BLD: 2.1 K/UL (ref 1.1–4.5)
LYMPHOCYTES NFR BLD: 27.7 % (ref 20–40)
MCH RBC QN AUTO: 28.8 PG (ref 27–31)
MCHC RBC AUTO-ENTMCNC: 30.9 G/DL (ref 33–37)
MCV RBC AUTO: 93.3 FL (ref 80–94)
MONOCYTES # BLD: 0.6 K/UL (ref 0–0.9)
MONOCYTES NFR BLD: 7.3 % (ref 0–10)
NEUTROPHILS # BLD: 4.5 K/UL (ref 1.5–7.5)
NEUTS SEG NFR BLD: 59.1 % (ref 50–65)
PLATELET # BLD AUTO: 266 K/UL (ref 130–400)
PMV BLD AUTO: 10.1 FL (ref 9.4–12.4)
POTASSIUM SERPL-SCNC: 4.8 MMOL/L (ref 3.5–5)
PROT SERPL-MCNC: 7.8 G/DL (ref 6.4–8.3)
RBC # BLD AUTO: 4.51 M/UL (ref 4.7–6.1)
SODIUM SERPL-SCNC: 139 MMOL/L (ref 136–145)
T4 FREE SERPL-MCNC: 1 NG/DL (ref 0.93–1.7)
TIBC SERPL-MCNC: 317 UG/DL (ref 250–400)
TRIGL SERPL-MCNC: 69 MG/DL (ref 0–149)
TSH SERPL DL<=0.005 MIU/L-ACNC: 2.42 UIU/ML (ref 0.27–4.2)
VIT B12 SERPL-MCNC: 259 PG/ML (ref 232–1245)
WBC # BLD AUTO: 7.7 K/UL (ref 4.8–10.8)

## 2024-10-16 ENCOUNTER — OFFICE VISIT (OUTPATIENT)
Dept: PULMONOLOGY | Facility: CLINIC | Age: 74
End: 2024-10-16
Payer: MEDICARE

## 2024-10-16 VITALS
WEIGHT: 158.2 LBS | HEIGHT: 69 IN | BODY MASS INDEX: 23.43 KG/M2 | OXYGEN SATURATION: 98 % | HEART RATE: 62 BPM | SYSTOLIC BLOOD PRESSURE: 158 MMHG | DIASTOLIC BLOOD PRESSURE: 82 MMHG | RESPIRATION RATE: 18 BRPM

## 2024-10-16 DIAGNOSIS — Z79.899 MEDICATION MANAGEMENT: Chronic | ICD-10-CM

## 2024-10-16 DIAGNOSIS — Z87.891 RECENTLY QUIT USING TOBACCO: Chronic | ICD-10-CM

## 2024-10-16 DIAGNOSIS — J44.89 ASTHMA WITH COPD (CHRONIC OBSTRUCTIVE PULMONARY DISEASE): Primary | Chronic | ICD-10-CM

## 2024-10-16 PROCEDURE — 1159F MED LIST DOCD IN RCRD: CPT | Performed by: NURSE PRACTITIONER

## 2024-10-16 PROCEDURE — 3079F DIAST BP 80-89 MM HG: CPT | Performed by: NURSE PRACTITIONER

## 2024-10-16 PROCEDURE — 1160F RVW MEDS BY RX/DR IN RCRD: CPT | Performed by: NURSE PRACTITIONER

## 2024-10-16 PROCEDURE — 99214 OFFICE O/P EST MOD 30 MIN: CPT | Performed by: NURSE PRACTITIONER

## 2024-10-16 PROCEDURE — 3077F SYST BP >= 140 MM HG: CPT | Performed by: NURSE PRACTITIONER

## 2024-10-16 RX ORDER — TIOTROPIUM BROMIDE AND OLODATEROL 3.124; 2.736 UG/1; UG/1
2 SPRAY, METERED RESPIRATORY (INHALATION)
Qty: 3 EACH | Refills: 3 | Status: SHIPPED | OUTPATIENT
Start: 2024-10-16 | End: 2024-11-15

## 2024-10-16 RX ORDER — TRAZODONE HYDROCHLORIDE 50 MG/1
100 TABLET, FILM COATED ORAL
COMMUNITY
Start: 2024-08-26 | End: 2025-02-23

## 2024-10-16 RX ORDER — TIOTROPIUM BROMIDE AND OLODATEROL 3.124; 2.736 UG/1; UG/1
2 SPRAY, METERED RESPIRATORY (INHALATION) DAILY
COMMUNITY

## 2024-10-16 NOTE — PROGRESS NOTES
"Chief Complaint  COPD and Asthma (Medication check)    Subjective    History of Present Illness      Rogerio Barksdale presents to [facility] for:    History of Present Illness   Management of COPD with asthma overlap.  When I last saw him in July he was trialed on Stiolto versus Breztri and is doing pretty well on Stiolto though he may need an ICS added at some point.  He still having stamina issues and I recommend that he start using his incentive spirometer again.  His blood pressure is elevated today and he tells me that he missed his blood pressure medication this morning due to a trapped raccoon that he had to deal with.  He quit smoking about 7 months ago.  He will be due for a lung cancer screening CT in March.  He does not take an annual flu shot.  Objective   Vital Signs:   /82   Pulse 62   Resp 18   Ht 174 cm (68.5\")   Wt 71.8 kg (158 lb 3.2 oz)   SpO2 98%   BMI 23.70 kg/m²     Physical Exam  Vitals reviewed.   Constitutional:       Appearance: He is well-developed.   HENT:      Head: Normocephalic and atraumatic.   Eyes:      General: No scleral icterus.  Cardiovascular:      Rate and Rhythm: Normal rate and regular rhythm.   Pulmonary:      Effort: Pulmonary effort is normal.      Breath sounds: Normal breath sounds. No wheezing, rhonchi or rales.   Abdominal:      General: There is no distension.      Palpations: Abdomen is soft.   Musculoskeletal:         General: Normal range of motion.      Cervical back: Normal range of motion and neck supple.   Skin:     General: Skin is warm and dry.      Nails: There is clubbing.   Neurological:      Mental Status: He is alert and oriented to person, place, and time.   Psychiatric:         Mood and Affect: Mood normal.         Behavior: Behavior normal.        Result Review :       Results for orders placed during the hospital encounter of 04/23/24    Complete PFT - Pre & Post " Bronchodilator    Narrative  [facility] - Pulmonary Function Test    [address]  [place]  [place]  [address]  [phone]    Patient : Rogerio Barksdale  MRN : [id number]  CSN : [id number]  Pulmonologist : Anderson Dowling MD  Date : 6/12/2024    ______________________________________________________________________    Interpretation :  1.  Spirometry is consistent with a moderate obstructive ventilatory defect.  2.  There is some improvement in spirometry postbronchodilator except for a decline in peak expiratory flow.  However a moderate obstructive ventilatory defect is still present.  3.  Lung volumes reveal an elevated expiratory reserve volume with a decrease in inspiratory capacity and otherwise are within normal limits.  4.  There is a moderate diffusion impairment which when corrected for alveolar volume is a mild diffusion impairment.  5.  Arterial blood gases on room air are within normal limits.      Anderson Dowling MD               Assessment and Plan      Diagnoses and all orders for this visit:    1. Asthma with COPD (chronic obstructive pulmonary disease) (Primary)  Comments:  Mostly stable.  Continue Stiolto and albuterol.  Add ICS if needed.  Orders:  -     tiotropium bromide-olodaterol (Stiolto Respimat) 2.5-2.5 MCG/ACT aerosol solution inhaler; Inhale 2 puffs Daily for 30 days.  Dispense: 3 each; Refill: 3    2. Recently quit using tobacco  Comments:  He quit smoking 7 months ago.  He will be due for a low-dose lung cancer screening in March 2025.  Orders:  -      CT Chest Low Dose Cancer Screening WO; Future    3. Medication management  Comments:  He is tolerating Stiolto better than he did on Breztri however he still has some breakthrough wheezing at times and may eventually need an ICS added.      Johnathan June, YOSEF  10/16/2024  15:19 CDT    Follow Up   Return in about 6 months (around 4/16/2025) for CT scan.    Patient was given instructions and counseling regarding  his condition or for health maintenance advice. Please see specific information pulled into the AVS if appropriate.

## 2024-11-04 DIAGNOSIS — J44.89 ASTHMA WITH COPD (CHRONIC OBSTRUCTIVE PULMONARY DISEASE): Chronic | ICD-10-CM

## 2024-11-04 RX ORDER — TIOTROPIUM BROMIDE AND OLODATEROL 3.124; 2.736 UG/1; UG/1
2 SPRAY, METERED RESPIRATORY (INHALATION) DAILY
Qty: 4 G | Refills: 0 | Status: SHIPPED | OUTPATIENT
Start: 2024-11-04

## 2024-11-04 NOTE — TELEPHONE ENCOUNTER
Please send refill to [facility] in [place]  Requested Prescriptions     Pending Prescriptions Disp Refills    Stiolto Respimat 2.5-2.5 MCG/ACT aerosol solution inhaler [Pharmacy Med Name: Stiolto Respimat 2.5-2.5 MCG/ACT Inhalation Aerosol Solution] 4 g 0     Sig: INHALE 2 PUFFS BY MOUTH ONCE DAILY      Last office visit with prescribing clinician: 10/16/2024   Last telemedicine visit with prescribing clinician: Visit date not found   Next office visit with prescribing clinician: 4/16/2025                         Would you like a call back once the refill request has been completed: [] Yes [] No    If the office needs to give you a call back, can they leave a voicemail: [] Yes [] No    Luann Gonzalez MA  11/04/24, 08:51 CST

## 2024-12-03 DIAGNOSIS — J44.89 ASTHMA WITH COPD (CHRONIC OBSTRUCTIVE PULMONARY DISEASE): Chronic | ICD-10-CM

## 2024-12-03 RX ORDER — TIOTROPIUM BROMIDE AND OLODATEROL 3.124; 2.736 UG/1; UG/1
2 SPRAY, METERED RESPIRATORY (INHALATION) DAILY
Qty: 4 G | Refills: 5 | Status: SHIPPED | OUTPATIENT
Start: 2024-12-03

## 2024-12-03 NOTE — TELEPHONE ENCOUNTER
Requested Prescriptions     Pending Prescriptions Disp Refills    Stiolto Respimat 2.5-2.5 MCG/ACT aerosol solution inhaler [Pharmacy Med Name: Stiolto Respimat 2.5-2.5 MCG/ACT Inhalation Aerosol Solution] 4 g 0     Sig: INHALE 2 PUFFS BY MOUTH ONCE DAILY      Last office visit with prescribing clinician: Visit date not found   Last telemedicine visit with prescribing clinician: Visit date not found   Next office visit with prescribing clinician: Visit date not found                         Would you like a call back once the refill request has been completed: [] Yes [] No    If the office needs to give you a call back, can they leave a voicemail: [] Yes [] No    Luann Gonzalez MA  12/03/24, 09:16 CST

## 2025-01-01 ENCOUNTER — HOSPITAL ENCOUNTER (EMERGENCY)
Age: 75
Discharge: HOME | End: 2025-01-01
Attending: EMERGENCY MEDICINE
Payer: MEDICARE

## 2025-01-01 ENCOUNTER — APPOINTMENT (OUTPATIENT)
Dept: CT IMAGING | Age: 75
End: 2025-01-01
Payer: MEDICARE

## 2025-01-01 VITALS
WEIGHT: 162 LBS | SYSTOLIC BLOOD PRESSURE: 164 MMHG | DIASTOLIC BLOOD PRESSURE: 73 MMHG | BODY MASS INDEX: 23.24 KG/M2 | OXYGEN SATURATION: 99 % | HEART RATE: 71 BPM | TEMPERATURE: 97.6 F | RESPIRATION RATE: 18 BRPM

## 2025-01-01 DIAGNOSIS — R59.1 LYMPHADENOPATHY: ICD-10-CM

## 2025-01-01 DIAGNOSIS — R10.31 ABDOMINAL PAIN, RIGHT LOWER QUADRANT: Primary | ICD-10-CM

## 2025-01-01 DIAGNOSIS — K63.89 MASS OF CECUM: ICD-10-CM

## 2025-01-01 LAB
ALBUMIN SERPL-MCNC: 3.9 G/DL (ref 3.5–5.2)
ALP SERPL-CCNC: 222 U/L (ref 40–129)
ALT SERPL-CCNC: 13 U/L (ref 5–41)
ANION GAP SERPL CALCULATED.3IONS-SCNC: 10 MMOL/L (ref 7–19)
AST SERPL-CCNC: 16 U/L (ref 5–40)
BASOPHILS # BLD: 0.1 K/UL (ref 0–0.2)
BASOPHILS NFR BLD: 0.9 % (ref 0–1)
BILIRUB SERPL-MCNC: 0.3 MG/DL (ref 0.2–1.2)
BUN SERPL-MCNC: 15 MG/DL (ref 8–23)
CALCIUM SERPL-MCNC: 9.1 MG/DL (ref 8.8–10.2)
CHLORIDE SERPL-SCNC: 102 MMOL/L (ref 98–111)
CO2 SERPL-SCNC: 25 MMOL/L (ref 22–29)
CREAT SERPL-MCNC: 1.4 MG/DL (ref 0.7–1.2)
EOSINOPHIL # BLD: 0.3 K/UL (ref 0–0.6)
EOSINOPHIL NFR BLD: 3.7 % (ref 0–5)
ERYTHROCYTE [DISTWIDTH] IN BLOOD BY AUTOMATED COUNT: 13 % (ref 11.5–14.5)
GLUCOSE SERPL-MCNC: 117 MG/DL (ref 70–99)
HCT VFR BLD AUTO: 39.5 % (ref 42–52)
HGB BLD-MCNC: 12.5 G/DL (ref 14–18)
IMM GRANULOCYTES # BLD: 0 K/UL
LIPASE SERPL-CCNC: 18 U/L (ref 13–60)
LYMPHOCYTES # BLD: 1.5 K/UL (ref 1.1–4.5)
LYMPHOCYTES NFR BLD: 22.4 % (ref 20–40)
MCH RBC QN AUTO: 27.8 PG (ref 27–31)
MCHC RBC AUTO-ENTMCNC: 31.6 G/DL (ref 33–37)
MCV RBC AUTO: 87.8 FL (ref 80–94)
MONOCYTES # BLD: 0.5 K/UL (ref 0–0.9)
MONOCYTES NFR BLD: 6.7 % (ref 0–10)
NEUTROPHILS # BLD: 4.5 K/UL (ref 1.5–7.5)
NEUTS SEG NFR BLD: 66 % (ref 50–65)
PLATELET # BLD AUTO: 270 K/UL (ref 130–400)
PMV BLD AUTO: 9.5 FL (ref 9.4–12.4)
POTASSIUM SERPL-SCNC: 4.7 MMOL/L (ref 3.5–5)
PROT SERPL-MCNC: 7.6 G/DL (ref 6.4–8.3)
RBC # BLD AUTO: 4.5 M/UL (ref 4.7–6.1)
SODIUM SERPL-SCNC: 137 MMOL/L (ref 136–145)
WBC # BLD AUTO: 6.8 K/UL (ref 4.8–10.8)

## 2025-01-01 PROCEDURE — 85025 COMPLETE CBC W/AUTO DIFF WBC: CPT

## 2025-01-01 PROCEDURE — 80053 COMPREHEN METABOLIC PANEL: CPT

## 2025-01-01 PROCEDURE — 99285 EMERGENCY DEPT VISIT HI MDM: CPT

## 2025-01-01 PROCEDURE — 6360000004 HC RX CONTRAST MEDICATION: Performed by: EMERGENCY MEDICINE

## 2025-01-01 PROCEDURE — 6360000002 HC RX W HCPCS: Performed by: EMERGENCY MEDICINE

## 2025-01-01 PROCEDURE — 83690 ASSAY OF LIPASE: CPT

## 2025-01-01 PROCEDURE — 96375 TX/PRO/DX INJ NEW DRUG ADDON: CPT

## 2025-01-01 PROCEDURE — 96374 THER/PROPH/DIAG INJ IV PUSH: CPT

## 2025-01-01 PROCEDURE — 74177 CT ABD & PELVIS W/CONTRAST: CPT

## 2025-01-01 PROCEDURE — 36415 COLL VENOUS BLD VENIPUNCTURE: CPT

## 2025-01-01 RX ORDER — MORPHINE SULFATE 2 MG/ML
2 INJECTION, SOLUTION INTRAMUSCULAR; INTRAVENOUS ONCE
Status: COMPLETED | OUTPATIENT
Start: 2025-01-01 | End: 2025-01-01

## 2025-01-01 RX ORDER — HYDROCODONE BITARTRATE AND ACETAMINOPHEN 5; 325 MG/1; MG/1
1 TABLET ORAL EVERY 6 HOURS PRN
Qty: 12 TABLET | Refills: 0 | Status: SHIPPED | OUTPATIENT
Start: 2025-01-01 | End: 2025-01-04

## 2025-01-01 RX ORDER — IOPAMIDOL 755 MG/ML
70 INJECTION, SOLUTION INTRAVASCULAR
Status: COMPLETED | OUTPATIENT
Start: 2025-01-01 | End: 2025-01-01

## 2025-01-01 RX ORDER — ONDANSETRON 2 MG/ML
4 INJECTION INTRAMUSCULAR; INTRAVENOUS ONCE
Status: COMPLETED | OUTPATIENT
Start: 2025-01-01 | End: 2025-01-01

## 2025-01-01 RX ORDER — DOCUSATE SODIUM 100 MG/1
100 CAPSULE, LIQUID FILLED ORAL 2 TIMES DAILY
Qty: 60 CAPSULE | Refills: 0 | Status: SHIPPED | OUTPATIENT
Start: 2025-01-01 | End: 2025-01-31

## 2025-01-01 RX ADMIN — MORPHINE SULFATE 2 MG: 2 INJECTION, SOLUTION INTRAMUSCULAR; INTRAVENOUS at 15:41

## 2025-01-01 RX ADMIN — IOPAMIDOL 70 ML: 755 INJECTION, SOLUTION INTRAVENOUS at 13:55

## 2025-01-01 RX ADMIN — ONDANSETRON 4 MG: 2 INJECTION INTRAMUSCULAR; INTRAVENOUS at 15:41

## 2025-01-01 ASSESSMENT — ENCOUNTER SYMPTOMS
EYES NEGATIVE: 1
RESPIRATORY NEGATIVE: 1
ABDOMINAL PAIN: 1

## 2025-01-01 ASSESSMENT — PAIN SCALES - GENERAL: PAINLEVEL_OUTOF10: 8

## 2025-01-01 NOTE — DISCHARGE INSTRUCTIONS
Scheduled follow-ups with GI for repeat colonoscopy for biopsies, as well as with oncology and as needed with general surgery.  Return to the emergency department if you have worsening pain or development of nausea, vomiting, constipation, or other signs of obstruction

## 2025-01-01 NOTE — ED PROVIDER NOTES
TYMPANOSTOMY TUBE PLACEMENT           CURRENT MEDICATIONS       Discharge Medication List as of 1/1/2025  4:04 PM        CONTINUE these medications which have NOT CHANGED    Details   traZODone (DESYREL) 50 MG tablet Take 2 tablets by mouth nightly as needed for Sleep, Disp-180 tablet, R-1Normal      STIOLTO RESPIMAT 2.5-2.5 MCG/ACT AERS Inhale 2 puffs into the lungs daily, DAWHistorical Med      omeprazole (PRILOSEC) 20 MG delayed release capsule Take 2 capsules by mouth once daily, Disp-180 capsule, R-1Normal      metoprolol tartrate (LOPRESSOR) 25 MG tablet Historical Med      clopidogrel (PLAVIX) 75 MG tablet Take 1 tablet by mouth dailyHistorical Med      acetaminophen (TYLENOL) 500 MG tablet Take 1 tablet by mouth every 6 hours as needed for PainHistorical Med      ramipril (ALTACE) 10 MG capsule Take 1 capsule by mouth in the morning and at bedtime Take 1 capsule by mouth once daily, Disp-180 capsule, R-3Normal      atorvastatin (LIPITOR) 80 MG tablet Take 1 tablet by mouth daily, Disp-90 tablet, R-3Normal      nitroGLYCERIN (NITROSTAT) 0.4 MG SL tablet Place 1 tablet under the tongue every 5 minutes as needed for Chest pain, Disp-25 tablet, R-5Normal      albuterol sulfate HFA (VENTOLIN HFA) 108 (90 Base) MCG/ACT inhaler Inhale 2 puffs into the lungs every 6 hours as needed for Wheezing, Disp-3 each, R-2Normal      fluticasone (FLONASE) 50 MCG/ACT nasal spray 2 sprays by Each Nostril route in the morning., Disp-3 each, R-3Normal      aspirin EC 81 MG EC tablet Take 1 tablet by mouthHistorical Med             ALLERGIES     Patient has no known allergies.    FAMILY HISTORY       Family History   Problem Relation Age of Onset    Coronary Art Dis Father     Coronary Art Dis Brother           SOCIAL HISTORY       Social History     Socioeconomic History    Marital status:      Spouse name: None    Number of children: None    Years of education: None    Highest education level: None   Tobacco Use

## 2025-01-02 ENCOUNTER — TELEPHONE (OUTPATIENT)
Dept: GASTROENTEROLOGY | Age: 75
End: 2025-01-02

## 2025-01-02 NOTE — TELEPHONE ENCOUNTER
Patient's wife called to schedule a L ED follow up asap due to pain. Please return patient's call.

## 2025-01-02 NOTE — TELEPHONE ENCOUNTER
I called and notified patient's wife of this. Pt is having nausea, but will f/u w/ Dr Gipson tomorrow at his scheduled apt.     This has been sent to Dr Cano today via email.

## 2025-01-03 ENCOUNTER — OFFICE VISIT (OUTPATIENT)
Dept: PRIMARY CARE CLINIC | Age: 75
End: 2025-01-03

## 2025-01-03 VITALS
WEIGHT: 147 LBS | BODY MASS INDEX: 21.09 KG/M2 | DIASTOLIC BLOOD PRESSURE: 66 MMHG | SYSTOLIC BLOOD PRESSURE: 94 MMHG | OXYGEN SATURATION: 95 % | TEMPERATURE: 97.8 F | HEART RATE: 67 BPM

## 2025-01-03 DIAGNOSIS — R10.31 ABDOMINAL PAIN, RIGHT LOWER QUADRANT: ICD-10-CM

## 2025-01-03 DIAGNOSIS — N18.31 STAGE 3A CHRONIC KIDNEY DISEASE (HCC): ICD-10-CM

## 2025-01-03 DIAGNOSIS — K59.00 CONSTIPATION, UNSPECIFIED CONSTIPATION TYPE: ICD-10-CM

## 2025-01-03 DIAGNOSIS — I10 ESSENTIAL (PRIMARY) HYPERTENSION: ICD-10-CM

## 2025-01-03 DIAGNOSIS — K52.9 COLITIS: ICD-10-CM

## 2025-01-03 DIAGNOSIS — J43.2 CENTRILOBULAR EMPHYSEMA (HCC): ICD-10-CM

## 2025-01-03 DIAGNOSIS — R10.31 RIGHT LOWER QUADRANT ABDOMINAL PAIN: Primary | ICD-10-CM

## 2025-01-03 DIAGNOSIS — K63.89 MASS OF CECUM: ICD-10-CM

## 2025-01-03 RX ORDER — ONDANSETRON 4 MG/1
4 TABLET, FILM COATED ORAL 3 TIMES DAILY PRN
Qty: 30 TABLET | Refills: 0 | Status: SHIPPED | OUTPATIENT
Start: 2025-01-03

## 2025-01-03 RX ORDER — RAMIPRIL 5 MG/1
5 CAPSULE ORAL 2 TIMES DAILY
Qty: 60 CAPSULE | Refills: 5 | Status: SHIPPED | OUTPATIENT
Start: 2025-01-03

## 2025-01-03 RX ORDER — HYDROCODONE BITARTRATE AND ACETAMINOPHEN 10; 325 MG/1; MG/1
1 TABLET ORAL EVERY 6 HOURS PRN
Qty: 60 TABLET | Refills: 0 | Status: SHIPPED | OUTPATIENT
Start: 2025-01-03 | End: 2025-02-02

## 2025-01-03 ASSESSMENT — ENCOUNTER SYMPTOMS
CONSTIPATION: 0
NAUSEA: 1
ABDOMINAL PAIN: 1
SHORTNESS OF BREATH: 0
CHEST TIGHTNESS: 0
DIARRHEA: 0
COUGH: 0
ANAL BLEEDING: 0

## 2025-01-03 NOTE — PATIENT INSTRUCTIONS
Blood pressure  Blood pressure was low today in the office  Cut your ramipril from 10 mg twice a day to 5 mg twice a day  New prescription sent to pharmacy  Continue on your same dose of metoprolol    Pain  Hydrocodone 10 mg every 6 hours as needed  Start by taking 1/2 tablet (5 mg) every 6 hours and may increase for pain control    Constipation  Instructions listed below    Nausea  Zofran (ondansetron) as needed for nausea  Continue with omeprazole    Possible colitis  Augmentin twice a day for 10 days                    How to improve constipation:  Increase water intake.  Try to drink 6-8 glasses of water a day.  Increase fiber intake with fruits and vegetables.  May also try an over the counter fiber supplement such as benefiber, fibersure, metamucil, citrucil, or fibercon.  Try stool softeners and laxatives:  Plan A:  Senna: 2 tablets twice a day PLUS  Colace 100mg twice a day        Plan B:  Stop Senna  Start miralax 17 g (1 capful) once a day PLUS  Colace 100mg twice a day        Plan C:  If no bowel movement in 4-5 days with the above may try either fleets enema or 1 bottle of magnesium citrate.    If still no relief, contact physician.

## 2025-01-03 NOTE — PROGRESS NOTES
[facility]  [address]  [address]  [place] [address]  Dept: [phone]  Dept Fax: [phone]  Loc: [phone]    Nick Jain is a 74 y.o. male who presents today for his medical conditions/complaints as noted below.  Nick Jain is here for Follow-Up from Hospital and Arm Pain         Patient History:      Coronary artery disease.  - Cardiology at [facility]: Dr. Reddy  - Status post CABG March 25, 2024.     Pulmonary nodules:  -March 2024: CT chest without right upper lobe: 4 mm nodule.     COPD  -[facility] pulmonology        pthx           Subjective:   CC:  Here today to discuss the following:    January 1, 2025  -[facility] emergency department for abdominal pain  - -Right lower quadrant abdominal pain present for about 6 weeks.  - - -No other associated symptoms such as nausea vomiting diarrhea hematuria fever.  CT abdomen and pelvis:  Wall thickening of the colon at the level of the ileocecal valve suggesting malignancy.  An infectious etiology is felt to be less likely.  Abnormal soft tissue extending from this finding into the adjacent more medial mesenteric soft tissues   representing a component of the mass-like density.  Multiple mesenteric lymph nodes concerning for metastatic disease.     7 mm nodular density abutting the left lateral coronal fascia.  Recommend follow-up to exclude a metastasis.     Borderline enlarged nonspecific para-aortic lymph node.     8 mm left hepatic lobe density, too small to characterize.  Recommend follow-up to exclude a metastasis.     Small hiatal hernia.     Diverticulosis.      Labs:  Lipase within normal limits  Hemoglobin 12.5  Platelets normal  GFR 52  GFR 52  Alkaline phosphatase 222  ALT 13  AST 16     Patient referred to the following:  Dr. Cano: Gastroenterology  Dr. Rios: Oncology  Dr. Jenkins: General surgery   - He was discharged with hydrocodone for pain and Colace    Today, he is

## 2025-01-08 ENCOUNTER — TELEPHONE (OUTPATIENT)
Dept: PRIMARY CARE CLINIC | Age: 75
End: 2025-01-08

## 2025-01-08 DIAGNOSIS — R97.8 OTHER ABNORMAL TUMOR MARKERS: ICD-10-CM

## 2025-01-08 DIAGNOSIS — K63.89 MASS OF CECUM: Primary | ICD-10-CM

## 2025-01-08 NOTE — TELEPHONE ENCOUNTER
Pts wife called today asking about referrals. Pt has appt with Oncology tomorrow but has not seen Gastro yet or had a biopsy so was confused about Oncology appt. Which appt should be first? Should they go to Oncology appt tomorrow?    Also I did put a call into Gastro but Dr Cano has not reviewed records yet and has been out of the office the last few days.

## 2025-01-08 NOTE — PROGRESS NOTES
Colonoscopy at [facility] by Dr. Junior Cano on 10/22/41338:  Findings:   - The mucosa appeared normal throughout the entire examined colon   - In the the cecum, a 5 mm sessile polyp was removed completely with cold snare polypectomy.  - In the the ascending colon, a 5 mm sessile polyp was removed completely with cold snare polypectomy.  - In the the sigmoid, a 7 mm sessile polyp was removed completely with cold snare polypectomy.  - Retroflexion in the rectum was normal and revealed no further abnormalities   FINAL DIAGNOSIS:   A.  Colon, cecal polypectomy: Tubular adenoma, negative for evidence of high-grade dysplasia.     B.  Colon, ascending colonic polypectomy: Polypoid fragment of benign colonic mucosa with mild epithelial hyperplasia and mucosal reparative change.     C. Colon, polypectomy at 40 cm: Polypoid fragments of vegetable material, no mucosal elements available for evaluation.  CBG/CBG             Prostate cancer screening     PSA = 0.84 on 1/17/2024 by PCP Dr. Saturnino Gipson I, Bambi Bartlett MA, am scribing for Gera Quintero MD. Electronically signed by Bambi Bartlett on 1/9/2025 at 3:26 PM       Nick was seen today for new patient.    Diagnoses and all orders for this visit:    Mass of cecum  -     CBC with Auto Differential; Future  -     Comprehensive Metabolic Panel; Future  -     CEA; Future  -     Cancer Antigen 19-9; Future  -     MRI ABDOMEN W WO CONTRAST MRCP; Future  -     CT CHEST W CONTRAST; Future  -     Carol Anderson, , General Surgery, [place]  -     Cancer Antigen 19-9  -     CEA  -     Lactate Dehydrogenase; Future    Pulmonary nodule  -     CT CHEST W CONTRAST; Future  -     Carol Anderson, , General Surgery, [place]    Liver nodule  -     MRI ABDOMEN W WO CONTRAST MRCP; Future  -     CT CHEST W CONTRAST; Future  -     Carol Anderson, , General Surgery, [place]    Elevated carcinoembryonic antigen (CEA)  -     CEA; Future  -     Lactate

## 2025-01-09 ENCOUNTER — HOSPITAL ENCOUNTER (OUTPATIENT)
Dept: INFUSION THERAPY | Age: 75
Discharge: HOME | End: 2025-01-09

## 2025-01-09 ENCOUNTER — HOSPITAL ENCOUNTER (OUTPATIENT)
Dept: CT IMAGING | Age: 75
Discharge: HOME | End: 2025-01-09
Payer: MEDICARE

## 2025-01-09 ENCOUNTER — OFFICE VISIT (OUTPATIENT)
Dept: HEMATOLOGY | Age: 75
End: 2025-01-09
Payer: MEDICARE

## 2025-01-09 VITALS
SYSTOLIC BLOOD PRESSURE: 120 MMHG | BODY MASS INDEX: 21.29 KG/M2 | DIASTOLIC BLOOD PRESSURE: 70 MMHG | TEMPERATURE: 97.2 F | HEART RATE: 72 BPM | OXYGEN SATURATION: 96 % | HEIGHT: 70 IN | WEIGHT: 148.7 LBS

## 2025-01-09 DIAGNOSIS — K76.89 LIVER NODULE: ICD-10-CM

## 2025-01-09 DIAGNOSIS — R91.1 PULMONARY NODULE: ICD-10-CM

## 2025-01-09 DIAGNOSIS — K63.89 MASS OF CECUM: Primary | ICD-10-CM

## 2025-01-09 DIAGNOSIS — K63.89 MASS OF CECUM: ICD-10-CM

## 2025-01-09 DIAGNOSIS — L04.9 LYMPH NODE ABSCESS: ICD-10-CM

## 2025-01-09 DIAGNOSIS — R97.8 OTHER ABNORMAL TUMOR MARKERS: ICD-10-CM

## 2025-01-09 DIAGNOSIS — R97.0 ELEVATED CARCINOEMBRYONIC ANTIGEN (CEA): ICD-10-CM

## 2025-01-09 LAB
ALBUMIN SERPL-MCNC: 3.7 G/DL (ref 3.5–5.2)
ALP SERPL-CCNC: 212 U/L (ref 40–129)
ALT SERPL-CCNC: 18 U/L (ref 5–41)
ANION GAP SERPL CALCULATED.3IONS-SCNC: 10 MMOL/L (ref 7–19)
AST SERPL-CCNC: 24 U/L (ref 5–40)
BASOPHILS # BLD: 0.03 K/UL (ref 0.01–0.08)
BASOPHILS NFR BLD: 0.4 % (ref 0.1–1.2)
BILIRUB SERPL-MCNC: 0.3 MG/DL (ref 0–1.2)
BUN SERPL-MCNC: 15 MG/DL (ref 8–23)
CALCIUM SERPL-MCNC: 9.2 MG/DL (ref 8.8–10.2)
CANCER AG19-9 SERPL-ACNC: 1606 U/ML (ref 0–35)
CEA SERPL-MCNC: 10.3 NG/ML (ref 0–4.7)
CHLORIDE SERPL-SCNC: 99 MMOL/L (ref 98–107)
CO2 SERPL-SCNC: 26 MMOL/L (ref 22–29)
CREAT SERPL-MCNC: 1.3 MG/DL (ref 0.7–1.2)
EOSINOPHIL # BLD: 0.29 K/UL (ref 0.04–0.54)
EOSINOPHIL NFR BLD: 3.4 % (ref 0.7–7)
ERYTHROCYTE [DISTWIDTH] IN BLOOD BY AUTOMATED COUNT: 13.3 % (ref 11.6–14.4)
GLUCOSE SERPL-MCNC: 113 MG/DL (ref 70–99)
HCT VFR BLD AUTO: 39.1 % (ref 40.1–51)
HGB BLD-MCNC: 12.7 G/DL (ref 13.7–17.5)
LDH SERPL-CCNC: 227 U/L (ref 91–215)
LYMPHOCYTES # BLD: 1.18 K/UL (ref 1.18–3.74)
LYMPHOCYTES NFR BLD: 14 % (ref 19.3–53.1)
MCH RBC QN AUTO: 28.1 PG (ref 25.7–32.2)
MCHC RBC AUTO-ENTMCNC: 32.5 G/DL (ref 32.3–36.5)
MCV RBC AUTO: 86.5 FL (ref 79–92.2)
MONOCYTES # BLD: 0.58 K/UL (ref 0.24–0.82)
MONOCYTES NFR BLD: 6.9 % (ref 4.7–12.5)
NEUTROPHILS # BLD: 6.32 K/UL (ref 1.56–6.13)
NEUTS SEG NFR BLD: 75.1 % (ref 34–71.1)
PLATELET # BLD AUTO: 286 K/UL (ref 163–337)
PMV BLD AUTO: 9.7 FL (ref 7.4–10.4)
POTASSIUM SERPL-SCNC: 4.6 MMOL/L (ref 3.5–5.1)
PROT SERPL-MCNC: 7.7 G/DL (ref 6.4–8.3)
RBC # BLD AUTO: 4.52 M/UL (ref 4.63–6.08)
SODIUM SERPL-SCNC: 135 MMOL/L (ref 136–145)
WBC # BLD AUTO: 8.42 K/UL (ref 4.23–9.07)

## 2025-01-09 PROCEDURE — 36415 COLL VENOUS BLD VENIPUNCTURE: CPT

## 2025-01-09 PROCEDURE — 36415 COLL VENOUS BLD VENIPUNCTURE: CPT | Performed by: INTERNAL MEDICINE

## 2025-01-09 PROCEDURE — 85025 COMPLETE CBC W/AUTO DIFF WBC: CPT

## 2025-01-09 PROCEDURE — 80053 COMPREHEN METABOLIC PANEL: CPT

## 2025-01-09 PROCEDURE — 6360000004 HC RX CONTRAST MEDICATION: Performed by: INTERNAL MEDICINE

## 2025-01-09 PROCEDURE — 71260 CT THORAX DX C+: CPT

## 2025-01-09 RX ORDER — CLOPIDOGREL BISULFATE 75 MG/1
75 TABLET ORAL DAILY
Status: ON HOLD | COMMUNITY

## 2025-01-09 RX ORDER — HYDROCODONE BITARTRATE AND ACETAMINOPHEN 5; 325 MG/1; MG/1
1 TABLET ORAL EVERY 6 HOURS PRN
Status: ON HOLD | COMMUNITY

## 2025-01-09 RX ORDER — IOPAMIDOL 755 MG/ML
60 INJECTION, SOLUTION INTRAVASCULAR
Status: COMPLETED | OUTPATIENT
Start: 2025-01-09 | End: 2025-01-09

## 2025-01-09 RX ADMIN — IOPAMIDOL 60 ML: 755 INJECTION, SOLUTION INTRAVENOUS at 14:26

## 2025-01-09 NOTE — TELEPHONE ENCOUNTER
Talked with pt again late yesterday before leaving and Belkis from oncology had called back and did encourage them to keep appt.   Wife said they will be at the appt today.

## 2025-01-10 ENCOUNTER — HOSPITAL ENCOUNTER (INPATIENT)
Age: 75
LOS: 16 days | Discharge: HOME | DRG: 329 | End: 2025-01-26
Attending: EMERGENCY MEDICINE
Payer: MEDICARE

## 2025-01-10 ENCOUNTER — APPOINTMENT (OUTPATIENT)
Dept: GENERAL RADIOLOGY | Age: 75
DRG: 329 | End: 2025-01-10
Payer: MEDICARE

## 2025-01-10 DIAGNOSIS — I50.21 ACUTE SYSTOLIC CONGESTIVE HEART FAILURE (HCC): ICD-10-CM

## 2025-01-10 DIAGNOSIS — I50.21 ACUTE SYSTOLIC HEART FAILURE (HCC): ICD-10-CM

## 2025-01-10 DIAGNOSIS — R10.30 LOWER ABDOMINAL PAIN: ICD-10-CM

## 2025-01-10 DIAGNOSIS — R06.02 SHORTNESS OF BREATH: ICD-10-CM

## 2025-01-10 DIAGNOSIS — K63.89 CECUM MASS: ICD-10-CM

## 2025-01-10 DIAGNOSIS — R11.2 NAUSEA AND VOMITING, UNSPECIFIED VOMITING TYPE: Primary | ICD-10-CM

## 2025-01-10 DIAGNOSIS — K56.609 INTESTINAL OBSTRUCTION, UNSPECIFIED CAUSE, UNSPECIFIED WHETHER PARTIAL OR COMPLETE (HCC): ICD-10-CM

## 2025-01-10 DIAGNOSIS — K56.609 SMALL BOWEL OBSTRUCTION (HCC): ICD-10-CM

## 2025-01-10 LAB
ALBUMIN SERPL-MCNC: 3.6 G/DL (ref 3.5–5.2)
ALP SERPL-CCNC: 184 U/L (ref 40–129)
ALT SERPL-CCNC: 13 U/L (ref 5–41)
ANION GAP SERPL CALCULATED.3IONS-SCNC: 13 MMOL/L (ref 7–19)
AST SERPL-CCNC: 15 U/L (ref 5–40)
BASOPHILS # BLD: 0 K/UL (ref 0–0.2)
BASOPHILS NFR BLD: 0.4 % (ref 0–1)
BILIRUB SERPL-MCNC: 0.4 MG/DL (ref 0.2–1.2)
BUN SERPL-MCNC: 17 MG/DL (ref 8–23)
CALCIUM SERPL-MCNC: 8.7 MG/DL (ref 8.8–10.2)
CHLORIDE SERPL-SCNC: 98 MMOL/L (ref 98–111)
CO2 SERPL-SCNC: 23 MMOL/L (ref 22–29)
CREAT SERPL-MCNC: 1.3 MG/DL (ref 0.7–1.2)
EOSINOPHIL # BLD: 0.1 K/UL (ref 0–0.6)
EOSINOPHIL NFR BLD: 0.7 % (ref 0–5)
ERYTHROCYTE [DISTWIDTH] IN BLOOD BY AUTOMATED COUNT: 13.2 % (ref 11.5–14.5)
GLUCOSE SERPL-MCNC: 111 MG/DL (ref 70–99)
HCT VFR BLD AUTO: 36.4 % (ref 42–52)
HGB BLD-MCNC: 11.6 G/DL (ref 14–18)
IMM GRANULOCYTES # BLD: 0 K/UL
LACTATE BLDV-SCNC: 1.1 MMOL/L (ref 0.5–1.9)
LYMPHOCYTES # BLD: 1.3 K/UL (ref 1.1–4.5)
LYMPHOCYTES NFR BLD: 13.4 % (ref 20–40)
MCH RBC QN AUTO: 27.4 PG (ref 27–31)
MCHC RBC AUTO-ENTMCNC: 31.9 G/DL (ref 33–37)
MCV RBC AUTO: 85.8 FL (ref 80–94)
MONOCYTES # BLD: 0.4 K/UL (ref 0–0.9)
MONOCYTES NFR BLD: 4.5 % (ref 0–10)
NEUTROPHILS # BLD: 7.7 K/UL (ref 1.5–7.5)
NEUTS SEG NFR BLD: 80.6 % (ref 50–65)
PLATELET # BLD AUTO: 262 K/UL (ref 130–400)
PMV BLD AUTO: 9.7 FL (ref 9.4–12.4)
POTASSIUM SERPL-SCNC: 4.9 MMOL/L (ref 3.5–5)
PROT SERPL-MCNC: 7 G/DL (ref 6.4–8.3)
RBC # BLD AUTO: 4.24 M/UL (ref 4.7–6.1)
SODIUM SERPL-SCNC: 134 MMOL/L (ref 136–145)
WBC # BLD AUTO: 9.5 K/UL (ref 4.8–10.8)

## 2025-01-10 PROCEDURE — 99222 1ST HOSP IP/OBS MODERATE 55: CPT | Performed by: INTERNAL MEDICINE

## 2025-01-10 PROCEDURE — 96374 THER/PROPH/DIAG INJ IV PUSH: CPT

## 2025-01-10 PROCEDURE — 1200000000 HC SEMI PRIVATE

## 2025-01-10 PROCEDURE — 2580000003 HC RX 258: Performed by: NURSE PRACTITIONER

## 2025-01-10 PROCEDURE — 36415 COLL VENOUS BLD VENIPUNCTURE: CPT

## 2025-01-10 PROCEDURE — 6360000002 HC RX W HCPCS: Performed by: NURSE PRACTITIONER

## 2025-01-10 PROCEDURE — 80053 COMPREHEN METABOLIC PANEL: CPT

## 2025-01-10 PROCEDURE — 74018 RADEX ABDOMEN 1 VIEW: CPT

## 2025-01-10 PROCEDURE — 85025 COMPLETE CBC W/AUTO DIFF WBC: CPT

## 2025-01-10 PROCEDURE — 2580000003 HC RX 258: Performed by: EMERGENCY MEDICINE

## 2025-01-10 PROCEDURE — 87040 BLOOD CULTURE FOR BACTERIA: CPT

## 2025-01-10 PROCEDURE — 83605 ASSAY OF LACTIC ACID: CPT

## 2025-01-10 PROCEDURE — 96375 TX/PRO/DX INJ NEW DRUG ADDON: CPT

## 2025-01-10 PROCEDURE — 99285 EMERGENCY DEPT VISIT HI MDM: CPT

## 2025-01-10 PROCEDURE — 6360000002 HC RX W HCPCS: Performed by: EMERGENCY MEDICINE

## 2025-01-10 RX ORDER — ONDANSETRON 2 MG/ML
4 INJECTION INTRAMUSCULAR; INTRAVENOUS EVERY 6 HOURS PRN
Status: DISCONTINUED | OUTPATIENT
Start: 2025-01-10 | End: 2025-01-11

## 2025-01-10 RX ORDER — SODIUM CHLORIDE 9 MG/ML
INJECTION, SOLUTION INTRAVENOUS PRN
Status: DISCONTINUED | OUTPATIENT
Start: 2025-01-10 | End: 2025-01-11

## 2025-01-10 RX ORDER — LABETALOL HYDROCHLORIDE 5 MG/ML
20 INJECTION, SOLUTION INTRAVENOUS EVERY 4 HOURS PRN
Status: DISCONTINUED | OUTPATIENT
Start: 2025-01-10 | End: 2025-01-11

## 2025-01-10 RX ORDER — HYDROCODONE BITARTRATE AND ACETAMINOPHEN 5; 325 MG/1; MG/1
1 TABLET ORAL EVERY 6 HOURS PRN
Status: DISCONTINUED | OUTPATIENT
Start: 2025-01-10 | End: 2025-01-11

## 2025-01-10 RX ORDER — POTASSIUM CHLORIDE 1500 MG/1
40 TABLET, EXTENDED RELEASE ORAL PRN
Status: DISCONTINUED | OUTPATIENT
Start: 2025-01-10 | End: 2025-01-11

## 2025-01-10 RX ORDER — ONDANSETRON 2 MG/ML
4 INJECTION INTRAMUSCULAR; INTRAVENOUS ONCE
Status: COMPLETED | OUTPATIENT
Start: 2025-01-10 | End: 2025-01-10

## 2025-01-10 RX ORDER — ENOXAPARIN SODIUM 100 MG/ML
40 INJECTION SUBCUTANEOUS DAILY
Status: DISCONTINUED | OUTPATIENT
Start: 2025-01-10 | End: 2025-01-11

## 2025-01-10 RX ORDER — 0.9 % SODIUM CHLORIDE 0.9 %
1000 INTRAVENOUS SOLUTION INTRAVENOUS ONCE
Status: COMPLETED | OUTPATIENT
Start: 2025-01-10 | End: 2025-01-10

## 2025-01-10 RX ORDER — CLOPIDOGREL BISULFATE 75 MG/1
75 TABLET ORAL DAILY
Status: DISCONTINUED | OUTPATIENT
Start: 2025-01-10 | End: 2025-01-20

## 2025-01-10 RX ORDER — ACETAMINOPHEN 650 MG/1
650 SUPPOSITORY RECTAL EVERY 6 HOURS PRN
Status: DISCONTINUED | OUTPATIENT
Start: 2025-01-10 | End: 2025-01-11

## 2025-01-10 RX ORDER — HYDROMORPHONE HYDROCHLORIDE 1 MG/ML
0.25 INJECTION, SOLUTION INTRAMUSCULAR; INTRAVENOUS; SUBCUTANEOUS EVERY 4 HOURS PRN
Status: DISCONTINUED | OUTPATIENT
Start: 2025-01-10 | End: 2025-01-11

## 2025-01-10 RX ORDER — POTASSIUM CHLORIDE 7.45 MG/ML
10 INJECTION INTRAVENOUS PRN
Status: DISCONTINUED | OUTPATIENT
Start: 2025-01-10 | End: 2025-01-11

## 2025-01-10 RX ORDER — MORPHINE SULFATE 4 MG/ML
4 INJECTION, SOLUTION INTRAMUSCULAR; INTRAVENOUS ONCE
Status: COMPLETED | OUTPATIENT
Start: 2025-01-10 | End: 2025-01-10

## 2025-01-10 RX ORDER — SODIUM CHLORIDE 0.9 % (FLUSH) 0.9 %
5-40 SYRINGE (ML) INJECTION EVERY 12 HOURS SCHEDULED
Status: DISCONTINUED | OUTPATIENT
Start: 2025-01-10 | End: 2025-01-26 | Stop reason: HOSPADM

## 2025-01-10 RX ORDER — SODIUM CHLORIDE 9 MG/ML
INJECTION, SOLUTION INTRAVENOUS CONTINUOUS
Status: DISCONTINUED | OUTPATIENT
Start: 2025-01-10 | End: 2025-01-11

## 2025-01-10 RX ORDER — ONDANSETRON 4 MG/1
4 TABLET, ORALLY DISINTEGRATING ORAL EVERY 8 HOURS PRN
Status: DISCONTINUED | OUTPATIENT
Start: 2025-01-10 | End: 2025-01-11

## 2025-01-10 RX ORDER — SODIUM CHLORIDE 0.9 % (FLUSH) 0.9 %
5-40 SYRINGE (ML) INJECTION PRN
Status: DISCONTINUED | OUTPATIENT
Start: 2025-01-10 | End: 2025-01-11

## 2025-01-10 RX ORDER — ATORVASTATIN CALCIUM 80 MG/1
80 TABLET, FILM COATED ORAL DAILY
Status: DISCONTINUED | OUTPATIENT
Start: 2025-01-10 | End: 2025-01-11

## 2025-01-10 RX ORDER — POLYETHYLENE GLYCOL 3350 17 G/17G
17 POWDER, FOR SOLUTION ORAL DAILY PRN
Status: DISCONTINUED | OUTPATIENT
Start: 2025-01-10 | End: 2025-01-11

## 2025-01-10 RX ORDER — MAGNESIUM SULFATE IN WATER 40 MG/ML
2000 INJECTION, SOLUTION INTRAVENOUS PRN
Status: DISCONTINUED | OUTPATIENT
Start: 2025-01-10 | End: 2025-01-11

## 2025-01-10 RX ORDER — METOPROLOL TARTRATE 25 MG/1
25 TABLET, FILM COATED ORAL 2 TIMES DAILY
Status: DISCONTINUED | OUTPATIENT
Start: 2025-01-10 | End: 2025-01-11

## 2025-01-10 RX ORDER — ACETAMINOPHEN 325 MG/1
650 TABLET ORAL EVERY 6 HOURS PRN
Status: DISCONTINUED | OUTPATIENT
Start: 2025-01-10 | End: 2025-01-11

## 2025-01-10 RX ADMIN — SODIUM CHLORIDE 1000 ML: 9 INJECTION, SOLUTION INTRAVENOUS at 16:09

## 2025-01-10 RX ADMIN — ONDANSETRON 4 MG: 2 INJECTION INTRAMUSCULAR; INTRAVENOUS at 17:23

## 2025-01-10 RX ADMIN — ENOXAPARIN SODIUM 40 MG: 40 INJECTION SUBCUTANEOUS at 22:16

## 2025-01-10 RX ADMIN — HYDROMORPHONE HYDROCHLORIDE 0.25 MG: 1 INJECTION, SOLUTION INTRAMUSCULAR; INTRAVENOUS; SUBCUTANEOUS at 19:21

## 2025-01-10 RX ADMIN — ONDANSETRON 4 MG: 2 INJECTION INTRAMUSCULAR; INTRAVENOUS at 20:37

## 2025-01-10 RX ADMIN — HYDROMORPHONE HYDROCHLORIDE 0.25 MG: 1 INJECTION, SOLUTION INTRAMUSCULAR; INTRAVENOUS; SUBCUTANEOUS at 23:30

## 2025-01-10 RX ADMIN — MORPHINE SULFATE 4 MG: 4 INJECTION, SOLUTION INTRAMUSCULAR; INTRAVENOUS at 17:23

## 2025-01-10 RX ADMIN — SODIUM CHLORIDE: 9 INJECTION, SOLUTION INTRAVENOUS at 22:17

## 2025-01-10 ASSESSMENT — PAIN SCALES - GENERAL
PAINLEVEL_OUTOF10: 9
PAINLEVEL_OUTOF10: 7
PAINLEVEL_OUTOF10: 4
PAINLEVEL_OUTOF10: 6

## 2025-01-10 ASSESSMENT — PAIN DESCRIPTION - DESCRIPTORS
DESCRIPTORS: ACHING

## 2025-01-10 ASSESSMENT — PAIN DESCRIPTION - LOCATION
LOCATION: ABDOMEN
LOCATION: OTHER (COMMENT)

## 2025-01-10 ASSESSMENT — PAIN DESCRIPTION - ORIENTATION
ORIENTATION: LOWER
ORIENTATION: RIGHT

## 2025-01-10 NOTE — ED NOTES
Called  @ [phone] @ 1548 for a copy of most recent CT scan to be sent to us electronically.  Spoke with Michelle and she sent the file.

## 2025-01-10 NOTE — ED PROVIDER NOTES
components:    Glucose 135 (*)     BUN 7 (*)     Calcium 8.4 (*)     Total Protein 5.8 (*)     Albumin 2.7 (*)     Alkaline Phosphatase 134 (*)     All other components within normal limits   CBC WITH AUTO DIFFERENTIAL - Abnormal; Notable for the following components:    WBC 13.9 (*)     RBC 3.31 (*)     Hemoglobin 9.3 (*)     Hematocrit 29.0 (*)     MCHC 32.1 (*)     Neutrophils % 81.0 (*)     Lymphocytes % 8.9 (*)     Neutrophils Absolute 11.3 (*)     Monocytes Absolute 1.20 (*)     All other components within normal limits   COMPREHENSIVE METABOLIC PANEL W/ REFLEX TO MG FOR LOW K - Abnormal; Notable for the following components:    Glucose 160 (*)     BUN 7 (*)     Calcium 8.3 (*)     Total Protein 5.8 (*)     Albumin 2.7 (*)     Alkaline Phosphatase 142 (*)     AST 56 (*)     All other components within normal limits   POCT GLUCOSE - Abnormal; Notable for the following components:    POC Glucose 169 (*)     All other components within normal limits   CULTURE, BLOOD 1    Narrative:     ORDER#: [id number]                          ORDERED BY: JENNA INIGUEZ  SOURCE: Blood LAC                          COLLECTED:  01/10/25 15:46  ANTIBIOTICS AT VLAD.:                      RECEIVED :  01/10/25 16:14   CULTURE, BLOOD 2    Narrative:     ORDER#: [id number]                          ORDERED BY: JENNA INIGUEZ  SOURCE: Blood R WRIST                      COLLECTED:  01/10/25 16:07  ANTIBIOTICS AT VLAD.:                      RECEIVED :  01/10/25 16:14   LACTIC ACID   MAGNESIUM   PHOSPHORUS   LACTIC ACID   RETICULOCYTES   FERRITIN   SURGICAL PATHOLOGY    Narrative:                                          [place] [address]  Department of Pathology  FINAL SURGICAL PATHOLOGY REPORT  Patient Name:  SHERRY JOHNSON             Accession No:  [id number]   Age Sex:   1950    74 Y M        Pt Type: JAMES SPRING

## 2025-01-11 ENCOUNTER — ANESTHESIA EVENT (OUTPATIENT)
Dept: OPERATING ROOM | Age: 75
End: 2025-01-11
Payer: MEDICARE

## 2025-01-11 ENCOUNTER — ANESTHESIA (OUTPATIENT)
Dept: OPERATING ROOM | Age: 75
End: 2025-01-11
Payer: MEDICARE

## 2025-01-11 PROBLEM — K56.609 SMALL BOWEL OBSTRUCTION (HCC): Status: ACTIVE | Noted: 2025-01-11

## 2025-01-11 LAB
ALBUMIN SERPL-MCNC: 3.5 G/DL (ref 3.5–5.2)
ALP SERPL-CCNC: 175 U/L (ref 40–129)
ALT SERPL-CCNC: 10 U/L (ref 5–41)
ANION GAP SERPL CALCULATED.3IONS-SCNC: 15 MMOL/L (ref 7–19)
AST SERPL-CCNC: 14 U/L (ref 5–40)
BASOPHILS # BLD: 0 K/UL (ref 0–0.2)
BASOPHILS NFR BLD: 0.4 % (ref 0–1)
BILIRUB SERPL-MCNC: 0.4 MG/DL (ref 0.2–1.2)
BUN SERPL-MCNC: 18 MG/DL (ref 8–23)
CALCIUM SERPL-MCNC: 8.8 MG/DL (ref 8.8–10.2)
CHLORIDE SERPL-SCNC: 102 MMOL/L (ref 98–111)
CO2 SERPL-SCNC: 20 MMOL/L (ref 22–29)
CREAT SERPL-MCNC: 1.3 MG/DL (ref 0.7–1.2)
EOSINOPHIL # BLD: 0.1 K/UL (ref 0–0.6)
EOSINOPHIL NFR BLD: 0.7 % (ref 0–5)
ERYTHROCYTE [DISTWIDTH] IN BLOOD BY AUTOMATED COUNT: 13.2 % (ref 11.5–14.5)
GLUCOSE SERPL-MCNC: 106 MG/DL (ref 70–99)
HCT VFR BLD AUTO: 37.9 % (ref 42–52)
HGB BLD-MCNC: 12 G/DL (ref 14–18)
IMM GRANULOCYTES # BLD: 0 K/UL
LACTATE BLDV-SCNC: 1.5 MMOL/L (ref 0.5–1.9)
LYMPHOCYTES # BLD: 1 K/UL (ref 1.1–4.5)
LYMPHOCYTES NFR BLD: 10 % (ref 20–40)
MAGNESIUM SERPL-MCNC: 2.1 MG/DL (ref 1.6–2.4)
MCH RBC QN AUTO: 27.6 PG (ref 27–31)
MCHC RBC AUTO-ENTMCNC: 31.7 G/DL (ref 33–37)
MCV RBC AUTO: 87.1 FL (ref 80–94)
MONOCYTES # BLD: 0.5 K/UL (ref 0–0.9)
MONOCYTES NFR BLD: 4.9 % (ref 0–10)
NEUTROPHILS # BLD: 8.4 K/UL (ref 1.5–7.5)
NEUTS SEG NFR BLD: 83.7 % (ref 50–65)
PHOSPHATE SERPL-MCNC: 2.9 MG/DL (ref 2.5–4.5)
PLATELET # BLD AUTO: 263 K/UL (ref 130–400)
PMV BLD AUTO: 9.6 FL (ref 9.4–12.4)
POTASSIUM SERPL-SCNC: 5.1 MMOL/L (ref 3.5–5)
POTASSIUM SERPL-SCNC: 5.5 MMOL/L (ref 3.5–5)
PROT SERPL-MCNC: 6.5 G/DL (ref 6.4–8.3)
RBC # BLD AUTO: 4.35 M/UL (ref 4.7–6.1)
SODIUM SERPL-SCNC: 137 MMOL/L (ref 136–145)
WBC # BLD AUTO: 10 K/UL (ref 4.8–10.8)

## 2025-01-11 PROCEDURE — 3700000000 HC ANESTHESIA ATTENDED CARE: Performed by: SURGERY

## 2025-01-11 PROCEDURE — 7100000001 HC PACU RECOVERY - ADDTL 15 MIN: Performed by: SURGERY

## 2025-01-11 PROCEDURE — 2580000003 HC RX 258: Performed by: SURGERY

## 2025-01-11 PROCEDURE — 6370000000 HC RX 637 (ALT 250 FOR IP): Performed by: NURSE PRACTITIONER

## 2025-01-11 PROCEDURE — 3700000001 HC ADD 15 MINUTES (ANESTHESIA): Performed by: SURGERY

## 2025-01-11 PROCEDURE — 6360000002 HC RX W HCPCS: Performed by: SURGERY

## 2025-01-11 PROCEDURE — 85025 COMPLETE CBC W/AUTO DIFF WBC: CPT

## 2025-01-11 PROCEDURE — 6370000000 HC RX 637 (ALT 250 FOR IP): Performed by: HOSPITALIST

## 2025-01-11 PROCEDURE — 88360 TUMOR IMMUNOHISTOCHEM/MANUAL: CPT

## 2025-01-11 PROCEDURE — 84100 ASSAY OF PHOSPHORUS: CPT

## 2025-01-11 PROCEDURE — 1200000000 HC SEMI PRIVATE

## 2025-01-11 PROCEDURE — 0DTF0ZZ RESECTION OF RIGHT LARGE INTESTINE, OPEN APPROACH: ICD-10-PCS | Performed by: SURGERY

## 2025-01-11 PROCEDURE — 94760 N-INVAS EAR/PLS OXIMETRY 1: CPT

## 2025-01-11 PROCEDURE — 2500000003 HC RX 250 WO HCPCS

## 2025-01-11 PROCEDURE — 3600000004 HC SURGERY LEVEL 4 BASE: Performed by: SURGERY

## 2025-01-11 PROCEDURE — 80053 COMPREHEN METABOLIC PANEL: CPT

## 2025-01-11 PROCEDURE — 2720000010 HC SURG SUPPLY STERILE: Performed by: SURGERY

## 2025-01-11 PROCEDURE — 51798 US URINE CAPACITY MEASURE: CPT

## 2025-01-11 PROCEDURE — 6360000002 HC RX W HCPCS: Performed by: ANESTHESIOLOGY

## 2025-01-11 PROCEDURE — 94640 AIRWAY INHALATION TREATMENT: CPT

## 2025-01-11 PROCEDURE — 84132 ASSAY OF SERUM POTASSIUM: CPT

## 2025-01-11 PROCEDURE — 2500000003 HC RX 250 WO HCPCS: Performed by: ANESTHESIOLOGY

## 2025-01-11 PROCEDURE — 6360000002 HC RX W HCPCS: Performed by: NURSE PRACTITIONER

## 2025-01-11 PROCEDURE — 36415 COLL VENOUS BLD VENIPUNCTURE: CPT

## 2025-01-11 PROCEDURE — 99232 SBSQ HOSP IP/OBS MODERATE 35: CPT | Performed by: INTERNAL MEDICINE

## 2025-01-11 PROCEDURE — 83605 ASSAY OF LACTIC ACID: CPT

## 2025-01-11 PROCEDURE — 2709999900 HC NON-CHARGEABLE SUPPLY: Performed by: SURGERY

## 2025-01-11 PROCEDURE — 6360000002 HC RX W HCPCS

## 2025-01-11 PROCEDURE — 88309 TISSUE EXAM BY PATHOLOGIST: CPT

## 2025-01-11 PROCEDURE — 83735 ASSAY OF MAGNESIUM: CPT

## 2025-01-11 PROCEDURE — 93005 ELECTROCARDIOGRAM TRACING: CPT | Performed by: HOSPITALIST

## 2025-01-11 PROCEDURE — 2580000003 HC RX 258: Performed by: ANESTHESIOLOGY

## 2025-01-11 PROCEDURE — 7100000000 HC PACU RECOVERY - FIRST 15 MIN: Performed by: SURGERY

## 2025-01-11 PROCEDURE — 6360000002 HC RX W HCPCS: Performed by: HOSPITALIST

## 2025-01-11 PROCEDURE — 3600000014 HC SURGERY LEVEL 4 ADDTL 15MIN: Performed by: SURGERY

## 2025-01-11 RX ORDER — IPRATROPIUM BROMIDE AND ALBUTEROL SULFATE 2.5; .5 MG/3ML; MG/3ML
1 SOLUTION RESPIRATORY (INHALATION)
Status: DISCONTINUED | OUTPATIENT
Start: 2025-01-11 | End: 2025-01-11 | Stop reason: HOSPADM

## 2025-01-11 RX ORDER — SODIUM CHLORIDE, SODIUM LACTATE, POTASSIUM CHLORIDE, CALCIUM CHLORIDE 600; 310; 30; 20 MG/100ML; MG/100ML; MG/100ML; MG/100ML
INJECTION, SOLUTION INTRAVENOUS CONTINUOUS
Status: DISCONTINUED | OUTPATIENT
Start: 2025-01-11 | End: 2025-01-11 | Stop reason: HOSPADM

## 2025-01-11 RX ORDER — DIPHENHYDRAMINE HYDROCHLORIDE 50 MG/ML
12.5 INJECTION INTRAMUSCULAR; INTRAVENOUS NIGHTLY PRN
Status: DISCONTINUED | OUTPATIENT
Start: 2025-01-11 | End: 2025-01-11 | Stop reason: HOSPADM

## 2025-01-11 RX ORDER — ONDANSETRON 2 MG/ML
4 INJECTION INTRAMUSCULAR; INTRAVENOUS EVERY 6 HOURS PRN
Status: DISCONTINUED | OUTPATIENT
Start: 2025-01-11 | End: 2025-01-11 | Stop reason: HOSPADM

## 2025-01-11 RX ORDER — LABETALOL 20 MG/4 ML (5 MG/ML) INTRAVENOUS SYRINGE
Status: DISCONTINUED | OUTPATIENT
Start: 2025-01-11 | End: 2025-01-11 | Stop reason: SDUPTHER

## 2025-01-11 RX ORDER — SODIUM CHLORIDE 0.9 % (FLUSH) 0.9 %
5-40 SYRINGE (ML) INJECTION EVERY 12 HOURS SCHEDULED
Status: DISCONTINUED | OUTPATIENT
Start: 2025-01-11 | End: 2025-01-11 | Stop reason: HOSPADM

## 2025-01-11 RX ORDER — ATORVASTATIN CALCIUM 40 MG/1
80 TABLET, FILM COATED ORAL DAILY
Status: DISCONTINUED | OUTPATIENT
Start: 2025-01-12 | End: 2025-01-26 | Stop reason: HOSPADM

## 2025-01-11 RX ORDER — SODIUM CHLORIDE 0.9 % (FLUSH) 0.9 %
5-40 SYRINGE (ML) INJECTION PRN
Status: DISCONTINUED | OUTPATIENT
Start: 2025-01-11 | End: 2025-01-11 | Stop reason: HOSPADM

## 2025-01-11 RX ORDER — SODIUM CHLORIDE 9 MG/ML
INJECTION, SOLUTION INTRAVENOUS PRN
Status: DISCONTINUED | OUTPATIENT
Start: 2025-01-11 | End: 2025-01-11

## 2025-01-11 RX ORDER — IPRATROPIUM BROMIDE AND ALBUTEROL SULFATE 2.5; .5 MG/3ML; MG/3ML
1 SOLUTION RESPIRATORY (INHALATION)
Status: DISCONTINUED | OUTPATIENT
Start: 2025-01-11 | End: 2025-01-11

## 2025-01-11 RX ORDER — SODIUM CHLORIDE 0.9 % (FLUSH) 0.9 %
5-40 SYRINGE (ML) INJECTION EVERY 12 HOURS SCHEDULED
Status: DISCONTINUED | OUTPATIENT
Start: 2025-01-11 | End: 2025-01-11

## 2025-01-11 RX ORDER — SODIUM CHLORIDE 9 MG/ML
INJECTION, SOLUTION INTRAVENOUS CONTINUOUS
Status: ACTIVE | OUTPATIENT
Start: 2025-01-11 | End: 2025-01-11

## 2025-01-11 RX ORDER — SODIUM CHLORIDE 9 MG/ML
INJECTION, SOLUTION INTRAVENOUS PRN
Status: DISCONTINUED | OUTPATIENT
Start: 2025-01-11 | End: 2025-01-11 | Stop reason: HOSPADM

## 2025-01-11 RX ORDER — FENTANYL CITRATE 50 UG/ML
INJECTION, SOLUTION INTRAMUSCULAR; INTRAVENOUS
Status: DISCONTINUED | OUTPATIENT
Start: 2025-01-11 | End: 2025-01-11 | Stop reason: SDUPTHER

## 2025-01-11 RX ORDER — HYDROMORPHONE HYDROCHLORIDE 1 MG/ML
0.25 INJECTION, SOLUTION INTRAMUSCULAR; INTRAVENOUS; SUBCUTANEOUS EVERY 4 HOURS PRN
Status: DISCONTINUED | OUTPATIENT
Start: 2025-01-11 | End: 2025-01-11

## 2025-01-11 RX ORDER — HYDROMORPHONE HYDROCHLORIDE 1 MG/ML
0.25 INJECTION, SOLUTION INTRAMUSCULAR; INTRAVENOUS; SUBCUTANEOUS EVERY 5 MIN PRN
Status: DISCONTINUED | OUTPATIENT
Start: 2025-01-11 | End: 2025-01-11

## 2025-01-11 RX ORDER — DEXAMETHASONE SODIUM PHOSPHATE 4 MG/ML
INJECTION, SOLUTION INTRA-ARTICULAR; INTRALESIONAL; INTRAMUSCULAR; INTRAVENOUS; SOFT TISSUE
Status: DISCONTINUED | OUTPATIENT
Start: 2025-01-11 | End: 2025-01-11 | Stop reason: SDUPTHER

## 2025-01-11 RX ORDER — NALOXONE HYDROCHLORIDE 0.4 MG/ML
0.4 INJECTION, SOLUTION INTRAMUSCULAR; INTRAVENOUS; SUBCUTANEOUS PRN
Status: DISCONTINUED | OUTPATIENT
Start: 2025-01-11 | End: 2025-01-11

## 2025-01-11 RX ORDER — LABETALOL HYDROCHLORIDE 5 MG/ML
10 INJECTION, SOLUTION INTRAVENOUS
Status: DISCONTINUED | OUTPATIENT
Start: 2025-01-11 | End: 2025-01-11 | Stop reason: HOSPADM

## 2025-01-11 RX ORDER — ACETAMINOPHEN 325 MG/1
650 TABLET ORAL EVERY 6 HOURS PRN
Status: DISCONTINUED | OUTPATIENT
Start: 2025-01-11 | End: 2025-01-11

## 2025-01-11 RX ORDER — HYDROMORPHONE HYDROCHLORIDE 1 MG/ML
0.5 INJECTION, SOLUTION INTRAMUSCULAR; INTRAVENOUS; SUBCUTANEOUS EVERY 5 MIN PRN
Status: DISCONTINUED | OUTPATIENT
Start: 2025-01-11 | End: 2025-01-11

## 2025-01-11 RX ORDER — PROCHLORPERAZINE EDISYLATE 5 MG/ML
5 INJECTION INTRAMUSCULAR; INTRAVENOUS
Status: DISCONTINUED | OUTPATIENT
Start: 2025-01-11 | End: 2025-01-11

## 2025-01-11 RX ORDER — PROMETHAZINE HYDROCHLORIDE 25 MG/ML
25 INJECTION, SOLUTION INTRAMUSCULAR; INTRAVENOUS EVERY 4 HOURS PRN
Status: DISCONTINUED | OUTPATIENT
Start: 2025-01-11 | End: 2025-01-26

## 2025-01-11 RX ORDER — HYDRALAZINE HYDROCHLORIDE 20 MG/ML
10 INJECTION INTRAMUSCULAR; INTRAVENOUS
Status: DISCONTINUED | OUTPATIENT
Start: 2025-01-11 | End: 2025-01-11 | Stop reason: HOSPADM

## 2025-01-11 RX ORDER — MORPHINE SULFATE 4 MG/ML
4 INJECTION, SOLUTION INTRAMUSCULAR; INTRAVENOUS
Status: DISCONTINUED | OUTPATIENT
Start: 2025-01-11 | End: 2025-01-17

## 2025-01-11 RX ORDER — METOPROLOL TARTRATE 1 MG/ML
INJECTION, SOLUTION INTRAVENOUS
Status: DISCONTINUED | OUTPATIENT
Start: 2025-01-11 | End: 2025-01-11 | Stop reason: SDUPTHER

## 2025-01-11 RX ORDER — ONDANSETRON 2 MG/ML
4 INJECTION INTRAMUSCULAR; INTRAVENOUS EVERY 6 HOURS PRN
Status: DISCONTINUED | OUTPATIENT
Start: 2025-01-11 | End: 2025-01-18

## 2025-01-11 RX ORDER — ACETAMINOPHEN 325 MG/1
650 TABLET ORAL EVERY 6 HOURS PRN
Status: DISCONTINUED | OUTPATIENT
Start: 2025-01-11 | End: 2025-01-26 | Stop reason: HOSPADM

## 2025-01-11 RX ORDER — OXYCODONE HYDROCHLORIDE 5 MG/1
2.5 TABLET ORAL EVERY 4 HOURS PRN
Status: DISCONTINUED | OUTPATIENT
Start: 2025-01-11 | End: 2025-01-11

## 2025-01-11 RX ORDER — DIPHENHYDRAMINE HYDROCHLORIDE 50 MG/ML
12.5 INJECTION INTRAMUSCULAR; INTRAVENOUS
Status: DISCONTINUED | OUTPATIENT
Start: 2025-01-11 | End: 2025-01-11

## 2025-01-11 RX ORDER — ONDANSETRON 2 MG/ML
4 INJECTION INTRAMUSCULAR; INTRAVENOUS
Status: DISCONTINUED | OUTPATIENT
Start: 2025-01-11 | End: 2025-01-11

## 2025-01-11 RX ORDER — NALOXONE HYDROCHLORIDE 0.4 MG/ML
0.4 INJECTION, SOLUTION INTRAMUSCULAR; INTRAVENOUS; SUBCUTANEOUS PRN
Status: DISCONTINUED | OUTPATIENT
Start: 2025-01-11 | End: 2025-01-26 | Stop reason: HOSPADM

## 2025-01-11 RX ORDER — OXYCODONE HYDROCHLORIDE 5 MG/1
5 TABLET ORAL EVERY 4 HOURS PRN
Status: DISCONTINUED | OUTPATIENT
Start: 2025-01-11 | End: 2025-01-11

## 2025-01-11 RX ORDER — ROCURONIUM BROMIDE 10 MG/ML
INJECTION, SOLUTION INTRAVENOUS
Status: DISCONTINUED | OUTPATIENT
Start: 2025-01-11 | End: 2025-01-11 | Stop reason: SDUPTHER

## 2025-01-11 RX ORDER — HYDROMORPHONE HYDROCHLORIDE 1 MG/ML
0.75 INJECTION, SOLUTION INTRAMUSCULAR; INTRAVENOUS; SUBCUTANEOUS EVERY 4 HOURS PRN
Status: DISCONTINUED | OUTPATIENT
Start: 2025-01-11 | End: 2025-01-11

## 2025-01-11 RX ORDER — PROPOFOL 10 MG/ML
INJECTION, EMULSION INTRAVENOUS
Status: DISCONTINUED | OUTPATIENT
Start: 2025-01-11 | End: 2025-01-11 | Stop reason: SDUPTHER

## 2025-01-11 RX ORDER — SODIUM CHLORIDE 0.9 % (FLUSH) 0.9 %
5-40 SYRINGE (ML) INJECTION PRN
Status: DISCONTINUED | OUTPATIENT
Start: 2025-01-11 | End: 2025-01-11

## 2025-01-11 RX ORDER — LIDOCAINE HYDROCHLORIDE 10 MG/ML
INJECTION, SOLUTION EPIDURAL; INFILTRATION; INTRACAUDAL; PERINEURAL
Status: DISCONTINUED | OUTPATIENT
Start: 2025-01-11 | End: 2025-01-11 | Stop reason: SDUPTHER

## 2025-01-11 RX ORDER — OXYCODONE HYDROCHLORIDE 10 MG/1
10 TABLET ORAL EVERY 4 HOURS PRN
Status: DISCONTINUED | OUTPATIENT
Start: 2025-01-11 | End: 2025-01-11

## 2025-01-11 RX ORDER — PROMETHAZINE HYDROCHLORIDE 25 MG/ML
25 INJECTION, SOLUTION INTRAMUSCULAR; INTRAVENOUS ONCE
Status: COMPLETED | OUTPATIENT
Start: 2025-01-11 | End: 2025-01-11

## 2025-01-11 RX ORDER — PROMETHAZINE HYDROCHLORIDE 25 MG/ML
12.5 INJECTION, SOLUTION INTRAMUSCULAR; INTRAVENOUS EVERY 6 HOURS PRN
Status: DISCONTINUED | OUTPATIENT
Start: 2025-01-11 | End: 2025-01-11

## 2025-01-11 RX ORDER — NALOXONE HYDROCHLORIDE 0.4 MG/ML
INJECTION, SOLUTION INTRAMUSCULAR; INTRAVENOUS; SUBCUTANEOUS PRN
Status: DISCONTINUED | OUTPATIENT
Start: 2025-01-11 | End: 2025-01-11 | Stop reason: HOSPADM

## 2025-01-11 RX ORDER — ONDANSETRON 2 MG/ML
INJECTION INTRAMUSCULAR; INTRAVENOUS
Status: DISCONTINUED | OUTPATIENT
Start: 2025-01-11 | End: 2025-01-11 | Stop reason: SDUPTHER

## 2025-01-11 RX ORDER — HEPARIN SODIUM 5000 [USP'U]/ML
5000 INJECTION, SOLUTION INTRAVENOUS; SUBCUTANEOUS 2 TIMES DAILY
Status: DISCONTINUED | OUTPATIENT
Start: 2025-01-12 | End: 2025-01-12

## 2025-01-11 RX ORDER — HYDROMORPHONE HYDROCHLORIDE 1 MG/ML
0.5 INJECTION, SOLUTION INTRAMUSCULAR; INTRAVENOUS; SUBCUTANEOUS EVERY 4 HOURS PRN
Status: DISCONTINUED | OUTPATIENT
Start: 2025-01-11 | End: 2025-01-11

## 2025-01-11 RX ADMIN — PROPOFOL 120 MG: 10 INJECTION, EMULSION INTRAVENOUS at 13:13

## 2025-01-11 RX ADMIN — PROPOFOL 30 MG: 10 INJECTION, EMULSION INTRAVENOUS at 13:40

## 2025-01-11 RX ADMIN — HYDROMORPHONE HYDROCHLORIDE 0.75 MG: 1 INJECTION, SOLUTION INTRAMUSCULAR; INTRAVENOUS; SUBCUTANEOUS at 04:23

## 2025-01-11 RX ADMIN — ROCURONIUM BROMIDE 70 MG: 10 INJECTION, SOLUTION INTRAVENOUS at 13:13

## 2025-01-11 RX ADMIN — HYDROMORPHONE HYDROCHLORIDE 0.5 MG: 1 INJECTION, SOLUTION INTRAMUSCULAR; INTRAVENOUS; SUBCUTANEOUS at 15:28

## 2025-01-11 RX ADMIN — ONDANSETRON 4 MG: 2 INJECTION INTRAMUSCULAR; INTRAVENOUS at 16:16

## 2025-01-11 RX ADMIN — HYDROMORPHONE HYDROCHLORIDE 0.5 MG: 1 INJECTION, SOLUTION INTRAMUSCULAR; INTRAVENOUS; SUBCUTANEOUS at 15:38

## 2025-01-11 RX ADMIN — FENTANYL CITRATE 50 MCG: 0.05 INJECTION, SOLUTION INTRAMUSCULAR; INTRAVENOUS at 14:29

## 2025-01-11 RX ADMIN — SODIUM CHLORIDE, POTASSIUM CHLORIDE, SODIUM LACTATE AND CALCIUM CHLORIDE: 600; 310; 30; 20 INJECTION, SOLUTION INTRAVENOUS at 13:03

## 2025-01-11 RX ADMIN — DEXAMETHASONE SODIUM PHOSPHATE 10 MG: 4 INJECTION, SOLUTION INTRAMUSCULAR; INTRAVENOUS at 13:32

## 2025-01-11 RX ADMIN — METOPROLOL TARTRATE 2.5 MG: 1 INJECTION, SOLUTION INTRAVENOUS at 13:44

## 2025-01-11 RX ADMIN — PROMETHAZINE HYDROCHLORIDE 25 MG: 25 INJECTION INTRAMUSCULAR; INTRAVENOUS at 04:23

## 2025-01-11 RX ADMIN — SODIUM CHLORIDE, PRESERVATIVE FREE 20 MG: 5 INJECTION INTRAVENOUS at 13:02

## 2025-01-11 RX ADMIN — CEFOXITIN 2000 MG: 2 INJECTION, POWDER, FOR SOLUTION INTRAVENOUS at 21:54

## 2025-01-11 RX ADMIN — ONDANSETRON 4 MG: 2 INJECTION INTRAMUSCULAR; INTRAVENOUS at 01:43

## 2025-01-11 RX ADMIN — METOPROLOL TARTRATE 25 MG: 25 TABLET, FILM COATED ORAL at 08:51

## 2025-01-11 RX ADMIN — MORPHINE SULFATE 4 MG: 4 INJECTION, SOLUTION INTRAMUSCULAR; INTRAVENOUS at 16:16

## 2025-01-11 RX ADMIN — FENTANYL CITRATE 50 MCG: 0.05 INJECTION, SOLUTION INTRAMUSCULAR; INTRAVENOUS at 13:13

## 2025-01-11 RX ADMIN — FENTANYL CITRATE 50 MCG: 0.05 INJECTION, SOLUTION INTRAMUSCULAR; INTRAVENOUS at 13:36

## 2025-01-11 RX ADMIN — SUGAMMADEX 200 MG: 100 INJECTION, SOLUTION INTRAVENOUS at 15:01

## 2025-01-11 RX ADMIN — LABETALOL 20 MG/4 ML (5 MG/ML) INTRAVENOUS SYRINGE 10 MG: at 13:54

## 2025-01-11 RX ADMIN — FENTANYL CITRATE 50 MCG: 0.05 INJECTION, SOLUTION INTRAMUSCULAR; INTRAVENOUS at 15:04

## 2025-01-11 RX ADMIN — METOPROLOL TARTRATE 2.5 MG: 1 INJECTION, SOLUTION INTRAVENOUS at 13:51

## 2025-01-11 RX ADMIN — MORPHINE SULFATE 4 MG: 4 INJECTION, SOLUTION INTRAMUSCULAR; INTRAVENOUS at 21:49

## 2025-01-11 RX ADMIN — ONDANSETRON 4 MG: 2 INJECTION INTRAMUSCULAR; INTRAVENOUS at 14:50

## 2025-01-11 RX ADMIN — CEFOXITIN 2000 MG: 2 INJECTION, POWDER, FOR SOLUTION INTRAVENOUS at 13:49

## 2025-01-11 RX ADMIN — LIDOCAINE HYDROCHLORIDE 50 MG: 10 INJECTION, SOLUTION EPIDURAL; INFILTRATION; INTRACAUDAL; PERINEURAL at 13:13

## 2025-01-11 RX ADMIN — TIOTROPIUM BROMIDE AND OLODATEROL 2 PUFF: 3.124; 2.736 SPRAY, METERED RESPIRATORY (INHALATION) at 07:59

## 2025-01-11 RX ADMIN — OXYCODONE HYDROCHLORIDE 10 MG: 10 TABLET ORAL at 11:21

## 2025-01-11 RX ADMIN — ONDANSETRON 4 MG: 2 INJECTION INTRAMUSCULAR; INTRAVENOUS at 21:49

## 2025-01-11 ASSESSMENT — PAIN SCALES - GENERAL
PAINLEVEL_OUTOF10: 5
PAINLEVEL_OUTOF10: 7
PAINLEVEL_OUTOF10: 6
PAINLEVEL_OUTOF10: 6
PAINLEVEL_OUTOF10: 7

## 2025-01-11 ASSESSMENT — PAIN DESCRIPTION - LOCATION
LOCATION: ABDOMEN

## 2025-01-11 ASSESSMENT — PAIN DESCRIPTION - ORIENTATION: ORIENTATION: MID

## 2025-01-11 ASSESSMENT — PAIN DESCRIPTION - DESCRIPTORS
DESCRIPTORS: ACHING

## 2025-01-11 ASSESSMENT — LIFESTYLE VARIABLES: SMOKING_STATUS: 0

## 2025-01-11 NOTE — ED NOTES
[employer] message sent to APRN regarding pt's pain and po order of norco vs IV dialudid since pt also has NPO order. Per Asif BERRIOS via [employer], okay to release IV order dilaudid for pt's pain.

## 2025-01-11 NOTE — ANESTHESIA POSTPROCEDURE EVALUATION
Department of Anesthesiology  Postprocedure Note    Patient: Nick Jain  MRN: [id number]  YOB: 1950  Date of evaluation: 1/11/2025    Procedure Summary       Date: 01/11/25 Room / Location: [address]    Anesthesia Start: 1307 Anesthesia Stop: 1516    Procedure: Right Colectomy (Right: Abdomen) Diagnosis:       Intestinal obstruction, unspecified cause, unspecified whether partial or complete (HCC)      (Intestinal obstruction, unspecified cause, unspecified whether partial or complete (HCC) [K56.609])    Surgeons: Suleiman Hunter MD Responsible Provider: Katerina Moulton APRN - CRNA    Anesthesia Type: general ASA Status: 3            Anesthesia Type: No value filed.    Reshma Phase I:      Reshma Phase II:      Anesthesia Post Evaluation    Patient location during evaluation: PACU  Patient participation: complete - patient participated  Level of consciousness: awake  Airway patency: patent  Nausea & Vomiting: no nausea  Cardiovascular status: hemodynamically stable  Respiratory status: acceptable  Hydration status: stable  Pain management: adequate    No notable events documented.

## 2025-01-11 NOTE — H&P
[facility]      Hospitalist - History & Physical      PCP: Tesfaye Gipson MD    Date of Admission: 1/10/2025    Date of Service: 1/10/2025    Chief Complaint:  N/V with abdominal pain    History Of Present Illness:   The patient is a 74 y.o. male with a PMH of COPD, CAD, HLD, HTN, CKD stage IIIa and recent diagnosis of cecal mass who presented to [facility] ED on 1/10/2025 complaining of nausea, vomiting and abdominal pain.  He apparently went to [facility] ED earlier this a.m. his family at bedside state that they were told he needed surgery immediately, however, they refused and he came to [facility] as he is established with oncology at this facility.  The imaging from the facility is difficult to obtain, however, they state that they were told he had a colon obstruction and needed surgery immediately.  He states that he has not had a bowel movement 4 days.  He has been taking opiate pain medication due to his abdominal pain.  He has not passed flatus in 2 days.  He has not been able to tolerate any p.o. since this morning.  He reports an approximate 15 pound weight loss, unintentional in the last month.  Denies melena or hematochezia.  Of note he did have a CT chest outpatient on 1/9/2024 which revealed prominent lymph node in the posterior mediastinal adenopathy cannot be excluded.  Further ED workup revealed , K4.9, BUN 17 creatinine 1.3.  Lactic acid 1.1.  Alk phos 184.  WBC 9.5, H&H 11.6/36.4 with a platelet count of 262.  To err on the side of caution no further CT imaging was performed due to 2 CTs in the last 24 hours with IV contrast.  KUB pending.  The patient will be admitted to hospital medicine for cecal mass and intractable nausea with a general surgery and oncology consult.    Past Medical History:        Diagnosis Date    COPD (chronic obstructive pulmonary disease) (HCC)     Heart attack (HCC)     two    Hypertension     Mini stroke     2       Past Surgical History:        Procedure

## 2025-01-11 NOTE — OP NOTE
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       Procedure(s):  Right Colectomy    Surgeon(s):  Suleiman Hunter MD    Assistant:   * No surgical staff found *    Anesthesia: General    Estimated Blood Loss (mL): 20 cc    Complications: None    Specimens:   ID Type Source Tests Collected by Time Destination   A : Right Colectomy Tissue Colon SURGICAL PATHOLOGY (Canceled) Suleiman Hunter MD 1/11/2025 1454        Implants:  * No implants in log *      Drains:   Urinary Catheter 01/11/25 2 Way (Active)   Catheter Indications Perioperative use for selected surgical procedures 01/11/25 1515   Urine Color Yellow 01/11/25 1515   Urine Appearance Clear 01/11/25 1515   Collection Container Standard 01/11/25 1515   Securement Method Securing device (Describe) 01/11/25 1515   Catheter Best Practices  Drainage tube clipped to bed;Bag below bladder;Lack of dependent loop in tubing 01/11/25 1515       Findings:  Infection Present At Time Of Surgery (PATOS) (choose all levels that have infection present):  None  Other Findings: Lymphadenopathy extended beyond the right ileocolic vessel up into the retroperitoneum extending up towards the iliac plexus  Mildly suspicious.  Pathology pending    Detailed Description of Procedure:   Patient was brought the operating suite placed supine position.  After receiving preoperative antibiotics and placement of sequential hose, he underwent uneventful general tracheal anesthesia.  Turcios catheter was passed and the abdomen was shaved and sterilely draped prepped in the usual manner.  Timeout was performed.    Midline incision was made with a 10 blade.  Subtendinous fat fascia takedown electrocautery.  The abdomen was entered without

## 2025-01-11 NOTE — SIGNIFICANT EVENT
SUBJECTIVE:    RN: Contacted me stating that \"patient's family states that they are dieing\" and demanded to see me at once      OBJECTIVE:    BP (!) 162/90   Pulse 80   Temp 98 °F (36.7 °C) (Temporal)   Resp 20   Ht 1.778 m (5' 10\")   Wt 67.1 kg (148 lb)   SpO2 98%   BMI 21.24 kg/m²     QTc 380      ASSESSMENTS & PLANS:    Intractable Pain:  Narcan PRN patient safety   Tylenol 650mg PO Q4h PRN fever or HA and \"may use for mild pain if patient wishes\"  Rectal Tylenol canceled as per large bowel mass with neoplasia suspected  Oxycodone 2.5-5-10mg PO Mild-Moderate-Severe Pain Scale  Dilaudid IV 0.25-0.5-0.75mg pain scale for when NPO or PO med fails    Intractable Nausea & Vomiting:   Zofran 4mg IVP Q6h PRN 1st line for Nausea / Vomiting  Phenergan 12.5mg IM Q6h PRN 2nd line for Nausea / Vomiting  And phenergan 25mg x once now

## 2025-01-11 NOTE — ANESTHESIA PRE PROCEDURE
liquid consumption: 0000                        Time of last solid consumption: 0000                        Date of last liquid consumption: 01/10/25                        Date of last solid food consumption: 01/10/25    BMI:   Wt Readings from Last 3 Encounters:   01/11/25 67.1 kg (148 lb)   01/09/25 67.4 kg (148 lb 11.2 oz)   01/03/25 66.7 kg (147 lb)     Body mass index is 21.24 kg/m².    CBC:   Lab Results   Component Value Date/Time    WBC 10.0 01/11/2025 03:35 AM    RBC 4.35 01/11/2025 03:35 AM    HGB 12.0 01/11/2025 03:35 AM    HCT 37.9 01/11/2025 03:35 AM    MCV 87.1 01/11/2025 03:35 AM    RDW 13.2 01/11/2025 03:35 AM     01/11/2025 03:35 AM       CMP:   Lab Results   Component Value Date/Time     01/11/2025 03:35 AM    K 5.1 01/11/2025 09:34 AM    K 5.5 01/11/2025 03:35 AM     01/11/2025 03:35 AM    CO2 20 01/11/2025 03:35 AM    BUN 18 01/11/2025 03:35 AM    CREATININE 1.3 01/11/2025 03:35 AM    GFRAA >59 01/10/2022 01:53 PM    LABGLOM 57 01/11/2025 03:35 AM    LABGLOM 57 01/17/2024 10:17 AM    GLUCOSE 106 01/11/2025 03:35 AM    CALCIUM 8.8 01/11/2025 03:35 AM    BILITOT 0.4 01/11/2025 03:35 AM    ALKPHOS 175 01/11/2025 03:35 AM    AST 14 01/11/2025 03:35 AM    ALT 10 01/11/2025 03:35 AM       POC Tests: No results for input(s): \"POCGLU\", \"POCNA\", \"POCK\", \"POCCL\", \"POCBUN\", \"POCHEMO\", \"POCHCT\" in the last 72 hours.    Coags: No results found for: \"PROTIME\", \"INR\", \"APTT\"    HCG (If Applicable): No results found for: \"PREGTESTUR\", \"PREGSERUM\", \"HCG\", \"HCGQUANT\"     ABGs: No results found for: \"PHART\", \"PO2ART\", \"VRP3LOY\", \"OIT9WGD\", \"BEART\", \"O4VJLDXZ\"     Type & Screen (If Applicable):  No results found for: \"ABORH\", \"LABANTI\"    Drug/Infectious Status (If Applicable):  No results found for: \"HIV\", \"HEPCAB\"    COVID-19 Screening (If Applicable):   Lab Results   Component Value Date/Time    COVID19 Not Detected 10/20/2021 10:04 AM           Anesthesia Evaluation  Patient summary

## 2025-01-11 NOTE — ED NOTES
ED TO INPATIENT SBAR HANDOFF    Patient Name: Nick Jain   : 1950  74 y.o.   Family/Caregiver Present: Yes  Code Status Order: No Order    C-SSRS: Risk of Suicide: No Risk  Sitter No  Restraints:         Situation  Chief Complaint:   Chief Complaint   Patient presents with    Abdominal Pain     Abd pain and emesis onset this morning; just left [facility] ED AMA after being diagnosed with bowel obstruction, had labs and CT done today    Vomiting     Patient Diagnosis: Cecum mass [K63.89]     Brief Description of Patient's Condition: pt arrived after being seen at [facility] today for abnormal abd CT and possible obstruction. Pt reports lower abdominal pain, worse on right side, x couple days, vomiting this AM but no vomiting in the ER. Pt reported all his doctors were at [facility] so he left [facility] ER AMA today and came here. Pt has had a CT with contrast x2 in the past two days. One yesterday at [facility] and one today at [facility] so no CT performed here. Pt did go for KUB and results pending. Pt with recent cecum mass diagnosis, has not had any treatment yet but has been seen by oncology. Pt's last BM on . Pt A&Ox4, VSS, NAD. Morphine given in the ER and has pt at a 4/10 pain scale.     Mental Status: oriented, alert, coherent, logical, thought processes intact, and able to concentrate and follow conversation  Arrived from: home    Imaging:   XR ABDOMEN (KUB) (SINGLE AP VIEW)    (Results Pending)     COVID-19 Results:   Internal Administration   First Dose COVID-19, MODERNA BLUE border, Primary or Immunocompromised, (age 12y+), IM, 100 mcg/0.5mL  2021   Second Dose COVID-19, MODERNA BLUE border, Primary or Immunocompromised, (age 12y+), IM, 100 mcg/0.5mL   2021       Last COVID Lab SARS-CoV-2, PCR (no units)   Date Value   10/20/2021 Not Detected           Abnormal labs:   Abnormal Labs Reviewed   CBC WITH AUTO DIFFERENTIAL - Abnormal; Notable for the following components:       Result Value

## 2025-01-12 LAB
ALBUMIN SERPL-MCNC: 2.7 G/DL (ref 3.5–5.2)
ALP SERPL-CCNC: 120 U/L (ref 40–129)
ALT SERPL-CCNC: 9 U/L (ref 5–41)
ANION GAP SERPL CALCULATED.3IONS-SCNC: 14 MMOL/L (ref 7–19)
AST SERPL-CCNC: 16 U/L (ref 5–40)
BASOPHILS # BLD: 0 K/UL (ref 0–0.2)
BASOPHILS NFR BLD: 0.1 % (ref 0–1)
BILIRUB SERPL-MCNC: 0.3 MG/DL (ref 0.2–1.2)
BUN SERPL-MCNC: 22 MG/DL (ref 8–23)
CALCIUM SERPL-MCNC: 8.1 MG/DL (ref 8.8–10.2)
CHLORIDE SERPL-SCNC: 103 MMOL/L (ref 98–111)
CO2 SERPL-SCNC: 20 MMOL/L (ref 22–29)
CREAT SERPL-MCNC: 1.4 MG/DL (ref 0.7–1.2)
EKG P AXIS: 71 DEGREES
EKG P-R INTERVAL: 172 MS
EKG Q-T INTERVAL: 384 MS
EKG QRS DURATION: 82 MS
EKG QTC CALCULATION (BAZETT): 405 MS
EKG T AXIS: 50 DEGREES
EOSINOPHIL # BLD: 0 K/UL (ref 0–0.6)
EOSINOPHIL NFR BLD: 0 % (ref 0–5)
ERYTHROCYTE [DISTWIDTH] IN BLOOD BY AUTOMATED COUNT: 13.4 % (ref 11.5–14.5)
GLUCOSE SERPL-MCNC: 147 MG/DL (ref 70–99)
HCT VFR BLD AUTO: 33.7 % (ref 42–52)
HGB BLD-MCNC: 10.8 G/DL (ref 14–18)
IMM GRANULOCYTES # BLD: 0.1 K/UL
LYMPHOCYTES # BLD: 0.7 K/UL (ref 1.1–4.5)
LYMPHOCYTES NFR BLD: 5.3 % (ref 20–40)
MCH RBC QN AUTO: 27.9 PG (ref 27–31)
MCHC RBC AUTO-ENTMCNC: 32 G/DL (ref 33–37)
MCV RBC AUTO: 87.1 FL (ref 80–94)
MONOCYTES # BLD: 0.7 K/UL (ref 0–0.9)
MONOCYTES NFR BLD: 5.3 % (ref 0–10)
NEUTROPHILS # BLD: 12.4 K/UL (ref 1.5–7.5)
NEUTS SEG NFR BLD: 88.9 % (ref 50–65)
PLATELET # BLD AUTO: 273 K/UL (ref 130–400)
PMV BLD AUTO: 10.1 FL (ref 9.4–12.4)
POTASSIUM SERPL-SCNC: 5.2 MMOL/L (ref 3.5–5)
PROT SERPL-MCNC: 5.6 G/DL (ref 6.4–8.3)
RBC # BLD AUTO: 3.87 M/UL (ref 4.7–6.1)
SODIUM SERPL-SCNC: 137 MMOL/L (ref 136–145)
WBC # BLD AUTO: 14 K/UL (ref 4.8–10.8)

## 2025-01-12 PROCEDURE — 93010 ELECTROCARDIOGRAM REPORT: CPT | Performed by: INTERNAL MEDICINE

## 2025-01-12 PROCEDURE — 36415 COLL VENOUS BLD VENIPUNCTURE: CPT

## 2025-01-12 PROCEDURE — 6370000000 HC RX 637 (ALT 250 FOR IP): Performed by: NURSE PRACTITIONER

## 2025-01-12 PROCEDURE — 94760 N-INVAS EAR/PLS OXIMETRY 1: CPT

## 2025-01-12 PROCEDURE — 94640 AIRWAY INHALATION TREATMENT: CPT

## 2025-01-12 PROCEDURE — 80053 COMPREHEN METABOLIC PANEL: CPT

## 2025-01-12 PROCEDURE — 6360000002 HC RX W HCPCS: Performed by: SURGERY

## 2025-01-12 PROCEDURE — 6360000002 HC RX W HCPCS: Performed by: NURSE PRACTITIONER

## 2025-01-12 PROCEDURE — 1200000000 HC SEMI PRIVATE

## 2025-01-12 PROCEDURE — 2500000003 HC RX 250 WO HCPCS: Performed by: NURSE PRACTITIONER

## 2025-01-12 PROCEDURE — 99232 SBSQ HOSP IP/OBS MODERATE 35: CPT | Performed by: INTERNAL MEDICINE

## 2025-01-12 PROCEDURE — 2580000003 HC RX 258: Performed by: SURGERY

## 2025-01-12 PROCEDURE — 85025 COMPLETE CBC W/AUTO DIFF WBC: CPT

## 2025-01-12 RX ORDER — DEXTROSE, SODIUM CHLORIDE, SODIUM LACTATE, POTASSIUM CHLORIDE, AND CALCIUM CHLORIDE 5; .6; .31; .03; .02 G/100ML; G/100ML; G/100ML; G/100ML; G/100ML
INJECTION, SOLUTION INTRAVENOUS CONTINUOUS
Status: DISCONTINUED | OUTPATIENT
Start: 2025-01-12 | End: 2025-01-17

## 2025-01-12 RX ORDER — HYDROCODONE BITARTRATE AND ACETAMINOPHEN 10; 325 MG/1; MG/1
1 TABLET ORAL EVERY 6 HOURS PRN
Status: DISCONTINUED | OUTPATIENT
Start: 2025-01-12 | End: 2025-01-16

## 2025-01-12 RX ORDER — HYDROCODONE BITARTRATE AND ACETAMINOPHEN 5; 325 MG/1; MG/1
1 TABLET ORAL EVERY 6 HOURS PRN
Status: DISCONTINUED | OUTPATIENT
Start: 2025-01-12 | End: 2025-01-12

## 2025-01-12 RX ORDER — METOPROLOL TARTRATE 25 MG/1
25 TABLET, FILM COATED ORAL 2 TIMES DAILY
Status: DISCONTINUED | OUTPATIENT
Start: 2025-01-12 | End: 2025-01-19 | Stop reason: SDUPTHER

## 2025-01-12 RX ORDER — HEPARIN SODIUM 5000 [USP'U]/ML
5000 INJECTION, SOLUTION INTRAVENOUS; SUBCUTANEOUS 3 TIMES DAILY
Status: DISCONTINUED | OUTPATIENT
Start: 2025-01-12 | End: 2025-01-21 | Stop reason: ALTCHOICE

## 2025-01-12 RX ADMIN — SODIUM CHLORIDE, SODIUM LACTATE, POTASSIUM CHLORIDE, CALCIUM CHLORIDE AND DEXTROSE MONOHYDRATE: 5; 600; 310; 30; 20 INJECTION, SOLUTION INTRAVENOUS at 10:22

## 2025-01-12 RX ADMIN — MORPHINE SULFATE 4 MG: 4 INJECTION, SOLUTION INTRAMUSCULAR; INTRAVENOUS at 02:47

## 2025-01-12 RX ADMIN — MORPHINE SULFATE 4 MG: 4 INJECTION, SOLUTION INTRAMUSCULAR; INTRAVENOUS at 20:21

## 2025-01-12 RX ADMIN — CEFOXITIN 2000 MG: 2 INJECTION, POWDER, FOR SOLUTION INTRAVENOUS at 02:50

## 2025-01-12 RX ADMIN — ONDANSETRON 4 MG: 2 INJECTION INTRAMUSCULAR; INTRAVENOUS at 23:20

## 2025-01-12 RX ADMIN — HYDROCODONE BITARTRATE AND ACETAMINOPHEN 1 TABLET: 10; 325 TABLET ORAL at 10:16

## 2025-01-12 RX ADMIN — SODIUM CHLORIDE, SODIUM LACTATE, POTASSIUM CHLORIDE, CALCIUM CHLORIDE AND DEXTROSE MONOHYDRATE: 5; 600; 310; 30; 20 INJECTION, SOLUTION INTRAVENOUS at 20:41

## 2025-01-12 RX ADMIN — METOPROLOL TARTRATE 25 MG: 25 TABLET, FILM COATED ORAL at 09:28

## 2025-01-12 RX ADMIN — HEPARIN SODIUM 5000 UNITS: 5000 INJECTION INTRAVENOUS; SUBCUTANEOUS at 20:22

## 2025-01-12 RX ADMIN — ONDANSETRON 4 MG: 2 INJECTION INTRAMUSCULAR; INTRAVENOUS at 17:38

## 2025-01-12 RX ADMIN — HEPARIN SODIUM 5000 UNITS: 5000 INJECTION INTRAVENOUS; SUBCUTANEOUS at 09:28

## 2025-01-12 RX ADMIN — ATORVASTATIN CALCIUM 80 MG: 80 TABLET, FILM COATED ORAL at 09:28

## 2025-01-12 RX ADMIN — SODIUM CHLORIDE, PRESERVATIVE FREE 10 ML: 5 INJECTION INTRAVENOUS at 20:22

## 2025-01-12 RX ADMIN — MORPHINE SULFATE 4 MG: 4 INJECTION, SOLUTION INTRAMUSCULAR; INTRAVENOUS at 09:03

## 2025-01-12 RX ADMIN — Medication 2 PUFF: at 07:01

## 2025-01-12 RX ADMIN — HYDROCODONE BITARTRATE AND ACETAMINOPHEN 1 TABLET: 10; 325 TABLET ORAL at 15:59

## 2025-01-12 RX ADMIN — METOPROLOL TARTRATE 25 MG: 25 TABLET, FILM COATED ORAL at 20:22

## 2025-01-12 RX ADMIN — HEPARIN SODIUM 5000 UNITS: 5000 INJECTION INTRAVENOUS; SUBCUTANEOUS at 15:59

## 2025-01-12 RX ADMIN — ONDANSETRON 4 MG: 2 INJECTION INTRAMUSCULAR; INTRAVENOUS at 09:03

## 2025-01-12 RX ADMIN — MORPHINE SULFATE 4 MG: 4 INJECTION, SOLUTION INTRAMUSCULAR; INTRAVENOUS at 13:03

## 2025-01-12 RX ADMIN — CEFOXITIN 2000 MG: 2 INJECTION, POWDER, FOR SOLUTION INTRAVENOUS at 09:30

## 2025-01-12 RX ADMIN — PROMETHAZINE HYDROCHLORIDE 25 MG: 25 INJECTION INTRAMUSCULAR; INTRAVENOUS at 20:21

## 2025-01-12 RX ADMIN — HYDROCODONE BITARTRATE AND ACETAMINOPHEN 1 TABLET: 10; 325 TABLET ORAL at 23:20

## 2025-01-12 RX ADMIN — CEFOXITIN 2000 MG: 2 INJECTION, POWDER, FOR SOLUTION INTRAVENOUS at 15:58

## 2025-01-12 ASSESSMENT — PAIN SCALES - GENERAL
PAINLEVEL_OUTOF10: 6
PAINLEVEL_OUTOF10: 7
PAINLEVEL_OUTOF10: 7

## 2025-01-12 ASSESSMENT — PAIN DESCRIPTION - DESCRIPTORS: DESCRIPTORS: ACHING

## 2025-01-12 ASSESSMENT — PAIN DESCRIPTION - LOCATION: LOCATION: ABDOMEN

## 2025-01-13 LAB
ALBUMIN SERPL-MCNC: 2.8 G/DL (ref 3.5–5.2)
ALP SERPL-CCNC: 116 U/L (ref 40–129)
ALT SERPL-CCNC: 10 U/L (ref 5–41)
ANION GAP SERPL CALCULATED.3IONS-SCNC: 9 MMOL/L (ref 7–19)
AST SERPL-CCNC: 20 U/L (ref 5–40)
BASOPHILS # BLD: 0 K/UL (ref 0–0.2)
BASOPHILS NFR BLD: 0.1 % (ref 0–1)
BILIRUB SERPL-MCNC: 0.3 MG/DL (ref 0.2–1.2)
BUN SERPL-MCNC: 16 MG/DL (ref 8–23)
CALCIUM SERPL-MCNC: 8.3 MG/DL (ref 8.8–10.2)
CEA SERPL-MCNC: 7.8 NG/ML (ref 0–4.7)
CHLORIDE SERPL-SCNC: 103 MMOL/L (ref 98–111)
CO2 SERPL-SCNC: 26 MMOL/L (ref 22–29)
CREAT SERPL-MCNC: 1.3 MG/DL (ref 0.7–1.2)
EOSINOPHIL # BLD: 0.1 K/UL (ref 0–0.6)
EOSINOPHIL NFR BLD: 0.6 % (ref 0–5)
ERYTHROCYTE [DISTWIDTH] IN BLOOD BY AUTOMATED COUNT: 13.5 % (ref 11.5–14.5)
FERRITIN SERPL-MCNC: 271 NG/ML (ref 30–400)
GLUCOSE SERPL-MCNC: 137 MG/DL (ref 70–99)
HCT VFR BLD AUTO: 31.9 % (ref 42–52)
HGB BLD-MCNC: 10 G/DL (ref 14–18)
IMM GRANULOCYTES # BLD: 0 K/UL
IRON SATN MFR SERPL: 6 % (ref 14–50)
IRON SERPL-MCNC: 9 UG/DL (ref 59–158)
LYMPHOCYTES # BLD: 0.7 K/UL (ref 1.1–4.5)
LYMPHOCYTES NFR BLD: 7.8 % (ref 20–40)
MCH RBC QN AUTO: 28.2 PG (ref 27–31)
MCHC RBC AUTO-ENTMCNC: 31.3 G/DL (ref 33–37)
MCV RBC AUTO: 89.9 FL (ref 80–94)
MONOCYTES # BLD: 0.6 K/UL (ref 0–0.9)
MONOCYTES NFR BLD: 6.5 % (ref 0–10)
NEUTROPHILS # BLD: 8 K/UL (ref 1.5–7.5)
NEUTS SEG NFR BLD: 84.6 % (ref 50–65)
PLATELET # BLD AUTO: 230 K/UL (ref 130–400)
PMV BLD AUTO: 10 FL (ref 9.4–12.4)
POTASSIUM SERPL-SCNC: 4.8 MMOL/L (ref 3.5–5)
POTASSIUM SERPL-SCNC: 5.6 MMOL/L (ref 3.5–5)
PROT SERPL-MCNC: 5.2 G/DL (ref 6.4–8.3)
RBC # BLD AUTO: 3.55 M/UL (ref 4.7–6.1)
RETICS # AUTO: 0.04 M/UL (ref 0.03–0.12)
RETICS/RBC NFR: 1.14 % (ref 0.5–1.5)
SODIUM SERPL-SCNC: 138 MMOL/L (ref 136–145)
TIBC SERPL-MCNC: 140 UG/DL (ref 250–400)
VIT B12 SERPL-MCNC: 213 PG/ML (ref 232–1245)
WBC # BLD AUTO: 9.5 K/UL (ref 4.8–10.8)

## 2025-01-13 PROCEDURE — 2580000003 HC RX 258: Performed by: INTERNAL MEDICINE

## 2025-01-13 PROCEDURE — 85025 COMPLETE CBC W/AUTO DIFF WBC: CPT

## 2025-01-13 PROCEDURE — 6360000002 HC RX W HCPCS: Performed by: NURSE PRACTITIONER

## 2025-01-13 PROCEDURE — 6370000000 HC RX 637 (ALT 250 FOR IP): Performed by: NURSE PRACTITIONER

## 2025-01-13 PROCEDURE — 83540 ASSAY OF IRON: CPT

## 2025-01-13 PROCEDURE — 82728 ASSAY OF FERRITIN: CPT

## 2025-01-13 PROCEDURE — 83921 ORGANIC ACID SINGLE QUANT: CPT

## 2025-01-13 PROCEDURE — 2500000003 HC RX 250 WO HCPCS: Performed by: NURSE PRACTITIONER

## 2025-01-13 PROCEDURE — 84132 ASSAY OF SERUM POTASSIUM: CPT

## 2025-01-13 PROCEDURE — 94640 AIRWAY INHALATION TREATMENT: CPT

## 2025-01-13 PROCEDURE — 1200000000 HC SEMI PRIVATE

## 2025-01-13 PROCEDURE — 82607 VITAMIN B-12: CPT

## 2025-01-13 PROCEDURE — 6360000002 HC RX W HCPCS: Performed by: SURGERY

## 2025-01-13 PROCEDURE — 99232 SBSQ HOSP IP/OBS MODERATE 35: CPT | Performed by: INTERNAL MEDICINE

## 2025-01-13 PROCEDURE — 6360000002 HC RX W HCPCS: Performed by: INTERNAL MEDICINE

## 2025-01-13 PROCEDURE — 36415 COLL VENOUS BLD VENIPUNCTURE: CPT

## 2025-01-13 PROCEDURE — 83550 IRON BINDING TEST: CPT

## 2025-01-13 PROCEDURE — 82378 CARCINOEMBRYONIC ANTIGEN: CPT

## 2025-01-13 PROCEDURE — 80053 COMPREHEN METABOLIC PANEL: CPT

## 2025-01-13 PROCEDURE — 2580000003 HC RX 258: Performed by: SURGERY

## 2025-01-13 PROCEDURE — 85045 AUTOMATED RETICULOCYTE COUNT: CPT

## 2025-01-13 PROCEDURE — 94760 N-INVAS EAR/PLS OXIMETRY 1: CPT

## 2025-01-13 RX ORDER — CYANOCOBALAMIN 1000 UG/ML
1000 INJECTION, SOLUTION INTRAMUSCULAR; SUBCUTANEOUS DAILY
Status: COMPLETED | OUTPATIENT
Start: 2025-01-13 | End: 2025-01-19

## 2025-01-13 RX ADMIN — PROMETHAZINE HYDROCHLORIDE 25 MG: 25 INJECTION INTRAMUSCULAR; INTRAVENOUS at 12:46

## 2025-01-13 RX ADMIN — ONDANSETRON 4 MG: 2 INJECTION INTRAMUSCULAR; INTRAVENOUS at 14:10

## 2025-01-13 RX ADMIN — ATORVASTATIN CALCIUM 80 MG: 80 TABLET, FILM COATED ORAL at 07:54

## 2025-01-13 RX ADMIN — SODIUM CHLORIDE, SODIUM LACTATE, POTASSIUM CHLORIDE, CALCIUM CHLORIDE AND DEXTROSE MONOHYDRATE: 5; 600; 310; 30; 20 INJECTION, SOLUTION INTRAVENOUS at 04:42

## 2025-01-13 RX ADMIN — MORPHINE SULFATE 4 MG: 4 INJECTION, SOLUTION INTRAMUSCULAR; INTRAVENOUS at 18:37

## 2025-01-13 RX ADMIN — ONDANSETRON 4 MG: 2 INJECTION INTRAMUSCULAR; INTRAVENOUS at 08:05

## 2025-01-13 RX ADMIN — METOPROLOL TARTRATE 25 MG: 25 TABLET, FILM COATED ORAL at 20:53

## 2025-01-13 RX ADMIN — SODIUM CHLORIDE, SODIUM LACTATE, POTASSIUM CHLORIDE, CALCIUM CHLORIDE AND DEXTROSE MONOHYDRATE: 5; 600; 310; 30; 20 INJECTION, SOLUTION INTRAVENOUS at 09:52

## 2025-01-13 RX ADMIN — SODIUM CHLORIDE, PRESERVATIVE FREE 10 ML: 5 INJECTION INTRAVENOUS at 20:54

## 2025-01-13 RX ADMIN — HYDROCODONE BITARTRATE AND ACETAMINOPHEN 1 TABLET: 10; 325 TABLET ORAL at 14:10

## 2025-01-13 RX ADMIN — SODIUM CHLORIDE, SODIUM LACTATE, POTASSIUM CHLORIDE, CALCIUM CHLORIDE AND DEXTROSE MONOHYDRATE: 5; 600; 310; 30; 20 INJECTION, SOLUTION INTRAVENOUS at 17:34

## 2025-01-13 RX ADMIN — SODIUM CHLORIDE, PRESERVATIVE FREE 10 ML: 5 INJECTION INTRAVENOUS at 07:49

## 2025-01-13 RX ADMIN — MORPHINE SULFATE 4 MG: 4 INJECTION, SOLUTION INTRAMUSCULAR; INTRAVENOUS at 12:40

## 2025-01-13 RX ADMIN — CYANOCOBALAMIN 1000 MCG: 1000 INJECTION, SOLUTION INTRAMUSCULAR at 07:50

## 2025-01-13 RX ADMIN — HEPARIN SODIUM 5000 UNITS: 5000 INJECTION INTRAVENOUS; SUBCUTANEOUS at 20:53

## 2025-01-13 RX ADMIN — METOPROLOL TARTRATE 25 MG: 25 TABLET, FILM COATED ORAL at 07:54

## 2025-01-13 RX ADMIN — HEPARIN SODIUM 5000 UNITS: 5000 INJECTION INTRAVENOUS; SUBCUTANEOUS at 07:51

## 2025-01-13 RX ADMIN — PROMETHAZINE HYDROCHLORIDE 25 MG: 25 INJECTION INTRAMUSCULAR; INTRAVENOUS at 02:30

## 2025-01-13 RX ADMIN — Medication 2 PUFF: at 07:34

## 2025-01-13 RX ADMIN — HEPARIN SODIUM 5000 UNITS: 5000 INJECTION INTRAVENOUS; SUBCUTANEOUS at 15:13

## 2025-01-13 RX ADMIN — MORPHINE SULFATE 4 MG: 4 INJECTION, SOLUTION INTRAMUSCULAR; INTRAVENOUS at 02:30

## 2025-01-13 RX ADMIN — SODIUM CHLORIDE 300 MG: 0.9 INJECTION, SOLUTION INTRAVENOUS at 07:55

## 2025-01-13 RX ADMIN — HYDROCODONE BITARTRATE AND ACETAMINOPHEN 1 TABLET: 10; 325 TABLET ORAL at 08:05

## 2025-01-13 ASSESSMENT — PAIN DESCRIPTION - LOCATION
LOCATION: ABDOMEN

## 2025-01-13 ASSESSMENT — PAIN - FUNCTIONAL ASSESSMENT
PAIN_FUNCTIONAL_ASSESSMENT: ACTIVITIES ARE NOT PREVENTED

## 2025-01-13 ASSESSMENT — PAIN SCALES - GENERAL
PAINLEVEL_OUTOF10: 8
PAINLEVEL_OUTOF10: 7
PAINLEVEL_OUTOF10: 6
PAINLEVEL_OUTOF10: 5
PAINLEVEL_OUTOF10: 6

## 2025-01-13 ASSESSMENT — PAIN DESCRIPTION - DESCRIPTORS
DESCRIPTORS: ACHING
DESCRIPTORS: DISCOMFORT
DESCRIPTORS: ACHING
DESCRIPTORS: DISCOMFORT

## 2025-01-13 ASSESSMENT — PAIN DESCRIPTION - ORIENTATION
ORIENTATION: MID

## 2025-01-13 NOTE — CARE COORDINATION
Case Management Assessment  Initial Evaluation    Date/Time of Evaluation: 1/13/2025 1:22 PM  Assessment Completed by: Gissel Gruber    If patient is discharged prior to next notation, then this note serves as note for discharge by case management.    Patient Name: Nick Jain                   YOB: 1950  Diagnosis: Lower abdominal pain [R10.30]  Cecum mass [K63.89]  Nausea and vomiting, unspecified vomiting type [R11.2]                   Date / Time: 1/10/2025  3:20 PM    Patient Admission Status: Inpatient   Readmission Risk (Low < 19, Mod (19-27), High > 27): Readmission Risk Score: 17.4    Current PCP: Tesfaye Gipson MD  PCP verified by CM? (P) Yes    Chart Reviewed: Yes      History Provided by: (P) Patient  Patient Orientation: (P) Alert and Oriented    Patient Cognition: (P) Alert    Hospitalization in the last 30 days (Readmission):  No    If yes, Readmission Assessment in CM Navigator will be completed.    Advance Directives:      Code Status: Full Code   Patient's Primary Decision Maker is: (P) Legal Next of Kin    Primary Decision Maker: Doreen Jain - Spouse - [phone]    Discharge Planning:    Patient lives with: (P) Spouse/Significant Other Type of Home: (P) House  Primary Care Giver: (P) Self  Patient Support Systems include: (P) Spouse/Significant Other   Current Financial resources: (P) Medicare  Current community resources: (P) None  Current services prior to admission: (P) None            Current DME:              Type of Home Care services:  (P) None    ADLS  Prior functional level: (P) Independent in ADLs/IADLs  Current functional level: (P) Independent in ADLs/IADLs    PT AM-PAC:   /24  OT AM-PAC:   /24    Family can provide assistance at DC: (P) Yes  Would you like Case Management to discuss the discharge plan with any other family members/significant others, and if so, who? (P) Yes  Plans to Return to Present Housing: (P) Yes  :  Potential Assistance needed at

## 2025-01-13 NOTE — FLOWSHEET NOTE
SALOME Correia in room @ 5095   - prolonged intubation, now extubated to high flow/BIPAP  - BIPAP HS and PRN  - high flow, wean as tolerated - on 25L/50% today and tolerating  - cont sildenafil  - cont steroids  - chest PT

## 2025-01-14 LAB
ALBUMIN SERPL-MCNC: 2.5 G/DL (ref 3.5–5.2)
ALP SERPL-CCNC: 108 U/L (ref 40–129)
ALT SERPL-CCNC: 10 U/L (ref 5–41)
ANION GAP SERPL CALCULATED.3IONS-SCNC: 7 MMOL/L (ref 7–19)
AST SERPL-CCNC: 21 U/L (ref 5–40)
BASOPHILS # BLD: 0 K/UL (ref 0–0.2)
BASOPHILS NFR BLD: 0.3 % (ref 0–1)
BILIRUB SERPL-MCNC: 0.2 MG/DL (ref 0.2–1.2)
BUN SERPL-MCNC: 11 MG/DL (ref 8–23)
CALCIUM SERPL-MCNC: 8.3 MG/DL (ref 8.8–10.2)
CHLORIDE SERPL-SCNC: 104 MMOL/L (ref 98–111)
CO2 SERPL-SCNC: 26 MMOL/L (ref 22–29)
CREAT SERPL-MCNC: 1.2 MG/DL (ref 0.7–1.2)
EOSINOPHIL # BLD: 0.1 K/UL (ref 0–0.6)
EOSINOPHIL NFR BLD: 1.9 % (ref 0–5)
ERYTHROCYTE [DISTWIDTH] IN BLOOD BY AUTOMATED COUNT: 13.6 % (ref 11.5–14.5)
GLUCOSE SERPL-MCNC: 111 MG/DL (ref 70–99)
HCT VFR BLD AUTO: 29.2 % (ref 42–52)
HGB BLD-MCNC: 9.1 G/DL (ref 14–18)
IMM GRANULOCYTES # BLD: 0 K/UL
LYMPHOCYTES # BLD: 1 K/UL (ref 1.1–4.5)
LYMPHOCYTES NFR BLD: 13.6 % (ref 20–40)
MCH RBC QN AUTO: 27.8 PG (ref 27–31)
MCHC RBC AUTO-ENTMCNC: 31.2 G/DL (ref 33–37)
MCV RBC AUTO: 89.3 FL (ref 80–94)
MONOCYTES # BLD: 0.5 K/UL (ref 0–0.9)
MONOCYTES NFR BLD: 6.5 % (ref 0–10)
NEUTROPHILS # BLD: 5.8 K/UL (ref 1.5–7.5)
NEUTS SEG NFR BLD: 77.3 % (ref 50–65)
PLATELET # BLD AUTO: 224 K/UL (ref 130–400)
PMV BLD AUTO: 10.1 FL (ref 9.4–12.4)
POTASSIUM SERPL-SCNC: 3.9 MMOL/L (ref 3.5–5)
PROT SERPL-MCNC: 5.5 G/DL (ref 6.4–8.3)
RBC # BLD AUTO: 3.27 M/UL (ref 4.7–6.1)
SODIUM SERPL-SCNC: 137 MMOL/L (ref 136–145)
WBC # BLD AUTO: 7.6 K/UL (ref 4.8–10.8)

## 2025-01-14 PROCEDURE — 1200000000 HC SEMI PRIVATE

## 2025-01-14 PROCEDURE — 94760 N-INVAS EAR/PLS OXIMETRY 1: CPT

## 2025-01-14 PROCEDURE — 2580000003 HC RX 258: Performed by: SURGERY

## 2025-01-14 PROCEDURE — 99232 SBSQ HOSP IP/OBS MODERATE 35: CPT | Performed by: INTERNAL MEDICINE

## 2025-01-14 PROCEDURE — 6360000002 HC RX W HCPCS: Performed by: INTERNAL MEDICINE

## 2025-01-14 PROCEDURE — 85025 COMPLETE CBC W/AUTO DIFF WBC: CPT

## 2025-01-14 PROCEDURE — 94640 AIRWAY INHALATION TREATMENT: CPT

## 2025-01-14 PROCEDURE — 2500000003 HC RX 250 WO HCPCS: Performed by: NURSE PRACTITIONER

## 2025-01-14 PROCEDURE — 2580000003 HC RX 258: Performed by: INTERNAL MEDICINE

## 2025-01-14 PROCEDURE — 6370000000 HC RX 637 (ALT 250 FOR IP): Performed by: NURSE PRACTITIONER

## 2025-01-14 PROCEDURE — 36415 COLL VENOUS BLD VENIPUNCTURE: CPT

## 2025-01-14 PROCEDURE — 6360000002 HC RX W HCPCS: Performed by: SURGERY

## 2025-01-14 PROCEDURE — 6360000002 HC RX W HCPCS: Performed by: NURSE PRACTITIONER

## 2025-01-14 PROCEDURE — 80053 COMPREHEN METABOLIC PANEL: CPT

## 2025-01-14 RX ADMIN — METOPROLOL TARTRATE 25 MG: 25 TABLET, FILM COATED ORAL at 08:38

## 2025-01-14 RX ADMIN — SODIUM CHLORIDE, SODIUM LACTATE, POTASSIUM CHLORIDE, CALCIUM CHLORIDE AND DEXTROSE MONOHYDRATE: 5; 600; 310; 30; 20 INJECTION, SOLUTION INTRAVENOUS at 01:31

## 2025-01-14 RX ADMIN — SODIUM CHLORIDE, SODIUM LACTATE, POTASSIUM CHLORIDE, CALCIUM CHLORIDE AND DEXTROSE MONOHYDRATE: 5; 600; 310; 30; 20 INJECTION, SOLUTION INTRAVENOUS at 20:05

## 2025-01-14 RX ADMIN — ATORVASTATIN CALCIUM 80 MG: 80 TABLET, FILM COATED ORAL at 08:38

## 2025-01-14 RX ADMIN — MORPHINE SULFATE 4 MG: 4 INJECTION, SOLUTION INTRAMUSCULAR; INTRAVENOUS at 21:17

## 2025-01-14 RX ADMIN — MORPHINE SULFATE 4 MG: 4 INJECTION, SOLUTION INTRAMUSCULAR; INTRAVENOUS at 14:03

## 2025-01-14 RX ADMIN — HYDROCODONE BITARTRATE AND ACETAMINOPHEN 1 TABLET: 10; 325 TABLET ORAL at 05:35

## 2025-01-14 RX ADMIN — HYDROCODONE BITARTRATE AND ACETAMINOPHEN 1 TABLET: 10; 325 TABLET ORAL at 11:18

## 2025-01-14 RX ADMIN — METOPROLOL TARTRATE 25 MG: 25 TABLET, FILM COATED ORAL at 21:16

## 2025-01-14 RX ADMIN — SODIUM CHLORIDE 300 MG: 0.9 INJECTION, SOLUTION INTRAVENOUS at 08:46

## 2025-01-14 RX ADMIN — Medication 2 PUFF: at 07:01

## 2025-01-14 RX ADMIN — HYDROCODONE BITARTRATE AND ACETAMINOPHEN 1 TABLET: 10; 325 TABLET ORAL at 17:40

## 2025-01-14 RX ADMIN — CYANOCOBALAMIN 1000 MCG: 1000 INJECTION, SOLUTION INTRAMUSCULAR at 08:38

## 2025-01-14 RX ADMIN — SODIUM CHLORIDE, PRESERVATIVE FREE 10 ML: 5 INJECTION INTRAVENOUS at 21:17

## 2025-01-14 RX ADMIN — SODIUM CHLORIDE, SODIUM LACTATE, POTASSIUM CHLORIDE, CALCIUM CHLORIDE AND DEXTROSE MONOHYDRATE: 5; 600; 310; 30; 20 INJECTION, SOLUTION INTRAVENOUS at 11:25

## 2025-01-14 RX ADMIN — HEPARIN SODIUM 5000 UNITS: 5000 INJECTION INTRAVENOUS; SUBCUTANEOUS at 21:16

## 2025-01-14 RX ADMIN — HEPARIN SODIUM 5000 UNITS: 5000 INJECTION INTRAVENOUS; SUBCUTANEOUS at 08:37

## 2025-01-14 RX ADMIN — HEPARIN SODIUM 5000 UNITS: 5000 INJECTION INTRAVENOUS; SUBCUTANEOUS at 15:51

## 2025-01-14 ASSESSMENT — PAIN SCALES - GENERAL
PAINLEVEL_OUTOF10: 8
PAINLEVEL_OUTOF10: 4
PAINLEVEL_OUTOF10: 0

## 2025-01-15 ENCOUNTER — TELEPHONE (OUTPATIENT)
Dept: CARDIOLOGY | Facility: CLINIC | Age: 75
End: 2025-01-15
Payer: MEDICARE

## 2025-01-15 LAB
ALBUMIN SERPL-MCNC: 2.7 G/DL (ref 3.5–5.2)
ALP SERPL-CCNC: 118 U/L (ref 40–129)
ALT SERPL-CCNC: 9 U/L (ref 5–41)
ANION GAP SERPL CALCULATED.3IONS-SCNC: 9 MMOL/L (ref 7–19)
AST SERPL-CCNC: 20 U/L (ref 5–40)
BACTERIA BLD CULT ORG #2: NORMAL
BACTERIA BLD CULT: NORMAL
BASOPHILS # BLD: 0 K/UL (ref 0–0.2)
BASOPHILS NFR BLD: 0.3 % (ref 0–1)
BILIRUB SERPL-MCNC: 0.3 MG/DL (ref 0.2–1.2)
BUN SERPL-MCNC: 8 MG/DL (ref 8–23)
CALCIUM SERPL-MCNC: 8.3 MG/DL (ref 8.8–10.2)
CHLORIDE SERPL-SCNC: 104 MMOL/L (ref 98–111)
CO2 SERPL-SCNC: 25 MMOL/L (ref 22–29)
CREAT SERPL-MCNC: 1 MG/DL (ref 0.7–1.2)
EOSINOPHIL # BLD: 0.2 K/UL (ref 0–0.6)
EOSINOPHIL NFR BLD: 2.8 % (ref 0–5)
ERYTHROCYTE [DISTWIDTH] IN BLOOD BY AUTOMATED COUNT: 13.5 % (ref 11.5–14.5)
GLUCOSE SERPL-MCNC: 98 MG/DL (ref 70–99)
HCT VFR BLD AUTO: 29.5 % (ref 42–52)
HGB BLD-MCNC: 9.3 G/DL (ref 14–18)
IMM GRANULOCYTES # BLD: 0 K/UL
LYMPHOCYTES # BLD: 1.1 K/UL (ref 1.1–4.5)
LYMPHOCYTES NFR BLD: 14.1 % (ref 20–40)
MCH RBC QN AUTO: 27.8 PG (ref 27–31)
MCHC RBC AUTO-ENTMCNC: 31.5 G/DL (ref 33–37)
MCV RBC AUTO: 88.1 FL (ref 80–94)
MONOCYTES # BLD: 0.6 K/UL (ref 0–0.9)
MONOCYTES NFR BLD: 7.7 % (ref 0–10)
NEUTROPHILS # BLD: 5.9 K/UL (ref 1.5–7.5)
NEUTS SEG NFR BLD: 74.7 % (ref 50–65)
PLATELET # BLD AUTO: 265 K/UL (ref 130–400)
PMV BLD AUTO: 9.8 FL (ref 9.4–12.4)
POTASSIUM SERPL-SCNC: 4.1 MMOL/L (ref 3.5–5)
PROT SERPL-MCNC: 5.6 G/DL (ref 6.4–8.3)
RBC # BLD AUTO: 3.35 M/UL (ref 4.7–6.1)
SODIUM SERPL-SCNC: 138 MMOL/L (ref 136–145)
WBC # BLD AUTO: 7.9 K/UL (ref 4.8–10.8)

## 2025-01-15 PROCEDURE — 94760 N-INVAS EAR/PLS OXIMETRY 1: CPT

## 2025-01-15 PROCEDURE — 36415 COLL VENOUS BLD VENIPUNCTURE: CPT

## 2025-01-15 PROCEDURE — 6370000000 HC RX 637 (ALT 250 FOR IP): Performed by: INTERNAL MEDICINE

## 2025-01-15 PROCEDURE — 6360000002 HC RX W HCPCS: Performed by: SURGERY

## 2025-01-15 PROCEDURE — 6370000000 HC RX 637 (ALT 250 FOR IP): Performed by: NURSE PRACTITIONER

## 2025-01-15 PROCEDURE — 1200000000 HC SEMI PRIVATE

## 2025-01-15 PROCEDURE — 2500000003 HC RX 250 WO HCPCS: Performed by: NURSE PRACTITIONER

## 2025-01-15 PROCEDURE — 2580000003 HC RX 258: Performed by: INTERNAL MEDICINE

## 2025-01-15 PROCEDURE — 80053 COMPREHEN METABOLIC PANEL: CPT

## 2025-01-15 PROCEDURE — 85025 COMPLETE CBC W/AUTO DIFF WBC: CPT

## 2025-01-15 PROCEDURE — 6360000002 HC RX W HCPCS: Performed by: INTERNAL MEDICINE

## 2025-01-15 PROCEDURE — 94640 AIRWAY INHALATION TREATMENT: CPT

## 2025-01-15 PROCEDURE — 6370000000 HC RX 637 (ALT 250 FOR IP): Performed by: SURGERY

## 2025-01-15 PROCEDURE — 2580000003 HC RX 258: Performed by: SURGERY

## 2025-01-15 RX ORDER — CALCIUM CARBONATE 500 MG/1
500 TABLET, CHEWABLE ORAL 3 TIMES DAILY PRN
Status: DISCONTINUED | OUTPATIENT
Start: 2025-01-15 | End: 2025-01-26 | Stop reason: HOSPADM

## 2025-01-15 RX ORDER — POLYETHYLENE GLYCOL 3350 17 G/17G
17 POWDER, FOR SOLUTION ORAL DAILY
Status: DISCONTINUED | OUTPATIENT
Start: 2025-01-15 | End: 2025-01-26 | Stop reason: HOSPADM

## 2025-01-15 RX ADMIN — SODIUM CHLORIDE 300 MG: 0.9 INJECTION, SOLUTION INTRAVENOUS at 09:27

## 2025-01-15 RX ADMIN — ATORVASTATIN CALCIUM 80 MG: 80 TABLET, FILM COATED ORAL at 09:07

## 2025-01-15 RX ADMIN — HYDROCODONE BITARTRATE AND ACETAMINOPHEN 1 TABLET: 10; 325 TABLET ORAL at 00:13

## 2025-01-15 RX ADMIN — HYDROCODONE BITARTRATE AND ACETAMINOPHEN 1 TABLET: 10; 325 TABLET ORAL at 18:58

## 2025-01-15 RX ADMIN — METOPROLOL TARTRATE 25 MG: 25 TABLET, FILM COATED ORAL at 20:50

## 2025-01-15 RX ADMIN — CYANOCOBALAMIN 1000 MCG: 1000 INJECTION, SOLUTION INTRAMUSCULAR at 09:07

## 2025-01-15 RX ADMIN — POLYETHYLENE GLYCOL 3350 17 G: 17 POWDER, FOR SOLUTION ORAL at 13:08

## 2025-01-15 RX ADMIN — METOPROLOL TARTRATE 25 MG: 25 TABLET, FILM COATED ORAL at 09:07

## 2025-01-15 RX ADMIN — HEPARIN SODIUM 5000 UNITS: 5000 INJECTION INTRAVENOUS; SUBCUTANEOUS at 15:27

## 2025-01-15 RX ADMIN — HEPARIN SODIUM 5000 UNITS: 5000 INJECTION INTRAVENOUS; SUBCUTANEOUS at 20:50

## 2025-01-15 RX ADMIN — SODIUM CHLORIDE, SODIUM LACTATE, POTASSIUM CHLORIDE, CALCIUM CHLORIDE AND DEXTROSE MONOHYDRATE: 5; 600; 310; 30; 20 INJECTION, SOLUTION INTRAVENOUS at 03:34

## 2025-01-15 RX ADMIN — Medication 2 PUFF: at 07:35

## 2025-01-15 RX ADMIN — ANTACID TABLETS 500 MG: 500 TABLET, CHEWABLE ORAL at 18:57

## 2025-01-15 RX ADMIN — SODIUM CHLORIDE, PRESERVATIVE FREE 10 ML: 5 INJECTION INTRAVENOUS at 09:09

## 2025-01-15 RX ADMIN — HEPARIN SODIUM 5000 UNITS: 5000 INJECTION INTRAVENOUS; SUBCUTANEOUS at 09:06

## 2025-01-15 ASSESSMENT — PAIN DESCRIPTION - LOCATION
LOCATION: INCISION
LOCATION: OTHER (COMMENT)

## 2025-01-15 ASSESSMENT — PAIN DESCRIPTION - DESCRIPTORS: DESCRIPTORS: SHARP;DISCOMFORT

## 2025-01-15 ASSESSMENT — PAIN SCALES - GENERAL
PAINLEVEL_OUTOF10: 7
PAINLEVEL_OUTOF10: 2
PAINLEVEL_OUTOF10: 9

## 2025-01-15 ASSESSMENT — PAIN - FUNCTIONAL ASSESSMENT: PAIN_FUNCTIONAL_ASSESSMENT: ACTIVITIES ARE NOT PREVENTED

## 2025-01-15 NOTE — TELEPHONE ENCOUNTER
MIGUEL ANGEL.  CALL PLACED TO DR TOLEDO OFFICE. LEFT VOICEMAIL WITH PHONE AND FAX INFO FOR THE [facility] LOCATION. EXPLAINED THAT THEIR REQUEST WOULD NEED TO BE FAXED TO THEM.

## 2025-01-15 NOTE — TELEPHONE ENCOUNTER
PT TO UNDERGO COLONOSCOPY & RIGHT COLECTOMY.    DR ACOSTA WOULD LIKE TO HOLD PLAVIX PRIOR TO EACH PROCEDURE    PLEASE ADVISE IF OKAY TO PROCEED  & FOR HOW LONG TO HOLD

## 2025-01-15 NOTE — FLOWSHEET NOTE
01/15/25 0600   Mobility   Activity Ambulate in jarrett   Level of Assistance Moderate assist, patient does 50-74%   Assistive Device Front wheel walker;Gait belt   Distance Ambulated (ft) 200 ft   Ambulation Response Tolerated well

## 2025-01-16 ENCOUNTER — APPOINTMENT (OUTPATIENT)
Dept: GENERAL RADIOLOGY | Age: 75
DRG: 329 | End: 2025-01-16
Payer: MEDICARE

## 2025-01-16 PROBLEM — C18.0 ADENOCARCINOMA OF CECUM (HCC): Status: ACTIVE | Noted: 2025-01-16

## 2025-01-16 PROBLEM — E43 SEVERE MALNUTRITION (HCC): Status: ACTIVE | Noted: 2025-01-16

## 2025-01-16 LAB
ALBUMIN SERPL-MCNC: 2.7 G/DL (ref 3.5–5.2)
ALP SERPL-CCNC: 134 U/L (ref 40–129)
ALT SERPL-CCNC: 16 U/L (ref 5–41)
ANION GAP SERPL CALCULATED.3IONS-SCNC: 8 MMOL/L (ref 7–19)
AST SERPL-CCNC: 31 U/L (ref 5–40)
BASOPHILS # BLD: 0 K/UL (ref 0–0.2)
BASOPHILS NFR BLD: 0.2 % (ref 0–1)
BILIRUB SERPL-MCNC: 0.4 MG/DL (ref 0.2–1.2)
BUN SERPL-MCNC: 7 MG/DL (ref 8–23)
CALCIUM SERPL-MCNC: 8.4 MG/DL (ref 8.8–10.2)
CHLORIDE SERPL-SCNC: 104 MMOL/L (ref 98–111)
CO2 SERPL-SCNC: 25 MMOL/L (ref 22–29)
CREAT SERPL-MCNC: 1 MG/DL (ref 0.7–1.2)
EOSINOPHIL # BLD: 0.2 K/UL (ref 0–0.6)
EOSINOPHIL NFR BLD: 2.5 % (ref 0–5)
ERYTHROCYTE [DISTWIDTH] IN BLOOD BY AUTOMATED COUNT: 13.4 % (ref 11.5–14.5)
GLUCOSE SERPL-MCNC: 135 MG/DL (ref 70–99)
HCT VFR BLD AUTO: 29.7 % (ref 42–52)
HGB BLD-MCNC: 9.3 G/DL (ref 14–18)
IMM GRANULOCYTES # BLD: 0 K/UL
LYMPHOCYTES # BLD: 0.9 K/UL (ref 1.1–4.5)
LYMPHOCYTES NFR BLD: 11.2 % (ref 20–40)
MCH RBC QN AUTO: 27.3 PG (ref 27–31)
MCHC RBC AUTO-ENTMCNC: 31.3 G/DL (ref 33–37)
MCV RBC AUTO: 87.1 FL (ref 80–94)
MONOCYTES # BLD: 0.8 K/UL (ref 0–0.9)
MONOCYTES NFR BLD: 9 % (ref 0–10)
NEUTROPHILS # BLD: 6.4 K/UL (ref 1.5–7.5)
NEUTS SEG NFR BLD: 76.6 % (ref 50–65)
PLATELET # BLD AUTO: 270 K/UL (ref 130–400)
PMV BLD AUTO: 9.4 FL (ref 9.4–12.4)
POTASSIUM SERPL-SCNC: 3.8 MMOL/L (ref 3.5–5)
PROT SERPL-MCNC: 5.8 G/DL (ref 6.4–8.3)
RBC # BLD AUTO: 3.41 M/UL (ref 4.7–6.1)
SODIUM SERPL-SCNC: 137 MMOL/L (ref 136–145)
WBC # BLD AUTO: 8.4 K/UL (ref 4.8–10.8)

## 2025-01-16 PROCEDURE — 2500000003 HC RX 250 WO HCPCS: Performed by: NURSE PRACTITIONER

## 2025-01-16 PROCEDURE — 6360000002 HC RX W HCPCS: Performed by: NURSE PRACTITIONER

## 2025-01-16 PROCEDURE — 94760 N-INVAS EAR/PLS OXIMETRY 1: CPT

## 2025-01-16 PROCEDURE — 94640 AIRWAY INHALATION TREATMENT: CPT

## 2025-01-16 PROCEDURE — 6370000000 HC RX 637 (ALT 250 FOR IP): Performed by: SURGERY

## 2025-01-16 PROCEDURE — 6360000002 HC RX W HCPCS: Performed by: INTERNAL MEDICINE

## 2025-01-16 PROCEDURE — 36415 COLL VENOUS BLD VENIPUNCTURE: CPT

## 2025-01-16 PROCEDURE — 6370000000 HC RX 637 (ALT 250 FOR IP): Performed by: INTERNAL MEDICINE

## 2025-01-16 PROCEDURE — 80053 COMPREHEN METABOLIC PANEL: CPT

## 2025-01-16 PROCEDURE — 6370000000 HC RX 637 (ALT 250 FOR IP): Performed by: NURSE PRACTITIONER

## 2025-01-16 PROCEDURE — 1200000000 HC SEMI PRIVATE

## 2025-01-16 PROCEDURE — 99232 SBSQ HOSP IP/OBS MODERATE 35: CPT | Performed by: INTERNAL MEDICINE

## 2025-01-16 PROCEDURE — 2580000003 HC RX 258: Performed by: SURGERY

## 2025-01-16 PROCEDURE — 74018 RADEX ABDOMEN 1 VIEW: CPT

## 2025-01-16 PROCEDURE — 6360000002 HC RX W HCPCS: Performed by: SURGERY

## 2025-01-16 PROCEDURE — 85025 COMPLETE CBC W/AUTO DIFF WBC: CPT

## 2025-01-16 RX ORDER — LABETALOL HYDROCHLORIDE 5 MG/ML
10 INJECTION, SOLUTION INTRAVENOUS EVERY 6 HOURS PRN
Status: DISCONTINUED | OUTPATIENT
Start: 2025-01-16 | End: 2025-01-18

## 2025-01-16 RX ORDER — HYDROMORPHONE HYDROCHLORIDE 1 MG/ML
0.5 INJECTION, SOLUTION INTRAMUSCULAR; INTRAVENOUS; SUBCUTANEOUS
Status: DISCONTINUED | OUTPATIENT
Start: 2025-01-16 | End: 2025-01-26

## 2025-01-16 RX ORDER — PROCHLORPERAZINE EDISYLATE 5 MG/ML
10 INJECTION INTRAMUSCULAR; INTRAVENOUS ONCE
Status: COMPLETED | OUTPATIENT
Start: 2025-01-16 | End: 2025-01-16

## 2025-01-16 RX ORDER — HYDRALAZINE HYDROCHLORIDE 20 MG/ML
10 INJECTION INTRAMUSCULAR; INTRAVENOUS EVERY 6 HOURS PRN
Status: DISCONTINUED | OUTPATIENT
Start: 2025-01-16 | End: 2025-01-26 | Stop reason: HOSPADM

## 2025-01-16 RX ORDER — SIMETHICONE 80 MG
80 TABLET,CHEWABLE ORAL EVERY 6 HOURS PRN
Status: DISCONTINUED | OUTPATIENT
Start: 2025-01-16 | End: 2025-01-26 | Stop reason: HOSPADM

## 2025-01-16 RX ORDER — OXYCODONE HYDROCHLORIDE 10 MG/1
10 TABLET ORAL EVERY 4 HOURS PRN
Status: DISCONTINUED | OUTPATIENT
Start: 2025-01-16 | End: 2025-01-26

## 2025-01-16 RX ORDER — LIDOCAINE 4 G/G
1 PATCH TOPICAL EVERY 12 HOURS PRN
Status: DISCONTINUED | OUTPATIENT
Start: 2025-01-16 | End: 2025-01-17

## 2025-01-16 RX ORDER — OXYCODONE HYDROCHLORIDE 5 MG/1
5 TABLET ORAL EVERY 4 HOURS PRN
Status: DISCONTINUED | OUTPATIENT
Start: 2025-01-16 | End: 2025-01-26 | Stop reason: HOSPADM

## 2025-01-16 RX ORDER — HYDROMORPHONE HYDROCHLORIDE 1 MG/ML
0.25 INJECTION, SOLUTION INTRAMUSCULAR; INTRAVENOUS; SUBCUTANEOUS
Status: DISCONTINUED | OUTPATIENT
Start: 2025-01-16 | End: 2025-01-26

## 2025-01-16 RX ORDER — ACETAMINOPHEN 500 MG
1000 TABLET ORAL EVERY 6 HOURS
Status: DISCONTINUED | OUTPATIENT
Start: 2025-01-16 | End: 2025-01-26 | Stop reason: HOSPADM

## 2025-01-16 RX ADMIN — HEPARIN SODIUM 5000 UNITS: 5000 INJECTION INTRAVENOUS; SUBCUTANEOUS at 20:47

## 2025-01-16 RX ADMIN — IRON SUCROSE 100 MG: 20 INJECTION, SOLUTION INTRAVENOUS at 06:43

## 2025-01-16 RX ADMIN — Medication 2 PUFF: at 07:25

## 2025-01-16 RX ADMIN — METOPROLOL TARTRATE 25 MG: 25 TABLET, FILM COATED ORAL at 11:50

## 2025-01-16 RX ADMIN — ONDANSETRON 4 MG: 2 INJECTION INTRAMUSCULAR; INTRAVENOUS at 07:49

## 2025-01-16 RX ADMIN — OXYCODONE 5 MG: 5 TABLET ORAL at 20:46

## 2025-01-16 RX ADMIN — CYANOCOBALAMIN 1000 MCG: 1000 INJECTION, SOLUTION INTRAMUSCULAR at 11:52

## 2025-01-16 RX ADMIN — HEPARIN SODIUM 5000 UNITS: 5000 INJECTION INTRAVENOUS; SUBCUTANEOUS at 18:04

## 2025-01-16 RX ADMIN — HEPARIN SODIUM 5000 UNITS: 5000 INJECTION INTRAVENOUS; SUBCUTANEOUS at 11:50

## 2025-01-16 RX ADMIN — HYDROCODONE BITARTRATE AND ACETAMINOPHEN 1 TABLET: 10; 325 TABLET ORAL at 11:49

## 2025-01-16 RX ADMIN — SIMETHICONE 80 MG: 80 TABLET, CHEWABLE ORAL at 07:49

## 2025-01-16 RX ADMIN — METOPROLOL TARTRATE 25 MG: 25 TABLET, FILM COATED ORAL at 20:46

## 2025-01-16 RX ADMIN — PROCHLORPERAZINE EDISYLATE 10 MG: 5 INJECTION INTRAMUSCULAR; INTRAVENOUS at 14:25

## 2025-01-16 RX ADMIN — HYDROCODONE BITARTRATE AND ACETAMINOPHEN 1 TABLET: 10; 325 TABLET ORAL at 03:44

## 2025-01-16 RX ADMIN — MORPHINE SULFATE 4 MG: 4 INJECTION, SOLUTION INTRAMUSCULAR; INTRAVENOUS at 06:50

## 2025-01-16 RX ADMIN — PROMETHAZINE HYDROCHLORIDE 25 MG: 25 INJECTION INTRAMUSCULAR; INTRAVENOUS at 08:49

## 2025-01-16 RX ADMIN — SODIUM CHLORIDE, PRESERVATIVE FREE 10 ML: 5 INJECTION INTRAVENOUS at 09:00

## 2025-01-16 RX ADMIN — HYDRALAZINE HYDROCHLORIDE 10 MG: 20 INJECTION, SOLUTION INTRAMUSCULAR; INTRAVENOUS at 14:25

## 2025-01-16 RX ADMIN — SODIUM CHLORIDE, SODIUM LACTATE, POTASSIUM CHLORIDE, CALCIUM CHLORIDE AND DEXTROSE MONOHYDRATE: 5; 600; 310; 30; 20 INJECTION, SOLUTION INTRAVENOUS at 17:57

## 2025-01-16 RX ADMIN — MORPHINE SULFATE 4 MG: 4 INJECTION, SOLUTION INTRAMUSCULAR; INTRAVENOUS at 18:00

## 2025-01-16 RX ADMIN — LABETALOL HYDROCHLORIDE 10 MG: 5 INJECTION, SOLUTION INTRAVENOUS at 17:58

## 2025-01-16 ASSESSMENT — PAIN SCALES - GENERAL
PAINLEVEL_OUTOF10: 6
PAINLEVEL_OUTOF10: 5
PAINLEVEL_OUTOF10: 1
PAINLEVEL_OUTOF10: 6
PAINLEVEL_OUTOF10: 5
PAINLEVEL_OUTOF10: 7

## 2025-01-16 ASSESSMENT — PAIN DESCRIPTION - ORIENTATION
ORIENTATION: MID
ORIENTATION: MID
ORIENTATION: RIGHT;LEFT;LOWER
ORIENTATION: MID
ORIENTATION: LOWER

## 2025-01-16 ASSESSMENT — PAIN DESCRIPTION - DESCRIPTORS
DESCRIPTORS: ACHING
DESCRIPTORS: SPASM
DESCRIPTORS: ACHING

## 2025-01-16 ASSESSMENT — PAIN DESCRIPTION - LOCATION
LOCATION: BACK;ABDOMEN
LOCATION: ABDOMEN
LOCATION: BACK
LOCATION: BACK
LOCATION: ABDOMEN

## 2025-01-17 ENCOUNTER — APPOINTMENT (OUTPATIENT)
Dept: CT IMAGING | Age: 75
DRG: 329 | End: 2025-01-17
Payer: MEDICARE

## 2025-01-17 PROBLEM — K56.7 ILEUS (HCC): Status: ACTIVE | Noted: 2025-01-17

## 2025-01-17 LAB
ALBUMIN SERPL-MCNC: 2.7 G/DL (ref 3.5–5.2)
ALP SERPL-CCNC: 142 U/L (ref 40–129)
ALT SERPL-CCNC: 32 U/L (ref 5–41)
ANION GAP SERPL CALCULATED.3IONS-SCNC: 11 MMOL/L (ref 7–19)
AST SERPL-CCNC: 56 U/L (ref 5–40)
BASOPHILS # BLD: 0 K/UL (ref 0–0.2)
BASOPHILS NFR BLD: 0.1 % (ref 0–1)
BILIRUB SERPL-MCNC: 0.4 MG/DL (ref 0.2–1.2)
BUN SERPL-MCNC: 7 MG/DL (ref 8–23)
CALCIUM SERPL-MCNC: 8.3 MG/DL (ref 8.8–10.2)
CHLORIDE SERPL-SCNC: 103 MMOL/L (ref 98–111)
CO2 SERPL-SCNC: 24 MMOL/L (ref 22–29)
CREAT SERPL-MCNC: 1 MG/DL (ref 0.7–1.2)
EOSINOPHIL # BLD: 0.1 K/UL (ref 0–0.6)
EOSINOPHIL NFR BLD: 0.4 % (ref 0–5)
ERYTHROCYTE [DISTWIDTH] IN BLOOD BY AUTOMATED COUNT: 13.5 % (ref 11.5–14.5)
GLUCOSE BLD-MCNC: 169 MG/DL (ref 70–99)
GLUCOSE SERPL-MCNC: 160 MG/DL (ref 70–99)
HCT VFR BLD AUTO: 29 % (ref 42–52)
HGB BLD-MCNC: 9.3 G/DL (ref 14–18)
IMM GRANULOCYTES # BLD: 0.1 K/UL
LYMPHOCYTES # BLD: 1.2 K/UL (ref 1.1–4.5)
LYMPHOCYTES NFR BLD: 8.9 % (ref 20–40)
MCH RBC QN AUTO: 28.1 PG (ref 27–31)
MCHC RBC AUTO-ENTMCNC: 32.1 G/DL (ref 33–37)
MCV RBC AUTO: 87.6 FL (ref 80–94)
MONOCYTES # BLD: 1.2 K/UL (ref 0–0.9)
MONOCYTES NFR BLD: 8.9 % (ref 0–10)
NEUTROPHILS # BLD: 11.3 K/UL (ref 1.5–7.5)
NEUTS SEG NFR BLD: 81 % (ref 50–65)
PERFORMED ON: ABNORMAL
PLATELET # BLD AUTO: 289 K/UL (ref 130–400)
PMV BLD AUTO: 9.9 FL (ref 9.4–12.4)
POTASSIUM SERPL-SCNC: 3.9 MMOL/L (ref 3.5–5)
PROT SERPL-MCNC: 5.8 G/DL (ref 6.4–8.3)
RBC # BLD AUTO: 3.31 M/UL (ref 4.7–6.1)
SODIUM SERPL-SCNC: 138 MMOL/L (ref 136–145)
WBC # BLD AUTO: 13.9 K/UL (ref 4.8–10.8)

## 2025-01-17 PROCEDURE — 51798 US URINE CAPACITY MEASURE: CPT

## 2025-01-17 PROCEDURE — 6370000000 HC RX 637 (ALT 250 FOR IP): Performed by: NURSE PRACTITIONER

## 2025-01-17 PROCEDURE — 36569 INSJ PICC 5 YR+ W/O IMAGING: CPT

## 2025-01-17 PROCEDURE — 74177 CT ABD & PELVIS W/CONTRAST: CPT

## 2025-01-17 PROCEDURE — 2500000003 HC RX 250 WO HCPCS: Performed by: NURSE PRACTITIONER

## 2025-01-17 PROCEDURE — 82962 GLUCOSE BLOOD TEST: CPT

## 2025-01-17 PROCEDURE — C1751 CATH, INF, PER/CENT/MIDLINE: HCPCS

## 2025-01-17 PROCEDURE — 6370000000 HC RX 637 (ALT 250 FOR IP): Performed by: SURGERY

## 2025-01-17 PROCEDURE — 2580000003 HC RX 258: Performed by: SURGERY

## 2025-01-17 PROCEDURE — 1200000000 HC SEMI PRIVATE

## 2025-01-17 PROCEDURE — 6360000002 HC RX W HCPCS: Performed by: INTERNAL MEDICINE

## 2025-01-17 PROCEDURE — 6360000002 HC RX W HCPCS: Performed by: SURGERY

## 2025-01-17 PROCEDURE — 99232 SBSQ HOSP IP/OBS MODERATE 35: CPT | Performed by: INTERNAL MEDICINE

## 2025-01-17 PROCEDURE — 80053 COMPREHEN METABOLIC PANEL: CPT

## 2025-01-17 PROCEDURE — 76937 US GUIDE VASCULAR ACCESS: CPT

## 2025-01-17 PROCEDURE — 85025 COMPLETE CBC W/AUTO DIFF WBC: CPT

## 2025-01-17 PROCEDURE — 36415 COLL VENOUS BLD VENIPUNCTURE: CPT

## 2025-01-17 PROCEDURE — 2580000003 HC RX 258: Performed by: NURSE PRACTITIONER

## 2025-01-17 PROCEDURE — 6360000004 HC RX CONTRAST MEDICATION: Performed by: SURGERY

## 2025-01-17 PROCEDURE — 02HV33Z INSERTION OF INFUSION DEVICE INTO SUPERIOR VENA CAVA, PERCUTANEOUS APPROACH: ICD-10-PCS | Performed by: INTERNAL MEDICINE

## 2025-01-17 PROCEDURE — 94760 N-INVAS EAR/PLS OXIMETRY 1: CPT

## 2025-01-17 PROCEDURE — 94640 AIRWAY INHALATION TREATMENT: CPT

## 2025-01-17 RX ORDER — IOPAMIDOL 755 MG/ML
75 INJECTION, SOLUTION INTRAVASCULAR
Status: COMPLETED | OUTPATIENT
Start: 2025-01-17 | End: 2025-01-17

## 2025-01-17 RX ORDER — LIDOCAINE 4 G/G
2 PATCH TOPICAL EVERY 12 HOURS PRN
Status: DISCONTINUED | OUTPATIENT
Start: 2025-01-17 | End: 2025-01-26 | Stop reason: HOSPADM

## 2025-01-17 RX ORDER — SODIUM CHLORIDE 9 MG/ML
INJECTION, SOLUTION INTRAVENOUS CONTINUOUS
Status: DISCONTINUED | OUTPATIENT
Start: 2025-01-17 | End: 2025-01-17

## 2025-01-17 RX ORDER — DEXTROSE MONOHYDRATE AND SODIUM CHLORIDE 5; .9 G/100ML; G/100ML
INJECTION, SOLUTION INTRAVENOUS CONTINUOUS
Status: DISCONTINUED | OUTPATIENT
Start: 2025-01-17 | End: 2025-01-21

## 2025-01-17 RX ORDER — METOPROLOL TARTRATE 1 MG/ML
5 INJECTION, SOLUTION INTRAVENOUS EVERY 6 HOURS
Status: DISCONTINUED | OUTPATIENT
Start: 2025-01-17 | End: 2025-01-18

## 2025-01-17 RX ADMIN — DEXTROSE AND SODIUM CHLORIDE: 5; 900 INJECTION, SOLUTION INTRAVENOUS at 14:26

## 2025-01-17 RX ADMIN — SODIUM CHLORIDE, PRESERVATIVE FREE 10 ML: 5 INJECTION INTRAVENOUS at 09:00

## 2025-01-17 RX ADMIN — METOPROLOL TARTRATE 5 MG: 5 INJECTION INTRAVENOUS at 12:23

## 2025-01-17 RX ADMIN — HEPARIN SODIUM 5000 UNITS: 5000 INJECTION INTRAVENOUS; SUBCUTANEOUS at 14:28

## 2025-01-17 RX ADMIN — OXYCODONE HYDROCHLORIDE 10 MG: 10 TABLET ORAL at 00:29

## 2025-01-17 RX ADMIN — SODIUM CHLORIDE, SODIUM LACTATE, POTASSIUM CHLORIDE, CALCIUM CHLORIDE AND DEXTROSE MONOHYDRATE: 5; 600; 310; 30; 20 INJECTION, SOLUTION INTRAVENOUS at 10:15

## 2025-01-17 RX ADMIN — METOPROLOL TARTRATE 5 MG: 5 INJECTION INTRAVENOUS at 23:09

## 2025-01-17 RX ADMIN — IOPAMIDOL 75 ML: 755 INJECTION, SOLUTION INTRAVENOUS at 15:24

## 2025-01-17 RX ADMIN — CYANOCOBALAMIN 1000 MCG: 1000 INJECTION, SOLUTION INTRAMUSCULAR at 14:27

## 2025-01-17 RX ADMIN — SODIUM CHLORIDE, PRESERVATIVE FREE 40 MG: 5 INJECTION INTRAVENOUS at 17:47

## 2025-01-17 RX ADMIN — HEPARIN SODIUM 5000 UNITS: 5000 INJECTION INTRAVENOUS; SUBCUTANEOUS at 20:28

## 2025-01-17 RX ADMIN — Medication 2 PUFF: at 07:13

## 2025-01-17 RX ADMIN — METOPROLOL TARTRATE 5 MG: 5 INJECTION INTRAVENOUS at 17:48

## 2025-01-17 RX ADMIN — ASCORBIC ACID, VITAMIN A PALMITATE, CHOLECALCIFEROL, THIAMINE HYDROCHLORIDE, RIBOFLAVIN-5 PHOSPHATE SODIUM, PYRIDOXINE HYDROCHLORIDE, NIACINAMIDE, DEXPANTHENOL, ALPHA-TOCOPHEROL ACETATE, VITAMIN K1, FOLIC ACID, BIOTIN, CYANOCOBALAMIN: 200; 3300; 200; 6; 3.6; 6; 40; 15; 10; 150; 600; 60; 5 INJECTION, SOLUTION INTRAVENOUS at 18:17

## 2025-01-17 RX ADMIN — SODIUM CHLORIDE, PRESERVATIVE FREE 10 ML: 5 INJECTION INTRAVENOUS at 20:28

## 2025-01-17 ASSESSMENT — PAIN DESCRIPTION - DESCRIPTORS: DESCRIPTORS: ACHING

## 2025-01-17 ASSESSMENT — PAIN DESCRIPTION - LOCATION: LOCATION: BACK

## 2025-01-17 ASSESSMENT — PAIN SCALES - GENERAL
PAINLEVEL_OUTOF10: 3
PAINLEVEL_OUTOF10: 7

## 2025-01-17 ASSESSMENT — PAIN DESCRIPTION - ORIENTATION: ORIENTATION: MID

## 2025-01-18 ENCOUNTER — APPOINTMENT (OUTPATIENT)
Age: 75
DRG: 329 | End: 2025-01-18
Payer: MEDICARE

## 2025-01-18 ENCOUNTER — APPOINTMENT (OUTPATIENT)
Dept: GENERAL RADIOLOGY | Age: 75
DRG: 329 | End: 2025-01-18
Payer: MEDICARE

## 2025-01-18 ENCOUNTER — APPOINTMENT (OUTPATIENT)
Dept: CT IMAGING | Age: 75
DRG: 329 | End: 2025-01-18
Payer: MEDICARE

## 2025-01-18 LAB
ALBUMIN SERPL-MCNC: 2.4 G/DL (ref 3.5–5.2)
ALLENS TEST: ABNORMAL
ALP SERPL-CCNC: 141 U/L (ref 40–129)
ALT SERPL-CCNC: 34 U/L (ref 5–41)
AMMONIA PLAS-SCNC: 23 UMOL/L (ref 16–60)
ANION GAP SERPL CALCULATED.3IONS-SCNC: 11 MMOL/L (ref 7–19)
AST SERPL-CCNC: 39 U/L (ref 5–40)
BASE EXCESS ARTERIAL: 1.2 MMOL/L (ref -2–2)
BILIRUB DIRECT SERPL-MCNC: 0.2 MG/DL (ref 0–0.3)
BILIRUB INDIRECT SERPL-MCNC: 0.2 MG/DL (ref 0–1)
BILIRUB SERPL-MCNC: 0.4 MG/DL (ref 0.2–1.2)
BNP BLD-MCNC: ABNORMAL PG/ML (ref 0–449)
BUN SERPL-MCNC: 9 MG/DL (ref 8–23)
CALCIUM SERPL-MCNC: 7.7 MG/DL (ref 8.8–10.2)
CARBOXYHEMOGLOBIN ARTERIAL: 1.3 % (ref 0–5)
CHLORIDE SERPL-SCNC: 103 MMOL/L (ref 98–111)
CO2 SERPL-SCNC: 24 MMOL/L (ref 22–29)
CREAT SERPL-MCNC: 0.9 MG/DL (ref 0.7–1.2)
ERYTHROCYTE [DISTWIDTH] IN BLOOD BY AUTOMATED COUNT: 14.1 % (ref 11.5–14.5)
GLUCOSE BLD-MCNC: 140 MG/DL (ref 70–99)
GLUCOSE SERPL-MCNC: 146 MG/DL (ref 70–99)
HCO3 ARTERIAL: 24.7 MMOL/L (ref 22–26)
HCT VFR BLD AUTO: 27.9 % (ref 42–52)
HEMOGLOBIN, ART, EXTENDED: 9.4 G/DL (ref 14–18)
HGB BLD-MCNC: 8.7 G/DL (ref 14–18)
LACTATE BLDV-SCNC: 0.9 MMOL/L (ref 0.5–1.9)
MAGNESIUM SERPL-MCNC: 1.7 MG/DL (ref 1.6–2.4)
MCH RBC QN AUTO: 27.9 PG (ref 27–31)
MCHC RBC AUTO-ENTMCNC: 31.2 G/DL (ref 33–37)
MCV RBC AUTO: 89.4 FL (ref 80–94)
METHEMOGLOBIN ARTERIAL: 1 %
METHYLMALONATE SERPL-SCNC: 0.22 UMOL/L (ref 0–0.4)
O2 CONTENT ARTERIAL: 12.8 ML/DL
O2 DELIVERY DEVICE: ABNORMAL
O2 SAT, ARTERIAL: 95.8 %
O2 THERAPY: ABNORMAL
OXYGEN FLOW: 2
PCO2 ARTERIAL: 34 MMHG (ref 35–45)
PERFORMED ON: ABNORMAL
PH ARTERIAL: 7.47 (ref 7.35–7.45)
PHOSPHATE SERPL-MCNC: 2.4 MG/DL (ref 2.5–4.5)
PLATELET # BLD AUTO: 207 K/UL (ref 130–400)
PMV BLD AUTO: 9.8 FL (ref 9.4–12.4)
PO2 ARTERIAL: 99 MMHG (ref 80–100)
POTASSIUM BLD-SCNC: 3.4 MMOL/L
POTASSIUM SERPL-SCNC: 3.6 MMOL/L (ref 3.5–5)
PROCALCITONIN: 0.67 NG/ML (ref 0–0.09)
PROT SERPL-MCNC: 5.2 G/DL (ref 6.4–8.3)
RBC # BLD AUTO: 3.12 M/UL (ref 4.7–6.1)
SAMPLE SOURCE: ABNORMAL
SODIUM SERPL-SCNC: 138 MMOL/L (ref 136–145)
TRIGL SERPL-MCNC: 95 MG/DL (ref 0–149)
TSH SERPL DL<=0.005 MIU/L-ACNC: 1.57 UIU/ML (ref 0.27–4.2)
WBC # BLD AUTO: 13.2 K/UL (ref 4.8–10.8)

## 2025-01-18 PROCEDURE — 83880 ASSAY OF NATRIURETIC PEPTIDE: CPT

## 2025-01-18 PROCEDURE — 84145 PROCALCITONIN (PCT): CPT

## 2025-01-18 PROCEDURE — 36415 COLL VENOUS BLD VENIPUNCTURE: CPT

## 2025-01-18 PROCEDURE — 99232 SBSQ HOSP IP/OBS MODERATE 35: CPT | Performed by: INTERNAL MEDICINE

## 2025-01-18 PROCEDURE — 2500000003 HC RX 250 WO HCPCS: Performed by: NURSE PRACTITIONER

## 2025-01-18 PROCEDURE — 80076 HEPATIC FUNCTION PANEL: CPT

## 2025-01-18 PROCEDURE — 93005 ELECTROCARDIOGRAM TRACING: CPT | Performed by: NURSE PRACTITIONER

## 2025-01-18 PROCEDURE — 83605 ASSAY OF LACTIC ACID: CPT

## 2025-01-18 PROCEDURE — C8929 TTE W OR WO FOL WCON,DOPPLER: HCPCS

## 2025-01-18 PROCEDURE — 83735 ASSAY OF MAGNESIUM: CPT

## 2025-01-18 PROCEDURE — 84100 ASSAY OF PHOSPHORUS: CPT

## 2025-01-18 PROCEDURE — 74177 CT ABD & PELVIS W/CONTRAST: CPT

## 2025-01-18 PROCEDURE — 6360000004 HC RX CONTRAST MEDICATION: Performed by: NURSE PRACTITIONER

## 2025-01-18 PROCEDURE — 85027 COMPLETE CBC AUTOMATED: CPT

## 2025-01-18 PROCEDURE — 6370000000 HC RX 637 (ALT 250 FOR IP): Performed by: NURSE PRACTITIONER

## 2025-01-18 PROCEDURE — 82140 ASSAY OF AMMONIA: CPT

## 2025-01-18 PROCEDURE — 2580000003 HC RX 258: Performed by: SURGERY

## 2025-01-18 PROCEDURE — 84443 ASSAY THYROID STIM HORMONE: CPT

## 2025-01-18 PROCEDURE — 51702 INSERT TEMP BLADDER CATH: CPT

## 2025-01-18 PROCEDURE — 71045 X-RAY EXAM CHEST 1 VIEW: CPT

## 2025-01-18 PROCEDURE — 36600 WITHDRAWAL OF ARTERIAL BLOOD: CPT

## 2025-01-18 PROCEDURE — 94760 N-INVAS EAR/PLS OXIMETRY 1: CPT

## 2025-01-18 PROCEDURE — 6360000002 HC RX W HCPCS: Performed by: SURGERY

## 2025-01-18 PROCEDURE — 2700000000 HC OXYGEN THERAPY PER DAY

## 2025-01-18 PROCEDURE — 80048 BASIC METABOLIC PNL TOTAL CA: CPT

## 2025-01-18 PROCEDURE — 82803 BLOOD GASES ANY COMBINATION: CPT

## 2025-01-18 PROCEDURE — 1200000000 HC SEMI PRIVATE

## 2025-01-18 PROCEDURE — 84478 ASSAY OF TRIGLYCERIDES: CPT

## 2025-01-18 PROCEDURE — 94640 AIRWAY INHALATION TREATMENT: CPT

## 2025-01-18 PROCEDURE — 82962 GLUCOSE BLOOD TEST: CPT

## 2025-01-18 PROCEDURE — 6360000002 HC RX W HCPCS: Performed by: INTERNAL MEDICINE

## 2025-01-18 PROCEDURE — 6360000002 HC RX W HCPCS: Performed by: NURSE PRACTITIONER

## 2025-01-18 RX ORDER — ONDANSETRON 2 MG/ML
4 INJECTION INTRAMUSCULAR; INTRAVENOUS EVERY 8 HOURS PRN
Status: DISCONTINUED | OUTPATIENT
Start: 2025-01-18 | End: 2025-01-26 | Stop reason: HOSPADM

## 2025-01-18 RX ORDER — IOPAMIDOL 755 MG/ML
70 INJECTION, SOLUTION INTRAVASCULAR
Status: COMPLETED | OUTPATIENT
Start: 2025-01-18 | End: 2025-01-18

## 2025-01-18 RX ORDER — LABETALOL HYDROCHLORIDE 5 MG/ML
10 INJECTION, SOLUTION INTRAVENOUS EVERY 6 HOURS PRN
Status: DISCONTINUED | OUTPATIENT
Start: 2025-01-18 | End: 2025-01-26 | Stop reason: HOSPADM

## 2025-01-18 RX ORDER — BUMETANIDE 0.25 MG/ML
1 INJECTION, SOLUTION INTRAMUSCULAR; INTRAVENOUS ONCE
Status: COMPLETED | OUTPATIENT
Start: 2025-01-18 | End: 2025-01-18

## 2025-01-18 RX ADMIN — I.V. FAT EMULSION 250 ML: 20 EMULSION INTRAVENOUS at 21:30

## 2025-01-18 RX ADMIN — METOPROLOL TARTRATE 5 MG: 5 INJECTION INTRAVENOUS at 05:13

## 2025-01-18 RX ADMIN — LABETALOL HYDROCHLORIDE 10 MG: 5 INJECTION, SOLUTION INTRAVENOUS at 19:26

## 2025-01-18 RX ADMIN — Medication 2 PUFF: at 07:50

## 2025-01-18 RX ADMIN — SULFUR HEXAFLUORIDE 2 ML: 60.7; .19; .19 INJECTION, POWDER, LYOPHILIZED, FOR SUSPENSION INTRAVENOUS; INTRAVESICAL at 16:27

## 2025-01-18 RX ADMIN — BUMETANIDE 1 MG: 0.25 INJECTION INTRAMUSCULAR; INTRAVENOUS at 13:42

## 2025-01-18 RX ADMIN — HEPARIN SODIUM 5000 UNITS: 5000 INJECTION INTRAVENOUS; SUBCUTANEOUS at 12:20

## 2025-01-18 RX ADMIN — HEPARIN SODIUM 5000 UNITS: 5000 INJECTION INTRAVENOUS; SUBCUTANEOUS at 21:33

## 2025-01-18 RX ADMIN — CYANOCOBALAMIN 1000 MCG: 1000 INJECTION, SOLUTION INTRAMUSCULAR at 12:20

## 2025-01-18 RX ADMIN — ASCORBIC ACID, VITAMIN A PALMITATE, CHOLECALCIFEROL, THIAMINE HYDROCHLORIDE, RIBOFLAVIN-5 PHOSPHATE SODIUM, PYRIDOXINE HYDROCHLORIDE, NIACINAMIDE, DEXPANTHENOL, ALPHA-TOCOPHEROL ACETATE, VITAMIN K1, FOLIC ACID, BIOTIN, CYANOCOBALAMIN: 200; 3300; 200; 6; 3.6; 6; 40; 15; 10; 150; 600; 60; 5 INJECTION, SOLUTION INTRAVENOUS at 21:26

## 2025-01-18 RX ADMIN — SODIUM CHLORIDE, PRESERVATIVE FREE 40 MG: 5 INJECTION INTRAVENOUS at 19:26

## 2025-01-18 RX ADMIN — IOPAMIDOL 70 ML: 755 INJECTION, SOLUTION INTRAVENOUS at 17:10

## 2025-01-18 RX ADMIN — LABETALOL HYDROCHLORIDE 10 MG: 5 INJECTION, SOLUTION INTRAVENOUS at 12:11

## 2025-01-18 RX ADMIN — SODIUM CHLORIDE, PRESERVATIVE FREE 10 ML: 5 INJECTION INTRAVENOUS at 21:36

## 2025-01-18 NOTE — PLAN OF CARE
Problem: Pain  Goal: Verbalizes/displays adequate comfort level or baseline comfort level  Outcome: Progressing     Problem: Safety - Adult  Goal: Free from fall injury  Outcome: Progressing  Flowsheets (Taken 1/17/2025 2238)  Free From Fall Injury: Instruct family/caregiver on patient safety     Problem: ABCDS Injury Assessment  Goal: Absence of physical injury  Outcome: Progressing  Flowsheets (Taken 1/17/2025 2238)  Absence of Physical Injury: Implement safety measures based on patient assessment     Problem: Nutrition Deficit:  Goal: Optimize nutritional status  Outcome: Progressing     Problem: Pain  Goal: Verbalizes/displays adequate comfort level or baseline comfort level  Outcome: Progressing     Problem: Safety - Adult  Goal: Free from fall injury  Outcome: Progressing  Flowsheets (Taken 1/17/2025 2238)  Free From Fall Injury: Instruct family/caregiver on patient safety     Problem: ABCDS Injury Assessment  Goal: Absence of physical injury  Outcome: Progressing  Flowsheets (Taken 1/17/2025 2238)  Absence of Physical Injury: Implement safety measures based on patient assessment     Problem: Nutrition Deficit:  Goal: Optimize nutritional status  Outcome: Progressing     Problem: Discharge Planning  Goal: Discharge to home or other facility with appropriate resources  Recent Flowsheet Documentation  Taken 1/17/2025 2034 by Eloina Maldonado, RN  Discharge to home or other facility with appropriate resources:   Identify barriers to discharge with patient and caregiver   Arrange for needed discharge resources and transportation as appropriate   Identify discharge learning needs (meds, wound care, etc)   Refer to discharge planning if patient needs post-hospital services based on physician order or complex needs related to functional status, cognitive ability or social support system     Problem: Safety - Adult  Goal: Free from fall injury  Recent Flowsheet Documentation  Taken 1/17/2025 2238 by Eloina Maldonado, RN  Yojana

## 2025-01-19 LAB
ALBUMIN SERPL-MCNC: 2.6 G/DL (ref 3.5–5.2)
ALP SERPL-CCNC: 152 U/L (ref 40–129)
ALT SERPL-CCNC: 39 U/L (ref 5–41)
ANION GAP SERPL CALCULATED.3IONS-SCNC: 8 MMOL/L (ref 7–19)
AST SERPL-CCNC: 43 U/L (ref 5–40)
BILIRUB DIRECT SERPL-MCNC: 0.2 MG/DL (ref 0–0.3)
BILIRUB INDIRECT SERPL-MCNC: 0.2 MG/DL (ref 0–1)
BILIRUB SERPL-MCNC: 0.4 MG/DL (ref 0.2–1.2)
BUN SERPL-MCNC: 12 MG/DL (ref 8–23)
CALCIUM SERPL-MCNC: 7.8 MG/DL (ref 8.8–10.2)
CHLORIDE SERPL-SCNC: 102 MMOL/L (ref 98–111)
CO2 SERPL-SCNC: 27 MMOL/L (ref 22–29)
CREAT SERPL-MCNC: 1 MG/DL (ref 0.7–1.2)
ECHO AO ASC DIAM: 3.3 CM
ECHO AO ASCENDING AORTA INDEX: 1.79 CM/M2
ECHO AO ROOT DIAM: 2.1 CM
ECHO AO ROOT INDEX: 1.14 CM/M2
ECHO AO SINUS VALSALVA DIAM: 3.4 CM
ECHO AO SINUS VALSALVA INDEX: 1.85 CM/M2
ECHO AV AREA PEAK VELOCITY: 2.8 CM2
ECHO AV AREA VTI: 3.2 CM2
ECHO AV AREA/BSA PEAK VELOCITY: 1.5 CM2/M2
ECHO AV AREA/BSA VTI: 1.7 CM2/M2
ECHO AV MEAN GRADIENT: 2 MMHG
ECHO AV MEAN VELOCITY: 0.6 M/S
ECHO AV PEAK GRADIENT: 3 MMHG
ECHO AV PEAK VELOCITY: 0.9 M/S
ECHO AV VELOCITY RATIO: 0.89
ECHO AV VTI: 16.1 CM
ECHO BSA: 1.83 M2
ECHO LA AREA 2C: 20.2 CM2
ECHO LA AREA 4C: 19.3 CM2
ECHO LA DIAMETER INDEX: 2.07 CM/M2
ECHO LA DIAMETER: 3.8 CM
ECHO LA MAJOR AXIS: 5.1 CM
ECHO LA MINOR AXIS: 5.5 CM
ECHO LA TO AORTIC ROOT RATIO: 1.81
ECHO LA VOL BP: 61 ML (ref 18–58)
ECHO LA VOL MOD A2C: 61 ML (ref 18–58)
ECHO LA VOL MOD A4C: 57 ML (ref 18–58)
ECHO LA VOL/BSA BIPLANE: 33 ML/M2 (ref 16–34)
ECHO LA VOLUME INDEX MOD A2C: 33 ML/M2 (ref 16–34)
ECHO LA VOLUME INDEX MOD A4C: 31 ML/M2 (ref 16–34)
ECHO LV E' LATERAL VELOCITY: 8.27 CM/S
ECHO LV E' SEPTAL VELOCITY: 4.79 CM/S
ECHO LV EDV A2C: 135 ML
ECHO LV EDV A4C: 109 ML
ECHO LV EDV INDEX A4C: 59 ML/M2
ECHO LV EDV NDEX A2C: 73 ML/M2
ECHO LV EJECTION FRACTION A2C: 38 %
ECHO LV EJECTION FRACTION A4C: 40 %
ECHO LV EJECTION FRACTION BIPLANE: 39 % (ref 55–100)
ECHO LV ESV A2C: 83 ML
ECHO LV ESV A4C: 66 ML
ECHO LV ESV INDEX A2C: 45 ML/M2
ECHO LV ESV INDEX A4C: 36 ML/M2
ECHO LV FRACTIONAL SHORTENING: 10 % (ref 28–44)
ECHO LV INTERNAL DIMENSION DIASTOLE INDEX: 2.77 CM/M2
ECHO LV INTERNAL DIMENSION DIASTOLIC: 5.1 CM (ref 4.2–5.9)
ECHO LV INTERNAL DIMENSION SYSTOLIC INDEX: 2.5 CM/M2
ECHO LV INTERNAL DIMENSION SYSTOLIC: 4.6 CM
ECHO LV IVSD: 1 CM (ref 0.6–1)
ECHO LV MASS 2D: 188 G (ref 88–224)
ECHO LV MASS INDEX 2D: 102.2 G/M2 (ref 49–115)
ECHO LV POSTERIOR WALL DIASTOLIC: 1 CM (ref 0.6–1)
ECHO LV RELATIVE WALL THICKNESS RATIO: 0.39
ECHO LVOT AREA: 3.1 CM2
ECHO LVOT AV VTI INDEX: 1.03
ECHO LVOT DIAM: 2 CM
ECHO LVOT MEAN GRADIENT: 1 MMHG
ECHO LVOT PEAK GRADIENT: 2 MMHG
ECHO LVOT PEAK VELOCITY: 0.8 M/S
ECHO LVOT STROKE VOLUME INDEX: 28.3 ML/M2
ECHO LVOT SV: 52.1 ML
ECHO LVOT VTI: 16.6 CM
ECHO MV A VELOCITY: 0.69 M/S
ECHO MV AREA VTI: 2.7 CM2
ECHO MV E DECELERATION TIME (DT): 200 MS
ECHO MV E VELOCITY: 0.83 M/S
ECHO MV E/A RATIO: 1.2
ECHO MV E/E' LATERAL: 10.04
ECHO MV E/E' RATIO (AVERAGED): 13.68
ECHO MV E/E' SEPTAL: 17.33
ECHO MV LVOT VTI INDEX: 1.17
ECHO MV MAX VELOCITY: 0.9 M/S
ECHO MV MEAN GRADIENT: 2 MMHG
ECHO MV MEAN VELOCITY: 0.7 M/S
ECHO MV PEAK GRADIENT: 3 MMHG
ECHO MV VTI: 19.4 CM
ECHO RA AREA 4C: 11 CM2
ECHO RA END SYSTOLIC VOLUME APICAL 4 CHAMBER INDEX BSA: 11 ML/M2
ECHO RA VOLUME: 21 ML
ECHO RV BASAL DIMENSION: 3.1 CM
ECHO RV INTERNAL DIMENSION: 2.5 CM
ECHO RV LONGITUDINAL DIMENSION: 7.5 CM
ECHO RV MID DIMENSION: 2.3 CM
ECHO RV TAPSE: 1.1 CM (ref 1.7–?)
ECHO TV REGURGITANT MAX VELOCITY: 2.71 M/S
ECHO TV REGURGITANT PEAK GRADIENT: 29 MMHG
EKG P AXIS: 63 DEGREES
EKG P AXIS: 79 DEGREES
EKG P-R INTERVAL: 128 MS
EKG P-R INTERVAL: 130 MS
EKG Q-T INTERVAL: 320 MS
EKG Q-T INTERVAL: 374 MS
EKG QRS DURATION: 76 MS
EKG QRS DURATION: 76 MS
EKG QTC CALCULATION (BAZETT): 407 MS
EKG QTC CALCULATION (BAZETT): 433 MS
EKG T AXIS: 175 DEGREES
EKG T AXIS: 47 DEGREES
ERYTHROCYTE [DISTWIDTH] IN BLOOD BY AUTOMATED COUNT: 14.7 % (ref 11.5–14.5)
GLUCOSE BLD-MCNC: 126 MG/DL (ref 70–99)
GLUCOSE BLD-MCNC: 129 MG/DL (ref 70–99)
GLUCOSE BLD-MCNC: 136 MG/DL (ref 70–99)
GLUCOSE BLD-MCNC: 139 MG/DL (ref 70–99)
GLUCOSE SERPL-MCNC: 116 MG/DL (ref 70–99)
HCT VFR BLD AUTO: 28.3 % (ref 42–52)
HGB BLD-MCNC: 8.9 G/DL (ref 14–18)
MAGNESIUM SERPL-MCNC: 1.8 MG/DL (ref 1.6–2.4)
MCH RBC QN AUTO: 28.1 PG (ref 27–31)
MCHC RBC AUTO-ENTMCNC: 31.4 G/DL (ref 33–37)
MCV RBC AUTO: 89.3 FL (ref 80–94)
PERFORMED ON: ABNORMAL
PHOSPHATE SERPL-MCNC: 2.9 MG/DL (ref 2.5–4.5)
PLATELET # BLD AUTO: 193 K/UL (ref 130–400)
PMV BLD AUTO: 9.7 FL (ref 9.4–12.4)
POTASSIUM SERPL-SCNC: 3.6 MMOL/L (ref 3.5–5)
PROT SERPL-MCNC: 5.5 G/DL (ref 6.4–8.3)
RBC # BLD AUTO: 3.17 M/UL (ref 4.7–6.1)
SODIUM SERPL-SCNC: 137 MMOL/L (ref 136–145)
TROPONIN, HIGH SENSITIVITY: 189 NG/L (ref 0–22)
TROPONIN, HIGH SENSITIVITY: 199 NG/L (ref 0–22)
WBC # BLD AUTO: 12.3 K/UL (ref 4.8–10.8)

## 2025-01-19 PROCEDURE — 6370000000 HC RX 637 (ALT 250 FOR IP): Performed by: INTERNAL MEDICINE

## 2025-01-19 PROCEDURE — 6370000000 HC RX 637 (ALT 250 FOR IP): Performed by: NURSE PRACTITIONER

## 2025-01-19 PROCEDURE — 93356 MYOCRD STRAIN IMG SPCKL TRCK: CPT | Performed by: INTERNAL MEDICINE

## 2025-01-19 PROCEDURE — 6360000002 HC RX W HCPCS: Performed by: SURGERY

## 2025-01-19 PROCEDURE — 99222 1ST HOSP IP/OBS MODERATE 55: CPT | Performed by: INTERNAL MEDICINE

## 2025-01-19 PROCEDURE — 6370000000 HC RX 637 (ALT 250 FOR IP): Performed by: SURGERY

## 2025-01-19 PROCEDURE — 83735 ASSAY OF MAGNESIUM: CPT

## 2025-01-19 PROCEDURE — 94640 AIRWAY INHALATION TREATMENT: CPT

## 2025-01-19 PROCEDURE — 93010 ELECTROCARDIOGRAM REPORT: CPT | Performed by: INTERNAL MEDICINE

## 2025-01-19 PROCEDURE — 2500000003 HC RX 250 WO HCPCS: Performed by: STUDENT IN AN ORGANIZED HEALTH CARE EDUCATION/TRAINING PROGRAM

## 2025-01-19 PROCEDURE — 2700000000 HC OXYGEN THERAPY PER DAY

## 2025-01-19 PROCEDURE — 93005 ELECTROCARDIOGRAM TRACING: CPT | Performed by: NURSE PRACTITIONER

## 2025-01-19 PROCEDURE — 80076 HEPATIC FUNCTION PANEL: CPT

## 2025-01-19 PROCEDURE — 85027 COMPLETE CBC AUTOMATED: CPT

## 2025-01-19 PROCEDURE — 93306 TTE W/DOPPLER COMPLETE: CPT | Performed by: INTERNAL MEDICINE

## 2025-01-19 PROCEDURE — 6360000002 HC RX W HCPCS: Performed by: INTERNAL MEDICINE

## 2025-01-19 PROCEDURE — 84484 ASSAY OF TROPONIN QUANT: CPT

## 2025-01-19 PROCEDURE — 2580000003 HC RX 258: Performed by: SURGERY

## 2025-01-19 PROCEDURE — 84100 ASSAY OF PHOSPHORUS: CPT

## 2025-01-19 PROCEDURE — 1200000000 HC SEMI PRIVATE

## 2025-01-19 PROCEDURE — 80048 BASIC METABOLIC PNL TOTAL CA: CPT

## 2025-01-19 PROCEDURE — 6360000002 HC RX W HCPCS: Performed by: NURSE PRACTITIONER

## 2025-01-19 PROCEDURE — 82962 GLUCOSE BLOOD TEST: CPT

## 2025-01-19 PROCEDURE — 2580000003 HC RX 258: Performed by: INTERNAL MEDICINE

## 2025-01-19 PROCEDURE — 94760 N-INVAS EAR/PLS OXIMETRY 1: CPT

## 2025-01-19 RX ORDER — SPIRONOLACTONE 25 MG/1
25 TABLET ORAL DAILY
Status: DISCONTINUED | OUTPATIENT
Start: 2025-01-19 | End: 2025-01-21

## 2025-01-19 RX ORDER — CARVEDILOL 6.25 MG/1
6.25 TABLET ORAL 2 TIMES DAILY WITH MEALS
Status: DISCONTINUED | OUTPATIENT
Start: 2025-01-19 | End: 2025-01-19

## 2025-01-19 RX ORDER — BUMETANIDE 1 MG/1
2 TABLET ORAL DAILY
Status: DISCONTINUED | OUTPATIENT
Start: 2025-01-19 | End: 2025-01-21

## 2025-01-19 RX ORDER — CARVEDILOL 3.12 MG/1
3.12 TABLET ORAL 2 TIMES DAILY WITH MEALS
Status: DISCONTINUED | OUTPATIENT
Start: 2025-01-20 | End: 2025-01-19

## 2025-01-19 RX ORDER — 0.9 % SODIUM CHLORIDE 0.9 %
500 INTRAVENOUS SOLUTION INTRAVENOUS ONCE
Status: COMPLETED | OUTPATIENT
Start: 2025-01-19 | End: 2025-01-20

## 2025-01-19 RX ORDER — CARVEDILOL 6.25 MG/1
6.25 TABLET ORAL 2 TIMES DAILY WITH MEALS
Status: DISCONTINUED | OUTPATIENT
Start: 2025-01-20 | End: 2025-01-20

## 2025-01-19 RX ADMIN — CARVEDILOL 6.25 MG: 6.25 TABLET, FILM COATED ORAL at 18:03

## 2025-01-19 RX ADMIN — CYANOCOBALAMIN 1000 MCG: 1000 INJECTION, SOLUTION INTRAMUSCULAR at 08:46

## 2025-01-19 RX ADMIN — SACUBITRIL AND VALSARTAN 1 TABLET: 24; 26 TABLET, FILM COATED ORAL at 15:08

## 2025-01-19 RX ADMIN — SODIUM CHLORIDE, PRESERVATIVE FREE 40 MG: 5 INJECTION INTRAVENOUS at 11:07

## 2025-01-19 RX ADMIN — Medication 2 PUFF: at 10:18

## 2025-01-19 RX ADMIN — HEPARIN SODIUM 5000 UNITS: 5000 INJECTION INTRAVENOUS; SUBCUTANEOUS at 15:08

## 2025-01-19 RX ADMIN — BUMETANIDE 2 MG: 1 TABLET ORAL at 15:08

## 2025-01-19 RX ADMIN — ASCORBIC ACID, VITAMIN A PALMITATE, CHOLECALCIFEROL, THIAMINE HYDROCHLORIDE, RIBOFLAVIN-5 PHOSPHATE SODIUM, PYRIDOXINE HYDROCHLORIDE, NIACINAMIDE, DEXPANTHENOL, ALPHA-TOCOPHEROL ACETATE, VITAMIN K1, FOLIC ACID, BIOTIN, CYANOCOBALAMIN: 200; 3300; 200; 6; 3.6; 6; 40; 15; 10; 150; 600; 60; 5 INJECTION, SOLUTION INTRAVENOUS at 22:54

## 2025-01-19 RX ADMIN — HEPARIN SODIUM 5000 UNITS: 5000 INJECTION INTRAVENOUS; SUBCUTANEOUS at 11:06

## 2025-01-19 RX ADMIN — SIMETHICONE 80 MG: 80 TABLET, CHEWABLE ORAL at 06:57

## 2025-01-19 RX ADMIN — HEPARIN SODIUM 5000 UNITS: 5000 INJECTION INTRAVENOUS; SUBCUTANEOUS at 22:55

## 2025-01-19 RX ADMIN — ATORVASTATIN CALCIUM 80 MG: 80 TABLET, FILM COATED ORAL at 08:45

## 2025-01-19 RX ADMIN — METOPROLOL TARTRATE 25 MG: 25 TABLET, FILM COATED ORAL at 11:00

## 2025-01-19 RX ADMIN — SPIRONOLACTONE 25 MG: 25 TABLET ORAL at 15:08

## 2025-01-19 RX ADMIN — ACETAMINOPHEN 1000 MG: 500 TABLET ORAL at 15:08

## 2025-01-19 RX ADMIN — SODIUM CHLORIDE 500 ML: 9 INJECTION, SOLUTION INTRAVENOUS at 21:14

## 2025-01-19 RX ADMIN — ONDANSETRON 4 MG: 2 INJECTION INTRAMUSCULAR; INTRAVENOUS at 03:54

## 2025-01-19 RX ADMIN — ACETAMINOPHEN 1000 MG: 500 TABLET ORAL at 11:00

## 2025-01-19 NOTE — PLAN OF CARE
Problem: Pain  Goal: Verbalizes/displays adequate comfort level or baseline comfort level  Outcome: Progressing     Problem: Safety - Adult  Goal: Free from fall injury  Outcome: Progressing  Flowsheets (Taken 1/19/2025 0337 by Salome Gan, RN)  Free From Fall Injury: Instruct family/caregiver on patient safety     Problem: ABCDS Injury Assessment  Goal: Absence of physical injury  Outcome: Progressing  Flowsheets (Taken 1/19/2025 0337 by Salome Gan, RN)  Absence of Physical Injury: Implement safety measures based on patient assessment     Problem: Nutrition Deficit:  Goal: Optimize nutritional status  Outcome: Progressing

## 2025-01-20 PROBLEM — Z51.5 PALLIATIVE CARE PATIENT: Status: ACTIVE | Noted: 2025-01-20

## 2025-01-20 PROBLEM — I50.21 ACUTE SYSTOLIC HEART FAILURE (HCC): Status: ACTIVE | Noted: 2025-01-10

## 2025-01-20 PROBLEM — I25.10 CAD (CORONARY ARTERY DISEASE): Status: ACTIVE | Noted: 2025-01-20

## 2025-01-20 PROBLEM — I25.5 ISCHEMIC CARDIOMYOPATHY: Status: ACTIVE | Noted: 2025-01-20

## 2025-01-20 LAB
ALBUMIN SERPL-MCNC: 2 G/DL (ref 3.5–5.2)
ALP SERPL-CCNC: 135 U/L (ref 40–129)
ALT SERPL-CCNC: 36 U/L (ref 5–41)
ANION GAP SERPL CALCULATED.3IONS-SCNC: 9 MMOL/L (ref 7–19)
AST SERPL-CCNC: 33 U/L (ref 5–40)
BILIRUB DIRECT SERPL-MCNC: 0.1 MG/DL (ref 0–0.3)
BILIRUB INDIRECT SERPL-MCNC: 0.2 MG/DL (ref 0–1)
BILIRUB SERPL-MCNC: 0.3 MG/DL (ref 0.2–1.2)
BUN SERPL-MCNC: 17 MG/DL (ref 8–23)
CALCIUM SERPL-MCNC: 7.2 MG/DL (ref 8.8–10.2)
CHLORIDE SERPL-SCNC: 100 MMOL/L (ref 98–111)
CO2 SERPL-SCNC: 27 MMOL/L (ref 22–29)
CREAT SERPL-MCNC: 1.1 MG/DL (ref 0.7–1.2)
ECHO BSA: 1.83 M2
EKG P AXIS: 65 DEGREES
EKG P AXIS: 85 DEGREES
EKG P-R INTERVAL: 126 MS
EKG P-R INTERVAL: 128 MS
EKG Q-T INTERVAL: 328 MS
EKG Q-T INTERVAL: 362 MS
EKG QRS DURATION: 76 MS
EKG QRS DURATION: 84 MS
EKG QTC CALCULATION (BAZETT): 394 MS
EKG QTC CALCULATION (BAZETT): 418 MS
EKG T AXIS: -175 DEGREES
EKG T AXIS: -76 DEGREES
ERYTHROCYTE [DISTWIDTH] IN BLOOD BY AUTOMATED COUNT: 15 % (ref 11.5–14.5)
GLUCOSE BLD-MCNC: 138 MG/DL (ref 70–99)
GLUCOSE BLD-MCNC: 140 MG/DL (ref 70–99)
GLUCOSE SERPL-MCNC: 121 MG/DL (ref 70–99)
HCT VFR BLD AUTO: 27.2 % (ref 42–52)
HGB BLD-MCNC: 8.4 G/DL (ref 14–18)
MAGNESIUM SERPL-MCNC: 2 MG/DL (ref 1.6–2.4)
MCH RBC QN AUTO: 28 PG (ref 27–31)
MCHC RBC AUTO-ENTMCNC: 30.9 G/DL (ref 33–37)
MCV RBC AUTO: 90.7 FL (ref 80–94)
PERFORMED ON: ABNORMAL
PERFORMED ON: ABNORMAL
PHOSPHATE SERPL-MCNC: 3.5 MG/DL (ref 2.5–4.5)
PLATELET # BLD AUTO: 177 K/UL (ref 130–400)
PMV BLD AUTO: 11 FL (ref 9.4–12.4)
POTASSIUM SERPL-SCNC: 3.3 MMOL/L (ref 3.5–5)
PROT SERPL-MCNC: 5 G/DL (ref 6.4–8.3)
RBC # BLD AUTO: 3 M/UL (ref 4.7–6.1)
SODIUM SERPL-SCNC: 136 MMOL/L (ref 136–145)
TROPONIN, HIGH SENSITIVITY: 177 NG/L (ref 0–22)
WBC # BLD AUTO: 9.6 K/UL (ref 4.8–10.8)

## 2025-01-20 PROCEDURE — 6370000000 HC RX 637 (ALT 250 FOR IP): Performed by: SURGERY

## 2025-01-20 PROCEDURE — 94760 N-INVAS EAR/PLS OXIMETRY 1: CPT

## 2025-01-20 PROCEDURE — 80048 BASIC METABOLIC PNL TOTAL CA: CPT

## 2025-01-20 PROCEDURE — C1887 CATHETER, GUIDING: HCPCS | Performed by: INTERNAL MEDICINE

## 2025-01-20 PROCEDURE — 02703ZZ DILATION OF CORONARY ARTERY, ONE ARTERY, PERCUTANEOUS APPROACH: ICD-10-PCS | Performed by: INTERNAL MEDICINE

## 2025-01-20 PROCEDURE — 99223 1ST HOSP IP/OBS HIGH 75: CPT

## 2025-01-20 PROCEDURE — 94640 AIRWAY INHALATION TREATMENT: CPT

## 2025-01-20 PROCEDURE — 82962 GLUCOSE BLOOD TEST: CPT

## 2025-01-20 PROCEDURE — 84100 ASSAY OF PHOSPHORUS: CPT

## 2025-01-20 PROCEDURE — 2580000003 HC RX 258: Performed by: INTERNAL MEDICINE

## 2025-01-20 PROCEDURE — 6370000000 HC RX 637 (ALT 250 FOR IP): Performed by: INTERNAL MEDICINE

## 2025-01-20 PROCEDURE — 6360000002 HC RX W HCPCS: Performed by: INTERNAL MEDICINE

## 2025-01-20 PROCEDURE — 4A023N7 MEASUREMENT OF CARDIAC SAMPLING AND PRESSURE, LEFT HEART, PERCUTANEOUS APPROACH: ICD-10-PCS | Performed by: INTERNAL MEDICINE

## 2025-01-20 PROCEDURE — 99232 SBSQ HOSP IP/OBS MODERATE 35: CPT | Performed by: INTERNAL MEDICINE

## 2025-01-20 PROCEDURE — 6360000002 HC RX W HCPCS: Performed by: SURGERY

## 2025-01-20 PROCEDURE — C1760 CLOSURE DEV, VASC: HCPCS | Performed by: INTERNAL MEDICINE

## 2025-01-20 PROCEDURE — 83735 ASSAY OF MAGNESIUM: CPT

## 2025-01-20 PROCEDURE — C1874 STENT, COATED/COV W/DEL SYS: HCPCS | Performed by: INTERNAL MEDICINE

## 2025-01-20 PROCEDURE — 93010 ELECTROCARDIOGRAM REPORT: CPT | Performed by: INTERNAL MEDICINE

## 2025-01-20 PROCEDURE — 99152 MOD SED SAME PHYS/QHP 5/>YRS: CPT | Performed by: INTERNAL MEDICINE

## 2025-01-20 PROCEDURE — 2700000000 HC OXYGEN THERAPY PER DAY

## 2025-01-20 PROCEDURE — 027135Z DILATION OF CORONARY ARTERY, TWO ARTERIES WITH TWO DRUG-ELUTING INTRALUMINAL DEVICES, PERCUTANEOUS APPROACH: ICD-10-PCS | Performed by: INTERNAL MEDICINE

## 2025-01-20 PROCEDURE — 51702 INSERT TEMP BLADDER CATH: CPT

## 2025-01-20 PROCEDURE — 92921 HC PRQ CARDIAC ANGIO ADDL ART: CPT | Performed by: INTERNAL MEDICINE

## 2025-01-20 PROCEDURE — 80076 HEPATIC FUNCTION PANEL: CPT

## 2025-01-20 PROCEDURE — 93459 L HRT ART/GRFT ANGIO: CPT | Performed by: INTERNAL MEDICINE

## 2025-01-20 PROCEDURE — C1894 INTRO/SHEATH, NON-LASER: HCPCS | Performed by: INTERNAL MEDICINE

## 2025-01-20 PROCEDURE — 92928 PRQ TCAT PLMT NTRAC ST 1 LES: CPT | Performed by: INTERNAL MEDICINE

## 2025-01-20 PROCEDURE — 93005 ELECTROCARDIOGRAM TRACING: CPT | Performed by: INTERNAL MEDICINE

## 2025-01-20 PROCEDURE — 2500000003 HC RX 250 WO HCPCS: Performed by: NURSE PRACTITIONER

## 2025-01-20 PROCEDURE — 93571 IV DOP VEL&/PRESS C FLO 1ST: CPT | Performed by: INTERNAL MEDICINE

## 2025-01-20 PROCEDURE — 6360000004 HC RX CONTRAST MEDICATION: Performed by: INTERNAL MEDICINE

## 2025-01-20 PROCEDURE — 93799 UNLISTED CV SVC/PROCEDURE: CPT | Performed by: INTERNAL MEDICINE

## 2025-01-20 PROCEDURE — 2140000000 HC CCU INTERMEDIATE R&B

## 2025-01-20 PROCEDURE — 84484 ASSAY OF TROPONIN QUANT: CPT

## 2025-01-20 PROCEDURE — 2709999900 HC NON-CHARGEABLE SUPPLY: Performed by: INTERNAL MEDICINE

## 2025-01-20 PROCEDURE — 99153 MOD SED SAME PHYS/QHP EA: CPT | Performed by: INTERNAL MEDICINE

## 2025-01-20 PROCEDURE — C1725 CATH, TRANSLUMIN NON-LASER: HCPCS | Performed by: INTERNAL MEDICINE

## 2025-01-20 PROCEDURE — B2151ZZ FLUOROSCOPY OF LEFT HEART USING LOW OSMOLAR CONTRAST: ICD-10-PCS | Performed by: INTERNAL MEDICINE

## 2025-01-20 PROCEDURE — B2131ZZ FLUOROSCOPY OF MULTIPLE CORONARY ARTERY BYPASS GRAFTS USING LOW OSMOLAR CONTRAST: ICD-10-PCS | Performed by: INTERNAL MEDICINE

## 2025-01-20 PROCEDURE — 6370000000 HC RX 637 (ALT 250 FOR IP): Performed by: NURSE PRACTITIONER

## 2025-01-20 PROCEDURE — B2111ZZ FLUOROSCOPY OF MULTIPLE CORONARY ARTERIES USING LOW OSMOLAR CONTRAST: ICD-10-PCS | Performed by: INTERNAL MEDICINE

## 2025-01-20 PROCEDURE — 2500000003 HC RX 250 WO HCPCS: Performed by: INTERNAL MEDICINE

## 2025-01-20 PROCEDURE — 92929 PR PRQ TRLUML CORONARY STENT W/ANGIO ADDL ART/BRNCH: CPT | Performed by: INTERNAL MEDICINE

## 2025-01-20 PROCEDURE — C1769 GUIDE WIRE: HCPCS | Performed by: INTERNAL MEDICINE

## 2025-01-20 PROCEDURE — 92943 PRQ TRLUML REVSC CH OCC ANT: CPT | Performed by: INTERNAL MEDICINE

## 2025-01-20 PROCEDURE — 2580000003 HC RX 258: Performed by: SURGERY

## 2025-01-20 PROCEDURE — 85027 COMPLETE CBC AUTOMATED: CPT

## 2025-01-20 PROCEDURE — 6360000002 HC RX W HCPCS: Performed by: NURSE PRACTITIONER

## 2025-01-20 DEVICE — STENT ONYXNG22530UX ONYX 2.25X30RX
Type: IMPLANTABLE DEVICE | Status: FUNCTIONAL
Brand: ONYX FRONTIER™

## 2025-01-20 DEVICE — STENT ONYXNG30038UX ONYX 3.00X38RX
Type: IMPLANTABLE DEVICE | Status: FUNCTIONAL
Brand: ONYX FRONTIER™

## 2025-01-20 RX ORDER — HEPARIN SODIUM 1000 [USP'U]/ML
INJECTION, SOLUTION INTRAVENOUS; SUBCUTANEOUS PRN
Status: DISCONTINUED | OUTPATIENT
Start: 2025-01-20 | End: 2025-01-20 | Stop reason: HOSPADM

## 2025-01-20 RX ORDER — FENTANYL CITRATE 50 UG/ML
INJECTION, SOLUTION INTRAMUSCULAR; INTRAVENOUS PRN
Status: DISCONTINUED | OUTPATIENT
Start: 2025-01-20 | End: 2025-01-20 | Stop reason: HOSPADM

## 2025-01-20 RX ORDER — CYANOCOBALAMIN 1000 UG/ML
1000 INJECTION, SOLUTION INTRAMUSCULAR; SUBCUTANEOUS WEEKLY
Status: DISCONTINUED | OUTPATIENT
Start: 2025-01-27 | End: 2025-01-26 | Stop reason: HOSPADM

## 2025-01-20 RX ORDER — ASPIRIN 81 MG/1
81 TABLET ORAL DAILY
Status: DISCONTINUED | OUTPATIENT
Start: 2025-01-21 | End: 2025-01-26 | Stop reason: HOSPADM

## 2025-01-20 RX ORDER — BIVALIRUDIN 250 MG/5ML
INJECTION, POWDER, LYOPHILIZED, FOR SOLUTION INTRAVENOUS PRN
Status: DISCONTINUED | OUTPATIENT
Start: 2025-01-20 | End: 2025-01-20 | Stop reason: HOSPADM

## 2025-01-20 RX ORDER — CLOPIDOGREL BISULFATE 75 MG/1
75 TABLET ORAL DAILY
Status: DISCONTINUED | OUTPATIENT
Start: 2025-01-21 | End: 2025-01-26 | Stop reason: HOSPADM

## 2025-01-20 RX ORDER — IODIXANOL 320 MG/ML
INJECTION, SOLUTION INTRAVASCULAR PRN
Status: DISCONTINUED | OUTPATIENT
Start: 2025-01-20 | End: 2025-01-20 | Stop reason: HOSPADM

## 2025-01-20 RX ORDER — CLOPIDOGREL BISULFATE 75 MG/1
75 TABLET ORAL DAILY
Status: DISCONTINUED | OUTPATIENT
Start: 2025-01-21 | End: 2025-01-20 | Stop reason: SDUPTHER

## 2025-01-20 RX ORDER — CARVEDILOL 6.25 MG/1
3.12 TABLET ORAL 2 TIMES DAILY WITH MEALS
Status: DISCONTINUED | OUTPATIENT
Start: 2025-01-21 | End: 2025-01-26 | Stop reason: HOSPADM

## 2025-01-20 RX ORDER — CLOPIDOGREL 300 MG/1
600 TABLET, FILM COATED ORAL ONCE
Status: COMPLETED | OUTPATIENT
Start: 2025-01-20 | End: 2025-01-20

## 2025-01-20 RX ORDER — MIDAZOLAM HYDROCHLORIDE 1 MG/ML
INJECTION, SOLUTION INTRAMUSCULAR; INTRAVENOUS PRN
Status: DISCONTINUED | OUTPATIENT
Start: 2025-01-20 | End: 2025-01-20 | Stop reason: HOSPADM

## 2025-01-20 RX ORDER — NITROGLYCERIN 20 MG/100ML
INJECTION INTRAVENOUS PRN
Status: DISCONTINUED | OUTPATIENT
Start: 2025-01-20 | End: 2025-01-20 | Stop reason: HOSPADM

## 2025-01-20 RX ORDER — CLOPIDOGREL BISULFATE 75 MG/1
TABLET ORAL PRN
Status: DISCONTINUED | OUTPATIENT
Start: 2025-01-20 | End: 2025-01-20 | Stop reason: HOSPADM

## 2025-01-20 RX ORDER — POTASSIUM CHLORIDE 1500 MG/1
20 TABLET, EXTENDED RELEASE ORAL ONCE
Status: COMPLETED | OUTPATIENT
Start: 2025-01-20 | End: 2025-01-20

## 2025-01-20 RX ADMIN — HEPARIN SODIUM 5000 UNITS: 5000 INJECTION INTRAVENOUS; SUBCUTANEOUS at 14:57

## 2025-01-20 RX ADMIN — OXYCODONE 5 MG: 5 TABLET ORAL at 21:23

## 2025-01-20 RX ADMIN — SODIUM CHLORIDE, PRESERVATIVE FREE 10 ML: 5 INJECTION INTRAVENOUS at 21:27

## 2025-01-20 RX ADMIN — OXYCODONE 5 MG: 5 TABLET ORAL at 08:53

## 2025-01-20 RX ADMIN — HEPARIN SODIUM 5000 UNITS: 5000 INJECTION INTRAVENOUS; SUBCUTANEOUS at 08:44

## 2025-01-20 RX ADMIN — ACETAMINOPHEN 1000 MG: 500 TABLET ORAL at 13:14

## 2025-01-20 RX ADMIN — SACUBITRIL AND VALSARTAN 0.5 TABLET: 24; 26 TABLET, FILM COATED ORAL at 21:23

## 2025-01-20 RX ADMIN — HEPARIN SODIUM 5000 UNITS: 5000 INJECTION INTRAVENOUS; SUBCUTANEOUS at 21:23

## 2025-01-20 RX ADMIN — SACUBITRIL AND VALSARTAN 0.5 TABLET: 24; 26 TABLET, FILM COATED ORAL at 08:53

## 2025-01-20 RX ADMIN — CLOPIDOGREL BISULFATE 600 MG: 300 TABLET, FILM COATED ORAL at 13:14

## 2025-01-20 RX ADMIN — CARVEDILOL 6.25 MG: 6.25 TABLET, FILM COATED ORAL at 08:53

## 2025-01-20 RX ADMIN — SIMETHICONE 80 MG: 80 TABLET, CHEWABLE ORAL at 02:57

## 2025-01-20 RX ADMIN — SODIUM CHLORIDE, PRESERVATIVE FREE 10 ML: 5 INJECTION INTRAVENOUS at 08:57

## 2025-01-20 RX ADMIN — SPIRONOLACTONE 25 MG: 25 TABLET ORAL at 08:54

## 2025-01-20 RX ADMIN — BUMETANIDE 2 MG: 1 TABLET ORAL at 08:53

## 2025-01-20 RX ADMIN — ONDANSETRON 4 MG: 2 INJECTION INTRAMUSCULAR; INTRAVENOUS at 14:01

## 2025-01-20 RX ADMIN — POTASSIUM CHLORIDE 20 MEQ: 1500 TABLET, EXTENDED RELEASE ORAL at 09:39

## 2025-01-20 RX ADMIN — I.V. FAT EMULSION 250 ML: 20 EMULSION INTRAVENOUS at 18:30

## 2025-01-20 RX ADMIN — ACETAMINOPHEN 1000 MG: 500 TABLET ORAL at 21:22

## 2025-01-20 RX ADMIN — ASCORBIC ACID, VITAMIN A PALMITATE, CHOLECALCIFEROL, THIAMINE HYDROCHLORIDE, RIBOFLAVIN-5 PHOSPHATE SODIUM, PYRIDOXINE HYDROCHLORIDE, NIACINAMIDE, DEXPANTHENOL, ALPHA-TOCOPHEROL ACETATE, VITAMIN K1, FOLIC ACID, BIOTIN, CYANOCOBALAMIN: 200; 3300; 200; 6; 3.6; 6; 40; 15; 10; 150; 600; 60; 5 INJECTION, SOLUTION INTRAVENOUS at 18:23

## 2025-01-20 RX ADMIN — Medication 2 PUFF: at 07:16

## 2025-01-20 RX ADMIN — SODIUM CHLORIDE, PRESERVATIVE FREE 40 MG: 5 INJECTION INTRAVENOUS at 08:44

## 2025-01-20 RX ADMIN — ACETAMINOPHEN 1000 MG: 500 TABLET ORAL at 08:52

## 2025-01-20 ASSESSMENT — PAIN - FUNCTIONAL ASSESSMENT: PAIN_FUNCTIONAL_ASSESSMENT: ACTIVITIES ARE NOT PREVENTED

## 2025-01-20 ASSESSMENT — PAIN DESCRIPTION - DESCRIPTORS: DESCRIPTORS: SORE

## 2025-01-20 ASSESSMENT — PAIN SCALES - GENERAL
PAINLEVEL_OUTOF10: 6
PAINLEVEL_OUTOF10: 4

## 2025-01-20 ASSESSMENT — PAIN DESCRIPTION - LOCATION
LOCATION: ABDOMEN
LOCATION: ABDOMEN;INCISION

## 2025-01-20 ASSESSMENT — PAIN DESCRIPTION - ORIENTATION: ORIENTATION: MID

## 2025-01-20 NOTE — ACP (ADVANCE CARE PLANNING)
Advance Care Planning      Palliative Medicine Provider (MD/NP)  Advance Care Planning (ACP) Conversation      Date of Conversation: 01/20/25  The patient and/or authorized decision maker consented to a voluntary Advance Care Planning conversation.   Individuals present for the conversation:   Patient with decision making capacity and Spouse at bedside    Legal Healthcare Agent(s):    Primary Decision Maker: Doreen Jain - Spouse - [phone]    ACP documents available in EMR prior to discussion:  -None    Primary Palliative Diagnosis(es):  Colonic adenocarcinoma  Ischemic cardiomyopathy  HFrEF    Conversation Summary: Met with patient and his spouse at bedside following his cath procedure.  They deny any previously completed ACP/POA documents.  Patient may be interested in completing a living will this admission and will notify me if they would like assistance.  Otherwise patient spouse does remain legal NOK decision maker by [place] law should he be unable to make decisions for himself. Patient remains a FULL CODE in the event of cardiac or respiratory arrest.     Resuscitation Status:    Code Status: Full Code    I spent 9 minutes providing ACP services with the patient and/or surrogate decision maker in a voluntary, in-person conversation discussing the patient's wishes and goals as detailed in the above note during consult visit.       Kenyatta Knapp, AGUSTINA - CNP

## 2025-01-20 NOTE — PROCEDURES
PROCEDURE NOTE  Date: 1/20/2025   Name: Nick Jain  YOB: 1950    Procedures    Cardiac catheterization preliminary note:    Severe triple-vessel disease.  RCA proximal to mid diffuse severe 95% in-stent restenosis that feeds a large RPL with occluded proximal RPDA.  SVG to RPDA is patent but fails to backfill RCA.  Left main with diffuse nonobstructive disease.  LAD proximal diffuse nonobstructive with mid 75% stenosis with IFR 0.75 localizing to lesion consistent with obstructive lesion.  LIMA to distal LAD is atretic and basically nonfunctional with diffuse distal disease.  First diagonal backfills LAD but is occluded after graft touchdown.  SVG to first diagonal is patent with diagonal occluded at touchdown with anastomotic disease.  Circumflex with occluded OM1.  Patent SVG to OM1.  Severe LV systolic dysfunction with severe mid to distal anterolateral and apical hypokinesis with moderate inferior and lateral hypokinesis.  Findings not consistent with Takotsubo's cardiomyopathy and more likely related to ischemic cardiomyopathy with loss of diagonal post bypass, obstructive LAD and RCA.  Successful PCI of proximal to mid RCA with SUSAN x 1.  Successful PCI of first diagonal with recanalization of  with PTCA.  Diagonal now fills distally via graft and native.  Successful PCI of mid LAD with SUSAN x 1.    Plan: Continue medical management.  Reevaluate LV function in 3 months.  Plavix uninterrupted for minimum 6 months.

## 2025-01-21 ENCOUNTER — TELEPHONE (OUTPATIENT)
Dept: HEMATOLOGY | Age: 75
End: 2025-01-21

## 2025-01-21 LAB
ANION GAP SERPL CALCULATED.3IONS-SCNC: 11 MMOL/L (ref 7–19)
BUN SERPL-MCNC: 22 MG/DL (ref 8–23)
CALCIUM SERPL-MCNC: 7.4 MG/DL (ref 8.8–10.2)
CHLORIDE SERPL-SCNC: 99 MMOL/L (ref 98–111)
CO2 SERPL-SCNC: 26 MMOL/L (ref 22–29)
CREAT SERPL-MCNC: 1.3 MG/DL (ref 0.7–1.2)
ERYTHROCYTE [DISTWIDTH] IN BLOOD BY AUTOMATED COUNT: 15.4 % (ref 11.5–14.5)
GLUCOSE BLD-MCNC: 125 MG/DL (ref 70–99)
GLUCOSE SERPL-MCNC: 119 MG/DL (ref 70–99)
HCT VFR BLD AUTO: 26.4 % (ref 42–52)
HGB BLD-MCNC: 8.3 G/DL (ref 14–18)
MAGNESIUM SERPL-MCNC: 1.8 MG/DL (ref 1.6–2.4)
MCH RBC QN AUTO: 28.1 PG (ref 27–31)
MCHC RBC AUTO-ENTMCNC: 31.4 G/DL (ref 33–37)
MCV RBC AUTO: 89.5 FL (ref 80–94)
PERFORMED ON: ABNORMAL
PHOSPHATE SERPL-MCNC: 3.7 MG/DL (ref 2.5–4.5)
PLATELET # BLD AUTO: 212 K/UL (ref 130–400)
PMV BLD AUTO: 10.8 FL (ref 9.4–12.4)
POTASSIUM SERPL-SCNC: 3.4 MMOL/L (ref 3.5–5)
RBC # BLD AUTO: 2.95 M/UL (ref 4.7–6.1)
SODIUM SERPL-SCNC: 136 MMOL/L (ref 136–145)
WBC # BLD AUTO: 13.2 K/UL (ref 4.8–10.8)

## 2025-01-21 PROCEDURE — 2700000000 HC OXYGEN THERAPY PER DAY

## 2025-01-21 PROCEDURE — 2500000003 HC RX 250 WO HCPCS: Performed by: NURSE PRACTITIONER

## 2025-01-21 PROCEDURE — 94760 N-INVAS EAR/PLS OXIMETRY 1: CPT

## 2025-01-21 PROCEDURE — 2580000003 HC RX 258: Performed by: INTERNAL MEDICINE

## 2025-01-21 PROCEDURE — 6370000000 HC RX 637 (ALT 250 FOR IP): Performed by: NURSE PRACTITIONER

## 2025-01-21 PROCEDURE — 6360000002 HC RX W HCPCS: Performed by: NURSE PRACTITIONER

## 2025-01-21 PROCEDURE — 83735 ASSAY OF MAGNESIUM: CPT

## 2025-01-21 PROCEDURE — 6360000002 HC RX W HCPCS: Performed by: SURGERY

## 2025-01-21 PROCEDURE — 2140000000 HC CCU INTERMEDIATE R&B

## 2025-01-21 PROCEDURE — 2500000003 HC RX 250 WO HCPCS: Performed by: INTERNAL MEDICINE

## 2025-01-21 PROCEDURE — 85027 COMPLETE CBC AUTOMATED: CPT

## 2025-01-21 PROCEDURE — 99232 SBSQ HOSP IP/OBS MODERATE 35: CPT

## 2025-01-21 PROCEDURE — 84100 ASSAY OF PHOSPHORUS: CPT

## 2025-01-21 PROCEDURE — 99232 SBSQ HOSP IP/OBS MODERATE 35: CPT | Performed by: INTERNAL MEDICINE

## 2025-01-21 PROCEDURE — 6370000000 HC RX 637 (ALT 250 FOR IP): Performed by: SURGERY

## 2025-01-21 PROCEDURE — 2580000003 HC RX 258: Performed by: SURGERY

## 2025-01-21 PROCEDURE — 94640 AIRWAY INHALATION TREATMENT: CPT

## 2025-01-21 PROCEDURE — 6370000000 HC RX 637 (ALT 250 FOR IP): Performed by: INTERNAL MEDICINE

## 2025-01-21 PROCEDURE — 80048 BASIC METABOLIC PNL TOTAL CA: CPT

## 2025-01-21 PROCEDURE — 82962 GLUCOSE BLOOD TEST: CPT

## 2025-01-21 RX ORDER — SPIRONOLACTONE 25 MG/1
12.5 TABLET ORAL DAILY
Status: DISCONTINUED | OUTPATIENT
Start: 2025-01-22 | End: 2025-01-26 | Stop reason: HOSPADM

## 2025-01-21 RX ORDER — POTASSIUM CHLORIDE 1500 MG/1
40 TABLET, EXTENDED RELEASE ORAL PRN
Status: DISCONTINUED | OUTPATIENT
Start: 2025-01-21 | End: 2025-01-26 | Stop reason: HOSPADM

## 2025-01-21 RX ORDER — POTASSIUM CHLORIDE 7.45 MG/ML
10 INJECTION INTRAVENOUS PRN
Status: DISCONTINUED | OUTPATIENT
Start: 2025-01-21 | End: 2025-01-26 | Stop reason: HOSPADM

## 2025-01-21 RX ORDER — BUMETANIDE 1 MG/1
1 TABLET ORAL DAILY
Status: DISCONTINUED | OUTPATIENT
Start: 2025-01-22 | End: 2025-01-26 | Stop reason: HOSPADM

## 2025-01-21 RX ORDER — ENOXAPARIN SODIUM 100 MG/ML
40 INJECTION SUBCUTANEOUS DAILY
Status: DISCONTINUED | OUTPATIENT
Start: 2025-01-21 | End: 2025-01-26 | Stop reason: HOSPADM

## 2025-01-21 RX ADMIN — ASPIRIN 81 MG: 81 TABLET, COATED ORAL at 07:33

## 2025-01-21 RX ADMIN — ACETAMINOPHEN 1000 MG: 500 TABLET ORAL at 07:33

## 2025-01-21 RX ADMIN — SODIUM CHLORIDE, PRESERVATIVE FREE 40 MG: 5 INJECTION INTRAVENOUS at 07:32

## 2025-01-21 RX ADMIN — ATORVASTATIN CALCIUM 80 MG: 80 TABLET, FILM COATED ORAL at 07:33

## 2025-01-21 RX ADMIN — SODIUM CHLORIDE, PRESERVATIVE FREE 10 ML: 5 INJECTION INTRAVENOUS at 07:34

## 2025-01-21 RX ADMIN — ENOXAPARIN SODIUM 40 MG: 100 INJECTION SUBCUTANEOUS at 15:48

## 2025-01-21 RX ADMIN — ACETAMINOPHEN 1000 MG: 500 TABLET ORAL at 15:48

## 2025-01-21 RX ADMIN — SODIUM CHLORIDE, PRESERVATIVE FREE 10 ML: 5 INJECTION INTRAVENOUS at 21:08

## 2025-01-21 RX ADMIN — SACUBITRIL AND VALSARTAN 0.5 TABLET: 24; 26 TABLET, FILM COATED ORAL at 07:33

## 2025-01-21 RX ADMIN — CARVEDILOL 3.12 MG: 6.25 TABLET, FILM COATED ORAL at 09:02

## 2025-01-21 RX ADMIN — OXYCODONE HYDROCHLORIDE 10 MG: 10 TABLET ORAL at 06:13

## 2025-01-21 RX ADMIN — CLOPIDOGREL BISULFATE 75 MG: 75 TABLET ORAL at 07:33

## 2025-01-21 RX ADMIN — Medication 2 PUFF: at 06:29

## 2025-01-21 RX ADMIN — SODIUM BICARBONATE: 84 INJECTION, SOLUTION INTRAVENOUS at 10:30

## 2025-01-21 RX ADMIN — SPIRONOLACTONE 25 MG: 25 TABLET ORAL at 07:33

## 2025-01-21 RX ADMIN — BUMETANIDE 2 MG: 1 TABLET ORAL at 07:33

## 2025-01-21 RX ADMIN — POTASSIUM CHLORIDE 40 MEQ: 1500 TABLET, EXTENDED RELEASE ORAL at 07:33

## 2025-01-21 RX ADMIN — HEPARIN SODIUM 5000 UNITS: 5000 INJECTION INTRAVENOUS; SUBCUTANEOUS at 07:32

## 2025-01-21 ASSESSMENT — PAIN DESCRIPTION - DESCRIPTORS: DESCRIPTORS: DISCOMFORT;SORE

## 2025-01-21 ASSESSMENT — PAIN - FUNCTIONAL ASSESSMENT: PAIN_FUNCTIONAL_ASSESSMENT: ACTIVITIES ARE NOT PREVENTED

## 2025-01-21 ASSESSMENT — PAIN SCALES - GENERAL
PAINLEVEL_OUTOF10: 0
PAINLEVEL_OUTOF10: 0
PAINLEVEL_OUTOF10: 7

## 2025-01-21 ASSESSMENT — PAIN DESCRIPTION - LOCATION: LOCATION: ABDOMEN;INCISION

## 2025-01-21 ASSESSMENT — PAIN DESCRIPTION - ORIENTATION: ORIENTATION: MID

## 2025-01-21 NOTE — DISCHARGE INSTRUCTIONS
Your Cardiologist has referred you for participation in Cardiac Rehabilitation and a consult has been sent to the [facility]iac [facility] program.     You are to contact [facility] WITHIN ONE WEEK AFTER HOSPITAL DISCHARGE to schedule your \"orientation & assessment\" appointment by calling direct at [phone].

## 2025-01-21 NOTE — PLAN OF CARE
Problem: Pain  Goal: Verbalizes/displays adequate comfort level or baseline comfort level  Outcome: Progressing     Problem: Safety - Adult  Goal: Free from fall injury  Outcome: Progressing     Problem: ABCDS Injury Assessment  Goal: Absence of physical injury  Outcome: Progressing     Problem: Nutrition Deficit:  Goal: Optimize nutritional status  Outcome: Progressing     Problem: Skin/Tissue Integrity  Goal: Absence of new skin breakdown  Outcome: Progressing

## 2025-01-21 NOTE — TELEPHONE ENCOUNTER
I called patient and left detailed voicemail about their appointment on 1/23/2025. I made patient aware not to arrive any earlier than the appointment time and to come at the time of the follow up not the time of the lab appointment if it is different than the follow up appt time. I also made patient aware to eat a meal (Our labs are not fasting labs) and drink plenty of water to hydrate their veins properly before coming to these appointments because this will make their lab draw much easier. Made patient aware that we are now located at the [facility] at [address]. Located between [facility] and the [facility]. Front entrance faces [place].

## 2025-01-22 DIAGNOSIS — C18.0 ADENOCARCINOMA OF CECUM (HCC): Primary | ICD-10-CM

## 2025-01-22 LAB
ALBUMIN SERPL-MCNC: 2.3 G/DL (ref 3.5–5.2)
ALP SERPL-CCNC: 196 U/L (ref 40–129)
ALT SERPL-CCNC: 27 U/L (ref 5–41)
ANION GAP SERPL CALCULATED.3IONS-SCNC: 12 MMOL/L (ref 8–16)
AST SERPL-CCNC: 38 U/L (ref 5–40)
BILIRUB DIRECT SERPL-MCNC: 0.2 MG/DL (ref 0–0.3)
BILIRUB INDIRECT SERPL-MCNC: 0.3 MG/DL (ref 0–1)
BILIRUB SERPL-MCNC: 0.5 MG/DL (ref 0.2–1.2)
BUN SERPL-MCNC: 33 MG/DL (ref 8–23)
CALCIUM SERPL-MCNC: 7.5 MG/DL (ref 8.8–10.2)
CHLORIDE SERPL-SCNC: 98 MMOL/L (ref 98–107)
CO2 SERPL-SCNC: 25 MMOL/L (ref 22–29)
CREAT SERPL-MCNC: 1.7 MG/DL (ref 0.7–1.2)
GLUCOSE BLD-MCNC: 120 MG/DL (ref 70–99)
GLUCOSE SERPL-MCNC: 95 MG/DL (ref 70–99)
MAGNESIUM SERPL-MCNC: 1.8 MG/DL (ref 1.6–2.4)
PERFORMED ON: ABNORMAL
PHOSPHATE SERPL-MCNC: 3.9 MG/DL (ref 2.5–4.5)
POTASSIUM SERPL-SCNC: 4.1 MMOL/L (ref 3.5–5.1)
PROT SERPL-MCNC: 5.7 G/DL (ref 6.4–8.3)
SODIUM SERPL-SCNC: 135 MMOL/L (ref 136–145)

## 2025-01-22 PROCEDURE — 80048 BASIC METABOLIC PNL TOTAL CA: CPT

## 2025-01-22 PROCEDURE — 97116 GAIT TRAINING THERAPY: CPT

## 2025-01-22 PROCEDURE — 2500000003 HC RX 250 WO HCPCS: Performed by: NURSE PRACTITIONER

## 2025-01-22 PROCEDURE — 2140000000 HC CCU INTERMEDIATE R&B

## 2025-01-22 PROCEDURE — 82962 GLUCOSE BLOOD TEST: CPT

## 2025-01-22 PROCEDURE — 99232 SBSQ HOSP IP/OBS MODERATE 35: CPT

## 2025-01-22 PROCEDURE — 6370000000 HC RX 637 (ALT 250 FOR IP): Performed by: NURSE PRACTITIONER

## 2025-01-22 PROCEDURE — 99232 SBSQ HOSP IP/OBS MODERATE 35: CPT | Performed by: INTERNAL MEDICINE

## 2025-01-22 PROCEDURE — 6370000000 HC RX 637 (ALT 250 FOR IP): Performed by: SURGERY

## 2025-01-22 PROCEDURE — 97161 PT EVAL LOW COMPLEX 20 MIN: CPT

## 2025-01-22 PROCEDURE — 83735 ASSAY OF MAGNESIUM: CPT

## 2025-01-22 PROCEDURE — 6370000000 HC RX 637 (ALT 250 FOR IP): Performed by: INTERNAL MEDICINE

## 2025-01-22 PROCEDURE — 2580000003 HC RX 258: Performed by: INTERNAL MEDICINE

## 2025-01-22 PROCEDURE — 2500000003 HC RX 250 WO HCPCS: Performed by: INTERNAL MEDICINE

## 2025-01-22 PROCEDURE — 97530 THERAPEUTIC ACTIVITIES: CPT

## 2025-01-22 PROCEDURE — 80076 HEPATIC FUNCTION PANEL: CPT

## 2025-01-22 PROCEDURE — 94760 N-INVAS EAR/PLS OXIMETRY 1: CPT

## 2025-01-22 PROCEDURE — 6360000002 HC RX W HCPCS: Performed by: SURGERY

## 2025-01-22 PROCEDURE — 2580000003 HC RX 258: Performed by: SURGERY

## 2025-01-22 PROCEDURE — 84100 ASSAY OF PHOSPHORUS: CPT

## 2025-01-22 PROCEDURE — 6360000002 HC RX W HCPCS: Performed by: NURSE PRACTITIONER

## 2025-01-22 PROCEDURE — 94640 AIRWAY INHALATION TREATMENT: CPT

## 2025-01-22 RX ORDER — 0.9 % SODIUM CHLORIDE 0.9 %
500 INTRAVENOUS SOLUTION INTRAVENOUS ONCE
Status: DISCONTINUED | OUTPATIENT
Start: 2025-01-22 | End: 2025-01-26

## 2025-01-22 RX ADMIN — ACETAMINOPHEN 1000 MG: 500 TABLET ORAL at 20:34

## 2025-01-22 RX ADMIN — SODIUM CHLORIDE, PRESERVATIVE FREE 40 MG: 5 INJECTION INTRAVENOUS at 08:08

## 2025-01-22 RX ADMIN — OXYCODONE HYDROCHLORIDE 10 MG: 10 TABLET ORAL at 10:45

## 2025-01-22 RX ADMIN — SODIUM BICARBONATE: 84 INJECTION, SOLUTION INTRAVENOUS at 10:48

## 2025-01-22 RX ADMIN — ENOXAPARIN SODIUM 40 MG: 100 INJECTION SUBCUTANEOUS at 16:24

## 2025-01-22 RX ADMIN — ACETAMINOPHEN 1000 MG: 500 TABLET ORAL at 08:08

## 2025-01-22 RX ADMIN — CLOPIDOGREL BISULFATE 75 MG: 75 TABLET ORAL at 08:09

## 2025-01-22 RX ADMIN — Medication 2 PUFF: at 07:22

## 2025-01-22 RX ADMIN — ACETAMINOPHEN 1000 MG: 500 TABLET ORAL at 15:10

## 2025-01-22 RX ADMIN — ATORVASTATIN CALCIUM 80 MG: 80 TABLET, FILM COATED ORAL at 08:09

## 2025-01-22 RX ADMIN — ASPIRIN 81 MG: 81 TABLET, COATED ORAL at 08:09

## 2025-01-22 RX ADMIN — SODIUM CHLORIDE, PRESERVATIVE FREE 10 ML: 5 INJECTION INTRAVENOUS at 20:37

## 2025-01-22 ASSESSMENT — PAIN SCALES - GENERAL: PAINLEVEL_OUTOF10: 4

## 2025-01-22 NOTE — PLAN OF CARE
Problem: Pain  Goal: Verbalizes/displays adequate comfort level or baseline comfort level  1/21/2025 2348 by Rosa Roberson RN  Outcome: Progressing  1/21/2025 1023 by Isabel Aguirre RN  Outcome: Progressing     Problem: Safety - Adult  Goal: Free from fall injury  1/21/2025 2348 by Rosa Roberson RN  Outcome: Progressing  1/21/2025 1023 by Isabel Aguirre RN  Outcome: Progressing  Flowsheets (Taken 1/21/2025 0900)  Free From Fall Injury:   Instruct family/caregiver on patient safety   Based on caregiver fall risk screen, instruct family/caregiver to ask for assistance with transferring infant if caregiver noted to have fall risk factors     Problem: ABCDS Injury Assessment  Goal: Absence of physical injury  1/21/2025 2348 by Rosa Roberson RN  Outcome: Progressing  1/21/2025 1023 by Isabel Aguirre RN  Outcome: Progressing  Flowsheets (Taken 1/21/2025 0900)  Absence of Physical Injury: Implement safety measures based on patient assessment     Problem: Nutrition Deficit:  Goal: Optimize nutritional status  1/21/2025 2348 by Rosa Roberson RN  Outcome: Progressing  Flowsheets (Taken 1/21/2025 1336 by Hialey Gutierrez, MS, RD, LD)  Nutrient intake appropriate for improving, restoring, or maintaining nutritional needs:   Assess nutritional status and recommend course of action   Monitor oral intake, labs, and treatment plans   Recommend appropriate diets, oral nutritional supplements, and vitamin/mineral supplements  1/21/2025 1023 by Isabel Aguirre RN  Outcome: Progressing     Problem: Skin/Tissue Integrity  Goal: Absence of new skin breakdown  1/21/2025 2348 by Rosa Roberson RN  Outcome: Progressing  1/21/2025 1023 by Isabel Aguirre RN  Outcome: Progressing

## 2025-01-22 NOTE — PLAN OF CARE
Problem: Safety - Adult  Goal: Free from fall injury  Outcome: Progressing     Problem: Pain  Goal: Verbalizes/displays adequate comfort level or baseline comfort level  Outcome: Progressing     Problem: ABCDS Injury Assessment  Goal: Absence of physical injury  Outcome: Progressing     Problem: Nutrition Deficit:  Goal: Optimize nutritional status  Outcome: Progressing     Problem: Skin/Tissue Integrity  Goal: Absence of new skin breakdown  Outcome: Progressing

## 2025-01-22 NOTE — PROGRESS NOTES
NIECY NGS sequencing requested on colon pathology [id number] collected 1/11/2025 at [facility].

## 2025-01-23 LAB
ANION GAP SERPL CALCULATED.3IONS-SCNC: 11 MMOL/L (ref 8–16)
BASOPHILS # BLD: 0 K/UL (ref 0–0.2)
BASOPHILS NFR BLD: 0.1 % (ref 0–1)
BUN SERPL-MCNC: 30 MG/DL (ref 8–23)
CALCIUM SERPL-MCNC: 7.3 MG/DL (ref 8.8–10.2)
CHLORIDE SERPL-SCNC: 100 MMOL/L (ref 98–107)
CO2 SERPL-SCNC: 26 MMOL/L (ref 22–29)
CREAT SERPL-MCNC: 1.6 MG/DL (ref 0.7–1.2)
EOSINOPHIL # BLD: 0.1 K/UL (ref 0–0.6)
EOSINOPHIL NFR BLD: 0.6 % (ref 0–5)
ERYTHROCYTE [DISTWIDTH] IN BLOOD BY AUTOMATED COUNT: 15.7 % (ref 11.5–14.5)
GLUCOSE BLD-MCNC: 103 MG/DL (ref 70–99)
GLUCOSE BLD-MCNC: 143 MG/DL (ref 70–99)
GLUCOSE BLD-MCNC: 95 MG/DL (ref 70–99)
GLUCOSE BLD-MCNC: 98 MG/DL (ref 70–99)
GLUCOSE BLD-MCNC: 99 MG/DL (ref 70–99)
GLUCOSE SERPL-MCNC: 95 MG/DL (ref 70–99)
HCT VFR BLD AUTO: 22.8 % (ref 42–52)
HGB BLD-MCNC: 7.4 G/DL (ref 14–18)
IMM GRANULOCYTES # BLD: 0.1 K/UL
LYMPHOCYTES # BLD: 0.9 K/UL (ref 1.1–4.5)
LYMPHOCYTES NFR BLD: 7.2 % (ref 20–40)
MAGNESIUM SERPL-MCNC: 1.9 MG/DL (ref 1.6–2.4)
MCH RBC QN AUTO: 28.6 PG (ref 27–31)
MCHC RBC AUTO-ENTMCNC: 32.5 G/DL (ref 33–37)
MCV RBC AUTO: 88 FL (ref 80–94)
MONOCYTES # BLD: 0.7 K/UL (ref 0–0.9)
MONOCYTES NFR BLD: 5.8 % (ref 0–10)
NEUTROPHILS # BLD: 10.9 K/UL (ref 1.5–7.5)
NEUTS SEG NFR BLD: 85.9 % (ref 50–65)
PERFORMED ON: ABNORMAL
PERFORMED ON: ABNORMAL
PERFORMED ON: NORMAL
PHOSPHATE SERPL-MCNC: 3.6 MG/DL (ref 2.5–4.5)
PLATELET # BLD AUTO: 263 K/UL (ref 130–400)
PMV BLD AUTO: 10.1 FL (ref 9.4–12.4)
POTASSIUM SERPL-SCNC: 4 MMOL/L (ref 3.5–5.1)
RBC # BLD AUTO: 2.59 M/UL (ref 4.7–6.1)
SODIUM SERPL-SCNC: 137 MMOL/L (ref 136–145)
WBC # BLD AUTO: 12.7 K/UL (ref 4.8–10.8)

## 2025-01-23 PROCEDURE — 80048 BASIC METABOLIC PNL TOTAL CA: CPT

## 2025-01-23 PROCEDURE — 94760 N-INVAS EAR/PLS OXIMETRY 1: CPT

## 2025-01-23 PROCEDURE — 6370000000 HC RX 637 (ALT 250 FOR IP): Performed by: NURSE PRACTITIONER

## 2025-01-23 PROCEDURE — 6360000002 HC RX W HCPCS: Performed by: SURGERY

## 2025-01-23 PROCEDURE — 85025 COMPLETE CBC W/AUTO DIFF WBC: CPT

## 2025-01-23 PROCEDURE — 97530 THERAPEUTIC ACTIVITIES: CPT

## 2025-01-23 PROCEDURE — 36410 VNPNXR 3YR/> PHY/QHP DX/THER: CPT

## 2025-01-23 PROCEDURE — C1751 CATH, INF, PER/CENT/MIDLINE: HCPCS

## 2025-01-23 PROCEDURE — 97116 GAIT TRAINING THERAPY: CPT

## 2025-01-23 PROCEDURE — 83735 ASSAY OF MAGNESIUM: CPT

## 2025-01-23 PROCEDURE — 99232 SBSQ HOSP IP/OBS MODERATE 35: CPT | Performed by: INTERNAL MEDICINE

## 2025-01-23 PROCEDURE — 6370000000 HC RX 637 (ALT 250 FOR IP): Performed by: INTERNAL MEDICINE

## 2025-01-23 PROCEDURE — 82962 GLUCOSE BLOOD TEST: CPT

## 2025-01-23 PROCEDURE — 6370000000 HC RX 637 (ALT 250 FOR IP): Performed by: STUDENT IN AN ORGANIZED HEALTH CARE EDUCATION/TRAINING PROGRAM

## 2025-01-23 PROCEDURE — 2140000000 HC CCU INTERMEDIATE R&B

## 2025-01-23 PROCEDURE — 94640 AIRWAY INHALATION TREATMENT: CPT

## 2025-01-23 PROCEDURE — 6370000000 HC RX 637 (ALT 250 FOR IP): Performed by: SURGERY

## 2025-01-23 PROCEDURE — 76937 US GUIDE VASCULAR ACCESS: CPT

## 2025-01-23 PROCEDURE — 84100 ASSAY OF PHOSPHORUS: CPT

## 2025-01-23 PROCEDURE — 05HY33Z INSERTION OF INFUSION DEVICE INTO UPPER VEIN, PERCUTANEOUS APPROACH: ICD-10-PCS | Performed by: STUDENT IN AN ORGANIZED HEALTH CARE EDUCATION/TRAINING PROGRAM

## 2025-01-23 PROCEDURE — 99232 SBSQ HOSP IP/OBS MODERATE 35: CPT

## 2025-01-23 PROCEDURE — 2500000003 HC RX 250 WO HCPCS: Performed by: NURSE PRACTITIONER

## 2025-01-23 PROCEDURE — 6360000002 HC RX W HCPCS: Performed by: NURSE PRACTITIONER

## 2025-01-23 PROCEDURE — 2580000003 HC RX 258: Performed by: SURGERY

## 2025-01-23 RX ORDER — TRAZODONE HYDROCHLORIDE 100 MG/1
100 TABLET ORAL NIGHTLY PRN
Status: DISCONTINUED | OUTPATIENT
Start: 2025-01-23 | End: 2025-01-24

## 2025-01-23 RX ADMIN — ATORVASTATIN CALCIUM 80 MG: 80 TABLET, FILM COATED ORAL at 10:58

## 2025-01-23 RX ADMIN — ACETAMINOPHEN 1000 MG: 500 TABLET ORAL at 16:46

## 2025-01-23 RX ADMIN — CARVEDILOL 3.12 MG: 6.25 TABLET, FILM COATED ORAL at 11:06

## 2025-01-23 RX ADMIN — ACETAMINOPHEN 1000 MG: 500 TABLET ORAL at 11:05

## 2025-01-23 RX ADMIN — SODIUM CHLORIDE, PRESERVATIVE FREE 10 ML: 5 INJECTION INTRAVENOUS at 11:00

## 2025-01-23 RX ADMIN — ASPIRIN 81 MG: 81 TABLET, COATED ORAL at 10:58

## 2025-01-23 RX ADMIN — ACETAMINOPHEN 1000 MG: 500 TABLET ORAL at 20:47

## 2025-01-23 RX ADMIN — SODIUM CHLORIDE, PRESERVATIVE FREE 10 ML: 5 INJECTION INTRAVENOUS at 20:48

## 2025-01-23 RX ADMIN — ENOXAPARIN SODIUM 40 MG: 100 INJECTION SUBCUTANEOUS at 16:46

## 2025-01-23 RX ADMIN — SODIUM CHLORIDE, PRESERVATIVE FREE 40 MG: 5 INJECTION INTRAVENOUS at 10:58

## 2025-01-23 RX ADMIN — Medication 2 PUFF: at 10:05

## 2025-01-23 RX ADMIN — CLOPIDOGREL BISULFATE 75 MG: 75 TABLET ORAL at 10:58

## 2025-01-23 RX ADMIN — OXYCODONE 5 MG: 5 TABLET ORAL at 10:58

## 2025-01-23 RX ADMIN — TRAZODONE HYDROCHLORIDE 100 MG: 100 TABLET ORAL at 20:48

## 2025-01-23 ASSESSMENT — PAIN SCALES - GENERAL: PAINLEVEL_OUTOF10: 5

## 2025-01-23 NOTE — CARE COORDINATION
Spoke with patient in room and spouse on phone.  They would like a referral be sent to IP Rehab.  They would like for him to get more PT/OT prior to him going home and starting his chemo treatments.  If does not meet criteria would like to go home with HH. Electronically signed by Rosa Bess RN on 1/23/2025 at 2:44 PM

## 2025-01-23 NOTE — PLAN OF CARE
Problem: Pain  Goal: Verbalizes/displays adequate comfort level or baseline comfort level  1/22/2025 2121 by Antione Doherty RN  Outcome: Progressing  1/22/2025 1632 by Chris Veloz RN  Outcome: Progressing     Problem: Safety - Adult  Goal: Free from fall injury  1/22/2025 2121 by Antione Doherty RN  Outcome: Progressing  1/22/2025 1632 by Chris Veloz RN  Outcome: Progressing     Problem: ABCDS Injury Assessment  Goal: Absence of physical injury  1/22/2025 2121 by Antione Doherty RN  Outcome: Progressing  1/22/2025 1632 by Chris Veloz RN  Outcome: Progressing     Problem: Nutrition Deficit:  Goal: Optimize nutritional status  1/22/2025 2121 by Antione Doherty RN  Outcome: Progressing  1/22/2025 1632 by Chris Veloz RN  Outcome: Progressing     Problem: Skin/Tissue Integrity  Goal: Absence of new skin breakdown  Description: 1.  Monitor for areas of redness and/or skin breakdown  2.  Assess vascular access sites hourly  3.  Every 4-6 hours minimum:  Change oxygen saturation probe site  4.  Every 4-6 hours:  If on nasal continuous positive airway pressure, respiratory therapy assess nares and determine need for appliance change or resting period.  1/22/2025 2121 by Antione Doherty RN  Outcome: Progressing  1/22/2025 1632 by Chris Veloz RN  Outcome: Progressing

## 2025-01-24 ENCOUNTER — APPOINTMENT (OUTPATIENT)
Age: 75
DRG: 329 | End: 2025-01-24
Attending: INTERNAL MEDICINE
Payer: MEDICARE

## 2025-01-24 LAB
ALBUMIN SERPL-MCNC: 2.3 G/DL (ref 3.5–5.2)
ALP SERPL-CCNC: 183 U/L (ref 40–129)
ALT SERPL-CCNC: 24 U/L (ref 5–41)
ANION GAP SERPL CALCULATED.3IONS-SCNC: 13 MMOL/L (ref 8–16)
AST SERPL-CCNC: 29 U/L (ref 5–40)
BACTERIA URNS QL MICRO: NORMAL /HPF
BASOPHILS # BLD: 0 K/UL (ref 0–0.2)
BASOPHILS NFR BLD: 0.2 % (ref 0–1)
BILIRUB DIRECT SERPL-MCNC: 0.1 MG/DL (ref 0–0.3)
BILIRUB INDIRECT SERPL-MCNC: 0.1 MG/DL (ref 0–1)
BILIRUB SERPL-MCNC: 0.2 MG/DL (ref 0.2–1.2)
BILIRUB UR QL STRIP: NEGATIVE
BUN SERPL-MCNC: 27 MG/DL (ref 8–23)
CALCIUM SERPL-MCNC: 7.5 MG/DL (ref 8.8–10.2)
CHLORIDE SERPL-SCNC: 100 MMOL/L (ref 98–107)
CLARITY UR: CLEAR
CO2 SERPL-SCNC: 23 MMOL/L (ref 22–29)
COLOR UR: YELLOW
CREAT SERPL-MCNC: 1.5 MG/DL (ref 0.7–1.2)
CRYSTALS URNS MICRO: NORMAL /HPF
ECHO BSA: 1.83 M2
ECHO LV EDV A2C: 83 ML
ECHO LV EDV A4C: 77 ML
ECHO LV EDV INDEX A4C: 42 ML/M2
ECHO LV EDV NDEX A2C: 46 ML/M2
ECHO LV EJECTION FRACTION A2C: 60 %
ECHO LV EJECTION FRACTION A4C: 48 %
ECHO LV EJECTION FRACTION BIPLANE: 54 % (ref 55–100)
ECHO LV ESV A2C: 33 ML
ECHO LV ESV A4C: 40 ML
ECHO LV ESV INDEX A2C: 18 ML/M2
ECHO LV ESV INDEX A4C: 22 ML/M2
EOSINOPHIL # BLD: 0.1 K/UL (ref 0–0.6)
EOSINOPHIL NFR BLD: 0.6 % (ref 0–5)
EPI CELLS #/AREA URNS AUTO: 4 /HPF (ref 0–5)
ERYTHROCYTE [DISTWIDTH] IN BLOOD BY AUTOMATED COUNT: 15.9 % (ref 11.5–14.5)
GLUCOSE BLD-MCNC: 104 MG/DL (ref 70–99)
GLUCOSE BLD-MCNC: 109 MG/DL (ref 70–99)
GLUCOSE BLD-MCNC: 139 MG/DL (ref 70–99)
GLUCOSE SERPL-MCNC: 105 MG/DL (ref 70–99)
GLUCOSE UR STRIP.AUTO-MCNC: NEGATIVE MG/DL
HCT VFR BLD AUTO: 24.2 % (ref 42–52)
HGB BLD-MCNC: 7.5 G/DL (ref 14–18)
HGB UR STRIP.AUTO-MCNC: NEGATIVE MG/L
HYALINE CASTS #/AREA URNS AUTO: 4 /HPF (ref 0–8)
IMM GRANULOCYTES # BLD: 0.1 K/UL
KETONES UR STRIP.AUTO-MCNC: NEGATIVE MG/DL
LEUKOCYTE ESTERASE UR QL STRIP.AUTO: ABNORMAL
LYMPHOCYTES # BLD: 0.9 K/UL (ref 1.1–4.5)
LYMPHOCYTES NFR BLD: 10.1 % (ref 20–40)
MAGNESIUM SERPL-MCNC: 2.2 MG/DL (ref 1.6–2.4)
MCH RBC QN AUTO: 27.4 PG (ref 27–31)
MCHC RBC AUTO-ENTMCNC: 31 G/DL (ref 33–37)
MCV RBC AUTO: 88.3 FL (ref 80–94)
MONOCYTES # BLD: 0.7 K/UL (ref 0–0.9)
MONOCYTES NFR BLD: 7.2 % (ref 0–10)
NEUTROPHILS # BLD: 7.3 K/UL (ref 1.5–7.5)
NEUTS SEG NFR BLD: 81.1 % (ref 50–65)
NITRITE UR QL STRIP.AUTO: NEGATIVE
PERFORMED ON: ABNORMAL
PH UR STRIP.AUTO: 6 [PH] (ref 5–8)
PHOSPHATE SERPL-MCNC: 3.2 MG/DL (ref 2.5–4.5)
PLATELET # BLD AUTO: 335 K/UL (ref 130–400)
PMV BLD AUTO: 10.2 FL (ref 9.4–12.4)
POTASSIUM SERPL-SCNC: 3.9 MMOL/L (ref 3.5–5.1)
PROT SERPL-MCNC: 5.8 G/DL (ref 6.4–8.3)
PROT UR STRIP.AUTO-MCNC: 30 MG/DL
RBC # BLD AUTO: 2.74 M/UL (ref 4.7–6.1)
RBC #/AREA URNS AUTO: 3 /HPF (ref 0–4)
SODIUM SERPL-SCNC: 136 MMOL/L (ref 136–145)
SP GR UR STRIP.AUTO: 1.03 (ref 1–1.03)
UROBILINOGEN UR STRIP.AUTO-MCNC: 1 E.U./DL
WBC # BLD AUTO: 9 K/UL (ref 4.8–10.8)
WBC #/AREA URNS AUTO: 4 /HPF (ref 0–5)

## 2025-01-24 PROCEDURE — 6370000000 HC RX 637 (ALT 250 FOR IP): Performed by: INTERNAL MEDICINE

## 2025-01-24 PROCEDURE — 6370000000 HC RX 637 (ALT 250 FOR IP): Performed by: NURSE PRACTITIONER

## 2025-01-24 PROCEDURE — 99232 SBSQ HOSP IP/OBS MODERATE 35: CPT

## 2025-01-24 PROCEDURE — 80048 BASIC METABOLIC PNL TOTAL CA: CPT

## 2025-01-24 PROCEDURE — 2140000000 HC CCU INTERMEDIATE R&B

## 2025-01-24 PROCEDURE — 83735 ASSAY OF MAGNESIUM: CPT

## 2025-01-24 PROCEDURE — 80076 HEPATIC FUNCTION PANEL: CPT

## 2025-01-24 PROCEDURE — 84100 ASSAY OF PHOSPHORUS: CPT

## 2025-01-24 PROCEDURE — 81001 URINALYSIS AUTO W/SCOPE: CPT

## 2025-01-24 PROCEDURE — 99232 SBSQ HOSP IP/OBS MODERATE 35: CPT | Performed by: INTERNAL MEDICINE

## 2025-01-24 PROCEDURE — C8924 2D TTE W OR W/O FOL W/CON,FU: HCPCS

## 2025-01-24 PROCEDURE — 6370000000 HC RX 637 (ALT 250 FOR IP): Performed by: SURGERY

## 2025-01-24 PROCEDURE — 85025 COMPLETE CBC W/AUTO DIFF WBC: CPT

## 2025-01-24 PROCEDURE — 97116 GAIT TRAINING THERAPY: CPT

## 2025-01-24 PROCEDURE — 6370000000 HC RX 637 (ALT 250 FOR IP)

## 2025-01-24 PROCEDURE — 94760 N-INVAS EAR/PLS OXIMETRY 1: CPT

## 2025-01-24 PROCEDURE — 6360000002 HC RX W HCPCS: Performed by: NURSE PRACTITIONER

## 2025-01-24 PROCEDURE — 94640 AIRWAY INHALATION TREATMENT: CPT

## 2025-01-24 PROCEDURE — 2580000003 HC RX 258: Performed by: SURGERY

## 2025-01-24 PROCEDURE — 6360000004 HC RX CONTRAST MEDICATION: Performed by: INTERNAL MEDICINE

## 2025-01-24 PROCEDURE — 2500000003 HC RX 250 WO HCPCS: Performed by: NURSE PRACTITIONER

## 2025-01-24 PROCEDURE — 97530 THERAPEUTIC ACTIVITIES: CPT

## 2025-01-24 PROCEDURE — 82962 GLUCOSE BLOOD TEST: CPT

## 2025-01-24 PROCEDURE — 6360000002 HC RX W HCPCS: Performed by: SURGERY

## 2025-01-24 RX ORDER — TRAZODONE HYDROCHLORIDE 100 MG/1
200 TABLET ORAL NIGHTLY PRN
Status: DISCONTINUED | OUTPATIENT
Start: 2025-01-24 | End: 2025-01-26 | Stop reason: HOSPADM

## 2025-01-24 RX ORDER — MECOBALAMIN 5000 MCG
5 TABLET,DISINTEGRATING ORAL NIGHTLY
Status: DISCONTINUED | OUTPATIENT
Start: 2025-01-24 | End: 2025-01-26 | Stop reason: HOSPADM

## 2025-01-24 RX ADMIN — OXYCODONE 5 MG: 5 TABLET ORAL at 02:21

## 2025-01-24 RX ADMIN — SULFUR HEXAFLUORIDE 2 ML: 60.7; .19; .19 INJECTION, POWDER, LYOPHILIZED, FOR SUSPENSION INTRAVENOUS; INTRAVESICAL at 09:10

## 2025-01-24 RX ADMIN — TRAZODONE HYDROCHLORIDE 200 MG: 100 TABLET ORAL at 21:07

## 2025-01-24 RX ADMIN — CARVEDILOL 3.12 MG: 6.25 TABLET, FILM COATED ORAL at 09:54

## 2025-01-24 RX ADMIN — SACUBITRIL AND VALSARTAN 0.5 TABLET: 24; 26 TABLET, FILM COATED ORAL at 09:54

## 2025-01-24 RX ADMIN — SPIRONOLACTONE 12.5 MG: 25 TABLET ORAL at 09:53

## 2025-01-24 RX ADMIN — ACETAMINOPHEN 1000 MG: 500 TABLET ORAL at 14:31

## 2025-01-24 RX ADMIN — ASPIRIN 81 MG: 81 TABLET, COATED ORAL at 09:53

## 2025-01-24 RX ADMIN — Medication 5 MG: at 21:07

## 2025-01-24 RX ADMIN — SACUBITRIL AND VALSARTAN 0.5 TABLET: 24; 26 TABLET, FILM COATED ORAL at 21:06

## 2025-01-24 RX ADMIN — SODIUM CHLORIDE, PRESERVATIVE FREE 40 MG: 5 INJECTION INTRAVENOUS at 09:55

## 2025-01-24 RX ADMIN — CARVEDILOL 3.12 MG: 6.25 TABLET, FILM COATED ORAL at 17:15

## 2025-01-24 RX ADMIN — ATORVASTATIN CALCIUM 80 MG: 80 TABLET, FILM COATED ORAL at 09:53

## 2025-01-24 RX ADMIN — ENOXAPARIN SODIUM 40 MG: 100 INJECTION SUBCUTANEOUS at 15:30

## 2025-01-24 RX ADMIN — ACETAMINOPHEN 1000 MG: 500 TABLET ORAL at 03:10

## 2025-01-24 RX ADMIN — Medication 2 PUFF: at 10:13

## 2025-01-24 RX ADMIN — CLOPIDOGREL BISULFATE 75 MG: 75 TABLET ORAL at 09:54

## 2025-01-24 RX ADMIN — SODIUM CHLORIDE, PRESERVATIVE FREE 10 ML: 5 INJECTION INTRAVENOUS at 09:59

## 2025-01-24 RX ADMIN — ACETAMINOPHEN 1000 MG: 500 TABLET ORAL at 10:01

## 2025-01-24 RX ADMIN — OXYCODONE HYDROCHLORIDE 10 MG: 10 TABLET ORAL at 09:54

## 2025-01-24 RX ADMIN — SODIUM CHLORIDE, PRESERVATIVE FREE 10 ML: 5 INJECTION INTRAVENOUS at 21:09

## 2025-01-24 RX ADMIN — ACETAMINOPHEN 1000 MG: 500 TABLET ORAL at 21:06

## 2025-01-24 ASSESSMENT — PAIN DESCRIPTION - DESCRIPTORS
DESCRIPTORS: ACHING

## 2025-01-24 ASSESSMENT — PAIN SCALES - GENERAL
PAINLEVEL_OUTOF10: 1
PAINLEVEL_OUTOF10: 7
PAINLEVEL_OUTOF10: 4
PAINLEVEL_OUTOF10: 0
PAINLEVEL_OUTOF10: 7

## 2025-01-24 ASSESSMENT — PAIN DESCRIPTION - ORIENTATION
ORIENTATION: LOWER

## 2025-01-24 ASSESSMENT — PAIN DESCRIPTION - LOCATION
LOCATION: BACK

## 2025-01-24 NOTE — CARE COORDINATION
01/24/25 1457   IMM Letter   IMM Letter given to Patient/Family/Significant other/Guardian/POA/by: given to pt by Amna Bess RN   IMM Letter date given: 01/24/25   IMM Letter time given: 1330     Second IMM given to patient and explained with patient verbalizing understanding.  All questions and concerns addressed     Signed letter placed in pt soft chart  Electronically signed by Rosa Bess RN on 1/24/2025 at 2:58 PM

## 2025-01-24 NOTE — PLAN OF CARE
Problem: Pain  Goal: Verbalizes/displays adequate comfort level or baseline comfort level  Outcome: Progressing     Problem: Safety - Adult  Goal: Free from fall injury  Outcome: Progressing     Problem: ABCDS Injury Assessment  Goal: Absence of physical injury  Outcome: Progressing     Problem: Nutrition Deficit:  Goal: Optimize nutritional status  Outcome: Progressing     Problem: Skin/Tissue Integrity  Goal: Absence of new skin breakdown  Description: 1.  Monitor for areas of redness and/or skin breakdown  2.  Assess vascular access sites hourly  3.  Every 4-6 hours minimum:  Change oxygen saturation probe site  4.  Every 4-6 hours:  If on nasal continuous positive airway pressure, respiratory therapy assess nares and determine need for appliance change or resting period.  Outcome: Progressing

## 2025-01-24 NOTE — CARE COORDINATION
[facility]  Notification of Admission Decision      [] Patient has been accepted for admit to [facility] on :       Please write discharge orders and summary prior to discharge.    [] Patient acceptance to Rehab pending the following :    [] Eval in progress       [x] Patient determined to be ineligible for services at [facility] because No OT eval, but standy assist for PT, walking 150ft.       We recommend you consider Home Health       Thank you for your referral, we appreciate you. If you have any questions, please feel   free to contact me at [phone].

## 2025-01-25 LAB
ANION GAP SERPL CALCULATED.3IONS-SCNC: 12 MMOL/L (ref 8–16)
BASOPHILS # BLD: 0 K/UL (ref 0–0.2)
BASOPHILS # BLD: 0 K/UL (ref 0–0.2)
BASOPHILS NFR BLD: 0.2 % (ref 0–1)
BASOPHILS NFR BLD: 0.3 % (ref 0–1)
BUN SERPL-MCNC: 23 MG/DL (ref 8–23)
CALCIUM SERPL-MCNC: 7.9 MG/DL (ref 8.8–10.2)
CHLORIDE SERPL-SCNC: 103 MMOL/L (ref 98–107)
CO2 SERPL-SCNC: 23 MMOL/L (ref 22–29)
CREAT SERPL-MCNC: 1.4 MG/DL (ref 0.7–1.2)
EOSINOPHIL # BLD: 0.1 K/UL (ref 0–0.6)
EOSINOPHIL # BLD: 0.1 K/UL (ref 0–0.6)
EOSINOPHIL NFR BLD: 0.9 % (ref 0–5)
EOSINOPHIL NFR BLD: 1.7 % (ref 0–5)
ERYTHROCYTE [DISTWIDTH] IN BLOOD BY AUTOMATED COUNT: 15.9 % (ref 11.5–14.5)
ERYTHROCYTE [DISTWIDTH] IN BLOOD BY AUTOMATED COUNT: 16.1 % (ref 11.5–14.5)
GLUCOSE BLD-MCNC: 106 MG/DL (ref 70–99)
GLUCOSE BLD-MCNC: 162 MG/DL (ref 70–99)
GLUCOSE BLD-MCNC: 90 MG/DL (ref 70–99)
GLUCOSE BLD-MCNC: 92 MG/DL (ref 70–99)
GLUCOSE SERPL-MCNC: 99 MG/DL (ref 70–99)
HCT VFR BLD AUTO: 24.3 % (ref 42–52)
HCT VFR BLD AUTO: 25.9 % (ref 42–52)
HGB BLD-MCNC: 7.4 G/DL (ref 14–18)
HGB BLD-MCNC: 7.9 G/DL (ref 14–18)
IMM GRANULOCYTES # BLD: 0 K/UL
IMM GRANULOCYTES # BLD: 0.1 K/UL
LYMPHOCYTES # BLD: 0.8 K/UL (ref 1.1–4.5)
LYMPHOCYTES # BLD: 0.8 K/UL (ref 1.1–4.5)
LYMPHOCYTES NFR BLD: 10.5 % (ref 20–40)
LYMPHOCYTES NFR BLD: 13.1 % (ref 20–40)
MCH RBC QN AUTO: 27.8 PG (ref 27–31)
MCH RBC QN AUTO: 27.9 PG (ref 27–31)
MCHC RBC AUTO-ENTMCNC: 30.5 G/DL (ref 33–37)
MCHC RBC AUTO-ENTMCNC: 30.5 G/DL (ref 33–37)
MCV RBC AUTO: 91.4 FL (ref 80–94)
MCV RBC AUTO: 91.5 FL (ref 80–94)
MONOCYTES # BLD: 0.5 K/UL (ref 0–0.9)
MONOCYTES # BLD: 0.5 K/UL (ref 0–0.9)
MONOCYTES NFR BLD: 6.8 % (ref 0–10)
MONOCYTES NFR BLD: 8.1 % (ref 0–10)
NEUTROPHILS # BLD: 4.9 K/UL (ref 1.5–7.5)
NEUTROPHILS # BLD: 6.1 K/UL (ref 1.5–7.5)
NEUTS SEG NFR BLD: 76.4 % (ref 50–65)
NEUTS SEG NFR BLD: 80.6 % (ref 50–65)
PERFORMED ON: ABNORMAL
PERFORMED ON: ABNORMAL
PERFORMED ON: NORMAL
PERFORMED ON: NORMAL
PLATELET # BLD AUTO: 344 K/UL (ref 130–400)
PLATELET # BLD AUTO: 393 K/UL (ref 130–400)
PMV BLD AUTO: 10.2 FL (ref 9.4–12.4)
PMV BLD AUTO: 10.2 FL (ref 9.4–12.4)
POTASSIUM SERPL-SCNC: 4.1 MMOL/L (ref 3.5–5)
RBC # BLD AUTO: 2.66 M/UL (ref 4.7–6.1)
RBC # BLD AUTO: 2.83 M/UL (ref 4.7–6.1)
SODIUM SERPL-SCNC: 138 MMOL/L (ref 136–145)
WBC # BLD AUTO: 6.4 K/UL (ref 4.8–10.8)
WBC # BLD AUTO: 7.6 K/UL (ref 4.8–10.8)

## 2025-01-25 PROCEDURE — 2580000003 HC RX 258: Performed by: SURGERY

## 2025-01-25 PROCEDURE — 99233 SBSQ HOSP IP/OBS HIGH 50: CPT | Performed by: INTERNAL MEDICINE

## 2025-01-25 PROCEDURE — 6360000002 HC RX W HCPCS: Performed by: NURSE PRACTITIONER

## 2025-01-25 PROCEDURE — 94640 AIRWAY INHALATION TREATMENT: CPT

## 2025-01-25 PROCEDURE — 82962 GLUCOSE BLOOD TEST: CPT

## 2025-01-25 PROCEDURE — 6370000000 HC RX 637 (ALT 250 FOR IP)

## 2025-01-25 PROCEDURE — 2500000003 HC RX 250 WO HCPCS: Performed by: NURSE PRACTITIONER

## 2025-01-25 PROCEDURE — 97116 GAIT TRAINING THERAPY: CPT

## 2025-01-25 PROCEDURE — 85025 COMPLETE CBC W/AUTO DIFF WBC: CPT

## 2025-01-25 PROCEDURE — 2140000000 HC CCU INTERMEDIATE R&B

## 2025-01-25 PROCEDURE — 6370000000 HC RX 637 (ALT 250 FOR IP): Performed by: SURGERY

## 2025-01-25 PROCEDURE — 80048 BASIC METABOLIC PNL TOTAL CA: CPT

## 2025-01-25 PROCEDURE — 6370000000 HC RX 637 (ALT 250 FOR IP): Performed by: NURSE PRACTITIONER

## 2025-01-25 PROCEDURE — 36415 COLL VENOUS BLD VENIPUNCTURE: CPT

## 2025-01-25 PROCEDURE — 6360000002 HC RX W HCPCS: Performed by: SURGERY

## 2025-01-25 PROCEDURE — 6370000000 HC RX 637 (ALT 250 FOR IP): Performed by: INTERNAL MEDICINE

## 2025-01-25 PROCEDURE — 94760 N-INVAS EAR/PLS OXIMETRY 1: CPT

## 2025-01-25 RX ADMIN — OXYCODONE HYDROCHLORIDE 10 MG: 10 TABLET ORAL at 12:39

## 2025-01-25 RX ADMIN — SPIRONOLACTONE 12.5 MG: 25 TABLET ORAL at 08:53

## 2025-01-25 RX ADMIN — SODIUM CHLORIDE, PRESERVATIVE FREE 10 ML: 5 INJECTION INTRAVENOUS at 19:59

## 2025-01-25 RX ADMIN — CARVEDILOL 3.12 MG: 6.25 TABLET, FILM COATED ORAL at 15:40

## 2025-01-25 RX ADMIN — SACUBITRIL AND VALSARTAN 0.5 TABLET: 24; 26 TABLET, FILM COATED ORAL at 19:57

## 2025-01-25 RX ADMIN — ACETAMINOPHEN 1000 MG: 500 TABLET ORAL at 19:57

## 2025-01-25 RX ADMIN — CARVEDILOL 3.12 MG: 6.25 TABLET, FILM COATED ORAL at 08:53

## 2025-01-25 RX ADMIN — OXYCODONE 5 MG: 5 TABLET ORAL at 19:58

## 2025-01-25 RX ADMIN — OXYCODONE HYDROCHLORIDE 10 MG: 10 TABLET ORAL at 08:52

## 2025-01-25 RX ADMIN — TRAZODONE HYDROCHLORIDE 200 MG: 100 TABLET ORAL at 19:58

## 2025-01-25 RX ADMIN — HYDROMORPHONE HYDROCHLORIDE 0.5 MG: 1 INJECTION, SOLUTION INTRAMUSCULAR; INTRAVENOUS; SUBCUTANEOUS at 15:40

## 2025-01-25 RX ADMIN — ASPIRIN 81 MG: 81 TABLET, COATED ORAL at 08:52

## 2025-01-25 RX ADMIN — SACUBITRIL AND VALSARTAN 0.5 TABLET: 24; 26 TABLET, FILM COATED ORAL at 08:53

## 2025-01-25 RX ADMIN — Medication 2 PUFF: at 06:40

## 2025-01-25 RX ADMIN — Medication 5 MG: at 19:57

## 2025-01-25 RX ADMIN — ENOXAPARIN SODIUM 40 MG: 100 INJECTION SUBCUTANEOUS at 15:40

## 2025-01-25 RX ADMIN — SODIUM CHLORIDE, PRESERVATIVE FREE 10 ML: 5 INJECTION INTRAVENOUS at 08:53

## 2025-01-25 RX ADMIN — SODIUM CHLORIDE, PRESERVATIVE FREE 40 MG: 5 INJECTION INTRAVENOUS at 08:51

## 2025-01-25 RX ADMIN — CLOPIDOGREL BISULFATE 75 MG: 75 TABLET ORAL at 08:52

## 2025-01-25 RX ADMIN — ATORVASTATIN CALCIUM 80 MG: 80 TABLET, FILM COATED ORAL at 08:53

## 2025-01-25 ASSESSMENT — PAIN DESCRIPTION - PAIN TYPE: TYPE: CHRONIC PAIN

## 2025-01-25 ASSESSMENT — PAIN SCALES - GENERAL
PAINLEVEL_OUTOF10: 7
PAINLEVEL_OUTOF10: 6
PAINLEVEL_OUTOF10: 7
PAINLEVEL_OUTOF10: 7
PAINLEVEL_OUTOF10: 8

## 2025-01-25 ASSESSMENT — PAIN - FUNCTIONAL ASSESSMENT
PAIN_FUNCTIONAL_ASSESSMENT: PREVENTS OR INTERFERES SOME ACTIVE ACTIVITIES AND ADLS
PAIN_FUNCTIONAL_ASSESSMENT: ACTIVITIES ARE NOT PREVENTED
PAIN_FUNCTIONAL_ASSESSMENT: PREVENTS OR INTERFERES SOME ACTIVE ACTIVITIES AND ADLS

## 2025-01-25 ASSESSMENT — PAIN DESCRIPTION - DESCRIPTORS
DESCRIPTORS: STABBING
DESCRIPTORS: STABBING
DESCRIPTORS: ACHING

## 2025-01-25 ASSESSMENT — PAIN DESCRIPTION - FREQUENCY: FREQUENCY: INTERMITTENT

## 2025-01-25 ASSESSMENT — PAIN DESCRIPTION - ORIENTATION
ORIENTATION: LOWER

## 2025-01-25 ASSESSMENT — PAIN DESCRIPTION - LOCATION
LOCATION: BACK

## 2025-01-25 NOTE — CARE COORDINATION
25 1530  DME Order for Walker as OP  ONE TIME     Complete     Comments: You must complete the order parameters below and add the medical necessity documentation for this DME in a separate note.    Folding Walker    Current patient weight: Weight - Scale: 60.9 kg (134 lb 5 oz) (equipment removed from bed)  Current patient height: Height: 177.8 cm (5' 10\")  Diagnosis: Cecum mass  Duration: Purchase   Electronically Signed By: Gage Andrews MD [NPI: [id number]]      Sherry Jain #[id number] (CSN:[id number]) (:1950 74 y.o. M) (Adm: 01/10/25)  [id number]  PCP    ARIELA GUEVARA  Demographics  CommentAddress   [address] [place] [address]    Home Phone   [phone]    Work Phone       Mobile Phone   [phone]             Social Security Number       Insurance Information   MEDICARE    Marital Status       [employer]   Not able to ask                Status   No [002]     Insurance Payors as of 2025    MEDICARE    Plan: MEDICARE PART A AND B Member: [id number] Effective from: 2014   Subscriber: SHERRY JAIN Subscriber ID: [id number] Guarantor: SHERRY JAIN     CIGCEDRIC    Plan: CIGNA MEDICARE SUPP Group: Plan G Member: [id number]   Effective from: 2024 Subscriber: SHERRY JAIN Subscriber ID: [id number]   Guarantor: SHERRY JAIN     Documents Filed to Patient    Power of  Living Will Clinical Unknown Study Attachment Consent Form ABN Waiver After Visit Summary Lab Result Scan Code Status MyChart Status Advance Care Planning    Not on File  Not on File  Not on File  Not on File  Filed  Filed  Filed  Not on File  FULL [Updated on 25 1540]  Active Jump to the Activity      Auth/Cert Information    Open auth/cert linked to hospital account [id number]      Emergency Contacts    Name Relation Home Work Mobile   Doreen Jain Spouse [phone]       Other Contacts    Name Relation Home Work Mobile   Elisa Wright Child [phone]

## 2025-01-25 NOTE — CARE COORDINATION
SW spoke with pt at bedside regarding HH and Walker.    Pt and wife are requesting [facility]  as their option.    CARLOS ALBERTO sent referral to [facility]  by [facility]    P:[phone]  F:[phone]    Electronically signed by Filomena Beach on 1/25/25 at 3:20 PM CST

## 2025-01-26 VITALS
HEIGHT: 70 IN | BODY MASS INDEX: 19.23 KG/M2 | SYSTOLIC BLOOD PRESSURE: 120 MMHG | OXYGEN SATURATION: 96 % | WEIGHT: 134.31 LBS | TEMPERATURE: 97.3 F | HEART RATE: 86 BPM | RESPIRATION RATE: 18 BRPM | DIASTOLIC BLOOD PRESSURE: 58 MMHG

## 2025-01-26 LAB
ANION GAP SERPL CALCULATED.3IONS-SCNC: 11 MMOL/L (ref 8–16)
BASOPHILS # BLD: 0 K/UL (ref 0–0.2)
BASOPHILS NFR BLD: 0.4 % (ref 0–1)
BUN SERPL-MCNC: 25 MG/DL (ref 8–23)
CALCIUM SERPL-MCNC: 7.7 MG/DL (ref 8.8–10.2)
CHLORIDE SERPL-SCNC: 103 MMOL/L (ref 98–107)
CO2 SERPL-SCNC: 22 MMOL/L (ref 22–29)
CREAT SERPL-MCNC: 1.4 MG/DL (ref 0.7–1.2)
EOSINOPHIL # BLD: 0.1 K/UL (ref 0–0.6)
EOSINOPHIL NFR BLD: 1.4 % (ref 0–5)
ERYTHROCYTE [DISTWIDTH] IN BLOOD BY AUTOMATED COUNT: 15.9 % (ref 11.5–14.5)
GLUCOSE BLD-MCNC: 83 MG/DL (ref 70–99)
GLUCOSE BLD-MCNC: 91 MG/DL (ref 70–99)
GLUCOSE SERPL-MCNC: 83 MG/DL (ref 70–99)
HCT VFR BLD AUTO: 23.9 % (ref 42–52)
HGB BLD-MCNC: 7.2 G/DL (ref 14–18)
IMM GRANULOCYTES # BLD: 0 K/UL
LYMPHOCYTES # BLD: 0.9 K/UL (ref 1.1–4.5)
LYMPHOCYTES NFR BLD: 16.1 % (ref 20–40)
MCH RBC QN AUTO: 27.7 PG (ref 27–31)
MCHC RBC AUTO-ENTMCNC: 30.1 G/DL (ref 33–37)
MCV RBC AUTO: 91.9 FL (ref 80–94)
MONOCYTES # BLD: 0.6 K/UL (ref 0–0.9)
MONOCYTES NFR BLD: 9.6 % (ref 0–10)
NEUTROPHILS # BLD: 4.1 K/UL (ref 1.5–7.5)
NEUTS SEG NFR BLD: 71.8 % (ref 50–65)
PERFORMED ON: NORMAL
PERFORMED ON: NORMAL
PLATELET # BLD AUTO: 375 K/UL (ref 130–400)
PMV BLD AUTO: 10 FL (ref 9.4–12.4)
POTASSIUM SERPL-SCNC: 4.4 MMOL/L (ref 3.5–5)
RBC # BLD AUTO: 2.6 M/UL (ref 4.7–6.1)
SODIUM SERPL-SCNC: 136 MMOL/L (ref 136–145)
WBC # BLD AUTO: 5.7 K/UL (ref 4.8–10.8)

## 2025-01-26 PROCEDURE — 94640 AIRWAY INHALATION TREATMENT: CPT

## 2025-01-26 PROCEDURE — 6370000000 HC RX 637 (ALT 250 FOR IP): Performed by: NURSE PRACTITIONER

## 2025-01-26 PROCEDURE — 2580000003 HC RX 258: Performed by: SURGERY

## 2025-01-26 PROCEDURE — 6370000000 HC RX 637 (ALT 250 FOR IP): Performed by: INTERNAL MEDICINE

## 2025-01-26 PROCEDURE — 80048 BASIC METABOLIC PNL TOTAL CA: CPT

## 2025-01-26 PROCEDURE — 99232 SBSQ HOSP IP/OBS MODERATE 35: CPT | Performed by: INTERNAL MEDICINE

## 2025-01-26 PROCEDURE — 85025 COMPLETE CBC W/AUTO DIFF WBC: CPT

## 2025-01-26 PROCEDURE — 6370000000 HC RX 637 (ALT 250 FOR IP): Performed by: SURGERY

## 2025-01-26 PROCEDURE — 2500000003 HC RX 250 WO HCPCS: Performed by: NURSE PRACTITIONER

## 2025-01-26 PROCEDURE — 94760 N-INVAS EAR/PLS OXIMETRY 1: CPT

## 2025-01-26 PROCEDURE — 82962 GLUCOSE BLOOD TEST: CPT

## 2025-01-26 PROCEDURE — 6360000002 HC RX W HCPCS: Performed by: SURGERY

## 2025-01-26 RX ORDER — CARVEDILOL 3.12 MG/1
3.12 TABLET ORAL 2 TIMES DAILY WITH MEALS
Qty: 60 TABLET | Refills: 3 | Status: SHIPPED | OUTPATIENT
Start: 2025-01-26

## 2025-01-26 RX ORDER — OXYCODONE HYDROCHLORIDE 5 MG/1
5 TABLET ORAL EVERY 4 HOURS PRN
Qty: 18 TABLET | Refills: 0 | Status: SHIPPED | OUTPATIENT
Start: 2025-01-26 | End: 2025-01-29 | Stop reason: SDUPTHER

## 2025-01-26 RX ORDER — SPIRONOLACTONE 25 MG/1
12.5 TABLET ORAL DAILY
Qty: 30 TABLET | Refills: 0 | Status: SHIPPED | OUTPATIENT
Start: 2025-01-27

## 2025-01-26 RX ORDER — LIDOCAINE 4 G/G
2 PATCH TOPICAL EVERY 12 HOURS PRN
Qty: 60 EACH | Refills: 0 | Status: SHIPPED | OUTPATIENT
Start: 2025-01-26

## 2025-01-26 RX ADMIN — ASPIRIN 81 MG: 81 TABLET, COATED ORAL at 08:23

## 2025-01-26 RX ADMIN — ACETAMINOPHEN 1000 MG: 500 TABLET ORAL at 08:24

## 2025-01-26 RX ADMIN — ATORVASTATIN CALCIUM 80 MG: 80 TABLET, FILM COATED ORAL at 08:24

## 2025-01-26 RX ADMIN — SPIRONOLACTONE 12.5 MG: 25 TABLET ORAL at 08:25

## 2025-01-26 RX ADMIN — SACUBITRIL AND VALSARTAN 0.5 TABLET: 24; 26 TABLET, FILM COATED ORAL at 08:24

## 2025-01-26 RX ADMIN — Medication 2 PUFF: at 06:46

## 2025-01-26 RX ADMIN — SODIUM CHLORIDE, PRESERVATIVE FREE 10 ML: 5 INJECTION INTRAVENOUS at 08:25

## 2025-01-26 RX ADMIN — SODIUM CHLORIDE, PRESERVATIVE FREE 40 MG: 5 INJECTION INTRAVENOUS at 08:24

## 2025-01-26 RX ADMIN — CARVEDILOL 3.12 MG: 6.25 TABLET, FILM COATED ORAL at 08:24

## 2025-01-26 RX ADMIN — CLOPIDOGREL BISULFATE 75 MG: 75 TABLET ORAL at 08:24

## 2025-01-26 ASSESSMENT — PAIN SCALES - GENERAL: PAINLEVEL_OUTOF10: 0

## 2025-01-26 NOTE — DISCHARGE SUMMARY
discharge for hospital follow-up.  Follow-up with oncology as scheduled.  Follow-up with general surgery for postop follow-up  Follow-up with cardiology as recommended for follow-up  Take medications as directed.    Resume activity as tolerated.    Diet: ADULT DIET; Regular  ADULT ORAL NUTRITION SUPPLEMENT; Dinner; Frozen Oral Supplement     Disposition: Patient is Stableand will be discharged to Home with Home Health Care.    Time spent on discharge 38 minutes spent in assessing patient, reviewing medications, discussion with nursing, confirming safe discharge plan and preparation of discharge summary.    Signed:  AGUSTINA Lima, 1/26/2025 12:10 PM

## 2025-01-26 NOTE — PROGRESS NOTES
Automatic Dose Adjustment of                Subcutaneous Anticoagulant for Prophylaxis    Nick Jain is a 74 y.o. male.     Recent Labs     01/11/25  0335 01/12/25  0334   CREATININE 1.3* 1.4*       Estimated Creatinine Clearance: 43 mL/min (A) (based on SCr of 1.4 mg/dL (H)).    Weight:  Wt Readings from Last 1 Encounters:   01/11/25 67.1 kg (148 lb)           Pharmacy has adjusted subcutaneous anticoagulant for prophylaxis to Heparin 5000 units SC three times daily based on the patient's weight and estimated CrCl per [facility] policy.               Electronically signed by ARMANDO ROTH RPH on 1/12/2025 at 11:55 AM   
            Automatic Dose Adjustment of                Subcutaneous Anticoagulant for Prophylaxis    Nick Jain is a 74 y.o. male.     Recent Labs     01/20/25  0230 01/21/25  0400   CREATININE 1.1 1.3*       Estimated Creatinine Clearance: 46 mL/min (A) (based on SCr of 1.3 mg/dL (H)).    Weight:  Wt Readings from Last 1 Encounters:   01/19/25 66.7 kg (147 lb)           Pharmacy has adjusted subcutaneous anticoagulant for prophylaxis to Enoxaparin 40 mg SC daily based on the patient's weight and estimated CrCl per [facility] policy.               Electronically signed by Kelli Hernandez [facility] on 1/21/2025 at 10:08 AM    
      [facility] General Surgery Progress Note    Chief Complaint:  Chief Complaint   Patient presents with    Abdominal Pain     Abd pain and emesis onset this morning; just left [place] ED AMA after being diagnosed with bowel obstruction, had labs and CT done today    Vomiting       POD # 10    S: Seen and examined. Feeling well. Tolerated clear liquid diet without issues. Continues to have bowel function. No nausea.    O:   BP (!) 75/50   Pulse 80   Temp 97.5 °F (36.4 °C) (Temporal)   Resp 18   Ht 1.778 m (5' 10\")   Wt 66.7 kg (147 lb)   SpO2 95%   BMI 21.09 kg/m²   I/O reviewed and appropriate  CONSTITUTIONAL: Alert, appropriate, no acute distress  SKIN: warm, dry with no rashes or lesions  HEENT: NCAT, Non icteric, PERR. Trachea Midline.  ABDOMEN: soft, ND, appropriately TTP, +BS. Groin dressing in place.  Incisions: Clean, dry, and intact with no erythema or induration      LABS:   CBC:   Recent Labs     01/19/25  0530 01/20/25  0230 01/21/25  0400   WBC 12.3* 9.6 13.2*   RBC 3.17* 3.00* 2.95*   HGB 8.9* 8.4* 8.3*   HCT 28.3* 27.2* 26.4*    177 212      BMP:   Recent Labs     01/19/25  0530 01/20/25  0230 01/21/25  0400    136 136   K 3.6 3.3* 3.4*    100 99   CO2 27 27 26   ANIONGAP 8 9 11   GLUCOSE 116* 121* 119*   BUN 12 17 22   CREATININE 1.0 1.1 1.3*   LABGLOM 78 70 57*   CALCIUM 7.8* 7.2* 7.4*     LFT:   Recent Labs     01/19/25  0530 01/20/25  0230   BILITOT 0.4 0.3   ALKPHOS 152* 135*   ALT 39 36   AST 43* 33       A: Principal Problem:    Cecum mass  Active Problems:    CKD (chronic kidney disease), stage III (HCC)    COPD (chronic obstructive pulmonary disease) (HCC)    Nausea and vomiting    Small bowel obstruction (HCC)    Severe malnutrition (HCC)    Adenocarcinoma of cecum (HCC)    Ileus (HCC)    Acute systolic heart failure (HCC)    Palliative care patient    CAD (coronary artery disease)    Ischemic cardiomyopathy  Resolved Problems:    * No resolved hospital problems. 
      [facility] General Surgery Progress Note    Chief Complaint:  Chief Complaint   Patient presents with    Abdominal Pain     Abd pain and emesis onset this morning; just left [place] ED AMA after being diagnosed with bowel obstruction, had labs and CT done today    Vomiting       POD # 9    S: Seen and examined s/p cardiac cath. C/o some nausea today. Passing stool.    O:   BP (!) 88/59   Pulse 86   Temp 97.4 °F (36.3 °C)   Resp 20   Ht 1.778 m (5' 10\")   Wt 66.7 kg (147 lb)   SpO2 100%   BMI 21.09 kg/m²   I/O reviewed and appropriate  CONSTITUTIONAL: Alert, appropriate, no acute distress  SKIN: warm, dry with no rashes or lesions  HEENT: NCAT, Non icteric, PERR. Trachea Midline.  ABDOMEN: soft, ND, appropriately TTP, +BS. Groin dressing in place.  Incisions: Clean, dry, and intact with no erythema or induration      LABS:   CBC:   Recent Labs     01/18/25 0420 01/19/25  0530 01/20/25  0230   WBC 13.2* 12.3* 9.6   RBC 3.12* 3.17* 3.00*   HGB 8.7* 8.9* 8.4*   HCT 27.9* 28.3* 27.2*    193 177      BMP:   Recent Labs     01/18/25  0420 01/18/25  1207 01/19/25  0530 01/20/25  0230     --  137 136   K 3.6 3.4 3.6 3.3*     --  102 100   CO2 24  --  27 27   ANIONGAP 11  --  8 9   GLUCOSE 146*  --  116* 121*   BUN 9  --  12 17   CREATININE 0.9  --  1.0 1.1   LABGLOM 89  --  78 70   CALCIUM 7.7*  --  7.8* 7.2*     LFT:   Recent Labs     01/18/25  0420 01/19/25  0530 01/20/25  0230   BILITOT 0.4 0.4 0.3   ALKPHOS 141* 152* 135*   ALT 34 39 36   AST 39 43* 33       A: Principal Problem:    Cecum mass  Active Problems:    CKD (chronic kidney disease), stage III (HCC)    COPD (chronic obstructive pulmonary disease) (HCC)    Nausea and vomiting    Small bowel obstruction (HCC)    Severe malnutrition (HCC)    Adenocarcinoma of cecum (HCC)    Ileus (HCC)    Acute systolic heart failure (HCC)    Palliative care patient    CAD (coronary artery disease)    Ischemic cardiomyopathy  Resolved Problems:    * No 
      [facility] General Surgery Progress Note    Chief Complaint:  Chief Complaint   Patient presents with    Abdominal Pain     Abd pain and emesis onset this morning; just left [place] ED AMA after being diagnosed with bowel obstruction, had labs and CT done today    Vomiting       POD # 11    S: Seen and examined. Feeling well. Tolerated full liquid diet without issues. Continues to have bowel function. No nausea.    O:   /66   Pulse (!) 104   Temp 97.2 °F (36.2 °C) (Temporal)   Resp 16   Ht 1.778 m (5' 10\")   Wt 66.7 kg (147 lb)   SpO2 97%   BMI 21.09 kg/m²   I/O reviewed and appropriate  CONSTITUTIONAL: Alert, appropriate, no acute distress  SKIN: warm, dry with no rashes or lesions  HEENT: NCAT, Non icteric, PERR. Trachea Midline.  ABDOMEN: soft, ND, appropriately TTP, +BS. Groin dressing in place.  Incisions: Clean, dry, and intact with staples. no erythema or induration      LABS:   CBC:   Recent Labs     01/20/25  0230 01/21/25  0400   WBC 9.6 13.2*   RBC 3.00* 2.95*   HGB 8.4* 8.3*   HCT 27.2* 26.4*    212      BMP:   Recent Labs     01/20/25  0230 01/21/25  0400 01/22/25  0537    136 135*   K 3.3* 3.4* 4.1    99 98   CO2 27 26 25   ANIONGAP 9 11 12   GLUCOSE 121* 119* 95   BUN 17 22 33*   CREATININE 1.1 1.3* 1.7*   LABGLOM 70 57* 41*   CALCIUM 7.2* 7.4* 7.5*     LFT:   Recent Labs     01/20/25  0230 01/22/25  0537   BILITOT 0.3 0.5   ALKPHOS 135* 196*   ALT 36 27   AST 33 38       A: Principal Problem:    Cecum mass  Active Problems:    CKD (chronic kidney disease), stage III (HCC)    COPD (chronic obstructive pulmonary disease) (HCC)    Nausea and vomiting    Small bowel obstruction (HCC)    Severe malnutrition (HCC)    Adenocarcinoma of cecum (HCC)    Ileus (HCC)    Acute systolic heart failure (HCC)    Palliative care patient    CAD (coronary artery disease)    Ischemic cardiomyopathy  Resolved Problems:    * No resolved hospital problems. *      P:  Advance to regular 
    DIS Nurse Note  SUBJECTIVE: Patient assessed secondary to elevated Deterioration Index Score.      Deterioration Index Score:  Predictive Model Details          43 (Caution)  Factor Value    Calculated 1/18/2025 20:47 28% Age 74 years old    Deterioration Index Model 26% Supplemental oxygen Nasal cannula     12% Pulse 113     10% Hematocrit abnormal (27.9 %)     9% Cardiac rhythm Sinus tachy     7% WBC count abnormal (13.2 K/uL)     4% Systolic 145     3% Blood pH abnormal (7.470)     2% Sodium 138 mmol/L     0% Pulse oximetry 98 %     0% Respiratory rate 16     0% Temperature 98.4 °F (36.9 °C)        Vital Signs:  Vitals:    01/18/25 1437 01/18/25 1600 01/18/25 1800 01/18/25 1927   BP: 131/78 131/78 128/81 (!) 145/82   Pulse: (!) 110  (!) 114 (!) 113   Resp: 22  16 16   Temp: 97.9 °F (36.6 °C)  99.1 °F (37.3 °C) 98.4 °F (36.9 °C)   TempSrc: Temporal  Temporal    SpO2: 97%  98% 98%   Weight:  67.6 kg (149 lb)     Height:  1.778 m (5' 10\")          Provider Notified: Reviewed patient status  & DIS score with Provider Vipin    ASSESSMENT:   pt noted to be tachycardic, SOBOE, denies distress, O2 sats WNL    Plan of Care: new orders received, meds given as ordered, continue to report any deterioration in condition, provider to bedside, labs as ordered.     Electronically signed by Jennifer Locke RN     
   Pharmacy Renal Adjustment    Nick Jain is a 74 y.o. male. Pharmacy has renally adjusted medications per protocol.    Recent Labs     01/11/25  0335 01/12/25  0334   BUN 18 22       Recent Labs     01/11/25  0335 01/12/25  0334   CREATININE 1.3* 1.4*       Estimated Creatinine Clearance: 43 mL/min (A) (based on SCr of 1.4 mg/dL (H)).    Height:   Ht Readings from Last 1 Encounters:   01/11/25 1.778 m (5' 10\")     Weight:  Wt Readings from Last 1 Encounters:   01/11/25 67.1 kg (148 lb)       Plan: Adjust the following medications based on renal function:           Cefoxitin to 2 g IV q 8 hrs    Electronically signed by ARMANDO ROTH RPH on 1/12/2025 at 11:58 AM     
  [facility]      [facility] Note    Patient:  Nick Jain  YOB: 1950  Date of Service: 1/15/2025  MRN: [id number]   Acct: [id number]   Primary Care Physician: Tesfaye Gipsno MD  Advance Directive: Full Code  Admit Date: 1/10/2025       Hospital Day: 5    Portions of this note have been copied forward, however, updated to reflect the most current clinical status of this patient.     CHIEF COMPLAINT nausea vomiting abdominal pain    SUBJECTIVE: Not passing any gas but pain is controlled      CUMULATIVE HOSPITAL COURSE:   Patient is a 74-year-old with past medical history of CKD stage IIIa, hypertension, hyperlipidemia, CAD, COPD with a recent diagnosis of a cecal mass who presented to the emergency room with nausea vomiting and abdominal pain.  He had previously gone to [facility] ED and found to have a colon obstruction with recommendation to have emergent surgery.  Patient refused care there and presented here.  He has not had a bowel movement 4 days prior to admission and no passing of gas for 2 days.  He reported 15 pound weight loss in the last month.  ER eval sodium 134 BUN 17 creatinine 1.3 lactic 1.1 alk phos 184 white count 9.5 hemoglobin 11.6 hematocrit 36.4 platelets 262.  KUB indicated low-grade small bowel obstruction versus ileus.  Patient was admitted to hospitalist and general surgery was consulted.  He underwent right hemicolectomy on 1/11/2025..  He is up and walking 4 times a day at least.  Patient had faint bowel sounds in the right lower quadrant today.        Objective:   VITALS:  BP (!) 160/65   Pulse 73   Temp 97.7 °F (36.5 °C) (Temporal)   Resp 16   Ht 1.778 m (5' 10\")   Wt 67.1 kg (148 lb)   SpO2 95%   BMI 21.24 kg/m²   24HR INTAKE/OUTPUT:    Intake/Output Summary (Last 24 hours) at 1/15/2025 1533  Last data filed at 1/15/2025 0907  Gross per 24 hour   Intake 1282.84 ml   Output 1125 ml   Net 157.84 ml           Physical Exam  Vitals and nursing note 
  [place] [facility]      Progress Note    Patient:  Nick Jain  YOB: 1950  Date of Service: 01/17/2025  MRN: [id number]   Acct: [id number]   Primary Care Physician: Tesfaye Gipson MD  Advance Directive: Full Code  Admit Date: 1/10/2025       Hospital Day: 9    Portions of this note have been copied forward, however, updated to reflect the most current clinical status of this patient.     CHIEF COMPLAINT nausea vomiting abdominal pain    SUBJECTIVE: Patient was given diagnosis today by oncology and is quite upset.  CUMULATIVE HOSPITAL COURSE:   Patient is a 74-year-old with past medical history of CKD stage IIIa, hypertension, hyperlipidemia, CAD, COPD with a recent diagnosis of a cecal mass who presented to the emergency room with nausea vomiting and abdominal pain.  He had previously gone to [facility] ED and found to have a colon obstruction with recommendation to have emergent surgery.  Patient refused care there and presented here.  He has not had a bowel movement 4 days prior to admission and no passing of gas for 2 days.  He reported 15 pound weight loss in the last month.  ER eval sodium 134 BUN 17 creatinine 1.3 lactic 1.1 alk phos 184 white count 9.5 hemoglobin 11.6 hematocrit 36.4 platelets 262.  KUB indicated low-grade small bowel obstruction versus ileus.  Patient was admitted to hospitalist and general surgery was consulted.  He underwent right hemicolectomy on 1/11/2025..  He is up and walking 4 times a day at least.  Patient had faint bowel sounds in the right lower quadrant today.  Significant amount of time spent with wife and patient answering questions.  He does have some bowel sounds today he still has not had a bowel movement or passing gas.    PICC line placed and TPN started.      Objective:   VITALS:  /72   Pulse (!) 113   Temp 97.3 °F (36.3 °C) (Temporal)   Resp 14   Ht 1.778 m (5' 10\")   Wt 66.7 kg (147 lb)   SpO2 98%   BMI 21.09 kg/m²   24HR 
  MEDICAL ONCOLOGY PROGRESS NOTE     Pt Name: Nick Jain  MRN: [id number]  YOB: 1950  Date of evaluation: 1/25/2025    Subjective-status post PCI/stent placement by Dr. Bernard cardiology.  Patient denies any pain.  He continues to improve daily.  His wife is hoping that he will be able to go home soon.    I will make an appointment to see him in about a week and a half and arrange a central line anticipating chemotherapy.  He is on Plavix and aspirin because of new stents placed this admission.  We may need to use a PICC line instead of a port and this will need to be arranged as an outpatient  Okay with me when discharged      HISTORY OF PRESENT ILLNESS:    Reason for admission  Small bowel obstruction r/t cecal mass  Status post right hemicolectomy 1/11/2025 by general surgery  Iron deficiency anemia  B12 deficiency  Colonic adenocarcinoma  CAD, heart failure    The patient is a 74 years old male who has established Dr. Gera Quintero.  He was seen yesterday 1/9/2025.  Patient was found to have a cecal mass, retroperitoneal adenopathy as well as mesenteric adenopathy.  The patient called this morning with complaints of right lower quadrant abdominal pain that has been going on for about 4 days.  The patient has not passed any gases and has not had any bowel movement.  He went to local hospital [facility] and had a CT of the abdomen pelvis that showed evidence of obstruction.  He was told that he needed surgery.  The patient left AGAINST MEDICAL ADVICE and presented to the emergency department at [facility].  He would like to keep his care locally here at [facility].  He is accompanied by his daughter.  He has complaints of nausea and vomiting as well.  Discussed with ER physician.  General surgery been consulted.    Outside CT abdomen/pelvis, [facility] :  Evidence of small bowel obstruction      1/11/2025-status post right hemicolectomy by Dr. Suleiman Hunter 
  MEDICAL ONCOLOGY PROGRESS NOTE     Pt Name: Nick Jain  MRN: [id number]  YOB: 1950  Date of evaluation: 1/11/2025    Subjective-significant event this morning with complaints of significant pain and intractable nausea vomiting.    HISTORY OF PRESENT ILLNESS:  The patient is a 74 years old male who has established Dr. Gera Quintero.  He was seen yesterday 1/9/2025.  Patient was found to have a cecal mass, retroperitoneal adenopathy as well as mesenteric adenopathy.  The patient called this morning with complaints of right lower quadrant abdominal pain that has been going on for about 4 days.  The patient has not passed any gases and has not had any bowel movement.  He went to local hospital [facility] and had a CT of the abdomen pelvis that showed evidence of obstruction.  He was told that he needed surgery.  The patient left AGAINST MEDICAL ADVICE and presented to the emergency department at [facility].  He would like to keep his care locally here at [facility].  He is accompanied by his daughter.  He has complaints of nausea and vomiting as well.  Discussed with ER physician.  General surgery been consulted.    Prior cancer history       Mr. Nick Jain is a 74 year old  gentleman  referred by Dr. Tesfaye Gipson (PCP) for a mass of the cecum identified on CT abdomen pelvis at [facility] on 1/1/2025.         Other diagnosis:  4 mm Pulmonary nodules being followed Dr. Anthony Matute.  COPD being followed by [facility]monology Elisa Penn        TARGET LESIONS  2-3 cm right axillary lymph node  Wall thickening of the colon at the level of the ileocecal valve.   3.3 x 2.4 x 7.0 cm abnormal soft tissue extends from the abnormal segment of colon (ileocecal valve area) into the adjacent medial mesenteric soft tissues  0.8 cm left hepatic lobe hypodensity is identified and too small to characterize  Multiple abnormal mesenteric lymph nodes are seen near the colonic wall 
  MEDICAL ONCOLOGY PROGRESS NOTE     Pt Name: Nick Jain  MRN: [id number]  YOB: 1950  Date of evaluation: 1/17/2025    Subjective-unfortunate, he has not had a bowel movement yet.  Feels nauseated.  No vomiting.  Abdomen is distended.    HISTORY OF PRESENT ILLNESS:    Reason for admission  Small bowel obstruction r/t cecal mass  Status post right hemicolectomy 1/11/2025 by general surgery  Iron deficiency anemia  B12 deficiency  Colonic adenocarcinoma      The patient is a 74 years old male who has established Dr. Gera Quintero.  He was seen yesterday 1/9/2025.  Patient was found to have a cecal mass, retroperitoneal adenopathy as well as mesenteric adenopathy.  The patient called this morning with complaints of right lower quadrant abdominal pain that has been going on for about 4 days.  The patient has not passed any gases and has not had any bowel movement.  He went to local hospital [facility] and had a CT of the abdomen pelvis that showed evidence of obstruction.  He was told that he needed surgery.  The patient left AGAINST MEDICAL ADVICE and presented to the emergency department at [facility].  He would like to keep his care locally here at [facility].  He is accompanied by his daughter.  He has complaints of nausea and vomiting as well.  Discussed with ER physician.  General surgery been consulted.    Outside CT abdomen/pelvis, [facility] :  Evidence of small bowel obstruction      1/11/2025-status post right hemicolectomy by Dr. Suleiman Hunter @[facility].  FINAL DIAGNOSIS:   Terminal ileum and right colon, right hemicolectomy:   Poorly differentiated adenocarcinoma (4.8 cm).   Tumor invades the visceral peritoneum.   Tumor extends to mesenteric margin.   Extensive lymph-vascular space invasion.   Incidental tubular adenoma   Incidental hyperplastic polyps.   Twenty lymph nodes positive for metastatic carcinoma (20/20)   AJCC pathologic stage:  
  MEDICAL ONCOLOGY PROGRESS NOTE     Pt Name: Nick Jain  MRN: [id number]  YOB: 1950  Date of evaluation: 1/18/2025    Subjective-he had 3 bowel movements yesterday.  Feels overall better..  Pain under control.  He has his kids at bedside.  He had a CT abdomen yesterday.  No evidence of obstruction.  He was started on TPN.  He is afebrile.  Feels improved compared to yesterday. He is planning to walk today    HISTORY OF PRESENT ILLNESS:    Reason for admission  Small bowel obstruction r/t cecal mass  Status post right hemicolectomy 1/11/2025 by general surgery  Iron deficiency anemia  B12 deficiency  Colonic adenocarcinoma      The patient is a 74 years old male who has established Dr. Gera Quintero.  He was seen yesterday 1/9/2025.  Patient was found to have a cecal mass, retroperitoneal adenopathy as well as mesenteric adenopathy.  The patient called this morning with complaints of right lower quadrant abdominal pain that has been going on for about 4 days.  The patient has not passed any gases and has not had any bowel movement.  He went to local hospital [facility] and had a CT of the abdomen pelvis that showed evidence of obstruction.  He was told that he needed surgery.  The patient left AGAINST MEDICAL ADVICE and presented to the emergency department at [facility].  He would like to keep his care locally here at [facility].  He is accompanied by his daughter.  He has complaints of nausea and vomiting as well.  Discussed with ER physician.  General surgery been consulted.    Outside CT abdomen/pelvis, [facility] :  Evidence of small bowel obstruction      1/11/2025-status post right hemicolectomy by Dr. Suleiman Hunter @[facility].  FINAL DIAGNOSIS:   Terminal ileum and right colon, right hemicolectomy:   Poorly differentiated adenocarcinoma (4.8 cm).   Tumor invades the visceral peritoneum.   Tumor extends to mesenteric margin.   Extensive 
  MEDICAL ONCOLOGY PROGRESS NOTE     Pt Name: Nick Jain  MRN: [id number]  YOB: 1950  Date of evaluation: 1/14/2025    Subjective-reviewed interval notes general surgery.  Mild abdominal pain.  Tolerated IV Venofer relatively well.  He has not had any bowel movement.  He is not passing gases.  He walked yesterday 4 times.    HISTORY OF PRESENT ILLNESS:    Reason for admission  Small bowel obstruction r/t cecal mass  Status post right hemicolectomy 1/11/2025 by general surgery  Iron deficiency anemia  B12 deficiency      The patient is a 74 years old male who has established Dr. Gera Quintero.  He was seen yesterday 1/9/2025.  Patient was found to have a cecal mass, retroperitoneal adenopathy as well as mesenteric adenopathy.  The patient called this morning with complaints of right lower quadrant abdominal pain that has been going on for about 4 days.  The patient has not passed any gases and has not had any bowel movement.  He went to local hospital [facility] and had a CT of the abdomen pelvis that showed evidence of obstruction.  He was told that he needed surgery.  The patient left AGAINST MEDICAL ADVICE and presented to the emergency department at [facility].  He would like to keep his care locally here at [facility].  He is accompanied by his daughter.  He has complaints of nausea and vomiting as well.  Discussed with ER physician.  General surgery been consulted.    Outside CT abdomen/pelvis, [facility] :  Evidence of small bowel obstruction      1/11/2025-status post right hemicolectomy by Dr. Suleiman Hunter @[facility].      PRIOR CANCER HISTORY     Mr. Nick Jain is a 74 year old  gentleman  referred by Dr. Tesfaye Gipson (PCP) for a mass of the cecum identified on CT abdomen pelvis at [facility] on 1/1/2025.         Other diagnosis:  4 mm Pulmonary nodules being followed Dr. Anthony Matute.  COPD being followed by [facility] Pulmonology Elisa 
  MEDICAL ONCOLOGY PROGRESS NOTE     Pt Name: Nick Jain  MRN: [id number]  YOB: 1950  Date of evaluation: 1/12/2025    Subjective-abdomen is somewhat tender this morning.  Afebrile.  Has not passed gas or had any bowel movement.  NPO.    HISTORY OF PRESENT ILLNESS:    Reason for admission  Small bowel obstruction r/t cecal mass  Status post right hemicolectomy 1/11/2025 by general surgery      The patient is a 74 years old male who has established Dr. Gera Quintero.  He was seen yesterday 1/9/2025.  Patient was found to have a cecal mass, retroperitoneal adenopathy as well as mesenteric adenopathy.  The patient called this morning with complaints of right lower quadrant abdominal pain that has been going on for about 4 days.  The patient has not passed any gases and has not had any bowel movement.  He went to local hospital [facility] and had a CT of the abdomen pelvis that showed evidence of obstruction.  He was told that he needed surgery.  The patient left AGAINST MEDICAL ADVICE and presented to the emergency department at [facility].  He would like to keep his care locally here at [facility].  He is accompanied by his daughter.  He has complaints of nausea and vomiting as well.  Discussed with ER physician.  General surgery been consulted.    Outside CT abdomen/pelvis, [facility] :  Evidence of small bowel obstruction      1/11/2025-status post right hemicolectomy by Dr. Suleiman Hunter @[facility].      PRIOR CANCER HISTORY     Mr. Nick Jain is a 74 year old  gentleman  referred by Dr. Tesfaye Gipson (PCP) for a mass of the cecum identified on CT abdomen pelvis at [facility] on 1/1/2025.         Other diagnosis:  4 mm Pulmonary nodules being followed Dr. Anthony Matute.  COPD being followed by [facility] Elisa Penn        TARGET LESIONS  2-3 cm right axillary lymph node  Wall thickening of the colon at the level of the ileocecal 
  [facility]      Patient:  Nick Jain  YOB: 1950  Date of Service: 1/20/2025  MRN: [id number]   Acct: [id number]   Primary Care Physician: Tesfaye Gipson MD  Advance Directive: Full Code  Admit Date: 1/10/2025       Hospital Day: 10  Portions of this note have been copied forward, however, changed to reflect the most current clinical status of this patient.    CHIEF COMPLAINT N/V/Abdominal pain    SUBJECTIVE:  He is seen just prior to heart cath. C/O mild shortness of breath. Denies chest pain. Incisional pain well controlled. Reports BM's. No issues or events overnight.     CUMULATIVE HOSPITAL STAY:  The patient is a 74-year-old male with a PMH of CKD 3A, HTN, HLD, CAD, COPD, and recent diagnosis of cecal mass who presented to [facility] ED on 1/10/2025 complaining of nausea, vomiting, and abdominal pain.  He reported he had gone to [facility] ED earlier on the day of admission.  He was told he needed surgery immediately, however, he refused and came to [facility] as he was established with oncology at this facility.  The imaging from the facility had been difficult to obtain however his family stated they were told that he had a colon obstruction and needed surgery immediately.  He had reported he had not had a BM in 4 days.  He had been taking opiate medication due to his abdominal pain.  He additionally reported a 15 pound weight loss which was unintentional over the last month.  He had ongoing and nausea and vomiting unable to tolerate any p.o. intake.  Further ED workup revealed , K4.9, BUN 17 and creatinine 1.3.  Lactic acid 1.1.  Alk phos 184.  WBC 9.5, H&H 11.6/36.4 with a platelet count of 262.  KUB performed on 1/10/2025 indicated developing/low-grade small bowel obstruction versus ileus.  He was admitted to hospital medicine with a general surgery and oncology consult.  He was taken to the OR on 1/11/2025 where he underwent a right hemicolectomy.  It did take a significant 
  [facility]      Progress Note    Patient:  Nick Jain  YOB: 1950  Date of Service: 1/11/2025  MRN: [id number]   Acct: [id number]   Primary Care Physician: Tesfaye Gipson MD  Advance Directive: Full Code  Admit Date: 1/10/2025       Hospital Day: 1    Portions of this note have been copied forward, however, updated to reflect the most current clinical status of this patient.     CHIEF COMPLAINT N/V, abdominal pain     SUBJECTIVE:  Mr. Jain was resting in bed this morning. Reports nausea and abdominal pain improved with regimen adjustment earlier this morning. Currently denies nausea or vomiting. Reports lower abdominal soreness. Reports SOB at baseline with COPD. Denies chest pain. Denies fever or chills.       CUMULATIVE HOSPITAL COURSE:   The patient is a 74 y.o. male with PMH of COPD, CAD, HLD, HTN, CKD stage IIIa and recent diagnosis of cecal mass who presented to [facility] ED for evaluation of nausea, vomiting and abdominal pain. He had apparently went to [facility] ED prior to coming here and was found to have colon obstruction with recommendations of surgery immediately. However, refused treatment at [facility]. And presented to [facility] for further evaluation and management since he is established with oncology here. Imaging from [facility] has been difficulty to obtain. Stated he has not had a bowel movement in the past 4 days and has not passed flatus in past 2 days. He has had ongoing nausea and vomiting and unable to tolerate any p.o. intake since morning of admission. Reportedly has lost approximately 15 lbs in past month. Denied melena or hematochezia. Workup in ED revealed , K4.9, BUN 17 creatinine 1.3. Lactic acid 1.1. Alk phos 184. WBC 9.5, H&H 11.6/36.4 with a platelet count of 262. KUB indicated developing/low grade small bowel obstruction versus ileus. Patient was admitted to hospital medicine for further evaluation with general surgery and oncology 
  [facility]      Progress Note    Patient:  Nick Jain  YOB: 1950  Date of Service: 01/18/2025  MRN: [id number]   Acct: [id number]   Primary Care Physician: Tesfaye Gipson MD  Advance Directive: Full Code  Admit Date: 1/10/2025       Hospital Day: 9    Portions of this note have been copied forward, however, updated to reflect the most current clinical status of this patient.     CHIEF COMPLAINT nausea vomiting abdominal pain    SUBJECTIVE: Complaint of shortness of breath and just not feeling well today  CUMULATIVE HOSPITAL COURSE:   Patient is a 74-year-old with past medical history of CKD stage IIIa, hypertension, hyperlipidemia, CAD, COPD with a recent diagnosis of a cecal mass who presented to the emergency room with nausea vomiting and abdominal pain.  He had previously gone to [facility] ED and found to have a colon obstruction with recommendation to have emergent surgery.  Patient refused care there and presented here.  He has not had a bowel movement 4 days prior to admission and no passing of gas for 2 days.  He reported 15 pound weight loss in the last month.  ER eval sodium 134 BUN 17 creatinine 1.3 lactic 1.1 alk phos 184 white count 9.5 hemoglobin 11.6 hematocrit 36.4 platelets 262.  KUB indicated low-grade small bowel obstruction versus ileus.  Patient was admitted to hospitalist and general surgery was consulted.  He underwent right hemicolectomy on 1/11/2025..  He is up and walking 4 times a day at least.  Patient had faint bowel sounds in the right lower quadrant today.  Significant amount of time spent with wife and patient answering questions.  He does have some bowel sounds today he still has not had a bowel movement or passing gas.    PICC line placed and TPN started.    Patient has been tachycardic most of the day echo ordered as well as repeat CT scan of the abdomen.    Objective:   VITALS:  /72   Pulse (!) 113   Temp 97.3 °F (36.3 °C) (Temporal)   
  Palliative Care Progress Note  1/22/2025 8:21 AM    Patient:  Nick Jain  YOB: 1950  Primary Care Physician: Tesfaye Gipson MD  Advance Directive: Full Code  Admit Date: 1/10/2025       Hospital Day: 12  Portions of this note have been copied forward, however, changed to reflect the most current clinical status of this patient.    CHIEF COMPLAINT/REASON FOR CONSULTATION Goals of care, family support, Code status, symptom management     SUBJECTIVE:  Mr. Jain is feeling well today. Had some pain this morning which improved with medications. Tolerating regular diet thus far. Increasing activity as able. No distress during my exam.     HPI: The patient is a 74 y.o. 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 He recently established with Dr. Quintero and visit note from 1/9/2025 reviewed.  Patient had been scheduled to see Dr. Junior Cano 1/28/2025 for colonoscopy with biopsy pending cardiology clearance.  He initially was seen at [facility] ED where he had imaging completed concerning for evidence of a bowel obstruction.  At OSH he was instructed that he likely would require urgent surgery so patient and family decided to leave AMA and presented to [facility] for admission where he is established with oncology.  He was admitted under hospitalist services to medical floor with general surgery evaluation and oncology following for continuity of care.  Patient underwent a right hemicolectomy/terminal ileum ileectomy and lymph node dissection 1/11/2025 by Dr. Suleiman Hunter.  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 CEA 10.3 and CA 19-9 1606 prior to surgery with CEA 7.8 on 1/13/2025. Patient did have evidence of iron deficiency anemia with B12 
  Palliative Care Progress Note  1/24/2025 10:53 AM    Patient:  Nick Jain  YOB: 1950  Primary Care Physician: Tesfaye Gipson MD  Advance Directive: Full Code  Admit Date: 1/10/2025       Hospital Day: 14  Portions of this note have been copied forward, however, changed to reflect the most current clinical status of this patient.    CHIEF COMPLAINT/REASON FOR CONSULTATION Goals of care, family support, Code status, symptom management     SUBJECTIVE:  Mr. Jain is alert and resting comfortably in bed.  He admits to difficulty sleeping overnight and has been having some visual and auditory hallucinations.  He is aware that he is having them which has caused some anxiety. Pain and nausea do remain controlled with current regimen.    HPI: The patient is a 74 y.o. 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 He recently established with Dr. Quintero and visit note from 1/9/2025 reviewed.  Patient had been scheduled to see Dr. Junior Cano 1/28/2025 for colonoscopy with biopsy pending cardiology clearance.  He initially was seen at [facility] ED where he had imaging completed concerning for evidence of a bowel obstruction.  At OSH he was instructed that he likely would require urgent surgery so patient and family decided to leave AMA and presented to [facility] for admission where he is established with oncology.  He was admitted under hospitalist services to medical floor with general surgery evaluation and oncology following for continuity of care.  Patient underwent a right hemicolectomy/terminal ileum ileectomy and lymph node dissection 1/11/2025 by Dr. Suleiman Hunter.  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 CEA 10.3 and CA 19-9 1606 prior 
  Physician Progress Note      PATIENT:               SHERRY JOHNSON  CSN #:                  [id number]  :                       1950  ADMIT DATE:       1/10/2025 3:20 PM  DISCH DATE:  RESPONDING  PROVIDER #:        Chris Mahmood MD MPH          QUERY TEXT:    Patient admitted with cecum mass. Noted to have documentation of weight loss,   dietician assessment with severe malnutrition diagnosis. If possible, please   document in progress notes and discharge summary if you are evaluating and /or   treating any of the following:    The medical record reflects the following:  Risk Factors: Cecum mass, COPD, CKD III,  Clinical Indicators: Acute illness, decreased energy 50% or less estimated or   more than 5 days. Moderate body fat loss buccal  region.  Treatment: ONS, Dietary consult, dietary monitoring.    Thank you  Karoline Lieberman RN, BSN, [facility]  [phone]    ASPEN Criteria:    https://aspenjournals.onlinelibrary.marie.com/doi/full/10.1177/[email or web address]  5  Options provided:  -- Protein calorie malnutrition severe  -- Other - I will add my own diagnosis  -- Disagree - Not applicable / Not valid  -- Disagree - Clinically unable to determine / Unknown  -- Refer to Clinical Documentation Reviewer    PROVIDER RESPONSE TEXT:    This patient has severe protein calorie malnutrition.    Query created by: Karoline Lieberman on 2025 2:21 PM      Electronically signed by:  Chris Mahmood MD MPH 2025 9:50 AM          
  [facility]      Progress Note    Patient:  Nick Jain  YOB: 1950  Date of Service: 1/13/2025  MRN: [id number]   Acct: [id number]   Primary Care Physician: Tesfaye Gipson MD  Advance Directive: Full Code  Admit Date: 1/10/2025       Hospital Day: 3    Portions of this note have been copied forward, however, updated to reflect the most current clinical status of this patient.     CHIEF COMPLAINT nausea vomiting abdominal pain    SUBJECTIVE: Not passing any gas but pain is controlled      CUMULATIVE HOSPITAL COURSE:   Patient is a 74-year-old with past medical history of CKD stage IIIa, hypertension, hyperlipidemia, CAD, COPD with a recent diagnosis of a cecal mass who presented to the emergency room with nausea vomiting and abdominal pain.  He had previously gone to [facility] ED and found to have a colon obstruction with recommendation to have emergent surgery.  Patient refused care there and presented here.  He has not had a bowel movement 4 days prior to admission and no passing of gas for 2 days.  He reported 15 pound weight loss in the last month.  ER eval sodium 134 BUN 17 creatinine 1.3 lactic 1.1 alk phos 184 white count 9.5 hemoglobin 11.6 hematocrit 36.4 platelets 262.  KUB indicated low-grade small bowel obstruction versus ileus.  Patient was admitted to hospitalist and general surgery was consulted.  He underwent right hemicolectomy on 1/11/2025.            Objective:   VITALS:  BP (!) 142/62   Pulse 76   Temp 97.2 °F (36.2 °C) (Temporal)   Resp 16   Ht 1.778 m (5' 10\")   Wt 67.1 kg (148 lb)   SpO2 90%   BMI 21.24 kg/m²   24HR INTAKE/OUTPUT:    Intake/Output Summary (Last 24 hours) at 1/13/2025 1734  Last data filed at 1/13/2025 1412  Gross per 24 hour   Intake 2342.97 ml   Output 2100 ml   Net 242.97 ml           Physical Exam  Vitals and nursing note reviewed.   Constitutional:       Appearance: Normal appearance.   HENT:      Head: Normocephalic and atraumatic. 
  [facility]      Patient:  Nick Jain  YOB: 1950  Date of Service: 1/22/2025  MRN: [id number]   Acct: [id number]   Primary Care Physician: Tesfaye Gipson MD  Advance Directive: Full Code  Admit Date: 1/10/2025       Hospital Day: 12  Portions of this note have been copied forward, however, changed to reflect the most current clinical status of this patient.    CHIEF COMPLAINT N/V/Abdominal pain    SUBJECTIVE:  Pain well controlled. Denies shortness of breath. His appetite is poor. He did have sustain hypotension yesterday systolic 70's, however, it did improve without intervention. He is on RA.     CUMULATIVE HOSPITAL STAY:  The patient is a 74-year-old male with a PMH of CKD 3A, HTN, HLD, CAD, COPD, and recent diagnosis of cecal mass who presented to [facility] ED on 1/10/2025 complaining of nausea, vomiting, and abdominal pain.  He reported he had gone to [facility] ED earlier on the day of admission.  He was told he needed surgery immediately, however, he refused and came to [facility] as he was established with oncology at this facility.  The imaging from the facility had been difficult to obtain however his family stated they were told that he had a colon obstruction and needed surgery immediately.  He had reported he had not had a BM in 4 days.  He had been taking opiate medication due to his abdominal pain.  He additionally reported a 15 pound weight loss which was unintentional over the last month.  He had ongoing and nausea and vomiting unable to tolerate any p.o. intake.  Further ED workup revealed , K4.9, BUN 17 and creatinine 1.3.  Lactic acid 1.1.  Alk phos 184.  WBC 9.5, H&H 11.6/36.4 with a platelet count of 262.  KUB performed on 1/10/2025 indicated developing/low-grade small bowel obstruction versus ileus.  He was admitted to hospital medicine with a general surgery and oncology consult.  He was taken to the OR on 1/11/2025 where he underwent a right hemicolectomy.  It did 
  [facility]      Progress Note    Patient:  Nick Jain  YOB: 1950  Date of Service: 01/19/2025  MRN: [id number]   Acct: [id number]   Primary Care Physician: Tesfaye Gipson MD  Advance Directive: Full Code  Admit Date: 1/10/2025       Hospital Day: 9    Portions of this note have been copied forward, however, updated to reflect the most current clinical status of this patient.     CHIEF COMPLAINT nausea vomiting abdominal pain    SUBJECTIVE: Complaint of shortness of breath and just not feeling well today  CUMULATIVE HOSPITAL COURSE:   Patient is a 74-year-old with past medical history of CKD stage IIIa, hypertension, hyperlipidemia, CAD, COPD with a recent diagnosis of a cecal mass who presented to the emergency room with nausea vomiting and abdominal pain.  He had previously gone to [facility] ED and found to have a colon obstruction with recommendation to have emergent surgery.  Patient refused care there and presented here.  He has not had a bowel movement 4 days prior to admission and no passing of gas for 2 days.  He reported 15 pound weight loss in the last month.  ER eval sodium 134 BUN 17 creatinine 1.3 lactic 1.1 alk phos 184 white count 9.5 hemoglobin 11.6 hematocrit 36.4 platelets 262.  KUB indicated low-grade small bowel obstruction versus ileus.  Patient was admitted to hospitalist and general surgery was consulted.  He underwent right hemicolectomy on 1/11/2025..  He is up and walking 4 times a day at least.  Patient had faint bowel sounds in the right lower quadrant today.  Significant amount of time spent with wife and patient answering questions.  He does have some bowel sounds today he still has not had a bowel movement or passing gas.    PICC line placed and TPN started.    Patient has been tachycardic most of the day echo ordered as well as repeat CT scan of the abdomen.    Echo reports severe decline in EF down to 30 from his previous 1 in March 2024.  Christine 
  [facility]      Progress Note    Patient:  Nick Jain  YOB: 1950  Date of Service: 1/16/2025  MRN: [id number]   Acct: [id number]   Primary Care Physician: Tesfaye Gipson MD  Advance Directive: Full Code  Admit Date: 1/10/2025       Hospital Day: 6    Portions of this note have been copied forward, however, updated to reflect the most current clinical status of this patient.     CHIEF COMPLAINT nausea vomiting abdominal pain    SUBJECTIVE: Patient was given diagnosis today by oncology and is quite upset.  CUMULATIVE HOSPITAL COURSE:   Patient is a 74-year-old with past medical history of CKD stage IIIa, hypertension, hyperlipidemia, CAD, COPD with a recent diagnosis of a cecal mass who presented to the emergency room with nausea vomiting and abdominal pain.  He had previously gone to [facility] ED and found to have a colon obstruction with recommendation to have emergent surgery.  Patient refused care there and presented here.  He has not had a bowel movement 4 days prior to admission and no passing of gas for 2 days.  He reported 15 pound weight loss in the last month.  ER eval sodium 134 BUN 17 creatinine 1.3 lactic 1.1 alk phos 184 white count 9.5 hemoglobin 11.6 hematocrit 36.4 platelets 262.  KUB indicated low-grade small bowel obstruction versus ileus.  Patient was admitted to hospitalist and general surgery was consulted.  He underwent right hemicolectomy on 1/11/2025..  He is up and walking 4 times a day at least.  Patient had faint bowel sounds in the right lower quadrant today.  Significant amount of time spent with wife and patient answering questions.  He does have some bowel sounds today he still has not had a bowel movement or passing gas      Objective:   VITALS:  BP (!) 176/98   Pulse (!) 145   Temp 97.5 °F (36.4 °C)   Resp 18   Ht 1.778 m (5' 10\")   Wt 67.1 kg (148 lb)   SpO2 97%   BMI 21.24 kg/m²   24HR INTAKE/OUTPUT:    Intake/Output Summary (Last 24 
  [facility]      Patient:  Nick Jain  YOB: 1950  Date of Service: 1/23/2025  MRN: [id number]   Acct: [id number]   Primary Care Physician: Tesfaye Gipson MD  Advance Directive: Full Code  Admit Date: 1/10/2025       Hospital Day: 13  Portions of this note have been copied forward, however, changed to reflect the most current clinical status of this patient.    CHIEF COMPLAINT N/V/Abdominal pain    SUBJECTIVE:  Pain well controlled. Denies shortness of breath. His appetite is fair. Hypotension improved with held medications. He is on RA.     CUMULATIVE HOSPITAL STAY:  The patient is a 74-year-old male with a PMH of CKD 3A, HTN, HLD, CAD, COPD, and recent diagnosis of cecal mass who presented to [facility] ED on 1/10/2025 complaining of nausea, vomiting, and abdominal pain.  He reported he had gone to [facility] ED earlier on the day of admission.  He was told he needed surgery immediately, however, he refused and came to [facility] as he was established with oncology at this facility.  The imaging from the facility had been difficult to obtain however his family stated they were told that he had a colon obstruction and needed surgery immediately.  He had reported he had not had a BM in 4 days.  He had been taking opiate medication due to his abdominal pain.  He additionally reported a 15 pound weight loss which was unintentional over the last month.  He had ongoing and nausea and vomiting unable to tolerate any p.o. intake.  Further ED workup revealed , K4.9, BUN 17 and creatinine 1.3.  Lactic acid 1.1.  Alk phos 184.  WBC 9.5, H&H 11.6/36.4 with a platelet count of 262.  KUB performed on 1/10/2025 indicated developing/low-grade small bowel obstruction versus ileus.  He was admitted to hospital medicine with a general surgery and oncology consult.  He was taken to the OR on 1/11/2025 where he underwent a right hemicolectomy.  It did take a significant amount of time for his bowel function 
  [facility]      [facility]    Patient:  Nick Jain  YOB: 1950  Date of Service: 1/12/2025  MRN: [id number]   Acct: [id number]   Primary Care Physician: Tesfaye Gipson MD  Advance Directive: Full Code  Admit Date: 1/10/2025       Hospital Day: 2    Portions of this note have been copied forward, however, updated to reflect the most current clinical status of this patient.     CHIEF COMPLAINT N/V, abdominal pain     SUBJECTIVE:  Mr. Jain was resting in chair this morning. Reports some nausea without vomiting. Reports postop pain to abdomen. Denies fever or chills. Denies shortness of breath or chest pain.         CUMULATIVE HOSPITAL COURSE:   The patient is a 74 y.o. male with PMH of COPD, CAD, HLD, HTN, CKD stage IIIa and recent diagnosis of cecal mass who presented to [facility] ED for evaluation of nausea, vomiting and abdominal pain. He had apparently went to [facility] ED prior to coming here and was found to have colon obstruction with recommendations of surgery immediately. However, refused treatment at [facility]. And presented to [facility] for further evaluation and management since he is established with oncology here. Imaging from [facility] has been difficulty to obtain. Stated he has not had a bowel movement in the past 4 days and has not passed flatus in past 2 days. He has had ongoing nausea and vomiting and unable to tolerate any p.o. intake since morning of admission. Reportedly has lost approximately 15 lbs in past month. Denied melena or hematochezia. Workup in ED revealed , K4.9, BUN 17 creatinine 1.3. Lactic acid 1.1. Alk phos 184. WBC 9.5, H&H 11.6/36.4 with a platelet count of 262. KUB indicated developing/low grade small bowel obstruction versus ileus. Patient was admitted to hospital medicine for further evaluation with general surgery and oncology consultations. IV fluids and PRN pain medications ordered. General surgery suggested hemicolectomy. Underwent 
  [facility]      Patient:  Nick Jain  YOB: 1950  Date of Service: 1/21/2025  MRN: [id number]   Acct: [id number]   Primary Care Physician: Tesfaye Gipson MD  Advance Directive: Full Code  Admit Date: 1/10/2025       Hospital Day: 11  Portions of this note have been copied forward, however, changed to reflect the most current clinical status of this patient.    CHIEF COMPLAINT N/V/Abdominal pain    SUBJECTIVE:  Pain well controlled. Denies shortness of breath. Tolerated clear liquid color. Denies shortness of breath. VSS. No issues or events overnight.     CUMULATIVE HOSPITAL STAY:  The patient is a 74-year-old male with a PMH of CKD 3A, HTN, HLD, CAD, COPD, and recent diagnosis of cecal mass who presented to [facility] ED on 1/10/2025 complaining of nausea, vomiting, and abdominal pain.  He reported he had gone to [facility] ED earlier on the day of admission.  He was told he needed surgery immediately, however, he refused and came to [facility] as he was established with oncology at this facility.  The imaging from the facility had been difficult to obtain however his family stated they were told that he had a colon obstruction and needed surgery immediately.  He had reported he had not had a BM in 4 days.  He had been taking opiate medication due to his abdominal pain.  He additionally reported a 15 pound weight loss which was unintentional over the last month.  He had ongoing and nausea and vomiting unable to tolerate any p.o. intake.  Further ED workup revealed , K4.9, BUN 17 and creatinine 1.3.  Lactic acid 1.1.  Alk phos 184.  WBC 9.5, H&H 11.6/36.4 with a platelet count of 262.  KUB performed on 1/10/2025 indicated developing/low-grade small bowel obstruction versus ileus.  He was admitted to hospital medicine with a general surgery and oncology consult.  He was taken to the OR on 1/11/2025 where he underwent a right hemicolectomy.  It did take a significant amount of time for his 
 4 Eyes Skin Assessment     NAME:  Nick Jain  YOB: 1950  MEDICAL RECORD NUMBER:  [id number]    The patient is being assessed for  Shift Handoff    I agree that at least one RN has performed a thorough Head to Toe Skin Assessment on the patient. ALL assessment sites listed below have been assessed.      Areas assessed by both nurses:    Head, Face, Ears, Shoulders, Back, Chest, Arms, Elbows, Hands, Sacrum. Buttock, Coccyx, Ischium, and Legs. Feet and Heels        Does the Patient have a Wound? Midline abdominal incision. No pressure ulcer noted.       Teo Prevention initiated by RN: No  Wound Care Orders initiated by RN: No    Pressure Injury (Stage 3,4, Unstageable, DTI, NWPT, and Complex wounds) if present, place Wound referral order by RN under : No    New Ostomies, if present place, Ostomy referral order under : No     Nurse 1 eSignature: Electronically signed by Carmella Huff RN on 1/13/25 at 11:17 AM CST    **SHARE this note so that the co-signing nurse can place an eSignature**    Nurse 2 eSignature: Electronically signed by Elizabeth Thakkar RN on 1/13/25 at 11:41 AM CST   
24 hr orders verified Electronically signed by Salome Gan RN on 1/19/2025 at 2:08 AM    
24 hr orders verified Electronically signed by Salome Gan RN on 1/20/2025 at 5:30 AM    
4 Eyes Skin Assessment     NAME:  Nick Jain  YOB: 1950  MEDICAL RECORD NUMBER:  [id number]    The patient is being assessed for  Transfer to New Unit    I agree that at least one RN has performed a thorough Head to Toe Skin Assessment on the patient. ALL assessment sites listed below have been assessed.      Areas assessed by both nurses:    Head, Face, Ears, Shoulders, Back, Chest, Arms, Elbows, Hands, Sacrum. Buttock, Coccyx, Ischium, and Legs. Feet and Heels        Does the Patient have a Wound? No noted wound(s)       Teo Prevention initiated by RN: Yes  Wound Care Orders initiated by RN: No    Pressure Injury (Stage 3,4, Unstageable, DTI, NWPT, and Complex wounds) if present, place Wound referral order by RN under : No    New Ostomies, if present place, Ostomy referral order under : No     Nurse 1 eSignature: Electronically signed by Rosa Roberson RN on 1/21/25 at 6:02 AM CST    **SHARE this note so that the co-signing nurse can place an eSignature**    Nurse 2 eSignature: Electronically signed by Nicole Andrew RN on 1/21/25 at 6:03 AM CST   
Bp 80/40. Cardiology notified. Hold 1700 dose of coreg per cardiology  
CRNA aware of BP trends, treat if SBP over 180.   
Cardiology Progress Note Fadi Bernard MD      Patient:  Nick Jaramillood  [id number]    Patient Active Problem List    Diagnosis Date Noted    Acute systolic heart failure (HCC) 01/10/2025     Priority: High    Palliative care patient 01/20/2025    CAD (coronary artery disease) 01/20/2025    Ischemic cardiomyopathy 01/20/2025    Ileus (HCC) 01/17/2025    Severe malnutrition (HCC) 01/16/2025    Adenocarcinoma of cecum (HCC) 01/16/2025    Small bowel obstruction (HCC) 01/11/2025    Nausea and vomiting 01/10/2025    Cecum mass 01/10/2025    Angina pectoris, unspecified 08/26/2024    Atherosclerotic heart disease of native coronary artery with unspecified angina pectoris 08/26/2024    COPD (chronic obstructive pulmonary disease) ([facility]) 03/13/2024    Tobacco abuse 03/13/2024    NSTEMI (non-ST elevated myocardial infarction) ([facility]) 03/12/2024    Non-penetrating foreign body in ear canal, right, initial encounter 11/15/2021    Bilateral sensorineural hearing loss 11/12/2020     Overview Note:        Audiogram and Acoustic Immittance              S/p bilateral myringotomy with tube placement 05/05/2020     Overview Note:     RMT-10/8/2019  LMT-10/21/2019      Mixed hearing loss, bilateral 09/24/2019    Colon cancer screening declined 07/11/2019    CKD (chronic kidney disease), stage III ([facility]) 01/12/2019    Familial hypercholesterolemia 01/04/2018    Essential (primary) hypertension 01/04/2018    Coronary artery disease involving native coronary artery of native heart without angina pectoris 01/04/2018       Admit Date:  1/10/2025    Admission Problem List: Present on Admission:   Nausea and vomiting   Cecum mass   CKD (chronic kidney disease), stage III (HCC)   COPD (chronic obstructive pulmonary disease) (HCC)   Small bowel obstruction (HCC)   Severe malnutrition (HCC)   Adenocarcinoma of cecum (HCC)   Ileus (HCC)   Palliative care patient   CAD (coronary artery disease)   Ischemic cardiomyopathy      Cardiac Specific 
Cardiology Progress Note Fadi Bernard MD      Patient:  Nick Jaramillood  [id number]    Patient Active Problem List    Diagnosis Date Noted    Acute systolic heart failure (HCC) 01/10/2025     Priority: High    Palliative care patient 01/20/2025    CAD (coronary artery disease) 01/20/2025    Ischemic cardiomyopathy 01/20/2025    Ileus (HCC) 01/17/2025    Severe malnutrition (HCC) 01/16/2025    Adenocarcinoma of cecum (HCC) 01/16/2025    Small bowel obstruction (HCC) 01/11/2025    Nausea and vomiting 01/10/2025    Cecum mass 01/10/2025    Angina pectoris, unspecified 08/26/2024    Atherosclerotic heart disease of native coronary artery with unspecified angina pectoris 08/26/2024    COPD (chronic obstructive pulmonary disease) ([facility]) 03/13/2024    Tobacco abuse 03/13/2024    NSTEMI (non-ST elevated myocardial infarction) ([facility]) 03/12/2024    Non-penetrating foreign body in ear canal, right, initial encounter 11/15/2021    Bilateral sensorineural hearing loss 11/12/2020     Overview Note:        Audiogram and Acoustic Immittance              S/p bilateral myringotomy with tube placement 05/05/2020     Overview Note:     RMT-10/8/2019  LMT-10/21/2019      Mixed hearing loss, bilateral 09/24/2019    Colon cancer screening declined 07/11/2019    CKD (chronic kidney disease), stage III ([facility]) 01/12/2019    Familial hypercholesterolemia 01/04/2018    Essential (primary) hypertension 01/04/2018    Coronary artery disease involving native coronary artery of native heart without angina pectoris 01/04/2018       Admit Date:  1/10/2025    Admission Problem List: Present on Admission:   Nausea and vomiting   Cecum mass   CKD (chronic kidney disease), stage III (HCC)   COPD (chronic obstructive pulmonary disease) (HCC)   Small bowel obstruction (HCC)   Severe malnutrition (HCC)   Adenocarcinoma of cecum (HCC)   Ileus (HCC)   Palliative care patient   CAD (coronary artery disease)   Ischemic cardiomyopathy      Cardiac Specific 
Cardiology Progress Note Fadi Bernard MD      Patient:  Nick Jaramillood  [id number]    Patient Active Problem List    Diagnosis Date Noted    Acute systolic heart failure (HCC) 01/10/2025     Priority: High    Palliative care patient 01/20/2025    CAD (coronary artery disease) 01/20/2025    Ischemic cardiomyopathy 01/20/2025    Ileus (HCC) 01/17/2025    Severe malnutrition (HCC) 01/16/2025    Adenocarcinoma of cecum (HCC) 01/16/2025    Small bowel obstruction (HCC) 01/11/2025    Nausea and vomiting 01/10/2025    Cecum mass 01/10/2025    Angina pectoris, unspecified 08/26/2024    Atherosclerotic heart disease of native coronary artery with unspecified angina pectoris 08/26/2024    COPD (chronic obstructive pulmonary disease) ([facility]) 03/13/2024    Tobacco abuse 03/13/2024    NSTEMI (non-ST elevated myocardial infarction) ([facility]) 03/12/2024    Non-penetrating foreign body in ear canal, right, initial encounter 11/15/2021    Bilateral sensorineural hearing loss 11/12/2020     Overview Note:        Audiogram and Acoustic Immittance              S/p bilateral myringotomy with tube placement 05/05/2020     Overview Note:     RMT-10/8/2019  LMT-10/21/2019      Mixed hearing loss, bilateral 09/24/2019    Colon cancer screening declined 07/11/2019    CKD (chronic kidney disease), stage III ([facility]) 01/12/2019    Familial hypercholesterolemia 01/04/2018    Essential (primary) hypertension 01/04/2018    Coronary artery disease involving native coronary artery of native heart without angina pectoris 01/04/2018       Admit Date:  1/10/2025    Admission Problem List: Present on Admission:   Nausea and vomiting   Cecum mass   CKD (chronic kidney disease), stage III (HCC)   COPD (chronic obstructive pulmonary disease) (HCC)   Small bowel obstruction (HCC)   Severe malnutrition (HCC)   Adenocarcinoma of cecum (HCC)   Ileus (HCC)   Palliative care patient   CAD (coronary artery disease)   Ischemic cardiomyopathy      Cardiac Specific 
Cardiology Progress Note Fadi Brenard MD      Patient:  Nick Jaramillood  [id number]    Patient Active Problem List    Diagnosis Date Noted    Acute systolic heart failure (HCC) 01/10/2025     Priority: High    Palliative care patient 01/20/2025    CAD (coronary artery disease) 01/20/2025    Ischemic cardiomyopathy 01/20/2025    Ileus (HCC) 01/17/2025    Severe malnutrition (HCC) 01/16/2025    Adenocarcinoma of cecum (HCC) 01/16/2025    Small bowel obstruction (HCC) 01/11/2025    Nausea and vomiting 01/10/2025    Cecum mass 01/10/2025    Angina pectoris, unspecified 08/26/2024    Atherosclerotic heart disease of native coronary artery with unspecified angina pectoris 08/26/2024    COPD (chronic obstructive pulmonary disease) ([facility]) 03/13/2024    Tobacco abuse 03/13/2024    NSTEMI (non-ST elevated myocardial infarction) ([facility]) 03/12/2024    Non-penetrating foreign body in ear canal, right, initial encounter 11/15/2021    Bilateral sensorineural hearing loss 11/12/2020     Overview Note:        Audiogram and Acoustic Immittance              S/p bilateral myringotomy with tube placement 05/05/2020     Overview Note:     RMT-10/8/2019  LMT-10/21/2019      Mixed hearing loss, bilateral 09/24/2019    Colon cancer screening declined 07/11/2019    CKD (chronic kidney disease), stage III ([facility]) 01/12/2019    Familial hypercholesterolemia 01/04/2018    Essential (primary) hypertension 01/04/2018    Coronary artery disease involving native coronary artery of native heart without angina pectoris 01/04/2018       Admit Date:  1/10/2025    Admission Problem List: Present on Admission:   Nausea and vomiting   Cecum mass   CKD (chronic kidney disease), stage III (HCC)   COPD (chronic obstructive pulmonary disease) (HCC)   Small bowel obstruction (HCC)   Severe malnutrition (HCC)   Adenocarcinoma of cecum (HCC)   Ileus (HCC)   Palliative care patient   CAD (coronary artery disease)   Ischemic cardiomyopathy      Cardiac Specific 
Comprehensive Nutrition Assessment    Type and Reason for Visit:  Initial, NPO/clear liquid    Nutrition Recommendations/Plan:   Consider PICC placement with short-term TPN     Malnutrition Assessment:  Malnutrition Status:  Severe malnutrition (01/16/25 1048)    Context:  Acute Illness     Findings of the 6 clinical characteristics of malnutrition:  Energy Intake:  50% or less of estimated energy requirements for 5 or more days   Body Fat Loss:  Moderate body fat loss Buccal region     Nutrition Assessment:    Pt meets the criteria for Severe Malnutrition AEB inadequate energy intake and moderate fat depletion. Pt is s/p surgery. He c/o nausea. No BM. Preferences obtained for Clear Liquid diet. Refuses Ensure Clear. PICC placement with short-term TPN may need to be considered to meet nutritional needs. Preferences have been updated in the diet order.    Nutrition Related Findings:      Wound Type: Surgical Incision       Current Nutrition Intake & Therapies:    Average Meal Intake: 0%  Average Supplement Intake: Refuses ONS  ADULT DIET; Clear Liquid; NO ITALIAN ICE, 7-UP OR TEA    Anthropometric Measures:  Height: 177.8 cm (5' 10\")  Ideal Body Weight (IBW): 166 lbs (75 kg)    Current Body Weight: 67.1 kg (147 lb 14.9 oz)  Current BMI (kg/m2): 21.2  BMI Categories: Underweight (BMI less than 22) age over 65    Estimated Daily Nutrient Needs:  Energy Requirements Based On: Kcal/kg  Weight Used for Energy Requirements: Current  Energy (kcal/day): 7778-7774 kcals/day  Weight Used for Protein Requirements: Current  Protein (g/day): 100-134 g/protein/day  Method Used for Fluid Requirements: 1 ml/kcal  Fluid (ml/day): 4311-5984 mL/day    Nutrition Diagnosis:   Severe malnutrition related to acute injury/trauma as evidenced by criteria as identified in malnutrition assessment    Nutrition Interventions:   Food and/or Nutrient Delivery: Continue Current Diet  Coordination of Nutrition Care: Continue to monitor while 
Comprehensive Nutrition Assessment    Type and Reason for Visit:  Reassess    Nutrition Recommendations/Plan:   Continue to work toward goal rate of PN     Malnutrition Assessment:  Malnutrition Status:  Severe malnutrition (01/18/25 1108)    Context:  Acute Illness     Findings of the 6 clinical characteristics of malnutrition:  Energy Intake:  50% or less of estimated energy requirements for 5 or more days  Weight Loss:  Mild weight loss     Body Fat Loss:  Moderate body fat loss Buccal region   Muscle Mass Loss:  Moderate muscle mass loss Temples (temporalis)  Fluid Accumulation:  No fluid accumulation     Strength:  Not Performed    Nutrition Assessment:    PN 5/15 has been started--continues at 25ml/hr.  Spoke with RN--to increase rate to 50ml/hr and work toward goal rate of 100ml/hr.  Monitor for refeedig.  Weight has increased slightly--no edema noted.    Nutrition Related Findings:      Wound Type: Surgical Incision       Current Nutrition Intake & Therapies:    Average Meal Intake: NPO  Average Supplements Intake: NPO  Current Parenteral Nutrition Orders:  Type and Formula: 2-in-1 Standard   Lipids: 250ml, Two times weekly  Duration: Continuous  Rate/Volume: PN 5/15 goal rate 100ml/hr  Current PN Order Provides: PN 5/15 @ 50ml/hr = 852 kcals with 60g protein, 180g CHO.  GIR = 1.84 mg CHO/kg/min.  Lipids add 500 kcals on days given.  Goal PN Orders Provides: PN 5/15 @ 100ml/hr with 20% lipids biweekly = 1874 kcals with 120 g protein, 360g CHO.  GIR = 3.68 mg CHO/kg/min.    Anthropometric Measures:  Height: 177.8 cm (5' 10\")  Ideal Body Weight (IBW): 166 lbs (75 kg)    Admission Body Weight: 67.4 kg (148 lb 9.4 oz)  Current Body Weight: 67.8 kg (149 lb 7.6 oz), 90 % IBW.    Current BMI (kg/m2): 21.4  Usual Body Weight: 69.4 kg (153 lb)  % Weight Change (Calculated): -2.3  Weight Adjustment For: No Adjustment  BMI Categories: Underweight (BMI less than 22) age over 65    Estimated Daily Nutrient 
Comprehensive Nutrition Assessment    Type and Reason for Visit:  Reassess    Nutrition Recommendations/Plan:   D/C TPN per Provider.  Add Ensure Clear ONS with dinner.  Monitor for diet advancement.     Malnutrition Assessment:  Malnutrition Status:  Severe malnutrition (01/18/25 1108)    Context:  Acute Illness     Findings of the 6 clinical characteristics of malnutrition:  Energy Intake:  50% or less of estimated energy requirements for 5 or more days  Weight Loss:  Mild weight loss     Body Fat Loss:  Moderate body fat loss Buccal region   Muscle Mass Loss:  Moderate muscle mass loss Temples (temporalis)  Fluid Accumulation:  No fluid accumulation     Strength:  Not Performed    Nutrition Assessment:    PN 5/15 continues at 50 mL/hr. Lipids have been given twice. Labs do not indicate refeeding syndrome. General Surgery advanced diet to Clear Liquid yesterday; 0% intake documented. BM 1/20. Pt reported that his bowels have been moving. Stated that he doesn't normally care for broths, jello, or sherbet. Pt agreeable to try apple flavored Ensure Clear ONS. Aware General Surgery recommends to d/c TPN and advance to Full Liquids.    Nutrition Related Findings:    BM 1/20. No edema. K+ 3.4, Mg 1.8, P 3.7, creat 1.3, GFR 57, glu 116-121, accuchek's 126-140. Wound Type: Surgical Incision (puncture)       Current Nutrition Intake & Therapies:    Average Meal Intake: 0%  Average Supplements Intake: None Ordered  PN-Adult Premix 5/15 - [employer] Electrolytes - Central Line  ADULT DIET; Full Liquid  ADULT ORAL NUTRITION SUPPLEMENT; Dinner; Clear Liquid Oral Supplement  Current Parenteral Nutrition Orders:  Type and Formula: 2-in-1 Standard   Lipids: 250ml, Two times weekly  Duration: Continuous  Rate/Volume: PN 5/15 at 50 mL/hr= 1200 mL  Current PN Order Provides: PN 5/15 @ 50ml/hr = 852 kcals with 60g protein, 180g CHO. GIR = 1.84 mg CHO/kg/min. Lipids add 500 kcals on days given.  Goal PN Orders Provides: PN 5/15 @ 
Department of General Surgery - Adult  Surgical Service   Dr. Marco Hunter progress Note      SUBJECTIVE: Doing well postop day 1.  He is complaining of discomfort.  Will get him up into a chair twice daily today.    OBJECTIVE      Physical    Great urine output  Abdomen is nondistended with incisional discomfort  Data  Labs look good this morning.  Slight bump in his BUN and creatinine but this should correct with hydration.    Current Inpatient Medications    See chart    ASSESSMENT AND PLAN    Discussed the operative findings.  Will order an incentive spirometer.  Out of bed to a chair today and hopefully ambulating tomorrow.  
Dr Banda updated on patient status/ cardio orders and vital signs this shift Electronically signed by Salome Gan RN on 1/20/2025 at 3:01 AM    
Dr Bernard notified of bp 75/41  map 51, HR 86, elevated troponin, tele noted to have 16 beats of V tach  orders for 500 ml NS bolus at 75 ml/hr  orders repeated and verified Electronically signed by Salome Gan RN on 1/19/2025 at 9:09 PM    
Every other surgical staple discontinued as per nursing communication order  
Message sent to dr Bernard in regards to B/p of 84/54 with map of 64 HR 91, orders to hold tonights dose of entresto and decrease  dose to 24/26 1/2 tab po bid starting tomorrow.   Electronically signed by Salome Gan RN on 1/19/2025 at 8:29 PM      
No significant surgical changes we are awaiting disposition patient's vital signs are stable he is afebrile  Incision is clean  Assessment plan will remove the remaining staples placed Steri-Strips have patient follow-up on an outpatient basis with the surgeon of record  
PCA Singh in room. Bed in low position. Call light in reach. Family called and updated.   
PICC line discontinued as Midline was placed. Left sided PICC was removed entirely with tip intact at 45 cm. Patient tolerated well. Occlusive dressing applied to site and patient instructed to keep dressing on for 24 hours.     Electronically signed by Zbigniew Bailey RN on 1/23/2025 at 1:33 PM    
Patient and Family demanding to see the Hospitalist. Dr. Pollard notified. Stat labs ordered, Stat EKG ordered , Vital signs taken  
Physical Therapy     01/23/25 1206   General   Diagnosis R HEMICOLECTOMY 1/11   Subjective   Subjective pt agreeable to tx   Vitals   Temp 98.1 °F (36.7 °C)   Pulse 99   Heart Rate Source Monitor   Respirations 18   SpO2 96 %   O2 Device Nasal cannula   BP (!) 96/58   MAP (Calculated) 71   BP Location Left upper arm   Patient Position Sitting   Bed mobility   Supine to Sit Stand by assistance   Sit to Supine Stand by assistance   Scooting Stand by assistance   Transfers   Sit to Stand Stand by assistance   Stand to Sit Stand by assistance   Comment cues for hand placement. pt able to manage self care post bowel movement at [facility] with no LOB   Ambulation   Surface Level tile   Device Rolling Walker   Assistance Contact guard assistance   Quality of Gait slow, gaurded   Gait Deviations Slow Ashanti;Decreased step length;Decreased step height   Distance 150ft   Comments cues to keep RW closer to self; pt does not normally use AD   Short Term Goals   Time Frame for Short Term Goals 14 days   Short Term Goal 1 ambulate 150' with AD as indicated supervision   Short Term Goal 2 TF supervision   Activity Tolerance   Activity Tolerance Patient tolerated treatment well   Assessment   Assessment pt SBA for most tasks- steady with no LOB. cues to correct fwd flex posture and keep RW closer to self. pt will need RW for DC home with spouse for safety. returned to bed with all needs in reach and alarm on.   PT Plan of Care   Thursday X   Safety Devices   Type of Devices Call light within reach;Bed alarm in place;Gait belt;Left in bed     Electronically signed by Dallas Guillory PTA on 1/23/2025 at 1:00 PM     
Physical Therapy     01/24/25 1100   General   Diagnosis R HEMICOLECTOMY 1/11   Subjective   Subjective pt agreeable to tx   Pain   Pre-Pain 0   Post-Pain 0   Bed mobility   Supine to Sit Stand by assistance   Sit to Supine Stand by assistance   Scooting Stand by assistance   Bed Mobility Comments increased time spent post tx positioning in tall sitting (pt declined transfer to chair due to c/o back pain that increases with time) used two pillows long ways to offset spine and allow pt to tolereate higher HOB to prevent pnuemonia. (pt frequently found in low semi fowlers- educated on importance of sitting up higher for lung support/function)   Transfers   Sit to Stand Stand by assistance   Stand to Sit Stand by assistance   Ambulation   Surface Level tile   Device Rolling Walker   Assistance Contact guard assistance   Quality of Gait slow, gaurded   Gait Deviations Slow Ashanti;Decreased step length;Decreased step height   Distance 100'   Comments limited today due to c/o nausea   Short Term Goals   Time Frame for Short Term Goals 14 days   Short Term Goal 1 ambulate 150' with AD as indicated supervision   Short Term Goal 2 TF supervision   Activity Tolerance   Activity Tolerance Patient tolerated treatment well   Assessment   Assessment pt limited by c/o nausea. able to tolerate AMB up to 100' RW CGA no LOB but pt very slow/guarded. spent increased time positioning pt to be able to tolerate higher semi spencer position - use of pillows to address back pain. nursing notified of pt stating he has been seeing objects/people that he knows is not actually there.   PT Plan of Care   Friday X   Safety Devices   Type of Devices Call light within reach;Bed alarm in place;Gait belt;Left in bed;Nurse notified     Electronically signed by Dallas Guillory PTA on 1/24/2025 at 11:20 AM     
Physical Therapy    Name: Nick Jain  MRN: [id number]  Date of service: 1/22/2025 01/22/25 1300   General   Diagnosis R HEMICOLECTOMY 1/11   General   General Comments Pt in bed, spouse covering pt up with blankets   Subjective   Subjective Nurse okayed therapy and unhooked IV. pt agreed to ambulation   Pain   Pre-Pain 0   Post-Pain 0   Pain Location   (not really hurting anywhere)   Bed mobility   Supine to Sit Stand by assistance   Sit to Supine Stand by assistance   Transfers   Sit to Stand Contact guard assistance   Stand to Sit Stand by assistance   Ambulation   WB Status WBAT   Ambulation   Surface Level tile   Device Rolling Walker   Assistance Contact guard assistance   Quality of Gait slow, gaurded   Gait Deviations Slow Ashanti;Decreased step length;Decreased step height   Distance 150ft   Comments pt stated he felt good walking   Short Term Goals   Time Frame for Short Term Goals 14 days   Short Term Goal 1 ambulate 150' with AD as indicated supervision   Short Term Goal 2 TF supervision   Activity Tolerance   Activity Tolerance Patient tolerated treatment well   Assessment   Assessment Pt did well this afternoon with therapy. Was able to perform bed mobility SBA and ambulate CGA increased distance. Returned to bed with all needs in reach. Spouse present and nurse to hook up IV post session   Discharge Recommendations Continue to assess pending progress;Patient would benefit from continued therapy after discharge   Physical Therapy Plan   General Plan 5-7 times per week   Therapy Duration 2 Weeks   Current Treatment Recommendations Strengthening;Balance training;Functional mobility training;Transfer training;Gait training;Safety education & training;Therapeutic activities;Pain management   PT Plan of Care   Wednesday X   Safety Devices   Type of Devices Left in bed;Bed alarm in place;Call light within reach;Nurse notified   PT Whiteboard Notes   Therapy Whiteboard HEMICOLECTOMY, RE 2-5 gait belt 
Physical Therapy  Attempted PT eval, but pt had just amb with the nursing staff.  Electronically signed by Shayna Guillory PT on 1/21/2025 at 2:08 PM      
Physical Therapy  Name: Nick Jain  MRN:  [id number]  Date of service:  1/25/2025 01/25/25 1517   Subjective   Subjective Pt agreed to therapy.   Pain Assessment   Pain Assessment 0-10   Pain Level 6   Pain Location Back   Pain Orientation Lower   Pain Descriptors Aching   Functional Pain Assessment Prevents or interferes some active activities and ADLs   Pain Type Chronic pain   Pain Frequency Intermittent   Non-Pharmaceutical Pain Intervention(s) Ambulation/Increased Activity;Repositioned;Rest   Response to Pain Intervention Patient satisfied   Bed Mobility   Supine to Sit Stand by assistance   Sit to Supine Stand by assistance   Transfers   Sit to Stand Stand by assistance   Stand to Sit Stand by assistance   Ambulation   WB Status WBAT   Ambulation   Surface Level tile   Device Rolling Walker   Assistance Contact guard assistance   Quality of Gait slow, gaurded   Gait Deviations Slow Ashanti;Decreased step length;Decreased step height   Distance 150'   Comments amb another 25' to practice with straight cane MIN assist for balance and cueing for step pattern   Other Activities   Comment Pt stated he did not have a walker at home, practiced with a cane but pt would benefit from more support of RW. Pt wanted to request a RW for home, notified case management and nursing.   Short Term Goals   Time Frame for Short Term Goals 14 days   Short Term Goal 1 ambulate 150' with AD as indicated supervision   Short Term Goal 2 TF supervision   Conditions Requiring Skilled Therapeutic Intervention   Body Structures, Functions, Activity Limitations Requiring Skilled Therapeutic Intervention Decreased strength;Decreased functional mobility ;Decreased endurance;Increased pain   Assessment Pt able to amb increased distance and had no LOB. Steady with RW use, mildly unsteady with cane, would recommend RW for home use. Assisted pt back to bed and positioned for comfort.   Activity Tolerance   Activity Tolerance Patient 
Postoperative day #13  Patient is on a regular diet having bowel movements at this point were attempting to establish disposition I believe the patient would benefit from some inpatient rehab prior to going home but will await further assessment  Vital signs are stable he is afebrile  Intake and output adequate  HEENT he has exceedingly poor oral care  Chest symmetrical excursion  Cardiovascular regular in rhythm  Abdomen is soft a well-healing midline surgical incision staples are intact  Musculoskeletal negative Homans  Neurologically without focal findings  Laboratories electrolytes are within normal limits white count is 9000 H&H is 7.5 and 24  Patient scheduled for an echo today  Assessment and plan patient is stable from a surgical standpoint his disposition is yet to be completely determined no further recommendations from a surgical standpoint at this time  
Postoperative day #14  Patient without complaint  Vitals are stable he is afebrile  Surgical incision is clean and dry surgical staples are intact  Assessment and plan patient's final disposition will hopefully be resolved on Monday it sounds as if he is going to be discharged home with outpatient rehab today I asked the nurse to remove every other surgical staple  
Pt ambulated down the jarrett to nurses station and back to room with walker. Pt tolerated well    Electronically signed by Isabel Aguirre RN on 1/21/2025 at 2:35 PM     
Pt reporting abdominal pain, pressure, bloating. BS present all 4 quadrants. Reports flatus. No BM as of yet. Nursing has administered zofran and simethecone at 0749, and pt began vomiting scant green emesis at 0845. Nursing gave phenergan at 0849. Pt appears at bit less distressed, no further vomiting as of this time.   
Subjective:  No acute events or changes. Has been having some sips and chips, tolerating with no nausea. Still no flatus.    Objective:  BP (!) 160/65   Pulse 73   Temp 97.7 °F (36.5 °C) (Temporal)   Resp 16   Ht 1.778 m (5' 10\")   Wt 67.1 kg (148 lb)   SpO2 95%   BMI 21.24 kg/m²   Date 01/15/25 0000 - 01/15/25 2359   Shift 3474-6871 8234-3420 1977-1514 24 Hour Total   INTAKE   Shift Total(mL/kg)       OUTPUT   Urine(mL/kg/hr) 800(1.5)   800   Shift Total(mL/kg) 800(11.9)   800(11.9)   Weight (kg) 67.1 67.1 67.1 67.1     General: NAD, AAO  Chest: regular, non-labored  Abdomen: Soft, ND, ATTP, incision C/d/I    CBC:   Lab Results   Component Value Date/Time    WBC 7.9 01/15/2025 05:02 AM    RBC 3.35 01/15/2025 05:02 AM    HGB 9.3 01/15/2025 05:02 AM    HCT 29.5 01/15/2025 05:02 AM    MCV 88.1 01/15/2025 05:02 AM    MCH 27.8 01/15/2025 05:02 AM    MCHC 31.5 01/15/2025 05:02 AM    RDW 13.5 01/15/2025 05:02 AM     01/15/2025 05:02 AM    MPV 9.8 01/15/2025 05:02 AM     BMP:    Lab Results   Component Value Date/Time     01/15/2025 05:02 AM    K 4.1 01/15/2025 05:02 AM     01/15/2025 05:02 AM    CO2 25 01/15/2025 05:02 AM    BUN 8 01/15/2025 05:02 AM    CREATININE 1.0 01/15/2025 05:02 AM    CALCIUM 8.3 01/15/2025 05:02 AM    GFRAA >59 01/10/2022 01:53 PM    LABGLOM 78 01/15/2025 05:02 AM    LABGLOM 57 01/17/2024 10:17 AM    GLUCOSE 98 01/15/2025 05:02 AM     Assessment and plan:  74 year old male s/p open right hemicolectomy  Doing okay, just awaiting return of bowel function. Continue with clears and ambulating.  Will add miralax  Other cares per the hospitalist service  
Subjective:  No acute events or changes. He has been having quite a bit of hiccoughs, no flatus. Abdominal pain is okay.    Objective:  BP (!) 153/86   Pulse 90   Temp 97.3 °F (36.3 °C) (Temporal)   Resp 16   Ht 1.778 m (5' 10\")   Wt 67.1 kg (148 lb)   SpO2 94%   BMI 21.24 kg/m²   Date 01/13/25 0000 - 01/13/25 2359   Shift 0785-4197 2840-0462 9647-0496 24 Hour Total   INTAKE   I.V.(mL/kg) 989.3(14.7) 802(11.9)  1791.3(26.7)   IV Piggyback(mL/kg)  250(3.7)  250(3.7)   Shift Total(mL/kg) 989.3(14.7) 1052(15.7)  2041.3(30.4)   OUTPUT   Urine(mL/kg/hr) 700(1.3) 350  1050   Shift Total(mL/kg) 700(10.4) 350(5.2)  1050(15.6)   Weight (kg) 67.1 67.1 67.1 67.1     General: NAD, AAO  Chest: Regular, non-labored  Abdomen: soft, Nd, ATTP, incision C/D/I    CBC:   Lab Results   Component Value Date/Time    WBC 9.5 01/13/2025 03:39 AM    RBC 3.55 01/13/2025 03:39 AM    HGB 10.0 01/13/2025 03:39 AM    HCT 31.9 01/13/2025 03:39 AM    MCV 89.9 01/13/2025 03:39 AM    MCH 28.2 01/13/2025 03:39 AM    MCHC 31.3 01/13/2025 03:39 AM    RDW 13.5 01/13/2025 03:39 AM     01/13/2025 03:39 AM    MPV 10.0 01/13/2025 03:39 AM     Assessment and plan:  74 year old male s/p open right hemicolectomy for colon mass  Doing okay. Not unexpected post operative ileus. I discussed with the patient and his family member that he needs to be getting up and walking more.   Await improved bowel function before starting any more diet.  Other cares per the hospitalist service.  
Subjective:  No acute events or changes. He has been walking a lot. He is not passing any flatus. Abdominal pain gradually improving.    Objective:  BP (!) 147/69   Pulse 74   Temp 98.1 °F (36.7 °C) (Temporal)   Resp 18   Ht 1.778 m (5' 10\")   Wt 67.1 kg (148 lb)   SpO2 94%   BMI 21.24 kg/m²   Date 01/14/25 0000 - 01/14/25 2359   Shift 2392-8250 6618-9678 4901-3074 24 Hour Total   INTAKE   I.V.(mL/kg) 1011.6(15.1)   1011.6(15.1)   Shift Total(mL/kg) 1011.6(15.1)   1011.6(15.1)   OUTPUT   Urine(mL/kg/hr) 200(0.4) 300  500   Shift Total(mL/kg) 200(3) 300(4.5)  500(7.4)   Weight (kg) 67.1 67.1 67.1 67.1     General: NAD, AAO  Chest: Regular, non-labored  Abdomen: soft, ATTP, incision C/D/I    CBC:   Lab Results   Component Value Date/Time    WBC 7.6 01/14/2025 04:25 AM    RBC 3.27 01/14/2025 04:25 AM    HGB 9.1 01/14/2025 04:25 AM    HCT 29.2 01/14/2025 04:25 AM    MCV 89.3 01/14/2025 04:25 AM    MCH 27.8 01/14/2025 04:25 AM    MCHC 31.2 01/14/2025 04:25 AM    RDW 13.6 01/14/2025 04:25 AM     01/14/2025 04:25 AM    MPV 10.1 01/14/2025 04:25 AM     BMP:    Lab Results   Component Value Date/Time     01/14/2025 04:25 AM    K 3.9 01/14/2025 04:25 AM     01/14/2025 04:25 AM    CO2 26 01/14/2025 04:25 AM    BUN 11 01/14/2025 04:25 AM    CREATININE 1.2 01/14/2025 04:25 AM    CALCIUM 8.3 01/14/2025 04:25 AM    GFRAA >59 01/10/2022 01:53 PM    LABGLOM 63 01/14/2025 04:25 AM    LABGLOM 57 01/17/2024 10:17 AM    GLUCOSE 111 01/14/2025 04:25 AM     Assessment and plan:  74 year old male s/p open right hemicolectomy  Awaiting return of bowel function. Continue ambulating ad allen. Okay to have sips and chips and popscicles.   Continue other cares per the hospitalist service    
Subjective:  No acute events or changes. No more vomiting, but also still not having flatus. Has been up walking this morning.    Objective:  BP (!) 142/87   Pulse (!) 119   Temp 97.2 °F (36.2 °C) (Temporal)   Resp 16   Ht 1.778 m (5' 10\")   Wt 67.1 kg (148 lb)   SpO2 93%   BMI 21.24 kg/m²   Date 01/17/25 0000 - 01/17/25 2359   Shift 3447-7087 2926-1446 5047-6959 24 Hour Total   INTAKE   I.V.(mL/kg) 1482(22.1)   1482(22.1)   Shift Total(mL/kg) 1482(22.1)   1482(22.1)   OUTPUT   Urine(mL/kg/hr) 200(0.4)   200   Shift Total(mL/kg) 200(3)   200(3)   Weight (kg) 67.1 67.1 67.1 67.1     General: NAD, AAO  Chest: Regular, non-labored  Abdomen: Soft, moderate distention, ATTP, incision C/D/I    CBC:   Lab Results   Component Value Date/Time    WBC 13.9 01/17/2025 04:16 AM    RBC 3.31 01/17/2025 04:16 AM    HGB 9.3 01/17/2025 04:16 AM    HCT 29.0 01/17/2025 04:16 AM    MCV 87.6 01/17/2025 04:16 AM    MCH 28.1 01/17/2025 04:16 AM    MCHC 32.1 01/17/2025 04:16 AM    RDW 13.5 01/17/2025 04:16 AM     01/17/2025 04:16 AM    MPV 9.9 01/17/2025 04:16 AM     BMP:    Lab Results   Component Value Date/Time     01/17/2025 04:16 AM    K 3.9 01/17/2025 04:16 AM     01/17/2025 04:16 AM    CO2 24 01/17/2025 04:16 AM    BUN 7 01/17/2025 04:16 AM    CREATININE 1.0 01/17/2025 04:16 AM    CALCIUM 8.3 01/17/2025 04:16 AM    GFRAA >59 01/10/2022 01:53 PM    LABGLOM 78 01/17/2025 04:16 AM    LABGLOM 57 01/17/2024 10:17 AM    GLUCOSE 160 01/17/2025 04:16 AM     Assessment and plan:  74 year old male s/p open right hemicolectomy  Doing okay, but with continued ileus and elevated WBC today, will go ahead and order CT scan to evaluate abdomen  PICC line placement in order to start TPN  Continue ambulating as tolerated  Continue heparin prophylaxis, start PPI        
Subjective:  Patient has had 3 bowel movements. However, he is having some hallucinations, has been tachycardic.    Objective:  /85   Pulse 94   Temp 97.5 °F (36.4 °C) (Temporal)   Resp 24   Ht 1.778 m (5' 10\")   Wt 67.8 kg (149 lb 8 oz)   SpO2 99%   BMI 21.45 kg/m²   Date 01/18/25 0000 - 01/18/25 2359   Shift 0140-3275 4381-6230 4084-0910 24 Hour Total   INTAKE   Shift Total(mL/kg)       OUTPUT   Urine(mL/kg/hr) 500(0.9)   500   Shift Total(mL/kg) 500(7.4)   500(7.4)   Weight (kg) 67.8 67.8 67.8 67.8     General: awake and alert, but does not appear as comfortable as the past couple days  Chest: non-labored  Abdomen: Soft, mild distention, mild diffuse TTP, incision C/d/I with no erythema or drainage    CBC:   Lab Results   Component Value Date/Time    WBC 13.2 01/18/2025 04:20 AM    RBC 3.12 01/18/2025 04:20 AM    HGB 8.7 01/18/2025 04:20 AM    HCT 27.9 01/18/2025 04:20 AM    MCV 89.4 01/18/2025 04:20 AM    MCH 27.9 01/18/2025 04:20 AM    MCHC 31.2 01/18/2025 04:20 AM    RDW 14.1 01/18/2025 04:20 AM     01/18/2025 04:20 AM    MPV 9.8 01/18/2025 04:20 AM     BMP:    Lab Results   Component Value Date/Time     01/18/2025 04:20 AM    K 3.4 01/18/2025 12:07 PM    K 3.6 01/18/2025 04:20 AM    K 3.9 01/17/2025 04:16 AM     01/18/2025 04:20 AM    CO2 24 01/18/2025 04:20 AM    BUN 9 01/18/2025 04:20 AM    CREATININE 0.9 01/18/2025 04:20 AM    CALCIUM 7.7 01/18/2025 04:20 AM    GFRAA >59 01/10/2022 01:53 PM    LABGLOM 89 01/18/2025 04:20 AM    LABGLOM 57 01/17/2024 10:17 AM    GLUCOSE 146 01/18/2025 04:20 AM     Assessment and plan:  74 year old male s/p open right hemicolectomy  CT yesterday with no abscess or fluid collection. There were some small drops of air, no large collections. Patient does not appear as comfortable today. His BNP is certainly very elevated, IVF have been held and diuresis started. Continue NPO though he has had the BMs and monitor closely. If he has worsening of his 
Subjective:  Patient has had nausea and vomiting since last night. He reports that he is not passing any flatus.    Objective:  BP (!) 177/94   Pulse (!) 114   Temp 98.2 °F (36.8 °C)   Resp 18   Ht 1.778 m (5' 10\")   Wt 67.1 kg (148 lb)   SpO2 94%   BMI 21.24 kg/m²   Date 01/16/25 0000 - 01/16/25 2359   Shift 1544-0026 5336-6876 8109-1038 24 Hour Total   INTAKE   I.V.(mL/kg) 1097(16.3)   1097(16.3)   Shift Total(mL/kg) 1097(16.3)   1097(16.3)   OUTPUT   Urine(mL/kg/hr) 300(0.6)   300   Shift Total(mL/kg) 300(4.5)   300(4.5)   Weight (kg) 67.1 67.1 67.1 67.1     General: NAD, AAO  Chest: regular, non-labored  Abdomen: Soft, moderate distention, mild diffuse TTP, incision C/d/I    CBC:   Lab Results   Component Value Date/Time    WBC 8.4 01/16/2025 05:16 AM    RBC 3.41 01/16/2025 05:16 AM    HGB 9.3 01/16/2025 05:16 AM    HCT 29.7 01/16/2025 05:16 AM    MCV 87.1 01/16/2025 05:16 AM    MCH 27.3 01/16/2025 05:16 AM    MCHC 31.3 01/16/2025 05:16 AM    RDW 13.4 01/16/2025 05:16 AM     01/16/2025 05:16 AM    MPV 9.4 01/16/2025 05:16 AM     BMP:    Lab Results   Component Value Date/Time     01/16/2025 05:16 AM    K 3.8 01/16/2025 05:16 AM     01/16/2025 05:16 AM    CO2 25 01/16/2025 05:16 AM    BUN 7 01/16/2025 05:16 AM    CREATININE 1.0 01/16/2025 05:16 AM    CALCIUM 8.4 01/16/2025 05:16 AM    GFRAA >59 01/10/2022 01:53 PM    LABGLOM 78 01/16/2025 05:16 AM    LABGLOM 57 01/17/2024 10:17 AM    GLUCOSE 135 01/16/2025 05:16 AM     Assessment and plan:  74 year old male s/p open right hemicolectomy  Ileus not having further improvement. Will order a KUB. Discussed with the patient that I think he would feel better with an NGT tube. Will see how the KUB looks and check back in with his nurse  NPO  If still not feeling better in the morning, then would recommend placement of PICC line and starting TPN  Other cares per the hospitalist service  
100 (1.5 g/kg)  Method Used for Fluid Requirements: 1 ml/kcal  Fluid (ml/day): 2001-2334    Nutrition Diagnosis:   Inadequate protein-energy intake related to acute injury/trauma as evidenced by intake 0-25%    Nutrition Interventions:   Food and/or Nutrient Delivery: Continue Current Diet, Modify Oral Nutrition Supplement  Nutrition Education/Counseling: No recommendation at this time  Coordination of Nutrition Care: Continue to monitor while inpatient  Plan of Care discussed with: pt    Goals:  Goals: PO intake 50% or greater, Meet at least 75% of estimated needs, Maintain adequate nutrition status  Type of Goal: Continue current goal  Previous Goal Met: Progressing toward Goal(s)    Nutrition Monitoring and Evaluation:   Behavioral-Environmental Outcomes: None Identified  Food/Nutrient Intake Outcomes: Food and Nutrient Intake, Supplement Intake  Physical Signs/Symptoms Outcomes: Biochemical Data, Weight, Skin, Fluid Status or Edema    Discharge Planning:    Too soon to determine     ANETA MISHRA MS, RD, LD  Contact: [phone]    
axis 3.2 cm previously 3.8 cm.  Normal liver spleen.  Bilateral adrenal enlargement, stable.  Kidneys and proximal   collecting system are unremarkable.  The small ascites and small free intraperitoneal gas, stable.  Periaortic lymphadenopathy is stable.  Right pericaval lymph node short axis 1.8 cm, stable.  The postoperative change of the right colon.  The no   developing inflammation.  Normal caliber bowel loops.     Colonic diverticula of the sigmoid.  No pericolonic inflammation.  Normal pelvic genitourinary structures.  No acute findings of the skeleton.     IMPRESSION:  Impression:     Postoperative abdomen with trace free intraperitoneal gas and small ascites.  Small ascites stable from 01/17/2025.  Free peritoneal gas is decreased.     Periaortic and mesenteric lymphadenopathy are stable     No inflammatory process, bowel or urinary obstruction     Question right colon thickening versus non distension pseudo thickening.  No pericolonic inflammation.  Correlate for colitis.     Colonic diverticulosis     Bilateral pleural effusions are increased from prior.      All CT scans are performed using dose optimization techniques as appropriate to the performed exam and include   at least one of the following: Automated exposure control, adjustment of the mA and/or kV according to size, and the use of iterative reconstruction technique.      ______________________________________   Electronically signed by: DAVIDE ORANTES M.D.  Date:     01/18/2025  Time:    17:32     CT images personally reviewed- very tiny drops of air, not along anastomosis, no organized fluid collection    CBC:   Lab Results   Component Value Date/Time    WBC 12.3 01/19/2025 05:30 AM    RBC 3.17 01/19/2025 05:30 AM    HGB 8.9 01/19/2025 05:30 AM    HCT 28.3 01/19/2025 05:30 AM    MCV 89.3 01/19/2025 05:30 AM    MCH 28.1 01/19/2025 05:30 AM    MCHC 31.4 01/19/2025 05:30 AM    RDW 14.7 01/19/2025 05:30 AM     01/19/2025 05:30 AM    
  BUN 22 16  --  11   CREATININE 1.4* 1.3*  --  1.2   GLUCOSE 147* 137*  --  111*     Recent Labs     01/12/25  0334 01/13/25  0339 01/14/25  0425   AST 16 20 21   ALT 9 10 10   BILITOT 0.3 0.3 0.2   ALKPHOS 120 116 108     Troponin T: No results for input(s): \"TROPONINI\" in the last 72 hours.  Pro-BNP: No results for input(s): \"BNP\" in the last 72 hours.  INR: No results for input(s): \"INR\" in the last 72 hours.  UA:No results for input(s): \"NITRITE\", \"COLORU\", \"PHUR\", \"LABCAST\", \"WBCUA\", \"RBCUA\", \"MUCUS\", \"TRICHOMONAS\", \"YEAST\", \"BACTERIA\", \"CLARITYU\", \"SPECGRAV\", \"LEUKOCYTESUR\", \"UROBILINOGEN\", \"BILIRUBINUR\", \"BLOODU\", \"GLUCOSEU\", \"AMORPHOUS\" in the last 72 hours.    Invalid input(s): \"KETONESU\"  A1C: No results for input(s): \"LABA1C\" in the last 72 hours.  ABG:No results for input(s): \"PHART\", \"WFM9CIG\", \"PO2ART\", \"IVZ7SNU\", \"BEART\", \"HGBAE\", \"P3JTUFVC\", \"CARBOXHGBART\" in the last 72 hours.    RAD:   XR ABDOMEN (KUB) (SINGLE AP VIEW)    Result Date: 1/10/2025  EXAM:  ONE VIEW ABDOMEN RADIOGRAPH.  HISTORY: Cecal mass.  TECHNIQUE: One view.  AP supine.  COMPARISON: None.      There are several loops of mildly dilated air-filled small bowel in the left hemiabdomen.  The cecum appears enlarged correlating with known cecal mass.  No pathologic calcifications detected.  There is gas noted within the unremarkable appearing transverse colon.  Excreted contrast material noted in the urinary bladder.  No secondary signs of free air. There is limited evaluation for free air on supine only exam.  Developing/low grade small bowel obstruction versus ileus.   ______________________________________ Electronically signed by: EUSEBIA YOUSIF M.D. Date:     01/10/2025 Time:    18:39             Assessment/Plan   Principal Problem:    Cecum mass  Active Problems:    CKD (chronic kidney disease), stage III (HCC)    COPD (chronic obstructive pulmonary disease) (HCC)    Nausea and vomiting    Small bowel obstruction (HCC)  Resolved 
1/24/2025 the patient c/o having visual and audible hallucinations.  He does admit to not sleeping well.  He takes up to 200 mg of trazodone at home.  Trazodone has been increased to 200 mg.  Attempting to get the private room to promote sleep.    Review of Systems:   Review of Systems   Constitutional:  Positive for appetite change and fatigue.   Respiratory:  Positive for shortness of breath.    Gastrointestinal:  Positive for abdominal pain (post-operative).   Neurological:  Positive for weakness.       14 point review of systems is negative except as specifically addressed above.      Objective:   VITALS:  BP (!) 126/55   Pulse 93   Temp 97.9 °F (36.6 °C) (Temporal)   Resp 18   Ht 1.778 m (5' 10\")   Wt 65.4 kg (144 lb 2 oz)   SpO2 97%   BMI 20.68 kg/m²   24HR INTAKE/OUTPUT:    Intake/Output Summary (Last 24 hours) at 1/24/2025 1115  Last data filed at 1/24/2025 0958  Gross per 24 hour   Intake 580 ml   Output 900 ml   Net -320 ml       Physical Exam  Vitals reviewed.   Constitutional:       Appearance: He is ill-appearing.   HENT:      Head: Normocephalic.      Mouth/Throat:      Mouth: Mucous membranes are dry.   Eyes:      Pupils: Pupils are equal, round, and reactive to light.   Cardiovascular:      Rate and Rhythm: Normal rate and regular rhythm.      Pulses: Normal pulses.      Heart sounds: Normal heart sounds.   Pulmonary:      Effort: Pulmonary effort is normal.      Breath sounds: Normal breath sounds.   Abdominal:      General: Bowel sounds are normal.      Palpations: Abdomen is soft.      Tenderness: There is abdominal tenderness (mild post-operative tenderness).   Musculoskeletal:      Cervical back: Normal range of motion and neck supple.      Right lower leg: No edema.      Left lower leg: No edema.   Skin:     Coloration: Skin is pale.   Neurological:      General: No focal deficit present.      Mental Status: He is alert and oriented to person, place, and time.      Motor: Weakness 
Walker  Assistance: Contact guard assistance  Gait Deviations: Slow Ash[place];Decreased step length;Decreased step height  Distance: 5'  Comments: pt ambulated to bedside commode. attempted ambulation but stated once he was up that he did not feel like walking.                Goals  Short Term Goals  Time Frame for Short Term Goals: 14 days  Short Term Goal 1: ambulate 150' with AD as indicated supervision  Short Term Goal 2: TF supervision     Education  Patient Education  Education Given To: Patient;Family  Education Provided: Role of Therapy;Plan of Care  Education Method: Demonstration;Verbal  Barriers to Learning: None  Education Outcome: Verbalized understanding;Demonstrated understanding    Electronically signed by NEENA Tovar on 1/22/2025 at 12:06 PM    
ABDOMEN (KUB) (SINGLE AP VIEW)    Result Date: 1/10/2025  EXAM:  ONE VIEW ABDOMEN RADIOGRAPH.  HISTORY: Cecal mass.  TECHNIQUE: One view.  AP supine.  COMPARISON: None.      There are several loops of mildly dilated air-filled small bowel in the left hemiabdomen.  The cecum appears enlarged correlating with known cecal mass.  No pathologic calcifications detected.  There is gas noted within the unremarkable appearing transverse colon.  Excreted contrast material noted in the urinary bladder.  No secondary signs of free air. There is limited evaluation for free air on supine only exam.  Developing/low grade small bowel obstruction versus ileus.   ______________________________________ Electronically signed by: EUSEBIA YOUSIF M.D. Date:     01/10/2025 Time:    18:39     Palliative Performance Scale:  [] 80% Full ambulation  Some disease: Normal activity w/ some effort  Full self-care  Normal/reduced intake  Full LOC  [] 70% Reduced ambulation  Some disease: Can't do normal job or work  Full self-care  Normal/reduced intake  Full LOC  [] 60% Reduced ambulation  Significant disease: Can't do hobbies/housework  Occasional assistance  Normal/reduced intake  LOC full/confusion  [x] 50% Mainly sit/lie  Extensive disease: Can't do any work  Considerable assistance  Normal/reduced intake  LOC full/confusion  [] 40% Mainly in bed  Extensive disease  Mainly assistance  Normal/reduced intake  LOC full/confusion  [] 30% Bed Bound  Extensive disease  Total care  Reduced Intake  LOC full/confusion  [] 20% Bed Bound  Extensive disease  Total care  Minimal intake  Drowsy/coma  [] 10% Bed Bound  Extensive disease  Total care  Mouth care only  Drowsy/coma  [] 0% Death    ECOG:(3) Capable of limited self-care, confined to bed or chair > 50% of waking hours    Assessment/Plan   Principal Problem:    Cecum mass  Active Problems:    CKD (chronic kidney disease), stage III (HCC)    COPD (chronic 
limited self-care, confined to bed or chair > 50% of waking hours    Assessment/Plan   Principal Problem:    Cecum mass  Active Problems:    CKD (chronic kidney disease), stage III (HCC)    COPD (chronic obstructive pulmonary disease) (HCC)    Nausea and vomiting    Small bowel obstruction (HCC)    Severe malnutrition (HCC)    Adenocarcinoma of cecum (HCC)    Ileus (HCC)    Acute systolic heart failure (HCC)    Palliative care patient    CAD (coronary artery disease)    Ischemic cardiomyopathy  Resolved Problems:    * No resolved hospital problems. *      Visit Summary:  Chart reviewed, patient discussed with nursing staff. Reviewed health issues, work up and treatment plan as well as factors that lead to hospitalization. Mr. Jain is seen at bedside this morning with no family present. Report obtained from RN with no acute events overnight. He has been up to [facility] but has not ambulated following heart cath. He is also agreeable for PT eval to assist with mobility due to long hospital course. I will round again this afternoon when family arrives.     Saw patient again at bedside with his son present. He has tolerated CLD with no nausea. Anticipate TPN will be discontinued soon. Again discussed typical process for d/c planning and options for pursuing rehab vs home health at time of discharge pending pt/family needs. Explained he would need to be medically stable for discharge with clearance from cardiology and GS as well. Opportunity for questions and emotional support provided. Palliative team will follow as needed.    Candidate for SCOP: Yes, will follow for referral need pending discharge disposition     Recommendations:      Palliative Care- GOC continue current medical treatment/workup monitor for improvement.  D/C planning based on hospital course. Code status- FULL CODE  SBO with cecal mass- oncology following.  S/p right hemicolectomy/terminal ileum ileectomy and lymph node dissection 1/11/25.  Pathology 
CATH LAB    COLONOSCOPY N/A 10/22/2021    Dr KHAILDA Cano-AP x 1, BCM x 1, 3 yr recall    CORONARY ARTERY BYPASS GRAFT  03/25/2024    [place] [facility]    HEMICOLECTOMY Right 01/11/2025    Right Colectomy performed by Suleiman Hunter MD at [facility] OR    TESTICLE REMOVAL      Just one    TYMPANOSTOMY TUBE PLACEMENT         Social History:    Marital status:   Smoking status: Former smoker  ETOH status: No  Resides: [place], [place]    Family History:   Family History   Problem Relation Age of Onset    Diabetes Mother     Coronary Art Dis Father     No Known Problems Sister     No Known Problems Sister     Coronary Art Dis Brother     No Known Problems Brother        Current Hospital Medications:    Current Facility-Administered Medications   Medication Dose Route Frequency Provider Last Rate Last Admin    traZODone (DESYREL) tablet 200 mg  200 mg Oral Nightly PRN Queenie Gold APRN   200 mg at 01/25/25 1958    melatonin disintegrating tablet 5 mg  5 mg Oral Nightly Kenyatta Knapp APRN - CNP   5 mg at 01/25/25 1957    sodium chloride 0.9 % bolus 500 mL  500 mL IntraVENous Once Queenie Gold APRN        potassium chloride (KLOR-CON M) extended release tablet 40 mEq  40 mEq Oral PRN Queenie Gold APRN   40 mEq at 01/21/25 0733    Or    potassium bicarb-citric acid (EFFER-K) effervescent tablet 40 mEq  40 mEq Oral PRN Queenie Gold APRN        Or    potassium chloride 10 mEq/100 mL IVPB (Peripheral Line)  10 mEq IntraVENous PRN Queenie Gold APRN        [Held by provider] bumetanide (BUMEX) tablet 1 mg  1 mg Oral Daily Fadi Bernard MD        spironolactone (ALDACTONE) tablet 12.5 mg  12.5 mg Oral Daily Fadi Bernard MD   12.5 mg at 01/26/25 0825    enoxaparin (LOVENOX) injection 40 mg  40 mg SubCUTAneous Daily Queenie Gold APRN   40 mg at 01/25/25 1540    [START ON 1/27/2025] cyanocobalamin injection 1,000 mcg  1,000 mcg IntraMUSCular Weekly Susi Rios MD        clopidogrel 
consistent with progression of metastatic disease. 4.  Interval development of mild thickening with loss of enhancement of portions of both adrenal glands, suspicious for metastatic disease. 5.  Stable 0.8 cm low density of the liver left lobe, too small to characterize.  Metastatic disease is in the differential diagnosis. 6.  Colonic diverticulosis, again noted.  No evidence of diverticulitis. 7.  Left orchiectomy. 8.  Interval development of small to moderate bilateral dependent pleural effusions. 9.  Stable small hiatal hernia, with interval development of mild reflux esophagitis.  All CT scans are performed using dose optimization techniques as appropriate to the performed exam and include at least one of the following: Automated exposure control, adjustment of the mA and/or kV according to size, and the use of iterative reconstruction technique.  ______________________________________ Electronically signed by: ESEQUIEL SUMNER M.D. Date:     01/17/2025 Time:    15:45     XR ABDOMEN (KUB) (SINGLE AP VIEW)    Result Date: 1/17/2025  Nonobstructive bowel gas pattern suggestive of postoperative ileus    ______________________________________ Electronically signed by: EUSEBIA CHOUDHARY M.D. Date:     01/17/2025 Time:    07:05        Echo:     Left Ventricle: Moderately reduced left ventricular systolic function with a visually estimated EF of 30 - 35%. EF by 2D Simpsons Biplane is 39%. Left ventricle size is normal. Normal wall thickness. Severe hypokinesis of the following segments: mid anterior, mid anterolateral, mid anteroseptal, mid inferior, mid inferolateral, mid inferoseptal, apical anterior, apical septal, apical inferior and apical lateral. Severe hypokinesis of the apex.  Basal segments with normal contractility.  Findings consistent with possible apical ballooning syndrome (Takotsubo's cardiomyopathy) Technical limitations preclude assessment of global longitudinal strain. Grade II diastolic dysfunction with 
gallbladder is unremarkable. No bile duct dilatation.  Spleen: No mass. No splenomegaly.  Adrenals: No mass.  Kidneys: A 0.4 cm left renal inferior pole hypodensity is identified and too small to characterize. No calculus. No hydronephrosis.  Pancreas: No mass or duct dilatation.  Gastrointestinal tract: There is a small hiatal hernia.  No abnormal bowel dilation is identified.  Diverticulosis is seen without evidence of diverticulitis.  Wall thickening of the colon is identified at the level of the ileocecal valve.  Abnormal soft  tissue extends from the abnormal segment of colon into the adjacent medial mesenteric soft tissues measuring up to 3.3 x 2.4 x 7.0 cm. Multiple abnormal mesenteric lymph nodes are seen near the colonic wall thickening and more central mesentery. The appendix is not definitely seen.  Lymph nodes: Multiple enlarged mesenteric lymph nodes. A para-aortic lymph node is seen measuring 1.0 cm in short axis.  Other smaller retroperitoneal lymph nodes are seen.  Mesentery, peritoneum: A small nodule abuts the left lateral coronal fascia on axial image 34 measuring 0.7 cm. There is trace free pelvic fluid.  Retroperitoneum: No mass.  Vasculature: The portal vein and branches, splenic vein, superior mesenteric vein, and hepatic veins are patent. Arterial atherosclerotic disease without aneurysm.  Pelvis: Trace free pelvic fluid.  Bones/soft tissues: There is levocurvature of the thoracolumbar spine with multilevel degenerative disc disease and facet arthropathy.  No suspicious osseous lesions are identified.      Wall thickening of the colon at the level of the ileocecal valve suggesting malignancy.  An infectious etiology is felt to be less likely.  Abnormal soft tissue extending from this finding into the adjacent more medial mesenteric soft tissues representing a component of the mass-like density.  Multiple mesenteric lymph nodes concerning for metastatic disease.  7 mm nodular density abutting 
Lower thorax: A left lower lobe calcified granuloma is noted. Minimal bibasilar atelectasis.  Probable right coronary artery stent.  A small hiatal hernia.  Liver: 0.8 cm left hepatic lobe hypodensity, too small to characterize.  Biliary: The gallbladder is unremarkable. No bile duct dilatation.  Spleen: No mass. No splenomegaly.  Adrenals: No mass.  Kidneys: A 0.4 cm left renal inferior pole hypodensity is identified and too small to characterize. No calculus. No hydronephrosis.  Pancreas: No mass or duct dilatation.  Gastrointestinal tract: There is a small hiatal hernia.  No abnormal bowel dilation is identified.  Diverticulosis is seen without evidence of diverticulitis.  Wall thickening of the colon is identified at the level of the ileocecal valve.  Abnormal soft  tissue extends from the abnormal segment of colon into the adjacent medial mesenteric soft tissues measuring up to 3.3 x 2.4 x 7.0 cm. Multiple abnormal mesenteric lymph nodes are seen near the colonic wall thickening and more central mesentery. The appendix is not definitely seen.  Lymph nodes: Multiple enlarged mesenteric lymph nodes. A para-aortic lymph node is seen measuring 1.0 cm in short axis.  Other smaller retroperitoneal lymph nodes are seen.  Mesentery, peritoneum: A small nodule abuts the left lateral coronal fascia on axial image 34 measuring 0.7 cm. There is trace free pelvic fluid.  Retroperitoneum: No mass.  Vasculature: The portal vein and branches, splenic vein, superior mesenteric vein, and hepatic veins are patent. Arterial atherosclerotic disease without aneurysm.  Pelvis: Trace free pelvic fluid.  Bones/soft tissues: There is levocurvature of the thoracolumbar spine with multilevel degenerative disc disease and facet arthropathy.  No suspicious osseous lesions are identified.      Wall thickening of the colon at the level of the ileocecal valve suggesting malignancy.  An infectious etiology is felt to be less likely.  Abnormal 
abutting the left lateral coronal fascia.  Recommend follow-up to exclude a metastasis.  Borderline enlarged nonspecific para-aortic lymph node.  8 mm left hepatic lobe density, too small to characterize.  Recommend follow-up to exclude a metastasis.  Small hiatal hernia.  Diverticulosis.  All CT scans are performed using dose optimization techniques as appropriate to the performed exam and include at least one of the following: Automated exposure control, adjustment of the mA and/or kV according to size, and the use of iterative reconstruction technique.  ______________________________________ Electronically signed by: MARIA ESTHER BARNES M.D. Date:     01/01/2025 Time:    14:05      Outside CT abdomen pelvis performed 1/10/2025 [facility] [place] [facility]      Evidence of small bowel obstruction    ASSESSMENT:  # Stage III vs IV colonic adenocarcinoma    Prior imaging studies with evidence of mediastinal adenopathy and axillary adenopathy.  This likely represent metastatic disease.    CEA 10.3, CA 19-9 1606 prior to surgery    CEA 7.8 on 1/13/2025 after surgery    1/11/2025-right hemicolectomy/terminal ileum ileectomy and lymph node dissection    FINAL DIAGNOSIS: AJCC pathologic stage:  pT4a N2b   Terminal ileum and right colon, right hemicolectomy:  Poorly differentiated adenocarcinoma (4.8 cm).   Tumor invades the visceral peritoneum.   Tumor extends to mesenteric margin.   Extensive lymph-vascular space invasion.   Incidental tubular adenoma   Incidental hyperplastic polyps.   Twenty lymph nodes positive for metastatic carcinoma (20/20)       POD#9-Status post right hemicolectomy for small bowel obstruction  CT abdomen pelvis [facility] showed evidence small bowel obstruction with right colonic mass  He has bowel sounds but no bowel movement yet.    1/17/2025-CT abdomen/pelvis with IV contrast, [facility]:  Mild wall thickening/edema of the partially visualized lower esophagus, new since the prior study.   Several 
5.2    Na 136 - 145 mmol/L 138  137   138  137        PLAN:  Continue current supportive care  Continue postoperative care  Follow-up results pathology  B12 replacement, day 4  Venofer 100 mg IV x 1 dose  Simethicone as needed  Discussed bedside RN and patient  Discussed pathology with patient    (Please note that portions of this note were completed with a voice recognition program. Efforts were made to edit the dictations but occasionally words are mis-transcribed.)    Encounter Information    Encounter Information   Provider Department Encounter # [place]   1/23/2025 9:15 AM Gera Quintero MD [facility] HEM ONC [id number] [facility]         Susi Rios MD    01/16/25  6:10 AM

## 2025-01-27 ENCOUNTER — CARE COORDINATION (OUTPATIENT)
Dept: CASE MANAGEMENT | Age: 75
End: 2025-01-27

## 2025-01-27 ENCOUNTER — TELEPHONE (OUTPATIENT)
Dept: CARDIOLOGY CLINIC | Age: 75
End: 2025-01-27

## 2025-01-27 RX ORDER — CLOPIDOGREL BISULFATE 75 MG/1
75 TABLET ORAL DAILY
Qty: 30 TABLET | Refills: 5 | Status: SHIPPED | OUTPATIENT
Start: 2025-01-27

## 2025-01-27 NOTE — CARE COORDINATION
Care Transitions Note    Initial Call - Call within 2 business days of discharge: Yes    Attempted to reach patient for transitions of care follow up. Unable to reach patient.    Outreach Attempts:   Attempted to make contact with Nick for an initial follow up call post discharge from the hospital without success.  Unable to leave a message regarding intent of call and call back information.  Will try again my next business day.       Patient: Nick Jain    Patient : 1950   MRN: [id number]    Reason for Admission:   Discharge Date: 25  RURS: Readmission Risk Score: 21.3    Last Discharge Facility       Date Complaint Diagnosis Description Type Department Provider    1/10/25 Abdominal Pain; Vomiting Nausea and vomiting, unspecified vomiting type ... ED to Hosp-Admission (Discharged) (ADMITTED) L Gage Menjivar MD; Warner Myers...            Was this an external facility discharge? No    Follow Up Appointment:   Patient does not have a follow up appointment scheduled at time of call.  Unable to reach, will route to office.   Future Appointments         Provider Specialty Dept Phone    2025 9:30 AM Gera Quintero MD Hematology and Oncology [phone]    2025 9:30 AM SCHEDULE, [facility] MED ONC MA Infusion Therapy [phone]    2025 9:00 AM Mari Caldwell, APRN Cardiology [phone]    2025 1:00 PM Tesfaye Gipson MD Primary Care [phone]            Plan for follow-up on next business day.      Will Hernandez RN

## 2025-01-27 NOTE — TELEPHONE ENCOUNTER
Karlene with [facility] pharmacy left message requesting that rx be sent to [facility] [place]

## 2025-01-27 NOTE — CONSULTS
[facility] Vascular Access Team:  Midline Insertion Procedure Note      Patient: Nick Jain  YOB: 1950   MRN: [id number]  Room: [address] [place] [address]     Attending Physician - Gage Andrews MD  Ordering Physician - Gage Andrews MD    Diagnosis:   Lower abdominal pain [R10.30]  Cecum mass [K63.89]  Nausea and vomiting, unspecified vomiting type [R11.2]       Procedure(s):   Insertion of a 4.5 Yi Single Lumen Power Midline    Indication(s):   Poor Venous Access (TPN discontinued - PICC to be D/C'd once Midline is placed)    Date of Procedure: 1/23/2025   Start Time: 1245  End Time: 1320    Performed by: Carlo Camp RN    Anesthesia: Local Injection 2 mL 1% Lidocaine without Epinephrine    Estimated Blood Loss (mL): Minimal    Complications: None    PICC 01/17/25 Left Basilic (Active)   Criteria for Appropriate Use Total parenteral nutrition 01/22/25 2126   Site Assessment Clean, dry & intact 01/22/25 2126   Phlebitis Assessment No symptoms 01/22/25 2126   Infiltration Assessment 0 01/22/25 2126   Extremity Circumference (cm) 27 cm 01/17/25 1300   External Catheter Length (cm) 2 cm 01/17/25 1300   Lumen #1 Color/Status White;Brisk blood return;Flushed;Normal saline locked 01/22/25 2126   Lumen #2 Color/Status Pink;Flushed;Brisk blood return;Alcohol cap present;Normal saline locked 01/22/25 2126   Line Care Cap changed;Connections checked and tightened 01/22/25 2126   Alcohol Cap Used Yes 01/22/25 2126   Date of Last Dressing Change 01/21/25 01/22/25 2126   Dressing Type Transparent w/CHG gel 01/22/25 2126   Dressing Status Clean, dry & intact 01/22/25 2126   Dressing Intervention New 01/22/25 2126         Findings:   1. Successful insertion of Midline.  2. Midline is ready to be used. Please change tubing prior to using the new Midline. Make sure to label, date and use alcohol caps on new tubing and alcohol caps on unused ports.   3. Labs may be drawn from Midline. Please make patient a unit draw.   
  [facility] Vascular Access Team:  PICC Line Insertion Procedure Note      Patient: Nick Jain  YOB: 1950   MRN: [id number]  Room: [address] [place] [address]     Attending Physician - Chris Mahmood MD  Ordering Physician -  Jazzy Ash MD    Diagnosis:   Lower abdominal pain [R10.30]  Cecum mass [K63.89]  Nausea and vomiting, unspecified vomiting type [R11.2]       Procedure(s):   Insertion of a 5.5 Cymro Double Lumen PICC    Indication(s):   TPN    Date of Procedure: 1/17/2025   Start Time: 1220  End Time: 1300    Performed by: Zbigniew Bailey RN    Anesthesia: Local Injection 3 mL 1% Lidocaine without Epinephrine    Estimated Blood Loss (mL): None    Complications: None    PICC 01/17/25 Left Basilic (Active)   Criteria for Appropriate Use Total parenteral nutrition 01/17/25 1300   Site Assessment Clean, dry & intact 01/17/25 1300   Phlebitis Assessment No symptoms 01/17/25 1300   Infiltration Assessment 0 01/17/25 1300   Extremity Circumference (cm) 27 cm 01/17/25 1300   External Catheter Length (cm) 2 cm 01/17/25 1300   Lumen #1 Color/Status White;Flushed;Brisk blood return;Normal saline locked;Alcohol cap applied 01/17/25 1300   Lumen #2 Color/Status Pink;Flushed;Brisk blood return;Normal saline locked;Alcohol cap applied 01/17/25 1300   Alcohol Cap Used Yes 01/17/25 1300   Date of Last Dressing Change 01/17/25 01/17/25 1300   Dressing Type Transparent w/CHG gel;Securing device 01/17/25 1300   Dressing Status Clean, dry & intact;New dressing applied 01/17/25 1300   Dressing Intervention New 01/17/25 1300         Findings:   1. Successful insertion of PICC line.  2. PICC Line is ready to be used.   3. Please change tubing prior to using new PICC line. Make sure to label, date and use alcohol caps on new tubing and alcohol caps on unused ports.        Detailed Description of Procedure:   Informed consent was obtained for the procedure. Risks including infection, thrombosis, nerve injury, arterial puncture, 
Cardiac Rehab MI/PTCA/Stent education packet was sent to the patient's address on record.  Handouts titled; \"Home Instructions Following a Cardiac Event\", \"A Healthy Heart: Care Instructions\",  \"Cardiac Diet/Low Cholesterol\", \"Cardiac Rehabilitation: An Individualized Supervised Program For You\" and a [facility] Cardiac & Pulmonary Rehabilitation brochure were included.  Patient was instructed to contact [facility] or the hospital nearest their residence for the opportunity to enroll in Phase II Outpatient Cardiac Rehab.   
Comprehensive Nutrition Assessment    Type and Reason for Visit:  Reassess    Nutrition Recommendations/Plan:   PICC placement and TPN: Clinimix 5/15 with a goal rate of 100 mL/hr. 20% Lipids 250 mL twice weekly. Start rate at 25 mL/hr and run for 30 minutes. Advance rate to 50 mL/hr and run for 30 minutes. Advance rate to 75 mL/hr and run for 30 minutes. Advance rate to goal rate of 100 mL/hr. Monitor for s/s Refeeding Syndrome. GUR 3.7.     Malnutrition Assessment:  Malnutrition Status:  Severe malnutrition (01/16/25 1048)    Context:  Acute Illness     Findings of the 6 clinical characteristics of malnutrition:  Energy Intake:  50% or less of estimated energy requirements for 5 or more days  Body Fat Loss:  Moderate body fat loss Buccal region     Nutrition Assessment:    Consult received for TPN recommendations. Pt agrees to starting TPN. PICC placement and nutrition components were explained to pt and his spouse. Pt does report feeling a little better than yesterday. He does not appear to be in as much pain. Will provide TPN recommendations. MD to manage IV Dextrose as TPN is administered and advanced to goal.    Nutrition Related Findings:      Wound Type: Surgical Incision       Current Nutrition Intake & Therapies:    Average Meal Intake: NPO  Average Supplements Intake: NPO  Diet NPO    Anthropometric Measures:  Height: 177.8 cm (5' 10\")  Ideal Body Weight (IBW): 166 lbs (75 kg)    Current Body Weight: 67.1 kg (147 lb 14.9 oz)  Current BMI (kg/m2): 21.2  BMI Categories: Underweight (BMI less than 22) age over 65    Estimated Daily Nutrient Needs:  Energy Requirements Based On: Kcal/kg  Weight Used for Energy Requirements: Current  Energy (kcal/day): 0837-9078 kcals/day  Weight Used for Protein Requirements: Current  Protein (g/day): 100-134 g/protein/day  Method Used for Fluid Requirements: 1 ml/kcal  Fluid (ml/day): 5331-9210 mL/day    Nutrition Diagnosis:   Severe malnutrition related to acute 
Department of General Surgery - Adult  Surgical Service   Dr. Marco Hunter consult Note      Reason for Consult: Cecal mass with partial obstruction  Requesting Physician: Hospitalist    CHIEF COMPLAINT: Nausea vomit    History Obtained From: Patient and family    HISTORY OF PRESENT ILLNESS:                The patient is a 74 y.o. male who presents with about 2 months history of abdominal bloating and mild cramping with 3 to 4-day history of nausea and vomiting.  He was seen in outside hospital there was a concern for obstruction.  Ended up in our emergency room.  KUB showed dilated small bowel loops.  Surgery was consulted.    Past Medical History:    Coronary artery disease status post CABG in March 2024    Past Surgical History:    1 appendectomy in 1960s  2 colonoscopy 2 years ago  Current Medications:   See chart  Allergies:  Patient has no known allergies.    Social History:   Significant for tobacco use which he has discontinued as of March 2024  Family History:   Noncontributory    REVIEW OF SYSTEMS:    Nausea and vomiting    PHYSICAL EXAM:    Vital signs stable  Abdomen is ever so slightly tender with the left lower quadrant and more so right lower quadrant tenderness with fullness in the right lower quadrant  DATA:    Labs okay.  Potassium 5.5.  CT from January 1 as well as KUB from yesterday reviewed    IMPRESSION/RECOMMENDATIONS:      74-year-old gentleman with a cecal mass now with partial small bowel obstruction.  Although he has coronary artery disease the good news is he underwent a successful CABG less than a year ago.  Because of his partial small bowel obstruction I do believe he requires a right colectomy.  Despite the inability to do an adequate bowel prep, should be able to easily create an enterocolostomy and avoid an ileostomy.  I discussed the risks and benefits of the procedure and will proceed later today.  
[facility]  Cardiology Consult      Requesting MD:  Chris Mahmood MD   Admit Status:         History obtained from:   [] Patient  [] Other (specify):     PROBLEM LIST:    Patient Active Problem List    Diagnosis Date Noted    Ileus (HCC) 01/17/2025    Severe malnutrition (HCC) 01/16/2025    Adenocarcinoma of cecum (HCC) 01/16/2025    Small bowel obstruction (HCC) 01/11/2025    Nausea and vomiting 01/10/2025    Cecum mass 01/10/2025    Angina pectoris, unspecified 08/26/2024    Atherosclerotic heart disease of native coronary artery with unspecified angina pectoris 08/26/2024    COPD (chronic obstructive pulmonary disease) (Former[facility]) 03/13/2024    Tobacco abuse 03/13/2024    NSTEMI (non-ST elevated myocardial infarction) (Former[facility]) 03/12/2024    Non-penetrating foreign body in ear canal, right, initial encounter 11/15/2021    Bilateral sensorineural hearing loss 11/12/2020     Overview Note:        Audiogram and Acoustic Immittance              S/p bilateral myringotomy with tube placement 05/05/2020     Overview Note:     RMT-10/8/2019  LMT-10/21/2019      Mixed hearing loss, bilateral 09/24/2019    Colon cancer screening declined 07/11/2019    CKD (chronic kidney disease), stage III ([facility]) 01/12/2019    Familial hypercholesterolemia 01/04/2018    Essential (primary) hypertension 01/04/2018    Coronary artery disease involving native coronary artery of native heart without angina pectoris 01/04/2018     1.  Coronary artery disease, history of prior PCI to RCA, triple-vessel disease by catheterization 3/2024, four-vessel CABG 3/25/2024 at [facility] (LIMA to LAD, SVG to RCA, SVG to OM, SVG to diagonal), normal LV ejection fraction.  2.  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  3.  Hypertension.  4.  Longstanding prior tobacco use with COPD.  5.  Postoperative heart failure with severe LV systolic dysfunction, 
taking: Reported on 1/9/2025) 60 tablet 0    ramipril (ALTACE) 5 MG capsule Take 1 capsule by mouth in the morning and at bedtime 60 capsule 5    docusate sodium (COLACE) 100 MG capsule Take 1 capsule by mouth 2 times daily 60 capsule 0    traZODone (DESYREL) 50 MG tablet Take 2 tablets by mouth nightly as needed for Sleep 180 tablet 1    STIOLTO RESPIMAT 2.5-2.5 MCG/ACT AERS Inhale 2 puffs into the lungs daily      omeprazole (PRILOSEC) 20 MG delayed release capsule Take 2 capsules by mouth once daily 180 capsule 1    metoprolol tartrate (LOPRESSOR) 25 MG tablet       acetaminophen (TYLENOL) 500 MG tablet Take 1 tablet by mouth every 6 hours as needed for Pain      atorvastatin (LIPITOR) 80 MG tablet Take 1 tablet by mouth daily 90 tablet 3    albuterol sulfate HFA (VENTOLIN HFA) 108 (90 Base) MCG/ACT inhaler Inhale 2 puffs into the lungs every 6 hours as needed for Wheezing 3 each 2    fluticasone (FLONASE) 50 MCG/ACT nasal spray 2 sprays by Each Nostril route in the morning. 3 each 3    aspirin EC 81 MG EC tablet Take 1 tablet by mouth         Allergies: No Known Allergies      Subjective   REVIEW OF SYSTEMS:   CONSTITUTIONAL: no fever, no night sweats,  fatigue;  HEENT: no blurring of vision, no double vision, no hearing difficulty, no tinnitus, no ulceration, no epistaxis;  LUNGS: no cough, no hemoptysis, no wheeze,  no shortness of breath;  CARDIOVASCULAR: no palpitation, no chest pain, no shortness of breath;  GI: Right lower quadrant abdominal pain, nausea, vomiting  LIBERTAD: no dysuria, no hematuria, no frequency or urgency, no nephrolithiasis;  MUSCULOSKELETAL: no joint pain, no swelling, no stiffness;  ENDOCRINE: no polyuria, no polydipsia, no cold or heat intolerance;  HEMATOLOGY: no easy bruising or bleeding, no history of clotting disorder;  DERMATOLOGY: no skin rash, no eczema, no pruritus;  NEUROLOGY: no syncope, no seizures, no numbness or tingling of hands, no numbness or tingling of feet, no 
monitoring, and additional family support.  Spouse seems open to this pending his hospital course.  They deny any previously completed ACP/POA documents.  Patient may be interested in completing a living will this admission and will notify me if they would like assistance.  Otherwise patient spouse does remain legal NOK decision maker by [employer] law should he be unable to make decisions for himself. Patient remains a FULL CODE in the event of cardiac or respiratory arrest.  Opportunity for questions and emotional support provided. Palliative team will follow as needed.    Candidate for SCOP: Yes, will follow for referral need pending discharge disposition    Recommendations:     Palliative Care- GOC continue current medical treatment/workup monitor for improvement.  D/C planning based on hospital course. Code status- FULL CODE  SBO with cecal mass- oncology following.  S/p right hemicolectomy/terminal ileum ileectomy and lymph node dissection 1/11/25.  Pathology consistent with colonic adenocarcinoma.  Concern for metastatic disease. Will need PET scan OP. NGT remains out with TPN ongoing.  Further postop care per primary team  Ischemic cardiomyopathy with HFrEF- cardiology following. Newly found decline in EF. S/p cardiac cath 1/20/25  per Dr. Bernard with multivessel disease and successful PCI of proximal to mid RCA with SUSAN x 1, PCI of first diagonal with recanalization of  with PTCA, and PCI of mid LAD with SUSAN x 1. Ongoing medical mgmt.   Anemia-receiving iron and B12 replacement per oncology.  Continue to monitor labs with transfusional support as warranted  OMA on CKD- resolved.  Continue to monitor renal function, avoid offending agents as able  COPD- stable    Thank you for consulting palliative care and allowing us to participate in the care of the patient.      CounselingTopics: Goals of care, Code Status, Disease process education, pt/family support                                     Total Time

## 2025-01-28 ENCOUNTER — CARE COORDINATION (OUTPATIENT)
Dept: CASE MANAGEMENT | Age: 75
End: 2025-01-28

## 2025-01-28 NOTE — CARE COORDINATION
Care Transitions Note    Initial Call - Call within 2 business days of discharge: Yes    Patient Current Location:  Home: [address] [place] [id number]    Care Transition Nurse contacted the patient by telephone to perform post hospital discharge assessment, verified name and  as identifiers. Provided introduction to self, and explanation of the Care Transition Nurse role.     Patient: Nick Jain    Patient : 1950   MRN: [id number]    Reason for Admission: N/V   Discharge Date: 25  RURS: Readmission Risk Score: 21.3      Last Discharge Facility       Date Complaint Diagnosis Description Type Department Provider    1/10/25 Abdominal Pain; Vomiting Nausea and vomiting, unspecified vomiting type ... ED to Hosp-Admission (Discharged) (ADMITTED) L Gage Menjivar MD; Warner Myers...            Was this an external facility discharge? No    Additional needs identified to be addressed with provider   No needs identified             Method of communication with provider: none.    Patients top risk factors for readmission: functional physical ability and medical condition-recent hemicolectomy, cardiac stents    Interventions to address risk factors:   Education: Discussed good nutrition, hydration, oral supplements  Review of patient management of conditions/medications: unable to review, wife takes care of them and she was out of home.   Home Health: [facility]  to come today or tomorrow per patient   Communication with providers: Has HFU, and appts, encouarged to keep as scheduled    Care Summary Note: Spoke with patient today for initial CTN call after discharge, second attempt. He says he did not sleep great last night trying to get comfortable. He has heard from [facility] and they are coming tomorrow or next day. He says incisions look good, they removed every other staple and he has 10 left. Says incision is intact and no redness or drainage. He says appetite is getting some better, as

## 2025-01-29 ENCOUNTER — TELEPHONE (OUTPATIENT)
Dept: SURGERY | Age: 75
End: 2025-01-29

## 2025-01-29 DIAGNOSIS — K56.609 SMALL BOWEL OBSTRUCTION (HCC): ICD-10-CM

## 2025-01-29 RX ORDER — OXYCODONE HYDROCHLORIDE 5 MG/1
5 TABLET ORAL EVERY 4 HOURS PRN
Qty: 90 TABLET | Refills: 0 | Status: SHIPPED | OUTPATIENT
Start: 2025-01-29 | End: 2025-02-28

## 2025-01-29 NOTE — TELEPHONE ENCOUNTER
Patient was told to get to schedule with GENERAL SURGERY; schedule post-op follow-up . Patient ask if order sent to  [facility] at [facility]  to get his staple out. Please called patient [phone]       Thank you

## 2025-01-30 ENCOUNTER — OFFICE VISIT (OUTPATIENT)
Dept: SURGERY | Age: 75
End: 2025-01-30

## 2025-01-30 ENCOUNTER — CARE COORDINATION (OUTPATIENT)
Dept: CASE MANAGEMENT | Age: 75
End: 2025-01-30

## 2025-01-30 ENCOUNTER — TELEPHONE (OUTPATIENT)
Dept: CARDIOLOGY CLINIC | Age: 75
End: 2025-01-30

## 2025-01-30 ENCOUNTER — TELEPHONE (OUTPATIENT)
Dept: SURGERY | Age: 75
End: 2025-01-30

## 2025-01-30 VITALS
TEMPERATURE: 97.3 F | OXYGEN SATURATION: 98 % | HEIGHT: 70 IN | BODY MASS INDEX: 19.9 KG/M2 | WEIGHT: 139 LBS | HEART RATE: 93 BPM

## 2025-01-30 DIAGNOSIS — Z90.49 S/P COLON RESECTION: Primary | ICD-10-CM

## 2025-01-30 PROCEDURE — 99024 POSTOP FOLLOW-UP VISIT: CPT | Performed by: SURGERY

## 2025-01-30 NOTE — TELEPHONE ENCOUNTER
Dr. Ash's office left message that they saw patient today and he is having SOB and dizziness. They were concerned for him to wait until 2.25.25 to be seen for hospital follow up and asked that it be moved up sooner. I called patient and he advised he has been having some SOB and dizziness. /60 HR 80's. Advised we could move appt up. Offered tomorrow but he said Monday at 11:30 would work better for him. Appt scheduled 2.3.25 at 11:30AM.

## 2025-01-30 NOTE — PROGRESS NOTES
Postop Progress Note    Subjective    Nick Jain presents to the office for postop follow up, 3 weeks s/p open right hemicolectomy with Dr. Hunter. He reports that he has  been doing okay. He does not have much appetite. He has been having SOA walking to the bathroom and some dizziness as well.    Objective    Vitals:    01/30/25 1125   Pulse: 93   Temp: 97.3 °F (36.3 °C)   SpO2: 98%     General: alert, cooperative - does appear pale and weak  Abdomen: Soft, NT, ND, incisions C/D/I, no erythema or induration  Remaining staples removed without difficulty    Assessment  73 yo male s/p open right hemicolectomy- he also had acute heart failure while hospitalized requiring stenting  1) Doing well post operatively. Okay to have diet and activity as tolerated  2) Follow up in general surgery as needed, I will have my office reach out to cardiology to see if he can be scheduled sooner to follow up with them.    Electronically signed by Jazzy Ash MD on 1/30/2025 at 11:39 AM

## 2025-01-30 NOTE — TELEPHONE ENCOUNTER
Called cardiology got vm on nurse line.  Left message per Dr. Ash that patient is in today following surgery and is experiencing continued symptoms of SOB and dizziness.  He is scheduled to see Mari Caldwell 02/25/25 but Dr. Ash requesting earlier appt due to his continued symptoms.

## 2025-01-30 NOTE — CARE COORDINATION
Care Transitions Note    Follow Up Call     Patient Current Location:  [place]    Care Transition Nurse contacted the patient, spouse/partner  by telephone. Verified name and  as identifiers.    Additional needs identified to be addressed with provider   Patient calling to see about now changing Cardio appt to tomorrow (originally offered this morning but he declined and has Monday appt). CTN has advised him with SOA and dizziness he needs to have sooner than later f/u, since the weekend is approaching. He agrees, and as they are on their way home from Dr. Ash appt, they will call when they get home.                  Method of communication with provider:  wife to call and schedule when she gets home.  .    Care Summary Note: Spoke with patient today along with wife on speaker to f/u on his needs/current health. He says he is doing some better but has had some SOA and dizziness. He was in office to see Dr. Ash today, says all went well, staples are out and incision looks good. Dr. Ash did reach out to Cardio office and said he needed sooner f/u due to ongoing symptoms. Office did reach out to patient and offered appt for tomorrow , but he felt it was too much on his wife to get out again so soon, so said Monday would be better. He also has PCP f/u on Monday and is worried about that. CTN advised him that it would be better to be seen by Cardio tomorrow and f/u with PCP on Monday that way they can address the SOA/dizziness before the weekend, as CTN feared he could become worse. He did agree, as did wife, and as soon as they get home, she is going to call and see if they can go ahead and come in tomorrow. He did have pain meds called in by PCP, as he was running low. CTN will follow up and watch for appt to be scheduled with Cardio and f/u after that.     Plan of care updates since last contact:  Education: daily weights, changing Cardio appt  Communication with providers: discussed upcoming appt and

## 2025-01-31 ENCOUNTER — OFFICE VISIT (OUTPATIENT)
Dept: CARDIOLOGY CLINIC | Age: 75
End: 2025-01-31
Payer: MEDICARE

## 2025-01-31 ENCOUNTER — CARE COORDINATION (OUTPATIENT)
Dept: CASE MANAGEMENT | Age: 75
End: 2025-01-31

## 2025-01-31 VITALS
WEIGHT: 136 LBS | HEIGHT: 70 IN | BODY MASS INDEX: 19.47 KG/M2 | SYSTOLIC BLOOD PRESSURE: 100 MMHG | OXYGEN SATURATION: 96 % | HEART RATE: 96 BPM | DIASTOLIC BLOOD PRESSURE: 60 MMHG

## 2025-01-31 DIAGNOSIS — E78.5 DYSLIPIDEMIA: ICD-10-CM

## 2025-01-31 DIAGNOSIS — I25.10 CORONARY ARTERY DISEASE INVOLVING NATIVE CORONARY ARTERY OF NATIVE HEART WITHOUT ANGINA PECTORIS: Primary | ICD-10-CM

## 2025-01-31 DIAGNOSIS — I25.5 ISCHEMIC CARDIOMYOPATHY: ICD-10-CM

## 2025-01-31 DIAGNOSIS — I10 ESSENTIAL HYPERTENSION: ICD-10-CM

## 2025-01-31 PROCEDURE — 1111F DSCHRG MED/CURRENT MED MERGE: CPT | Performed by: NURSE PRACTITIONER

## 2025-01-31 PROCEDURE — 3078F DIAST BP <80 MM HG: CPT | Performed by: NURSE PRACTITIONER

## 2025-01-31 PROCEDURE — 3017F COLORECTAL CA SCREEN DOC REV: CPT | Performed by: NURSE PRACTITIONER

## 2025-01-31 PROCEDURE — 99214 OFFICE O/P EST MOD 30 MIN: CPT | Performed by: NURSE PRACTITIONER

## 2025-01-31 PROCEDURE — G8427 DOCREV CUR MEDS BY ELIG CLIN: HCPCS | Performed by: NURSE PRACTITIONER

## 2025-01-31 PROCEDURE — 1159F MED LIST DOCD IN RCRD: CPT | Performed by: NURSE PRACTITIONER

## 2025-01-31 PROCEDURE — 1036F TOBACCO NON-USER: CPT | Performed by: NURSE PRACTITIONER

## 2025-01-31 PROCEDURE — 1123F ACP DISCUSS/DSCN MKR DOCD: CPT | Performed by: NURSE PRACTITIONER

## 2025-01-31 PROCEDURE — 3074F SYST BP LT 130 MM HG: CPT | Performed by: NURSE PRACTITIONER

## 2025-01-31 PROCEDURE — G8420 CALC BMI NORM PARAMETERS: HCPCS | Performed by: NURSE PRACTITIONER

## 2025-01-31 ASSESSMENT — ENCOUNTER SYMPTOMS
COUGH: 0
SORE THROAT: 0
WHEEZING: 0
SHORTNESS OF BREATH: 1
CHEST TIGHTNESS: 0

## 2025-01-31 NOTE — PROGRESS NOTES
[facility]  [address]  [address]  formerly [facility] [address]  [phone]      Chief Complaint / Reason for Being Seen: Hospital follow-up    1. Coronary artery disease involving native coronary artery of native heart without angina pectoris    2. Ischemic cardiomyopathy    3. Essential hypertension    4. Dyslipidemia        Patient with a history of chronic kidney disease 3A, hypertension, hyperlipidemia, CAD, COPD and cecal mass.  He presented to the ER on 1/10/2025 complaining of nausea vomiting abdominal pain.  He had actually gone to [facility] emergency department earlier on the day of admission he was told he needed surgery immediately and he refused and came to [facility].  He had not had a bowel movement in 4 days.  He had been taking opiate medications due to his abdominal pain.  He had had a 15 pound weight loss which was unintentional over the last month.  KUB showed developing/low-grade small bowel obstruction versus ileus.  He was admitted to the hospital with general surgery and oncology consult.  On 1/11/2025 he went to the OR for a right Heema colectomy.  On 1/18/2025 he began having tachycardia in addition to dyspnea and.  His proBNP was greater than 35,000.  He was given a dose of IV Lasix with significant improvement of his symptoms.  Cardiology was consulted 2D echo was performed that showed EF of 30 to 35%.  Cardiology did take patient to the cardiac Cath Lab on 1/20/2025 and he was found to have severe triple-vessel disease.  Findings are felt to be related to ischemic cardiomyopathy with loss of diagonal post bypass obstructive LAD and RCA.  He did undergo drug-eluting stent to the RCA and to the LAD.  Also successful PCI of the first diagonal with recannulization of  with PTCA.  Cardiology recommended Plavix uninterrupted for 6 months in addition to starting guideline directed medical therapy.    Patient did see general surgery yesterday for postop check.  Due to

## 2025-01-31 NOTE — CARE COORDINATION
Care Transitions Note    Follow Up Call     Patient Current Location:  Home: [address] [place] [id number]    Care Transition Nurse contacted the patient by telephone. Verified name and  as identifiers.    Additional needs identified to be addressed with provider   No needs identified                 Method of communication with provider: none.    Care Summary Note: Spoke with patient and wife today for f/u call. He says he is not feeling much better. Seen by Cardio today. They believed patient is taking too much Entresto and Aldactone. Wife while in office seemed to agree, but when she got home she found she had been giving half. CTN advised her to reach out to office and make them aware, keep the PCP f/u on Monday. He has taken a Zofran for upset stomach. CTN will follow up at a later time and see how things are going.     Plan of care updates since last contact:  Education: discussed staying away from greasy/spicy, eating bland foods over the weekend. Keeping daily weights, vitals. Using walker if needed.     Assessments:  Care Transitions Subsequent and Final Call    Subsequent and Final Calls  Do you have any ongoing symptoms?: No  Have your medications changed?: No  Do you have any questions related to your medications?: No  Do you currently have any active services?: Yes  Are you currently active with any services?: Home Health  Do you have any needs or concerns that I can assist you with?: No  Identified Barriers: None  Care Transitions Interventions  Other Interventions:              Follow Up Appointment:   Reviewed upcoming appointment(s).  Future Appointments         Provider Specialty Dept Phone    2/3/2025 11:00 AM Tesfaye Gipson MD Primary Care [phone]    2025 9:30 AM Gera Quintero MD Hematology and Oncology [phone]    2025 9:30 AM SCHEDULE, [facility] MA Infusion Therapy [phone]    2025 11:00 AM Margarita Nunn, APRN - NP Cardiology [phone]

## 2025-02-03 ENCOUNTER — OFFICE VISIT (OUTPATIENT)
Dept: PRIMARY CARE CLINIC | Age: 75
End: 2025-02-03

## 2025-02-03 VITALS
TEMPERATURE: 98 F | HEART RATE: 82 BPM | WEIGHT: 135 LBS | DIASTOLIC BLOOD PRESSURE: 58 MMHG | SYSTOLIC BLOOD PRESSURE: 102 MMHG | BODY MASS INDEX: 19.37 KG/M2 | OXYGEN SATURATION: 96 %

## 2025-02-03 DIAGNOSIS — E78.01 FAMILIAL HYPERCHOLESTEROLEMIA: ICD-10-CM

## 2025-02-03 DIAGNOSIS — E83.51 HYPOCALCEMIA: ICD-10-CM

## 2025-02-03 DIAGNOSIS — I51.9 SYSTOLIC DYSFUNCTION: ICD-10-CM

## 2025-02-03 DIAGNOSIS — M54.50 CHRONIC MIDLINE LOW BACK PAIN WITHOUT SCIATICA: ICD-10-CM

## 2025-02-03 DIAGNOSIS — C18.9 METASTASIS FROM COLON CANCER (HCC): ICD-10-CM

## 2025-02-03 DIAGNOSIS — I25.10 CORONARY ARTERY DISEASE INVOLVING NATIVE CORONARY ARTERY OF NATIVE HEART WITHOUT ANGINA PECTORIS: ICD-10-CM

## 2025-02-03 DIAGNOSIS — N18.31 STAGE 3A CHRONIC KIDNEY DISEASE (HCC): ICD-10-CM

## 2025-02-03 DIAGNOSIS — C18.9 ADENOCARCINOMA, COLON (HCC): Primary | ICD-10-CM

## 2025-02-03 DIAGNOSIS — G89.29 CHRONIC MIDLINE LOW BACK PAIN WITHOUT SCIATICA: ICD-10-CM

## 2025-02-03 DIAGNOSIS — J43.2 CENTRILOBULAR EMPHYSEMA (HCC): ICD-10-CM

## 2025-02-03 DIAGNOSIS — E53.8 B12 DEFICIENCY: ICD-10-CM

## 2025-02-03 DIAGNOSIS — I10 ESSENTIAL (PRIMARY) HYPERTENSION: ICD-10-CM

## 2025-02-03 DIAGNOSIS — C79.9 METASTASIS FROM COLON CANCER (HCC): ICD-10-CM

## 2025-02-03 DIAGNOSIS — D64.9 ANEMIA, UNSPECIFIED TYPE: ICD-10-CM

## 2025-02-03 RX ORDER — MEGESTROL ACETATE 40 MG/ML
200 SUSPENSION ORAL 2 TIMES DAILY
Qty: 480 ML | Refills: 3 | Status: SHIPPED | OUTPATIENT
Start: 2025-02-03

## 2025-02-03 RX ORDER — PHENOL 1.4 %
1 AEROSOL, SPRAY (ML) MUCOUS MEMBRANE DAILY
Qty: 30 TABLET | Refills: 3 | Status: SHIPPED | OUTPATIENT
Start: 2025-02-03

## 2025-02-03 ASSESSMENT — ENCOUNTER SYMPTOMS
SHORTNESS OF BREATH: 0
CONSTIPATION: 0
BACK PAIN: 1
CHEST TIGHTNESS: 0
DIARRHEA: 0
ABDOMINAL PAIN: 0
COUGH: 0
ANAL BLEEDING: 0
NAUSEA: 0

## 2025-02-03 ASSESSMENT — PATIENT HEALTH QUESTIONNAIRE - PHQ9
SUM OF ALL RESPONSES TO PHQ QUESTIONS 1-9: 2
2. FEELING DOWN, DEPRESSED OR HOPELESS: SEVERAL DAYS
SUM OF ALL RESPONSES TO PHQ9 QUESTIONS 1 & 2: 2
1. LITTLE INTEREST OR PLEASURE IN DOING THINGS: SEVERAL DAYS
SUM OF ALL RESPONSES TO PHQ QUESTIONS 1-9: 2

## 2025-02-03 NOTE — PATIENT INSTRUCTIONS
Appetite stimulant  Megace: 1 teaspoon twice a day    Pain  Notify me which of the following pain medications you prefer:  Oxycodone every 4-6 hours as needed for pain  Hydrocodone every 6 hours as needed for pain    Increase water intake and supplement your diet with boost or Ensure

## 2025-02-03 NOTE — PROGRESS NOTES
[facility]  [address]  [address]  [place] [address]  Dept: [phone]  Dept Fax: [phone]  Loc: [phone]    Nick Jain is a 74 y.o. male who presents today for his medical conditions/complaints asnoted below.  Nick Jain is here for Follow-Up from Hospital        Post-Discharge Transitional Care Management Services    Admit date:  1/10/2025                      Discharge date:  1/26/25     Discharging Physician:  Dr. Gage Andrews     Advance Directive: Full Code     Consults: IP CONSULT TO DIETITIAN  IP CONSULT TO PHARMACY  IP CONSULT TO VASCULAR ACCESS TEAM  IP CONSULT TO VASCULAR ACCESS TEAM  IP CONSULT TO PALLIATIVE CARE  IP CONSULT TO CARDIOLOGY  IP CONSULT TO CARDIOLOGY  IP CONSULT TO CARDIAC REHAB  IP CONSULT TO CARDIAC REHAB  IP CONSULT TO SOCIAL WORK  IP CONSULT TO VASCULAR ACCESS TEAM  IP CONSULT TO REHAB/TCU ADMISSION COORDINATOR  IP CONSULT TO HOME CARE NEEDS      Primary Care Physician:  Tesfaye Gipson MD     Discharge Diagnoses:  Principal Problem:    Cecum mass  Active Problems:    CKD (chronic kidney disease), stage III (HCC)    COPD (chronic obstructive pulmonary disease) (HCC)    Nausea and vomiting    Small bowel obstruction (HCC)    Severe malnutrition (HCC)    Adenocarcinoma of cecum (HCC)    Ileus (HCC)    Acute systolic heart failure (HCC)    Palliative care patient    CAD (coronary artery disease)    Ischemic cardiomyopathy  Resolved Problems:    * No resolved hospital problems. *        Inpatient course: Discharge summary reviewed- see chart for full details.  Hospital Course:   The patient is a 74-year-old male with a PMH of CKD 3A, HTN, HLD, CAD, COPD, and recent diagnosis of cecal mass who presented to [facility] ED on 1/10/2025 complaining of nausea, vomiting, and abdominal pain.  He reported he had gone to [facility] ED earlier on the day of admission.  He was told he needed surgery immediately, however, he

## 2025-02-05 DIAGNOSIS — C18.9 METASTASIS FROM COLON CANCER (HCC): ICD-10-CM

## 2025-02-05 DIAGNOSIS — C18.9 ADENOCARCINOMA, COLON (HCC): Primary | ICD-10-CM

## 2025-02-05 DIAGNOSIS — C79.9 METASTASIS FROM COLON CANCER (HCC): ICD-10-CM

## 2025-02-05 DIAGNOSIS — R10.31 RIGHT LOWER QUADRANT ABDOMINAL PAIN: ICD-10-CM

## 2025-02-05 RX ORDER — HYDROCODONE BITARTRATE AND ACETAMINOPHEN 10; 325 MG/1; MG/1
1 TABLET ORAL EVERY 6 HOURS PRN
Qty: 120 TABLET | Refills: 0 | Status: SHIPPED | OUTPATIENT
Start: 2025-02-05 | End: 2025-03-07

## 2025-02-06 ENCOUNTER — TELEPHONE (OUTPATIENT)
Dept: PRIMARY CARE CLINIC | Age: 75
End: 2025-02-06

## 2025-02-06 LAB
CARIS GENOMIC LOH - EXOME: NORMAL
CARIS HER2/NEU: NEGATIVE
CARIS HLA-A: NORMAL
CARIS HLA-B: NORMAL
CARIS HLA-C: NORMAL
CARIS MISMATCH REPAIR STATUS: NORMAL
CARIS MLH1: NORMAL
CARIS MSH2: NORMAL
CARIS MSH6: NORMAL
CARIS MSI - EXOME: NORMAL
CARIS PD-L1 (SP142): NEGATIVE
CARIS PMS2: NORMAL
CARIS TMB - EXOME: NORMAL

## 2025-02-06 RX ORDER — ONDANSETRON 4 MG/1
4 TABLET, ORALLY DISINTEGRATING ORAL 3 TIMES DAILY PRN
Qty: 21 TABLET | Refills: 0 | Status: SHIPPED | OUTPATIENT
Start: 2025-02-06

## 2025-02-06 RX ORDER — PROMETHAZINE HYDROCHLORIDE 25 MG/1
25 TABLET ORAL 4 TIMES DAILY PRN
Qty: 30 TABLET | Refills: 2 | Status: SHIPPED | OUTPATIENT
Start: 2025-02-06 | End: 2025-03-08

## 2025-02-06 NOTE — TELEPHONE ENCOUNTER
I have called in prescriptions for Zofran and Phenergan  -The Zofran is an oral disintegrating tablet which may be better tolerated than the Zofran pills he has currently.  -If he is not getting any relief with the Zofran ODT he may take Phenergan instead  -This way he has 2 options for the gastrointestinal symptoms

## 2025-02-06 NOTE — TELEPHONE ENCOUNTER
Patient and HH called to report that he has abd pain, diarrhea, n/v, can't keep food down. No fever. Pt was seen 2/3 for HFU

## 2025-02-06 NOTE — TELEPHONE ENCOUNTER
Pt will  the phenergan. He already has zofran. Pain med has helped as of right now. Will let us know tomorrow if no better or anything new

## 2025-02-07 ENCOUNTER — TELEPHONE (OUTPATIENT)
Dept: HEMATOLOGY | Age: 75
End: 2025-02-07

## 2025-02-07 ENCOUNTER — CARE COORDINATION (OUTPATIENT)
Dept: CASE MANAGEMENT | Age: 75
End: 2025-02-07

## 2025-02-07 NOTE — TELEPHONE ENCOUNTER
I called patient and left detailed voicemail about their appointment on 02/12/2025. I made patient aware not to arrive any earlier than the appointment time and to come at the time of the follow up not the time of the lab appointment if it is different than the follow up appt time. I also made patient aware to eat a meal (Our labs are not fasting labs) and drink plenty of water to hydrate their veins properly before coming to these appointments because this will make their lab draw much easier. Made patient aware that we are now located at the [facility] at [address]. Located between [facility] and the [facility]. Front entrance faces [place].

## 2025-02-07 NOTE — CARE COORDINATION
AGUSTINA Allen - NP Cardiology [phone]    2/27/2025 1:00 PM Tesfaye Gipson MD Primary Care [phone]            Care Transition Nurse provided contact information.  Plan for follow-up call in 6-10 days based on severity of symptoms and risk factors.  Plan for next call: symptom management-GI symptoms, BM's, appetite, mobility, HH visits, Incision assessment      Will Hernandez RN

## 2025-02-11 NOTE — PROGRESS NOTES
01/13/2025 03:39 AM     FERRITIN 271.0 01/13/2025 03:39 AM     [id number] 213 01/13/2025 03:39 AM     FOLATE 8.9 09/25/2024 12:41 PM     RETICCTPCT 1.14 01/13/2025 03:39 AM     RETICABS 0.0405 01/13/2025 03:39 AM      01/09/2025 04:00 PM   B12 was borderline low but MMA was normal suggesting no B12 deficiency.  Likely functional iron deficiency     Venofer 1000 mg completed 1/16/2025  B12 1000 mcg subcu times daily x 7 doses to be followed by weekly B12 1000 mcg x 4 doses to be followed by B12 1000 mcg monthly.  To be started 1/27/2025     Hemoglobin improving after parenteral iron infusions.  Will continue to monitor anemia and replete with iron again, as needed in the future.  Will also continue B12 repletion with future visits.        #3  Renal impairment-stable          Continue hydration.  Continue to monitor.           #4  Nutrition Status/ Weight management    Wt Readings from Last 3 Encounters:   02/12/25 58.7 kg (129 lb 4.8 oz)   02/03/25 61.2 kg (135 lb)   01/31/25 61.7 kg (136 lb)       Rx Megace prescribed by PCP 2/3/2025.   Patient denies benefit or increase in appetite.    Rx Marinol 2.5 mg BID prescribed 2/12/2025 in addition to Reglan with meals for reported nausea/fullness after eating.     Consult to Dietician for further evaluation .                #5  History of CAD/systolic heart failure (Takotsubo's cardiomyopathy)-managed by cardiology     1/20/2025-Cardiac catheterization, [facility] by Dr. Bernard, cardiology:  Findings of :severe triple-vessel disease.  RCA proximal to mid diffuse severe 95% in-stent restenosis that feeds a large RPL with occluded proximal RPDA.  SVG to RPDA is patent but fails to backfill RCA.  Left main with diffuse nonobstructive disease.  LAD proximal diffuse nonobstructive with mid 75% stenosis with IFR 0.75 localizing to lesion consistent with obstructive lesion.  LIMA to distal LAD is atretic and basically nonfunctional with diffuse distal disease.  First diagonal

## 2025-02-12 ENCOUNTER — HOSPITAL ENCOUNTER (OUTPATIENT)
Dept: INFUSION THERAPY | Age: 75
Discharge: HOME | End: 2025-02-12
Payer: MEDICARE

## 2025-02-12 ENCOUNTER — OFFICE VISIT (OUTPATIENT)
Dept: HEMATOLOGY | Age: 75
End: 2025-02-12
Payer: MEDICARE

## 2025-02-12 VITALS — BODY MASS INDEX: 19.09 KG/M2 | HEIGHT: 69 IN

## 2025-02-12 VITALS
BODY MASS INDEX: 19.15 KG/M2 | HEIGHT: 69 IN | HEART RATE: 89 BPM | SYSTOLIC BLOOD PRESSURE: 100 MMHG | TEMPERATURE: 98 F | DIASTOLIC BLOOD PRESSURE: 60 MMHG | WEIGHT: 129.3 LBS | OXYGEN SATURATION: 99 %

## 2025-02-12 DIAGNOSIS — C18.0 ADENOCARCINOMA OF CECUM (HCC): ICD-10-CM

## 2025-02-12 DIAGNOSIS — K63.89 MASS OF CECUM: ICD-10-CM

## 2025-02-12 DIAGNOSIS — R91.1 PULMONARY NODULE: ICD-10-CM

## 2025-02-12 DIAGNOSIS — R63.4 WEIGHT LOSS: ICD-10-CM

## 2025-02-12 DIAGNOSIS — R97.8 OTHER ABNORMAL TUMOR MARKERS: ICD-10-CM

## 2025-02-12 DIAGNOSIS — C18.9 ADENOCARCINOMA OF COLON (HCC): ICD-10-CM

## 2025-02-12 DIAGNOSIS — C18.9 ADENOCARCINOMA OF COLON (HCC): Primary | ICD-10-CM

## 2025-02-12 DIAGNOSIS — R63.0 LACK OF APPETITE: Primary | ICD-10-CM

## 2025-02-12 DIAGNOSIS — E43 SEVERE MALNUTRITION (HCC): ICD-10-CM

## 2025-02-12 DIAGNOSIS — R11.0 NAUSEA: ICD-10-CM

## 2025-02-12 DIAGNOSIS — C18.9 ADENOCARCINOMA, COLON (HCC): ICD-10-CM

## 2025-02-12 DIAGNOSIS — K76.89 LIVER NODULE: ICD-10-CM

## 2025-02-12 DIAGNOSIS — R97.0 ELEVATED CARCINOEMBRYONIC ANTIGEN (CEA): ICD-10-CM

## 2025-02-12 DIAGNOSIS — C18.0 ADENOCARCINOMA OF CECUM (HCC): Primary | ICD-10-CM

## 2025-02-12 LAB
ALBUMIN SERPL-MCNC: 3 G/DL (ref 3.5–5.2)
ALP SERPL-CCNC: 216 U/L (ref 40–129)
ALT SERPL-CCNC: 13 U/L (ref 5–41)
ANION GAP SERPL CALCULATED.3IONS-SCNC: 10 MMOL/L (ref 7–19)
AST SERPL-CCNC: 23 U/L (ref 5–40)
BASOPHILS # BLD: 0.02 K/UL (ref 0–0.2)
BASOPHILS NFR BLD: 0.3 % (ref 0–1)
BILIRUB SERPL-MCNC: 0.3 MG/DL (ref 0–1.2)
BUN SERPL-MCNC: 30 MG/DL (ref 8–23)
CALCIUM SERPL-MCNC: 8.8 MG/DL (ref 8.8–10.2)
CANCER AG19-9 SERPL-ACNC: 4628 U/ML (ref 0–35)
CEA SERPL-MCNC: 22.4 NG/ML (ref 0–4.7)
CHLORIDE SERPL-SCNC: 105 MMOL/L (ref 98–107)
CO2 SERPL-SCNC: 23 MMOL/L (ref 22–29)
CREAT SERPL-MCNC: 1.3 MG/DL (ref 0.7–1.2)
EOSINOPHIL # BLD: 0.03 K/UL (ref 0–0.6)
EOSINOPHIL NFR BLD: 0.5 % (ref 0–5)
ERYTHROCYTE [DISTWIDTH] IN BLOOD BY AUTOMATED COUNT: 16.8 % (ref 11.5–14.5)
GLUCOSE SERPL-MCNC: 108 MG/DL (ref 70–99)
HCT VFR BLD AUTO: 31 % (ref 42–52)
HGB BLD-MCNC: 9.8 G/DL (ref 14–18)
LYMPHOCYTES # BLD: 1.21 K/UL (ref 1.1–4.5)
LYMPHOCYTES NFR BLD: 18.2 % (ref 20–40)
MAGNESIUM SERPL-MCNC: 2.1 MG/DL (ref 1.6–2.4)
MCH RBC QN AUTO: 28.5 PG (ref 27–31)
MCHC RBC AUTO-ENTMCNC: 31.6 G/DL (ref 33–37)
MCV RBC AUTO: 90.1 FL (ref 80–94)
MONOCYTES # BLD: 0.46 K/UL (ref 0–0.9)
MONOCYTES NFR BLD: 6.9 % (ref 1–10)
NEUTROPHILS # BLD: 4.91 K/UL (ref 1.5–7.5)
NEUTS SEG NFR BLD: 73.8 % (ref 50–65)
PLATELET # BLD AUTO: 323 K/UL (ref 130–400)
PMV BLD AUTO: 10.2 FL (ref 9.4–12.4)
POTASSIUM SERPL-SCNC: 5 MMOL/L (ref 3.5–5.1)
PROT SERPL-MCNC: 6.9 G/DL (ref 6.4–8.3)
RBC # BLD AUTO: 3.44 M/UL (ref 4.7–6.1)
SODIUM SERPL-SCNC: 138 MMOL/L (ref 136–145)
WBC # BLD AUTO: 6.65 K/UL (ref 4.8–10.8)

## 2025-02-12 PROCEDURE — 1159F MED LIST DOCD IN RCRD: CPT | Performed by: INTERNAL MEDICINE

## 2025-02-12 PROCEDURE — 1125F AMNT PAIN NOTED PAIN PRSNT: CPT | Performed by: INTERNAL MEDICINE

## 2025-02-12 PROCEDURE — 1036F TOBACCO NON-USER: CPT | Performed by: INTERNAL MEDICINE

## 2025-02-12 PROCEDURE — G8420 CALC BMI NORM PARAMETERS: HCPCS | Performed by: INTERNAL MEDICINE

## 2025-02-12 PROCEDURE — 81401 MOPATH PROCEDURE LEVEL 2: CPT

## 2025-02-12 PROCEDURE — 3074F SYST BP LT 130 MM HG: CPT | Performed by: INTERNAL MEDICINE

## 2025-02-12 PROCEDURE — 86301 IMMUNOASSAY TUMOR CA 19-9: CPT

## 2025-02-12 PROCEDURE — G2211 COMPLEX E/M VISIT ADD ON: HCPCS | Performed by: INTERNAL MEDICINE

## 2025-02-12 PROCEDURE — 81232 DPYD GENE COMMON VARIANTS: CPT

## 2025-02-12 PROCEDURE — 80053 COMPREHEN METABOLIC PANEL: CPT

## 2025-02-12 PROCEDURE — G8427 DOCREV CUR MEDS BY ELIG CLIN: HCPCS | Performed by: INTERNAL MEDICINE

## 2025-02-12 PROCEDURE — 82378 CARCINOEMBRYONIC ANTIGEN: CPT

## 2025-02-12 PROCEDURE — 99214 OFFICE O/P EST MOD 30 MIN: CPT

## 2025-02-12 PROCEDURE — 99215 OFFICE O/P EST HI 40 MIN: CPT | Performed by: INTERNAL MEDICINE

## 2025-02-12 PROCEDURE — 1111F DSCHRG MED/CURRENT MED MERGE: CPT | Performed by: INTERNAL MEDICINE

## 2025-02-12 PROCEDURE — 36415 COLL VENOUS BLD VENIPUNCTURE: CPT

## 2025-02-12 PROCEDURE — 3078F DIAST BP <80 MM HG: CPT | Performed by: INTERNAL MEDICINE

## 2025-02-12 PROCEDURE — 83735 ASSAY OF MAGNESIUM: CPT

## 2025-02-12 PROCEDURE — 3017F COLORECTAL CA SCREEN DOC REV: CPT | Performed by: INTERNAL MEDICINE

## 2025-02-12 PROCEDURE — 85025 COMPLETE CBC W/AUTO DIFF WBC: CPT

## 2025-02-12 PROCEDURE — 1123F ACP DISCUSS/DSCN MKR DOCD: CPT | Performed by: INTERNAL MEDICINE

## 2025-02-12 RX ORDER — DRONABINOL 2.5 MG/1
2.5 CAPSULE ORAL
Qty: 60 CAPSULE | Refills: 0 | Status: SHIPPED | OUTPATIENT
Start: 2025-02-12 | End: 2025-03-14

## 2025-02-12 RX ORDER — METOCLOPRAMIDE 10 MG/1
10 TABLET ORAL
Qty: 120 TABLET | Refills: 3 | Status: SHIPPED | OUTPATIENT
Start: 2025-02-12

## 2025-02-12 NOTE — PROGRESS NOTES
[facility]  [address] [place] [address] [place] [address]  Phone: [phone]  Fax: [phone]    Ella Mosquera, MS, RD, LD   Nick Jain 74 y.o.   Diagnosis, staging, date of diagnosis: metastatic adenocarcinoma of cecum, Jan 2025  Current Treatment: pending      Comprehensive Nutrition Assessment    Type and Reason for Visit:  Initial, Consult    Malnutrition Assessment:  Malnutrition Status:  Severe malnutrition (02/12/25 1203)    Context:  Chronic Illness     Findings of the 6 clinical characteristics of malnutrition:  Energy Intake:  75% or less estimated energy requirements for 1 month or longer  Weight Loss:  Greater than 10% over 6 months     Body Fat Loss:  Severe body fat loss Orbital, Buccal region   Muscle Mass Loss:  Severe muscle mass loss Temples (temporalis), Clavicles (pectoralis & deltoids), Thigh (quadriceps)    Nutrition Assessment:    DM 75 y/o male presents 2/12/25 for initial RD evaluation. Referral from Dr. Ingrid MD for pt with new dx metastatic adenocarcinoma of cecum. Pt presents severely malnourished AEB wt loss 24lbs over the past 6 months, inadequate energy intake, and severe muscle/fat wasting. Pt is s/p hemicolectomy on 1/11/25. He sates he has had extremely poor appetite since this surgery. Pt endorses overall disinterest in food as well as early satiety. Pt denies diarrhea or constipation. He states he has a bowel movement approximately 4x daily.     Diet History:  Pt notes he has not been eating 3 meals daily. He does not often feel like eating breakfast. For lunch and dinner meals, he has been eating sandwiches but does not always finish a whole sandwich. He ate better last night that he has in several weeks per pt. He had an open-faced ham and cheese sandwich and ate 100% of it. Pt drinks water throughout the day. He does like Boost but has not been drinking it consistently.     Nutrition Related Laboratory

## 2025-02-14 ENCOUNTER — CARE COORDINATION (OUTPATIENT)
Dept: CASE MANAGEMENT | Age: 75
End: 2025-02-14

## 2025-02-14 NOTE — CARE COORDINATION
Care Transitions Note    Follow Up Call     Patient Current Location:  Home: [address] [place] [id number]    Care Transition Nurse contacted the patient by telephone. Verified name and  as identifiers.    Additional needs identified to be addressed with provider   No needs identified                 Method of communication with provider: none.    Care Summary Note: Spoke with patient today for f/u call. He says he is not feeling his best, but not in any bad pain. He has medication to take for that. He has had a couple of BM's that were diarrhea. He does not like the taste of Ensure/CIB. CTN discussed Ensure Clear, or Pedialyte, or Gatorade that has added protein. He was given script for Marinol, but pharmacy had to order. He is waiting for that to come in. He says he is trying to eat what his wife prepares for him. CTN encouraged him in his eating and food choices. He has met with Dietician at the [facility], and gotten some advice from her. He says he is able to keep down what he eats.  Encouraged him to do the best he can. CTN will follow up at  a later time.     Plan of care updates since last contact:  Education: good nutrition, added protein, calories         Assessments:  Care Transitions Subsequent and Final Call    Subsequent and Final Calls  Do you have any ongoing symptoms?: No  Have your medications changed?: No  Do you have any questions related to your medications?: No  Do you currently have any active services?: Yes  Are you currently active with any services?: Home Health  Do you have any needs or concerns that I can assist you with?: No  Identified Barriers: None  Care Transitions Interventions  Other Interventions:              Follow Up Appointment:   JONY appointment attended as scheduled   Future Appointments         Provider Specialty Dept Phone    2025 9:00 AM (Arrive by 8:30 AM) [facility] NM INJECTION ROOM Radiology [phone]    2025 10:00 AM (Arrive by 9:30 AM) [facility] PET

## 2025-02-15 LAB
DPYD GENE MUT ANL BLD/T: NORMAL
DPYD GENE PROD MET ACT IMP BLD/T-IMP: NORMAL
DPYD PHENOTYPE: NORMAL
EER DIHYDROPYRIMIDINE DEHYDROGENASE: NORMAL
SPECIMEN SOURCE: NORMAL

## 2025-02-17 ENCOUNTER — TELEPHONE (OUTPATIENT)
Dept: VASCULAR SURGERY | Age: 75
End: 2025-02-17

## 2025-02-17 NOTE — TELEPHONE ENCOUNTER
The patient's surgery is actually scheduled for 02/25/2025.  I put the first letter in by mistake and it stated 03/25/2025.  The procedure is actually 02/25/2025.  The patient is aware.         Nick Gabriel Directions     Report to the [facility] (go in the front door and to the left) on Tuesday, 02/25/2025 @ 7:00 AM.   Nothing to eat or drink after midnight the night before the procedure.  Please take all medications as normally scheduled to take unless listed below with a sip of water.  Do not take Spironolactone the morning of the procedure.   Per Dr. Rodriguez you may continue on Plavix for this procedure.   If you have sleep apnea and require C-PAP, please bring this with you to the hospital.  Bring a list of all of your allergies and medications with you to the hospital.  Please let our nurse know if you have had an allergy to iodine, shellfish, or x-ray dye.  Let the nurse know if you take any of the following:  Over the counter herbal supplements  Diclofenec, indomethacin, ketoprofen, Caridopa/levadopa, naproxen, sulindac, piroxicam, glucosamine, Chondrotin, cocchine, or methotrexate.  Plan to stay at the hospital for 4 - 6 hours before being released  by the physician. You will need someone to drive you home after the procedure.  10. Other Directions: For any questions or concerns please contact our office @        [phone] and ask to speak to Carmella.   11.  You will need a  to take you home.        Unless instructed otherwise by your physician, cleanse incision/puncture site twice daily with soap and water.  Apply dry gauze. Do not get in tub.  Okay to shower.  Do not apply any salve, cream, peroxide or alcohol to the incision.  Call with any increasing redness or drainage

## 2025-02-17 NOTE — TELEPHONE ENCOUNTER
Called and spoke to the patient to let him know the following regarding port placement in specials. The patient is aware that Dr. Rodriguez has agreed to place this port while the patient is still on Plavix.  The patient is aware of the following and voiced understanding.             Nick Jain     Port Directions     Report to the [facility] (go in the front door and to the left) on Tuesday, 03/25/2025 @ 7:00 AM.   Nothing to eat or drink after midnight the night before the procedure.  Please take all medications as normally scheduled to take unless listed below with a sip of water.  Do not take Spironolactone the morning of the procedure.   Per Dr. Rodriguez you may continue on Plavix for this procedure.   If you have sleep apnea and require C-PAP, please bring this with you to the hospital.  Bring a list of all of your allergies and medications with you to the hospital.  Please let our nurse know if you have had an allergy to iodine, shellfish, or x-ray dye.  Let the nurse know if you take any of the following:  Over the counter herbal supplements  Diclofenec, indomethacin, ketoprofen, Caridopa/levadopa, naproxen, sulindac, piroxicam, glucosamine, Chondrotin, cocchine, or methotrexate.  Plan to stay at the hospital for 4 - 6 hours before being released  by the physician. You will need someone to drive you home after the procedure.  10. Other Directions: For any questions or concerns please contact our office @        [phone] and ask to speak to Carmella.   11.  You will need a  to take you home.        Unless instructed otherwise by your physician, cleanse incision/puncture site twice daily with soap and water.  Apply dry gauze. Do not get in tub.  Okay to shower.  Do not apply any salve, cream, peroxide or alcohol to the incision.  Call with any increasing redness or drainage

## 2025-02-18 ENCOUNTER — HOSPITAL ENCOUNTER (OUTPATIENT)
Dept: NUCLEAR MEDICINE | Age: 75
Discharge: HOME | End: 2025-02-20
Payer: MEDICARE

## 2025-02-18 ENCOUNTER — OFFICE VISIT (OUTPATIENT)
Dept: CARDIOLOGY CLINIC | Age: 75
End: 2025-02-18
Payer: MEDICARE

## 2025-02-18 ENCOUNTER — HOSPITAL ENCOUNTER (OUTPATIENT)
Dept: NUCLEAR MEDICINE | Age: 75
Discharge: HOME | End: 2025-02-20

## 2025-02-18 VITALS
SYSTOLIC BLOOD PRESSURE: 118 MMHG | HEIGHT: 69 IN | OXYGEN SATURATION: 93 % | WEIGHT: 129 LBS | BODY MASS INDEX: 19.11 KG/M2 | DIASTOLIC BLOOD PRESSURE: 80 MMHG | HEART RATE: 104 BPM

## 2025-02-18 DIAGNOSIS — K76.89 LIVER NODULE: ICD-10-CM

## 2025-02-18 DIAGNOSIS — I25.5 ISCHEMIC CARDIOMYOPATHY: ICD-10-CM

## 2025-02-18 DIAGNOSIS — K63.89 MASS OF CECUM: ICD-10-CM

## 2025-02-18 DIAGNOSIS — I10 ESSENTIAL HYPERTENSION: ICD-10-CM

## 2025-02-18 DIAGNOSIS — E78.5 DYSLIPIDEMIA: ICD-10-CM

## 2025-02-18 DIAGNOSIS — R91.1 PULMONARY NODULE: ICD-10-CM

## 2025-02-18 DIAGNOSIS — I25.10 CORONARY ARTERY DISEASE INVOLVING NATIVE CORONARY ARTERY OF NATIVE HEART WITHOUT ANGINA PECTORIS: Primary | ICD-10-CM

## 2025-02-18 DIAGNOSIS — C18.9 ADENOCARCINOMA OF COLON (HCC): ICD-10-CM

## 2025-02-18 LAB
GLUCOSE BLD-MCNC: 97 MG/DL (ref 70–99)
PERFORMED ON: NORMAL

## 2025-02-18 PROCEDURE — 3079F DIAST BP 80-89 MM HG: CPT | Performed by: NURSE PRACTITIONER

## 2025-02-18 PROCEDURE — 3074F SYST BP LT 130 MM HG: CPT | Performed by: NURSE PRACTITIONER

## 2025-02-18 PROCEDURE — 78815 PET IMAGE W/CT SKULL-THIGH: CPT

## 2025-02-18 PROCEDURE — 1159F MED LIST DOCD IN RCRD: CPT | Performed by: NURSE PRACTITIONER

## 2025-02-18 PROCEDURE — 3017F COLORECTAL CA SCREEN DOC REV: CPT | Performed by: NURSE PRACTITIONER

## 2025-02-18 PROCEDURE — G8420 CALC BMI NORM PARAMETERS: HCPCS | Performed by: NURSE PRACTITIONER

## 2025-02-18 PROCEDURE — G8427 DOCREV CUR MEDS BY ELIG CLIN: HCPCS | Performed by: NURSE PRACTITIONER

## 2025-02-18 PROCEDURE — 99214 OFFICE O/P EST MOD 30 MIN: CPT | Performed by: NURSE PRACTITIONER

## 2025-02-18 PROCEDURE — 1036F TOBACCO NON-USER: CPT | Performed by: NURSE PRACTITIONER

## 2025-02-18 PROCEDURE — 82962 GLUCOSE BLOOD TEST: CPT

## 2025-02-18 PROCEDURE — 1111F DSCHRG MED/CURRENT MED MERGE: CPT | Performed by: NURSE PRACTITIONER

## 2025-02-18 PROCEDURE — 1123F ACP DISCUSS/DSCN MKR DOCD: CPT | Performed by: NURSE PRACTITIONER

## 2025-02-18 RX ORDER — METOPROLOL SUCCINATE 25 MG/1
25 TABLET, EXTENDED RELEASE ORAL DAILY
Qty: 90 TABLET | Refills: 1 | Status: SHIPPED | OUTPATIENT
Start: 2025-02-18

## 2025-02-18 RX ORDER — FLUDEOXYGLUCOSE F 18 200 MCI/ML
15 INJECTION, SOLUTION INTRAVENOUS ONCE
Status: DISCONTINUED | OUTPATIENT
Start: 2025-02-18 | End: 2025-02-22 | Stop reason: HOSPADM

## 2025-02-18 ASSESSMENT — ENCOUNTER SYMPTOMS
SHORTNESS OF BREATH: 0
WHEEZING: 0
SORE THROAT: 0
COUGH: 0
CHEST TIGHTNESS: 0

## 2025-02-18 NOTE — PROGRESS NOTES
[facility] Cardiology   Established Patient Office Visit  [address]  [address]  [facility] [address]  [phone]        OFFICE VISIT:  2025    Nick Jain - : 1950    Reason For Visit:  Nick is a 74 y.o. male who is here for Follow-up and Coronary Artery Disease    1. Coronary artery disease involving native coronary artery of native heart without angina pectoris    2. Ischemic cardiomyopathy    3. Essential hypertension    4. Dyslipidemia        Patient with a history of chronic kidney disease 3A, hypertension, hyperlipidemia, CAD, COPD and cecal mass.  He presented to the ER on 1/10/2025 complaining of nausea vomiting abdominal pain.  He had actually gone to [facility] emergency department earlier on the day of admission he was told he needed surgery immediately and he refused and came to [facility].  He had not had a bowel movement in 4 days.  He had been taking opiate medications due to his abdominal pain.  He had had a 15 pound weight loss which was unintentional over the last month.  KUB showed developing/low-grade small bowel obstruction versus ileus.  He was admitted to the hospital with general surgery and oncology consult.  On 2025 he went to the OR for a right Heema colectomy.  On 2025 he began having tachycardia in addition to dyspnea and.  His proBNP was greater than 35,000.  He was given a dose of IV Lasix with significant improvement of his symptoms.  Cardiology was consulted 2D echo was performed that showed EF of 30 to 35%.  Cardiology did take patient to the cardiac Cath Lab on 2025 and he was found to have severe triple-vessel disease.  Findings are felt to be related to ischemic cardiomyopathy with loss of diagonal post bypass obstructive LAD and RCA.  He did undergo drug-eluting stent to the RCA and to the LAD.  Also successful PCI of the first diagonal with recannulization of  with PTCA.  Cardiology recommended Plavix uninterrupted for 6

## 2025-02-20 ENCOUNTER — TELEPHONE (OUTPATIENT)
Dept: CARDIOLOGY CLINIC | Age: 75
End: 2025-02-20

## 2025-02-20 NOTE — TELEPHONE ENCOUNTER
Patients wife called to clarify medication changes from recent visit on 2.18.2025. Advised carvedilol was stopped and metoprolol started. She thought entresto was stopped, advised entresto was not stopped. She voiced understanding.

## 2025-02-21 ENCOUNTER — CARE COORDINATION (OUTPATIENT)
Dept: CASE MANAGEMENT | Age: 75
End: 2025-02-21

## 2025-02-21 NOTE — CARE COORDINATION
Care Transitions Note    Final Call     Patient Current Location:  Home: [address] [address]    Care Transition Nurse contacted the patient, spouse/partner  by telephone. Verified name and  as identifiers.    Care Summary Note: Spoke with patient and wife today for f/u call. He says he is feeling some better, but it's a slow go of it. PT is coming today to work with him. Wife says OT has discharged him already. He is eligible for Cardiac rehab but is not really ready physically for that. Says he has gone thought that before. CTN did discuss that he could do that locally  most likely as they are from [place]. He did say he has done that there before. Appetite is still not optimal. He does not care for OS/CIB. Wife did get some Ensure Clear, says he did vomit that up. CTN did encourage her to try small glass and see if it was a one time thing or a true gastric issue. Discussed trying to freeze OS or mixing with some ice cream to make more palatable for him. She said she might try that. CTN also discussed him doing the exercises PT showed him every day to help build strength, and possible outpatient PT if he feels he did not get what was needed with HH PT. He is using his walker for mobility. Says his abdominal incisions are good. He is stable, just trying to get him to eat better, discussed small meals, snacks. Wife says he has no teeth, dentures do not fit well, says he has been this way for years. He was able to cut things into small pieces and keep it down prior to his surgeries but not it is a little harder. CTN discussed soups, oatmeal, rice, scrambled eggs, soft foods. She says he does eat boiled eggs and pimento cheese sandwiches. He has seen dietician at [facility]. He has issues with texture and that is a barrier. CTN will keep patient another week to see if there are any changes, and refer to [facility]  next call. He is agreeable to this.     Assessments:  Care Transitions Subsequent and Final Call

## 2025-02-24 ENCOUNTER — PREP FOR PROCEDURE (OUTPATIENT)
Dept: VASCULAR SURGERY | Age: 75
End: 2025-02-24

## 2025-02-24 ENCOUNTER — TELEPHONE (OUTPATIENT)
Dept: HEMATOLOGY | Age: 75
End: 2025-02-24

## 2025-02-24 ENCOUNTER — TELEPHONE (OUTPATIENT)
Dept: VASCULAR SURGERY | Age: 75
End: 2025-02-24

## 2025-02-24 DIAGNOSIS — F51.04 CHRONIC INSOMNIA: ICD-10-CM

## 2025-02-24 RX ORDER — SODIUM CHLORIDE 0.9 % (FLUSH) 0.9 %
5-40 SYRINGE (ML) INJECTION EVERY 12 HOURS SCHEDULED
Status: CANCELLED | OUTPATIENT
Start: 2025-02-24

## 2025-02-24 RX ORDER — SODIUM CHLORIDE 0.9 % (FLUSH) 0.9 %
5-40 SYRINGE (ML) INJECTION PRN
Status: CANCELLED | OUTPATIENT
Start: 2025-02-24

## 2025-02-24 RX ORDER — CLONIDINE HYDROCHLORIDE 0.1 MG/1
0.1 TABLET ORAL PRN
Status: CANCELLED | OUTPATIENT
Start: 2025-02-24

## 2025-02-24 RX ORDER — TRAZODONE HYDROCHLORIDE 50 MG/1
TABLET ORAL
Qty: 180 TABLET | Refills: 1 | Status: SHIPPED | OUTPATIENT
Start: 2025-02-24

## 2025-02-24 RX ORDER — SODIUM CHLORIDE 9 MG/ML
INJECTION, SOLUTION INTRAVENOUS PRN
Status: CANCELLED | OUTPATIENT
Start: 2025-02-24

## 2025-02-24 NOTE — TELEPHONE ENCOUNTER

## 2025-02-24 NOTE — H&P (VIEW-ONLY)
Patient Care Team:  Tesfaye Gipson MD as PCP - General (Family Medicine)  Tesfaye Gipson MD as PCP - Empaneled Provider  Tee Ohara MD as Consulting Physician (Otolaryngology)  Will Hernandez, SALOME as Care Transitions Nurse        Nick Jain 74 y.o. male with a history of cancer and is in need of chemo.  He will need mediport for this.  We have been asked to place this.    Patient Active Problem List   Diagnosis    Familial hypercholesterolemia    Essential (primary) hypertension    Coronary artery disease involving native coronary artery of native heart without angina pectoris    CKD (chronic kidney disease), stage III (HCC)    Colon cancer screening declined    Mixed hearing loss, bilateral    S/p bilateral myringotomy with tube placement    Bilateral sensorineural hearing loss    Non-penetrating foreign body in ear canal, right, initial encounter    COPD (chronic obstructive pulmonary disease) (HCC)    Tobacco abuse    NSTEMI (non-ST elevated myocardial infarction) (HCC)    Angina pectoris, unspecified    Atherosclerotic heart disease of native coronary artery with unspecified angina pectoris    Nausea and vomiting    Cecum mass    Small bowel obstruction (HCC)    Severe malnutrition    Adenocarcinoma, colon (HCC)    Ileus (HCC)    Acute systolic heart failure (HCC)    Palliative care patient    CAD (coronary artery disease)    Ischemic cardiomyopathy      See attached meds  Allergies:  Patient has no known allergies.  Past Medical History:   Diagnosis Date    COPD (chronic obstructive pulmonary disease) ([facility])     Heart attack ([facility])     two    Hypertension     Mini stroke     2    Palliative care patient 01/20/2025      Past Surgical History:   Procedure Laterality Date    APPENDECTOMY      BACK SURGERY      CARDIAC PROCEDURE N/A 1/20/2025    Left heart cath / coronary angiography w grafts performed by Fadi Bernard MD at [facility] CARDIAC CATH LAB    CARDIAC PROCEDURE N/A 1/20/2025    Percutaneous coronary

## 2025-02-24 NOTE — TELEPHONE ENCOUNTER
Called the patient to let him know we have moved his procedure to 7:30 AM.  The patient should arrive @ 6:00 AM.  The patient voiced understanding.

## 2025-02-24 NOTE — H&P
Patient Care Team:  Tesfaye Gipson MD as PCP - General (Family Medicine)  Tesfaye Gipson MD as PCP - Empaneled Provider  Tee Ohara MD as Consulting Physician (Otolaryngology)  Will Hernandez, SALOME as Care Transitions Nurse        Nick Jain 74 y.o. male with a history of cancer and is in need of chemo.  He will need mediport for this.  We have been asked to place this.    Patient Active Problem List   Diagnosis    Familial hypercholesterolemia    Essential (primary) hypertension    Coronary artery disease involving native coronary artery of native heart without angina pectoris    CKD (chronic kidney disease), stage III (HCC)    Colon cancer screening declined    Mixed hearing loss, bilateral    S/p bilateral myringotomy with tube placement    Bilateral sensorineural hearing loss    Non-penetrating foreign body in ear canal, right, initial encounter    COPD (chronic obstructive pulmonary disease) (HCC)    Tobacco abuse    NSTEMI (non-ST elevated myocardial infarction) (HCC)    Angina pectoris, unspecified    Atherosclerotic heart disease of native coronary artery with unspecified angina pectoris    Nausea and vomiting    Cecum mass    Small bowel obstruction (HCC)    Severe malnutrition    Adenocarcinoma, colon (HCC)    Ileus (HCC)    Acute systolic heart failure (HCC)    Palliative care patient    CAD (coronary artery disease)    Ischemic cardiomyopathy      See attached meds  Allergies:  Patient has no known allergies.  Past Medical History:   Diagnosis Date    COPD (chronic obstructive pulmonary disease) ([facility])     Heart attack ([facility])     two    Hypertension     Mini stroke     2    Palliative care patient 01/20/2025      Past Surgical History:   Procedure Laterality Date    APPENDECTOMY      BACK SURGERY      CARDIAC PROCEDURE N/A 1/20/2025    Left heart cath / coronary angiography w grafts performed by Fadi Bernard MD at [facility] CARDIAC CATH LAB    CARDIAC PROCEDURE N/A 1/20/2025    Percutaneous coronary

## 2025-02-25 ENCOUNTER — HOSPITAL ENCOUNTER (OUTPATIENT)
Dept: INTERVENTIONAL RADIOLOGY/VASCULAR | Age: 75
Discharge: HOME | End: 2025-02-25
Attending: SURGERY
Payer: MEDICARE

## 2025-02-25 VITALS
DIASTOLIC BLOOD PRESSURE: 57 MMHG | HEIGHT: 69 IN | BODY MASS INDEX: 17.77 KG/M2 | SYSTOLIC BLOOD PRESSURE: 76 MMHG | TEMPERATURE: 96.5 F | RESPIRATION RATE: 16 BRPM | WEIGHT: 120 LBS | HEART RATE: 99 BPM | OXYGEN SATURATION: 97 %

## 2025-02-25 DIAGNOSIS — I87.8 POOR VENOUS ACCESS: ICD-10-CM

## 2025-02-25 PROCEDURE — 99152 MOD SED SAME PHYS/QHP 5/>YRS: CPT

## 2025-02-25 PROCEDURE — 99153 MOD SED SAME PHYS/QHP EA: CPT

## 2025-02-25 PROCEDURE — 2500000003 HC RX 250 WO HCPCS: Performed by: NURSE PRACTITIONER

## 2025-02-25 PROCEDURE — 6360000002 HC RX W HCPCS: Performed by: SURGERY

## 2025-02-25 PROCEDURE — 77001 FLUOROGUIDE FOR VEIN DEVICE: CPT

## 2025-02-25 PROCEDURE — 6360000002 HC RX W HCPCS: Performed by: NURSE PRACTITIONER

## 2025-02-25 PROCEDURE — 76937 US GUIDE VASCULAR ACCESS: CPT

## 2025-02-25 PROCEDURE — C1769 GUIDE WIRE: HCPCS

## 2025-02-25 PROCEDURE — 36561 INSERT TUNNELED CV CATH: CPT

## 2025-02-25 RX ORDER — SODIUM CHLORIDE 0.9 % (FLUSH) 0.9 %
5-40 SYRINGE (ML) INJECTION PRN
Status: DISCONTINUED | OUTPATIENT
Start: 2025-02-25 | End: 2025-02-27 | Stop reason: HOSPADM

## 2025-02-25 RX ORDER — SODIUM CHLORIDE 9 MG/ML
INJECTION, SOLUTION INTRAVENOUS PRN
Status: DISCONTINUED | OUTPATIENT
Start: 2025-02-25 | End: 2025-02-27 | Stop reason: HOSPADM

## 2025-02-25 RX ORDER — LIDOCAINE HYDROCHLORIDE AND EPINEPHRINE 10; 10 MG/ML; UG/ML
INJECTION, SOLUTION INFILTRATION; PERINEURAL PRN
Status: COMPLETED | OUTPATIENT
Start: 2025-02-25 | End: 2025-02-25

## 2025-02-25 RX ORDER — HEPARIN 100 UNIT/ML
5 SYRINGE INTRAVENOUS PRN
Status: DISCONTINUED | OUTPATIENT
Start: 2025-02-25 | End: 2025-02-27 | Stop reason: HOSPADM

## 2025-02-25 RX ORDER — HEPARIN SODIUM 5000 [USP'U]/ML
INJECTION, SOLUTION INTRAVENOUS; SUBCUTANEOUS PRN
Status: COMPLETED | OUTPATIENT
Start: 2025-02-25 | End: 2025-02-25

## 2025-02-25 RX ORDER — SODIUM CHLORIDE 0.9 % (FLUSH) 0.9 %
5-40 SYRINGE (ML) INJECTION EVERY 12 HOURS SCHEDULED
Status: DISCONTINUED | OUTPATIENT
Start: 2025-02-25 | End: 2025-02-27 | Stop reason: HOSPADM

## 2025-02-25 RX ORDER — MIDAZOLAM HYDROCHLORIDE 1 MG/ML
INJECTION, SOLUTION INTRAMUSCULAR; INTRAVENOUS PRN
Status: COMPLETED | OUTPATIENT
Start: 2025-02-25 | End: 2025-02-25

## 2025-02-25 RX ORDER — CLONIDINE HYDROCHLORIDE 0.1 MG/1
0.1 TABLET ORAL PRN
Status: DISCONTINUED | OUTPATIENT
Start: 2025-02-25 | End: 2025-02-27 | Stop reason: HOSPADM

## 2025-02-25 RX ADMIN — LIDOCAINE HYDROCHLORIDE AND EPINEPHRINE 20 ML: 10; 10 INJECTION, SOLUTION INFILTRATION; PERINEURAL at 08:54

## 2025-02-25 RX ADMIN — MIDAZOLAM 1 MG: 1 INJECTION INTRAMUSCULAR; INTRAVENOUS at 08:49

## 2025-02-25 RX ADMIN — MIDAZOLAM 0.5 MG: 1 INJECTION INTRAMUSCULAR; INTRAVENOUS at 09:12

## 2025-02-25 RX ADMIN — MIDAZOLAM 0.5 MG: 1 INJECTION INTRAMUSCULAR; INTRAVENOUS at 08:59

## 2025-02-25 RX ADMIN — HEPARIN SODIUM 5000 UNITS: 5000 INJECTION INTRAVENOUS; SUBCUTANEOUS at 09:18

## 2025-02-25 RX ADMIN — WATER 2000 MG: 1 INJECTION INTRAMUSCULAR; INTRAVENOUS; SUBCUTANEOUS at 08:19

## 2025-02-25 ASSESSMENT — PAIN SCALES - GENERAL: PAINLEVEL_OUTOF10: 0

## 2025-02-25 ASSESSMENT — PAIN - FUNCTIONAL ASSESSMENT: PAIN_FUNCTIONAL_ASSESSMENT: ACTIVITIES ARE NOT PREVENTED

## 2025-02-25 NOTE — DISCHARGE INSTRUCTIONS
Please read over PowerPort booklet as it will answer most questions.    Take it easy for a couple of days, nothing strenuous.    Watch site for bleeding, swelling and any signs of infection: redness, hot to the touch, drainage. Please call the office if any of these happen. [phone]

## 2025-02-25 NOTE — PROGRESS NOTES
Went over discharge instructions with patient. Gave them the powerport booklet. All questions were answered and pt verbalized understanding. After patient got dressed rechecked his blood pressure it improved to the 90's systolic. Pt felt fine had no symptoms of low BP and was warm and dry. Pt was not hungry but did sip some water before leaving.

## 2025-02-25 NOTE — OP NOTE
Patient Name: Nick Jain   YOB: 1950   MRN: [id number]       2/25/2025    Preoperative Diagnosis:  1.  Poor venous access, adenocarcinoma of the colon    Postoperative Diagnosis:  Same    Operative Procedure:  1.  Ultrasound guided cannulation right internal jugular vein  2.  right internal jugular vein Single Chamber lumen subcutaneous vascular access device placement (Bard PowerPort)    Surgeon:  Mari Rodriguez DO    Anesthesia: Local and Moderate Sedation  Moderate sedation ASA class III  Timing start: 8:49  End time:9:30  Given 2 Versed     Estimated Blood Loss:  Less than 50 ml    Findings:  1.  The right internal jugular vein is patent.  2.  The catheter tip is in the right atrium/superior vena cava junction and ready for use when needed.    Procedure in Detail:    After the patient was consented and given IV antibiotics, he was brought to the angiogram suite and placed  in the supine position.  Local and Moderate Sedation was administered, and the patient's bilateral chest and neck were prepped and draped in the usual sterile fashion.    The internal jugular vein was cannulated with a micropuncture needle under ultrasound guidance. The ultrasound image was saved to PACS. An 0.018 wire was placed through the needle and then the needle was replaced with a micropuncture catheter.  The 0.018 wire was exchanged for an 0.035 wire, and this wire was placed into the inferior vena cava utilizing fluoroscopy. The micropuncture catheter was removed and a small incision was made over the wire in the neck with knife.    Next, an incision was made in the chest with knife.  Subcutaneous tissue was dissected with bovie electrocautery and a subcutaneous pocket was created inferior to the chest incision with bovie electrocautery and blunt dissection.  Hemostasis was excellent.    The catheter was tunneled subcutaneously from the chest wound to the neck wound.  A dilator/tear away sheath was placed over

## 2025-02-25 NOTE — INTERVAL H&P NOTE
Update History & Physical    The patient's History and Physical of February 24, 2025 was reviewed with the patient and I examined the patient. There was no change. The surgical site was confirmed by the patient and me.     Plan: The risks, benefits, expected outcome, and alternative to the recommended procedure have been discussed with the patient. Patient understands and wants to proceed with the procedure.     Moderate sedation  ASA  III, Mallampati 3    Electronically signed by Mari Rodriguez DO on 2/25/2025 at 8:30 AM

## 2025-02-26 NOTE — PROGRESS NOTES
Patient:  Nick Jain  YOB: 1950  Date of Service: 2/27/2025  MRN: [id number]    Primary Care Physician: Tesfaye Gipson MD    Chief Complaint   Patient presents with    Follow-up    Cancer     Metastatic colon adenocarcinoma    Results     Subjective:     Mr. Nick Jain is a 74 year old  gentleman followed with primary and secondary diagnoses as outlined:  Stage IV metastatic poorly differentiated adenocarcinoma obstructing the cecum-resected 1/11/2025  2-week tumor marker doubling time prior to therapy  Other diagnosis:  4 mm Pulmonary nodules being followed Dr. Anthony Matute.  COPD being followed by [facility] Elisa Penn  CAD CABG x 4 By Dr Matute March 2024, cardiac stents x 2 by Dr. Bernard 1/20/2025    He returns today, 2/27/2025, accompanied be his wife and daughter Elisa to review the PET scan report and for evaluation regarding further workup and treatment.  Although his weight is stable, he is anorexic, cachectic without an appetite but he can swallow and had a good BM today.       TARGET METASTATIC COLON CANCER LESIONS  2-3 cm right axillary lymph node  3.3 x 2.4 x 7.0 cm abnormal soft tissue extends from the abnormal segment of colon (ileocecal valve area) into the adjacent medial mesenteric soft tissues  0.8 cm left hepatic lobe hypodensity is identified and too small to characterize  Interval postop progression of the retroperitoneal, para-aortic and mesenteric lymphadenopathy on CT scan 1/17/2025  0.7 cm small nodule abuts the left lateral coronal fascia   In addition to tissue submitted at surgery 1/11/2025, Dr. Suleiman Hunter described lymphadenopathy extending beyond the right ileocolic vessel up into the retroperitoneum and extending up toward the iliac plexus.   conglomerate lymph node mass at the thoracic inlet on the left with SUV max of 8.35   Rapidly progressive postop tumor markers CEA 19-9 of 8596 and CEA of 45.7 on 2/27/2025 (2-week tumor

## 2025-02-27 ENCOUNTER — OFFICE VISIT (OUTPATIENT)
Dept: HEMATOLOGY | Age: 75
End: 2025-02-27
Payer: MEDICARE

## 2025-02-27 ENCOUNTER — OFFICE VISIT (OUTPATIENT)
Dept: PRIMARY CARE CLINIC | Age: 75
End: 2025-02-27

## 2025-02-27 ENCOUNTER — CLINICAL DOCUMENTATION (OUTPATIENT)
Dept: HEMATOLOGY | Age: 75
End: 2025-02-27

## 2025-02-27 ENCOUNTER — HOSPITAL ENCOUNTER (OUTPATIENT)
Dept: INFUSION THERAPY | Age: 75
Discharge: HOME | End: 2025-02-27
Payer: MEDICARE

## 2025-02-27 VITALS
HEIGHT: 69 IN | TEMPERATURE: 98 F | HEART RATE: 114 BPM | WEIGHT: 122.9 LBS | BODY MASS INDEX: 18.2 KG/M2 | DIASTOLIC BLOOD PRESSURE: 80 MMHG | OXYGEN SATURATION: 98 % | SYSTOLIC BLOOD PRESSURE: 132 MMHG

## 2025-02-27 VITALS
TEMPERATURE: 97.4 F | DIASTOLIC BLOOD PRESSURE: 68 MMHG | HEART RATE: 112 BPM | BODY MASS INDEX: 18.02 KG/M2 | WEIGHT: 122 LBS | SYSTOLIC BLOOD PRESSURE: 98 MMHG | OXYGEN SATURATION: 100 %

## 2025-02-27 VITALS — HEIGHT: 69 IN | BODY MASS INDEX: 18.15 KG/M2

## 2025-02-27 DIAGNOSIS — C18.9 ADENOCARCINOMA, COLON (HCC): ICD-10-CM

## 2025-02-27 DIAGNOSIS — C79.9 METASTATIC ADENOCARCINOMA (HCC): Primary | ICD-10-CM

## 2025-02-27 DIAGNOSIS — J43.2 CENTRILOBULAR EMPHYSEMA (HCC): ICD-10-CM

## 2025-02-27 DIAGNOSIS — Z01.818 EXAMINATION PRIOR TO CHEMOTHERAPY: ICD-10-CM

## 2025-02-27 DIAGNOSIS — E78.01 FAMILIAL HYPERCHOLESTEROLEMIA: ICD-10-CM

## 2025-02-27 DIAGNOSIS — C18.9 ADENOCARCINOMA, COLON (HCC): Primary | ICD-10-CM

## 2025-02-27 DIAGNOSIS — I25.119 ATHEROSCLEROSIS OF NATIVE CORONARY ARTERY OF NATIVE HEART WITH ANGINA PECTORIS: ICD-10-CM

## 2025-02-27 DIAGNOSIS — R97.8 OTHER ABNORMAL TUMOR MARKERS: ICD-10-CM

## 2025-02-27 DIAGNOSIS — G89.29 CHRONIC MIDLINE LOW BACK PAIN WITHOUT SCIATICA: ICD-10-CM

## 2025-02-27 DIAGNOSIS — C77.9 METASTATIC ADENOCARCINOMA TO LYMPH NODE (HCC): ICD-10-CM

## 2025-02-27 DIAGNOSIS — R11.2 NAUSEA AND VOMITING, UNSPECIFIED VOMITING TYPE: ICD-10-CM

## 2025-02-27 DIAGNOSIS — N18.31 STAGE 3A CHRONIC KIDNEY DISEASE (HCC): ICD-10-CM

## 2025-02-27 DIAGNOSIS — R63.0 DECREASED APPETITE: ICD-10-CM

## 2025-02-27 DIAGNOSIS — R94.5 ABNORMAL RESULTS OF LIVER FUNCTION STUDIES: ICD-10-CM

## 2025-02-27 DIAGNOSIS — C77.9 METASTATIC ADENOCARCINOMA TO LYMPH NODE (HCC): Primary | ICD-10-CM

## 2025-02-27 DIAGNOSIS — I10 ESSENTIAL (PRIMARY) HYPERTENSION: ICD-10-CM

## 2025-02-27 DIAGNOSIS — M54.50 CHRONIC MIDLINE LOW BACK PAIN WITHOUT SCIATICA: ICD-10-CM

## 2025-02-27 PROBLEM — K56.7 ILEUS (HCC): Status: RESOLVED | Noted: 2025-01-17 | Resolved: 2025-02-27

## 2025-02-27 PROBLEM — I50.21 ACUTE SYSTOLIC HEART FAILURE (HCC): Status: RESOLVED | Noted: 2025-01-10 | Resolved: 2025-02-27

## 2025-02-27 PROBLEM — K63.89 CECUM MASS: Status: RESOLVED | Noted: 2025-01-10 | Resolved: 2025-02-27

## 2025-02-27 PROBLEM — I21.4 NSTEMI (NON-ST ELEVATED MYOCARDIAL INFARCTION) (HCC): Status: RESOLVED | Noted: 2024-03-12 | Resolved: 2025-02-27

## 2025-02-27 PROBLEM — K56.609 SMALL BOWEL OBSTRUCTION (HCC): Status: RESOLVED | Noted: 2025-01-11 | Resolved: 2025-02-27

## 2025-02-27 PROBLEM — I20.9 ANGINA PECTORIS, UNSPECIFIED: Status: RESOLVED | Noted: 2024-08-26 | Resolved: 2025-02-27

## 2025-02-27 PROBLEM — Z53.20 COLON CANCER SCREENING DECLINED: Status: RESOLVED | Noted: 2019-07-11 | Resolved: 2025-02-27

## 2025-02-27 LAB
ALBUMIN SERPL-MCNC: 3.1 G/DL (ref 3.5–5.2)
ALP SERPL-CCNC: 1143 U/L (ref 40–129)
ALT SERPL-CCNC: 29 U/L (ref 5–41)
ANION GAP SERPL CALCULATED.3IONS-SCNC: 14 MMOL/L (ref 7–19)
AST SERPL-CCNC: 53 U/L (ref 5–40)
BASOPHILS # BLD: 0.02 K/UL (ref 0–0.2)
BASOPHILS NFR BLD: 0.2 % (ref 0–1)
BILIRUB SERPL-MCNC: 0.3 MG/DL (ref 0–1.2)
BUN SERPL-MCNC: 31 MG/DL (ref 8–23)
CALCIUM SERPL-MCNC: 9.1 MG/DL (ref 8.8–10.2)
CANCER AG19-9 SERPL-ACNC: 8596 U/ML (ref 0–35)
CEA SERPL-MCNC: 45.7 NG/ML (ref 0–4.7)
CHLORIDE SERPL-SCNC: 104 MMOL/L (ref 98–107)
CO2 SERPL-SCNC: 17 MMOL/L (ref 22–29)
CREAT SERPL-MCNC: 1.1 MG/DL (ref 0.7–1.2)
EOSINOPHIL # BLD: 0.02 K/UL (ref 0–0.6)
EOSINOPHIL NFR BLD: 0.2 % (ref 0–5)
ERYTHROCYTE [DISTWIDTH] IN BLOOD BY AUTOMATED COUNT: 16.5 % (ref 11.5–14.5)
GLUCOSE SERPL-MCNC: 103 MG/DL (ref 70–99)
HAV IGM SERPL QL IA: NORMAL
HBV CORE IGM SERPL QL IA: NORMAL
HBV SURFACE AG SERPL QL IA: NORMAL
HCT VFR BLD AUTO: 30.2 % (ref 42–52)
HCV AB SERPL QL IA: NORMAL
HGB BLD-MCNC: 9.5 G/DL (ref 14–18)
LYMPHOCYTES # BLD: 1.07 K/UL (ref 1.1–4.5)
LYMPHOCYTES NFR BLD: 10.8 % (ref 20–40)
MAGNESIUM SERPL-MCNC: 2.1 MG/DL (ref 1.6–2.4)
MCH RBC QN AUTO: 27.7 PG (ref 27–31)
MCHC RBC AUTO-ENTMCNC: 31.5 G/DL (ref 33–37)
MCV RBC AUTO: 88 FL (ref 80–94)
MONOCYTES # BLD: 0.71 K/UL (ref 0–0.9)
MONOCYTES NFR BLD: 7.2 % (ref 1–10)
NEUTROPHILS # BLD: 8.06 K/UL (ref 1.5–7.5)
NEUTS SEG NFR BLD: 81.3 % (ref 50–65)
PLATELET # BLD AUTO: 351 K/UL (ref 130–400)
PMV BLD AUTO: 10.1 FL (ref 9.4–12.4)
POTASSIUM SERPL-SCNC: 4.7 MMOL/L (ref 3.5–5.1)
PROT SERPL-MCNC: 7.3 G/DL (ref 6.4–8.3)
RBC # BLD AUTO: 3.43 M/UL (ref 4.7–6.1)
SODIUM SERPL-SCNC: 135 MMOL/L (ref 136–145)
WBC # BLD AUTO: 9.91 K/UL (ref 4.8–10.8)

## 2025-02-27 PROCEDURE — 1123F ACP DISCUSS/DSCN MKR DOCD: CPT | Performed by: INTERNAL MEDICINE

## 2025-02-27 PROCEDURE — G8427 DOCREV CUR MEDS BY ELIG CLIN: HCPCS | Performed by: INTERNAL MEDICINE

## 2025-02-27 PROCEDURE — 82378 CARCINOEMBRYONIC ANTIGEN: CPT

## 2025-02-27 PROCEDURE — 85025 COMPLETE CBC W/AUTO DIFF WBC: CPT

## 2025-02-27 PROCEDURE — 86301 IMMUNOASSAY TUMOR CA 19-9: CPT

## 2025-02-27 PROCEDURE — 1036F TOBACCO NON-USER: CPT | Performed by: INTERNAL MEDICINE

## 2025-02-27 PROCEDURE — 1125F AMNT PAIN NOTED PAIN PRSNT: CPT | Performed by: INTERNAL MEDICINE

## 2025-02-27 PROCEDURE — 36415 COLL VENOUS BLD VENIPUNCTURE: CPT

## 2025-02-27 PROCEDURE — 80053 COMPREHEN METABOLIC PANEL: CPT

## 2025-02-27 PROCEDURE — 1159F MED LIST DOCD IN RCRD: CPT | Performed by: INTERNAL MEDICINE

## 2025-02-27 PROCEDURE — 99215 OFFICE O/P EST HI 40 MIN: CPT | Performed by: INTERNAL MEDICINE

## 2025-02-27 PROCEDURE — 99213 OFFICE O/P EST LOW 20 MIN: CPT

## 2025-02-27 PROCEDURE — 3079F DIAST BP 80-89 MM HG: CPT | Performed by: INTERNAL MEDICINE

## 2025-02-27 PROCEDURE — 81232 DPYD GENE COMMON VARIANTS: CPT

## 2025-02-27 PROCEDURE — 83735 ASSAY OF MAGNESIUM: CPT

## 2025-02-27 PROCEDURE — G8419 CALC BMI OUT NRM PARAM NOF/U: HCPCS | Performed by: INTERNAL MEDICINE

## 2025-02-27 PROCEDURE — 3075F SYST BP GE 130 - 139MM HG: CPT | Performed by: INTERNAL MEDICINE

## 2025-02-27 PROCEDURE — 3017F COLORECTAL CA SCREEN DOC REV: CPT | Performed by: INTERNAL MEDICINE

## 2025-02-27 PROCEDURE — 80074 ACUTE HEPATITIS PANEL: CPT

## 2025-02-27 RX ORDER — SIMETHICONE 125 MG
125 CAPSULE ORAL AS NEEDED
COMMUNITY

## 2025-02-27 ASSESSMENT — ENCOUNTER SYMPTOMS
CHEST TIGHTNESS: 0
SHORTNESS OF BREATH: 0
DIARRHEA: 0
NAUSEA: 0
CONSTIPATION: 0
BACK PAIN: 1
ANAL BLEEDING: 0
COUGH: 0
ABDOMINAL PAIN: 0

## 2025-02-27 NOTE — PROGRESS NOTES
[facility]  [address] [place] [address] [place] [address]  Phone: [phone]  Fax: [phone]    Ella Mosquera, MS, RD, LD   Nick Jain 74 y.o.   Diagnosis, staging, date of diagnosis: metastatic adenocarcinoma of cecum, Jan 2025  Current Treatment: pending; plan for mFOLFOX6 + Cetuximab by weekly    Comprehensive Nutrition Assessment    Type and Reason for Visit:  Reassess    Malnutrition Assessment:  Malnutrition Status:  Severe malnutrition (02/27/25 1248)    Context:  Chronic Illness     Findings of the 6 clinical characteristics of malnutrition:  Energy Intake:  75% or less estimated energy requirements for 1 month or longer  Weight Loss:  Greater than 10% over 6 months     Body Fat Loss:  Severe body fat loss Orbital, Buccal region, Triceps   Muscle Mass Loss:  Severe muscle mass loss Temples (temporalis), Thigh (quadriceps), Hand (interosseous)    Nutrition Assessment:    DM 75 y/o male presents 2/27/25 for follow-up RD evaluation. Referral from Dr. Ingrid MD for pt with new dx metastatic adenocarcinoma of cecum. Pt remains severely malnourished AEB wt loss 31lbs over the past 6 months, inadequate energy intake, and severe muscle/fat wasting. Pt is s/p hemicolectomy on 1/11/25. He continues to endorse extremely poor appetite. He has also developed a few food aversions, specifically textures. Pt endorses overall disinterest in food as well as early satiety. Pt was having regular bowel movements up to 4x daily. He has not had a bowel movement in 3 days per his family. He has not taking any medications for constipation.   Pt is prescribed Megace and Marinol for appetite stimulation. He admits that he does not take medications consistently at times. He has been taking Reglan TID when he eats something per his report.    Diet History:  Pt has been eating 1 fried egg with ketchup most mornings. This is the most consistent part of his diet. Throughout the

## 2025-02-27 NOTE — PROGRESS NOTES
[facility]  [address]  [address]  [place] [address]  Dept: [phone]  Dept Fax: [phone]  Loc: [phone]    Nick Jain is a 74 y.o. male who presents today for his medical conditions/complaints as noted below.  Nick Jain is here for 6 Month Follow-Up         Patient History:     Stage IV right sided ileocecal adenocarcinoma  -[facility] oncology  -February 25, 2025: Right internal jugular vein single-chamber lumen subcutaneous access device placement.     Coronary artery disease.  - Cardiology at [facility]: Dr. Reddy  - Status post CABG March 25, 2024.     Pulmonary nodules:  -March 2024: CT chest without right upper lobe: 4 mm nodule.     COPD  -[facility] pulmonology    Chronic kidney disease        pthx        Subjective:   CC:  Here today to discuss the following:    February 18, 2025  -Followed up with cardiology  - -Continue with current medication    February 12, 2025  -Followed up with oncology    Lower back pain  Continues to have issues with worsening lower back pain.  - primarily notices it in the lumbar area.  Worse with sitting bending and twisting.  No radiculopathy.  - imaging including CT of abdomen and pelvis with IV contrast shows evidence of  Severe multilevel degenerative changes of the lumbar spine.  PET scan performed on February 19 showed no evidence of metastases to the spine.  - he is finding relief with hydrocodone 10 mg 4 times daily.  He was having adverse effects of the oxycodone.  They are uncertain if the hydrocodone is providing enough relief.  He is having no adverse effects such as lethargy or constipation.    He continues to have issues related to his appetite.  He has persistent nausea and no vomiting.  Also with reflux.  He is currently on Marinol and Megace for appetite stimulant.  He also prefers Zofran ODT over Phenergan for nausea.  He is on Reglan to increase gastric emptying as well.  States he is

## 2025-02-27 NOTE — PROGRESS NOTES
Plan submitted per Dr Quintero for insurance authorization for  Cetuximab + FOLFOX + Encorafinib 300 mg PO daily for treatment of BRAF+ metastatic colon adenocarcinoma, per NCCN guidelines with modified dosing as follows:    Cetuximab 250 mg/m2 IV Q 14 D  Concurrent with  Oxaliplatin 50 mg/m2 D1 + Leucovorin 400 mg/m2 D1+ 5FU 800 mg/m2 CIV daily D1-2 (1600 mg/m2 IV over 46-48 hours)  Q 14 D  +  Encorafinib 300 mg PO daily  (75 mg caps)

## 2025-02-27 NOTE — PATIENT INSTRUCTIONS
Current Outpatient Medications   Medication     Simethicone (GAS-X EXTRA STRENGTH) 125 MG CAPS Gas    traZODone (DESYREL) 50 MG tablet Sleep    metoprolol succinate (TOPROL XL) 25 MG extended release tablet Heart rate    droNABinol (MARINOL) 2.5 MG capsule Appetite    metoclopramide (REGLAN) 10 MG tablet Stomach emptying    ondansetron (ZOFRAN-ODT) 4 MG disintegrating tablet Nausea    promethazine (PHENERGAN) 25 MG tablet Nausea    HYDROcodone-acetaminophen (NORCO)  MG per tablet Pain    calcium carbonate (CALCIUM 600) 600 MG TABS tablet Calcium    megestrol (MEGACE) 40 MG/ML suspension Appetite          clopidogrel (PLAVIX) 75 MG tablet Blood thinner    lidocaine 4 % external patch     sacubitril-valsartan (ENTRESTO) 24-26 MG per tablet heart    spironolactone (ALDACTONE) 25 MG tablet heart    ondansetron (ZOFRAN) 4 MG tablet Nausea    STIOLTO RESPIMAT 2.5-2.5 MCG/ACT AERS Breathing    omeprazole (PRILOSEC) 20 MG delayed release capsule Reflux    acetaminophen (TYLENOL) 500 MG tablet Pain    atorvastatin (LIPITOR) 80 MG tablet Cholesterol    albuterol sulfate HFA (VENTOLIN HFA) 108 (90 Base) MCG/ACT inhaler breathing    fluticasone (FLONASE) 50 MCG/ACT nasal spray Allergies    aspirin EC 81 MG EC tablet Heart     No current facility-administered medications for this visit.           Blood pressure cuff Brand Omron           For questions and technical support for the [employer] [facility]:  [phone]

## 2025-02-28 ENCOUNTER — CLINICAL DOCUMENTATION (OUTPATIENT)
Facility: HOSPITAL | Age: 75
End: 2025-02-28

## 2025-02-28 ENCOUNTER — CARE COORDINATION (OUTPATIENT)
Dept: CASE MANAGEMENT | Age: 75
End: 2025-02-28

## 2025-02-28 RX ORDER — MONTELUKAST SODIUM 10 MG/1
10 TABLET ORAL DAILY
Qty: 30 TABLET | Refills: 0 | Status: SHIPPED | OUTPATIENT
Start: 2025-02-28 | End: 2025-03-30

## 2025-02-28 NOTE — PROGRESS NOTES
Patient Assistance                   Additional notes: Reviewed chart and no assistance is needed. Patient has Medicare and [employer] supplement.

## 2025-02-28 NOTE — CARE COORDINATION
Care Transitions Note    Final Call     Patient Current Location:  Home: [address] [place] [id number]    Care Transition Nurse contacted the patient by telephone. Verified name and  as identifiers.    Patient graduated from the Care Transitions program on today.  Patient/family progressing towards self management. .        Handoff:   Patient/family agreeable to [facility] outreach.      Care Summary Note: Spoke with patient today for f/u call. He said he is doing good today, and has eaten two good meals and kept them down. No feelings of nausea or any vomiting. He is taking appetite stimulant to help keep his weight up. He is meeting with Oncology Dietician often and she is doing everything she can to get his appetite back up to speed. HH is seeing him, OT has discharged and PT is near discharge. At this time CTN will refer to AC for further f/u and patient is agreeable. CTN episode will end and no further CTN outreach scheduled.     Assessments:  Care Transitions 24 Hour Call    Do you have any ongoing symptoms?: No  Do you have all of your prescriptions and are they filled?: Yes  Were you discharged with any Home Care or Post Acute Services: Yes  Post Acute Services: Home Health  Do you have support at home?: Partner/Spouse/SO  Are you an active caregiver in your home?: No  Care Transitions Interventions          Upcoming Appointments:    Future Appointments         Provider Specialty Dept Phone    3/20/2025 10:45 AM Margarita Nunn APRN - NP Cardiology [phone]    2025 1:20 PM Tesfaye Gipson MD Primary Care [phone]            Patient has agreed to contact primary care provider and/or specialist for any further questions, concerns, or needs.    Will Hernandez RN

## 2025-02-28 NOTE — TELEPHONE ENCOUNTER
Spoke with Elisa, patient's daughter, with treatment appointment scheduled 3/5/25 at 9AM.  Rx Singulair phoned to preferred pharmacy with instructions to take daily.

## 2025-03-04 ENCOUNTER — TELEPHONE (OUTPATIENT)
Facility: HOSPITAL | Age: 75
End: 2025-03-04

## 2025-03-04 ENCOUNTER — APPOINTMENT (OUTPATIENT)
Dept: GENERAL RADIOLOGY | Age: 75
End: 2025-03-04
Payer: MEDICARE

## 2025-03-04 ENCOUNTER — APPOINTMENT (OUTPATIENT)
Dept: CT IMAGING | Age: 75
End: 2025-03-04
Payer: MEDICARE

## 2025-03-04 ENCOUNTER — HOSPITAL ENCOUNTER (INPATIENT)
Age: 75
LOS: 10 days | Discharge: HOSPICE | End: 2025-03-14
Attending: EMERGENCY MEDICINE | Admitting: STUDENT IN AN ORGANIZED HEALTH CARE EDUCATION/TRAINING PROGRAM
Payer: MEDICARE

## 2025-03-04 DIAGNOSIS — K20.90 ESOPHAGITIS: ICD-10-CM

## 2025-03-04 DIAGNOSIS — G89.3 NEOPLASM RELATED PAIN: ICD-10-CM

## 2025-03-04 DIAGNOSIS — Z51.5 PALLIATIVE CARE PATIENT: ICD-10-CM

## 2025-03-04 DIAGNOSIS — R60.0 EDEMA OF LEFT UPPER ARM: ICD-10-CM

## 2025-03-04 DIAGNOSIS — C78.7 METASTATIC COLON CANCER TO LIVER (HCC): ICD-10-CM

## 2025-03-04 DIAGNOSIS — N17.9 AKI (ACUTE KIDNEY INJURY): ICD-10-CM

## 2025-03-04 DIAGNOSIS — C77.9 METASTATIC ADENOCARCINOMA TO LYMPH NODE (HCC): ICD-10-CM

## 2025-03-04 DIAGNOSIS — C18.9 ADENOCARCINOMA, COLON: ICD-10-CM

## 2025-03-04 DIAGNOSIS — R10.31 RIGHT LOWER QUADRANT ABDOMINAL PAIN: ICD-10-CM

## 2025-03-04 DIAGNOSIS — C18.9 METASTATIC COLON CANCER TO LIVER (HCC): ICD-10-CM

## 2025-03-04 DIAGNOSIS — A41.9 SEPSIS WITHOUT ACUTE ORGAN DYSFUNCTION, DUE TO UNSPECIFIED ORGANISM (HCC): ICD-10-CM

## 2025-03-04 DIAGNOSIS — I25.5 ISCHEMIC CARDIOMYOPATHY: ICD-10-CM

## 2025-03-04 DIAGNOSIS — C79.9 METASTASIS FROM COLON CANCER (HCC): ICD-10-CM

## 2025-03-04 DIAGNOSIS — J18.9 PNEUMONIA OF LEFT LOWER LOBE DUE TO INFECTIOUS ORGANISM: Primary | ICD-10-CM

## 2025-03-04 DIAGNOSIS — C18.9 METASTASIS FROM COLON CANCER (HCC): ICD-10-CM

## 2025-03-04 DIAGNOSIS — R11.2 NAUSEA AND VOMITING, UNSPECIFIED VOMITING TYPE: ICD-10-CM

## 2025-03-04 DIAGNOSIS — Z51.5 HOSPICE CARE: ICD-10-CM

## 2025-03-04 PROBLEM — N18.9 ACUTE KIDNEY INJURY SUPERIMPOSED ON CKD: Status: ACTIVE | Noted: 2025-03-04

## 2025-03-04 LAB
ALBUMIN SERPL-MCNC: 3.2 G/DL (ref 3.5–5.2)
ALP SERPL-CCNC: 981 U/L (ref 40–129)
ALT SERPL-CCNC: 18 U/L (ref 5–41)
ANION GAP SERPL CALCULATED.3IONS-SCNC: 21 MMOL/L (ref 8–16)
AST SERPL-CCNC: 19 U/L (ref 5–40)
B PARAP IS1001 DNA NPH QL NAA+NON-PROBE: NOT DETECTED
B PERT.PT PRMT NPH QL NAA+NON-PROBE: NOT DETECTED
BASOPHILS # BLD: 0 K/UL (ref 0–0.2)
BASOPHILS NFR BLD: 0.1 % (ref 0–1)
BILIRUB SERPL-MCNC: 0.3 MG/DL (ref 0.2–1.2)
BNP BLD-MCNC: 2169 PG/ML (ref 0–449)
BUN SERPL-MCNC: 46 MG/DL (ref 8–23)
C PNEUM DNA NPH QL NAA+NON-PROBE: NOT DETECTED
CALCIUM SERPL-MCNC: 9.1 MG/DL (ref 8.8–10.2)
CHLORIDE SERPL-SCNC: 98 MMOL/L (ref 98–107)
CO2 SERPL-SCNC: 14 MMOL/L (ref 22–29)
CREAT SERPL-MCNC: 2 MG/DL (ref 0.7–1.2)
EOSINOPHIL # BLD: 0 K/UL (ref 0–0.6)
EOSINOPHIL NFR BLD: 0 % (ref 0–5)
ERYTHROCYTE [DISTWIDTH] IN BLOOD BY AUTOMATED COUNT: 16.8 % (ref 11.5–14.5)
FLUAV H1 2009 PAN RNA NPH NAA+NON-PROBE: DETECTED
FLUBV RNA NPH QL NAA+NON-PROBE: NOT DETECTED
GLUCOSE SERPL-MCNC: 135 MG/DL (ref 70–99)
HADV DNA NPH QL NAA+NON-PROBE: NOT DETECTED
HCOV 229E RNA NPH QL NAA+NON-PROBE: NOT DETECTED
HCOV HKU1 RNA NPH QL NAA+NON-PROBE: NOT DETECTED
HCOV NL63 RNA NPH QL NAA+NON-PROBE: DETECTED
HCOV OC43 RNA NPH QL NAA+NON-PROBE: NOT DETECTED
HCT VFR BLD AUTO: 33.3 % (ref 42–52)
HGB BLD-MCNC: 10.3 G/DL (ref 14–18)
HMPV RNA NPH QL NAA+NON-PROBE: NOT DETECTED
HPIV1 RNA NPH QL NAA+NON-PROBE: NOT DETECTED
HPIV2 RNA NPH QL NAA+NON-PROBE: NOT DETECTED
HPIV3 RNA NPH QL NAA+NON-PROBE: NOT DETECTED
HPIV4 RNA NPH QL NAA+NON-PROBE: NOT DETECTED
IMM GRANULOCYTES # BLD: 0.1 K/UL
LACTATE BLDV-SCNC: 1.8 MG/DL (ref 0.5–1.9)
LACTATE BLDV-SCNC: 2.1 MG/DL (ref 0.5–1.9)
LIPASE SERPL-CCNC: 54 U/L (ref 13–60)
LYMPHOCYTES # BLD: 1.7 K/UL (ref 1.1–4.5)
LYMPHOCYTES NFR BLD: 10 % (ref 20–40)
M PNEUMO DNA NPH QL NAA+NON-PROBE: NOT DETECTED
MCH RBC QN AUTO: 27.8 PG (ref 27–31)
MCHC RBC AUTO-ENTMCNC: 30.9 G/DL (ref 33–37)
MCV RBC AUTO: 90 FL (ref 80–94)
MONOCYTES # BLD: 0.9 K/UL (ref 0–0.9)
MONOCYTES NFR BLD: 5.5 % (ref 0–10)
NEUTROPHILS # BLD: 14.5 K/UL (ref 1.5–7.5)
NEUTS SEG NFR BLD: 84.1 % (ref 50–65)
PLATELET # BLD AUTO: 426 K/UL (ref 130–400)
PMV BLD AUTO: 10.3 FL (ref 9.4–12.4)
POTASSIUM SERPL-SCNC: 5.1 MMOL/L (ref 3.5–5.1)
PROCALCITONIN: 0.76 NG/ML (ref 0–0.09)
PROT SERPL-MCNC: 7.6 G/DL (ref 6.4–8.3)
RBC # BLD AUTO: 3.7 M/UL (ref 4.7–6.1)
RSV RNA NPH QL NAA+NON-PROBE: NOT DETECTED
RV+EV RNA NPH QL NAA+NON-PROBE: NOT DETECTED
SARS-COV-2 RNA NPH QL NAA+NON-PROBE: NOT DETECTED
SODIUM SERPL-SCNC: 133 MMOL/L (ref 136–145)
WBC # BLD AUTO: 17.2 K/UL (ref 4.8–10.8)

## 2025-03-04 PROCEDURE — 6370000000 HC RX 637 (ALT 250 FOR IP): Performed by: INTERNAL MEDICINE

## 2025-03-04 PROCEDURE — 2580000003 HC RX 258: Performed by: EMERGENCY MEDICINE

## 2025-03-04 PROCEDURE — 0202U NFCT DS 22 TRGT SARS-COV-2: CPT

## 2025-03-04 PROCEDURE — 87040 BLOOD CULTURE FOR BACTERIA: CPT

## 2025-03-04 PROCEDURE — 96375 TX/PRO/DX INJ NEW DRUG ADDON: CPT

## 2025-03-04 PROCEDURE — 96365 THER/PROPH/DIAG IV INF INIT: CPT

## 2025-03-04 PROCEDURE — 6360000002 HC RX W HCPCS: Performed by: NURSE PRACTITIONER

## 2025-03-04 PROCEDURE — 99285 EMERGENCY DEPT VISIT HI MDM: CPT

## 2025-03-04 PROCEDURE — 1200000000 HC SEMI PRIVATE

## 2025-03-04 PROCEDURE — 84145 PROCALCITONIN (PCT): CPT

## 2025-03-04 PROCEDURE — 71045 X-RAY EXAM CHEST 1 VIEW: CPT

## 2025-03-04 PROCEDURE — 74177 CT ABD & PELVIS W/CONTRAST: CPT

## 2025-03-04 PROCEDURE — 36415 COLL VENOUS BLD VENIPUNCTURE: CPT

## 2025-03-04 PROCEDURE — 99223 1ST HOSP IP/OBS HIGH 75: CPT | Performed by: INTERNAL MEDICINE

## 2025-03-04 PROCEDURE — 2580000003 HC RX 258: Performed by: NURSE PRACTITIONER

## 2025-03-04 PROCEDURE — 83605 ASSAY OF LACTIC ACID: CPT

## 2025-03-04 PROCEDURE — 6360000004 HC RX CONTRAST MEDICATION: Performed by: EMERGENCY MEDICINE

## 2025-03-04 PROCEDURE — 2500000003 HC RX 250 WO HCPCS: Performed by: NURSE PRACTITIONER

## 2025-03-04 PROCEDURE — 83690 ASSAY OF LIPASE: CPT

## 2025-03-04 PROCEDURE — 85025 COMPLETE CBC W/AUTO DIFF WBC: CPT

## 2025-03-04 PROCEDURE — 83880 ASSAY OF NATRIURETIC PEPTIDE: CPT

## 2025-03-04 PROCEDURE — 80053 COMPREHEN METABOLIC PANEL: CPT

## 2025-03-04 PROCEDURE — 6360000002 HC RX W HCPCS: Performed by: EMERGENCY MEDICINE

## 2025-03-04 RX ORDER — OSELTAMIVIR PHOSPHATE 30 MG/1
30 CAPSULE ORAL DAILY
Status: DISCONTINUED | OUTPATIENT
Start: 2025-03-04 | End: 2025-03-05

## 2025-03-04 RX ORDER — MEGESTROL ACETATE 40 MG/ML
200 SUSPENSION ORAL 2 TIMES DAILY
Status: DISCONTINUED | OUTPATIENT
Start: 2025-03-04 | End: 2025-03-08

## 2025-03-04 RX ORDER — METOPROLOL SUCCINATE 25 MG/1
25 TABLET, EXTENDED RELEASE ORAL DAILY
Status: DISCONTINUED | OUTPATIENT
Start: 2025-03-04 | End: 2025-03-14 | Stop reason: HOSPADM

## 2025-03-04 RX ORDER — ALBUTEROL SULFATE 90 UG/1
2 INHALANT RESPIRATORY (INHALATION) EVERY 6 HOURS PRN
Status: DISCONTINUED | OUTPATIENT
Start: 2025-03-04 | End: 2025-03-14 | Stop reason: HOSPADM

## 2025-03-04 RX ORDER — ONDANSETRON 2 MG/ML
4 INJECTION INTRAMUSCULAR; INTRAVENOUS ONCE
Status: COMPLETED | OUTPATIENT
Start: 2025-03-04 | End: 2025-03-04

## 2025-03-04 RX ORDER — SODIUM CHLORIDE, SODIUM LACTATE, POTASSIUM CHLORIDE, AND CALCIUM CHLORIDE .6; .31; .03; .02 G/100ML; G/100ML; G/100ML; G/100ML
1000 INJECTION, SOLUTION INTRAVENOUS ONCE
Status: COMPLETED | OUTPATIENT
Start: 2025-03-04 | End: 2025-03-04

## 2025-03-04 RX ORDER — FLUTICASONE PROPIONATE 50 MCG
2 SPRAY, SUSPENSION (ML) NASAL DAILY PRN
Status: DISCONTINUED | OUTPATIENT
Start: 2025-03-04 | End: 2025-03-14 | Stop reason: HOSPADM

## 2025-03-04 RX ORDER — SODIUM CHLORIDE 9 MG/ML
INJECTION, SOLUTION INTRAVENOUS PRN
Status: DISCONTINUED | OUTPATIENT
Start: 2025-03-04 | End: 2025-03-14 | Stop reason: HOSPADM

## 2025-03-04 RX ORDER — ATORVASTATIN CALCIUM 40 MG/1
80 TABLET, FILM COATED ORAL DAILY
Status: DISCONTINUED | OUTPATIENT
Start: 2025-03-04 | End: 2025-03-14 | Stop reason: HOSPADM

## 2025-03-04 RX ORDER — ACETAMINOPHEN 650 MG/1
650 SUPPOSITORY RECTAL EVERY 6 HOURS PRN
Status: DISCONTINUED | OUTPATIENT
Start: 2025-03-04 | End: 2025-03-14 | Stop reason: HOSPADM

## 2025-03-04 RX ORDER — DRONABINOL 2.5 MG/1
2.5 CAPSULE ORAL
Status: DISCONTINUED | OUTPATIENT
Start: 2025-03-04 | End: 2025-03-12

## 2025-03-04 RX ORDER — SODIUM CHLORIDE 0.9 % (FLUSH) 0.9 %
5-40 SYRINGE (ML) INJECTION EVERY 12 HOURS SCHEDULED
Status: DISCONTINUED | OUTPATIENT
Start: 2025-03-04 | End: 2025-03-14 | Stop reason: HOSPADM

## 2025-03-04 RX ORDER — ONDANSETRON 4 MG/1
4 TABLET, ORALLY DISINTEGRATING ORAL EVERY 8 HOURS PRN
Status: DISCONTINUED | OUTPATIENT
Start: 2025-03-04 | End: 2025-03-14 | Stop reason: HOSPADM

## 2025-03-04 RX ORDER — POLYETHYLENE GLYCOL 3350 17 G/17G
17 POWDER, FOR SOLUTION ORAL DAILY PRN
Status: DISCONTINUED | OUTPATIENT
Start: 2025-03-04 | End: 2025-03-14 | Stop reason: HOSPADM

## 2025-03-04 RX ORDER — HYDROMORPHONE HYDROCHLORIDE 1 MG/ML
0.5 INJECTION, SOLUTION INTRAMUSCULAR; INTRAVENOUS; SUBCUTANEOUS
Status: DISCONTINUED | OUTPATIENT
Start: 2025-03-04 | End: 2025-03-07

## 2025-03-04 RX ORDER — HYDROMORPHONE HYDROCHLORIDE 1 MG/ML
0.5 INJECTION, SOLUTION INTRAMUSCULAR; INTRAVENOUS; SUBCUTANEOUS ONCE
Status: COMPLETED | OUTPATIENT
Start: 2025-03-04 | End: 2025-03-04

## 2025-03-04 RX ORDER — HYDROMORPHONE HYDROCHLORIDE 1 MG/ML
0.5 INJECTION, SOLUTION INTRAMUSCULAR; INTRAVENOUS; SUBCUTANEOUS EVERY 4 HOURS PRN
Status: DISCONTINUED | OUTPATIENT
Start: 2025-03-04 | End: 2025-03-04

## 2025-03-04 RX ORDER — ENOXAPARIN SODIUM 100 MG/ML
30 INJECTION SUBCUTANEOUS DAILY
Status: DISCONTINUED | OUTPATIENT
Start: 2025-03-04 | End: 2025-03-06

## 2025-03-04 RX ORDER — LIDOCAINE 4 G/G
2 PATCH TOPICAL EVERY 12 HOURS PRN
Status: DISCONTINUED | OUTPATIENT
Start: 2025-03-04 | End: 2025-03-06

## 2025-03-04 RX ORDER — MORPHINE SULFATE 4 MG/ML
4 INJECTION, SOLUTION INTRAMUSCULAR; INTRAVENOUS ONCE
Status: COMPLETED | OUTPATIENT
Start: 2025-03-04 | End: 2025-03-04

## 2025-03-04 RX ORDER — SODIUM CHLORIDE 0.9 % (FLUSH) 0.9 %
5-40 SYRINGE (ML) INJECTION PRN
Status: DISCONTINUED | OUTPATIENT
Start: 2025-03-04 | End: 2025-03-14 | Stop reason: HOSPADM

## 2025-03-04 RX ORDER — METOCLOPRAMIDE 5 MG/1
10 TABLET ORAL
Status: DISCONTINUED | OUTPATIENT
Start: 2025-03-04 | End: 2025-03-08

## 2025-03-04 RX ORDER — ACETAMINOPHEN 325 MG/1
650 TABLET ORAL EVERY 6 HOURS PRN
Status: DISCONTINUED | OUTPATIENT
Start: 2025-03-04 | End: 2025-03-14 | Stop reason: HOSPADM

## 2025-03-04 RX ORDER — IOPAMIDOL 755 MG/ML
70 INJECTION, SOLUTION INTRAVASCULAR
Status: COMPLETED | OUTPATIENT
Start: 2025-03-04 | End: 2025-03-04

## 2025-03-04 RX ORDER — ONDANSETRON 2 MG/ML
4 INJECTION INTRAMUSCULAR; INTRAVENOUS EVERY 6 HOURS PRN
Status: DISCONTINUED | OUTPATIENT
Start: 2025-03-04 | End: 2025-03-14 | Stop reason: HOSPADM

## 2025-03-04 RX ORDER — HYDROCODONE BITARTRATE AND ACETAMINOPHEN 10; 325 MG/1; MG/1
1 TABLET ORAL EVERY 6 HOURS PRN
Status: DISCONTINUED | OUTPATIENT
Start: 2025-03-04 | End: 2025-03-05

## 2025-03-04 RX ORDER — PROCHLORPERAZINE EDISYLATE 5 MG/ML
10 INJECTION INTRAMUSCULAR; INTRAVENOUS EVERY 6 HOURS PRN
Status: DISCONTINUED | OUTPATIENT
Start: 2025-03-04 | End: 2025-03-14 | Stop reason: HOSPADM

## 2025-03-04 RX ORDER — SPIRONOLACTONE 25 MG/1
12.5 TABLET ORAL DAILY
Status: DISCONTINUED | OUTPATIENT
Start: 2025-03-04 | End: 2025-03-14 | Stop reason: HOSPADM

## 2025-03-04 RX ORDER — ASPIRIN 81 MG/1
81 TABLET ORAL DAILY
Status: DISCONTINUED | OUTPATIENT
Start: 2025-03-04 | End: 2025-03-11

## 2025-03-04 RX ORDER — CLOPIDOGREL BISULFATE 75 MG/1
75 TABLET ORAL DAILY
Status: DISCONTINUED | OUTPATIENT
Start: 2025-03-04 | End: 2025-03-14 | Stop reason: HOSPADM

## 2025-03-04 RX ORDER — CYANOCOBALAMIN 1000 UG/ML
1000 INJECTION, SOLUTION INTRAMUSCULAR; SUBCUTANEOUS DAILY
Status: DISCONTINUED | OUTPATIENT
Start: 2025-03-05 | End: 2025-03-05

## 2025-03-04 RX ORDER — TRAZODONE HYDROCHLORIDE 100 MG/1
100 TABLET ORAL NIGHTLY PRN
Status: DISCONTINUED | OUTPATIENT
Start: 2025-03-04 | End: 2025-03-10

## 2025-03-04 RX ORDER — DOCUSATE SODIUM 100 MG/1
100 CAPSULE, LIQUID FILLED ORAL DAILY
COMMUNITY

## 2025-03-04 RX ADMIN — AZITHROMYCIN MONOHYDRATE 500 MG: 500 INJECTION, POWDER, LYOPHILIZED, FOR SOLUTION INTRAVENOUS at 15:41

## 2025-03-04 RX ADMIN — CEFEPIME 1000 MG: 1 INJECTION, POWDER, FOR SOLUTION INTRAMUSCULAR; INTRAVENOUS at 22:31

## 2025-03-04 RX ADMIN — ENOXAPARIN SODIUM 30 MG: 100 INJECTION SUBCUTANEOUS at 15:36

## 2025-03-04 RX ADMIN — CEFEPIME 2000 MG: 2 INJECTION, POWDER, FOR SOLUTION INTRAVENOUS at 10:32

## 2025-03-04 RX ADMIN — SODIUM CHLORIDE, SODIUM LACTATE, POTASSIUM CHLORIDE, AND CALCIUM CHLORIDE 1000 ML: .6; .31; .03; .02 INJECTION, SOLUTION INTRAVENOUS at 07:36

## 2025-03-04 RX ADMIN — ONDANSETRON 4 MG: 2 INJECTION INTRAMUSCULAR; INTRAVENOUS at 07:37

## 2025-03-04 RX ADMIN — ONDANSETRON 4 MG: 2 INJECTION INTRAMUSCULAR; INTRAVENOUS at 22:33

## 2025-03-04 RX ADMIN — HYDROMORPHONE HYDROCHLORIDE 0.5 MG: 1 INJECTION, SOLUTION INTRAMUSCULAR; INTRAVENOUS; SUBCUTANEOUS at 21:18

## 2025-03-04 RX ADMIN — SODIUM BICARBONATE: 84 INJECTION, SOLUTION INTRAVENOUS at 15:37

## 2025-03-04 RX ADMIN — OSELTAMIVIR 30 MG: 30 CAPSULE ORAL at 21:17

## 2025-03-04 RX ADMIN — HYDROMORPHONE HYDROCHLORIDE 0.5 MG: 1 INJECTION, SOLUTION INTRAMUSCULAR; INTRAVENOUS; SUBCUTANEOUS at 16:50

## 2025-03-04 RX ADMIN — HYDROMORPHONE HYDROCHLORIDE 0.5 MG: 1 INJECTION, SOLUTION INTRAMUSCULAR; INTRAVENOUS; SUBCUTANEOUS at 11:53

## 2025-03-04 RX ADMIN — MORPHINE SULFATE 4 MG: 4 INJECTION, SOLUTION INTRAMUSCULAR; INTRAVENOUS at 07:37

## 2025-03-04 RX ADMIN — SODIUM CHLORIDE, SODIUM LACTATE, POTASSIUM CHLORIDE, AND CALCIUM CHLORIDE 1000 ML: .6; .31; .03; .02 INJECTION, SOLUTION INTRAVENOUS at 10:33

## 2025-03-04 RX ADMIN — SODIUM CHLORIDE, PRESERVATIVE FREE 10 ML: 5 INJECTION INTRAVENOUS at 21:21

## 2025-03-04 RX ADMIN — IOPAMIDOL 70 ML: 755 INJECTION, SOLUTION INTRAVENOUS at 09:22

## 2025-03-04 ASSESSMENT — PAIN SCALES - GENERAL
PAINLEVEL_OUTOF10: 10
PAINLEVEL_OUTOF10: 9
PAINLEVEL_OUTOF10: 8
PAINLEVEL_OUTOF10: 10

## 2025-03-04 ASSESSMENT — PAIN DESCRIPTION - DESCRIPTORS
DESCRIPTORS: BURNING;STABBING
DESCRIPTORS: STABBING

## 2025-03-04 ASSESSMENT — PAIN DESCRIPTION - LOCATION
LOCATION: BACK
LOCATION: BACK

## 2025-03-04 ASSESSMENT — PAIN DESCRIPTION - ORIENTATION
ORIENTATION: MID
ORIENTATION: LOWER

## 2025-03-04 ASSESSMENT — PAIN - FUNCTIONAL ASSESSMENT: PAIN_FUNCTIONAL_ASSESSMENT: ACTIVITIES ARE NOT PREVENTED

## 2025-03-04 NOTE — PROGRESS NOTES
Pharmacy Adjustment per [facility] protocol    Nick Jain is a 74 y.o. male. Pharmacy has adjusted medications per [facility] protocol.    Recent Labs     03/04/25  0728   BUN 46*       Recent Labs     03/04/25  0728   CREATININE 2.0*       Estimated Creatinine Clearance: 25 mL/min (A) (based on SCr of 2 mg/dL (H)).    Height:   Ht Readings from Last 1 Encounters:   03/04/25 1.753 m (5' 9\")     Weight:  Wt Readings from Last 1 Encounters:   03/04/25 55.8 kg (123 lb)    BMI:  BMI Readings from Last 1 Encounters:   03/04/25 18.16 kg/m²         Plan: Adjust the following medications based on [facility] protocol:           Cefepime to 2000 mg IV once over 30 minutes followed by 1000 mg IV every 12 hours extended infusion over 240 minutes     Electronically signed by Magdy Prescott RPH on 3/4/2025 at 2:20 PM    stretcher

## 2025-03-04 NOTE — H&P
[facility]      Hospitalist - History & Physical      PCP: Tesfaye Gipson MD    Date of Admission: 3/4/2025    Date of Service: 3/4/2025    Chief Complaint:  Shortness of breath/nausea, vomiting, abdominal pain    History Of Present Illness:   The patient is a 74 y.o. male with a PMH of CAD s/p CABG and recent PCI, adenocarcinoma s/p right colectomy on 1/11/2025, COPD, and hypertension who presented to [facility] ED on 3/4/2025 complaining of intractable nausea, vomiting and abdominal pain.  He states that he began to experience nausea approximately 2 days prior to his admission.  He began to vomit on the day prior to admission and has not been able to tolerate any p.o. intake.  He additionally reports an irregular bowel movements and decreased urine output.  He endorses epigastric and suprapubic abdominal pain.  He additionally reports shortness of breath with productive cough.  He denies fever or chills.  Of note he was to initiate chemotherapy on 3/5/2025 with Dr. Quintero.    Further ED workup revealed , K5.1, CO2 14, BUN 46 and creatinine 2.0.  Initial lactic acid 2.1.  WBC 17.2, H&H 10.3/33.3 with a platelet count of 426.  He was found to be tachycardic with a heart rate of 141 upon arrival the respiratory rate of 21.  Lipase 14.  He therefore met sepsis criteria received sepsis bolus.  CT of the abdomen pelvis with IV contrast revealed nodular and groundglass infiltrates within the lower aspect of both lungs.  Consolidation within the left lower lobe.  May represent combination of pneumonia and for metastasis.  Circumferential mucosal thickening in the distal esophagus compatible with esophagitis.  Progression of hepatic metastasis.  Progression of mesenteric and retroperitoneal lymphadenopathy.  Mild intrahepatic and hepatic biliary ductal dilatation.  Distended gallbladder.  Mild peripancreatic stranding.  Complete or near occlusion of superior mesenteric vein.  Lymphadenopathy causing severe    traZODone (DESYREL) 50 MG tablet TAKE 2 TABLETS EVERY NIGHT AS NEEDED FOR SLEEP 2/24/25   Tesfaye Gipson MD   metoprolol succinate (TOPROL XL) 25 MG extended release tablet Take 1 tablet by mouth daily 2/18/25   Margarita Nunn APRN - NP   droNABinol (MARINOL) 2.5 MG capsule Take 1 capsule by mouth 2 times daily (before meals) for 30 days. Max Daily Amount: 5 mg 2/12/25 3/14/25  Gera Quintero MD   metoclopramide (REGLAN) 10 MG tablet Take 1 tablet by mouth 3 times daily (with meals) 2/12/25   Gera Quintero MD   ondansetron (ZOFRAN-ODT) 4 MG disintegrating tablet Take 1 tablet by mouth 3 times daily as needed for Nausea or Vomiting 2/6/25   Tesfaye Gipson MD   promethazine (PHENERGAN) 25 MG tablet Take 1 tablet by mouth 4 times daily as needed for Nausea 2/6/25 3/8/25  Tesfaye Gipson MD   HYDROcodone-acetaminophen (NORCO)  MG per tablet Take 1 tablet by mouth every 6 hours as needed for Pain for up to 30 days. Intended supply: 30 days Max Daily Amount: 4 tablets 2/5/25 3/7/25  Tesfaye Gipson MD   calcium carbonate (CALCIUM 600) 600 MG TABS tablet Take 1 tablet by mouth daily 2/3/25   Tesfaye Gipson MD   megestrol (MEGACE) 40 MG/ML suspension Take 5 mLs by mouth 2 times daily 2/3/25   Tesfaye Gipson MD   clopidogrel (PLAVIX) 75 MG tablet Take 1 tablet by mouth daily 1/27/25   Mari Caldwell APRN   lidocaine 4 % external patch Place 2 patches onto the skin every 12 hours as needed (lumbar pain\) 1/26/25   Queenie Gold APRN   sacubitril-valsartan (ENTRESTO) 24-26 MG per tablet Take 0.5 tablets by mouth 2 times daily 1/26/25   Queenie Gold APRN   spironolactone (ALDACTONE) 25 MG tablet Take 0.5 tablets by mouth daily 1/27/25   Queenie Gold APRN   ondansetron (ZOFRAN) 4 MG tablet Take 1 tablet by mouth 3 times daily as needed for Nausea or Vomiting 1/3/25   Tesfaye Gipson MD   STIOLTO RESPIMAT 2.5-2.5 MCG/ACT AERS Inhale 2 puffs into the lungs daily

## 2025-03-04 NOTE — CARE COORDINATION
Case Management Assessment  Initial Evaluation    Date/Time of Evaluation: 3/4/2025 4:01 PM  Assessment Completed by: Cori Johnson RN    If patient is discharged prior to next notation, then this note serves as note for discharge by case management.    Patient Name: Nick Jain                   YOB: 1950  Diagnosis: OMA (acute kidney injury) [N17.9]  Metastatic colon cancer to liver (HCC) [C18.9, C78.7]  Sepsis due to pneumonia (HCC) [J18.9, A41.9]  Pneumonia of left lower lobe due to infectious organism [J18.9]  Sepsis without acute organ dysfunction, due to unspecified organism (HCC) [A41.9]  Nausea and vomiting, unspecified vomiting type [R11.2]                   Date / Time: 3/4/2025  7:12 AM    Patient Admission Status: Inpatient   Readmission Risk (Low < 19, Mod (19-27), High > 27): Readmission Risk Score: 27.1    Current PCP: Tesfaye Gipson MD  PCP verified by CM? Yes    Chart Reviewed: Yes      History Provided by: Patient  Patient Orientation: Alert and Oriented, Person, Place, Situation    Patient Cognition: Alert    Hospitalization in the last 30 days (Readmission):  No    If yes, Readmission Assessment in CM Navigator will be completed.    Advance Directives:      Code Status: Full Code   Patient's Primary Decision Maker is:      Primary Decision Maker: Doreen Jain - Spouse - [phone]    Discharge Planning:    Patient lives with: Spouse/Significant Other Type of Home: Trailer/Mobile Home  Primary Care Giver: Self  Patient Support Systems include: Spouse/Significant Other   Current Financial resources: Medicare  Current community resources: None  Current services prior to admission: Durable Medical Equipment, Home Care            Current DME: Cane, Walker, Wheelchair            Type of Home Care services:  Aide Services, OT, PT, Nursing Services, Skilled Therapy    ADLS  Prior functional level: Independent in ADLs/IADLs  Current functional level: Assistance with the

## 2025-03-04 NOTE — ED PROVIDER NOTES
[facility] EMERGENCY DEPARTMENT  eMERGENCY dEPARTMENT eNCOUnter      Pt Name: Nick Jain  MRN: [id number]  Birthdate 1950  Date of evaluation: 3/4/2025  Provider: Johnathon Hsu MD    CHIEF COMPLAINT       Chief Complaint   Patient presents with    Shortness of Breath    Nausea    Vomiting         HISTORY OF PRESENT ILLNESS   (Location/Symptom, Timing/Onset,Context/Setting, Quality, Duration, Modifying Factors, Severity)  Note limiting factors.   Nick Jain is a 74 y.o. male who presents to the emergency department for evaluation regarding nausea with associated vomiting.  Patient presents here to the ED with his family who states that he is not felt well for the past couple of days.  Patient describes diffuse, crampy abdominal pain associated with decreased p.o. intake and nausea and vomiting.  Patient also states he has had some mild shortness of breath and a nonproductive cough.  He has a recent diagnosis of metastatic adenocarcinoma of the colon and is underwent previous hemicolectomy.  He is currently following with oncology and due to begin his outpatient chemotherapy this coming week.  He does not have any prior history of previous bowel obstruction.    HPI    NursingNotes were reviewed.    REVIEW OF SYSTEMS    (2-9 systems for level 4, 10 or more for level 5)     Review of Systems   Constitutional:  Positive for activity change, appetite change and fatigue. Negative for fever.   Respiratory:  Positive for cough and shortness of breath.    Cardiovascular:  Negative for chest pain and palpitations.   Gastrointestinal:  Positive for abdominal pain, nausea and vomiting. Negative for abdominal distention.   Neurological:  Positive for dizziness. Negative for syncope.   All other systems reviewed and are negative.           PAST MEDICALHISTORY     Past Medical History:   Diagnosis Date    COPD (chronic obstructive pulmonary disease) (HCC)     Heart attack (HCC)     two    Hypertension     Metastatic

## 2025-03-04 NOTE — TELEPHONE ENCOUNTER
Called and left a message asking for a return call concerning applying for free [employer].  Copay was $1,262.00.    Ebony Guzman  Financial Navigator   [facility]    [phone]

## 2025-03-04 NOTE — CONSULTS
pneumonia and/or metastasis.  There is small left pleural effusion.  Increased low density lesion within the left lobe of the liver.  On axial image 13 this measures 1.6 x 1.9 cm.  There are adjacent smaller low-density lesions within the left lobe.  This is most compatible with progression of hepatic metastasis.  There is intrahepatic and extrahepatic biliary ductal dilatation.  Gallbladder is distended.  Mild nodular thickening of the left adrenal gland.  The right adrenal gland is unremarkable.  The kidneys show no acute process.  No hydronephrosis.  The spleen shows no acute abnormality.  Mild peripancreatic stranding.  Mild pancreatitis is not excluded.  There is no bowel obstruction.  No evidence of appendicitis.  Unremarkable urinary bladder.  Trace free fluid within the right and left paracolic gutter.  There has been interval progression of mesenteric and retroperitoneal lymphadenopathy most compatible with worsening metastasis.  Left periaortic lymphadenopathy on axial image 35 measures 1.8 cm transverse dimension.  Right periaortic lymphadenopathy on  axial image 33 measures 2.5 cm AP dimension.  Lymph node anterior to the hepatic artery on axial image 27 measures 2.6 x 1.7 cm. Right periaortic lymph node at the lower aspect of the chest on axial image 16 measures 1.9 x 1.4 cm  There is new complete or near complete occlusion of the superior mesenteric vein. Lymphadenopathy causes mass effect and severe narrowing of the inferior vena cava.  This can be seen on coronal image 29 and axial image 33.  Circumferential mucosal thickening of the distal esophagus likely due to esophagitis.  Diffuse mesenteric edema.  No acute osseous abnormality.        1.  Nodular and ground-glass infiltrate within the lower aspect of both lungs.  Consolidation within the posterior left lower lobe.  This may represent a combination of pneumonia and/or metastasis. 2.  Small left pleural effusion. 3.  Circumferential mucosal    Component Value Date/Time    IRON 9 01/13/2025 03:39 AM    TIBC 140 01/13/2025 03:39 AM    IRONPERSAT 6 01/13/2025 03:39 AM    FERRITIN 271.0 01/13/2025 03:39 AM    MLTSOKWQ57 213 01/13/2025 03:39 AM    FOLATE 8.9 09/25/2024 12:41 PM    RETICCTPCT 1.14 01/13/2025 03:39 AM    RETICABS 0.0405 01/13/2025 03:39 AM     01/09/2025 04:00 PM   B12 was borderline low but MMA was normal suggesting no B12 deficiency.  Likely functional iron deficiency     Venofer 1000 mg completed 1/16/2025  B12 1000 mcg subcu times daily x 7 doses to be followed by weekly B12 1000 mcg x 4 doses to be followed by B12 1000 mcg monthly.  To be started 1/27/2025     # Neutrophil leukocytosis  WBC 17.2/ANC 14  Afebrile.  Continue to monitor    # Pneumonia  Left lower lobe consolidation on CT scan   continue cefepime/azithromycin    # Influenza A  Tamiflu 30 mg p.o. daily days 1-5, started 3/4/2025  Dose corrected for EGFR 34    # Renal impairment-worsening  Creatinine 2.0.  Previously 1.1      Latest Ref Rng & Units 3/4/2025     7:28 AM 2/27/2025    10:56 AM 2/12/2025     9:49 AM 1/26/2025     4:05 AM 1/25/2025     9:08 AM   Labs Renal   BUN 8 - 23 mg/dL 46  31  30  25  23    Cr 0.7 - 1.2 mg/dL 2.0  1.1  1.3  1.4  1.4    K 3.5 - 5.1 mmol/L 5.1  4.7  5.0  4.4  4.1    Na 136 - 145 mmol/L 133  135  138  136  138    Worsening of renal function     # History of CAD/systolic heart failure (Takotsubo's cardiomyopathy)-managed by cardiology     1/20/2025-Cardiac catheterization, [facility] by Dr. Bernard, cardiology:  Findings of :severe triple-vessel disease.  RCA proximal to mid diffuse severe 95% in-stent restenosis that feeds a large RPL with occluded proximal RPDA.  SVG to RPDA is patent but fails to backfill RCA.  Left main with diffuse nonobstructive disease.  LAD proximal diffuse nonobstructive with mid 75% stenosis with IFR 0.75 localizing to lesion consistent with obstructive lesion.  LIMA to distal LAD is atretic and basically nonfunctional with

## 2025-03-04 NOTE — PROGRESS NOTES
4 Eyes Skin Assessment     NAME:  Nick Jain  YOB: 1950  MEDICAL RECORD NUMBER:  [id number]    The patient is being assessed for  Admission    I agree that at least one RN has performed a thorough Head to Toe Skin Assessment on the patient. ALL assessment sites listed below have been assessed.      Areas assessed by both nurses:    Head, Face, Ears, Shoulders, Back, Chest, Arms, Elbows, Hands, Sacrum. Buttock, Coccyx, Ischium, Legs. Feet and Heels, and Under Medical Devices         Does the Patient have a Wound? No noted wound(s)       Teo Prevention initiated by RN: Yes  Wound Care Orders initiated by RN: No    Pressure Injury (Stage 3,4, Unstageable, DTI, NWPT, and Complex wounds) if present, place Wound referral order by RN under : No    New Ostomies, if present place, Ostomy referral order under : No     Nurse 1 eSignature: Electronically signed by KYLAH CARO RN on 3/4/25 at 3:14 PM CST    **SHARE this note so that the co-signing nurse can place an eSignature**    Nurse 2 eSignature: Electronically signed by Eloina Murillo RN on 3/4/25 at 3:47 PM CST'

## 2025-03-04 NOTE — PROGRESS NOTES
Nick Jain arrived to room # 406.   Presented with: sepsis d/t pna  Mental Status: Patient is oriented, alert, coherent, logical, thought processes intact, and able to concentrate and follow conversation.   Vitals:    03/04/25 1411   BP: 120/79   Pulse: (!) 128   Resp: 18   Temp: 97.8 °F (36.6 °C)   SpO2: 98%     Patient safety contract and falls prevention contract reviewed with patient Yes.  Oriented Patient to room.  Call light within reach. Yes.  Needs, issues or concerns expressed at this time: no.  Primary nurse Phyllis states she will do home med list review.      Electronically signed by Eloina Murillo RN on 3/4/2025 at 2:47 PM

## 2025-03-04 NOTE — ED NOTES
ED TO INPATIENT SBAR HANDOFF    Patient Name: Nick Jain   : 1950  74 y.o.   Family/Caregiver Present: Yes  Code Status Order: Prior    C-SSRS: Risk of Suicide: No Risk  Sitter No  Restraints:         Situation  Chief Complaint:   Chief Complaint   Patient presents with    Shortness of Breath    Nausea    Vomiting     Patient Diagnosis: Sepsis due to pneumonia (HCC) [J18.9, A41.9]     Brief Description of Patient's Condition: sob  Mental Status: oriented  Arrived from: home    Imaging:   CT ABDOMEN PELVIS W IV CONTRAST Additional Contrast? None   Final Result   1.  Nodular and ground-glass infiltrate within the lower aspect of both lungs.  Consolidation within the posterior left lower lobe.  This may represent a combination of pneumonia and/or metastasis.   2.  Small left pleural effusion.   3.  Circumferential mucosal thickening of the distal esophagus most compatible with esophagitis.   4.  Progression of hepatic metastasis.   5.  Progression of mesenteric and retroperitoneal metastatic lymphadenopathy.  Multiple reference images and measurements provided above.   6.  Trace free fluid.   7.  Mild intrahepatic and hepatic biliary ductal dilatation.  Distended gallbladder.   8.  Mild peripancreatic stranding.  Mild acute pancreatitis is not excluded.   9.  Complete or near complete occlusion of the superior mesenteric vein.   10.  Lymphadenopathy causes severe stenosis and near-complete occlusion of the inferior vena cava.        All CT scans are performed using dose optimization techniques as appropriate to the performed exam and include    at least one of the following: Automated exposure control, adjustment of the mA and/or kV according to size, and the use of iterative reconstruction technique.        ______________________________________    Electronically signed by: JANETH DELACRUZ M.D.   Date:     2025   Time:    09:48       XR CHEST PORTABLE   Final Result   1.  No acute cardiopulmonary  disease.               ______________________________________    Electronically signed by: FUNMI DAVIS M.D.   Date:     03/04/2025   Time:    07:31         COVID-19 Results:   Internal Administration   First Dose COVID-19, LINDA ordoñez, Primary or Immunocompromised, (age 12y+), IM, 100 mcg/0.5mL  02/25/2021   Second Dose COVID-19, LINDA ordoñez, Primary or Immunocompromised, (age 12y+), IM, 100 mcg/0.5mL   03/25/2021       Last COVID Lab SARS-CoV-2, PCR (no units)   Date Value   10/20/2021 Not Detected           Abnormal labs:   Abnormal Labs Reviewed   BRAIN NATRIURETIC PEPTIDE - Abnormal; Notable for the following components:       Result Value    NT Pro-BNP 2,169 (*)     All other components within normal limits   CBC WITH AUTO DIFFERENTIAL - Abnormal; Notable for the following components:    WBC 17.2 (*)     RBC 3.70 (*)     Hemoglobin 10.3 (*)     Hematocrit 33.3 (*)     MCHC 30.9 (*)     RDW 16.8 (*)     Platelets 426 (*)     Neutrophils % 84.1 (*)     Lymphocytes % 10.0 (*)     Neutrophils Absolute 14.5 (*)     All other components within normal limits   COMPREHENSIVE METABOLIC PANEL - Abnormal; Notable for the following components:    Sodium 133 (*)     CO2 14 (*)     Anion Gap 21 (*)     Glucose 135 (*)     BUN 46 (*)     Creatinine 2.0 (*)     Est, Glom Filt Rate 34 (*)     Albumin 3.2 (*)     Alkaline Phosphatase 981 (*)     All other components within normal limits   LACTATE, SEPSIS - Abnormal; Notable for the following components:    Lactic Acid, Sepsis 2.1 (*)     All other components within normal limits    Narrative:     CALL  [place]  K[facility] tel. ,  Chemistry results called to and read back by hank landis,  03/04/2025 11:07, by OLGA   PROCALCITONIN - Abnormal; Notable for the following components:    Procalcitonin 0.76 (*)     All other components within normal limits     Background  Allergies: No Known Allergies  Current Medications:   Medications Administered         ceFEPIme

## 2025-03-05 ENCOUNTER — HOSPITAL ENCOUNTER (OUTPATIENT)
Dept: INFUSION THERAPY | Age: 75
End: 2025-03-05

## 2025-03-05 DIAGNOSIS — E53.8 B12 DEFICIENCY: Primary | ICD-10-CM

## 2025-03-05 LAB
ALBUMIN SERPL-MCNC: 2.7 G/DL (ref 3.5–5.2)
ALP SERPL-CCNC: 597 U/L (ref 40–129)
ALT SERPL-CCNC: 16 U/L (ref 10–50)
ANION GAP SERPL CALCULATED.3IONS-SCNC: 11 MMOL/L (ref 8–16)
AST SERPL-CCNC: 23 U/L (ref 10–50)
BASOPHILS # BLD: 0 K/UL (ref 0–0.2)
BASOPHILS NFR BLD: 0.1 % (ref 0–1)
BILIRUB SERPL-MCNC: <0.2 MG/DL (ref 0.2–1.2)
BUN SERPL-MCNC: 41 MG/DL (ref 8–23)
CALCIUM SERPL-MCNC: 8.3 MG/DL (ref 8.8–10.2)
CHLORIDE SERPL-SCNC: 104 MMOL/L (ref 98–107)
CO2 SERPL-SCNC: 21 MMOL/L (ref 22–29)
CREAT SERPL-MCNC: 1.3 MG/DL (ref 0.7–1.2)
EOSINOPHIL # BLD: 0 K/UL (ref 0–0.6)
EOSINOPHIL NFR BLD: 0 % (ref 0–5)
ERYTHROCYTE [DISTWIDTH] IN BLOOD BY AUTOMATED COUNT: 17.1 % (ref 11.5–14.5)
GLUCOSE SERPL-MCNC: 139 MG/DL (ref 70–99)
HCT VFR BLD AUTO: 24.7 % (ref 42–52)
HGB BLD-MCNC: 7.8 G/DL (ref 14–18)
IMM GRANULOCYTES # BLD: 0.1 K/UL
LYMPHOCYTES # BLD: 1 K/UL (ref 1.1–4.5)
LYMPHOCYTES NFR BLD: 8.7 % (ref 20–40)
MCH RBC QN AUTO: 27.6 PG (ref 27–31)
MCHC RBC AUTO-ENTMCNC: 31.6 G/DL (ref 33–37)
MCV RBC AUTO: 87.3 FL (ref 80–94)
MONOCYTES # BLD: 0.7 K/UL (ref 0–0.9)
MONOCYTES NFR BLD: 6 % (ref 0–10)
NEUTROPHILS # BLD: 9.5 K/UL (ref 1.5–7.5)
NEUTS SEG NFR BLD: 84.8 % (ref 50–65)
PLATELET # BLD AUTO: 304 K/UL (ref 130–400)
PMV BLD AUTO: 10.1 FL (ref 9.4–12.4)
POTASSIUM SERPL-SCNC: 4.2 MMOL/L (ref 3.5–5)
PROT SERPL-MCNC: 6.2 G/DL (ref 6.4–8.3)
RBC # BLD AUTO: 2.83 M/UL (ref 4.7–6.1)
SODIUM SERPL-SCNC: 136 MMOL/L (ref 136–145)
WBC # BLD AUTO: 11.2 K/UL (ref 4.8–10.8)

## 2025-03-05 PROCEDURE — 97161 PT EVAL LOW COMPLEX 20 MIN: CPT

## 2025-03-05 PROCEDURE — 80053 COMPREHEN METABOLIC PANEL: CPT

## 2025-03-05 PROCEDURE — 1200000000 HC SEMI PRIVATE

## 2025-03-05 PROCEDURE — 99223 1ST HOSP IP/OBS HIGH 75: CPT

## 2025-03-05 PROCEDURE — 6370000000 HC RX 637 (ALT 250 FOR IP)

## 2025-03-05 PROCEDURE — 94760 N-INVAS EAR/PLS OXIMETRY 1: CPT

## 2025-03-05 PROCEDURE — 6370000000 HC RX 637 (ALT 250 FOR IP): Performed by: NURSE PRACTITIONER

## 2025-03-05 PROCEDURE — 97530 THERAPEUTIC ACTIVITIES: CPT

## 2025-03-05 PROCEDURE — 6360000002 HC RX W HCPCS: Performed by: NURSE PRACTITIONER

## 2025-03-05 PROCEDURE — 97165 OT EVAL LOW COMPLEX 30 MIN: CPT

## 2025-03-05 PROCEDURE — 94640 AIRWAY INHALATION TREATMENT: CPT

## 2025-03-05 PROCEDURE — 6360000002 HC RX W HCPCS: Performed by: INTERNAL MEDICINE

## 2025-03-05 PROCEDURE — 2500000003 HC RX 250 WO HCPCS: Performed by: NURSE PRACTITIONER

## 2025-03-05 PROCEDURE — 99232 SBSQ HOSP IP/OBS MODERATE 35: CPT | Performed by: INTERNAL MEDICINE

## 2025-03-05 PROCEDURE — 2580000003 HC RX 258: Performed by: NURSE PRACTITIONER

## 2025-03-05 PROCEDURE — 6370000000 HC RX 637 (ALT 250 FOR IP): Performed by: INTERNAL MEDICINE

## 2025-03-05 PROCEDURE — 85025 COMPLETE CBC W/AUTO DIFF WBC: CPT

## 2025-03-05 RX ORDER — SODIUM CHLORIDE, SODIUM LACTATE, POTASSIUM CHLORIDE, CALCIUM CHLORIDE 600; 310; 30; 20 MG/100ML; MG/100ML; MG/100ML; MG/100ML
INJECTION, SOLUTION INTRAVENOUS CONTINUOUS
Status: DISCONTINUED | OUTPATIENT
Start: 2025-03-05 | End: 2025-03-06

## 2025-03-05 RX ORDER — CYANOCOBALAMIN 1000 UG/ML
1000 INJECTION, SOLUTION INTRAMUSCULAR; SUBCUTANEOUS WEEKLY
Status: COMPLETED | OUTPATIENT
Start: 2025-03-05 | End: 2025-03-12

## 2025-03-05 RX ORDER — CETIRIZINE HYDROCHLORIDE 10 MG/1
10 TABLET ORAL DAILY
Status: DISCONTINUED | OUTPATIENT
Start: 2025-03-05 | End: 2025-03-14 | Stop reason: HOSPADM

## 2025-03-05 RX ORDER — DIPHENHYDRAMINE HYDROCHLORIDE 50 MG/ML
50 INJECTION INTRAMUSCULAR; INTRAVENOUS
OUTPATIENT
Start: 2025-03-19

## 2025-03-05 RX ORDER — CYANOCOBALAMIN 1000 UG/ML
1000 INJECTION, SOLUTION INTRAMUSCULAR; SUBCUTANEOUS ONCE
OUTPATIENT
Start: 2025-03-19

## 2025-03-05 RX ORDER — EPINEPHRINE 1 MG/ML
0.3 INJECTION, SOLUTION, CONCENTRATE INTRAVENOUS PRN
OUTPATIENT
Start: 2025-03-19

## 2025-03-05 RX ORDER — ACETAMINOPHEN 325 MG/1
650 TABLET ORAL
OUTPATIENT
Start: 2025-03-19

## 2025-03-05 RX ORDER — OSELTAMIVIR PHOSPHATE 30 MG/1
30 CAPSULE ORAL 2 TIMES DAILY
Status: DISCONTINUED | OUTPATIENT
Start: 2025-03-05 | End: 2025-03-05

## 2025-03-05 RX ORDER — OXYCODONE HYDROCHLORIDE 10 MG/1
10 TABLET ORAL EVERY 4 HOURS PRN
Status: DISCONTINUED | OUTPATIENT
Start: 2025-03-05 | End: 2025-03-12

## 2025-03-05 RX ORDER — ONDANSETRON 2 MG/ML
8 INJECTION INTRAMUSCULAR; INTRAVENOUS
OUTPATIENT
Start: 2025-03-19

## 2025-03-05 RX ORDER — OXYCODONE HYDROCHLORIDE 5 MG/1
5 TABLET ORAL EVERY 4 HOURS PRN
Status: DISCONTINUED | OUTPATIENT
Start: 2025-03-05 | End: 2025-03-10

## 2025-03-05 RX ORDER — SODIUM CHLORIDE 9 MG/ML
INJECTION, SOLUTION INTRAVENOUS CONTINUOUS
OUTPATIENT
Start: 2025-03-19

## 2025-03-05 RX ORDER — OSELTAMIVIR PHOSPHATE 75 MG/1
75 CAPSULE ORAL 2 TIMES DAILY
Status: COMPLETED | OUTPATIENT
Start: 2025-03-05 | End: 2025-03-08

## 2025-03-05 RX ORDER — FAMOTIDINE 10 MG/ML
20 INJECTION, SOLUTION INTRAVENOUS
OUTPATIENT
Start: 2025-03-19

## 2025-03-05 RX ORDER — FENTANYL 12.5 UG/1
1 PATCH TRANSDERMAL
Status: DISCONTINUED | OUTPATIENT
Start: 2025-03-05 | End: 2025-03-07

## 2025-03-05 RX ORDER — CYANOCOBALAMIN 1000 UG/ML
1000 INJECTION, SOLUTION INTRAMUSCULAR; SUBCUTANEOUS ONCE
OUTPATIENT
Start: 2025-03-19 | End: 2025-03-19

## 2025-03-05 RX ORDER — HYDROCORTISONE SODIUM SUCCINATE 100 MG/2ML
100 INJECTION INTRAMUSCULAR; INTRAVENOUS
OUTPATIENT
Start: 2025-03-19

## 2025-03-05 RX ORDER — NALOXONE HYDROCHLORIDE 0.4 MG/ML
0.4 INJECTION, SOLUTION INTRAMUSCULAR; INTRAVENOUS; SUBCUTANEOUS PRN
Status: DISCONTINUED | OUTPATIENT
Start: 2025-03-05 | End: 2025-03-14 | Stop reason: HOSPADM

## 2025-03-05 RX ORDER — OXYCODONE HYDROCHLORIDE 5 MG/1
5 TABLET ORAL EVERY 4 HOURS PRN
Status: DISCONTINUED | OUTPATIENT
Start: 2025-03-05 | End: 2025-03-05

## 2025-03-05 RX ORDER — ALBUTEROL SULFATE 90 UG/1
4 INHALANT RESPIRATORY (INHALATION) PRN
OUTPATIENT
Start: 2025-03-19

## 2025-03-05 RX ADMIN — ASPIRIN 81 MG: 81 TABLET, COATED ORAL at 08:11

## 2025-03-05 RX ADMIN — OXYCODONE 5 MG: 5 TABLET ORAL at 18:23

## 2025-03-05 RX ADMIN — HYDROMORPHONE HYDROCHLORIDE 0.5 MG: 1 INJECTION, SOLUTION INTRAMUSCULAR; INTRAVENOUS; SUBCUTANEOUS at 19:35

## 2025-03-05 RX ADMIN — PROCHLORPERAZINE EDISYLATE 10 MG: 5 INJECTION INTRAMUSCULAR; INTRAVENOUS at 22:38

## 2025-03-05 RX ADMIN — AZITHROMYCIN MONOHYDRATE 500 MG: 500 INJECTION, POWDER, LYOPHILIZED, FOR SOLUTION INTRAVENOUS at 14:32

## 2025-03-05 RX ADMIN — TRAZODONE HYDROCHLORIDE 100 MG: 100 TABLET ORAL at 22:32

## 2025-03-05 RX ADMIN — METOPROLOL SUCCINATE 25 MG: 25 TABLET, EXTENDED RELEASE ORAL at 08:12

## 2025-03-05 RX ADMIN — Medication 2 PUFF: at 12:00

## 2025-03-05 RX ADMIN — HYDROMORPHONE HYDROCHLORIDE 0.5 MG: 1 INJECTION, SOLUTION INTRAMUSCULAR; INTRAVENOUS; SUBCUTANEOUS at 00:21

## 2025-03-05 RX ADMIN — SODIUM CHLORIDE, PRESERVATIVE FREE 10 ML: 5 INJECTION INTRAVENOUS at 19:39

## 2025-03-05 RX ADMIN — METOCLOPRAMIDE 10 MG: 5 TABLET ORAL at 11:50

## 2025-03-05 RX ADMIN — CLOPIDOGREL BISULFATE 75 MG: 75 TABLET ORAL at 08:11

## 2025-03-05 RX ADMIN — CEFEPIME 2000 MG: 2 INJECTION, POWDER, FOR SOLUTION INTRAVENOUS at 22:37

## 2025-03-05 RX ADMIN — HYDROMORPHONE HYDROCHLORIDE 0.5 MG: 1 INJECTION, SOLUTION INTRAMUSCULAR; INTRAVENOUS; SUBCUTANEOUS at 22:28

## 2025-03-05 RX ADMIN — OSELTAMIVIR PHOSPHATE 75 MG: 75 CAPSULE ORAL at 19:35

## 2025-03-05 RX ADMIN — ACETAMINOPHEN 650 MG: 325 TABLET ORAL at 08:11

## 2025-03-05 RX ADMIN — ENOXAPARIN SODIUM 30 MG: 100 INJECTION SUBCUTANEOUS at 14:30

## 2025-03-05 RX ADMIN — SODIUM CHLORIDE, PRESERVATIVE FREE 10 ML: 5 INJECTION INTRAVENOUS at 11:13

## 2025-03-05 RX ADMIN — DRONABINOL 2.5 MG: 2.5 CAPSULE ORAL at 17:26

## 2025-03-05 RX ADMIN — CETIRIZINE HYDROCHLORIDE 10 MG: 10 TABLET, FILM COATED ORAL at 11:50

## 2025-03-05 RX ADMIN — SODIUM CHLORIDE, PRESERVATIVE FREE 10 ML: 5 INJECTION INTRAVENOUS at 09:55

## 2025-03-05 RX ADMIN — HYDROMORPHONE HYDROCHLORIDE 0.5 MG: 1 INJECTION, SOLUTION INTRAMUSCULAR; INTRAVENOUS; SUBCUTANEOUS at 06:30

## 2025-03-05 RX ADMIN — MEGESTROL ACETATE 200 MG: 40 SUSPENSION ORAL at 19:36

## 2025-03-05 RX ADMIN — HYDROMORPHONE HYDROCHLORIDE 0.5 MG: 1 INJECTION, SOLUTION INTRAMUSCULAR; INTRAVENOUS; SUBCUTANEOUS at 03:25

## 2025-03-05 RX ADMIN — METOCLOPRAMIDE 10 MG: 5 TABLET ORAL at 08:11

## 2025-03-05 RX ADMIN — PROCHLORPERAZINE EDISYLATE 10 MG: 5 INJECTION INTRAMUSCULAR; INTRAVENOUS at 03:28

## 2025-03-05 RX ADMIN — METOCLOPRAMIDE 10 MG: 5 TABLET ORAL at 17:26

## 2025-03-05 RX ADMIN — CYANOCOBALAMIN 1000 MCG: 1000 INJECTION, SOLUTION INTRAMUSCULAR; SUBCUTANEOUS at 08:12

## 2025-03-05 RX ADMIN — SODIUM CHLORIDE, POTASSIUM CHLORIDE, SODIUM LACTATE AND CALCIUM CHLORIDE: 600; 310; 30; 20 INJECTION, SOLUTION INTRAVENOUS at 11:53

## 2025-03-05 RX ADMIN — OSELTAMIVIR PHOSPHATE 75 MG: 75 CAPSULE ORAL at 08:11

## 2025-03-05 RX ADMIN — CEFEPIME 2000 MG: 2 INJECTION, POWDER, FOR SOLUTION INTRAVENOUS at 10:05

## 2025-03-05 RX ADMIN — HYDROMORPHONE HYDROCHLORIDE 0.5 MG: 1 INJECTION, SOLUTION INTRAMUSCULAR; INTRAVENOUS; SUBCUTANEOUS at 09:56

## 2025-03-05 ASSESSMENT — PAIN DESCRIPTION - LOCATION
LOCATION: BACK
LOCATION: BACK;ABDOMEN
LOCATION: BACK
LOCATION: BACK
LOCATION: BACK;ABDOMEN
LOCATION: ABDOMEN

## 2025-03-05 ASSESSMENT — PAIN DESCRIPTION - DESCRIPTORS
DESCRIPTORS: STABBING
DESCRIPTORS: SHARP
DESCRIPTORS: DISCOMFORT;ACHING

## 2025-03-05 ASSESSMENT — PAIN SCALES - GENERAL
PAINLEVEL_OUTOF10: 8
PAINLEVEL_OUTOF10: 10
PAINLEVEL_OUTOF10: 0
PAINLEVEL_OUTOF10: 8
PAINLEVEL_OUTOF10: 7
PAINLEVEL_OUTOF10: 10
PAINLEVEL_OUTOF10: 10

## 2025-03-05 ASSESSMENT — PAIN - FUNCTIONAL ASSESSMENT
PAIN_FUNCTIONAL_ASSESSMENT: ACTIVITIES ARE NOT PREVENTED

## 2025-03-05 ASSESSMENT — PAIN DESCRIPTION - ORIENTATION
ORIENTATION: LOWER

## 2025-03-05 NOTE — CONSULTS
Palliative Care Consult Note    3/5/2025 8:38 AM  Subjective:  Admit Date: 3/4/2025  PCP: Tesfaye Gipson MD    Date of Service: 3/5/2025    Reason for Consultation:  Goals of Care, Code Status, Family Support     History Obtained From: EMR/Patient and their Family    History Of Present Illness:   The patient is a 74 y.o. male with PMH HTN, COPD, TIA, CAD s/p CABG x3 3/2024 and recent PCI, pulmonary nodules, HLD, CKD, recent cecal cancer diagnosis 1/2025, longstanding prior tobacco use, and other comorbidities who presented to [facility] 3/4/2025 complaining of nausea and vomiting. Patient is known to palliative care and followed by inpatient team during recent admission here 1/10/2025-1/26/2025. During previous stay, he underwent a right hemicolectomy/terminal ileum ileectomy and lymph node dissection 1/11/2025 by Dr. Suleiman Hunter. 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 Unfortunately patient did have a persistent postoperative ileus. A PICC line was placed 1/17/2025 for initiation of TPN. Ileus did begin to resolve and bowel function returned. Patient developed tachycardia with increase in proBNP. He was initiated on IV diuretics and an echo was obtained 1/19/2025 which revealed EF 30 to 35%, severe hypokinesis of multiple segments with possible apical ballooning syndrome concerning for Takotsubo's cardiomyopathy, grade 2 diastolic dysfunction. EF was normal following his CABG last year. He went to the cath lab 1/20/2025 where he was found to have severe triple-vessel disease. Findings are felt to be related to ischemic cardiomyopathy with loss of diagonal post bypass obstructive LAD and RCA. He underwent SUSAN to RCA and SUSAN to LAD. Additionally had successful PCI of first diagonal with recannulization of  with PTCA. He was recommended heart failure med regimen and uninterrupted Plavix for at least 6 months.  He was discharged home in stable condition. Since that time, he has  +muscle weakness  Vascular: Denies edema / claudication / varicosities  Heme / endocrine: Denies easy bruising / bleeding / excessive sweating / heat or cold intolerance  Psychiatric:  Denies depression / anxiety / insomnia / mood changes  Skin:  Denies new rashes / lesions / skin hair or nail changes    14 point review of systems is negative except as specifically addressed above.    Physical Examination:  /76   Pulse (!) 117   Temp 98 °F (36.7 °C) (Temporal)   Resp 16   Ht 1.753 m (5' 9\")   Wt 60.1 kg (132 lb 6.4 oz)   SpO2 95%   BMI 19.55 kg/m²     General appearance: 73 yo male, ill appearing, cachectic, no acute distress  Head: Normocephalic, without obvious abnormality, atraumatic  Eyes: conjunctivae/corneas clear. PERRL, EOM's intact.   Ears/Nose: normal external ears and nose  Neck/Throat: supple, symmetrical, trachea midline  Pulmonary: diminished to auscultation bilaterally, unlabored on room air  Cardiovascular: tachycardic and regula rhythm   Gastrointestinal: soft, minimally TTP throughout, bowel sounds present  Musculoskeletal:No lower extremity edema,  No erythema, no tenderness to palpation  Skin: Warm, dry, pale  Neurologic: Drowsy, wakes to voice but falls asleep easily, follows commands, generalized weakness  Psychiatric: Calm and cooperative    Diagnostic Data:  CBC:  Recent Labs     03/04/25  0728 03/05/25  0340   WBC 17.2* 11.2*   HGB 10.3* 7.8*   HCT 33.3* 24.7*   * 304     BMP:  Recent Labs     03/04/25  0728 03/05/25  0340   * 136   K 5.1 4.2   CL 98 104   CO2 14* 21*   BUN 46* 41*   CREATININE 2.0* 1.3*   CALCIUM 9.1 8.3*     Recent Labs     03/04/25  0728 03/05/25  0340   AST 19 23   ALT 18 16   BILITOT 0.3 <0.2   ALKPHOS 981* 597*       CT ABDOMEN PELVIS W IV CONTRAST Additional Contrast? None    Result Date: 3/4/2025  EXAM: CT ABDOMEN PELVIS WITH INTRAVENOUS CONTRAST 03/04/2025.  SAGITTAL AND CORONAL REFORMATTED IMAGES OBTAINED  HISTORY: Colorectal cancer.

## 2025-03-05 NOTE — PROGRESS NOTES
[facility]      Patient:  Nick Jain  YOB: 1950  Date of Service: 3/5/2025  MRN: [id number]   Acct: [id number]   Primary Care Physician: Tesfaye Gipson MD  Advance Directive: Full Code  Admit Date: 3/4/2025       Hospital Day: 1  Portions of this note have been copied forward, however, changed to reflect the most current clinical status of this patient.    CHIEF COMPLAINT Shortness of breath/nausea, vomiting, abdominal pain     SUBJECTIVE:  His wife states that he \"had a bad night with pain\", however he has been able to get comfortable this AM. Nausea and vomiting resolved. Tachycardia improving. C/O nasal congestion.  Afebrile    CUMULATIVE HOSPITAL STAY:  The patient is a 74 y.o. male with a PMH of CAD s/p CABG and recent PCI, adenocarcinoma s/p right colectomy on 1/11/2025, COPD, and hypertension who presented to [facility] ED on 3/4/2025 complaining of intractable nausea, vomiting and abdominal pain.  He stated that he began to experience nausea approximately 2 days prior to his admission.  He began to vomit on the day prior to admission and had not been able to tolerate any p.o. intake.  He additionally reported irregular bowel movements and decreased urine output.  He endorsed epigastric and suprapubic abdominal pain.  He additionally reported shortness of breath with productive cough.  He denied fever or chills.  Of note he was to initiate chemotherapy on 3/5/2025 with Dr. Quintero.     Further ED workup revealed , K5.1, CO2 14, BUN 46 and creatinine 2.0.  Initial lactic acid 2.1.  WBC 17.2, H&H 10.3/33.3 with a platelet count of 426.  He was found to be tachycardic with a heart rate of 141 upon arrival the respiratory rate of 21.  Lipase 14.  He, therefore, met sepsis criteria received sepsis bolus.  CT of the abdomen pelvis with IV contrast revealed nodular and groundglass infiltrates within the lower aspect of both lungs.  Consolidation within the left lower lobe.  May

## 2025-03-05 NOTE — PROGRESS NOTES
Occupational Therapy  Facility/Department: [facility] ONCOLOGY UNIT  Occupational Therapy Initial Assessment    Name: Nick Jain  : 1950  MRN: [id number]  Date of Service: 3/5/2025    Discharge Recommendations:  Patient would benefit from continued therapy after discharge          Patient Diagnosis(es): The primary encounter diagnosis was Pneumonia of left lower lobe due to infectious organism. Diagnoses of OMA (acute kidney injury), Nausea and vomiting, unspecified vomiting type, Metastatic colon cancer to liver (HCC), and Sepsis without acute organ dysfunction, due to unspecified organism (HCC) were also pertinent to this visit.  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  Past Surgical History:  has a past surgical history that includes Appendectomy; Testicle removal; back surgery; Tympanostomy tube placement; Colonoscopy (N/A, 10/22/2021); hemicolectomy (Right, 2025); Coronary artery bypass graft (2024); Cardiac procedure (N/A, 2025); Cardiac procedure (N/A, 2025); and vascular surgery (2025).    Treatment Diagnosis: sepsis due to pneumonia      Assessment  Assessment: Evaluation completed and treatment initiated. Pt would benefit from further skilled therapy to upgrade safety and independence for participation in functional tasks. Pt is limited by decreased endurance and would benefit from occupational therapy services.  Treatment Diagnosis: sepsis due to pneumonia  REQUIRES OT FOLLOW-UP: Yes  Activity Tolerance  Activity Tolerance: Patient Tolerated treatment well     Plan  Occupational Therapy Plan  Times Per Week:

## 2025-03-05 NOTE — ACP (ADVANCE CARE PLANNING)
Advance Care Planning      Palliative Medicine Provider (MD/NP)  Advance Care Planning (ACP) Conversation      Date of Conversation: 03/05/25  The patient and/or authorized decision maker consented to a voluntary Advance Care Planning conversation.   Individuals present for the conversation:   Patient and Spouse at bedside    Legal Healthcare Agent(s):    Primary Decision Maker: Doreen Jain - Spouse - [phone]    ACP documents available in EMR prior to discussion:  -None  -No new documents completed.    Primary Palliative Diagnosis(es):  Sepsis 2/2 PNA  Metastatic colonic adenocarcinoma  HFrEF    Conversation Summary: Met with patient and his spouse at bedside to discuss current status and plan of care. They deny any newly completed ACP/POA documents and updated emergency contacts to include their son at pt/spouse request. Reviewed code status and meaning of \"full code\" vs \"DNR\" and typical process of aggressive resuscitation.  Patient does remain a FULL CODE at this time but is considering transition to DNR. Assured them that this transition does not mean withdrawal of care or other life prolonging treatments would be stopped.  Encouraged them to continue discussing as a family before making changes. They seem receptive to further ACP and goals of care discussions based on patient's progress.    Resuscitation Status:    Code Status: Full Code    I spent 10 minutes providing ACP services with the patient and/or surrogate decision maker in a voluntary, in-person conversation discussing the patient's wishes and goals as detailed in the above note during consult visit.       Kenyatta Knapp, AGUSTINA - CNP

## 2025-03-05 NOTE — PROGRESS NOTES
Comprehensive Nutrition Assessment    Type and Reason for Visit:  Initial, Positive nutrition screen    Nutrition Recommendations/Plan:   Follow for decreasing nausea, advancing diet, po intake, weight     Malnutrition Assessment:  Malnutrition Status:  Moderate malnutrition (03/05/25 1312)    Context:  Acute Illness     Findings of the 6 clinical characteristics of malnutrition:  Energy Intake:  50% or less of estimated energy requirements for 5 or more days  Weight Loss:  Greater than 7.5% over 3 months     Body Fat Loss:  Mild body fat loss Orbital, Buccal region   Muscle Mass Loss:  Mild muscle mass loss Temples (temporalis), Clavicles (pectoralis & deltoids)  Fluid Accumulation:  No fluid accumulation     Strength:  Not Performed    Nutrition Assessment:    +NS for decreased weight and po intake.  Pt is receiving a clear liquid diet.  Aware of intractable nausea and vomiting.  Pt has lost approx 30# or 18.4% of UBW since 1/1/2025--this is a significant loss.  With this loss, decreased pointake and decrease in fat and muscle mass pt meets criteria for Severe Malnutrition    Nutrition Related Findings:    BUN 41.,  Creat 1.3., GFR 57.,  Glucose 135-139. Wound Type:  (puncture)       Current Nutrition Intake & Therapies:    Average Meal Intake: Unable to assess  Average Supplements Intake: None Ordered  ADULT DIET; Clear Liquid    Anthropometric Measures:  Height: 175.3 cm (5' 9.02\")  Ideal Body Weight (IBW): 160 lbs (73 kg)    Admission Body Weight: 55.8 kg (123 lb)  Current Body Weight: 60.1 kg (132 lb 7.9 oz), 82.8 % IBW. Weight Source: Bed scale  Current BMI (kg/m2): 19.6  Usual Body Weight: 73.5 kg (162 lb 0.6 oz) (1/1/2025)  % Weight Change (Calculated): -18.2  Weight Adjustment For: No Adjustment  BMI Categories: Underweight (BMI less than 22) age over 65    Estimated Daily Nutrient Needs:  Energy Requirements Based On: Kcal/kg  Weight Used for Energy Requirements: Current  Energy (kcal/day): 9916-6844  kcals (30-35 kcals/kg)  Weight Used for Protein Requirements: Current  Protein (g/day): 90g  Method Used for Fluid Requirements: 1 ml/kcal  Fluid (ml/day): 7885-5373 ml    Nutrition Diagnosis:   Inadequate oral intake related to nausea/vomiting/diarrhea, catabolic illness as evidenced by NPO or clear liquid status due to medical condition, weight loss, loss of subcutaneous fat    Nutrition Interventions:   Food and/or Nutrient Delivery: Continue Current Diet  Nutrition Education/Counseling: No recommendation at this time  Coordination of Nutrition Care: Continue to monitor while inpatient       Goals:  Goals: Meet at least 75% of estimated needs, PO intake 50% or greater  Type of Goal: New goal       Nutrition Monitoring and Evaluation:   Behavioral-Environmental Outcomes: None Identified  Food/Nutrient Intake Outcomes: Progression of Nutrition  Physical Signs/Symptoms Outcomes: Biochemical Data, Fluid Status or Edema, Weight, Skin    Discharge Planning:    Too soon to determine     Shayna Azevedo MS, RD, LD  Contact: [phone]

## 2025-03-05 NOTE — PROGRESS NOTES
quadrant mesenteric nodes as well as central mesenteric adenopathy consistent with metastatic disease  Mildly dilated fluid-filled small bowel loops which could be due to partial obstruction by the aforementioned colonic mass  Colonic diverticulosis  Small hiatal hernia  Distended gallbladder with a mild amount of high attenuation which could be due to bibasilar secretion of contrast from the prior CT scan  1.0 x 0.8 cm low-attenuation structure in the left hepatic lobe that could reflect either a hemangioma or metastatic deposit.      His daughter Elisa desired to have management at [facility] where all of his subspecialty physicians are and left the ED AMA and brought him to the ED at [facility] where he was admitted for management.     Right hemicolectomy by Dr. Suleiman Hunter @[facility] 1/11/2025  PATHOLOGY:  Terminal ileum and right colon, right hemicolectomy:   Poorly differentiated adenocarcinoma (4.8 cm).   Tumor invades the visceral peritoneum.   Tumor extends to mesenteric margin.   Extensive lymph-vascular space invasion.   Incidental tubular adenoma   Incidental hyperplastic polyps.   Twenty lymph nodes positive for metastatic carcinoma (20/20)   AJCC pathologic stage:  pT4a N2b Mx     In addition to tissue submitted at surgery 1/11/2025, Dr. Suleiman Hunter described lymphadenopathy extending beyond the right ileocolic vessel up into the retroperitoneum and extending up toward the iliac plexus.      Other diagnosis:  4 mm Pulmonary nodules being followed Dr. Anthony Matute.  COPD being followed by [facility] Elisa Penn  CAD CABG x 4 By Dr Matute March 2024, cardiac stents x 2 by Dr. Bernard 1/20/2025     He returns today, 2/12/2025, accompanied be his wife and daughter Elisa for evaluation regarding further workup and treatment.        TARGET METASTATIC COLON CANCER LESIONS  2-3 cm right axillary lymph node  3.3 x 2.4 x 7.0 cm abnormal soft tissue extends from the abnormal segment of colon (ileocecal  posterior left lower lobe.  This may represent a combination of pneumonia and/or metastasis.  There is small left pleural effusion.  Increased low density lesion within the left lobe of the liver.  On axial image 13 this measures 1.6 x 1.9 cm.  There are adjacent smaller low-density lesions within the left lobe.  This is most compatible with progression of hepatic metastasis.  There is intrahepatic and extrahepatic biliary ductal dilatation.  Gallbladder is distended.  Mild nodular thickening of the left adrenal gland.  The right adrenal gland is unremarkable.  The kidneys show no acute process.  No hydronephrosis.  The spleen shows no acute abnormality.  Mild peripancreatic stranding.  Mild pancreatitis is not excluded.  There is no bowel obstruction.  No evidence of appendicitis.  Unremarkable urinary bladder.  Trace free fluid within the right and left paracolic gutter.  There has been interval progression of mesenteric and retroperitoneal lymphadenopathy most compatible with worsening metastasis.  Left periaortic lymphadenopathy on axial image 35 measures 1.8 cm transverse dimension.  Right periaortic lymphadenopathy on  axial image 33 measures 2.5 cm AP dimension.  Lymph node anterior to the hepatic artery on axial image 27 measures 2.6 x 1.7 cm. Right periaortic lymph node at the lower aspect of the chest on axial image 16 measures 1.9 x 1.4 cm  There is new complete or near complete occlusion of the superior mesenteric vein. Lymphadenopathy causes mass effect and severe narrowing of the inferior vena cava.  This can be seen on coronal image 29 and axial image 33.  Circumferential mucosal thickening of the distal esophagus likely due to esophagitis.  Diffuse mesenteric edema.  No acute osseous abnormality.        1.  Nodular and ground-glass infiltrate within the lower aspect of both lungs.  Consolidation within the posterior left lower lobe.  This may represent a combination of pneumonia and/or metastasis. 2.

## 2025-03-05 NOTE — PROGRESS NOTES
Physical Therapy  Facility/Department: [facility] ONCOLOGY UNIT  Physical Therapy Initial Assessment    Name: Nick Jain  : 1950  MRN: [id number]  Date of Service: 3/5/2025    Discharge Recommendations:  Continue to assess pending progress, 24 hour supervision or assist          Patient Diagnosis(es): The primary encounter diagnosis was Pneumonia of left lower lobe due to infectious organism. Diagnoses of OMA (acute kidney injury), Nausea and vomiting, unspecified vomiting type, Metastatic colon cancer to liver (HCC), and Sepsis without acute organ dysfunction, due to unspecified organism (HCC) were also pertinent to this visit.  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  Past Surgical History:  has a past surgical history that includes Appendectomy; Testicle removal; back surgery; Tympanostomy tube placement; Colonoscopy (N/A, 10/22/2021); hemicolectomy (Right, 2025); Coronary artery bypass graft (2024); Cardiac procedure (N/A, 2025); Cardiac procedure (N/A, 2025); and vascular surgery (2025).    Assessment  Body Structures, Functions, Activity Limitations Requiring Skilled Therapeutic Intervention: Decreased functional mobility ;Decreased ADL status;Decreased ROM;Decreased strength;Decreased endurance;Decreased balance;Increased pain;Decreased posture  Assessment: Pt SOMEWHAT LETHARGIC OVERALL, BUT ABLE TO STAND AND TAKE STEPS IN ROOM WITH ASSIST. WILL PROGRESS AS TOLERATED.  Requires PT Follow-Up: Yes  Activity Tolerance  Activity Tolerance: Patient limited by fatigue;Patient limited by endurance    Plan  Physical Therapy Plan  General Plan: 5-7 times per  week  Current Treatment Recommendations: Strengthening, ROM, Balance training, Functional mobility training, Transfer training, Gait training, Safety education & training, Patient/Caregiver education & training, Endurance training  Safety Devices  Type of Devices: Call light within reach, Heels elevated for pressure relief, Bed alarm in place, Nurse notified    Restrictions  Restrictions/Precautions  Restrictions/Precautions: Fall Risk, Isolation  Required Braces or Orthoses?: No  Position Activity Restriction  Other Position/Activity Restrictions: Droplet     Subjective  General  Patient assessed for rehabilitation services?: Yes  Diagnosis: SEPSIS/PNA, FLU, COLON CA  Subjective  Subjective: Pt GROGGY BUT WILLING TO PARTICIPATE         Social/Functional History  Social/Functional History  Lives With: Spouse  Type of Home: House  Home Layout: One level  Home Access: Stairs to enter without rails  Entrance Stairs - Number of Steps: 2  Bathroom Shower/Tub: Tub/Shower unit  Bathroom Toilet: Standard  Bathroom Equipment: Grab bars in shower, Grab bars around toilet, Shower chair  Bathroom Accessibility: Accessible  Home Equipment: Cane, Walker - Rolling, Wheelchair - Manual  Receives Help From: Family  Prior Level of Assist for ADLs: Independent  Prior Level of Assist for Homemaking: Independent  Homemaking Responsibilities: No  Prior Level of Assist for Transfers: Independent  Active : No  Patient's  Info: spouse  Mode of Transportation: Car  Occupation: Retired  Vision/Hearing  Vision  Vision: Within Functional Limits  Hearing  Hearing: Within functional limits    Cognition   Orientation  Overall Orientation Status: Within Functional Limits  Cognition  Overall Cognitive Status: WFL  Cognition Comment: GROGGY ALERT AND FOLLOWING SIMPLE COMMANDS    Objective  Temp: 97.8 °F (36.6 °C)  Pulse: 98  Respirations: 16  SpO2: 93 %  O2 Device: None (Room air)  BP: 105/64  MAP (Calculated): 78

## 2025-03-05 NOTE — PROGRESS NOTES
Plan submitted for B12 repletion as recommended by Dr Rios during inpt stay at [facility], to begin pending authorization with initiation of chemotherapy in clinic tentatively scheduled for 3/19/25.

## 2025-03-05 NOTE — PLAN OF CARE
Problem: Safety - Adult  Goal: Free from fall injury  Outcome: Progressing     Problem: ABCDS Injury Assessment  Goal: Absence of physical injury  Outcome: Progressing     Problem: Skin/Tissue Integrity  Goal: Skin integrity remains intact  Description: 1.  Monitor for areas of redness and/or skin breakdown  2.  Assess vascular access sites hourly  3.  Every 4-6 hours minimum:  Change oxygen saturation probe site  4.  Every 4-6 hours:  If on nasal continuous positive airway pressure, respiratory therapy assess nares and determine need for appliance change or resting period  Outcome: Progressing

## 2025-03-05 NOTE — PROGRESS NOTES
Recent Labs     03/04/25  0728 03/05/25  0340   BUN 46* 41*       Recent Labs     03/04/25  0728 03/05/25  0340   CREATININE 2.0* 1.3*       Estimated Creatinine Clearance: 39 mL/min (A) (based on SCr of 1.3 mg/dL (H)).      Plan: Increased Tamiflu to 30mg BID due to improvement in renal function    Electronically signed by Tres Martinez RPH on 3/5/2025 at 5:20 AM

## 2025-03-06 PROBLEM — G89.3 NEOPLASM RELATED PAIN: Status: ACTIVE | Noted: 2025-03-06

## 2025-03-06 LAB
ALBUMIN SERPL-MCNC: 2.4 G/DL (ref 3.5–5.2)
ALP SERPL-CCNC: 440 U/L (ref 40–129)
ALT SERPL-CCNC: 12 U/L (ref 10–50)
ANION GAP SERPL CALCULATED.3IONS-SCNC: 9 MMOL/L (ref 8–16)
AST SERPL-CCNC: 18 U/L (ref 10–50)
BASOPHILS # BLD: 0 K/UL (ref 0–0.2)
BASOPHILS NFR BLD: 0.1 % (ref 0–1)
BILIRUB SERPL-MCNC: <0.2 MG/DL (ref 0.2–1.2)
BUN SERPL-MCNC: 30 MG/DL (ref 8–23)
CALCIUM SERPL-MCNC: 8.1 MG/DL (ref 8.8–10.2)
CHLORIDE SERPL-SCNC: 107 MMOL/L (ref 98–107)
CO2 SERPL-SCNC: 21 MMOL/L (ref 22–29)
CREAT SERPL-MCNC: 1.1 MG/DL (ref 0.7–1.2)
EOSINOPHIL # BLD: 0 K/UL (ref 0–0.6)
EOSINOPHIL NFR BLD: 0.1 % (ref 0–5)
ERYTHROCYTE [DISTWIDTH] IN BLOOD BY AUTOMATED COUNT: 17.2 % (ref 11.5–14.5)
GLUCOSE SERPL-MCNC: 100 MG/DL (ref 70–99)
HCT VFR BLD AUTO: 23.1 % (ref 42–52)
HGB BLD-MCNC: 7.2 G/DL (ref 14–18)
IMM GRANULOCYTES # BLD: 0.1 K/UL
LYMPHOCYTES # BLD: 1 K/UL (ref 1.1–4.5)
LYMPHOCYTES NFR BLD: 9.7 % (ref 20–40)
MCH RBC QN AUTO: 27.8 PG (ref 27–31)
MCHC RBC AUTO-ENTMCNC: 31.2 G/DL (ref 33–37)
MCV RBC AUTO: 89.2 FL (ref 80–94)
MONOCYTES # BLD: 0.6 K/UL (ref 0–0.9)
MONOCYTES NFR BLD: 6.1 % (ref 0–10)
NEUTROPHILS # BLD: 8.4 K/UL (ref 1.5–7.5)
NEUTS SEG NFR BLD: 83.4 % (ref 50–65)
PLATELET # BLD AUTO: 275 K/UL (ref 130–400)
PMV BLD AUTO: 10.4 FL (ref 9.4–12.4)
POTASSIUM SERPL-SCNC: 4.2 MMOL/L (ref 3.5–5)
PROT SERPL-MCNC: 5.6 G/DL (ref 6.4–8.3)
RBC # BLD AUTO: 2.59 M/UL (ref 4.7–6.1)
SODIUM SERPL-SCNC: 137 MMOL/L (ref 136–145)
WBC # BLD AUTO: 10 K/UL (ref 4.8–10.8)

## 2025-03-06 PROCEDURE — 97530 THERAPEUTIC ACTIVITIES: CPT

## 2025-03-06 PROCEDURE — 6370000000 HC RX 637 (ALT 250 FOR IP): Performed by: NURSE PRACTITIONER

## 2025-03-06 PROCEDURE — 1200000000 HC SEMI PRIVATE

## 2025-03-06 PROCEDURE — 2580000003 HC RX 258: Performed by: NURSE PRACTITIONER

## 2025-03-06 PROCEDURE — 6360000002 HC RX W HCPCS: Performed by: NURSE PRACTITIONER

## 2025-03-06 PROCEDURE — 99232 SBSQ HOSP IP/OBS MODERATE 35: CPT

## 2025-03-06 PROCEDURE — 6370000000 HC RX 637 (ALT 250 FOR IP)

## 2025-03-06 PROCEDURE — 80053 COMPREHEN METABOLIC PANEL: CPT

## 2025-03-06 PROCEDURE — 99232 SBSQ HOSP IP/OBS MODERATE 35: CPT | Performed by: INTERNAL MEDICINE

## 2025-03-06 PROCEDURE — 94760 N-INVAS EAR/PLS OXIMETRY 1: CPT

## 2025-03-06 PROCEDURE — 94640 AIRWAY INHALATION TREATMENT: CPT

## 2025-03-06 PROCEDURE — 85025 COMPLETE CBC W/AUTO DIFF WBC: CPT

## 2025-03-06 PROCEDURE — 6370000000 HC RX 637 (ALT 250 FOR IP): Performed by: INTERNAL MEDICINE

## 2025-03-06 PROCEDURE — 97110 THERAPEUTIC EXERCISES: CPT

## 2025-03-06 PROCEDURE — 97535 SELF CARE MNGMENT TRAINING: CPT

## 2025-03-06 PROCEDURE — 2500000003 HC RX 250 WO HCPCS: Performed by: NURSE PRACTITIONER

## 2025-03-06 PROCEDURE — 97116 GAIT TRAINING THERAPY: CPT

## 2025-03-06 RX ORDER — LIDOCAINE 4 G/G
2 PATCH TOPICAL DAILY
Status: DISCONTINUED | OUTPATIENT
Start: 2025-03-06 | End: 2025-03-14 | Stop reason: HOSPADM

## 2025-03-06 RX ORDER — ENOXAPARIN SODIUM 100 MG/ML
40 INJECTION SUBCUTANEOUS DAILY
Status: DISCONTINUED | OUTPATIENT
Start: 2025-03-06 | End: 2025-03-11

## 2025-03-06 RX ADMIN — CEFEPIME 2000 MG: 2 INJECTION, POWDER, FOR SOLUTION INTRAVENOUS at 22:45

## 2025-03-06 RX ADMIN — ENOXAPARIN SODIUM 40 MG: 100 INJECTION SUBCUTANEOUS at 15:08

## 2025-03-06 RX ADMIN — Medication 2 PUFF: at 09:07

## 2025-03-06 RX ADMIN — OXYCODONE HYDROCHLORIDE 10 MG: 10 TABLET ORAL at 05:23

## 2025-03-06 RX ADMIN — DRONABINOL 2.5 MG: 2.5 CAPSULE ORAL at 16:31

## 2025-03-06 RX ADMIN — CEFEPIME 2000 MG: 2 INJECTION, POWDER, FOR SOLUTION INTRAVENOUS at 10:24

## 2025-03-06 RX ADMIN — SODIUM CHLORIDE, PRESERVATIVE FREE 10 ML: 5 INJECTION INTRAVENOUS at 22:47

## 2025-03-06 RX ADMIN — METOCLOPRAMIDE 10 MG: 5 TABLET ORAL at 11:52

## 2025-03-06 RX ADMIN — HYDROMORPHONE HYDROCHLORIDE 0.5 MG: 1 INJECTION, SOLUTION INTRAMUSCULAR; INTRAVENOUS; SUBCUTANEOUS at 20:43

## 2025-03-06 RX ADMIN — METOCLOPRAMIDE 10 MG: 5 TABLET ORAL at 08:13

## 2025-03-06 RX ADMIN — METOPROLOL SUCCINATE 25 MG: 25 TABLET, EXTENDED RELEASE ORAL at 08:14

## 2025-03-06 RX ADMIN — OSELTAMIVIR PHOSPHATE 75 MG: 75 CAPSULE ORAL at 08:14

## 2025-03-06 RX ADMIN — DRONABINOL 2.5 MG: 2.5 CAPSULE ORAL at 05:23

## 2025-03-06 RX ADMIN — SODIUM CHLORIDE, POTASSIUM CHLORIDE, SODIUM LACTATE AND CALCIUM CHLORIDE: 600; 310; 30; 20 INJECTION, SOLUTION INTRAVENOUS at 03:24

## 2025-03-06 RX ADMIN — METOCLOPRAMIDE 10 MG: 5 TABLET ORAL at 16:31

## 2025-03-06 RX ADMIN — CLOPIDOGREL BISULFATE 75 MG: 75 TABLET ORAL at 08:14

## 2025-03-06 RX ADMIN — OXYCODONE HYDROCHLORIDE 10 MG: 10 TABLET ORAL at 13:02

## 2025-03-06 RX ADMIN — TRAZODONE HYDROCHLORIDE 100 MG: 100 TABLET ORAL at 22:47

## 2025-03-06 RX ADMIN — CETIRIZINE HYDROCHLORIDE 10 MG: 10 TABLET, FILM COATED ORAL at 08:13

## 2025-03-06 RX ADMIN — OXYCODONE HYDROCHLORIDE 10 MG: 10 TABLET ORAL at 00:26

## 2025-03-06 RX ADMIN — AZITHROMYCIN MONOHYDRATE 500 MG: 500 INJECTION, POWDER, LYOPHILIZED, FOR SOLUTION INTRAVENOUS at 15:08

## 2025-03-06 RX ADMIN — OSELTAMIVIR PHOSPHATE 75 MG: 75 CAPSULE ORAL at 20:44

## 2025-03-06 RX ADMIN — HYDROMORPHONE HYDROCHLORIDE 0.5 MG: 1 INJECTION, SOLUTION INTRAMUSCULAR; INTRAVENOUS; SUBCUTANEOUS at 03:21

## 2025-03-06 RX ADMIN — MEGESTROL ACETATE 200 MG: 40 SUSPENSION ORAL at 20:45

## 2025-03-06 RX ADMIN — OXYCODONE 5 MG: 5 TABLET ORAL at 22:47

## 2025-03-06 RX ADMIN — MEGESTROL ACETATE 200 MG: 40 SUSPENSION ORAL at 08:14

## 2025-03-06 RX ADMIN — ASPIRIN 81 MG: 81 TABLET, COATED ORAL at 08:13

## 2025-03-06 RX ADMIN — OXYCODONE 5 MG: 5 TABLET ORAL at 18:24

## 2025-03-06 ASSESSMENT — PAIN SCALES - GENERAL
PAINLEVEL_OUTOF10: 10
PAINLEVEL_OUTOF10: 10
PAINLEVEL_OUTOF10: 7
PAINLEVEL_OUTOF10: 7
PAINLEVEL_OUTOF10: 10

## 2025-03-06 ASSESSMENT — PAIN DESCRIPTION - ORIENTATION
ORIENTATION: MID
ORIENTATION: LOWER;MID
ORIENTATION: MID
ORIENTATION: LOWER
ORIENTATION: MID;LOWER
ORIENTATION: LOWER;MID

## 2025-03-06 ASSESSMENT — PAIN - FUNCTIONAL ASSESSMENT
PAIN_FUNCTIONAL_ASSESSMENT: ACTIVITIES ARE NOT PREVENTED

## 2025-03-06 ASSESSMENT — PAIN DESCRIPTION - LOCATION
LOCATION: CHEST
LOCATION: BACK

## 2025-03-06 ASSESSMENT — PAIN DESCRIPTION - DESCRIPTORS
DESCRIPTORS: DISCOMFORT
DESCRIPTORS: DISCOMFORT
DESCRIPTORS: ACHING
DESCRIPTORS: DISCOMFORT;ACHING
DESCRIPTORS: ACHING

## 2025-03-06 NOTE — PROGRESS NOTES
Pharmacy Automatic Dose Adjustment                Subcutaneous Anticoagulant for Prophylaxis    Nick Jain is a 74 y.o. male.     Recent Labs     03/05/25  0340 03/06/25  0530   CREATININE 1.3* 1.1       Estimated Creatinine Clearance: 49 mL/min (based on SCr of 1.1 mg/dL).    Weight:  Wt Readings from Last 1 Encounters:   03/05/25 60.1 kg (132 lb 6.4 oz)           Pharmacy has adjusted subcutaneous anticoagulant for prophylaxis to Enoxaparin 40 mg SC daily based on the patient's weight and estimated CrCl per [facility] policy.               Electronically signed by Phil Melendez RPH on 3/6/2025 at 2:13 PM

## 2025-03-06 NOTE — PROGRESS NOTES
excluded. 9.  Complete or near complete occlusion of the superior mesenteric vein. 10.  Lymphadenopathy causes severe stenosis and near-complete occlusion of the inferior vena cava.  All CT scans are performed using dose optimization techniques as appropriate to the performed exam and include at least one of the following: Automated exposure control, adjustment of the mA and/or kV according to size, and the use of iterative reconstruction technique.  ______________________________________ Electronically signed by: JANETH DELACRUZ M.D. Date:     03/04/2025 Time:    09:48     XR CHEST PORTABLE    Result Date: 3/4/2025  EXAM: CHEST RADIOGRAPH  TECHNIQUE: Single frontal chest radiograph.  HISTORY: Shortness of breath.  COMPARISON: 01/19/2025.  FINDINGS: The lungs show no consolidation, pleural effusion pneumothorax. The right Port-A-Cath is in place tip in the SVC.  The cardiomediastinal silhouette and pulmonary vessels are within normal limits. Sternotomy and CABG changes.  The upper abdomen is unremarkable.  No acute bony abnormality.       1.  No acute cardiopulmonary disease.    ______________________________________ Electronically signed by: FUNMI DAVIS M.D. Date:     03/04/2025 Time:    07:31     Palliative Performance Scale:  [] 80% Full ambulation  Some disease: Normal activity w/ some effort  Full self-care  Normal/reduced intake  Full LOC  [] 70% Reduced ambulation  Some disease: Can't do normal job or work  Full self-care  Normal/reduced intake  Full LOC  [] 60% Reduced ambulation  Significant disease: Can't do hobbies/housework  Occasional assistance  Normal/reduced intake  LOC full/confusion  [x] 50% Mainly sit/lie  Extensive disease: Can't do any work  Considerable assistance  Normal/reduced intake  LOC full/confusion  [] 40% Mainly in bed  Extensive disease  Mainly assistance  Normal/reduced intake  LOC full/confusion  [] 30% Bed Bound  Extensive disease  Total care  Reduced Intake   needed.    Candidate for SCOP: Yes, outpatient palliative care referral ordered 3/6/2025      Recommendations:      Palliative Care- GOC prolong life, continue current medical treatment/workup and monitor for improvement.  D/C planning based on hospital course.  Improve symptom management for quality of life. Code status- FULL CODE, ongoing discussions  Stage IV right sided ileocecal adenocarcinoma- oncology following, plans to initiate palliative chemotherapy OP.  Neoplasm related pain- initiate 12 mcg fentanyl duragesic for long acting pain modality 3/5/25. Transition Norco to Roxicodone panel 5 or 10 mg Q4h prn. Monitor for symptom control.  Narcan and bowel regimen available  Persistent N/V- uncontrolled pain potentially contributing. Monitor bowel function. Multiple antiemetic modalities available. Advance diet as able  LLL pneumonia with positive Influenza A/coronavirus- mgmt per hospitalist. Remains on IV abx. Initiated on tamiflu 3/4/25  Hx CAD with ischemic cardiomyopathy and HFrEF- multiple cardiac interventions last hospitalization. Continue home regimen as able. Involve cards as warranted at discretion of hospitalist.   OMA on CKD- improved with IVF. Continue to monitor renal function and avoid offending agents as able    Thank you for consulting Palliative Care and allowing us to participate in the care of this patient.     Total Time Spent with patient assessment, interview of independent historian/HCS, workup/treatment review, discussion with medical team, review of current and home medications, and placement of orders/preparation of this note: 36 minutes                               Electronically signed by AGUSTINA Chapman CNP on 3/6/2025 at 8:09 AM    (Please note that portions of this note were completed with a voice recognition program.  Effortswere made to edit the dictations but occasionally words are mis-transcribed.)

## 2025-03-06 NOTE — PROGRESS NOTES
1.753 m (5' 9.02\")   Wt 60.1 kg (132 lb 6.4 oz)   SpO2 97%   BMI 19.54 kg/m²   24HR INTAKE/OUTPUT:    Intake/Output Summary (Last 24 hours) at 3/6/2025 1652  Last data filed at 3/6/2025 1641  Gross per 24 hour   Intake 240 ml   Output 700 ml   Net -460 ml       Physical Exam  Vitals reviewed.   Constitutional:       Appearance: He is ill-appearing.   HENT:      Nose: Congestion and rhinorrhea present.      Mouth/Throat:      Mouth: Mucous membranes are dry.   Eyes:      Pupils: Pupils are equal, round, and reactive to light.   Cardiovascular:      Rate and Rhythm: Normal rate and regular rhythm.      Pulses: Normal pulses.      Heart sounds: Normal heart sounds.   Pulmonary:      Effort: Pulmonary effort is normal.      Breath sounds: Examination of the right-lower field reveals decreased breath sounds. Examination of the left-lower field reveals decreased breath sounds. Decreased breath sounds present.   Abdominal:      General: Bowel sounds are normal. There is distension (mild continues to improve).   Musculoskeletal:      Cervical back: Normal range of motion and neck supple.      Right lower leg: No edema.      Left lower leg: No edema.   Skin:     General: Skin is warm.      Capillary Refill: Capillary refill takes less than 2 seconds.      Coloration: Skin is pale.   Neurological:      General: No focal deficit present.      Mental Status: He is alert and oriented to person, place, and time.      Motor: Weakness present.             Medications:      sodium chloride        lidocaine  2 patch TransDERmal Daily    enoxaparin  40 mg SubCUTAneous Daily    cefepime  2,000 mg IntraVENous Q12H    cyanocobalamin  1,000 mcg SubCUTAneous Weekly    oseltamivir  75 mg Oral BID    fentaNYL  1 patch TransDERmal Q72H    cetirizine  10 mg Oral Daily    aspirin EC  81 mg Oral Daily    clopidogrel  75 mg Oral Daily    [Held by provider] Encorafenib  300 mg Oral Daily    metoprolol succinate  25 mg Oral Daily    [Held by  provider] sacubitril-valsartan  0.5 tablet Oral BID    tiotropium-olodaterol  2 puff Inhalation Daily    [Held by provider] spironolactone  12.5 mg Oral Daily    sodium chloride flush  5-40 mL IntraVENous 2 times per day    [Held by provider] atorvastatin  80 mg Oral Daily    metoclopramide  10 mg Oral TID WC    megestrol  200 mg Oral BID    droNABinol  2.5 mg Oral BID AC     naloxone, oxyCODONE **OR** oxyCODONE, albuterol sulfate HFA, sodium chloride flush, sodium chloride, ondansetron **OR** ondansetron, polyethylene glycol, acetaminophen **OR** acetaminophen, prochlorperazine, HYDROmorphone, fluticasone, traZODone  ADULT ORAL NUTRITION SUPPLEMENT; Breakfast, Lunch, Dinner; Clear Liquid Oral Supplement  ADULT DIET; Full Liquid  ADULT ORAL NUTRITION SUPPLEMENT; Lunch, Dinner; Frozen Oral Supplement  ADULT ORAL NUTRITION SUPPLEMENT; Breakfast; Fortified Gelatin Oral Supplement     Lab and other Data:     Recent Labs     03/04/25 0728 03/05/25 0340 03/06/25  0530   WBC 17.2* 11.2* 10.0   HGB 10.3* 7.8* 7.2*   * 304 275     Recent Labs     03/04/25 0728 03/05/25 0340 03/06/25  0530   * 136 137   K 5.1 4.2 4.2   CL 98 104 107   CO2 14* 21* 21*   BUN 46* 41* 30*   CREATININE 2.0* 1.3* 1.1   GLUCOSE 135* 139* 100*     Recent Labs     03/04/25 0728 03/05/25 0340 03/06/25  0530   AST 19 23 18   ALT 18 16 12   BILITOT 0.3 <0.2 <0.2   ALKPHOS 981* 597* 440*     Troponin T: No results for input(s): \"TROPONINI\" in the last 72 hours.  Pro-BNP: No results for input(s): \"BNP\" in the last 72 hours.  INR: No results for input(s): \"INR\" in the last 72 hours.  UA:No results for input(s): \"NITRITE\", \"COLORU\", \"PHUR\", \"LABCAST\", \"WBCUA\", \"RBCUA\", \"MUCUS\", \"TRICHOMONAS\", \"YEAST\", \"BACTERIA\", \"CLARITYU\", \"SPECGRAV\", \"LEUKOCYTESUR\", \"UROBILINOGEN\", \"BILIRUBINUR\", \"BLOODU\", \"GLUCOSEU\", \"AMORPHOUS\" in the last 72 hours.    Invalid input(s): \"KETONESU\"  A1C: No results for input(s): \"LABA1C\" in the last 72 hours.  ABG:No

## 2025-03-06 NOTE — PROGRESS NOTES
Physical Therapy     03/06/25 1100   Restrictions/Precautions   Restrictions/Precautions Fall Risk;Isolation   Position Activity Restriction   Other Position/Activity Restrictions Droplet   General   Diagnosis SEPSIS/PNA, FLU, COLON CA   Subjective   Subjective pt agreeable to tx; stated he wants to go back home with HH   Bed mobility   Supine to Sit Stand by assistance   Sit to Supine Stand by assistance   Scooting Stand by assistance   Transfers   Sit to Stand Contact guard assistance;Stand by assistance   Stand to Sit Contact guard assistance;Stand by assistance   Bed to Chair Contact guard assistance;Stand by assistance   Comment cues for hand placement   Ambulation   Device Rolling Walker   Assistance Contact guard assistance;Stand by assistance   Quality of Gait FLEXED POSTURE, NO TRUE LOB, GUARDED OVERALL   Gait Deviations Slow [place];Decreased step height;Decreased step length   Distance 30'   Comments in room with turns no LOB. low endurance/SOA. improves with sitting rest breaks.   PT Exercises   Exercise Treatment dynamic standing exercises beside EOB with and without UE support at RW CGA for 2-4 minutes no LOB.   Short Term Goals   Time Frame for Short Term Goals 14 DAYS   Short Term Goal 1 BED MOB MOD IND   Short Term Goal 2 TRANSFERS MOD IND   Short Term Goal 3 ' RW SUPERVISION   Activity Tolerance   Activity Tolerance Patient tolerated treatment well   Assessment   Assessment pt CGA/SBA for all tasks with no LOB. mild SOA and fatigue but improves with sitting rest breaks. returned to bed and placed into tall sitting with alarm on and all needs in reach.   PT Plan of Care   Thursday X   Safety Devices   Type of Devices Call light within reach;Bed alarm in place;Gait belt;Left in bed;Nurse notified     Electronically signed by Dallas Guillory PTA on 3/6/2025 at 11:44 AM

## 2025-03-06 NOTE — PROGRESS NOTES
distended.    Mild nodular thickening of the left adrenal gland.  The right adrenal gland is unremarkable.  The kidneys show no acute process.    Mild peripancreatic stranding.  Mild pancreatitis is not excluded.    There is no bowel obstruction.  No evidence of appendicitis.  Unremarkable urinary bladder.  Trace free fluid within the right and left paracolic gutter.    There has been interval progression of mesenteric and retroperitoneal lymphadenopathy most compatible with worsening metastasis.  Left periaortic lymphadenopathy on axial image 35 measures 1.8 cm transverse dimension.  Right periaortic lymphadenopathy on  axial image 33 measures 2.5 cm AP dimension.  Lymph node anterior to the hepatic artery on axial image 27 measures 2.6 x 1.7 cm. Right periaortic lymph node at the lower aspect of the chest on axial image 16 measures 1.9 x 1.4 cm    There is new complete or near complete occlusion of the superior mesenteric vein.  Lymphadenopathy causes mass effect and severe narrowing of the inferior vena cava.  This can be seen on coronal image 29 and axial image 33.    Circumferential mucosal thickening of the distal esophagus likely due to esophagitis.    Diffuse mesenteric edema.    No acute osseous abnormality.        3/4/2025-chest x-ray, [facility]-no acute cardiopulmonary process                Prior oncological history   Mr. Nick Jain is a 74 year old  gentleman originally referred by Dr. Tesfaye Gipson (PCP) for a mass of the cecum identified on CT abdomen pelvis at [facility] on 1/1/2025.   I saw him in initial consultation on 1/9/2025 and began to expedite his workup and treatment..     The following day, Nick developed further acute and increasing abdominal pain and sought initial evaluation in [facility] on 1/10/2025.     CT abdomen/pelvis with contrast at [facility] on 1/10/2025 reported the following:  5.1 x 4.8 x 6.2 cm large irregular heterogeneous mass in the region of  transmission to the patient's nurse/physician at 6:38 a.M.     ______________________________________ Electronically signed by: JERI VINCENT M.D. Date:     02/19/2025 Time:    23:43        My interpretation: Mediastinal adenopathy, liver metastasis      ASSESSMENT:  # Stage IV right-sided ileocecal adenocarcinoma BRAF [id number]     imaging studies with evidence of mediastinal adenopathy, liver metastasis     CEA 10.3, CA 19-9 1606 prior to surgery     CEA 7.8 on 1/13/2025 after surgery     1/11/2025-right hemicolectomy/terminal ileum ileectomy and lymph node dissection     FINAL DIAGNOSIS: AJCC pathologic stage:  pT4a N2b   Terminal ileum and right colon, right hemicolectomy:  Poorly differentiated adenocarcinoma (4.8 cm).   Tumor invades the visceral peritoneum.   Tumor extends to mesenteric margin.   Extensive lymph-vascular space invasion.   Incidental tubular adenoma   Incidental hyperplastic polyps.   Twenty lymph nodes positive for metastatic carcinoma (20/20)        1/17/2025-CT abdomen/pelvis with IV contrast, [facility]:  Mild wall thickening/edema of the partially visualized lower esophagus, new since the prior study.   Several prominent para esophageal nodes, none enlarged by size criteria, measuring up to 0.6 cm in short axis dimension, new since the prior study.  Stable 0.8 cm low density of the liver left lobe, too small to characterize.  No new liver lesions.    Interval right hemicolectomy.  No visible complication.    Interval resection of the previously seen necrotic nodes in the right abdomen.   Interval progression of the retroperitoneal adenopathy.  For example, necrotic node along anterior aspect of the IVC at level of the right renal vein measures 1.8 cm in short axis mention, increased from 0.9 cm.    Node along anterior aspect of the abdominal aorta at the same level 1.2 cm, increased from 1.0 cm.   Hakan hepatis node 1.2 cm, increased from 0.7 cm.    Right retrocrural node 0.9 cm, increased from 0.6

## 2025-03-06 NOTE — PROGRESS NOTES
Comprehensive Nutrition Assessment    Type and Reason for Visit:  Reassess    Nutrition Recommendations/Plan:   Add Gelatein and Magic Cups.     Malnutrition Assessment:  Malnutrition Status:  Severe malnutrition (03/06/25 1104)    Context:  Chronic Illness     Findings of the 6 clinical characteristics of malnutrition:  Energy Intake:  Mild decrease in energy intake  Weight Loss:  Greater than 7.5% over 3 months     Body Fat Loss:  Severe body fat loss Orbital, Buccal region   Muscle Mass Loss:  Severe muscle mass loss Temples (temporalis), Clavicles (pectoralis & deltoids), Thigh (quadriceps), Hand (interosseous)      Nutrition Assessment:    Diet advanced to full liquids. Pt tolerating ONS, but eating and drinking only small amounts. Will offer other ONS choices and menu options.    Nutrition Related Findings:    BUN 41.,  Creat 1.3., GFR 57.,  Glucose 135-139. Wound Type: None       Current Nutrition Intake & Therapies:    Average Meal Intake: Unable to assess  Average Supplements Intake: None Ordered  ADULT ORAL NUTRITION SUPPLEMENT; Breakfast, Lunch, Dinner; Clear Liquid Oral Supplement  ADULT DIET; Full Liquid  ADULT ORAL NUTRITION SUPPLEMENT; Lunch, Dinner; Frozen Oral Supplement  ADULT ORAL NUTRITION SUPPLEMENT; Breakfast; Fortified Gelatin Oral Supplement    Anthropometric Measures:  Height: 175.3 cm (5' 9.02\")  Ideal Body Weight (IBW): 160 lbs (73 kg)    Admission Body Weight: 59.9 kg (132 lb)  Current Body Weight: 59.9 kg (132 lb), 82.8 % IBW. Weight Source: Bed scale  Current BMI (kg/m2): 19.5  Usual Body Weight: 67.1 kg (148 lb)     % Weight Change (Calculated): -10.8  Weight Adjustment For: No Adjustment                 BMI Categories: Underweight (BMI less than 22) age over 65    Estimated Daily Nutrient Needs:  Energy Requirements Based On: Kcal/kg  Weight Used for Energy Requirements: Current  Energy (kcal/day): 2127-0827 kcals (30-35 kcals/kg)  Weight Used for Protein Requirements:

## 2025-03-06 NOTE — PROGRESS NOTES
Occupational Therapy     03/06/25 1200   Subjective   Subjective Pt in bed upon arrival for therapy. Pt agreeable to participate. Wife present.   Pain Assessment   Pain Assessment None - Denies Pain   Cognition   Overall Cognitive Status WFL   Orientation   Overall Orientation Status WFL   Bed Mobility Training   Bed Mobility Training Yes   Overall Level of Assistance Supervision   Interventions Verbal cues   Supine to Sit Supervision   Sit to Supine Supervision   Scooting Supervision   Transfer Training   Transfer Training Yes   Overall Level of Assistance Contact guard assistance;Stand by assistance   Interventions Verbal cues;Tactile cues   Sit to Stand Contact guard assistance;Stand by assistance   Stand to Sit Contact guard assistance;Stand by assistance   Toilet Transfer Contact guard assistance;Stand by assistance   Balance   Sitting Intact   Standing With support  (RW)   ADL   Feeding Independent   Grooming Independent;Setup   UE Bathing Independent;Setup   LE Bathing Contact guard assistance   UE Dressing Independent;Setup   LE Dressing Contact guard assistance   Putting On/Taking Off Footwear Contact guard assistance;Stand by assistance   Toileting Contact guard assistance   Functional Mobility Contact guard assistance;Stand by assistance   Assessment   Assessment Tx focused on functional mobility in room and to bathroom at RW level with CGA-SBA. Pt had no loss of balance. Pt instructed on standing balance activities standing EOB at RW level with CGA. Pt took rest breaks in sitting. Pt instructed on AROM exercises seated at EOB taking rest breaks as needed. Pt encouraged to sit in bed with HOB raised to improve lunch function and neck control.   Activity Tolerance Patient limited by endurance  (rest breaks between activities.)   Discharge Recommendations Patient would benefit from continued therapy after discharge;Home with assist PRN;Home with Home health OT  (Lives at home with wife. Would life to get

## 2025-03-07 LAB
ALBUMIN SERPL-MCNC: 2.4 G/DL (ref 3.5–5.2)
ALP SERPL-CCNC: 373 U/L (ref 40–129)
ALT SERPL-CCNC: 10 U/L (ref 10–50)
ANION GAP SERPL CALCULATED.3IONS-SCNC: 9 MMOL/L (ref 8–16)
AST SERPL-CCNC: 19 U/L (ref 10–50)
BASOPHILS # BLD: 0 K/UL (ref 0–0.2)
BASOPHILS NFR BLD: 0.1 % (ref 0–1)
BILIRUB SERPL-MCNC: <0.2 MG/DL (ref 0.2–1.2)
BUN SERPL-MCNC: 22 MG/DL (ref 8–23)
CALCIUM SERPL-MCNC: 8.2 MG/DL (ref 8.8–10.2)
CHLORIDE SERPL-SCNC: 107 MMOL/L (ref 98–107)
CO2 SERPL-SCNC: 21 MMOL/L (ref 22–29)
CREAT SERPL-MCNC: 1 MG/DL (ref 0.7–1.2)
EOSINOPHIL # BLD: 0 K/UL (ref 0–0.6)
EOSINOPHIL NFR BLD: 0.1 % (ref 0–5)
ERYTHROCYTE [DISTWIDTH] IN BLOOD BY AUTOMATED COUNT: 17.2 % (ref 11.5–14.5)
FERRITIN SERPL-MCNC: 1642 NG/ML (ref 30–400)
GLUCOSE SERPL-MCNC: 99 MG/DL (ref 70–99)
HAPTOGLOB SERPL-MCNC: 415 MG/DL (ref 30–200)
HCT VFR BLD AUTO: 23.4 % (ref 42–52)
HGB BLD-MCNC: 7.4 G/DL (ref 14–18)
IMM GRANULOCYTES # BLD: 0.1 K/UL
IRON SATN MFR SERPL: 21 % (ref 20–50)
IRON SERPL-MCNC: 20 UG/DL (ref 59–158)
LDH SERPL-CCNC: 167 U/L (ref 135–225)
LYMPHOCYTES # BLD: 1 K/UL (ref 1.1–4.5)
LYMPHOCYTES NFR BLD: 9.1 % (ref 20–40)
MCH RBC QN AUTO: 28.1 PG (ref 27–31)
MCHC RBC AUTO-ENTMCNC: 31.6 G/DL (ref 33–37)
MCV RBC AUTO: 89 FL (ref 80–94)
MONOCYTES # BLD: 0.8 K/UL (ref 0–0.9)
MONOCYTES NFR BLD: 7.5 % (ref 0–10)
NEUTROPHILS # BLD: 8.7 K/UL (ref 1.5–7.5)
NEUTS SEG NFR BLD: 82.6 % (ref 50–65)
PLATELET # BLD AUTO: 249 K/UL (ref 130–400)
PMV BLD AUTO: 10.1 FL (ref 9.4–12.4)
POTASSIUM SERPL-SCNC: 4.4 MMOL/L (ref 3.5–5)
PROT SERPL-MCNC: 5.6 G/DL (ref 6.4–8.3)
RBC # BLD AUTO: 2.63 M/UL (ref 4.7–6.1)
RETICS # AUTO: 0.03 M/UL (ref 0.03–0.12)
RETICS/RBC NFR: 1.23 % (ref 0.5–1.5)
SODIUM SERPL-SCNC: 137 MMOL/L (ref 136–145)
TIBC SERPL-MCNC: 94 UG/DL (ref 250–400)
WBC # BLD AUTO: 10.5 K/UL (ref 4.8–10.8)

## 2025-03-07 PROCEDURE — 83550 IRON BINDING TEST: CPT

## 2025-03-07 PROCEDURE — 82728 ASSAY OF FERRITIN: CPT

## 2025-03-07 PROCEDURE — 6360000002 HC RX W HCPCS

## 2025-03-07 PROCEDURE — 6370000000 HC RX 637 (ALT 250 FOR IP): Performed by: INTERNAL MEDICINE

## 2025-03-07 PROCEDURE — 2500000003 HC RX 250 WO HCPCS: Performed by: NURSE PRACTITIONER

## 2025-03-07 PROCEDURE — 6360000002 HC RX W HCPCS: Performed by: NURSE PRACTITIONER

## 2025-03-07 PROCEDURE — 83615 LACTATE (LD) (LDH) ENZYME: CPT

## 2025-03-07 PROCEDURE — 83010 ASSAY OF HAPTOGLOBIN QUANT: CPT

## 2025-03-07 PROCEDURE — 80053 COMPREHEN METABOLIC PANEL: CPT

## 2025-03-07 PROCEDURE — 85045 AUTOMATED RETICULOCYTE COUNT: CPT

## 2025-03-07 PROCEDURE — 85025 COMPLETE CBC W/AUTO DIFF WBC: CPT

## 2025-03-07 PROCEDURE — 97116 GAIT TRAINING THERAPY: CPT

## 2025-03-07 PROCEDURE — 6370000000 HC RX 637 (ALT 250 FOR IP): Performed by: NURSE PRACTITIONER

## 2025-03-07 PROCEDURE — 94640 AIRWAY INHALATION TREATMENT: CPT

## 2025-03-07 PROCEDURE — 99232 SBSQ HOSP IP/OBS MODERATE 35: CPT | Performed by: INTERNAL MEDICINE

## 2025-03-07 PROCEDURE — 2580000003 HC RX 258: Performed by: NURSE PRACTITIONER

## 2025-03-07 PROCEDURE — 94760 N-INVAS EAR/PLS OXIMETRY 1: CPT

## 2025-03-07 PROCEDURE — 99233 SBSQ HOSP IP/OBS HIGH 50: CPT

## 2025-03-07 PROCEDURE — 97530 THERAPEUTIC ACTIVITIES: CPT

## 2025-03-07 PROCEDURE — 1200000000 HC SEMI PRIVATE

## 2025-03-07 PROCEDURE — 6370000000 HC RX 637 (ALT 250 FOR IP)

## 2025-03-07 PROCEDURE — 83540 ASSAY OF IRON: CPT

## 2025-03-07 RX ORDER — HYDROMORPHONE HYDROCHLORIDE 1 MG/ML
0.5 INJECTION, SOLUTION INTRAMUSCULAR; INTRAVENOUS; SUBCUTANEOUS EVERY 6 HOURS PRN
Status: DISCONTINUED | OUTPATIENT
Start: 2025-03-07 | End: 2025-03-14 | Stop reason: HOSPADM

## 2025-03-07 RX ORDER — FENTANYL 25 UG/1
1 PATCH TRANSDERMAL
Status: DISCONTINUED | OUTPATIENT
Start: 2025-03-07 | End: 2025-03-11

## 2025-03-07 RX ADMIN — HYDROMORPHONE HYDROCHLORIDE 0.5 MG: 1 INJECTION, SOLUTION INTRAMUSCULAR; INTRAVENOUS; SUBCUTANEOUS at 00:11

## 2025-03-07 RX ADMIN — OXYCODONE HYDROCHLORIDE 10 MG: 10 TABLET ORAL at 10:10

## 2025-03-07 RX ADMIN — SODIUM CHLORIDE, PRESERVATIVE FREE 10 ML: 5 INJECTION INTRAVENOUS at 21:21

## 2025-03-07 RX ADMIN — Medication 2 PUFF: at 07:35

## 2025-03-07 RX ADMIN — SPIRONOLACTONE 12.5 MG: 25 TABLET ORAL at 09:46

## 2025-03-07 RX ADMIN — MEGESTROL ACETATE 200 MG: 40 SUSPENSION ORAL at 21:21

## 2025-03-07 RX ADMIN — OSELTAMIVIR PHOSPHATE 75 MG: 75 CAPSULE ORAL at 21:21

## 2025-03-07 RX ADMIN — HYDROMORPHONE HYDROCHLORIDE 0.5 MG: 1 INJECTION, SOLUTION INTRAMUSCULAR; INTRAVENOUS; SUBCUTANEOUS at 06:04

## 2025-03-07 RX ADMIN — CEFEPIME 2000 MG: 2 INJECTION, POWDER, FOR SOLUTION INTRAVENOUS at 21:59

## 2025-03-07 RX ADMIN — CEFEPIME 2000 MG: 2 INJECTION, POWDER, FOR SOLUTION INTRAVENOUS at 09:44

## 2025-03-07 RX ADMIN — ENOXAPARIN SODIUM 40 MG: 100 INJECTION SUBCUTANEOUS at 17:46

## 2025-03-07 RX ADMIN — OXYCODONE HYDROCHLORIDE 10 MG: 10 TABLET ORAL at 16:29

## 2025-03-07 RX ADMIN — CLOPIDOGREL BISULFATE 75 MG: 75 TABLET ORAL at 09:45

## 2025-03-07 RX ADMIN — DRONABINOL 2.5 MG: 2.5 CAPSULE ORAL at 06:03

## 2025-03-07 RX ADMIN — OXYCODONE HYDROCHLORIDE 10 MG: 10 TABLET ORAL at 03:44

## 2025-03-07 RX ADMIN — SODIUM CHLORIDE, PRESERVATIVE FREE 10 ML: 5 INJECTION INTRAVENOUS at 09:45

## 2025-03-07 RX ADMIN — ONDANSETRON 4 MG: 2 INJECTION INTRAMUSCULAR; INTRAVENOUS at 12:21

## 2025-03-07 RX ADMIN — METOCLOPRAMIDE 10 MG: 5 TABLET ORAL at 16:00

## 2025-03-07 RX ADMIN — METOPROLOL SUCCINATE 25 MG: 25 TABLET, EXTENDED RELEASE ORAL at 09:46

## 2025-03-07 RX ADMIN — CETIRIZINE HYDROCHLORIDE 10 MG: 10 TABLET, FILM COATED ORAL at 09:46

## 2025-03-07 RX ADMIN — METOCLOPRAMIDE 10 MG: 5 TABLET ORAL at 09:45

## 2025-03-07 RX ADMIN — OSELTAMIVIR PHOSPHATE 75 MG: 75 CAPSULE ORAL at 09:46

## 2025-03-07 RX ADMIN — ASPIRIN 81 MG: 81 TABLET, COATED ORAL at 09:46

## 2025-03-07 RX ADMIN — MEGESTROL ACETATE 200 MG: 40 SUSPENSION ORAL at 09:46

## 2025-03-07 RX ADMIN — HYDROMORPHONE HYDROCHLORIDE 0.5 MG: 1 INJECTION, SOLUTION INTRAMUSCULAR; INTRAVENOUS; SUBCUTANEOUS at 14:55

## 2025-03-07 ASSESSMENT — PAIN SCALES - GENERAL
PAINLEVEL_OUTOF10: 7
PAINLEVEL_OUTOF10: 7
PAINLEVEL_OUTOF10: 8
PAINLEVEL_OUTOF10: 7
PAINLEVEL_OUTOF10: 10

## 2025-03-07 ASSESSMENT — PAIN - FUNCTIONAL ASSESSMENT
PAIN_FUNCTIONAL_ASSESSMENT: ACTIVITIES ARE NOT PREVENTED

## 2025-03-07 ASSESSMENT — PAIN DESCRIPTION - DESCRIPTORS
DESCRIPTORS: DISCOMFORT
DESCRIPTORS: ACHING
DESCRIPTORS: DISCOMFORT
DESCRIPTORS: ACHING;DISCOMFORT

## 2025-03-07 ASSESSMENT — PAIN DESCRIPTION - LOCATION
LOCATION: BACK
LOCATION: BACK;ABDOMEN
LOCATION: BACK

## 2025-03-07 ASSESSMENT — PAIN DESCRIPTION - ORIENTATION
ORIENTATION: LOWER
ORIENTATION: MID
ORIENTATION: MID
ORIENTATION: LOWER

## 2025-03-07 NOTE — CARE COORDINATION
Met with patient to discuss discharge plan and review Therapy notes.  Patient believes his mobility has improved since admission and plans to discharge home and continue home health.  He is current with [facility].  [facility] by Christopher CROCKER [phone]  F [phone]  Electronically signed by Cori Johnson RN on 3/7/2025 at 11:39 AM

## 2025-03-07 NOTE — PROGRESS NOTES
Palliative Care Progress Note  3/7/2025 7:45 AM    Patient:  Nick Jain  YOB: 1950  Primary Care Physician: Tesfaye Gipson MD  Advance Directive: Full Code  Admit Date: 3/4/2025       Hospital Day: 3  Portions of this note have been copied forward, however, changed to reflect the most current clinical status of this patient.    CHIEF COMPLAINT/REASON FOR CONSULTATION Goals of care, family support, Code status, symptom management     SUBJECTIVE:  Mr. Jain is resting in bed. He continues to have pain which is somewhat improved from time of admission.  Ambulated in room and sat up in chair for 2 hours today per spouse. He did have n/v this afternoon with lunch with attempts at increasing PO intake.He is without distress during my exam.     HPI: The patient is a 74 y.o. male with PMH HTN, COPD, TIA, CAD s/p CABG x3 3/2024 and recent PCI, pulmonary nodules, HLD, CKD, recent cecal cancer diagnosis 1/2025, longstanding prior tobacco use, and other comorbidities who presented to [facility] 3/4/2025 complaining of nausea and vomiting. Patient is known to palliative care and followed by inpatient team during recent admission here 1/10/2025-1/26/2025. During previous stay, he underwent a right hemicolectomy/terminal ileum ileectomy and lymph node dissection 1/11/2025 by Dr. Suleiman Hunter. 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 Unfortunately patient did have a persistent postoperative ileus. A PICC line was placed 1/17/2025 for initiation of TPN. Ileus did begin to resolve and bowel function returned. Patient developed tachycardia with increase in proBNP. He was initiated on IV diuretics and an echo was obtained 1/19/2025 which revealed EF 30 to 35%, severe hypokinesis of multiple segments with possible apical ballooning syndrome concerning for Takotsubo's cardiomyopathy, grade 2 diastolic dysfunction. EF was normal following his CABG last year. He went to the cath lab  note  Multiple complex medical problems  Morbidity mortality high risk  Medical decision making High complexity                                  Electronically signed by AGUSTINA Chapman CNP on 3/7/2025 at 7:45 AM    (Please note that portions of this note were completed with a voice recognition program.  Effortswere made to edit the dictations but occasionally words are mis-transcribed.)

## 2025-03-07 NOTE — PROGRESS NOTES
Physical Therapy     03/07/25 1100   Restrictions/Precautions   Restrictions/Precautions Fall Risk;Isolation   Position Activity Restriction   Other Position/Activity Restrictions Droplet   General   Diagnosis SEPSIS/PNA, FLU, COLON CA   Subjective   Subjective pt agreeable to tx   Bed mobility   Supine to Sit Stand by assistance   Transfers   Sit to Stand Contact guard assistance   Stand to Sit Contact guard assistance   Bed to Chair Contact guard assistance   Comment cues for hand placement. CGA in bathroom for toilet transfer and standing at sink for safety/stability.   Ambulation   Device Rolling Walker   Assistance Contact guard assistance   Quality of Gait FLEXED POSTURE, NO TRUE LOB, GUARDED OVERALL   Gait Deviations Slow [place];Decreased step height;Decreased step length   Distance 20' 30'   Comments sitting rest break. quick to fatigue/become SOA   Short Term Goals   Time Frame for Short Term Goals 14 DAYS   Short Term Goal 1 BED MOB MOD IND   Short Term Goal 2 TRANSFERS MOD IND   Short Term Goal 3 ' RW SUPERVISION   Activity Tolerance   Activity Tolerance Patient tolerated treatment well;Patient limited by fatigue   Assessment   Assessment pt able to tolerate all tasks CGA with no LOB but continues to be limited from further distance in room AMB due to fatigue and SOA. left in chair with alarm on and all needs in reach.   PT Plan of Care   Friday X   Safety Devices   Type of Devices Call light within reach;Chair alarm in place;Gait belt;Left in chair;Nurse notified     Electronically signed by Dallas Guillory PTA on 3/7/2025 at 11:18 AM

## 2025-03-07 NOTE — PROGRESS NOTES
Nutrition Assessment     Type and Reason for Visit: Reassess    Nutrition Recommendations/Plan:   Discontinue Gelatein     Malnutrition Assessment:  Malnutrition Status: Severe malnutrition    Nutrition Assessment:  Continues on full liquids with ONS all meals. Reports eating a little better and may consume at least half of meals and supplements. Pt is on Marinol which can help appetite. Weight noted to be trending up. Pt dislikes Gelatein and will discontinue it. Pt states he is able to consume more of Ensure Clear and Magic Cup.    Estimated Daily Nutrient Needs:  Energy (kcal):  2337-5564 kcals (30-35 kcals/kg) Weight Used for Energy Requirements: Current     Protein (g):  90g Weight Used for Protein Requirements: Current        Fluid (ml/day):  4587-9505 ml Method Used for Fluid Requirements: 1 ml/kcal    Nutrition Related Findings:   GFR 78 (improved), BM 3-5 Wound Type: None    Current Nutrition Therapies:    ADULT ORAL NUTRITION SUPPLEMENT; Breakfast, Lunch, Dinner; Clear Liquid Oral Supplement  ADULT DIET; Full Liquid  ADULT ORAL NUTRITION SUPPLEMENT; Lunch, Dinner; Frozen Oral Supplement  ADULT ORAL NUTRITION SUPPLEMENT; Breakfast; Fortified Gelatin Oral Supplement    Anthropometric Measures:  Height: 175.3 cm (5' 9.02\")  Current Body Wt: 60.1 kg (132 lb 7.9 oz)   BMI: 19.6      Nutrition Diagnosis:   Severe malnutrition, in context of chronic illness related to catabolic illness, Altered GI structure, decreased appetite as evidenced by criteria as identified in malnutrition assessment    Nutrition Interventions:   Food and/or Nutrient Delivery: Continue Current Diet, Modify Oral Nutrition Supplement  Nutrition Education/Counseling: No recommendation at this time  Coordination of Nutrition Care: Continue to monitor while inpatient  Plan of Care discussed with: Pt/wife    Goals:  Goals: PO intake 50% or greater  Type of Goal: Continue current goal  Previous Goal Met: Progressing toward Goal(s)    Nutrition

## 2025-03-07 NOTE — PROGRESS NOTES
Chamber lumen subcutaneous vascular access device placement (Bard PowerPort)       Social History:    Marital status:   Smoking status: Former smoker  ETOH status: No  [facility] [facility]    Family History:   Family History   Problem Relation Age of Onset    Diabetes Mother     Coronary Art Dis Father     No Known Problems Sister     No Known Problems Sister     Coronary Art Dis Brother     No Known Problems Brother        Current Hospital Medications:    Current Facility-Administered Medications   Medication Dose Route Frequency Provider Last Rate Last Admin    lidocaine 4 % external patch 2 patch  2 patch TransDERmal Daily Kenyatta Knapp APRN - CNP   2 patch at 03/06/25 1010    enoxaparin (LOVENOX) injection 40 mg  40 mg SubCUTAneous Daily Queenie Gold APRN   40 mg at 03/06/25 1508    ceFEPIme (MAXIPIME) 2,000 mg in sodium chloride 0.9 % 100 mL IVPB ([id number])  2,000 mg IntraVENous Q12H Queenie Gold APRN   Stopped at 03/07/25 0245    cyanocobalamin injection 1,000 mcg  1,000 mcg SubCUTAneous Weekly Susi Rios MD   1,000 mcg at 03/05/25 0812    oseltamivir (TAMIFLU) capsule 75 mg  75 mg Oral BID Susi Rios MD   75 mg at 03/06/25 2044    naloxone (NARCAN) injection 0.4 mg  0.4 mg IntraVENous PRN Kenyatta Knapp APRN - CNP        fentaNYL (DURAGESIC) 12 MCG/HR 1 patch  1 patch TransDERmal Q72H Kenyatta Knapp APRN - CNP   1 patch at 03/05/25 1112    oxyCODONE (ROXICODONE) immediate release tablet 5 mg  5 mg Oral Q4H PRN Kenyatta Knapp APRN - CNP   5 mg at 03/06/25 2247    Or    oxyCODONE HCl (OXY-IR) immediate release tablet 10 mg  10 mg Oral Q4H PRN Kenyatta Knapp APRN - CNP   10 mg at 03/07/25 0344    cetirizine (ZYRTEC) tablet 10 mg  10 mg Oral Daily Queenie Gold APRN   10 mg at 03/06/25 0813    albuterol sulfate HFA (PROVENTIL;VENTOLIN;PROAIR) 108 (90 Base) MCG/ACT inhaler 2 puff  2 puff Inhalation Q6H PRN Queenie Gold APRN        aspirin EC  abnormal distension of portions of the small bowel, potentially mild ileus.  No evidence of obstruction.  Stable colonic diverticulosis.    No evidence of diverticulitis.    Interval development of free air, as expected given the recent surgery.    Trace fluid along both pericolic gutters and in the pelvis.     Plan:  Awaiting initiation modified FOLFOX-cetuximab and oral BRAF inhibitor    # Normocytic anemia/reactive thrombocytosis-worsening  Recent Labs     03/06/25  0530 03/05/25  0340 03/04/25  0728   WBC 10.0 11.2* 17.2*   HGB 7.2* 7.8* 10.3*   HCT 23.1* 24.7* 33.3*   MCV 89.2 87.3 90.0    304 426*     Lab Results   Component Value Date/Time    IRON 9 01/13/2025 03:39 AM    TIBC 140 01/13/2025 03:39 AM    IRONPERSAT 6 01/13/2025 03:39 AM    FERRITIN 271.0 01/13/2025 03:39 AM    COWJUIHY11 213 01/13/2025 03:39 AM    FOLATE 8.9 09/25/2024 12:41 PM    RETICCTPCT 1.14 01/13/2025 03:39 AM    RETICABS 0.0405 01/13/2025 03:39 AM     01/09/2025 04:00 PM   B12 was borderline low but MMA was normal suggesting no B12 deficiency.  Likely functional iron deficiency     Venofer 1000 mg completed 1/16/2025  B12 1000 mcg subcu times daily x 7 doses to be followed by weekly B12 1000 mcg x 4 doses to be followed by B12 1000 mcg monthly.    B12 1000 mcg 3/5/2025     # Neutrophil leukocytosis-improving  Recent Labs     03/06/25  0530 03/05/25  0340 03/04/25  0728   WBC 10.0 11.2* 17.2*   HGB 7.2* 7.8* 10.3*   HCT 23.1* 24.7* 33.3*   MCV 89.2 87.3 90.0    304 426*     # Pneumonia  Left lower lobe consolidation on CT scan   He completed azithromycin.  Continue cefepime.    # Influenza A  Tamiflu 30 mg p.o x 1 dose on 3/4/2025 due to poor renal clearance  Tamiflu 75 p.o. twice daily x 3 additional days, started 3/5/2025 due to improvement of renal clearance    Pain-  Fentanyl 12 mcg every 72 hours  Oxycodone as needed    # Renal impairment-improving  Creatinine 2.0-> 1.1      Latest Ref Rng & Units 3/6/2025

## 2025-03-07 NOTE — PROGRESS NOTES
Physician Progress Note      PATIENT:               SHERRY JOHNSON  CSN #:                  [id number]  :                       1950  ADMIT DATE:       3/4/2025 7:12 AM  DISCH DATE:  RESPONDING  PROVIDER #:        Chris Mahmood MD MPH          QUERY TEXT:    Patient admitted with Sepsis due to pneumonia. Noted to have documentation of   weight loss, dietician assessment with severe malnutrition diagnosis in note   3/6. If possible, please document in progress notes and discharge summary if   you are evaluating and /or treating any of the following:    The medical record reflects the following:  Risk Factors: Sepsis PNA, Colon Cancer.  Clinical Indicators: Mild decreased energy intake, weight loss, Severe body   fat loss, Severe muscle mass loss.  Treatment: Dietary consult, ONS. Monitoring.    Thank you  Karoline Lieberman RN, BSN, [facility]  [phone]    ASPEN Criteria:    https://aspenjournals.onlinelibrary.marie.com/doi/full/10.1177/393798579609406  5  Options provided:  -- Protein calorie malnutrition severe  -- Other - I will add my own diagnosis  -- Disagree - Not applicable / Not valid  -- Disagree - Clinically unable to determine / Unknown  -- Refer to Clinical Documentation Reviewer    PROVIDER RESPONSE TEXT:    This patient has severe protein calorie malnutrition.    Query created by: Karoline Lieberman on 3/6/2025 2:37 PM      Electronically signed by:  Chris Mahmood MD MPH 3/7/2025 3:21 PM

## 2025-03-08 ENCOUNTER — APPOINTMENT (OUTPATIENT)
Dept: CT IMAGING | Age: 75
End: 2025-03-08
Payer: MEDICARE

## 2025-03-08 PROBLEM — K20.90 ESOPHAGITIS: Status: ACTIVE | Noted: 2025-03-04

## 2025-03-08 LAB
ALBUMIN SERPL-MCNC: 2.6 G/DL (ref 3.5–5.2)
ALP SERPL-CCNC: 336 U/L (ref 40–129)
ALT SERPL-CCNC: 9 U/L (ref 10–50)
ANION GAP SERPL CALCULATED.3IONS-SCNC: 9 MMOL/L (ref 8–16)
AST SERPL-CCNC: 20 U/L (ref 10–50)
BASOPHILS # BLD: 0 K/UL (ref 0–0.2)
BASOPHILS NFR BLD: 0 % (ref 0–1)
BILIRUB SERPL-MCNC: <0.2 MG/DL (ref 0.2–1.2)
BUN SERPL-MCNC: 23 MG/DL (ref 8–23)
CALCIUM SERPL-MCNC: 8.5 MG/DL (ref 8.8–10.2)
CHLORIDE SERPL-SCNC: 107 MMOL/L (ref 98–107)
CO2 SERPL-SCNC: 20 MMOL/L (ref 22–29)
CREAT SERPL-MCNC: 1 MG/DL (ref 0.7–1.2)
D DIMER PPP FEU-MCNC: 4.51 UG/ML FEU (ref 0–0.48)
DPYD GENE MUT ANL BLD/T: NORMAL
DPYD GENE PROD MET ACT IMP BLD/T-IMP: NORMAL
DPYD PHENOTYPE: NORMAL
EER DIHYDROPYRIMIDINE DEHYDROGENASE: NORMAL
EOSINOPHIL # BLD: 0 K/UL (ref 0–0.6)
EOSINOPHIL NFR BLD: 0.1 % (ref 0–5)
ERYTHROCYTE [DISTWIDTH] IN BLOOD BY AUTOMATED COUNT: 17.2 % (ref 11.5–14.5)
GLUCOSE BLD-MCNC: 98 MG/DL (ref 70–99)
GLUCOSE SERPL-MCNC: 95 MG/DL (ref 70–99)
HCT VFR BLD AUTO: 24.3 % (ref 42–52)
HGB BLD-MCNC: 7.3 G/DL (ref 14–18)
IMM GRANULOCYTES # BLD: 0.1 K/UL
LYMPHOCYTES # BLD: 1 K/UL (ref 1.1–4.5)
LYMPHOCYTES NFR BLD: 9 % (ref 20–40)
MCH RBC QN AUTO: 27.7 PG (ref 27–31)
MCHC RBC AUTO-ENTMCNC: 30 G/DL (ref 33–37)
MCV RBC AUTO: 92 FL (ref 80–94)
MONOCYTES # BLD: 0.8 K/UL (ref 0–0.9)
MONOCYTES NFR BLD: 7.3 % (ref 0–10)
NEUTROPHILS # BLD: 9 K/UL (ref 1.5–7.5)
NEUTS SEG NFR BLD: 83 % (ref 50–65)
PERFORMED ON: NORMAL
PLATELET # BLD AUTO: 274 K/UL (ref 130–400)
PMV BLD AUTO: 10.4 FL (ref 9.4–12.4)
POTASSIUM SERPL-SCNC: 4.3 MMOL/L (ref 3.5–5)
PROT SERPL-MCNC: 5.7 G/DL (ref 6.4–8.3)
RBC # BLD AUTO: 2.64 M/UL (ref 4.7–6.1)
SODIUM SERPL-SCNC: 136 MMOL/L (ref 136–145)
SPECIMEN SOURCE: NORMAL
WBC # BLD AUTO: 10.9 K/UL (ref 4.8–10.8)

## 2025-03-08 PROCEDURE — 85025 COMPLETE CBC W/AUTO DIFF WBC: CPT

## 2025-03-08 PROCEDURE — 6370000000 HC RX 637 (ALT 250 FOR IP): Performed by: INTERNAL MEDICINE

## 2025-03-08 PROCEDURE — 82962 GLUCOSE BLOOD TEST: CPT

## 2025-03-08 PROCEDURE — 99233 SBSQ HOSP IP/OBS HIGH 50: CPT | Performed by: INTERNAL MEDICINE

## 2025-03-08 PROCEDURE — 6360000004 HC RX CONTRAST MEDICATION: Performed by: NURSE PRACTITIONER

## 2025-03-08 PROCEDURE — 2580000003 HC RX 258: Performed by: NURSE PRACTITIONER

## 2025-03-08 PROCEDURE — 6360000002 HC RX W HCPCS: Performed by: NURSE PRACTITIONER

## 2025-03-08 PROCEDURE — 80053 COMPREHEN METABOLIC PANEL: CPT

## 2025-03-08 PROCEDURE — 85379 FIBRIN DEGRADATION QUANT: CPT

## 2025-03-08 PROCEDURE — 74174 CTA ABD&PLVS W/CONTRAST: CPT

## 2025-03-08 PROCEDURE — 6370000000 HC RX 637 (ALT 250 FOR IP): Performed by: NURSE PRACTITIONER

## 2025-03-08 PROCEDURE — 6360000002 HC RX W HCPCS

## 2025-03-08 PROCEDURE — 51798 US URINE CAPACITY MEASURE: CPT

## 2025-03-08 PROCEDURE — 94760 N-INVAS EAR/PLS OXIMETRY 1: CPT

## 2025-03-08 PROCEDURE — 94640 AIRWAY INHALATION TREATMENT: CPT

## 2025-03-08 PROCEDURE — 1200000000 HC SEMI PRIVATE

## 2025-03-08 PROCEDURE — 71275 CT ANGIOGRAPHY CHEST: CPT

## 2025-03-08 PROCEDURE — 2500000003 HC RX 250 WO HCPCS: Performed by: NURSE PRACTITIONER

## 2025-03-08 PROCEDURE — 6370000000 HC RX 637 (ALT 250 FOR IP)

## 2025-03-08 RX ORDER — DOCUSATE SODIUM 100 MG/1
100 CAPSULE, LIQUID FILLED ORAL 2 TIMES DAILY
Status: DISCONTINUED | OUTPATIENT
Start: 2025-03-08 | End: 2025-03-14 | Stop reason: HOSPADM

## 2025-03-08 RX ORDER — METOCLOPRAMIDE 5 MG/1
5 TABLET ORAL
Status: DISCONTINUED | OUTPATIENT
Start: 2025-03-08 | End: 2025-03-14 | Stop reason: HOSPADM

## 2025-03-08 RX ORDER — IOPAMIDOL 755 MG/ML
70 INJECTION, SOLUTION INTRAVASCULAR
Status: COMPLETED | OUTPATIENT
Start: 2025-03-08 | End: 2025-03-08

## 2025-03-08 RX ORDER — FENTANYL 12.5 UG/1
1 PATCH TRANSDERMAL
Refills: 0 | Status: DISCONTINUED | OUTPATIENT
Start: 2025-03-08 | End: 2025-03-11

## 2025-03-08 RX ADMIN — OXYCODONE HYDROCHLORIDE 10 MG: 10 TABLET ORAL at 04:52

## 2025-03-08 RX ADMIN — HYDROMORPHONE HYDROCHLORIDE 0.5 MG: 1 INJECTION, SOLUTION INTRAMUSCULAR; INTRAVENOUS; SUBCUTANEOUS at 02:00

## 2025-03-08 RX ADMIN — HYDROMORPHONE HYDROCHLORIDE 0.5 MG: 1 INJECTION, SOLUTION INTRAMUSCULAR; INTRAVENOUS; SUBCUTANEOUS at 08:27

## 2025-03-08 RX ADMIN — CLOPIDOGREL BISULFATE 75 MG: 75 TABLET ORAL at 08:26

## 2025-03-08 RX ADMIN — CETIRIZINE HYDROCHLORIDE 10 MG: 10 TABLET, FILM COATED ORAL at 08:26

## 2025-03-08 RX ADMIN — ASPIRIN 81 MG: 81 TABLET, COATED ORAL at 08:26

## 2025-03-08 RX ADMIN — CEFEPIME 2000 MG: 2 INJECTION, POWDER, FOR SOLUTION INTRAVENOUS at 11:48

## 2025-03-08 RX ADMIN — METOCLOPRAMIDE 5 MG: 5 TABLET ORAL at 11:43

## 2025-03-08 RX ADMIN — SODIUM CHLORIDE, PRESERVATIVE FREE 10 ML: 5 INJECTION INTRAVENOUS at 17:38

## 2025-03-08 RX ADMIN — ENOXAPARIN SODIUM 40 MG: 100 INJECTION SUBCUTANEOUS at 17:43

## 2025-03-08 RX ADMIN — OXYCODONE HYDROCHLORIDE 10 MG: 10 TABLET ORAL at 20:05

## 2025-03-08 RX ADMIN — ONDANSETRON 4 MG: 2 INJECTION INTRAMUSCULAR; INTRAVENOUS at 05:54

## 2025-03-08 RX ADMIN — Medication 2 PUFF: at 08:20

## 2025-03-08 RX ADMIN — METOCLOPRAMIDE 5 MG: 5 TABLET ORAL at 17:44

## 2025-03-08 RX ADMIN — OSELTAMIVIR PHOSPHATE 75 MG: 75 CAPSULE ORAL at 08:26

## 2025-03-08 RX ADMIN — OXYCODONE 5 MG: 5 TABLET ORAL at 00:50

## 2025-03-08 RX ADMIN — MEGESTROL ACETATE 200 MG: 40 SUSPENSION ORAL at 08:26

## 2025-03-08 RX ADMIN — HYDROMORPHONE HYDROCHLORIDE 0.5 MG: 1 INJECTION, SOLUTION INTRAMUSCULAR; INTRAVENOUS; SUBCUTANEOUS at 17:43

## 2025-03-08 RX ADMIN — ONDANSETRON 4 MG: 2 INJECTION INTRAMUSCULAR; INTRAVENOUS at 17:44

## 2025-03-08 RX ADMIN — ACETAMINOPHEN 650 MG: 325 TABLET ORAL at 18:39

## 2025-03-08 RX ADMIN — SPIRONOLACTONE 12.5 MG: 25 TABLET ORAL at 08:26

## 2025-03-08 RX ADMIN — METOPROLOL SUCCINATE 25 MG: 25 TABLET, EXTENDED RELEASE ORAL at 08:26

## 2025-03-08 RX ADMIN — OSELTAMIVIR PHOSPHATE 75 MG: 75 CAPSULE ORAL at 20:04

## 2025-03-08 RX ADMIN — PROCHLORPERAZINE EDISYLATE 10 MG: 5 INJECTION INTRAMUSCULAR; INTRAVENOUS at 10:39

## 2025-03-08 RX ADMIN — IOPAMIDOL 70 ML: 755 INJECTION, SOLUTION INTRAVENOUS at 15:44

## 2025-03-08 RX ADMIN — SODIUM CHLORIDE, PRESERVATIVE FREE 10 ML: 5 INJECTION INTRAVENOUS at 20:05

## 2025-03-08 RX ADMIN — METOCLOPRAMIDE 5 MG: 5 TABLET ORAL at 08:26

## 2025-03-08 RX ADMIN — DOCUSATE SODIUM 100 MG: 100 CAPSULE, LIQUID FILLED ORAL at 20:04

## 2025-03-08 RX ADMIN — CEFEPIME 2000 MG: 2 INJECTION, POWDER, FOR SOLUTION INTRAVENOUS at 22:42

## 2025-03-08 ASSESSMENT — PAIN SCALES - GENERAL
PAINLEVEL_OUTOF10: 7
PAINLEVEL_OUTOF10: 8
PAINLEVEL_OUTOF10: 2
PAINLEVEL_OUTOF10: 5
PAINLEVEL_OUTOF10: 8
PAINLEVEL_OUTOF10: 7
PAINLEVEL_OUTOF10: 0
PAINLEVEL_OUTOF10: 9
PAINLEVEL_OUTOF10: 7

## 2025-03-08 ASSESSMENT — PAIN DESCRIPTION - DESCRIPTORS
DESCRIPTORS: ACHING
DESCRIPTORS: SHARP
DESCRIPTORS: DISCOMFORT
DESCRIPTORS: SHARP

## 2025-03-08 ASSESSMENT — PAIN DESCRIPTION - ORIENTATION
ORIENTATION: MID
ORIENTATION: MID
ORIENTATION: LOWER
ORIENTATION: MID

## 2025-03-08 ASSESSMENT — PAIN - FUNCTIONAL ASSESSMENT
PAIN_FUNCTIONAL_ASSESSMENT: ACTIVITIES ARE NOT PREVENTED

## 2025-03-08 ASSESSMENT — PAIN DESCRIPTION - LOCATION
LOCATION: BACK
LOCATION: BACK
LOCATION: GENERALIZED
LOCATION: BACK
LOCATION: BACK;ABDOMEN

## 2025-03-08 NOTE — H&P (VIEW-ONLY)
GASTROENTEROLOGY INPATIENT CONSULT     Reason for Consultation: nausea, vomiting   Referring Provider: Chris Mahmood MD     ASSESSMENT AND PLAN:  Nick Jain is a 74 y.o. year old male with recently diagnosed metastatic colon cancer (not yet started on palliative chemo), CAD on ASA+plavix, hx COPD who presents to the hospital  on 3/4/25 with fevers, chills, SOB, nonproductive cough and found to have WBC17.2, procal 0.76, [id number] in the setting of influenza A infection, on day 5 of tamiflu with significant improvement in respiratory symptoms. GI consulted for ongoing nausea, vomiting, crampy abdominal pain and inability to tolerate PO intake x2-3 months. Discussed his case with Dr Rios, Oncology. I suspect the majority of his symptoms are because of his progressively worsening colon cancer which he has not yet started therapy. 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 Thus we will plan for EGD on Monday (provided this is safe from anesthesia standpoint) to evaluate the esophagitis (rule out infectious/candida/peptic and malignancy). I agree with a dedicated CTA or MRA to delineate SMV thrombosis and IVC stenosis and need for anticoagulation (will defer to Oncology).     RECOMMENDATION  - antiemetics and pain control per primary team  - protonix 40mg IV BID (at least 8 weeks for presumed esophagitis)  - agree with CTA or MRA to evaluate SMV and IVC   - plan for EGD on Monday (provided this is safe from anesthesia standpoint) to evaluate the esophagitis (rule out infectious/candida/peptic and malignancy). Please note patient is on plavix and ASA and therapeutics may be limited due to increased risk of bleeding     Above recommendations relayed to patient and his daughter during bedside rounds and discussed with Dr Rios. Dr. Rhina Orosco will be taking over the [facility] for me

## 2025-03-08 NOTE — PROGRESS NOTES
hours  Oxycodone as needed    # Renal impairment-resolved  Creatinine 2.0-> 1.1      Latest Ref Rng & Units 3/8/2025     1:52 AM 3/7/2025     6:15 AM 3/6/2025     5:30 AM 3/5/2025     3:40 AM 3/4/2025     7:28 AM   Labs Renal   BUN 8 - 23 mg/dL 23  22  30  41  46    Cr 0.7 - 1.2 mg/dL 1.0  1.0  1.1  1.3  2.0    K 3.5 - 5.0 mmol/L 4.3  4.4  4.2  4.2  5.1    Na 136 - 145 mmol/L 136  137  137  136  133    Improvement of renal function     # History of CAD/systolic heart failure (Takotsubo's cardiomyopathy)-managed by cardiology     1/20/2025-Cardiac catheterization, [facility] by Dr. Bernard, cardiology:  Findings of :severe triple-vessel disease.  RCA proximal to mid diffuse severe 95% in-stent restenosis that feeds a large RPL with occluded proximal RPDA.  SVG to RPDA is patent but fails to backfill RCA.  Left main with diffuse nonobstructive disease.  LAD proximal diffuse nonobstructive with mid 75% stenosis with IFR 0.75 localizing to lesion consistent with obstructive lesion.  LIMA to distal LAD is atretic and basically nonfunctional with diffuse distal disease.  First diagonal backfills LAD but is occluded after graft touchdown.  SVG to first diagonal is patent with diagonal occluded at touchdown with anastomotic disease.  Circumflex with occluded OM1.  Patent SVG to OM1.  Severe LV systolic dysfunction with severe mid to distal anterolateral and apical hypokinesis with moderate inferior and lateral hypokinesis.  Findings not consistent with Takotsubo's cardiomyopathy and more likely related to ischemic cardiomyopathy with loss of diagonal post bypass, obstructive LAD and RCA.  Successful PCI of proximal to mid RCA with SUSAN x 1.  Successful PCI of first diagonal with recanalization of  with PTCA.  Diagonal now fills distally via graft and native.  Successful PCI of mid LAD with SUSAN x 1.     # DVT prophylaxis-Lovenox 30 mg subcutaneous daily    # SMV occlusion-CTA abdomen/pelvis  Discussed with GI.  Findings of

## 2025-03-08 NOTE — PROGRESS NOTES
[facility]      Patient:  Nick Jain  YOB: 1950  Date of Service: 3/8/2025  MRN: [id number]   Acct: [id number]   Primary Care Physician: Tesfaye Gipson MD  Advance Directive: Full Code  Admit Date: 3/4/2025       Hospital Day: 4  Portions of this note have been copied forward, however, changed to reflect the most current clinical status of this patient.    CHIEF COMPLAINT Shortness of breath/nausea, vomiting, abdominal pain     SUBJECTIVE: He is resting in bed.  He states that he continues to have intermittent abdominal cramping and nausea with p.o. intake.  He additionally has had persistent tachycardia low 100s to 110's.  He remains on room air.    CUMULATIVE HOSPITAL STAY:  The patient is a 74 y.o. male with a PMH of CAD s/p CABG and recent PCI, adenocarcinoma s/p right colectomy on 1/11/2025, COPD, and hypertension who presented to [facility] ED on 3/4/2025 complaining of intractable nausea, vomiting and abdominal pain.  He stated that he began to experience nausea approximately 2 days prior to his admission.  He began to vomit on the day prior to admission and had not been able to tolerate any p.o. intake.  He additionally reported irregular bowel movements and decreased urine output.  He endorsed epigastric and suprapubic abdominal pain.  He additionally reported shortness of breath with productive cough.  He denied fever or chills.  Of note he was to initiate chemotherapy on 3/5/2025 with Dr. Quintero.     Further ED workup revealed , K5.1, CO2 14, BUN 46 and creatinine 2.0.  Initial lactic acid 2.1.  WBC 17.2, H&H 10.3/33.3 with a platelet count of 426.  He was found to be tachycardic with a heart rate of 141 upon arrival the respiratory rate of 21.  Lipase 14.  He, therefore, met sepsis criteria received sepsis bolus.  CT of the abdomen pelvis with IV contrast revealed nodular and groundglass infiltrates within the lower aspect of both lungs.  Consolidation within the left lower  -Inpatient palliative care consulted and will be following       Acute kidney injury superimposed on CKD   -Resolved               - Creatinine 1.0 today               - IVFs dc'd              - Monitor I's and O's closely              - Monitor labs closely              - Avoid hypotension              - Avoid nephrotoxic agents    Influenza A   -Tamiflu 75 mg BID x4 days   -Droplet precautions   -Symptomatic treatment-Zyrtec 10 mg added      Tachycardia   -EKG sinus tach with ST abnormality  -Dimer pending  -CTA pulmonary to r/o PE      Resolved Problems:    * No resolved hospital problems. *       Antibiotic: Cefepime/Azithromycin     DVT Prophylaxis: Lovenox    Disposition: FAWAD Gold, AGUSTINA, 3/8/2025 4:27 PM

## 2025-03-08 NOTE — CONSULTS
GASTROENTEROLOGY INPATIENT CONSULT     Reason for Consultation: nausea, vomiting   Referring Provider: Chris Mahmood MD     ASSESSMENT AND PLAN:  Nick Jain is a 74 y.o. year old male with recently diagnosed metastatic colon cancer (not yet started on palliative chemo), CAD on ASA+plavix, hx COPD who presents to the hospital  on 3/4/25 with fevers, chills, SOB, nonproductive cough and found to have WBC17.2, procal 0.76, [id number] in the setting of influenza A infection, on day 5 of tamiflu with significant improvement in respiratory symptoms. GI consulted for ongoing nausea, vomiting, crampy abdominal pain and inability to tolerate PO intake x2-3 months. Discussed his case with Dr Rios, Oncology. I suspect the majority of his symptoms are because of his progressively worsening colon cancer which he has not yet started therapy. 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 Thus we will plan for EGD on Monday (provided this is safe from anesthesia standpoint) to evaluate the esophagitis (rule out infectious/candida/peptic and malignancy). I agree with a dedicated CTA or MRA to delineate SMV thrombosis and IVC stenosis and need for anticoagulation (will defer to Oncology).     RECOMMENDATION  - antiemetics and pain control per primary team  - protonix 40mg IV BID (at least 8 weeks for presumed esophagitis)  - agree with CTA or MRA to evaluate SMV and IVC   - plan for EGD on Monday (provided this is safe from anesthesia standpoint) to evaluate the esophagitis (rule out infectious/candida/peptic and malignancy). Please note patient is on plavix and ASA and therapeutics may be limited due to increased risk of bleeding     Above recommendations relayed to patient and his daughter during bedside rounds and discussed with Dr Rios. Dr. Rhina Orosco will be taking over the [facility] for me  coronary intervention performed by Fadi Bernard MD at [facility] CARDIAC CATH LAB    COLONOSCOPY N/A 10/22/2021    Dr KHALIDA Cano-AP x 1, BCM x 1, 3 yr recall    CORONARY ARTERY BYPASS GRAFT  03/25/2024    [facility]    HEMICOLECTOMY Right 01/11/2025    Right Colectomy performed by Suleiman Hunter MD at [facility] OR    TESTICLE REMOVAL      Just one    TYMPANOSTOMY TUBE PLACEMENT      VASCULAR SURGERY  02/25/2025    Ultrasound guided cannulation right internal jugular vein 2.  right internal jugular vein Single Chamber lumen subcutaneous vascular access device placement ([employer] PowerPort)    No Known Allergies  No current facility-administered medications on file prior to encounter.     Current Outpatient Medications on File Prior to Encounter   Medication Sig Dispense Refill    docusate sodium (COLACE) 100 MG capsule Take 1 capsule by mouth daily      clopidogrel (PLAVIX) 75 MG tablet Take 1 tablet by mouth daily 30 tablet 5    sacubitril-valsartan (ENTRESTO) 24-26 MG per tablet Take 0.5 tablets by mouth 2 times daily 60 tablet 0    spironolactone (ALDACTONE) 25 MG tablet Take 0.5 tablets by mouth daily (Patient taking differently: Take 0.5 tablets by mouth 2 times daily) 30 tablet 0    atorvastatin (LIPITOR) 80 MG tablet Take 1 tablet by mouth daily 90 tablet 3    montelukast (SINGULAIR) 10 MG tablet Take 1 tablet by mouth daily (Patient not taking: Reported on 3/4/2025) 30 tablet 0    Encorafenib 75 MG CAPS Take 300 mg by mouth daily (Patient not taking: Reported on 3/4/2025) 30 capsule 5    Simethicone (GAS-X EXTRA STRENGTH) 125 MG CAPS Take 1 capsule by mouth as needed      traZODone (DESYREL) 50 MG tablet TAKE 2 TABLETS EVERY NIGHT AS NEEDED FOR SLEEP 180 tablet 1    metoprolol succinate (TOPROL XL) 25 MG extended release tablet Take 1 tablet by mouth daily 90 tablet 1    droNABinol (MARINOL) 2.5 MG capsule Take 1 capsule by mouth 2 times daily (before meals) for 30 days. Max Daily Amount: 5 mg 60 capsule 0

## 2025-03-08 NOTE — PROGRESS NOTES
[facility]      Patient:  Nick Jain  YOB: 1950  Date of Service: 3/7/2025  MRN: [id number]   Acct: [id number]   Primary Care Physician: Tesfaye Gipson MD  Advance Directive: Full Code  Admit Date: 3/4/2025       Hospital Day: 3  Portions of this note have been copied forward, however, changed to reflect the most current clinical status of this patient.    CHIEF COMPLAINT Shortness of breath/nausea, vomiting, abdominal pain     SUBJECTIVE: He is seen sitting in the chair.  Mild tachycardia ongoing.  Vital signs stable.  He is currently on room air.  Afebrile.  No issues or events overnight    CUMULATIVE HOSPITAL STAY:  The patient is a 74 y.o. male with a PMH of CAD s/p CABG and recent PCI, adenocarcinoma s/p right colectomy on 1/11/2025, COPD, and hypertension who presented to [facility] ED on 3/4/2025 complaining of intractable nausea, vomiting and abdominal pain.  He stated that he began to experience nausea approximately 2 days prior to his admission.  He began to vomit on the day prior to admission and had not been able to tolerate any p.o. intake.  He additionally reported irregular bowel movements and decreased urine output.  He endorsed epigastric and suprapubic abdominal pain.  He additionally reported shortness of breath with productive cough.  He denied fever or chills.  Of note he was to initiate chemotherapy on 3/5/2025 with Dr. Quintero.     Further ED workup revealed , K5.1, CO2 14, BUN 46 and creatinine 2.0.  Initial lactic acid 2.1.  WBC 17.2, H&H 10.3/33.3 with a platelet count of 426.  He was found to be tachycardic with a heart rate of 141 upon arrival the respiratory rate of 21.  Lipase 14.  He, therefore, met sepsis criteria received sepsis bolus.  CT of the abdomen pelvis with IV contrast revealed nodular and groundglass infiltrates within the lower aspect of both lungs.  Consolidation within the left lower lobe.  May represent combination of pneumonia and for  Monitor I's and O's closely              - Monitor labs closely              - Avoid hypotension              - Avoid nephrotoxic agents    Influenza A   -Tamiflu 75 mg BID x4 days   -Droplet precautions   -Symptomatic treatment-Zyrtec 10 mg added      Resolved Problems:    * No resolved hospital problems. *       Antibiotic: Cefepime/Azithromycin     DVT Prophylaxis: Lovenox    Disposition: AGUSTINA Salas, 3/7/2025 6:17 PM

## 2025-03-08 NOTE — PROGRESS NOTES
Pharmacy Renal Adjustment    Nick Jain is a 74 y.o. male. Pharmacy has renally adjusted medications per protocol.    Recent Labs     03/06/25  0530 03/07/25  0615   BUN 30* 22       Recent Labs     03/06/25  0530 03/07/25  0615   CREATININE 1.1 1.0       Estimated Creatinine Clearance: 54 mL/min (based on SCr of 1 mg/dL).    Height:   Ht Readings from Last 1 Encounters:   03/05/25 1.753 m (5' 9.02\")     Weight:  Wt Readings from Last 1 Encounters:   03/05/25 60.1 kg (132 lb 6.4 oz)     Plan: Adjust the following medications based on renal function:           Reglan to 5 mg PO TID     Electronically signed by ARMANDO ROTH RPH on 3/8/2025 at 2:32 AM

## 2025-03-08 NOTE — PLAN OF CARE
Problem: Safety - Adult  Goal: Free from fall injury  Outcome: Progressing     Problem: ABCDS Injury Assessment  Goal: Absence of physical injury  Outcome: Progressing     Problem: Skin/Tissue Integrity  Goal: Skin integrity remains intact  Description: 1.  Monitor for areas of redness and/or skin breakdown  2.  Assess vascular access sites hourly  3.  Every 4-6 hours minimum:  Change oxygen saturation probe site  4.  Every 4-6 hours:  If on nasal continuous positive airway pressure, respiratory therapy assess nares and determine need for appliance change or resting period  Outcome: Progressing     Problem: Pain  Goal: Verbalizes/displays adequate comfort level or baseline comfort level  Outcome: Progressing     Problem: Nutrition Deficit:  Goal: Optimize nutritional status  Outcome: Progressing  Flowsheets (Taken 3/7/2025 1142 by Federica Núñez RD)  Nutrient intake appropriate for improving, restoring, or maintaining nutritional needs:   Monitor oral intake, labs, and treatment plans   Recommend appropriate diets, oral nutritional supplements, and vitamin/mineral supplements

## 2025-03-09 PROBLEM — R60.0 EDEMA OF LEFT UPPER ARM: Status: ACTIVE | Noted: 2025-03-09

## 2025-03-09 PROBLEM — K55.069 SUPERIOR MESENTERIC VEIN THROMBOSIS: Status: ACTIVE | Noted: 2025-03-09

## 2025-03-09 PROBLEM — I71.9 PENETRATING ULCER OF AORTA: Status: ACTIVE | Noted: 2025-03-09

## 2025-03-09 LAB
ALBUMIN SERPL-MCNC: 2.6 G/DL (ref 3.5–5.2)
ALP SERPL-CCNC: 329 U/L (ref 40–129)
ALT SERPL-CCNC: 9 U/L (ref 10–50)
ANION GAP SERPL CALCULATED.3IONS-SCNC: 10 MMOL/L (ref 8–16)
AST SERPL-CCNC: 21 U/L (ref 10–50)
BACTERIA BLD CULT ORG #2: NORMAL
BACTERIA BLD CULT: NORMAL
BASOPHILS # BLD: 0 K/UL (ref 0–0.2)
BASOPHILS NFR BLD: 0.1 % (ref 0–1)
BILIRUB SERPL-MCNC: 0.2 MG/DL (ref 0.2–1.2)
BUN SERPL-MCNC: 26 MG/DL (ref 8–23)
CALCIUM SERPL-MCNC: 8.4 MG/DL (ref 8.8–10.2)
CHLORIDE SERPL-SCNC: 106 MMOL/L (ref 98–107)
CO2 SERPL-SCNC: 19 MMOL/L (ref 22–29)
CREAT SERPL-MCNC: 1.1 MG/DL (ref 0.7–1.2)
EKG P AXIS: 67 DEGREES
EKG P AXIS: NORMAL DEGREES
EKG P-R INTERVAL: 116 MS
EKG P-R INTERVAL: NORMAL MS
EKG Q-T INTERVAL: 288 MS
EKG Q-T INTERVAL: 300 MS
EKG QRS DURATION: 76 MS
EKG QRS DURATION: 80 MS
EKG QTC CALCULATION (BAZETT): 391 MS
EKG QTC CALCULATION (BAZETT): 443 MS
EKG T AXIS: -31 DEGREES
EKG T AXIS: -44 DEGREES
EOSINOPHIL # BLD: 0 K/UL (ref 0–0.6)
EOSINOPHIL NFR BLD: 0.3 % (ref 0–5)
ERYTHROCYTE [DISTWIDTH] IN BLOOD BY AUTOMATED COUNT: 17.4 % (ref 11.5–14.5)
GLUCOSE SERPL-MCNC: 81 MG/DL (ref 70–99)
HCT VFR BLD AUTO: 24.5 % (ref 42–52)
HGB BLD-MCNC: 7.4 G/DL (ref 14–18)
IMM GRANULOCYTES # BLD: 0.1 K/UL
LYMPHOCYTES # BLD: 1.1 K/UL (ref 1.1–4.5)
LYMPHOCYTES NFR BLD: 7.8 % (ref 20–40)
MCH RBC QN AUTO: 28 PG (ref 27–31)
MCHC RBC AUTO-ENTMCNC: 30.2 G/DL (ref 33–37)
MCV RBC AUTO: 92.8 FL (ref 80–94)
MONOCYTES # BLD: 0.9 K/UL (ref 0–0.9)
MONOCYTES NFR BLD: 6.1 % (ref 0–10)
NEUTROPHILS # BLD: 12 K/UL (ref 1.5–7.5)
NEUTS SEG NFR BLD: 85.1 % (ref 50–65)
PLATELET # BLD AUTO: 238 K/UL (ref 130–400)
PMV BLD AUTO: 10.7 FL (ref 9.4–12.4)
POTASSIUM SERPL-SCNC: 4.4 MMOL/L (ref 3.5–5)
PROT SERPL-MCNC: 5.8 G/DL (ref 6.4–8.3)
RBC # BLD AUTO: 2.64 M/UL (ref 4.7–6.1)
SODIUM SERPL-SCNC: 135 MMOL/L (ref 136–145)
WBC # BLD AUTO: 14 K/UL (ref 4.8–10.8)

## 2025-03-09 PROCEDURE — 6370000000 HC RX 637 (ALT 250 FOR IP): Performed by: NURSE PRACTITIONER

## 2025-03-09 PROCEDURE — 6360000002 HC RX W HCPCS

## 2025-03-09 PROCEDURE — 80053 COMPREHEN METABOLIC PANEL: CPT

## 2025-03-09 PROCEDURE — 94760 N-INVAS EAR/PLS OXIMETRY 1: CPT

## 2025-03-09 PROCEDURE — 6360000002 HC RX W HCPCS: Performed by: NURSE PRACTITIONER

## 2025-03-09 PROCEDURE — 6370000000 HC RX 637 (ALT 250 FOR IP)

## 2025-03-09 PROCEDURE — 1200000000 HC SEMI PRIVATE

## 2025-03-09 PROCEDURE — 99232 SBSQ HOSP IP/OBS MODERATE 35: CPT | Performed by: INTERNAL MEDICINE

## 2025-03-09 PROCEDURE — 94640 AIRWAY INHALATION TREATMENT: CPT

## 2025-03-09 PROCEDURE — 2500000003 HC RX 250 WO HCPCS: Performed by: NURSE PRACTITIONER

## 2025-03-09 PROCEDURE — 85025 COMPLETE CBC W/AUTO DIFF WBC: CPT

## 2025-03-09 RX ADMIN — CLOPIDOGREL BISULFATE 75 MG: 75 TABLET ORAL at 08:34

## 2025-03-09 RX ADMIN — DRONABINOL 2.5 MG: 2.5 CAPSULE ORAL at 05:24

## 2025-03-09 RX ADMIN — Medication 2 PUFF: at 06:46

## 2025-03-09 RX ADMIN — OXYCODONE HYDROCHLORIDE 10 MG: 10 TABLET ORAL at 04:24

## 2025-03-09 RX ADMIN — SODIUM CHLORIDE, PRESERVATIVE FREE 10 ML: 5 INJECTION INTRAVENOUS at 09:40

## 2025-03-09 RX ADMIN — ACETAMINOPHEN 650 MG: 325 TABLET ORAL at 16:27

## 2025-03-09 RX ADMIN — METOCLOPRAMIDE 5 MG: 5 TABLET ORAL at 15:34

## 2025-03-09 RX ADMIN — SPIRONOLACTONE 12.5 MG: 25 TABLET ORAL at 08:34

## 2025-03-09 RX ADMIN — HYDROMORPHONE HYDROCHLORIDE 0.5 MG: 1 INJECTION, SOLUTION INTRAMUSCULAR; INTRAVENOUS; SUBCUTANEOUS at 04:31

## 2025-03-09 RX ADMIN — METOPROLOL SUCCINATE 25 MG: 25 TABLET, EXTENDED RELEASE ORAL at 08:34

## 2025-03-09 RX ADMIN — HYDROMORPHONE HYDROCHLORIDE 0.5 MG: 1 INJECTION, SOLUTION INTRAMUSCULAR; INTRAVENOUS; SUBCUTANEOUS at 15:34

## 2025-03-09 RX ADMIN — ONDANSETRON 4 MG: 2 INJECTION INTRAMUSCULAR; INTRAVENOUS at 08:34

## 2025-03-09 RX ADMIN — OXYCODONE HYDROCHLORIDE 10 MG: 10 TABLET ORAL at 13:00

## 2025-03-09 RX ADMIN — CETIRIZINE HYDROCHLORIDE 10 MG: 10 TABLET, FILM COATED ORAL at 08:34

## 2025-03-09 RX ADMIN — ENOXAPARIN SODIUM 40 MG: 100 INJECTION SUBCUTANEOUS at 15:34

## 2025-03-09 RX ADMIN — METOCLOPRAMIDE 5 MG: 5 TABLET ORAL at 11:09

## 2025-03-09 RX ADMIN — ASPIRIN 81 MG: 81 TABLET, COATED ORAL at 08:34

## 2025-03-09 RX ADMIN — DOCUSATE SODIUM 100 MG: 100 CAPSULE, LIQUID FILLED ORAL at 08:34

## 2025-03-09 RX ADMIN — METOCLOPRAMIDE 5 MG: 5 TABLET ORAL at 08:34

## 2025-03-09 RX ADMIN — DOCUSATE SODIUM 100 MG: 100 CAPSULE, LIQUID FILLED ORAL at 21:13

## 2025-03-09 RX ADMIN — SODIUM CHLORIDE, PRESERVATIVE FREE 10 ML: 5 INJECTION INTRAVENOUS at 21:14

## 2025-03-09 RX ADMIN — OXYCODONE HYDROCHLORIDE 10 MG: 10 TABLET ORAL at 17:58

## 2025-03-09 RX ADMIN — OXYCODONE HYDROCHLORIDE 10 MG: 10 TABLET ORAL at 08:34

## 2025-03-09 ASSESSMENT — PAIN DESCRIPTION - DESCRIPTORS
DESCRIPTORS: ACHING
DESCRIPTORS: ACHING
DESCRIPTORS: SHARP
DESCRIPTORS: ACHING

## 2025-03-09 ASSESSMENT — PAIN SCALES - GENERAL
PAINLEVEL_OUTOF10: 3
PAINLEVEL_OUTOF10: 8
PAINLEVEL_OUTOF10: 10
PAINLEVEL_OUTOF10: 3
PAINLEVEL_OUTOF10: 10

## 2025-03-09 ASSESSMENT — PAIN - FUNCTIONAL ASSESSMENT: PAIN_FUNCTIONAL_ASSESSMENT: ACTIVITIES ARE NOT PREVENTED

## 2025-03-09 ASSESSMENT — PAIN DESCRIPTION - LOCATION
LOCATION: BACK
LOCATION: BACK
LOCATION: ABDOMEN
LOCATION: BACK
LOCATION: ABDOMEN
LOCATION: ABDOMEN;BACK
LOCATION: BACK

## 2025-03-09 ASSESSMENT — PAIN DESCRIPTION - ORIENTATION: ORIENTATION: LOWER

## 2025-03-09 NOTE — PLAN OF CARE
Problem: Safety - Adult  Goal: Free from fall injury  Outcome: Progressing     Problem: ABCDS Injury Assessment  Goal: Absence of physical injury  Outcome: Progressing     Problem: Skin/Tissue Integrity  Goal: Skin integrity remains intact  Description: 1.  Monitor for areas of redness and/or skin breakdown  2.  Assess vascular access sites hourly  3.  Every 4-6 hours minimum:  Change oxygen saturation probe site  4.  Every 4-6 hours:  If on nasal continuous positive airway pressure, respiratory therapy assess nares and determine need for appliance change or resting period  Outcome: Progressing     Problem: Pain  Goal: Verbalizes/displays adequate comfort level or baseline comfort level  Outcome: Progressing     Problem: Nutrition Deficit:  Goal: Optimize nutritional status  Outcome: Progressing

## 2025-03-09 NOTE — PROGRESS NOTES
3/10/25 at 7AM. Please direct all further questions or concerns to her.   Thank you for allowing us to participate in the care of Nick Jaramillood. Please do not hesitate to contact us with any questions or concerns.     Samira Andrew MD  Gastroenterology & Hepatology   Office: [phone]    DISCLAIMER:    This physician (Samira Andrew MD) works through a [employer] (temporary staffing) as an inpatient consultant gastroenterologist only and has no outpatient clinic for patient follow up. Any results not available at time of inpatient discharge and/or GI clinic follow up should be managed by the hospitalist team, PCP, or outpatient gastroenterologist.         SUBJECTIVE:  No acute events overnight. Tolerating some PO intake       Review of Systems  As per subjective or otherwise 14 point ROS negative.    No Known Allergies       Current Facility-Administered Medications:     metoclopramide (REGLAN) tablet 5 mg, 5 mg, Oral, TID WC, Queenie Gold, APRN, 5 mg at 03/09/25 1109    fentaNYL (DURAGESIC) 12 MCG/HR 1 patch, 1 patch, TransDERmal, Q72H, Susi Rios MD, 1 patch at 03/08/25 1138    docusate sodium (COLACE) capsule 100 mg, 100 mg, Oral, BID, Queenie Gold, APRN, 100 mg at 03/09/25 0834    fentaNYL (DURAGESIC) 25 MCG/HR 1 patch, 1 patch, TransDERmal, Q72H, Neihoff, Alisyn, APRN - CNP, 1 patch at 03/07/25 0954    HYDROmorphone HCl PF (DILAUDID) injection 0.5 mg, 0.5 mg, IntraVENous, Q6H PRN, Neihoff, Alisyn, APRN - CNP, 0.5 mg at 03/09/25 0431    lidocaine 4 % external patch 2 patch, 2 patch, TransDERmal, Daily, Neihoff, Alisyn, APRN - CNP, 2 patch at 03/09/25 0835    enoxaparin (LOVENOX) injection 40 mg, 40 mg, SubCUTAneous, Daily, Queenie Gold, APRN, 40 mg at 03/08/25 1743    cyanocobalamin injection 1,000 mcg, 1,000 mcg, SubCUTAneous, Weekly, Susi Rios MD, 1,000 mcg at 03/05/25 0812    naloxone (NARCAN) injection 0.4 mg, 0.4 mg, IntraVENous, PRN,  g, 17 g, Oral, Daily PRN, Queenie Gold, APRN    acetaminophen (TYLENOL) tablet 650 mg, 650 mg, Oral, Q6H PRN, 650 mg at 03/08/25 1839 **OR** acetaminophen (TYLENOL) suppository 650 mg, 650 mg, Rectal, Q6H PRN, Queenie Gold, APRN    prochlorperazine (COMPAZINE) injection 10 mg, 10 mg, IntraVENous, Q6H PRN, Queenie Gold, APRN, 10 mg at 03/08/25 1039    [Held by provider] atorvastatin (LIPITOR) tablet 80 mg, 80 mg, Oral, Daily, Queenie Gold APRN    fluticasone (FLONASE) 50 MCG/ACT nasal spray 2 spray, 2 spray, Each Nostril, Daily PRN, Queenie Gold APRN    traZODone (DESYREL) tablet 100 mg, 100 mg, Oral, Nightly PRN, Queenie Gold APRN, 100 mg at 03/06/25 2247    droNABinol (MARINOL) capsule 2.5 mg, 2.5 mg, Oral, BID AC, Queenie Gold, APRN, 2.5 mg at 03/09/25 0524      OBJECTIVE:  /80   Pulse (!) 109   Temp 97.5 °F (36.4 °C) (Temporal)   Resp 16   Ht 1.753 m (5' 9.02\")   Wt 60.1 kg (132 lb 6.4 oz)   SpO2 97%   BMI 19.54 kg/m²   Constitutional: NAD, ill appearing  Abdominal: +BS, soft, nontender, nondistended      LABS AND IMAGING:  CBC:  Recent Labs     03/07/25  0615 03/08/25  0152 03/09/25  0420   WBC 10.5 10.9* 14.0*   RBC 2.63* 2.64* 2.64*   HGB 7.4* 7.3* 7.4*   HCT 23.4* 24.3* 24.5*   MCV 89.0 92.0 92.8   MCH 28.1 27.7 28.0   MCHC 31.6* 30.0* 30.2*    274 238     Recent Labs     03/07/25  0615 03/08/25 0152 03/09/25  0420    136 135*   K 4.4 4.3 4.4   ANIONGAP 9 9 10    107 106   CO2 21* 20* 19*   BUN 22 23 26*   CREATININE 1.0 1.0 1.1   GLUCOSE 99 95 81   CALCIUM 8.2* 8.5* 8.4*     No results for input(s): \"MG\", \"PHOS\" in the last 72 hours.  Recent Labs     03/07/25 0615 03/08/25 0152 03/09/25  0420   AST 19 20 21   ALT 10 9* 9*   BILITOT <0.2 <0.2 0.2   ALKPHOS 373* 336* 329*     Troponin T: No results for input(s): \"TROPONINI\" in the last 72 hours.  INR: No results for input(s): \"INR\" in the last 72 hours.  No results for input(s):

## 2025-03-09 NOTE — PROGRESS NOTES
[facility]      Patient:  Nick Jain  YOB: 1950  Date of Service: 3/9/2025  MRN: [id number]   Acct: [id number]   Primary Care Physician: Tesfaye Gipson MD  Advance Directive: Full Code  Admit Date: 3/4/2025       Hospital Day: 5  Portions of this note have been copied forward, however, changed to reflect the most current clinical status of this patient.    CHIEF COMPLAINT Shortness of breath/nausea, vomiting, abdominal pain     SUBJECTIVE: He is resting in bed.  He states that he continues to have intermittent abdominal cramping and nausea with p.o. intake.  He additionally has had persistent tachycardia low 100s to 110's.  He remains on room air.    CUMULATIVE HOSPITAL STAY:  The patient is a 74 y.o. male with a PMH of CAD s/p CABG and recent PCI, adenocarcinoma s/p right colectomy on 1/11/2025, COPD, and hypertension who presented to [facility] ED on 3/4/2025 complaining of intractable nausea, vomiting and abdominal pain.  He stated that he began to experience nausea approximately 2 days prior to his admission.  He began to vomit on the day prior to admission and had not been able to tolerate any p.o. intake.  He additionally reported irregular bowel movements and decreased urine output.  He endorsed epigastric and suprapubic abdominal pain.  He additionally reported shortness of breath with productive cough.  He denied fever or chills.  Of note he was to initiate chemotherapy on 3/5/2025 with Dr. Quintero.     Further ED workup revealed , K5.1, CO2 14, BUN 46 and creatinine 2.0.  Initial lactic acid 2.1.  WBC 17.2, H&H 10.3/33.3 with a platelet count of 426.  He was found to be tachycardic with a heart rate of 141 upon arrival the respiratory rate of 21.  Lipase 14.  He, therefore, met sepsis criteria received sepsis bolus.  CT of the abdomen pelvis with IV contrast revealed nodular and groundglass infiltrates within the lower aspect of both lungs.  Consolidation within the left lower

## 2025-03-09 NOTE — CONSULTS
Patient name: Nick Jain  MRN: [id number]  YOB: 1950    Date of consultation: 3/9/2025    Reason for consultation: Penetrating aortic ulcer    HPI: This is a very pleasant 74-year-old gentleman with widely metastatic poorly differentiated adeno carcinoma of the colon, s/p surgical resection with right hemicolectomy, 20 out of 20 lymph nodes positive.  Plan was for palliative chemotherapy with modified FOLFOX, cetuximab, and BRAF inhibitor, patient was admitted with nausea and vomiting and crampy abdominal pain, decreased oral intake, shortness of breath, cough.  And receiving supportive care.  Multiple CTs with IV contrast of the abdomen and pelvis have been performed since the beginning of January.  All of these studies have been reviewed, along with the CT from yesterday.  There is a small penetrating ulcer versus chronic short segment dissection in the infrarenal aorta on the right side just proximal to the iliac bifurcation.  There is no dilation of the aorta proximal or distal to this.  There is a mild dilation of the aorta at the area of ulceration versus dissection, 1.5 cm diameter compared to 1.8 cm diameter.  This is unchanged on current CTA versus the CTA reviewed from January 1, and all of the abdominal CTs in between.  Certainly we could offer elective intervention to exclude the area with a covered graft, which but with patient's current oncologic status, infectious status, this may not be the optimal timing.  Will defer plan and intervention to Dr Rodriguez when she returns on Monday.  No emergent intervention needed today.    Allergies: NKDA    Medications: Aspirin 81 mg daily, Zyrtec 10 mg daily, Plavix 75 mg daily, cyanocobalamin injection 1000 mcg weekly, Colace 100 mg twice daily, Marinol 2.5 mg twice daily, Lovenox 40 mg subcu daily, fentanyl patch 12 mcg every 72 hours, fentanyl patch 25 mcg every 72 hours, lidocaine patch transdermal daily, Reglan 5 mg 3 times daily, Toprol-XL 25  it is present on each of the scans.  It could be a spontaneous pathology, it could be related to a previous cardiac intervention in the more distant past with a small dissection resulting in a chronic ulceration.  Either way, it is stable and unchanged after >2 months.  Narrative & Impression  EXAM: CTA ABDOMEN AND PELVIS WITH AND WITHOUT CONTRAST  VASCULAR:  Atherosclerotic calcifications are shown throughout the visualized arterial structures, including the native coronary arteries  There is a penetrating ulcer along the right lateral wall of the infrarenal abdominal aorta. The defect measures approximately 1.5 cm superior inferior. The aorta measures overall at this level approximately 1.9 cm transverse by 1.7 cm AP  No mural disruption or active arterial contrast extravasation involving the abdominal aorta is identified.  Aorta: No significant stenosis.  Mild atherosclerotic plaque/calcification.  Celiac artery: No significant stenosis.  Superior mesenteric artery: No significant stenosis.  Inferior mesenteric artery: No significant stenosis.  Right renal artery: No significant stenosis.  Left renal artery: No significant stenosis.  Common Iliac arteries: Mild stenosis.  Internal Iliac arteries: Mild stenosis.  External Iliac arteries: Mild mild stenosis.  There is mild atheromatous plaque in the bilateral common femoral arteries. The bilateral profunda femoris arteries and bilateral superficial femoral arteries appear patent at their origins   There are bilateral pleural effusions, greater on the left, progressed versus prior exam. Atelectasis is demonstrated at the bilateral lung bases. Superimposed infiltrate cannot be excluded.  Hepatic lesions concerning for metastases show no significant interval change compared with 4 days prior  There is vicarious excretion of contrast material into the gallbladder, which can be seen in renal insufficiency  Multiple abnormal lymph nodes along the superior mesenteric

## 2025-03-09 NOTE — PROGRESS NOTES
throughout the visualized osseous structures.    No lytic or blastic focus of ema cortical bone destruction is appreciated         I saw him in initial consultation on 1/9/2025 and began to expedite his workup and treatment..     The following day, Nick developed further acute and increasing abdominal pain and sought initial evaluation in [facility] on 1/10/2025.     CT abdomen/pelvis with contrast at [facility] on 1/10/2025 reported the following:  5.1 x 4.8 x 6.2 cm large irregular heterogeneous mass in the region of the cecum and proximal colon consistent with malignancy  Multiple irregular right lower quadrant mesenteric nodes as well as central mesenteric adenopathy consistent with metastatic disease  Mildly dilated fluid-filled small bowel loops which could be due to partial obstruction by the aforementioned colonic mass  Colonic diverticulosis  Small hiatal hernia  Distended gallbladder with a mild amount of high attenuation which could be due to bibasilar secretion of contrast from the prior CT scan  1.0 x 0.8 cm low-attenuation structure in the left hepatic lobe that could reflect either a hemangioma or metastatic deposit.      His daughter Elisa desired to have management at [facility] where all of his subspecialty physicians are and left the ED AMA and brought him to the ED at [facility] where he was admitted for management.        1/10/2025-9   Dr. Rios received a call from the patient and wife with complaints of constipation.  Patient went to local ER at [facility].  He had a CT abdomen pelvis that showed findings of small bowel obstruction.  Dr. Shelley Oneill was consulted as institution and recommended surgery.  Patient's wife left the ER department [facility] and drove to ER at [facility] to have surgery performed at [facility].     1/11/2025-patient was seen by general surgery, Dr. Suleiman Hunter (covering for Dr. Carol Jenkins) who recommended a right  internal jugular vein Single Chamber lumen subcutaneous vascular access device placement (Bard PowerPort)       Social History:    Marital status:   Smoking status: Former smoker  ETOH status: No  Resides: [place]    Family History:   Family History   Problem Relation Age of Onset    Diabetes Mother     Coronary Art Dis Father     No Known Problems Sister     No Known Problems Sister     Coronary Art Dis Brother     No Known Problems Brother        Current Hospital Medications:    Current Facility-Administered Medications   Medication Dose Route Frequency Provider Last Rate Last Admin    metoclopramide (REGLAN) tablet 5 mg  5 mg Oral TID WC Queenie Gold APRN   5 mg at 03/09/25 0834    fentaNYL (DURAGESIC) 12 MCG/HR 1 patch  1 patch TransDERmal Q72H Susi Rios MD   1 patch at 03/08/25 1138    docusate sodium (COLACE) capsule 100 mg  100 mg Oral BID Queenie Gold APRN   100 mg at 03/09/25 0834    fentaNYL (DURAGESIC) 25 MCG/HR 1 patch  1 patch TransDERmal Q72H Kenyatta Knapp, APRN - CNP   1 patch at 03/07/25 0954    HYDROmorphone HCl PF (DILAUDID) injection 0.5 mg  0.5 mg IntraVENous Q6H PRN Kenyatta Knapp, APRN - CNP   0.5 mg at 03/09/25 0431    lidocaine 4 % external patch 2 patch  2 patch TransDERmal Daily Kenyatta Knapp, APRN - CNP   2 patch at 03/09/25 0835    enoxaparin (LOVENOX) injection 40 mg  40 mg SubCUTAneous Daily Queenie Gold APRN   40 mg at 03/08/25 1743    cyanocobalamin injection 1,000 mcg  1,000 mcg SubCUTAneous Weekly Susi Rios MD   1,000 mcg at 03/05/25 0812    naloxone (NARCAN) injection 0.4 mg  0.4 mg IntraVENous PRN NeihLanie hannonn, APRN - CNP        oxyCODONE (ROXICODONE) immediate release tablet 5 mg  5 mg Oral Q4H PRN Nejanneth, Alisyn, APRN - CNP   5 mg at 03/08/25 0050    Or    oxyCODONE HCl (OXY-IR) immediate release tablet 10 mg  10 mg Oral Q4H PRN Kenyatta Knapp, APRN - CNP   10 mg at 03/09/25 0834    cetirizine (ZYRTEC) tablet

## 2025-03-10 ENCOUNTER — ANCILLARY PROCEDURE (OUTPATIENT)
Dept: ENDOSCOPY | Age: 75
End: 2025-03-10
Attending: INTERNAL MEDICINE
Payer: MEDICARE

## 2025-03-10 ENCOUNTER — ANESTHESIA EVENT (OUTPATIENT)
Dept: ENDOSCOPY | Age: 75
End: 2025-03-10
Payer: MEDICARE

## 2025-03-10 ENCOUNTER — APPOINTMENT (OUTPATIENT)
Dept: VASCULAR LAB | Age: 75
End: 2025-03-10
Payer: MEDICARE

## 2025-03-10 ENCOUNTER — ANESTHESIA (OUTPATIENT)
Dept: ENDOSCOPY | Age: 75
End: 2025-03-10
Payer: MEDICARE

## 2025-03-10 LAB
ALBUMIN SERPL-MCNC: 2.5 G/DL (ref 3.5–5.2)
ALP SERPL-CCNC: 338 U/L (ref 40–129)
ALT SERPL-CCNC: 8 U/L (ref 10–50)
ANION GAP SERPL CALCULATED.3IONS-SCNC: 11 MMOL/L (ref 8–16)
AST SERPL-CCNC: 24 U/L (ref 10–50)
BACTERIA #/AREA URNS HPF: ABNORMAL /HPF
BASOPHILS # BLD: 0 K/UL (ref 0–0.2)
BASOPHILS NFR BLD: 0.1 % (ref 0–1)
BILIRUB SERPL-MCNC: 0.3 MG/DL (ref 0.2–1.2)
BILIRUB UR QL STRIP: NEGATIVE
BUN SERPL-MCNC: 30 MG/DL (ref 8–23)
CALCIUM SERPL-MCNC: 8.8 MG/DL (ref 8.8–10.2)
CASTS #/AREA URNS LPF: ABNORMAL /LPF
CHLORIDE SERPL-SCNC: 106 MMOL/L (ref 98–107)
CLARITY UR: CLEAR
CO2 SERPL-SCNC: 18 MMOL/L (ref 22–29)
COARSE GRAN CASTS #/AREA URNS LPF: ABNORMAL /LPF (ref 0–5)
COLOR UR: ABNORMAL
CREAT SERPL-MCNC: 1.2 MG/DL (ref 0.7–1.2)
EOSINOPHIL # BLD: 0.1 K/UL (ref 0–0.6)
EOSINOPHIL NFR BLD: 0.4 % (ref 0–5)
ERYTHROCYTE [DISTWIDTH] IN BLOOD BY AUTOMATED COUNT: 17.6 % (ref 11.5–14.5)
FINE GRAN CASTS #/AREA URNS HPF: ABNORMAL /LPF (ref 0–2)
GLUCOSE SERPL-MCNC: 91 MG/DL (ref 70–99)
GLUCOSE UR STRIP.AUTO-MCNC: NEGATIVE MG/DL
HCT VFR BLD AUTO: 23.5 % (ref 42–52)
HGB BLD-MCNC: 7.1 G/DL (ref 14–18)
HGB UR STRIP.AUTO-MCNC: ABNORMAL MG/L
HYALINE CASTS #/AREA URNS LPF: ABNORMAL /LPF (ref 0–5)
IMM GRANULOCYTES # BLD: 0.1 K/UL
KETONES UR STRIP.AUTO-MCNC: ABNORMAL MG/DL
LEUKOCYTE ESTERASE UR QL STRIP.AUTO: ABNORMAL
LYMPHOCYTES # BLD: 0.9 K/UL (ref 1.1–4.5)
LYMPHOCYTES NFR BLD: 7.1 % (ref 20–40)
MCH RBC QN AUTO: 28 PG (ref 27–31)
MCHC RBC AUTO-ENTMCNC: 30.2 G/DL (ref 33–37)
MCV RBC AUTO: 92.5 FL (ref 80–94)
MONOCYTES # BLD: 0.8 K/UL (ref 0–0.9)
MONOCYTES NFR BLD: 5.9 % (ref 0–10)
NEUTROPHILS # BLD: 11.3 K/UL (ref 1.5–7.5)
NEUTS SEG NFR BLD: 86 % (ref 50–65)
NITRITE UR QL STRIP.AUTO: NEGATIVE
PH UR STRIP.AUTO: 5.5 [PH] (ref 5–8)
PLATELET # BLD AUTO: 235 K/UL (ref 130–400)
PMV BLD AUTO: 10.9 FL (ref 9.4–12.4)
POTASSIUM SERPL-SCNC: 4.4 MMOL/L (ref 3.5–5)
PROT SERPL-MCNC: 5.7 G/DL (ref 6.4–8.3)
PROT UR STRIP.AUTO-MCNC: 100 MG/DL
RBC # BLD AUTO: 2.54 M/UL (ref 4.7–6.1)
RBC #/AREA URNS HPF: ABNORMAL /HPF (ref 0–2)
SODIUM SERPL-SCNC: 135 MMOL/L (ref 136–145)
SP GR UR STRIP.AUTO: 1.04 (ref 1–1.03)
SQUAMOUS #/AREA URNS HPF: ABNORMAL /HPF
UROBILINOGEN UR STRIP.AUTO-MCNC: 0.2 E.U./DL
WBC # BLD AUTO: 13.1 K/UL (ref 4.8–10.8)
WBC #/AREA URNS HPF: ABNORMAL /HPF (ref 0–5)

## 2025-03-10 PROCEDURE — 88312 SPECIAL STAINS GROUP 1: CPT

## 2025-03-10 PROCEDURE — 6360000002 HC RX W HCPCS: Performed by: INTERNAL MEDICINE

## 2025-03-10 PROCEDURE — 2500000003 HC RX 250 WO HCPCS: Performed by: INTERNAL MEDICINE

## 2025-03-10 PROCEDURE — 2580000003 HC RX 258: Performed by: INTERNAL MEDICINE

## 2025-03-10 PROCEDURE — 1200000000 HC SEMI PRIVATE

## 2025-03-10 PROCEDURE — 80053 COMPREHEN METABOLIC PANEL: CPT

## 2025-03-10 PROCEDURE — 94760 N-INVAS EAR/PLS OXIMETRY 1: CPT

## 2025-03-10 PROCEDURE — 6360000002 HC RX W HCPCS

## 2025-03-10 PROCEDURE — 6370000000 HC RX 637 (ALT 250 FOR IP): Performed by: NURSE PRACTITIONER

## 2025-03-10 PROCEDURE — 93971 EXTREMITY STUDY: CPT

## 2025-03-10 PROCEDURE — 6370000000 HC RX 637 (ALT 250 FOR IP)

## 2025-03-10 PROCEDURE — 3700000000 HC ANESTHESIA ATTENDED CARE: Performed by: INTERNAL MEDICINE

## 2025-03-10 PROCEDURE — 6370000000 HC RX 637 (ALT 250 FOR IP): Performed by: INTERNAL MEDICINE

## 2025-03-10 PROCEDURE — 99232 SBSQ HOSP IP/OBS MODERATE 35: CPT | Performed by: INTERNAL MEDICINE

## 2025-03-10 PROCEDURE — 85025 COMPLETE CBC W/AUTO DIFF WBC: CPT

## 2025-03-10 PROCEDURE — 7100000001 HC PACU RECOVERY - ADDTL 15 MIN: Performed by: INTERNAL MEDICINE

## 2025-03-10 PROCEDURE — 0DB28ZX EXCISION OF MIDDLE ESOPHAGUS, VIA NATURAL OR ARTIFICIAL OPENING ENDOSCOPIC, DIAGNOSTIC: ICD-10-PCS | Performed by: INTERNAL MEDICINE

## 2025-03-10 PROCEDURE — 3700000001 HC ADD 15 MINUTES (ANESTHESIA): Performed by: INTERNAL MEDICINE

## 2025-03-10 PROCEDURE — 2500000003 HC RX 250 WO HCPCS

## 2025-03-10 PROCEDURE — 94640 AIRWAY INHALATION TREATMENT: CPT

## 2025-03-10 PROCEDURE — 51798 US URINE CAPACITY MEASURE: CPT

## 2025-03-10 PROCEDURE — 81001 URINALYSIS AUTO W/SCOPE: CPT

## 2025-03-10 PROCEDURE — 2580000003 HC RX 258

## 2025-03-10 PROCEDURE — 88305 TISSUE EXAM BY PATHOLOGIST: CPT

## 2025-03-10 PROCEDURE — 7100000000 HC PACU RECOVERY - FIRST 15 MIN: Performed by: INTERNAL MEDICINE

## 2025-03-10 PROCEDURE — 3609012400 HC EGD TRANSORAL BIOPSY SINGLE/MULTIPLE: Performed by: INTERNAL MEDICINE

## 2025-03-10 PROCEDURE — 2709999900 HC NON-CHARGEABLE SUPPLY: Performed by: INTERNAL MEDICINE

## 2025-03-10 RX ORDER — TRAZODONE HYDROCHLORIDE 100 MG/1
100 TABLET ORAL NIGHTLY
Status: DISCONTINUED | OUTPATIENT
Start: 2025-03-10 | End: 2025-03-14 | Stop reason: HOSPADM

## 2025-03-10 RX ORDER — LACTULOSE 10 G/15ML
20 SOLUTION ORAL 3 TIMES DAILY
Status: DISCONTINUED | OUTPATIENT
Start: 2025-03-10 | End: 2025-03-11

## 2025-03-10 RX ORDER — SODIUM CHLORIDE, SODIUM LACTATE, POTASSIUM CHLORIDE, CALCIUM CHLORIDE 600; 310; 30; 20 MG/100ML; MG/100ML; MG/100ML; MG/100ML
INJECTION, SOLUTION INTRAVENOUS
Status: DISCONTINUED | OUTPATIENT
Start: 2025-03-10 | End: 2025-03-10 | Stop reason: SDUPTHER

## 2025-03-10 RX ORDER — OXYCODONE HYDROCHLORIDE 10 MG/1
10 TABLET ORAL NIGHTLY
Refills: 0 | Status: DISCONTINUED | OUTPATIENT
Start: 2025-03-10 | End: 2025-03-14 | Stop reason: HOSPADM

## 2025-03-10 RX ORDER — LIDOCAINE HYDROCHLORIDE 10 MG/ML
INJECTION, SOLUTION INFILTRATION; PERINEURAL
Status: DISCONTINUED | OUTPATIENT
Start: 2025-03-10 | End: 2025-03-10 | Stop reason: SDUPTHER

## 2025-03-10 RX ORDER — SUCRALFATE 1 G/1
1 TABLET ORAL
Status: DISCONTINUED | OUTPATIENT
Start: 2025-03-10 | End: 2025-03-14 | Stop reason: HOSPADM

## 2025-03-10 RX ORDER — PROPOFOL 10 MG/ML
INJECTION, EMULSION INTRAVENOUS
Status: DISCONTINUED | OUTPATIENT
Start: 2025-03-10 | End: 2025-03-10 | Stop reason: SDUPTHER

## 2025-03-10 RX ADMIN — SODIUM CHLORIDE, PRESERVATIVE FREE 40 MG: 5 INJECTION INTRAVENOUS at 13:01

## 2025-03-10 RX ADMIN — METOCLOPRAMIDE 5 MG: 5 TABLET ORAL at 12:53

## 2025-03-10 RX ADMIN — WATER 1000 MG: 1 INJECTION INTRAMUSCULAR; INTRAVENOUS; SUBCUTANEOUS at 17:35

## 2025-03-10 RX ADMIN — SODIUM CHLORIDE, PRESERVATIVE FREE 10 ML: 5 INJECTION INTRAVENOUS at 20:36

## 2025-03-10 RX ADMIN — SUCRALFATE 1 G: 1 TABLET ORAL at 16:11

## 2025-03-10 RX ADMIN — TRAZODONE HYDROCHLORIDE 100 MG: 100 TABLET ORAL at 20:34

## 2025-03-10 RX ADMIN — LIDOCAINE HYDROCHLORIDE 70 MG: 10 INJECTION, SOLUTION INFILTRATION; PERINEURAL at 11:50

## 2025-03-10 RX ADMIN — GLYCERIN 2 G: 2 SUPPOSITORY RECTAL at 14:31

## 2025-03-10 RX ADMIN — DOCUSATE SODIUM 100 MG: 100 CAPSULE, LIQUID FILLED ORAL at 12:54

## 2025-03-10 RX ADMIN — HYDROMORPHONE HYDROCHLORIDE 0.5 MG: 1 INJECTION, SOLUTION INTRAMUSCULAR; INTRAVENOUS; SUBCUTANEOUS at 01:15

## 2025-03-10 RX ADMIN — ONDANSETRON 4 MG: 2 INJECTION INTRAMUSCULAR; INTRAVENOUS at 21:53

## 2025-03-10 RX ADMIN — ENOXAPARIN SODIUM 40 MG: 100 INJECTION SUBCUTANEOUS at 14:31

## 2025-03-10 RX ADMIN — PROPOFOL 120 MG: 10 INJECTION, EMULSION INTRAVENOUS at 11:50

## 2025-03-10 RX ADMIN — METOPROLOL SUCCINATE 25 MG: 25 TABLET, EXTENDED RELEASE ORAL at 12:53

## 2025-03-10 RX ADMIN — DOCUSATE SODIUM 100 MG: 100 CAPSULE, LIQUID FILLED ORAL at 20:34

## 2025-03-10 RX ADMIN — SODIUM CHLORIDE, PRESERVATIVE FREE 10 ML: 5 INJECTION INTRAVENOUS at 13:02

## 2025-03-10 RX ADMIN — SODIUM CHLORIDE, PRESERVATIVE FREE 40 MG: 5 INJECTION INTRAVENOUS at 20:35

## 2025-03-10 RX ADMIN — DRONABINOL 2.5 MG: 2.5 CAPSULE ORAL at 16:11

## 2025-03-10 RX ADMIN — SPIRONOLACTONE 12.5 MG: 25 TABLET ORAL at 12:54

## 2025-03-10 RX ADMIN — CLOPIDOGREL BISULFATE 75 MG: 75 TABLET ORAL at 12:54

## 2025-03-10 RX ADMIN — OXYCODONE HYDROCHLORIDE 10 MG: 10 TABLET ORAL at 16:11

## 2025-03-10 RX ADMIN — SUCRALFATE 1 G: 1 TABLET ORAL at 13:04

## 2025-03-10 RX ADMIN — OXYCODONE HYDROCHLORIDE 10 MG: 10 TABLET ORAL at 20:34

## 2025-03-10 RX ADMIN — HYDROMORPHONE HYDROCHLORIDE 0.5 MG: 1 INJECTION, SOLUTION INTRAMUSCULAR; INTRAVENOUS; SUBCUTANEOUS at 09:18

## 2025-03-10 RX ADMIN — SUCRALFATE 1 G: 1 TABLET ORAL at 20:34

## 2025-03-10 RX ADMIN — OXYCODONE HYDROCHLORIDE 10 MG: 10 TABLET ORAL at 00:00

## 2025-03-10 RX ADMIN — METOCLOPRAMIDE 5 MG: 5 TABLET ORAL at 16:11

## 2025-03-10 RX ADMIN — OXYCODONE 5 MG: 5 TABLET ORAL at 05:47

## 2025-03-10 RX ADMIN — LACTULOSE 20 G: 20 SOLUTION ORAL at 16:57

## 2025-03-10 RX ADMIN — Medication 2 PUFF: at 10:06

## 2025-03-10 RX ADMIN — DRONABINOL 2.5 MG: 2.5 CAPSULE ORAL at 05:47

## 2025-03-10 RX ADMIN — ASPIRIN 81 MG: 81 TABLET, COATED ORAL at 12:53

## 2025-03-10 RX ADMIN — HYDROMORPHONE HYDROCHLORIDE 0.5 MG: 1 INJECTION, SOLUTION INTRAMUSCULAR; INTRAVENOUS; SUBCUTANEOUS at 13:41

## 2025-03-10 RX ADMIN — LACTULOSE 20 G: 20 SOLUTION ORAL at 20:36

## 2025-03-10 RX ADMIN — SODIUM CHLORIDE, SODIUM LACTATE, POTASSIUM CHLORIDE, AND CALCIUM CHLORIDE: 600; 310; 30; 20 INJECTION, SOLUTION INTRAVENOUS at 11:43

## 2025-03-10 RX ADMIN — CETIRIZINE HYDROCHLORIDE 10 MG: 10 TABLET, FILM COATED ORAL at 12:54

## 2025-03-10 ASSESSMENT — PAIN DESCRIPTION - ORIENTATION
ORIENTATION: MID

## 2025-03-10 ASSESSMENT — PAIN DESCRIPTION - LOCATION
LOCATION: BACK
LOCATION: BACK;GENERALIZED
LOCATION: BACK

## 2025-03-10 ASSESSMENT — PAIN SCALES - GENERAL
PAINLEVEL_OUTOF10: 9
PAINLEVEL_OUTOF10: 4
PAINLEVEL_OUTOF10: 7
PAINLEVEL_OUTOF10: 10
PAINLEVEL_OUTOF10: 6
PAINLEVEL_OUTOF10: 7
PAINLEVEL_OUTOF10: 5
PAINLEVEL_OUTOF10: 9
PAINLEVEL_OUTOF10: 7
PAINLEVEL_OUTOF10: 9

## 2025-03-10 ASSESSMENT — PAIN DESCRIPTION - DESCRIPTORS
DESCRIPTORS: ACHING

## 2025-03-10 ASSESSMENT — PAIN - FUNCTIONAL ASSESSMENT
PAIN_FUNCTIONAL_ASSESSMENT: NONE - DENIES PAIN

## 2025-03-10 NOTE — PROGRESS NOTES
Vascular lab preliminary.  Left arm venous duplex scan completed.  No evidence for DVT, SVT, or reflux noted at this time.  Final report pending.

## 2025-03-10 NOTE — PROGRESS NOTES
Palliative Care Progress Note  3/10/2025 8:28 AM    Patient:  Nick Jain  YOB: 1950  Primary Care Physician: Tesfaye Gipson MD  Advance Directive: Full Code  Admit Date: 3/4/2025       Hospital Day: 6  Portions of this note have been copied forward, however, changed to reflect the most current clinical status of this patient.    CHIEF COMPLAINT/REASON FOR CONSULTATION Goals of care, family support, Code status, symptom management     SUBJECTIVE:  Mr. Jain has struggled with symptoms over the weekend. Ongoing abdominal and back pain with difficulty sleeping at night. Also receiving aggressive bowel regimen to assist with constipation.     HPI: The patient is a 74 y.o. male with PMH HTN, COPD, TIA, CAD s/p CABG x3 3/2024 and recent PCI, pulmonary nodules, HLD, CKD, recent cecal cancer diagnosis 1/2025, longstanding prior tobacco use, and other comorbidities who presented to [facility] 3/4/2025 complaining of nausea and vomiting. Patient is known to palliative care and followed by inpatient team during recent admission here 1/10/2025-1/26/2025. During previous stay, he underwent a right hemicolectomy/terminal ileum ileectomy and lymph node dissection 1/11/2025 by Dr. Suleiman Hunter. 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 Unfortunately patient did have a persistent postoperative ileus. A PICC line was placed 1/17/2025 for initiation of TPN. Ileus did begin to resolve and bowel function returned. Patient developed tachycardia with increase in proBNP. He was initiated on IV diuretics and an echo was obtained 1/19/2025 which revealed EF 30 to 35%, severe hypokinesis of multiple segments with possible apical ballooning syndrome concerning for Takotsubo's cardiomyopathy, grade 2 diastolic dysfunction. EF was normal following his CABG last year. He went to the cath lab 1/20/2025 where he was found to have severe triple-vessel disease. Findings are felt to be related to  Oral Daily    droNABinol  2.5 mg Oral BID AC     HYDROmorphone, naloxone, oxyCODONE **OR** oxyCODONE, albuterol sulfate HFA, sodium chloride flush, sodium chloride, ondansetron **OR** ondansetron, polyethylene glycol, acetaminophen **OR** acetaminophen, prochlorperazine, fluticasone, traZODone  Diet NPO     Lab and other Data:     Recent Labs     03/08/25  0152 03/09/25  0420 03/10/25  0115   WBC 10.9* 14.0* 13.1*   HGB 7.3* 7.4* 7.1*    238 235     Recent Labs     03/08/25  0152 03/09/25  0420 03/10/25  0115    135* 135*   K 4.3 4.4 4.4    106 106   CO2 20* 19* 18*   BUN 23 26* 30*   CREATININE 1.0 1.1 1.2   GLUCOSE 95 81 91     Recent Labs     03/08/25 0152 03/09/25 0420 03/10/25  0115   AST 20 21 24   ALT 9* 9* 8*   BILITOT <0.2 0.2 0.3   ALKPHOS 336* 329* 338*       RAD:   CT ABDOMEN PELVIS W IV CONTRAST Additional Contrast? None    Result Date: 3/4/2025  EXAM: CT ABDOMEN PELVIS WITH INTRAVENOUS CONTRAST 03/04/2025.  SAGITTAL AND CORONAL REFORMATTED IMAGES OBTAINED  HISTORY: Colorectal cancer.  Abdominal pain  COMPARISON: 01/09/2025 chest CT, 01/18/2025 abdominal CT  FINDINGS: Nodular ground-glass infiltrate within the lower aspect of both lungs.  Consolidation within the posterior left lower lobe.  This may represent a combination of pneumonia and/or metastasis.  There is small left pleural effusion.  Increased low density lesion within the left lobe of the liver.  On axial image 13 this measures 1.6 x 1.9 cm.  There are adjacent smaller low-density lesions within the left lobe.  This is most compatible with progression of hepatic metastasis.  There is intrahepatic and extrahepatic biliary ductal dilatation.  Gallbladder is distended.  Mild nodular thickening of the left adrenal gland.  The right adrenal gland is unremarkable.  The kidneys show no acute process.  No hydronephrosis.  The spleen shows no acute abnormality.  Mild peripancreatic stranding.  Mild pancreatitis is not excluded.

## 2025-03-10 NOTE — ANESTHESIA PRE PROCEDURE
Department of Anesthesiology  Preprocedure Note       Name:  Nick Jain   Age:  74 y.o.  :  1950                                          MRN:  [id number]         Date:  3/10/2025      Surgeon: Surgeon(s):  Rhina Orosco MD    Procedure: Procedure(s):  ESOPHAGOGASTRODUODENOSCOPY    Medications prior to admission:   Prior to Admission medications    Medication Sig Start Date End Date Taking? Authorizing Provider   docusate sodium (COLACE) 100 MG capsule Take 1 capsule by mouth daily   Yes ProviderLouis MD   clopidogrel (PLAVIX) 75 MG tablet Take 1 tablet by mouth daily 25  Yes Mari Caldwell APRN   sacubitril-valsartan (ENTRESTO) 24-26 MG per tablet Take 0.5 tablets by mouth 2 times daily 25  Yes Queenie Gold APRN   spironolactone (ALDACTONE) 25 MG tablet Take 0.5 tablets by mouth daily  Patient taking differently: Take 0.5 tablets by mouth 2 times daily 25  Yes Queenie Gold APRN   atorvastatin (LIPITOR) 80 MG tablet Take 1 tablet by mouth daily 23  Yes Tesfaye Gipson MD   montelukast (SINGULAIR) 10 MG tablet Take 1 tablet by mouth daily  Patient not taking: Reported on 3/4/2025 2/28/25 3/30/25  Gera Qiuntero MD   Encorafenib 75 MG CAPS Take 300 mg by mouth daily  Patient not taking: Reported on 3/4/2025 2/27/25 3/29/25  Gera Quintero MD   Simethicone (GAS-X EXTRA STRENGTH) 125 MG CAPS Take 1 capsule by mouth as needed    ProviderLouis MD   traZODone (DESYREL) 50 MG tablet TAKE 2 TABLETS EVERY NIGHT AS NEEDED FOR SLEEP 25   Tesfaye Gipson MD   metoprolol succinate (TOPROL XL) 25 MG extended release tablet Take 1 tablet by mouth daily 25   Margarita Nunn APRN - NP   droNABinol (MARINOL) 2.5 MG capsule Take 1 capsule by mouth 2 times daily (before meals) for 30 days. Max Daily Amount: 5 mg 2/12/25 3/14/25  Gera Quintero MD   metoclopramide (REGLAN) 10 MG tablet Take 1 tablet by mouth 3 times daily (with meals) 25

## 2025-03-10 NOTE — INTERVAL H&P NOTE
Update History & Physical    The patient's History and Physical of March 10, 2025 was reviewed with the patient and I examined the patient. There was no change. The surgical site was confirmed by the patient and me.     Plan: The risks, benefits, expected outcome, and alternative to the recommended procedure have been discussed with the patient. Patient understands and wants to proceed with the procedure.     Electronically signed by Rhina Orosco MD on 3/10/2025 at 10:33 AM

## 2025-03-10 NOTE — PROGRESS NOTES
evaluation regarding further workup and treatment.        TARGET METASTATIC COLON CANCER LESIONS  2-3 cm right axillary lymph node  3.3 x 2.4 x 7.0 cm abnormal soft tissue extends from the abnormal segment of colon (ileocecal valve area) into the adjacent medial mesenteric soft tissues  0.8 cm left hepatic lobe hypodensity is identified and too small to characterize  Interval postop progression of the retroperitoneal, para-aortic and mesenteric lymphadenopathy on CT scan 1/17/2025  0.7 cm small nodule abuts the left lateral coronal fascia   In addition to tissue submitted at surgery 1/11/2025, Dr. Suleiman Hunter described lymphadenopathy extending beyond the right ileocolic vessel up into the retroperitoneum and extending up toward the iliac plexus.   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 Tesfaye Gipson (PCP) for a mass of cecum identified on CT abdomen pelvis at [facility] on 01/01/2025      Nick presented to [facility] ED on 1/1/2025 for evaluation after having right lower quadrant abdominal and right flank pain that had  been present for about 6 weeks.  The pain has worsened significantly over the previous several days.  Nick and his wife Doreen relate that he has had increasing anorexia, early satiety and constipation and lost about 15 pounds in the previous 4 months.         CT ABDOMEN PELVIS at [facility] ED on 1/1/2025: No comparison  Left lower lobe lung calcified granuloma, minimal bibasilar atelectasis.   Small Hiatal hernia   0.8 cm left hepatic lobe hypodensity is identified and too small to characterize  Wall thickening of the colon at the level of the ileocecal valve.   3.3 x 2.4 x 7.0 cm abnormal soft tissue extends from the abnormal segment of colon (ileocecal valve area) into the adjacent medial  VASCULAR SURGERY  02/25/2025    Ultrasound guided cannulation right internal jugular vein 2.  right internal jugular vein Single Chamber lumen subcutaneous vascular access device placement (Bard PowerPort)       Social History:    Marital status:   Smoking status: Former smoker  ETOH status: No  Resides: [place]    Family History:   Family History   Problem Relation Age of Onset    Diabetes Mother     Coronary Art Dis Father     No Known Problems Sister     No Known Problems Sister     Coronary Art Dis Brother     No Known Problems Brother        Current Hospital Medications:    Current Facility-Administered Medications   Medication Dose Route Frequency Provider Last Rate Last Admin    metoclopramide (REGLAN) tablet 5 mg  5 mg Oral TID WC Queenie Gold APRN   5 mg at 03/09/25 1534    fentaNYL (DURAGESIC) 12 MCG/HR 1 patch  1 patch TransDERmal Q72H Susi Rios MD   1 patch at 03/08/25 1138    docusate sodium (COLACE) capsule 100 mg  100 mg Oral BID Queenie Gold APRN   100 mg at 03/09/25 2113    fentaNYL (DURAGESIC) 25 MCG/HR 1 patch  1 patch TransDERmal Q72H Kenyatta Knapp APRN - CNP   1 patch at 03/07/25 0954    HYDROmorphone HCl PF (DILAUDID) injection 0.5 mg  0.5 mg IntraVENous Q6H PRN Kenyatta Knapp APRN - CNP   0.5 mg at 03/10/25 0115    lidocaine 4 % external patch 2 patch  2 patch TransDERmal Daily Kenyatta Knapp APRN - CNP   2 patch at 03/09/25 0835    enoxaparin (LOVENOX) injection 40 mg  40 mg SubCUTAneous Daily Queenie Gold APRN   40 mg at 03/09/25 1534    cyanocobalamin injection 1,000 mcg  1,000 mcg SubCUTAneous Weekly Susi Rios MD   1,000 mcg at 03/05/25 0812    naloxone (NARCAN) injection 0.4 mg  0.4 mg IntraVENous PRN Kenyatta Knapp APRN - CNP        oxyCODONE (ROXICODONE) immediate release tablet 5 mg  5 mg Oral Q4H PRN Kenyatta Knapp APRN - CNP   5 mg at 03/08/25 0050    Or    oxyCODONE HCl (OXY-IR) immediate release tablet 10 mg

## 2025-03-10 NOTE — PROGRESS NOTES
03/10/25 1300   Subjective   Subjective Attempt.  Patient just returned from Endo declined mobility at this time.   PT Plan of Care   Monday D       Electronically signed by Judson Childress PTA on 3/10/2025 at 1:22 PM

## 2025-03-10 NOTE — OP NOTE
Endoscopy Note          Operative Report    Patient: Nick Jain MRN: [id number]      YOB: 1950  Age: 74 y.o.  Sex: male            Indications: Worsening food and liquid regurgitation with nausea.    Preoperative Evaluation: The patient was evaluated prior to the procedure in the GI lab admission area, the patient's ASA was recorded in the chart.  Consent was obtained from the patient with discussion to include the risk of perforation, bleeding, infection, and aspiration.    Anesthesia: MARQUITA-per anesthesia.    Complication: None; patient tolerated the procedure well.    Estimated blood loss: Insignificant.    Procedure: The patient was sedated into the left lateral decubitus position.  Scope was advanced from the mouth to the third portion of duodenum.  Scope was withdrawn to the stomach and retroflexed view was performed.     Findings:  Severe, LA grade D esophagitis extending into the mid esophagus.  This was biopsied for confirmation and also sent for Candida.  There was a 3 to 4 cm hiatal hernia.  Normal stomach and normal on retroflexion.  Normal bulb and duodenum.    Postoperative Diagnosis:  Severe esophagitis, LA grade D.  3 to 4 cm hiatal hernia.      Recommendations:   Return patient to hospital [facility] for ongoing care.  Start pantoprazole 40 mg IV twice daily.  Give Carafate liquid formulation p.o. 4 times daily.  GERD precautions.  Okay to resume diet and medications.  Follow-up pathology.      Signed By:  Rhina Orosco MD     March 10, 2025

## 2025-03-10 NOTE — ANESTHESIA POSTPROCEDURE EVALUATION
Department of Anesthesiology  Postprocedure Note    Patient: Nick Jain  MRN: [id number]  YOB: 1950  Date of evaluation: 3/10/2025    Procedure Summary       Date: 03/10/25 Room / Location: [place] [id number] / [facility]    Anesthesia Start: 1147 Anesthesia Stop:     Procedure: ESOPHAGOGASTRODUODENOSCOPY BIOPSY Diagnosis:       Esophagitis      (Esophagitis [K20.90])    Surgeons: Rhina Orosco MD Responsible Provider: Chintan Padilla APRN - CRNA    Anesthesia Type: TIVA, General ASA Status: 3            Anesthesia Type: TIVA, General    Reshma Phase I:      Reshma Phase II:      Anesthesia Post Evaluation    Patient location during evaluation: PACU  Patient participation: complete - patient participated  Level of consciousness: awake and alert  Pain score: 0  Airway patency: patent  Nausea & Vomiting: no vomiting and no nausea  Cardiovascular status: blood pressure returned to baseline and hemodynamically stable  Respiratory status: spontaneous ventilation, room air, nonlabored ventilation and acceptable  Hydration status: euvolemic  Pain management: adequate    No notable events documented.

## 2025-03-10 NOTE — PROGRESS NOTES
[facility]      Patient:  Nick Jain  YOB: 1950  Date of Service: 3/10/2025  MRN: [id number]   Acct: [id number]   Primary Care Physician: Tesfaye Gipson MD  Advance Directive: Full Code  Admit Date: 3/4/2025       Hospital Day: 6  Portions of this note have been copied forward, however, changed to reflect the most current clinical status of this patient.  CHIEF COMPLAINT shortness of breath    Subjective has been having urinary symptoms dysuria, suprapubic tenderness along with constipation    Cumulative Hospital stay  74-year-old male with CAD s/p CABG March 2024, recent PCI, metastatic colonic adenocarcinoma s/p right colectomy in January 2025, initiated on palliative chemotherapy with modified FOLFOX, Cetuximab and BRAF inhibitor that was to be initiated on 3/5, COPD, HTN, with complaints of tractable nausea, vomiting, abdominal pain.  Patient states that he had had ongoing nausea and vomiting for the past few days prior to admission.  Endorsed epigastric and suprapubic abdominal pain along with dyspnea and productive cough.  Workup in ER CT abdomen pelvis nodular groundglass infiltrate within the lower aspect of both lungs, consolidation within the left lower lobe, concern for combination of pneumonia and metastasis, circumferential mucosal thickening of the distal esophagus compatible with esophagitis progression of hepatic metastasis, progression of mesenteric and retroperitoneal lymphadenopathy, mild intrahepatic and hepatic biliary ductal dilatation, distended gallbladder mild peripancreatic stranding complete or near occlusion of superior mesenteric vein.  Lymphadenopathy causing severe stenosis and near complete occlusion of inferior vena cava, coronavirus NL 63 and influenza A positive.  Initiated on cefepime and azithromycin, Tamiflu. Patient admitted to hospitalist service sepsis due to pneumonia with consultation to oncology.  Palliative care consulted for assistance with  diagonal   Maintaining on DAPT    Nausea and vomiting / Esophagitis  CTA abdomen and pelvis penetrating ulcer involving the infrarenal abdominal aorta, bilateral pleural effusions, well-defined low-density lesion in spleen, superior mesenteric artery widely patent and distal branches well-perfused  Consulted GI  EGD today severe esophagitis LA grade D, 3 to 4 cm hiatal hernia.  Initiated on Protonix twice daily and Carafate 4 times daily.  Biopsy sent    Severe malnutrition   Dietitian following    Adenocarcinoma, colon / Palliative care patient/ Metastatic adenocarcinoma to lymph node / Neoplasm related pain   Oncology following   Palliative care following   As needed pain medication    Acute kidney injury superimposed on CKD   Resolved   Daily labs     Edema of left upper arm   Venous duplex LUE negative DVT/SVT    Penetrating ulcer of aorta  Vascular surgery consulted as well as compared to findings in January 1 and are unchanged.  No emergent intervention needed.   evaluate and will defer plan to   CTA abdomen pelvis Atherosclerotic calcifications are shown throughout the visualized arterial structures, including the native coronary arteries, There is a penetrating ulcer along the right lateral wall of the infrarenal abdominal aorta. The defect measures approximately 1.5 cm superior inferior. The aorta measures overall at this level approximately 1.9 cm transverse by 1.7 cm AP, No mural disruption or active arterial contrast extravasation involving the abdominal aorta is identified  Acute cystitis without hematuria   -Urinalysis & Culture    -IV antibiotic, Rocephin    -Monitor I&O    Superior mesenteric vein thrombosis        Antibiotic: Rocephin      DVT Prophylaxis: Lovenox    GI prophylaxis: Protonix, Carafate    Gonzales Gibson, AGUSTINA - CNP, 3/10/2025 4:22 PM

## 2025-03-10 NOTE — PROGRESS NOTES
Had discussion with patient and wife about managing pain. Patient feels frustrated about his pain \"yo-yo ing\", feeling very well controlled with rest and then waking up to severe pain. Discussed calling out for PRN pain medications before pain gets too bad (if possible). He has   He also tells me he has been struggling with anxiety and depression about his medical condition.

## 2025-03-11 LAB
ALBUMIN SERPL-MCNC: 2.6 G/DL (ref 3.5–5.2)
ALP SERPL-CCNC: 454 U/L (ref 40–129)
ALT SERPL-CCNC: 9 U/L (ref 10–50)
ANION GAP SERPL CALCULATED.3IONS-SCNC: 14 MMOL/L (ref 8–16)
AST SERPL-CCNC: 25 U/L (ref 10–50)
BASOPHILS # BLD: 0 K/UL (ref 0–0.2)
BASOPHILS NFR BLD: 0.1 % (ref 0–1)
BILIRUB SERPL-MCNC: 0.2 MG/DL (ref 0.2–1.2)
BUN SERPL-MCNC: 34 MG/DL (ref 8–23)
CALCIUM SERPL-MCNC: 9 MG/DL (ref 8.8–10.2)
CHLORIDE SERPL-SCNC: 106 MMOL/L (ref 98–107)
CO2 SERPL-SCNC: 17 MMOL/L (ref 22–29)
CREAT SERPL-MCNC: 1.4 MG/DL (ref 0.7–1.2)
EOSINOPHIL # BLD: 0 K/UL (ref 0–0.6)
EOSINOPHIL NFR BLD: 0.2 % (ref 0–5)
ERYTHROCYTE [DISTWIDTH] IN BLOOD BY AUTOMATED COUNT: 17.8 % (ref 11.5–14.5)
GLUCOSE SERPL-MCNC: 112 MG/DL (ref 70–99)
HCT VFR BLD AUTO: 24.3 % (ref 42–52)
HGB BLD-MCNC: 7.3 G/DL (ref 14–18)
IMM GRANULOCYTES # BLD: 0.1 K/UL
LYMPHOCYTES # BLD: 1.2 K/UL (ref 1.1–4.5)
LYMPHOCYTES NFR BLD: 9.3 % (ref 20–40)
MCH RBC QN AUTO: 27.8 PG (ref 27–31)
MCHC RBC AUTO-ENTMCNC: 30 G/DL (ref 33–37)
MCV RBC AUTO: 92.4 FL (ref 80–94)
MONOCYTES # BLD: 0.8 K/UL (ref 0–0.9)
MONOCYTES NFR BLD: 6 % (ref 0–10)
NEUTROPHILS # BLD: 10.5 K/UL (ref 1.5–7.5)
NEUTS SEG NFR BLD: 83.9 % (ref 50–65)
PLATELET # BLD AUTO: 274 K/UL (ref 130–400)
PMV BLD AUTO: 11.2 FL (ref 9.4–12.4)
POTASSIUM SERPL-SCNC: 4.4 MMOL/L (ref 3.5–5)
PROT SERPL-MCNC: 6 G/DL (ref 6.4–8.3)
RBC # BLD AUTO: 2.63 M/UL (ref 4.7–6.1)
SODIUM SERPL-SCNC: 137 MMOL/L (ref 136–145)
WBC # BLD AUTO: 12.5 K/UL (ref 4.8–10.8)

## 2025-03-11 PROCEDURE — 6360000002 HC RX W HCPCS: Performed by: INTERNAL MEDICINE

## 2025-03-11 PROCEDURE — 99233 SBSQ HOSP IP/OBS HIGH 50: CPT | Performed by: INTERNAL MEDICINE

## 2025-03-11 PROCEDURE — 99232 SBSQ HOSP IP/OBS MODERATE 35: CPT | Performed by: INTERNAL MEDICINE

## 2025-03-11 PROCEDURE — 80053 COMPREHEN METABOLIC PANEL: CPT

## 2025-03-11 PROCEDURE — 6370000000 HC RX 637 (ALT 250 FOR IP): Performed by: INTERNAL MEDICINE

## 2025-03-11 PROCEDURE — 6370000000 HC RX 637 (ALT 250 FOR IP)

## 2025-03-11 PROCEDURE — 1200000000 HC SEMI PRIVATE

## 2025-03-11 PROCEDURE — 2500000003 HC RX 250 WO HCPCS

## 2025-03-11 PROCEDURE — 6360000002 HC RX W HCPCS

## 2025-03-11 PROCEDURE — 97116 GAIT TRAINING THERAPY: CPT

## 2025-03-11 PROCEDURE — 2500000003 HC RX 250 WO HCPCS: Performed by: INTERNAL MEDICINE

## 2025-03-11 PROCEDURE — 2580000003 HC RX 258: Performed by: INTERNAL MEDICINE

## 2025-03-11 PROCEDURE — 94760 N-INVAS EAR/PLS OXIMETRY 1: CPT

## 2025-03-11 PROCEDURE — 97530 THERAPEUTIC ACTIVITIES: CPT

## 2025-03-11 PROCEDURE — 2580000003 HC RX 258

## 2025-03-11 PROCEDURE — 94640 AIRWAY INHALATION TREATMENT: CPT

## 2025-03-11 PROCEDURE — 85025 COMPLETE CBC W/AUTO DIFF WBC: CPT

## 2025-03-11 RX ORDER — FENTANYL 50 UG/1
1 PATCH TRANSDERMAL
Refills: 0 | Status: DISCONTINUED | OUTPATIENT
Start: 2025-03-11 | End: 2025-03-14 | Stop reason: HOSPADM

## 2025-03-11 RX ORDER — ENOXAPARIN SODIUM 100 MG/ML
1 INJECTION SUBCUTANEOUS 2 TIMES DAILY
Status: DISCONTINUED | OUTPATIENT
Start: 2025-03-11 | End: 2025-03-13

## 2025-03-11 RX ADMIN — SODIUM CHLORIDE, PRESERVATIVE FREE 40 MG: 5 INJECTION INTRAVENOUS at 21:47

## 2025-03-11 RX ADMIN — WATER 1000 MG: 1 INJECTION INTRAMUSCULAR; INTRAVENOUS; SUBCUTANEOUS at 17:59

## 2025-03-11 RX ADMIN — SODIUM BICARBONATE: 84 INJECTION, SOLUTION INTRAVENOUS at 09:37

## 2025-03-11 RX ADMIN — SUCRALFATE 1 G: 1 TABLET ORAL at 06:02

## 2025-03-11 RX ADMIN — Medication 2 PUFF: at 06:21

## 2025-03-11 RX ADMIN — METOCLOPRAMIDE 5 MG: 5 TABLET ORAL at 09:13

## 2025-03-11 RX ADMIN — Medication 12.5 MG: at 06:02

## 2025-03-11 RX ADMIN — OXYCODONE HYDROCHLORIDE 10 MG: 10 TABLET ORAL at 21:46

## 2025-03-11 RX ADMIN — METOCLOPRAMIDE 5 MG: 5 TABLET ORAL at 17:59

## 2025-03-11 RX ADMIN — LIDOCAINE HYDROCHLORIDE: 20 SOLUTION ORAL at 16:05

## 2025-03-11 RX ADMIN — SUCRALFATE 1 G: 1 TABLET ORAL at 17:59

## 2025-03-11 RX ADMIN — ENOXAPARIN SODIUM 60 MG: 100 INJECTION SUBCUTANEOUS at 09:12

## 2025-03-11 RX ADMIN — OXYCODONE HYDROCHLORIDE 10 MG: 10 TABLET ORAL at 16:05

## 2025-03-11 RX ADMIN — CETIRIZINE HYDROCHLORIDE 10 MG: 10 TABLET, FILM COATED ORAL at 09:13

## 2025-03-11 RX ADMIN — SUCRALFATE 1 G: 1 TABLET ORAL at 11:09

## 2025-03-11 RX ADMIN — SODIUM CHLORIDE, PRESERVATIVE FREE 10 ML: 5 INJECTION INTRAVENOUS at 11:47

## 2025-03-11 RX ADMIN — DRONABINOL 2.5 MG: 2.5 CAPSULE ORAL at 06:02

## 2025-03-11 RX ADMIN — PROCHLORPERAZINE EDISYLATE 10 MG: 5 INJECTION INTRAMUSCULAR; INTRAVENOUS at 17:59

## 2025-03-11 RX ADMIN — CLOPIDOGREL BISULFATE 75 MG: 75 TABLET ORAL at 09:13

## 2025-03-11 RX ADMIN — METOPROLOL SUCCINATE 25 MG: 25 TABLET, EXTENDED RELEASE ORAL at 09:13

## 2025-03-11 RX ADMIN — ONDANSETRON 4 MG: 2 INJECTION INTRAMUSCULAR; INTRAVENOUS at 09:19

## 2025-03-11 RX ADMIN — DOCUSATE SODIUM 100 MG: 100 CAPSULE, LIQUID FILLED ORAL at 09:13

## 2025-03-11 RX ADMIN — HYDROMORPHONE HYDROCHLORIDE 0.5 MG: 1 INJECTION, SOLUTION INTRAMUSCULAR; INTRAVENOUS; SUBCUTANEOUS at 03:13

## 2025-03-11 RX ADMIN — LIDOCAINE HYDROCHLORIDE: 20 SOLUTION ORAL at 06:02

## 2025-03-11 RX ADMIN — ONDANSETRON 4 MG: 2 INJECTION INTRAMUSCULAR; INTRAVENOUS at 16:05

## 2025-03-11 RX ADMIN — METOCLOPRAMIDE 5 MG: 5 TABLET ORAL at 11:51

## 2025-03-11 RX ADMIN — SODIUM CHLORIDE, PRESERVATIVE FREE 40 MG: 5 INJECTION INTRAVENOUS at 09:14

## 2025-03-11 RX ADMIN — LIDOCAINE HYDROCHLORIDE: 20 SOLUTION ORAL at 11:05

## 2025-03-11 ASSESSMENT — PAIN DESCRIPTION - LOCATION
LOCATION: BACK
LOCATION: ABDOMEN
LOCATION: BACK;GENERALIZED

## 2025-03-11 ASSESSMENT — PAIN SCALES - GENERAL
PAINLEVEL_OUTOF10: 9
PAINLEVEL_OUTOF10: 7
PAINLEVEL_OUTOF10: 0
PAINLEVEL_OUTOF10: 7

## 2025-03-11 ASSESSMENT — PAIN - FUNCTIONAL ASSESSMENT
PAIN_FUNCTIONAL_ASSESSMENT: ACTIVITIES ARE NOT PREVENTED
PAIN_FUNCTIONAL_ASSESSMENT: ACTIVITIES ARE NOT PREVENTED

## 2025-03-11 NOTE — PLAN OF CARE
VASCULAR SURGERY    Dr. Rodriguez discussed with patient and wife that she has talked with Dr. Rios and it is agreed that no vascular surgery intervention would be of benefit given patient's medical condition. Pt and wife voiced understanding.     Leonela Claire PA-C

## 2025-03-11 NOTE — PROGRESS NOTES
[facility]      Patient:  Nick Jain  YOB: 1950  Date of Service: 3/11/2025  MRN: [id number]   Acct: [id number]   Primary Care Physician: Tesfaye Gipson MD  Advance Directive: Full Code  Admit Date: 3/4/2025       Hospital Day: 7  Portions of this note have been copied forward, however, changed to reflect the most current clinical status of this patient.  CHIEF COMPLAINT shortness of breath    Subjective noted improvement of urinary symptoms, has had multiple bowel movements     Cumulative Hospital stay  74-year-old male with CAD s/p CABG March 2024, recent PCI, metastatic colonic adenocarcinoma s/p right colectomy in January 2025, initiated on palliative chemotherapy with modified FOLFOX, Cetuximab and BRAF inhibitor that was to be initiated on 3/5, COPD, HTN, with complaints of tractable nausea, vomiting, abdominal pain.  Patient states that he had had ongoing nausea and vomiting for the past few days prior to admission.  Endorsed epigastric and suprapubic abdominal pain along with dyspnea and productive cough.  Workup in ER CT abdomen pelvis nodular groundglass infiltrate within the lower aspect of both lungs, consolidation within the left lower lobe, concern for combination of pneumonia and metastasis, circumferential mucosal thickening of the distal esophagus compatible with esophagitis progression of hepatic metastasis, progression of mesenteric and retroperitoneal lymphadenopathy, mild intrahepatic and hepatic biliary ductal dilatation, distended gallbladder mild peripancreatic stranding complete or near occlusion of superior mesenteric vein.  Lymphadenopathy causing severe stenosis and near complete occlusion of inferior vena cava, coronavirus NL 63 and influenza A positive.  Initiated on cefepime and azithromycin, Tamiflu. Patient admitted to hospitalist service sepsis due to pneumonia with consultation to oncology.  Palliative care consulted for assistance with pain  thrombosis   LMW heparin every 12 hours   Opioid induced constipation    Scheduled bowel regimen    Multiple bowel movements today     Antibiotic: Rocephin      DVT Prophylaxis: Lovenox    GI prophylaxis: Protonix, Carafate    Gonzales Gibson, APRN - CNP, 3/11/2025 11:16 AM

## 2025-03-11 NOTE — PROGRESS NOTES
Progress Note            Date:3/11/2025        Patient Name:Nick Jain     YOB: 1950     Age:74 y.o.    CC: Follow-up severe esophagitis    He is doing well this morning.  He actually was able to get down some pudding and coffee this morning.    Physical Exam   /69   Pulse (!) 114   Temp 97 °F (36.1 °C) (Temporal)   Resp 16   Ht 1.753 m (5' 9.02\")   Wt 60.1 kg (132 lb 6.4 oz)   SpO2 94%   BMI 19.54 kg/m²      - GENERAL: Alert and oriented x 3. No acute distress. Well-nourished.    - EYES: EOMI. Anicteric.    - HENT: Moist mucous membranes. No cervical lymphadenopathy.    - LUNGS: Clear to auscultation bilaterally. No accessory muscle use.    - CARDIOVASCULAR: Regular rate and rhythm. No murmur. No JVD.    - ABDOMEN: Soft, non-tender and non-distended. No palpable masses.    - EXTREMITIES: No edema. Non-tender.?SKIN: No rashes or lesions. Warm.      Labs    CBC:  Recent Labs     03/09/25  0420 03/10/25  0115 03/11/25  0300   WBC 14.0* 13.1* 12.5*   RBC 2.64* 2.54* 2.63*   HGB 7.4* 7.1* 7.3*   HCT 24.5* 23.5* 24.3*   MCV 92.8 92.5 92.4   RDW 17.4* 17.6* 17.8*    235 274     CHEMISTRIES:  Recent Labs     03/09/25  0420 03/10/25  0115 03/11/25  0300   * 135* 137   K 4.4 4.4 4.4    106 106   CO2 19* 18* 17*   BUN 26* 30* 34*   CREATININE 1.1 1.2 1.4*   GLUCOSE 81 91 112*     PT/INR:No results for input(s): \"PROTIME\", \"INR\" in the last 72 hours.  APTT:No results for input(s): \"APTT\" in the last 72 hours.  LIVER PROFILE:  Recent Labs     03/09/25  0420 03/10/25  0115 03/11/25  0300   AST 21 24 25   ALT 9* 8* 9*   BILITOT 0.2 0.3 0.2   ALKPHOS 329* 338* 454*         Assessment & Plan:   Nick Jain is a 74 y.o. year old male with recently diagnosed metastatic colon cancer (not yet started on palliative chemo), CAD on ASA+plavix, hx COPD.  We have been consulted on this hospitalization given odynophagia and dysphagia.    Severe esophagitis, suspect reflux but biopsies are  pending from EGD/10/25.  He has had some improvement in symptoms since starting high-dose IV PPI, Carafate, GI cocktail yesterday.  Continue pantoprazole 40 mg IV twice daily.  Continue Carafate p.o. 4 times daily.  Continue an additional day of GI cocktail twice daily.  Advance diet as tolerated.  Pathology still pending.

## 2025-03-11 NOTE — PROGRESS NOTES
Palliative Care Progress Note  3/11/2025 8:51 AM    Patient:  Nick Jain  YOB: 1950  Primary Care Physician: Tesfaye Gipson MD  Advance Directive: Full Code  Admit Date: 3/4/2025       Hospital Day: 7  Portions of this note have been copied forward, however, changed to reflect the most current clinical status of this patient.    CHIEF COMPLAINT/REASON FOR CONSULTATION Goals of care, family support, Code status, symptom management     SUBJECTIVE:  Mr. Jain has been started on regimen for severe esophagitis and overall is feeling improved today.  He has tolerated some p.o. intake. Has had BM and also urine sample collected concerning for UTI. Pt is up in recliner chair during my exam and without distress.    HPI: The patient is a 74 y.o. male with PMH HTN, COPD, TIA, CAD s/p CABG x3 3/2024 and recent PCI, pulmonary nodules, HLD, CKD, recent cecal cancer diagnosis 1/2025, longstanding prior tobacco use, and other comorbidities who presented to [facility] 3/4/2025 complaining of nausea and vomiting. Patient is known to palliative care and followed by inpatient team during recent admission here 1/10/2025-1/26/2025. During previous stay, he underwent a right hemicolectomy/terminal ileum ileectomy and lymph node dissection 1/11/2025 by Dr. Suleiman Hunter. 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 Unfortunately patient did have a persistent postoperative ileus. A PICC line was placed 1/17/2025 for initiation of TPN. Ileus did begin to resolve and bowel function returned. Patient developed tachycardia with increase in proBNP. He was initiated on IV diuretics and an echo was obtained 1/19/2025 which revealed EF 30 to 35%, severe hypokinesis of multiple segments with possible apical ballooning syndrome concerning for Takotsubo's cardiomyopathy, grade 2 diastolic dysfunction. EF was normal following his CABG last year. He went to the cath lab 1/20/2025 where he was found to  resolved  Tachycardia- Ddimer elevated, CTA wokrup negative for PE. Ongoing tele monitoring  Constipation- resolved.  Remains on Movantik for opioid-induced symptom prevention  UTI- UA obtained 3/10/25 with 4+ bacteria, initiated on Rocephin. Culture pending  SMV thrombosis- mgmt per hematology, receiving LMW heparin scheduled  Penetrating ulcer of aorta- noted on imaging workup.  Evaluated by vascular surgery with no immediate intervention recommended    Thank you for consulting Palliative Care and allowing us to participate in the care of this patient.     Completed patient assessment, interview of independent historian/HCS, workup/treatment review, discussion with medical team, review of current and home medications, opioid titration and monitoring for symptom management, and placement of orders/preparation of this note  Multiple complex medical problems  Morbidity mortality high risk  Medical decision making High complexity                                Electronically signed by AGUSTINA Chapman CNP on 3/11/2025 at 8:51 AM    (Please note that portions of this note were completed with a voice recognition program.  Effortswere made to edit the dictations but occasionally words are mis-transcribed.)

## 2025-03-11 NOTE — PROGRESS NOTES
Nutrition Assessment     Type and Reason for Visit: Reassess    Nutrition Recommendations/Plan:   Start Ensure Clear at L.  Follow for po intake, labs and weight     Malnutrition Assessment:  Malnutrition Status: Severe malnutrition    Nutrition Assessment:  Full liquids continue.  PO intake back to 0-25%.  Does take the Magic cup well at L & D.  Restarting Ensure clear Apple.  New weight not available.  Marinol is still prescribed.  Continues to meet criteria for severe malnutrition    Estimated Daily Nutrient Needs:  Energy (kcal):  1339-0122 kcals (30-35 kcals/kg) Weight Used for Energy Requirements: Current     Protein (g):  90g Weight Used for Protein Requirements: Current        Fluid (ml/day):  2799-8932 ml Method Used for Fluid Requirements: 1 ml/kcal    Nutrition Related Findings:   GFR 52 (improved)  BM 3/10 Wound Type:  (puncture)    Current Nutrition Therapies:    ADULT DIET; Full Liquid  ADULT ORAL NUTRITION SUPPLEMENT; Lunch; Clear Liquid Oral Supplement  ADULT ORAL NUTRITION SUPPLEMENT; Lunch, Dinner; Frozen Oral Supplement    Anthropometric Measures:  Height: 175.3 cm (5' 9.02\")  Current Body Wt: 60.1 kg (132 lb 7.9 oz)   BMI: 19.6        Nutrition Diagnosis:   Severe malnutrition, in context of chronic illness related to catabolic illness, decreased appetite, Altered GI structure as evidenced by criteria as identified in malnutrition assessment, intake 0-25%    Nutrition Interventions:   Food and/or Nutrient Delivery: Continue Current Diet, Continue Oral Nutrition Supplement  Nutrition Education/Counseling: No recommendation at this time  Coordination of Nutrition Care: Continue to monitor while inpatient  Plan of Care discussed with: pt/wife    Goals:  Goals: Meet at least 75% of estimated needs, PO intake 50% or greater, Maintain adequate nutrition status  Type of Goal: Continue current goal  Previous Goal Met: Progressing toward Goal(s)    Nutrition Monitoring and Evaluation:    Behavioral-Environmental Outcomes: None Identified  Food/Nutrient Intake Outcomes: Food and Nutrient Intake, Supplement Intake, Diet Advancement/Tolerance  Physical Signs/Symptoms Outcomes: Biochemical Data, Chewing or Swallowing, Fluid Status or Edema, Weight, Skin    Discharge Planning:    Too soon to determine     Shayna Azevedo MS, RD, LD  Contact: [phone]

## 2025-03-11 NOTE — CARE COORDINATION
Patient's spouse, Doreen, called asking to meet with this CM.  Met with patient and Doreen in patient's room.  Doreen expressed concerns about there will be no vascular interventions.  She is worried that there will be no improvements in patient's symptoms.  Informed Doreen that she should discuss her concerns with Dr. Rios.  This CM also asked Sally BERRIOS to re-visit and assist Doreen in understanding medical decisions.  Sally plans to meet with patient and his spouse tomorrow.  CM/SW will continue to follow.  Electronically signed by Cori Johnson RN on 3/11/2025 at 3:49 PM

## 2025-03-11 NOTE — PROGRESS NOTES
Physical Therapy  Name: Nick Jain  MRN:  [id number]  Date of service:  3/11/2025       03/11/25 1055   Restrictions/Precautions   Restrictions/Precautions Fall Risk;Isolation   Required Braces or Orthoses? No   Subjective   Subjective pt agreeable to tx   Oxygen Therapy   O2 Device None (Room air)   Bed Mobility   Supine to Sit Contact guard assistance;Stand by assistance   Transfers   Sit to Stand Contact guard assistance;Stand by assistance   Stand to Sit Contact guard assistance;Stand by assistance   Ambulation   Device Rolling Walker   Assistance Contact guard assistance;Stand by assistance   Quality of Gait FLEXED POSTURE, NO TRUE LOB, GUARDED OVERALL   Gait Deviations Slow [place];Decreased step height;Decreased step length   Distance 20 feet   Exercises   Ankle Pumps 10   Shoulder Active Range of Motion backward shoulder rolls x 10   Other Activities   Comment transfer to and from [facility]   Short Term Goals   Time Frame for Short Term Goals 14 DAYS   Short Term Goal 1 BED MOB MOD IND   Short Term Goal 2 TRANSFERS MOD IND   Short Term Goal 3 ' RW SUPERVISION   Conditions Requiring Skilled Therapeutic Intervention   Body Structures, Functions, Activity Limitations Requiring Skilled Therapeutic Intervention Decreased functional mobility ;Decreased ADL status;Decreased ROM;Decreased strength;Decreased endurance;Decreased balance;Increased pain;Decreased posture   Assessment patient stated that he was slightly lightheaded.  Lightheadedness did not get worse with movement.  Wife states that he is on meds that contribute to that.  He is safe to transfer with wife to [facility].   Discharge Recommendations Continue to assess pending progress;24 hour supervision or assist;Patient would benefit from continued therapy after discharge   Activity Tolerance   Activity Tolerance Patient tolerated treatment well;Patient limited by fatigue   Physical Therapy Plan   General Plan 5-7 times per week   Current Treatment  Recommendations Strengthening;ROM;Balance training;Functional mobility training;Transfer training;Gait training;Safety education & training;Patient/Caregiver education & training;Endurance training   PT Plan of Care   Tuesday X   Safety Devices   Type of Devices Call light within reach;Left in chair  (wife in room)         Electronically signed by Diana Watson PTA on 3/11/2025 at 10:59 AM

## 2025-03-11 NOTE — PROGRESS NOTES
scan and fused PET/CT scan were presented for evaluation in axial, sagittal and coronal planes.  Additional images including a rotating maximum intensity projection image of the PET scan were provided as required.  (The low dose  noncontrast CT images utilized for fusion with the PET images to create the PET / CT images are not of diagnostic quality and are not used to diagnose disease independently of the PET images. Reference is made to recent diagnostic quality examinations for CT findings not directly associated with PET findings.)  FINDINGS:  CT In the head and neck, the included portions of the orbits and paranasal sinuses demonstrate generally normal levels of activity .   The nasopharynx, oropharynx and oral cavity demonstrate normal levels of activity .  The larynx and vocal cords are .   The salivary glands demonstrate normal levels of activity .  The thyroid gland demonstrates normal levels of activity . There is a large conglomerate lymph node mass at the thoracic inlet on the left with SUV max of 8.35. There is a smaller node lateral  to this with SUV max of 4.73. No additional significantly FDG avid lymph nodes are seen in the neck.  Incidental note is made of increased physiologic activity in musculature of the posterior neck.  In the thorax, the right lung is generally well expanded with no evidence of significant new infiltrate or consolidation or effusion. There is a new small left pleural effusion. No significantly FDG avid pulmonary parenchymal or pleural-based lesions are  identified. There is an FDG avid left subcarinal node interposed between the esophagus and aorta and spine with SUV max 4.32. There is a larger node below this with SUV max 5.55. There is a larger node below this with SUV max of 6.6. There is a node posterior to the descending aorta to the left of the spine at about the level of T9 with SUV max 4.38. There is a small node below this with SUV max of 3.2 and at about the same  care  B12 replacement q. weekly, 3/5/2025  Awaiting initiation frontline modified FOLFOX, cetuximab, BRAF inhibitor as outpatient  Hold chemotherapy Encorafenib  At this point, patient is contemplative regarding palliative chemotherapy  Continue Fentanyl 37 mcg every 72 hours for pain control  Roxicodone as needed  Movantik for constipation  Advance diet to full liquids  Continue antiemetics  PPI Protonix 40 twice daily  Low molecular weight heparin 1 mg/kg q 12 hours for SMV occlusion      (Please note that portions of this note were completed with a voice recognition program. Efforts were made to edit the dictations but occasionally words are mis-transcribed.)        Susi Rios MD    03/11/25  6:28 AM

## 2025-03-12 ENCOUNTER — RESULTS FOLLOW-UP (OUTPATIENT)
Dept: ENDOSCOPY | Age: 75
End: 2025-03-12

## 2025-03-12 LAB
ABO + RH BLD: NORMAL
ANION GAP SERPL CALCULATED.3IONS-SCNC: 11 MMOL/L (ref 8–16)
BASOPHILS # BLD: 0 K/UL (ref 0–0.2)
BASOPHILS NFR BLD: 0.1 % (ref 0–1)
BLD GP AB SCN SERPL QL: NORMAL
BLOOD BANK DISPENSE STATUS: NORMAL
BLOOD BANK PRODUCT CODE: NORMAL
BPU ID: NORMAL
BUN SERPL-MCNC: 35 MG/DL (ref 8–23)
CALCIUM SERPL-MCNC: 8.2 MG/DL (ref 8.8–10.2)
CHLORIDE SERPL-SCNC: 108 MMOL/L (ref 98–107)
CO2 SERPL-SCNC: 21 MMOL/L (ref 22–29)
CREAT SERPL-MCNC: 1.6 MG/DL (ref 0.7–1.2)
DESCRIPTION BLOOD BANK: NORMAL
EOSINOPHIL # BLD: 0 K/UL (ref 0–0.6)
EOSINOPHIL NFR BLD: 0.2 % (ref 0–5)
ERYTHROCYTE [DISTWIDTH] IN BLOOD BY AUTOMATED COUNT: 18.4 % (ref 11.5–14.5)
GLUCOSE SERPL-MCNC: 113 MG/DL (ref 70–99)
HCT VFR BLD AUTO: 21.9 % (ref 42–52)
HGB BLD-MCNC: 6.6 G/DL (ref 14–18)
IMM GRANULOCYTES # BLD: 0.1 K/UL
LYMPHOCYTES # BLD: 0.9 K/UL (ref 1.1–4.5)
LYMPHOCYTES NFR BLD: 5.9 % (ref 20–40)
MCH RBC QN AUTO: 28.2 PG (ref 27–31)
MCHC RBC AUTO-ENTMCNC: 30.1 G/DL (ref 33–37)
MCV RBC AUTO: 93.6 FL (ref 80–94)
MONOCYTES # BLD: 0.7 K/UL (ref 0–0.9)
MONOCYTES NFR BLD: 4.8 % (ref 0–10)
NEUTROPHILS # BLD: 12.8 K/UL (ref 1.5–7.5)
NEUTS SEG NFR BLD: 88.5 % (ref 50–65)
PLATELET # BLD AUTO: 227 K/UL (ref 130–400)
PMV BLD AUTO: 11.5 FL (ref 9.4–12.4)
POTASSIUM SERPL-SCNC: 4.2 MMOL/L (ref 3.5–5)
RBC # BLD AUTO: 2.34 M/UL (ref 4.7–6.1)
SODIUM SERPL-SCNC: 140 MMOL/L (ref 136–145)
WBC # BLD AUTO: 14.5 K/UL (ref 4.8–10.8)

## 2025-03-12 PROCEDURE — 6370000000 HC RX 637 (ALT 250 FOR IP)

## 2025-03-12 PROCEDURE — 6360000002 HC RX W HCPCS

## 2025-03-12 PROCEDURE — 6360000002 HC RX W HCPCS: Performed by: INTERNAL MEDICINE

## 2025-03-12 PROCEDURE — 99232 SBSQ HOSP IP/OBS MODERATE 35: CPT | Performed by: INTERNAL MEDICINE

## 2025-03-12 PROCEDURE — P9016 RBC LEUKOCYTES REDUCED: HCPCS

## 2025-03-12 PROCEDURE — 6370000000 HC RX 637 (ALT 250 FOR IP): Performed by: INTERNAL MEDICINE

## 2025-03-12 PROCEDURE — 86850 RBC ANTIBODY SCREEN: CPT

## 2025-03-12 PROCEDURE — 85018 HEMOGLOBIN: CPT

## 2025-03-12 PROCEDURE — 2500000003 HC RX 250 WO HCPCS

## 2025-03-12 PROCEDURE — 86900 BLOOD TYPING SEROLOGIC ABO: CPT

## 2025-03-12 PROCEDURE — 1200000000 HC SEMI PRIVATE

## 2025-03-12 PROCEDURE — 36430 TRANSFUSION BLD/BLD COMPNT: CPT

## 2025-03-12 PROCEDURE — 86901 BLOOD TYPING SEROLOGIC RH(D): CPT

## 2025-03-12 PROCEDURE — 85025 COMPLETE CBC W/AUTO DIFF WBC: CPT

## 2025-03-12 PROCEDURE — 2580000003 HC RX 258

## 2025-03-12 PROCEDURE — 30233N1 TRANSFUSION OF NONAUTOLOGOUS RED BLOOD CELLS INTO PERIPHERAL VEIN, PERCUTANEOUS APPROACH: ICD-10-PCS | Performed by: INTERNAL MEDICINE

## 2025-03-12 PROCEDURE — 36415 COLL VENOUS BLD VENIPUNCTURE: CPT

## 2025-03-12 PROCEDURE — 2580000003 HC RX 258: Performed by: INTERNAL MEDICINE

## 2025-03-12 PROCEDURE — 2700000000 HC OXYGEN THERAPY PER DAY

## 2025-03-12 PROCEDURE — 94760 N-INVAS EAR/PLS OXIMETRY 1: CPT

## 2025-03-12 PROCEDURE — 85014 HEMATOCRIT: CPT

## 2025-03-12 PROCEDURE — 2500000003 HC RX 250 WO HCPCS: Performed by: INTERNAL MEDICINE

## 2025-03-12 PROCEDURE — 94640 AIRWAY INHALATION TREATMENT: CPT

## 2025-03-12 PROCEDURE — 80048 BASIC METABOLIC PNL TOTAL CA: CPT

## 2025-03-12 PROCEDURE — 86923 COMPATIBILITY TEST ELECTRIC: CPT

## 2025-03-12 RX ORDER — FLUCONAZOLE 100 MG/1
150 TABLET ORAL DAILY
Status: DISCONTINUED | OUTPATIENT
Start: 2025-03-12 | End: 2025-03-14 | Stop reason: HOSPADM

## 2025-03-12 RX ORDER — GLYCOPYRROLATE 0.2 MG/ML
0.2 INJECTION INTRAMUSCULAR; INTRAVENOUS EVERY 6 HOURS PRN
Status: DISCONTINUED | OUTPATIENT
Start: 2025-03-12 | End: 2025-03-14 | Stop reason: HOSPADM

## 2025-03-12 RX ORDER — MORPHINE SULFATE 20 MG/ML
5 SOLUTION ORAL
Refills: 0 | Status: DISCONTINUED | OUTPATIENT
Start: 2025-03-12 | End: 2025-03-14 | Stop reason: HOSPADM

## 2025-03-12 RX ORDER — SODIUM CHLORIDE 9 MG/ML
INJECTION, SOLUTION INTRAVENOUS PRN
Status: DISCONTINUED | OUTPATIENT
Start: 2025-03-12 | End: 2025-03-14 | Stop reason: HOSPADM

## 2025-03-12 RX ORDER — MIRTAZAPINE 15 MG/1
15 TABLET, FILM COATED ORAL NIGHTLY
Status: DISCONTINUED | OUTPATIENT
Start: 2025-03-12 | End: 2025-03-14 | Stop reason: HOSPADM

## 2025-03-12 RX ORDER — OXYCODONE HYDROCHLORIDE 10 MG/1
10 TABLET ORAL EVERY 4 HOURS PRN
Refills: 0 | Status: DISCONTINUED | OUTPATIENT
Start: 2025-03-12 | End: 2025-03-14 | Stop reason: HOSPADM

## 2025-03-12 RX ORDER — LORAZEPAM 2 MG/ML
0.5 CONCENTRATE ORAL EVERY 6 HOURS PRN
Status: DISCONTINUED | OUTPATIENT
Start: 2025-03-12 | End: 2025-03-14 | Stop reason: HOSPADM

## 2025-03-12 RX ADMIN — CLOPIDOGREL BISULFATE 75 MG: 75 TABLET ORAL at 08:46

## 2025-03-12 RX ADMIN — LIDOCAINE HYDROCHLORIDE: 20 SOLUTION ORAL at 16:50

## 2025-03-12 RX ADMIN — OXYCODONE HYDROCHLORIDE 10 MG: 10 TABLET ORAL at 21:08

## 2025-03-12 RX ADMIN — SUCRALFATE 1 G: 1 TABLET ORAL at 05:38

## 2025-03-12 RX ADMIN — CETIRIZINE HYDROCHLORIDE 10 MG: 10 TABLET, FILM COATED ORAL at 08:46

## 2025-03-12 RX ADMIN — WATER 1000 MG: 1 INJECTION INTRAMUSCULAR; INTRAVENOUS; SUBCUTANEOUS at 18:02

## 2025-03-12 RX ADMIN — METOCLOPRAMIDE 5 MG: 5 TABLET ORAL at 16:50

## 2025-03-12 RX ADMIN — SODIUM BICARBONATE: 84 INJECTION, SOLUTION INTRAVENOUS at 00:08

## 2025-03-12 RX ADMIN — TRAZODONE HYDROCHLORIDE 100 MG: 100 TABLET ORAL at 19:38

## 2025-03-12 RX ADMIN — METOCLOPRAMIDE 5 MG: 5 TABLET ORAL at 08:46

## 2025-03-12 RX ADMIN — OXYCODONE HYDROCHLORIDE 15 MG: 10 TABLET ORAL at 16:50

## 2025-03-12 RX ADMIN — OXYCODONE HYDROCHLORIDE 10 MG: 10 TABLET ORAL at 08:53

## 2025-03-12 RX ADMIN — SODIUM CHLORIDE, PRESERVATIVE FREE 40 MG: 5 INJECTION INTRAVENOUS at 19:38

## 2025-03-12 RX ADMIN — HYDROMORPHONE HYDROCHLORIDE 0.5 MG: 1 INJECTION, SOLUTION INTRAMUSCULAR; INTRAVENOUS; SUBCUTANEOUS at 11:43

## 2025-03-12 RX ADMIN — SUCRALFATE 1 G: 1 TABLET ORAL at 19:37

## 2025-03-12 RX ADMIN — LIDOCAINE HYDROCHLORIDE: 20 SOLUTION ORAL at 05:38

## 2025-03-12 RX ADMIN — ENOXAPARIN SODIUM 60 MG: 100 INJECTION SUBCUTANEOUS at 08:45

## 2025-03-12 RX ADMIN — METOPROLOL SUCCINATE 25 MG: 25 TABLET, EXTENDED RELEASE ORAL at 08:46

## 2025-03-12 RX ADMIN — FLUCONAZOLE 150 MG: 100 TABLET ORAL at 12:09

## 2025-03-12 RX ADMIN — DRONABINOL 2.5 MG: 2.5 CAPSULE ORAL at 05:38

## 2025-03-12 RX ADMIN — METOCLOPRAMIDE 5 MG: 5 TABLET ORAL at 11:43

## 2025-03-12 RX ADMIN — SODIUM BICARBONATE: 84 INJECTION, SOLUTION INTRAVENOUS at 21:07

## 2025-03-12 RX ADMIN — SODIUM CHLORIDE, PRESERVATIVE FREE 40 MG: 5 INJECTION INTRAVENOUS at 08:46

## 2025-03-12 RX ADMIN — SUCRALFATE 1 G: 1 TABLET ORAL at 11:43

## 2025-03-12 RX ADMIN — SODIUM CHLORIDE, PRESERVATIVE FREE 10 ML: 5 INJECTION INTRAVENOUS at 08:47

## 2025-03-12 RX ADMIN — Medication 12.5 MG: at 05:38

## 2025-03-12 RX ADMIN — Medication 2 PUFF: at 07:28

## 2025-03-12 RX ADMIN — DOCUSATE SODIUM 100 MG: 100 CAPSULE, LIQUID FILLED ORAL at 19:38

## 2025-03-12 RX ADMIN — ENOXAPARIN SODIUM 60 MG: 100 INJECTION SUBCUTANEOUS at 19:37

## 2025-03-12 RX ADMIN — DOCUSATE SODIUM 100 MG: 100 CAPSULE, LIQUID FILLED ORAL at 08:46

## 2025-03-12 RX ADMIN — MIRTAZAPINE 15 MG: 15 TABLET, FILM COATED ORAL at 19:37

## 2025-03-12 RX ADMIN — CYANOCOBALAMIN 1000 MCG: 1000 INJECTION, SOLUTION INTRAMUSCULAR; SUBCUTANEOUS at 08:46

## 2025-03-12 RX ADMIN — SUCRALFATE 1 G: 1 TABLET ORAL at 16:50

## 2025-03-12 RX ADMIN — LIDOCAINE HYDROCHLORIDE: 20 SOLUTION ORAL at 11:44

## 2025-03-12 ASSESSMENT — PAIN DESCRIPTION - DESCRIPTORS
DESCRIPTORS: DISCOMFORT
DESCRIPTORS: CRUSHING
DESCRIPTORS: DISCOMFORT
DESCRIPTORS: DISCOMFORT;ACHING

## 2025-03-12 ASSESSMENT — PAIN DESCRIPTION - LOCATION
LOCATION: GENERALIZED
LOCATION: ABDOMEN;BACK
LOCATION: BACK;ABDOMEN
LOCATION: BACK;ABDOMEN

## 2025-03-12 ASSESSMENT — PAIN SCALES - GENERAL
PAINLEVEL_OUTOF10: 10
PAINLEVEL_OUTOF10: 7
PAINLEVEL_OUTOF10: 4
PAINLEVEL_OUTOF10: 7

## 2025-03-12 ASSESSMENT — PAIN DESCRIPTION - ORIENTATION
ORIENTATION: MID
ORIENTATION: MID;LOWER
ORIENTATION: MID;LOWER
ORIENTATION: LOWER;MID

## 2025-03-12 ASSESSMENT — PAIN - FUNCTIONAL ASSESSMENT
PAIN_FUNCTIONAL_ASSESSMENT: PREVENTS OR INTERFERES SOME ACTIVE ACTIVITIES AND ADLS
PAIN_FUNCTIONAL_ASSESSMENT: PREVENTS OR INTERFERES WITH MANY ACTIVE NOT PASSIVE ACTIVITIES
PAIN_FUNCTIONAL_ASSESSMENT: ACTIVITIES ARE NOT PREVENTED
PAIN_FUNCTIONAL_ASSESSMENT: PREVENTS OR INTERFERES SOME ACTIVE ACTIVITIES AND ADLS

## 2025-03-12 NOTE — ACP (ADVANCE CARE PLANNING)
Advance Care Planning      Palliative Medicine Provider (MD/NP)  Advance Care Planning (ACP) Conversation      Date of Conversation: 03/12/25  The patient and/or authorized decision maker consented to a voluntary Advance Care Planning conversation.   Individuals present for the conversation:   Patient with decision making capacity and Spouse at bedside    Legal Healthcare Agent(s):    Primary Decision Maker: Doreen Jain - Spouse - [phone]    ACP documents available in EMR prior to discussion:  -None  -No new documents completed.    Primary Palliative Diagnosis(es):  Metastatic colonic adenocarcinoma  HFrEF    Conversation Summary: Met with patient and his wife at bedside to further discuss goals of care. Extensive discussions today regarding comfort measures with hospice services. Patient is a poor candidate for anticancer therapy and invasive surgical interventions. They seem to understand and are agreeable to pursue hospice at home with transition to DNR but abreu remain hopeful that he will improve and can come off of hospice in the future to pursue treatment. Educated further on meaning of \"full code\" vs \"DNR\" and typical process of aggressive resuscitation. Raffy discussions that patient would be high risk for poor outcomes or sustained trauma should he suffer a code situation. Patient wishes to proceed with transition to DNR to help reduce suffering and assist with terminal care. Code status order updated and hospice consulted. Discussed the above with hospice team, hospitalist NP, and nursing staff.       Resuscitation Status:    Code Status: DNR      I spent 17 minutes providing separately identifiable ACP services with the patient and/or surrogate decision maker in a voluntary, in-person conversation discussing the patient's wishes and goals as detailed in the above note.       Kenyatta Knapp, AGUSTINA - CNP

## 2025-03-12 NOTE — PROGRESS NOTES
Progress Note            Date:3/12/2025        Patient Name:Nick Jain     YOB: 1950     Age:74 y.o.    CC: Follow-up esophageal candidiasis and esophagitis    He reports feeling \"okay\" today.  His wife is not at bedside as usual and she helps to provide most history so history is limited this morning.  He states that he really wants to go home.    Physical Exam   BP (!) 141/68   Pulse (!) 110   Temp 96.8 °F (36 °C) (Temporal)   Resp 16   Ht 1.753 m (5' 9.02\")   Wt 60.1 kg (132 lb 6.4 oz)   SpO2 94%   BMI 19.54 kg/m²      - GENERAL: Alert and oriented x 3. No acute distress. Well-nourished.    - EYES: EOMI. Anicteric.    - HENT: Moist mucous membranes. No cervical lymphadenopathy.    - LUNGS: Clear to auscultation bilaterally. No accessory muscle use.    - CARDIOVASCULAR: Regular rate and rhythm. No murmur. No JVD.    - ABDOMEN: Soft, non-tender and non-distended. No palpable masses.    - EXTREMITIES: No edema. Non-tender.?SKIN: No rashes or lesions. Warm.      Labs    CBC:  Recent Labs     03/10/25  0115 03/11/25  0300   WBC 13.1* 12.5*   RBC 2.54* 2.63*   HGB 7.1* 7.3*   HCT 23.5* 24.3*   MCV 92.5 92.4   RDW 17.6* 17.8*    274     CHEMISTRIES:  Recent Labs     03/10/25  0115 03/11/25  0300   * 137   K 4.4 4.4    106   CO2 18* 17*   BUN 30* 34*   CREATININE 1.2 1.4*   GLUCOSE 91 112*     PT/INR:No results for input(s): \"PROTIME\", \"INR\" in the last 72 hours.  APTT:No results for input(s): \"APTT\" in the last 72 hours.  LIVER PROFILE:  Recent Labs     03/10/25  0115 03/11/25  0300   AST 24 25   ALT 8* 9*   BILITOT 0.3 0.2   ALKPHOS 338* 454*         Assessment & Plan:   Nick Jian is a 74 y.o. year old male with recently diagnosed metastatic colon cancer (not yet started on palliative chemo), CAD on ASA+plavix, hx COPD.  We have been consulted on this hospitalization given odynophagia and dysphagia.     Severe esophagitis, pathology revealed Candida and I also suspect  that there is an underlying reflux component based on appearance.    Start Diflucan 150 mg p.o. daily x 7 to 10 days for Candida esophagitis.  Continue pantoprazole 40 mg IV twice daily.  Continue Carafate p.o. 4 times daily.  All while inpatient.  Would recommend outpatient once daily dosing when discharged.  GI cocktail as needed.

## 2025-03-12 NOTE — PROGRESS NOTES
native heart without angina pectoris  January 2025 underwent SUSAN mid RCA, mid LAD, balloon angioplasty to proximal first diagonal   Maintaining on DAPT    Nausea and vomiting / Esophagitis  CTA abdomen and pelvis penetrating ulcer involving the infrarenal abdominal aorta, bilateral pleural effusions, well-defined low-density lesion in spleen, superior mesenteric artery widely patent and distal branches well-perfused   GI following   EGD 3/10 severe esophagitis LA grade D, 3 to 4 cm hiatal hernia.  Initiated on Protonix twice daily and Carafate 4 times daily.  Candida esophagitis Diflucan x 10 days    Severe malnutrition   Dietitian following    Adenocarcinoma, colon / Palliative care patient/ Metastatic adenocarcinoma to lymph node / Neoplasm related pain   Oncology following   Palliative care following   Hospice consulted    As needed pain medication    Acute kidney injury superimposed on CKD / metabolic acidosis    Creat 1.6   Held diuretics    Gentle IVF               - Monitor I's and O's closely              - Monitor labs closely              - Avoid hypotension              - Avoid nephrotoxic agents    Edema of left upper arm   Venous duplex LUE negative DVT/SVT    Penetrating ulcer of aorta  Vascular surgery consulted  no surgical intervention   CTA abdomen pelvis Atherosclerotic calcifications are shown throughout the visualized arterial structures, including the native coronary arteries, There is a penetrating ulcer along the right lateral wall of the infrarenal abdominal aorta. The defect measures approximately 1.5 cm superior inferior. The aorta measures overall at this level approximately 1.9 cm transverse by 1.7 cm AP, No mural disruption or active arterial contrast extravasation involving the abdominal aorta is identified  Acute cystitis without hematuria   -Urinalysis & Culture    -IV antibiotic, Rocephin    -Monitor I&O    Superior mesenteric vein thrombosis   LMW heparin every 12 hours    Opioid induced constipation    Scheduled bowel regimen    Multiple bowel movements 3/11  Acute on chronic anemia   -No signs of bleeding    -1 unit PRBC ordered    Iron studies reviewed    Trend H/H      Antibiotic: Rocephin      DVT Prophylaxis: Lovenox    GI prophylaxis: Protonix, Carafate    Gonzales Gibson, APRN - CNP, 3/12/2025 12:37 PM

## 2025-03-12 NOTE — PROGRESS NOTES
MEDICAL ONCOLOGY PROGRESS NOTE     Pt Name: Nick Jain  MRN: [id number]  YOB: 1950  Date of evaluation: 3/12/2025    Subjective-reviewed interval notes IM, palliative care, GI and vascular.  Fentanyl was increased to 50 mcg every 72 hours.  Patient continues to have nausea vomiting.  Poor p.o. intake.      3/10/2025-EGD by Dr. Rhina Orosco, [facility]  Severe esophagitis, LA grade D.  3 to 4 cm hiatal hernia.    HISTORY OF PRESENT ILLNESS:   The patient is well-known to our clinic.  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  He was recently seen by Dr. Gera Quintero and recommend initiation of palliative chemotherapy with modified FOLFOX, cetuximab and BRAF inhibitor that was scheduled to be started 3/5/2025.    The patient presented to the ER department, [facility] with complaints of increased nausea, vomiting.  The patient has been sick for the last couple of days.  He also complains of crampy abdominal pain associated with decreased p.o. intake, short of breath and nonproductive cough.    Laboratory studies remarkable for neutrophil leukocytosis with WBC 17.2, ANC is 14.5, hemoglobin 10.5, platelet counts 426,000.  Chemistry remarkable for sodium 133, creatinine 2.0 (previously 1.1), elevated BUN 46, procalcitonin 0.76, proBNP 2169, albumin 3.2, alkaline phosphatase 981, normal LFTs      3/4/2025-CT abdomen pelvis with IV contrast, [facility]:  Nodular ground-glass infiltrate within the lower aspect of both lungs.  Consolidation within the posterior left lower lobe.  This may represent a combination of pneumonia and/or metastasis.    There is small left pleural effusion.    Increased low density lesion within the left lobe of the liver.  On axial image 13 this measures 1.6 x 1.9 cm.    There are adjacent smaller low-density lesions within the left lobe.  This is most compatible with progression of hepatic metastasis.    There is intrahepatic and  injection 5-40 mL  5-40 mL IntraVENous 2 times per day Rhina Orosco MD   10 mL at 03/11/25 1147    sodium chloride flush 0.9 % injection 5-40 mL  5-40 mL IntraVENous PRN Rhina Orosco MD   10 mL at 03/05/25 0955    0.9 % sodium chloride infusion   IntraVENous PRN Rhina Orosco MD        ondansetron (ZOFRAN-ODT) disintegrating tablet 4 mg  4 mg Oral Q8H PRN Rhina Orosco MD        Or    ondansetron (ZOFRAN) injection 4 mg  4 mg IntraVENous Q6H PRN Rhina Orosco MD   4 mg at 03/11/25 1605    polyethylene glycol (GLYCOLAX) packet 17 g  17 g Oral Daily PRN Rhina Orosco MD        acetaminophen (TYLENOL) tablet 650 mg  650 mg Oral Q6H PRN Rhina Orosco MD   650 mg at 03/09/25 1627    Or    acetaminophen (TYLENOL) suppository 650 mg  650 mg Rectal Q6H PRN Rhina Orosco MD        prochlorperazine (COMPAZINE) injection 10 mg  10 mg IntraVENous Q6H PRN Rhina Orosco MD   10 mg at 03/11/25 1759    [Held by provider] atorvastatin (LIPITOR) tablet 80 mg  80 mg Oral Daily Queenie Gold APRN        fluticasone (FLONASE) 50 MCG/ACT nasal spray 2 spray  2 spray Each Nostril Daily PRN Rhina Orosco MD        droNABinol (MARINOL) capsule 2.5 mg  2.5 mg Oral BID AC Rhina Orosco MD   2.5 mg at 03/12/25 0538       Allergies: No Known Allergies    Objective   /75   Pulse (!) 106   Temp 98.1 °F (36.7 °C) (Temporal)   Resp 20   Ht 1.753 m (5' 9.02\")   Wt 60.1 kg (132 lb 6.4 oz)   SpO2 90%   BMI 19.54 kg/m²   PHYSICAL EXAM:  CONSTITUTIONAL: Alert, appropriate, no acute distress, looks ill-appearing  EYES: Non icteric, EOM intact, pupils equal round   NECK: Supple, no masses.  No palpable thyroid mass  CHEST/LUNGS: CTA bilaterally, normal respiratory effort   CARDIOVASCULAR: Regular rate and rhythm, no murmurs.  Bilateral lower extremity edema  ABDOMEN: soft non-tender, active bowel sounds, no HSM.  No palpable masses  EXTREMITIES: warm,   SKIN: warm, dry with no

## 2025-03-12 NOTE — PROGRESS NOTES
03/12/25 1000   Subjective   Subjective Not feeling too good.  I sat up yesterday in that chair about 4 hours.  I just don't have the strength today.  ( to walk )  (Patent declined mobility.)   PT Plan of Care   Wednesday R       Electronically signed by Judson Childress PTA on 3/12/2025 at 10:39 AM

## 2025-03-12 NOTE — CARE COORDINATION
Per Sally BERRIOS, patient's spouse was inquiring about obtaining \"extra help\" at home.    Met with patient to provide list of Independent Caregivers.  Patient's spouse states they do not have the funds to pay for any extra help.  She states they are not eligible for Medicaid.  Electronically signed by Cori Johnson RN on 3/12/2025 at 1:41 PM

## 2025-03-12 NOTE — PROGRESS NOTES
post bypass obstructive LAD and RCA. He underwent SUSAN to RCA and SUSAN to LAD. Additionally had successful PCI of first diagonal with recannulization of  with PTCA. He was recommended heart failure med regimen and uninterrupted Plavix for at least 6 months.  He was discharged home in stable condition. Since that time, he has continued follow-up outpatient with oncology by Dr. Quintero and was recommended nitiation of palliative chemotherapy with modified FOLFOX, cetuximab and BRAF inhibitor that was scheduled to be started 3/5/2025.  Unfortunately he presented back to the ED with complaints of persistent nausea, vomiting, and crampy abdominal pain with associated decreased p.o. intake.  He also endorsed shortness of breath and nonproductive cough. Workup revealed , K5.1, CO2 14, BUN 46 and creatinine 2.0. Initial lactic acid 2.1. WBC 17.2, H&H 10.3/33.3 with a platelet count of 426.  CXR was read with no acute cardiopulmonary process.  CT abdomen/pelvis with IV contrast revealed nodular and groundglass infiltrates within the lower aspect of both lungs. Consolidation within the left lower lobe. May represent combination of pneumonia and for metastasis. Circumferential mucosal thickening in the distal esophagus compatible with esophagitis. Progression of hepatic metastasis. Progression of mesenteric and retroperitoneal lymphadenopathy. Mild intrahepatic and hepatic biliary ductal dilatation. Distended gallbladder. Mild peripancreatic stranding. Complete or near occlusion of superior mesenteric vein. Lymphadenopathy causing severe stenosis and near complete occlusion of inferior vena cava.  Patient's respiratory panel was positive for influenza A and a corona virus. He was initiated on Tamiflu.  Admitted under hospitalist services to the medical floor with oncology evaluation for continuity of care.  Palliative care is consulted for metastatic colon cancer and intractable N/V.     Review of Systems:   14 point  review of systems is negative except as specifically addressed above.    Objective:   VITALS:  BP (!) 141/68   Pulse (!) 110   Temp 96.8 °F (36 °C) (Temporal)   Resp 14   Ht 1.753 m (5' 9.02\")   Wt 60.1 kg (132 lb 6.4 oz)   SpO2 94%   BMI 19.54 kg/m²   24HR INTAKE/OUTPUT:    Intake/Output Summary (Last 24 hours) at 3/12/2025 0824  Last data filed at 3/12/2025 1029  Gross per 24 hour   Intake 210 ml   Output 400 ml   Net -190 ml     General appearance: 73 yo male, ill appearing, cachectic,NAD  Head: Normocephalic, without obvious abnormality, atraumatic  Eyes: conjunctivae/corneas clear. PERRL, EOM's intact.   Ears/Nose: normal external ears and nose  Neck/Throat: supple, symmetrical, trachea midline  Pulmonary: diminished to auscultation bilaterally, unlabored on room air  Cardiovascular: tachycardic and regular rhythm   Gastrointestinal: soft, rounded, minimally TTP throughout, bowel sounds present  Musculoskeletal:No lower extremity edema,  No erythema, no tenderness to palpation  Skin: Warm, dry, pale  Neurologic: Alert and oriented x4, follows commands, generalized weakness  Psychiatric: Calm and cooperative    Medications:      sodium bicarbonate 75 mEq in sodium chloride 0.9 % 1,000 mL infusion 75 mL/hr at 03/12/25 0008    sodium chloride        enoxaparin  1 mg/kg SubCUTAneous BID    fentaNYL  1 patch TransDERmal Q72H    naloxegol  12.5 mg Oral QAM AC    pantoprazole (PROTONIX) 40 mg in sodium chloride (PF) 0.9 % 10 mL injection  40 mg IntraVENous BID    sucralfate  1 g Oral 4x Daily AC & HS    aluminum & magnesium hydroxide-simethicone (MAALOX PLUS) 30 mL, lidocaine viscous hcl (XYLOCAINE) 5 mL (GI COCKTAIL)   Oral TID AC    traZODone  100 mg Oral Nightly    oxyCODONE  10 mg Oral Nightly    cefTRIAXone (ROCEPHIN) IV  1,000 mg IntraVENous Q24H    metoclopramide  5 mg Oral TID WC    docusate sodium  100 mg Oral BID    lidocaine  2 patch TransDERmal Daily    cyanocobalamin  1,000 mcg SubCUTAneous Weekly

## 2025-03-12 NOTE — CONSENT
Informed Consent for Blood Component Transfusion Note    I have discussed with the patient and spouse the rationale for blood component transfusion; its benefits in treating or preventing fatigue, organ damage, or death; and its risk which includes mild transfusion reactions, rare risk of blood borne infection, or more serious but rare reactions. I have discussed the alternatives to transfusion, including the risk and consequences of not receiving transfusion. The patient had an opportunity to ask questions and had agreed to proceed with transfusion of blood components.    Electronically signed by AGUSTINA Holder CNP on 3/12/25 at 3:34 PM JULIO CÉSAR

## 2025-03-13 ENCOUNTER — TELEPHONE (OUTPATIENT)
Dept: HEMATOLOGY | Age: 75
End: 2025-03-13

## 2025-03-13 PROBLEM — I25.5 ISCHEMIC CARDIOMYOPATHY: Chronic | Status: ACTIVE | Noted: 2025-01-20

## 2025-03-13 LAB
ANION GAP SERPL CALCULATED.3IONS-SCNC: 11 MMOL/L (ref 8–16)
BASOPHILS # BLD: 0 K/UL (ref 0–0.2)
BASOPHILS NFR BLD: 0.1 % (ref 0–1)
BUN SERPL-MCNC: 36 MG/DL (ref 8–23)
CALCIUM SERPL-MCNC: 7.8 MG/DL (ref 8.8–10.2)
CHLORIDE SERPL-SCNC: 109 MMOL/L (ref 98–107)
CO2 SERPL-SCNC: 22 MMOL/L (ref 22–29)
CREAT SERPL-MCNC: 1.7 MG/DL (ref 0.7–1.2)
ECHO BSA: 1.65 M2
EOSINOPHIL # BLD: 0.1 K/UL (ref 0–0.6)
EOSINOPHIL NFR BLD: 0.3 % (ref 0–5)
ERYTHROCYTE [DISTWIDTH] IN BLOOD BY AUTOMATED COUNT: 17.9 % (ref 11.5–14.5)
GLUCOSE SERPL-MCNC: 111 MG/DL (ref 70–99)
HCT VFR BLD AUTO: 27.4 % (ref 42–52)
HCT VFR BLD AUTO: 27.8 % (ref 42–52)
HGB BLD-MCNC: 8.6 G/DL (ref 14–18)
HGB BLD-MCNC: 8.8 G/DL (ref 14–18)
IMM GRANULOCYTES # BLD: 0.1 K/UL
LYMPHOCYTES # BLD: 1 K/UL (ref 1.1–4.5)
LYMPHOCYTES NFR BLD: 6.4 % (ref 20–40)
MCH RBC QN AUTO: 28.5 PG (ref 27–31)
MCHC RBC AUTO-ENTMCNC: 32.1 G/DL (ref 33–37)
MCV RBC AUTO: 88.7 FL (ref 80–94)
MONOCYTES # BLD: 0.7 K/UL (ref 0–0.9)
MONOCYTES NFR BLD: 4.5 % (ref 0–10)
NEUTROPHILS # BLD: 13.4 K/UL (ref 1.5–7.5)
NEUTS SEG NFR BLD: 88 % (ref 50–65)
PLATELET # BLD AUTO: 223 K/UL (ref 130–400)
PMV BLD AUTO: 11.3 FL (ref 9.4–12.4)
POTASSIUM SERPL-SCNC: 4.3 MMOL/L (ref 3.5–5)
RBC # BLD AUTO: 3.09 M/UL (ref 4.7–6.1)
SODIUM SERPL-SCNC: 142 MMOL/L (ref 136–145)
WBC # BLD AUTO: 15.3 K/UL (ref 4.8–10.8)

## 2025-03-13 PROCEDURE — 2580000003 HC RX 258: Performed by: INTERNAL MEDICINE

## 2025-03-13 PROCEDURE — 94640 AIRWAY INHALATION TREATMENT: CPT

## 2025-03-13 PROCEDURE — 85025 COMPLETE CBC W/AUTO DIFF WBC: CPT

## 2025-03-13 PROCEDURE — 93971 EXTREMITY STUDY: CPT | Performed by: SURGERY

## 2025-03-13 PROCEDURE — 6370000000 HC RX 637 (ALT 250 FOR IP): Performed by: INTERNAL MEDICINE

## 2025-03-13 PROCEDURE — 6370000000 HC RX 637 (ALT 250 FOR IP)

## 2025-03-13 PROCEDURE — 6360000002 HC RX W HCPCS

## 2025-03-13 PROCEDURE — 6360000002 HC RX W HCPCS: Performed by: INTERNAL MEDICINE

## 2025-03-13 PROCEDURE — 2500000003 HC RX 250 WO HCPCS: Performed by: INTERNAL MEDICINE

## 2025-03-13 PROCEDURE — 2700000000 HC OXYGEN THERAPY PER DAY

## 2025-03-13 PROCEDURE — 1200000000 HC SEMI PRIVATE

## 2025-03-13 PROCEDURE — 80048 BASIC METABOLIC PNL TOTAL CA: CPT

## 2025-03-13 PROCEDURE — 99232 SBSQ HOSP IP/OBS MODERATE 35: CPT | Performed by: INTERNAL MEDICINE

## 2025-03-13 PROCEDURE — 94760 N-INVAS EAR/PLS OXIMETRY 1: CPT

## 2025-03-13 PROCEDURE — 2500000003 HC RX 250 WO HCPCS

## 2025-03-13 RX ADMIN — SUCRALFATE 1 G: 1 TABLET ORAL at 06:09

## 2025-03-13 RX ADMIN — CLOPIDOGREL BISULFATE 75 MG: 75 TABLET ORAL at 07:56

## 2025-03-13 RX ADMIN — ONDANSETRON 4 MG: 2 INJECTION INTRAMUSCULAR; INTRAVENOUS at 20:51

## 2025-03-13 RX ADMIN — DOCUSATE SODIUM 100 MG: 100 CAPSULE, LIQUID FILLED ORAL at 20:51

## 2025-03-13 RX ADMIN — Medication 12.5 MG: at 06:09

## 2025-03-13 RX ADMIN — SODIUM CHLORIDE, PRESERVATIVE FREE 40 MG: 5 INJECTION INTRAVENOUS at 08:00

## 2025-03-13 RX ADMIN — OXYCODONE HYDROCHLORIDE 10 MG: 10 TABLET ORAL at 20:50

## 2025-03-13 RX ADMIN — METOCLOPRAMIDE 5 MG: 5 TABLET ORAL at 11:22

## 2025-03-13 RX ADMIN — APIXABAN 5 MG: 5 TABLET, FILM COATED ORAL at 14:54

## 2025-03-13 RX ADMIN — HYDROMORPHONE HYDROCHLORIDE 0.5 MG: 1 INJECTION, SOLUTION INTRAMUSCULAR; INTRAVENOUS; SUBCUTANEOUS at 10:16

## 2025-03-13 RX ADMIN — METOCLOPRAMIDE 5 MG: 5 TABLET ORAL at 07:56

## 2025-03-13 RX ADMIN — SUCRALFATE 1 G: 1 TABLET ORAL at 17:17

## 2025-03-13 RX ADMIN — MIRTAZAPINE 15 MG: 15 TABLET, FILM COATED ORAL at 20:50

## 2025-03-13 RX ADMIN — SUCRALFATE 1 G: 1 TABLET ORAL at 20:50

## 2025-03-13 RX ADMIN — APIXABAN 5 MG: 5 TABLET, FILM COATED ORAL at 20:50

## 2025-03-13 RX ADMIN — ONDANSETRON 4 MG: 2 INJECTION INTRAMUSCULAR; INTRAVENOUS at 07:52

## 2025-03-13 RX ADMIN — Medication 2 PUFF: at 11:03

## 2025-03-13 RX ADMIN — WATER 1000 MG: 1 INJECTION INTRAMUSCULAR; INTRAVENOUS; SUBCUTANEOUS at 17:17

## 2025-03-13 RX ADMIN — HYDROMORPHONE HYDROCHLORIDE 0.5 MG: 1 INJECTION, SOLUTION INTRAMUSCULAR; INTRAVENOUS; SUBCUTANEOUS at 15:01

## 2025-03-13 RX ADMIN — ENOXAPARIN SODIUM 60 MG: 100 INJECTION SUBCUTANEOUS at 07:57

## 2025-03-13 RX ADMIN — DOCUSATE SODIUM 100 MG: 100 CAPSULE, LIQUID FILLED ORAL at 07:57

## 2025-03-13 RX ADMIN — METOPROLOL SUCCINATE 25 MG: 25 TABLET, EXTENDED RELEASE ORAL at 07:56

## 2025-03-13 RX ADMIN — Medication 5 MG: at 14:14

## 2025-03-13 RX ADMIN — METOCLOPRAMIDE 5 MG: 5 TABLET ORAL at 17:17

## 2025-03-13 RX ADMIN — SODIUM CHLORIDE, PRESERVATIVE FREE 10 ML: 5 INJECTION INTRAVENOUS at 20:51

## 2025-03-13 RX ADMIN — SUCRALFATE 1 G: 1 TABLET ORAL at 11:23

## 2025-03-13 RX ADMIN — CETIRIZINE HYDROCHLORIDE 10 MG: 10 TABLET, FILM COATED ORAL at 07:56

## 2025-03-13 RX ADMIN — FLUCONAZOLE 150 MG: 100 TABLET ORAL at 07:56

## 2025-03-13 RX ADMIN — OXYCODONE HYDROCHLORIDE 10 MG: 10 TABLET ORAL at 11:28

## 2025-03-13 RX ADMIN — TRAZODONE HYDROCHLORIDE 100 MG: 100 TABLET ORAL at 20:51

## 2025-03-13 ASSESSMENT — PAIN - FUNCTIONAL ASSESSMENT: PAIN_FUNCTIONAL_ASSESSMENT: ACTIVITIES ARE NOT PREVENTED

## 2025-03-13 ASSESSMENT — PAIN DESCRIPTION - LOCATION
LOCATION: BACK
LOCATION: ABDOMEN
LOCATION: GENERALIZED
LOCATION: ABDOMEN
LOCATION: ABDOMEN

## 2025-03-13 ASSESSMENT — PAIN DESCRIPTION - ORIENTATION
ORIENTATION: MID

## 2025-03-13 ASSESSMENT — PAIN SCALES - GENERAL
PAINLEVEL_OUTOF10: 9
PAINLEVEL_OUTOF10: 3
PAINLEVEL_OUTOF10: 9
PAINLEVEL_OUTOF10: 10

## 2025-03-13 ASSESSMENT — PAIN DESCRIPTION - DESCRIPTORS
DESCRIPTORS: DISCOMFORT;ACHING
DESCRIPTORS: ACHING
DESCRIPTORS: ACHING

## 2025-03-13 NOTE — PROGRESS NOTES
03/13/25 1510   Encounter Summary   Encounter Overview/Reason Spiritual/Emotional Needs   Encounter Code  Assessment by  services   Service Provided For Patient;Family   Referral/Consult From Palliative Care   Support System Family members   Complexity of Encounter Moderate   Begin Time 1450   End Time  1510   Total Time Calculated 20 min   Spiritual/Emotional needs   Type Spiritual Support   Palliative Care   Type Palliative Care, Follow-up   Assessment/Intervention/Outcome   Assessment Compromised coping;Concerns with suffering   Intervention Active listening;Prayer (assurance of)/[place];Sustaining Presence/Ministry of presence   Outcome Acceptance;Expressed Gratitude   Plan and Referrals   Plan/Referrals Other (Comment)  (Pt is discharging home with Hospice.)   Does the patient have a [facility] PCP? Yes    to follow up after discharge? No       This  visited with pt to provide spiritual care. Pt is discharging home with hospice today or tomorrow per his wife. Pt has co-morbidities and cancer. This  visited with pt, wife, and friends who came in during the visit. Provided sustaining presence, support, ritual, and prayer. Pt, wife, and friends, expressed gratitude for spiritual care.       Spiritual Health History and Assessment/Progress Note  [facility]    (P) Spiritual/Emotional Needs,  ,  ,      Name: Nick Jain MRN: [id number]    Age: 74 y.o.     Sex: male   Language: English   Yazidi: Mosque   Pneumonia of left lower lobe due to infectious organism     Date: 3/13/2025            Total Time Calculated: (P) 20 min              Spiritual Assessment began in [facility] 4 ONCOLOGY UNIT        Referral/Consult From: (P) Palliative Care   Encounter Overview/Reason: (P) Spiritual/Emotional Needs  Service Provided For: (P) Patient, Family    Emelyn, Belief, Meaning:   Patient identifies as spiritual and is connected with a emelyn tradition or spiritual  practice  Family/Friends identify as spiritual and are connected with a ghada tradition or spiritual practice      Importance and Influence:  Patient has spiritual/personal beliefs that influence decisions regarding their health  Family/Friends have spiritual/personal beliefs that influence decisions regarding the patient's health    Community:  Patient is connected with a spiritual community  Family/Friends are connected with a spiritual community:    Assessment and Plan of Care:     Patient Interventions include: Affirmed coping skills/support systems and Provided sacramental/Islam ritual  Family/Friends Interventions include: Affirmed coping skills/support systems and Provided sacramental/Islam ritual    Patient Plan of Care: Other: Pt is due to discharge today or tomorrow.  Family/Friends Plan of Care: Other: Pt is due to discharge today or tomorrow.    Electronically signed by Mary Behrens, Chaplain on 3/13/2025 at 3:14 PM

## 2025-03-13 NOTE — PROGRESS NOTES
performed from the  skull base through the level of the thighs in conjunction with a spiral CT scan from the skull base through the level of the thighs utilized both for attenuation correction and also for the creation of a spatially registered CT examination for PET / CT fusion.  The CT scan, PET scan and fused PET/CT scan were presented for evaluation in axial, sagittal and coronal planes.  Additional images including a rotating maximum intensity projection image of the PET scan were provided as required.  (The low dose  noncontrast CT images utilized for fusion with the PET images to create the PET / CT images are not of diagnostic quality and are not used to diagnose disease independently of the PET images. Reference is made to recent diagnostic quality examinations for CT findings not directly associated with PET findings.)  FINDINGS:  CT In the head and neck, the included portions of the orbits and paranasal sinuses demonstrate generally normal levels of activity .   The nasopharynx, oropharynx and oral cavity demonstrate normal levels of activity .  The larynx and vocal cords are .   The salivary glands demonstrate normal levels of activity .  The thyroid gland demonstrates normal levels of activity . There is a large conglomerate lymph node mass at the thoracic inlet on the left with SUV max of 8.35. There is a smaller node lateral  to this with SUV max of 4.73. No additional significantly FDG avid lymph nodes are seen in the neck.  Incidental note is made of increased physiologic activity in musculature of the posterior neck.  In the thorax, the right lung is generally well expanded with no evidence of significant new infiltrate or consolidation or effusion. There is a new small left pleural effusion. No significantly FDG avid pulmonary parenchymal or pleural-based lesions are  identified. There is an FDG avid left subcarinal node interposed between the esophagus and aorta and spine with SUV max 4.32. There  daily  Anticoagulation as above   Heparin discussed with patient.      (Please note that portions of this note were completed with a voice recognition program. Efforts were made to edit the dictations but occasionally words are mis-transcribed.)        Susi Rios MD    03/13/25  5:56 AM

## 2025-03-13 NOTE — PROGRESS NOTES
Palliative Care Progress Note  3/13/2025 8:30 AM    Patient:  Nick Jain  YOB: 1950  Primary Care Physician: Tesfaye Gipson MD  Advance Directive: DNR  Admit Date: 3/4/2025       Hospital Day: 9  Portions of this note have been copied forward, however, changed to reflect the most current clinical status of this patient.    CHIEF COMPLAINT/REASON FOR CONSULTATION Goals of care, family support, Code status, symptom management     SUBJECTIVE:  Mr. Jain is back to bed from recliner chair. He is fatigued and SOB but recovering with O2 for comfort. Intermittent pain and nausea continues. Tolerating current med regimen. Hospice discharge planning on going.     HPI: The patient is a 74 y.o. 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 Patient is known to palliative care and followed by inpatient team during recent admission here 1/10/2025-1/26/2025. During previous stay, he underwent a right hemicolectomy/terminal ileum ileectomy and lymph node dissection 1/11/2025 by Dr. Suleiman Hunter. 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 Unfortunately patient did have a persistent postoperative ileus. A PICC line was placed 1/17/2025 for initiation of TPN. Ileus did begin to resolve and bowel function returned. Patient developed tachycardia with increase in proBNP. He was initiated on IV diuretics and an echo was obtained 1/19/2025 which revealed EF 30 to 35%, severe hypokinesis of multiple segments with possible apical ballooning syndrome concerning for Takotsubo's cardiomyopathy, grade 2 diastolic dysfunction. EF was normal following his CABG last year. He went to the cath lab 1/20/2025 where he was found to have severe triple-vessel disease. Findings are felt  Increased low density lesion within the left lobe of the liver.  On axial image 13 this measures 1.6 x 1.9 cm.  There are adjacent smaller low-density lesions within the left lobe.  This is most compatible with progression of hepatic metastasis.  There is intrahepatic and extrahepatic biliary ductal dilatation.  Gallbladder is distended.  Mild nodular thickening of the left adrenal gland.  The right adrenal gland is unremarkable.  The kidneys show no acute process.  No hydronephrosis.  The spleen shows no acute abnormality.  Mild peripancreatic stranding.  Mild pancreatitis is not excluded.  There is no bowel obstruction.  No evidence of appendicitis.  Unremarkable urinary bladder.  Trace free fluid within the right and left paracolic gutter.  There has been interval progression of mesenteric and retroperitoneal lymphadenopathy most compatible with worsening metastasis.  Left periaortic lymphadenopathy on axial image 35 measures 1.8 cm transverse dimension.  Right periaortic lymphadenopathy on  axial image 33 measures 2.5 cm AP dimension.  Lymph node anterior to the hepatic artery on axial image 27 measures 2.6 x 1.7 cm. Right periaortic lymph node at the lower aspect of the chest on axial image 16 measures 1.9 x 1.4 cm  There is new complete or near complete occlusion of the superior mesenteric vein. Lymphadenopathy causes mass effect and severe narrowing of the inferior vena cava.  This can be seen on coronal image 29 and axial image 33.  Circumferential mucosal thickening of the distal esophagus likely due to esophagitis.  Diffuse mesenteric edema.  No acute osseous abnormality.        1.  Nodular and ground-glass infiltrate within the lower aspect of both lungs.  Consolidation within the posterior left lower lobe.  This may represent a combination of pneumonia and/or metastasis. 2.  Small left pleural effusion. 3.  Circumferential mucosal thickening of the distal esophagus most compatible with esophagitis. 4.

## 2025-03-13 NOTE — PROGRESS NOTES
[facility]      Patient:  Nick Jain  YOB: 1950  Date of Service: 3/13/2025  MRN: [id number]   Acct: [id number]   Primary Care Physician: Tesfaye Gipson MD  Advance Directive: DNR  Admit Date: 3/4/2025       Hospital Day: 9  Portions of this note have been copied forward, however, changed to reflect the most current clinical status of this patient.  CHIEF COMPLAINT shortness of breath    Cumulative Hospital stay  74-year-old male with CAD s/p CABG March 2024, recent PCI, metastatic colonic adenocarcinoma s/p right colectomy in January 2025, initiated on palliative chemotherapy with modified FOLFOX, Cetuximab and BRAF inhibitor that was to be initiated on 3/5, COPD, HTN, with complaints of tractable nausea, vomiting, abdominal pain.  Patient states that he had had ongoing nausea and vomiting for the past few days prior to admission.  Endorsed epigastric and suprapubic abdominal pain along with dyspnea and productive cough.  Workup in ER CT abdomen pelvis nodular groundglass infiltrate within the lower aspect of both lungs, consolidation within the left lower lobe, concern for combination of pneumonia and metastasis, circumferential mucosal thickening of the distal esophagus compatible with esophagitis progression of hepatic metastasis, progression of mesenteric and retroperitoneal lymphadenopathy, mild intrahepatic and hepatic biliary ductal dilatation, distended gallbladder mild peripancreatic stranding complete or near occlusion of superior mesenteric vein.  Lymphadenopathy causing severe stenosis and near complete occlusion of inferior vena cava, coronavirus NL 63 and influenza A positive.  Initiated on cefepime and azithromycin, Tamiflu. Patient admitted to hospitalist service sepsis due to pneumonia with consultation to oncology.  Palliative care consulted for assistance with pain management.  Patient had persistent tachycardia CTA abdomen and pelvis penetrating ulcer involving the

## 2025-03-13 NOTE — PLAN OF CARE
Problem: Safety - Adult  Goal: Free from fall injury  Outcome: Progressing     Problem: Skin/Tissue Integrity  Goal: Skin integrity remains intact  Description: 1.  Monitor for areas of redness and/or skin breakdown  2.  Assess vascular access sites hourly  3.  Every 4-6 hours minimum:  Change oxygen saturation probe site  4.  Every 4-6 hours:  If on nasal continuous positive airway pressure, respiratory therapy assess nares and determine need for appliance change or resting period  Outcome: Progressing  Flowsheets (Taken 3/12/2025 1108 by Eileen Paris RN)  Skin Integrity Remains Intact: Monitor for areas of redness and/or skin breakdown     Problem: Pain  Goal: Verbalizes/displays adequate comfort level or baseline comfort level  Outcome: Progressing     Problem: Nutrition Deficit:  Goal: Optimize nutritional status  Outcome: Progressing

## 2025-03-13 NOTE — TELEPHONE ENCOUNTER
I called to remind the patient of his appointment with Dr. Quintero and wife made me aware that patient has been in the hospital for 13 days and is going home on hospice and I made her aware that I will let our  staff know to cancel the appointments.

## 2025-03-13 NOTE — PROGRESS NOTES
The needed equipment has been ordered and will be set up tomorrow AM.  It will then be ok to dc the pt when medically ready. A portable 02 tank will be brought to the pts room for transport home.  Hospice will follow for assessment/admission.  Please dc the pt with a 3 day supply of medications and care orders. Emotional and sc support provided.

## 2025-03-13 NOTE — PLAN OF CARE
Problem: Safety - Adult  Goal: Free from fall injury  3/13/2025 1242 by Kelechi Shultz, RN  Outcome: Progressing  3/12/2025 2302 by Amanda Dobson RN  Outcome: Progressing     Problem: ABCDS Injury Assessment  Goal: Absence of physical injury  3/13/2025 1242 by Kelechi Shultz, RN  Outcome: Progressing  3/12/2025 2302 by Amanda Dobson RN  Outcome: Progressing     Problem: Pain  Goal: Verbalizes/displays adequate comfort level or baseline comfort level  3/13/2025 1242 by Kelechi Shultz, RN  Outcome: Not Progressing  3/12/2025 2302 by Amanda Dobson RN  Outcome: Progressing     Problem: Pain  Goal: Verbalizes/displays adequate comfort level or baseline comfort level  3/13/2025 1242 by Kelechi Shultz, RN  Outcome: Not Progressing  3/12/2025 2302 by Amanda Dobson RN  Outcome: Progressing

## 2025-03-14 VITALS
HEIGHT: 69 IN | RESPIRATION RATE: 15 BRPM | SYSTOLIC BLOOD PRESSURE: 121 MMHG | BODY MASS INDEX: 19.61 KG/M2 | TEMPERATURE: 97.7 F | DIASTOLIC BLOOD PRESSURE: 67 MMHG | WEIGHT: 132.4 LBS | OXYGEN SATURATION: 96 % | HEART RATE: 110 BPM

## 2025-03-14 LAB
ANION GAP SERPL CALCULATED.3IONS-SCNC: 9 MMOL/L (ref 8–16)
BASOPHILS # BLD: 0 K/UL (ref 0–0.2)
BASOPHILS NFR BLD: 0.1 % (ref 0–1)
BUN SERPL-MCNC: 36 MG/DL (ref 8–23)
CALCIUM SERPL-MCNC: 7.6 MG/DL (ref 8.8–10.2)
CHLORIDE SERPL-SCNC: 108 MMOL/L (ref 98–107)
CO2 SERPL-SCNC: 22 MMOL/L (ref 22–29)
CREAT SERPL-MCNC: 1.6 MG/DL (ref 0.7–1.2)
EOSINOPHIL # BLD: 0.1 K/UL (ref 0–0.6)
EOSINOPHIL NFR BLD: 0.5 % (ref 0–5)
ERYTHROCYTE [DISTWIDTH] IN BLOOD BY AUTOMATED COUNT: 18 % (ref 11.5–14.5)
GLUCOSE SERPL-MCNC: 101 MG/DL (ref 70–99)
HCT VFR BLD AUTO: 27.6 % (ref 42–52)
HGB BLD-MCNC: 8.7 G/DL (ref 14–18)
IMM GRANULOCYTES # BLD: 0.1 K/UL
LYMPHOCYTES # BLD: 0.9 K/UL (ref 1.1–4.5)
LYMPHOCYTES NFR BLD: 6 % (ref 20–40)
MCH RBC QN AUTO: 28.4 PG (ref 27–31)
MCHC RBC AUTO-ENTMCNC: 31.5 G/DL (ref 33–37)
MCV RBC AUTO: 90.2 FL (ref 80–94)
MONOCYTES # BLD: 0.6 K/UL (ref 0–0.9)
MONOCYTES NFR BLD: 4.2 % (ref 0–10)
NEUTROPHILS # BLD: 12.6 K/UL (ref 1.5–7.5)
NEUTS SEG NFR BLD: 88.6 % (ref 50–65)
PLATELET # BLD AUTO: 194 K/UL (ref 130–400)
PMV BLD AUTO: 10.7 FL (ref 9.4–12.4)
POTASSIUM SERPL-SCNC: 4.1 MMOL/L (ref 3.5–5)
RBC # BLD AUTO: 3.06 M/UL (ref 4.7–6.1)
SODIUM SERPL-SCNC: 139 MMOL/L (ref 136–145)
WBC # BLD AUTO: 14.2 K/UL (ref 4.8–10.8)

## 2025-03-14 PROCEDURE — 6370000000 HC RX 637 (ALT 250 FOR IP)

## 2025-03-14 PROCEDURE — 6370000000 HC RX 637 (ALT 250 FOR IP): Performed by: INTERNAL MEDICINE

## 2025-03-14 PROCEDURE — 94760 N-INVAS EAR/PLS OXIMETRY 1: CPT

## 2025-03-14 PROCEDURE — 94640 AIRWAY INHALATION TREATMENT: CPT

## 2025-03-14 PROCEDURE — 85025 COMPLETE CBC W/AUTO DIFF WBC: CPT

## 2025-03-14 PROCEDURE — 6360000002 HC RX W HCPCS: Performed by: INTERNAL MEDICINE

## 2025-03-14 PROCEDURE — 2700000000 HC OXYGEN THERAPY PER DAY

## 2025-03-14 PROCEDURE — 2500000003 HC RX 250 WO HCPCS: Performed by: INTERNAL MEDICINE

## 2025-03-14 PROCEDURE — 80048 BASIC METABOLIC PNL TOTAL CA: CPT

## 2025-03-14 RX ORDER — PROMETHAZINE HYDROCHLORIDE 25 MG/1
25 TABLET ORAL 4 TIMES DAILY PRN
Qty: 30 TABLET | Refills: 2 | Status: SHIPPED | OUTPATIENT
Start: 2025-03-14 | End: 2025-03-18

## 2025-03-14 RX ORDER — OXYCODONE HYDROCHLORIDE 10 MG/1
10 TABLET ORAL EVERY 4 HOURS PRN
Qty: 18 TABLET | Refills: 0 | Status: SHIPPED | OUTPATIENT
Start: 2025-03-14 | End: 2025-03-17

## 2025-03-14 RX ORDER — HEPARIN 100 UNIT/ML
300 SYRINGE INTRAVENOUS EVERY 8 HOURS PRN
Status: DISCONTINUED | OUTPATIENT
Start: 2025-03-14 | End: 2025-03-14 | Stop reason: HOSPADM

## 2025-03-14 RX ORDER — SUCRALFATE 1 G/1
1 TABLET ORAL
Qty: 120 TABLET | Refills: 3 | Status: SHIPPED | OUTPATIENT
Start: 2025-03-14 | End: 2025-03-18

## 2025-03-14 RX ORDER — LORAZEPAM 0.5 MG/1
0.5 TABLET ORAL EVERY 8 HOURS PRN
Qty: 9 TABLET | Refills: 0 | Status: SHIPPED | OUTPATIENT
Start: 2025-03-14 | End: 2025-03-17

## 2025-03-14 RX ORDER — FLUCONAZOLE 150 MG/1
150 TABLET ORAL DAILY
Qty: 4 TABLET | Refills: 0 | Status: SHIPPED | OUTPATIENT
Start: 2025-03-15 | End: 2025-03-18

## 2025-03-14 RX ORDER — HYDROCODONE BITARTRATE AND ACETAMINOPHEN 10; 325 MG/1; MG/1
1 TABLET ORAL EVERY 6 HOURS PRN
Qty: 12 TABLET | Refills: 0 | Status: SHIPPED | OUTPATIENT
Start: 2025-03-14 | End: 2025-03-17

## 2025-03-14 RX ORDER — FENTANYL 50 UG/1
1 PATCH TRANSDERMAL
Qty: 1 PATCH | Refills: 0 | Status: SHIPPED | OUTPATIENT
Start: 2025-03-17 | End: 2025-03-18

## 2025-03-14 RX ORDER — SENNA AND DOCUSATE SODIUM 50; 8.6 MG/1; MG/1
1 TABLET, FILM COATED ORAL 2 TIMES DAILY
Qty: 60 TABLET | Refills: 1 | Status: SHIPPED | OUTPATIENT
Start: 2025-03-14 | End: 2025-03-18

## 2025-03-14 RX ORDER — MIRTAZAPINE 15 MG/1
15 TABLET, FILM COATED ORAL NIGHTLY
Qty: 30 TABLET | Refills: 3 | Status: SHIPPED | OUTPATIENT
Start: 2025-03-14 | End: 2025-03-18

## 2025-03-14 RX ORDER — FENTANYL 50 UG/1
1 PATCH TRANSDERMAL
Qty: 1 PATCH | Refills: 0 | Status: SHIPPED | OUTPATIENT
Start: 2025-03-17 | End: 2025-03-14

## 2025-03-14 RX ORDER — LORAZEPAM 2 MG/ML
0.5 CONCENTRATE ORAL EVERY 6 HOURS PRN
Qty: 3 ML | Refills: 0 | Status: SHIPPED | OUTPATIENT
Start: 2025-03-14 | End: 2025-03-14 | Stop reason: HOSPADM

## 2025-03-14 RX ORDER — OXYCODONE HYDROCHLORIDE 10 MG/1
10 TABLET ORAL NIGHTLY
Qty: 3 TABLET | Refills: 0 | Status: SHIPPED | OUTPATIENT
Start: 2025-03-14 | End: 2025-03-14 | Stop reason: HOSPADM

## 2025-03-14 RX ORDER — CEFDINIR 300 MG/1
300 CAPSULE ORAL 2 TIMES DAILY
Qty: 6 CAPSULE | Refills: 0 | Status: SHIPPED | OUTPATIENT
Start: 2025-03-14 | End: 2025-03-17

## 2025-03-14 RX ADMIN — OXYCODONE HYDROCHLORIDE 10 MG: 10 TABLET ORAL at 06:05

## 2025-03-14 RX ADMIN — DOCUSATE SODIUM 100 MG: 100 CAPSULE, LIQUID FILLED ORAL at 08:43

## 2025-03-14 RX ADMIN — FLUCONAZOLE 150 MG: 100 TABLET ORAL at 08:43

## 2025-03-14 RX ADMIN — SODIUM CHLORIDE, PRESERVATIVE FREE 10 ML: 5 INJECTION INTRAVENOUS at 08:46

## 2025-03-14 RX ADMIN — OXYCODONE HYDROCHLORIDE 10 MG: 10 TABLET ORAL at 13:40

## 2025-03-14 RX ADMIN — METOPROLOL SUCCINATE 25 MG: 25 TABLET, EXTENDED RELEASE ORAL at 08:43

## 2025-03-14 RX ADMIN — SUCRALFATE 1 G: 1 TABLET ORAL at 12:07

## 2025-03-14 RX ADMIN — SUCRALFATE 1 G: 1 TABLET ORAL at 05:59

## 2025-03-14 RX ADMIN — METOCLOPRAMIDE 5 MG: 5 TABLET ORAL at 12:07

## 2025-03-14 RX ADMIN — METOCLOPRAMIDE 5 MG: 5 TABLET ORAL at 08:43

## 2025-03-14 RX ADMIN — APIXABAN 5 MG: 5 TABLET, FILM COATED ORAL at 08:43

## 2025-03-14 RX ADMIN — HEPARIN 300 UNITS: 100 SYRINGE at 12:08

## 2025-03-14 RX ADMIN — CLOPIDOGREL BISULFATE 75 MG: 75 TABLET ORAL at 08:43

## 2025-03-14 RX ADMIN — CETIRIZINE HYDROCHLORIDE 10 MG: 10 TABLET, FILM COATED ORAL at 08:43

## 2025-03-14 RX ADMIN — Medication 2 PUFF: at 06:27

## 2025-03-14 RX ADMIN — Medication 12.5 MG: at 05:59

## 2025-03-14 ASSESSMENT — PAIN SCALES - GENERAL
PAINLEVEL_OUTOF10: 0
PAINLEVEL_OUTOF10: 3
PAINLEVEL_OUTOF10: 5

## 2025-03-14 ASSESSMENT — PAIN DESCRIPTION - LOCATION
LOCATION: GENERALIZED

## 2025-03-14 ASSESSMENT — PAIN DESCRIPTION - DESCRIPTORS
DESCRIPTORS: DISCOMFORT;ACHING
DESCRIPTORS: DISCOMFORT;ACHING
DESCRIPTORS: ACHING;DISCOMFORT

## 2025-03-14 ASSESSMENT — PAIN - FUNCTIONAL ASSESSMENT: PAIN_FUNCTIONAL_ASSESSMENT: PREVENTS OR INTERFERES SOME ACTIVE ACTIVITIES AND ADLS

## 2025-03-14 ASSESSMENT — PAIN DESCRIPTION - ORIENTATION: ORIENTATION: MID

## 2025-03-14 ASSESSMENT — PAIN DESCRIPTION - FREQUENCY: FREQUENCY: CONTINUOUS

## 2025-03-14 ASSESSMENT — PAIN DESCRIPTION - PAIN TYPE
TYPE: CHRONIC PAIN
TYPE: CHRONIC PAIN

## 2025-03-14 NOTE — PLAN OF CARE
Problem: Safety - Adult  Goal: Free from fall injury  3/14/2025 1146 by Luna Joe RN  Outcome: Adequate for Discharge  3/14/2025 0138 by Amanda Dobson RN  Outcome: Progressing     Problem: ABCDS Injury Assessment  Goal: Absence of physical injury  3/14/2025 1146 by Luna Joe RN  Outcome: Adequate for Discharge  3/14/2025 0138 by Amanda Dobson RN  Outcome: Progressing     Problem: Skin/Tissue Integrity  Goal: Skin integrity remains intact  Description: 1.  Monitor for areas of redness and/or skin breakdown  2.  Assess vascular access sites hourly  3.  Every 4-6 hours minimum:  Change oxygen saturation probe site  4.  Every 4-6 hours:  If on nasal continuous positive airway pressure, respiratory therapy assess nares and determine need for appliance change or resting period  3/14/2025 1146 by Luna Joe RN  Outcome: Adequate for Discharge  3/14/2025 0138 by Amanda Dobson RN  Outcome: Progressing     Problem: Pain  Goal: Verbalizes/displays adequate comfort level or baseline comfort level  3/14/2025 1146 by Luna Joe RN  Outcome: Adequate for Discharge  3/14/2025 0138 by Amanda Dobson RN  Outcome: Progressing     Problem: Nutrition Deficit:  Goal: Optimize nutritional status  3/14/2025 1146 by Luna Joe RN  Outcome: Adequate for Discharge  3/14/2025 0138 by Amanda Dobson RN  Outcome: Progressing

## 2025-03-14 NOTE — CARE COORDINATION
03/14/25 1143   IMM Letter   IMM Letter given to Patient/Family/Significant other/Guardian/POA/by: Home with Hospice, IMM not appropriate

## 2025-03-14 NOTE — DISCHARGE SUMMARY
for Nausea     sacubitril-valsartan 24-26 MG per tablet  Commonly known as: ENTRESTO  Take 0.5 tablets by mouth 2 times daily     Stiolto Respimat 2.5-2.5 MCG/ACT Aers  Generic drug: tiotropium-olodaterol            STOP taking these medications      Encorafenib 75 MG Caps     omeprazole 20 MG delayed release capsule  Commonly known as: PRILOSEC     spironolactone 25 MG tablet  Commonly known as: ALDACTONE     traZODone 50 MG tablet  Commonly known as: DESYREL            ASK your doctor about these medications      albuterol sulfate  (90 Base) MCG/ACT inhaler  Commonly known as: Ventolin HFA  Inhale 2 puffs into the lungs every 6 hours as needed for Wheezing     montelukast 10 MG tablet  Commonly known as: SINGULAIR  Take 1 tablet by mouth daily               Where to Get Your Medications        These medications were sent to [id number] - [address] [facility] -  [phone] - F [phone]  [address] [place] [address] [address] [place] [id number]      Phone: [phone]   apixaban 5 MG Tabs tablet  cefdinir 300 MG capsule  fentaNYL 50 MCG/HR  fluconazole 150 MG tablet  HYDROcodone-acetaminophen  MG per tablet  LORazepam 2 MG/ML concentrated solution  mirtazapine 15 MG tablet  oxyCODONE HCl 10 MG immediate release tablet  oxyCODONE HCl 10 MG immediate release tablet  promethazine 25 MG tablet  sennosides-docusate sodium 8.6-50 MG tablet  sucralfate 1 GM tablet         Discharge Instructions:   Follow up with Tesfaye Gipson MD in 7 days.  Take medications as directed.  Resume activity as tolerated.    Diet: ADULT DIET; Full Liquid  ADULT ORAL NUTRITION SUPPLEMENT; Lunch; Clear Liquid Oral Supplement  ADULT ORAL NUTRITION SUPPLEMENT; Lunch, Dinner; Frozen Oral Supplement     Disposition: Patient is  home hospice and will be discharged to Home Hospice.    Time spent on discharge 42 minutes spent in assessing patient, reviewing medications, discussion with  nursing, confirming safe discharge plan and preparation of discharge summary.    Signed:  AGUSTINA Holder - CNP, 3/14/2025 11:40 AM       EMR Dragon/Transcription disclaimer:   Much of this encounter note is an electronic transcription/translation of spoken language to printed text. The electronic translation of spoken language may permit erroneous, or at times, nonsensical words or phrases to be inadvertently transcribed; although attempts have made to review the note for such errors, some may still exist.

## 2025-03-14 NOTE — PLAN OF CARE
Problem: Safety - Adult  Goal: Free from fall injury  3/14/2025 0138 by Amanda Dobson RN  Outcome: Progressing  3/13/2025 1242 by Kelechi Shultz RN  Outcome: Progressing     Problem: ABCDS Injury Assessment  Goal: Absence of physical injury  3/14/2025 0138 by Amanda Dobson RN  Outcome: Progressing  3/13/2025 1242 by Kelechi Shultz RN  Outcome: Progressing     Problem: Skin/Tissue Integrity  Goal: Skin integrity remains intact  Description: 1.  Monitor for areas of redness and/or skin breakdown  2.  Assess vascular access sites hourly  3.  Every 4-6 hours minimum:  Change oxygen saturation probe site  4.  Every 4-6 hours:  If on nasal continuous positive airway pressure, respiratory therapy assess nares and determine need for appliance change or resting period  3/14/2025 0138 by Amanda Dobson RN  Outcome: Progressing  3/13/2025 1242 by Kelechi Shultz RN  Outcome: Progressing     Problem: Pain  Goal: Verbalizes/displays adequate comfort level or baseline comfort level  3/14/2025 0138 by Amanda Dobson RN  Outcome: Progressing  3/13/2025 1242 by Kelechi Shultz RN  Outcome: Not Progressing     Problem: Nutrition Deficit:  Goal: Optimize nutritional status  Outcome: Progressing     Problem: Pain  Goal: Verbalizes/displays adequate comfort level or baseline comfort level  3/14/2025 0138 by Amanda Dobson RN  Outcome: Progressing  3/13/2025 1242 by Kelechi Shultz RN  Outcome: Not Progressing

## 2025-03-15 ENCOUNTER — APPOINTMENT (OUTPATIENT)
Dept: GENERAL RADIOLOGY | Age: 75
DRG: 193 | End: 2025-03-15
Payer: MEDICARE

## 2025-03-15 ENCOUNTER — HOSPITAL ENCOUNTER (INPATIENT)
Age: 75
LOS: 2 days | Discharge: HOSPICE | DRG: 193 | End: 2025-03-17
Attending: STUDENT IN AN ORGANIZED HEALTH CARE EDUCATION/TRAINING PROGRAM | Admitting: STUDENT IN AN ORGANIZED HEALTH CARE EDUCATION/TRAINING PROGRAM
Payer: MEDICARE

## 2025-03-15 DIAGNOSIS — R06.03 ACUTE RESPIRATORY DISTRESS: Primary | ICD-10-CM

## 2025-03-15 DIAGNOSIS — J18.9 NOSOCOMIAL PNEUMONIA: ICD-10-CM

## 2025-03-15 DIAGNOSIS — R09.02 HYPOXEMIA: ICD-10-CM
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
## 2025-03-15 DIAGNOSIS — J90 PLEURAL EFFUSION: ICD-10-CM

## 2025-03-15 PROBLEM — E87.70 FLUID OVERLOAD: Status: ACTIVE | Noted: 2025-03-15

## 2025-03-15 LAB
ALBUMIN SERPL-MCNC: 1.9 G/DL (ref 3.5–5.2)
ALP SERPL-CCNC: 299 U/L (ref 40–129)
ALT SERPL-CCNC: <5 U/L (ref 10–50)
ANION GAP SERPL CALCULATED.3IONS-SCNC: 13 MMOL/L (ref 8–16)
APTT PPP: 39.5 SEC (ref 26–36.2)
AST SERPL-CCNC: 27 U/L (ref 10–50)
BASE EXCESS ARTERIAL: -2 MMOL/L (ref -2–2)
BASOPHILS # BLD: 0 K/UL (ref 0–0.2)
BASOPHILS NFR BLD: 0.1 % (ref 0–1)
BILIRUB SERPL-MCNC: 0.3 MG/DL (ref 0.2–1.2)
BNP BLD-MCNC: 2696 PG/ML (ref 0–449)
BUN SERPL-MCNC: 43 MG/DL (ref 8–23)
CALCIUM SERPL-MCNC: 8 MG/DL (ref 8.8–10.2)
CARBOXYHEMOGLOBIN ARTERIAL: 1.6 % (ref 0–5)
CHLORIDE SERPL-SCNC: 103 MMOL/L (ref 98–107)
CO2 SERPL-SCNC: 19 MMOL/L (ref 22–29)
CREAT SERPL-MCNC: 1.7 MG/DL (ref 0.7–1.2)
EOSINOPHIL # BLD: 0.1 K/UL (ref 0–0.6)
EOSINOPHIL NFR BLD: 0.8 % (ref 0–5)
ERYTHROCYTE [DISTWIDTH] IN BLOOD BY AUTOMATED COUNT: 18.1 % (ref 11.5–14.5)
FERRITIN SERPL-MCNC: 3622 NG/ML (ref 30–400)
GLUCOSE SERPL-MCNC: 96 MG/DL (ref 70–99)
HCO3 ARTERIAL: 20.3 MMOL/L (ref 22–26)
HCT VFR BLD AUTO: 32.9 % (ref 42–52)
HEMOGLOBIN, ART, EXTENDED: 9.9 G/DL (ref 14–18)
HGB BLD-MCNC: 10 G/DL (ref 14–18)
IMM GRANULOCYTES # BLD: 0.1 K/UL
INR PPP: 2.2 (ref 0.88–1.18)
IRON SATN MFR SERPL: 24 % (ref 20–50)
IRON SERPL-MCNC: 20 UG/DL (ref 59–158)
LACTATE BLDV-SCNC: 1.7 MMOL/L (ref 0.5–1.9)
LYMPHOCYTES # BLD: 1.1 K/UL (ref 1.1–4.5)
LYMPHOCYTES NFR BLD: 6.8 % (ref 20–40)
MCH RBC QN AUTO: 28.4 PG (ref 27–31)
MCHC RBC AUTO-ENTMCNC: 30.4 G/DL (ref 33–37)
MCV RBC AUTO: 93.5 FL (ref 80–94)
METHEMOGLOBIN ARTERIAL: 1.4 %
MONOCYTES # BLD: 0.6 K/UL (ref 0–0.9)
MONOCYTES NFR BLD: 3.8 % (ref 0–10)
NEUTROPHILS # BLD: 14.7 K/UL (ref 1.5–7.5)
NEUTS SEG NFR BLD: 88 % (ref 50–65)
O2 CONTENT ARTERIAL: 12.2 ML/DL
O2 DELIVERY DEVICE: ABNORMAL
O2 SAT, ARTERIAL: 87.5 %
O2 THERAPY: ABNORMAL
OXYGEN FLOW: 4
PCO2 ARTERIAL: 26 MMHG (ref 35–45)
PH ARTERIAL: 7.5 (ref 7.35–7.45)
PLATELET # BLD AUTO: 230 K/UL (ref 130–400)
PMV BLD AUTO: 11.8 FL (ref 9.4–12.4)
PO2 ARTERIAL: 54 MMHG (ref 80–100)
POTASSIUM BLD-SCNC: 4.2 MMOL/L
POTASSIUM SERPL-SCNC: 4.4 MMOL/L (ref 3.5–5)
PROT SERPL-MCNC: 5.6 G/DL (ref 6.4–8.3)
PROTHROMBIN TIME: 23.9 SEC (ref 12–14.6)
RBC # BLD AUTO: 3.52 M/UL (ref 4.7–6.1)
SAMPLE SOURCE: ABNORMAL
SODIUM SERPL-SCNC: 135 MMOL/L (ref 136–145)
T4 FREE SERPL-MCNC: 1.38 NG/DL (ref 0.93–1.7)
TIBC SERPL-MCNC: 84 UG/DL (ref 250–400)
TSH SERPL DL<=0.005 MIU/L-ACNC: 7.53 UIU/ML (ref 0.27–4.2)
WBC # BLD AUTO: 16.7 K/UL (ref 4.8–10.8)

## 2025-03-15 PROCEDURE — 6360000002 HC RX W HCPCS: Performed by: PHYSICIAN ASSISTANT

## 2025-03-15 PROCEDURE — 85025 COMPLETE CBC W/AUTO DIFF WBC: CPT

## 2025-03-15 PROCEDURE — 99285 EMERGENCY DEPT VISIT HI MDM: CPT

## 2025-03-15 PROCEDURE — 6360000002 HC RX W HCPCS: Performed by: STUDENT IN AN ORGANIZED HEALTH CARE EDUCATION/TRAINING PROGRAM

## 2025-03-15 PROCEDURE — 96367 TX/PROPH/DG ADDL SEQ IV INF: CPT

## 2025-03-15 PROCEDURE — 2580000003 HC RX 258: Performed by: PHYSICIAN ASSISTANT

## 2025-03-15 PROCEDURE — 96365 THER/PROPH/DIAG IV INF INIT: CPT

## 2025-03-15 PROCEDURE — 2500000003 HC RX 250 WO HCPCS: Performed by: STUDENT IN AN ORGANIZED HEALTH CARE EDUCATION/TRAINING PROGRAM

## 2025-03-15 PROCEDURE — 71045 X-RAY EXAM CHEST 1 VIEW: CPT

## 2025-03-15 PROCEDURE — 36600 WITHDRAWAL OF ARTERIAL BLOOD: CPT

## 2025-03-15 PROCEDURE — 6370000000 HC RX 637 (ALT 250 FOR IP): Performed by: STUDENT IN AN ORGANIZED HEALTH CARE EDUCATION/TRAINING PROGRAM

## 2025-03-15 PROCEDURE — 82803 BLOOD GASES ANY COMBINATION: CPT

## 2025-03-15 PROCEDURE — 2700000000 HC OXYGEN THERAPY PER DAY

## 2025-03-15 PROCEDURE — 36415 COLL VENOUS BLD VENIPUNCTURE: CPT

## 2025-03-15 PROCEDURE — 84439 ASSAY OF FREE THYROXINE: CPT

## 2025-03-15 PROCEDURE — 82728 ASSAY OF FERRITIN: CPT

## 2025-03-15 PROCEDURE — 1200000000 HC SEMI PRIVATE

## 2025-03-15 PROCEDURE — 83540 ASSAY OF IRON: CPT

## 2025-03-15 PROCEDURE — 83880 ASSAY OF NATRIURETIC PEPTIDE: CPT

## 2025-03-15 PROCEDURE — 83550 IRON BINDING TEST: CPT

## 2025-03-15 PROCEDURE — 85730 THROMBOPLASTIN TIME PARTIAL: CPT

## 2025-03-15 PROCEDURE — 80053 COMPREHEN METABOLIC PANEL: CPT

## 2025-03-15 PROCEDURE — 83605 ASSAY OF LACTIC ACID: CPT

## 2025-03-15 PROCEDURE — 96375 TX/PRO/DX INJ NEW DRUG ADDON: CPT

## 2025-03-15 PROCEDURE — 87040 BLOOD CULTURE FOR BACTERIA: CPT

## 2025-03-15 PROCEDURE — 85610 PROTHROMBIN TIME: CPT

## 2025-03-15 PROCEDURE — 84443 ASSAY THYROID STIM HORMONE: CPT

## 2025-03-15 RX ORDER — HYDROMORPHONE HYDROCHLORIDE 1 MG/ML
0.5 INJECTION, SOLUTION INTRAMUSCULAR; INTRAVENOUS; SUBCUTANEOUS ONCE
Status: COMPLETED | OUTPATIENT
Start: 2025-03-15 | End: 2025-03-15

## 2025-03-15 RX ORDER — MAGNESIUM SULFATE IN WATER 40 MG/ML
2000 INJECTION, SOLUTION INTRAVENOUS PRN
Status: DISCONTINUED | OUTPATIENT
Start: 2025-03-15 | End: 2025-03-17 | Stop reason: HOSPADM

## 2025-03-15 RX ORDER — CLOPIDOGREL BISULFATE 75 MG/1
75 TABLET ORAL DAILY
Status: DISCONTINUED | OUTPATIENT
Start: 2025-03-16 | End: 2025-03-17 | Stop reason: HOSPADM

## 2025-03-15 RX ORDER — FLUCONAZOLE 100 MG/1
100 TABLET ORAL DAILY
Status: DISCONTINUED | OUTPATIENT
Start: 2025-03-15 | End: 2025-03-17 | Stop reason: HOSPADM

## 2025-03-15 RX ORDER — POTASSIUM CHLORIDE 7.45 MG/ML
10 INJECTION INTRAVENOUS PRN
Status: DISCONTINUED | OUTPATIENT
Start: 2025-03-15 | End: 2025-03-17 | Stop reason: HOSPADM

## 2025-03-15 RX ORDER — BUMETANIDE 0.25 MG/ML
2 INJECTION, SOLUTION INTRAMUSCULAR; INTRAVENOUS DAILY
Status: DISCONTINUED | OUTPATIENT
Start: 2025-03-15 | End: 2025-03-17 | Stop reason: HOSPADM

## 2025-03-15 RX ORDER — SENNA AND DOCUSATE SODIUM 50; 8.6 MG/1; MG/1
1 TABLET, FILM COATED ORAL 2 TIMES DAILY
Status: DISCONTINUED | OUTPATIENT
Start: 2025-03-15 | End: 2025-03-17 | Stop reason: HOSPADM

## 2025-03-15 RX ORDER — SODIUM CHLORIDE 0.9 % (FLUSH) 0.9 %
5-40 SYRINGE (ML) INJECTION PRN
Status: DISCONTINUED | OUTPATIENT
Start: 2025-03-15 | End: 2025-03-17 | Stop reason: HOSPADM

## 2025-03-15 RX ORDER — BUMETANIDE 0.25 MG/ML
0.5 INJECTION, SOLUTION INTRAMUSCULAR; INTRAVENOUS ONCE
Status: DISCONTINUED | OUTPATIENT
Start: 2025-03-15 | End: 2025-03-17

## 2025-03-15 RX ORDER — METOPROLOL SUCCINATE 25 MG/1
25 TABLET, EXTENDED RELEASE ORAL DAILY
Status: DISCONTINUED | OUTPATIENT
Start: 2025-03-16 | End: 2025-03-17 | Stop reason: HOSPADM

## 2025-03-15 RX ORDER — MIRTAZAPINE 15 MG/1
15 TABLET, FILM COATED ORAL NIGHTLY
Status: DISCONTINUED | OUTPATIENT
Start: 2025-03-15 | End: 2025-03-17 | Stop reason: HOSPADM

## 2025-03-15 RX ORDER — SIMETHICONE 80 MG
125 TABLET,CHEWABLE ORAL AS NEEDED
Status: DISCONTINUED | OUTPATIENT
Start: 2025-03-15 | End: 2025-03-17 | Stop reason: HOSPADM

## 2025-03-15 RX ORDER — 0.9 % SODIUM CHLORIDE 0.9 %
250 INTRAVENOUS SOLUTION INTRAVENOUS ONCE
Status: COMPLETED | OUTPATIENT
Start: 2025-03-15 | End: 2025-03-15

## 2025-03-15 RX ORDER — ATORVASTATIN CALCIUM 80 MG/1
80 TABLET, FILM COATED ORAL DAILY
Status: DISCONTINUED | OUTPATIENT
Start: 2025-03-16 | End: 2025-03-17 | Stop reason: HOSPADM

## 2025-03-15 RX ORDER — SODIUM CHLORIDE 9 MG/ML
INJECTION, SOLUTION INTRAVENOUS PRN
Status: DISCONTINUED | OUTPATIENT
Start: 2025-03-15 | End: 2025-03-17 | Stop reason: HOSPADM

## 2025-03-15 RX ORDER — LIDOCAINE 4 G/G
2 PATCH TOPICAL EVERY 12 HOURS PRN
Status: DISCONTINUED | OUTPATIENT
Start: 2025-03-15 | End: 2025-03-17 | Stop reason: HOSPADM

## 2025-03-15 RX ORDER — DRONABINOL 2.5 MG/1
2.5 CAPSULE ORAL 2 TIMES DAILY
Status: DISCONTINUED | OUTPATIENT
Start: 2025-03-15 | End: 2025-03-17 | Stop reason: HOSPADM

## 2025-03-15 RX ORDER — FENTANYL 50 UG/1
1 PATCH TRANSDERMAL
Status: DISCONTINUED | OUTPATIENT
Start: 2025-03-17 | End: 2025-03-16

## 2025-03-15 RX ORDER — ASPIRIN 81 MG/1
81 TABLET ORAL DAILY
Status: DISCONTINUED | OUTPATIENT
Start: 2025-03-16 | End: 2025-03-17 | Stop reason: HOSPADM

## 2025-03-15 RX ORDER — POLYETHYLENE GLYCOL 3350 17 G/17G
17 POWDER, FOR SOLUTION ORAL DAILY PRN
Status: DISCONTINUED | OUTPATIENT
Start: 2025-03-15 | End: 2025-03-17 | Stop reason: HOSPADM

## 2025-03-15 RX ORDER — ONDANSETRON 2 MG/ML
4 INJECTION INTRAMUSCULAR; INTRAVENOUS EVERY 6 HOURS PRN
Status: DISCONTINUED | OUTPATIENT
Start: 2025-03-15 | End: 2025-03-17 | Stop reason: HOSPADM

## 2025-03-15 RX ORDER — LORAZEPAM 0.5 MG/1
0.5 TABLET ORAL EVERY 8 HOURS PRN
Status: DISCONTINUED | OUTPATIENT
Start: 2025-03-15 | End: 2025-03-17 | Stop reason: HOSPADM

## 2025-03-15 RX ORDER — SODIUM CHLORIDE 0.9 % (FLUSH) 0.9 %
5-40 SYRINGE (ML) INJECTION EVERY 12 HOURS SCHEDULED
Status: DISCONTINUED | OUTPATIENT
Start: 2025-03-15 | End: 2025-03-17 | Stop reason: HOSPADM

## 2025-03-15 RX ORDER — ACETAMINOPHEN 650 MG/1
650 SUPPOSITORY RECTAL EVERY 6 HOURS PRN
Status: DISCONTINUED | OUTPATIENT
Start: 2025-03-15 | End: 2025-03-17 | Stop reason: HOSPADM

## 2025-03-15 RX ORDER — POTASSIUM CHLORIDE 1500 MG/1
40 TABLET, EXTENDED RELEASE ORAL PRN
Status: DISCONTINUED | OUTPATIENT
Start: 2025-03-15 | End: 2025-03-17 | Stop reason: HOSPADM

## 2025-03-15 RX ORDER — FLUTICASONE PROPIONATE 50 MCG
2 SPRAY, SUSPENSION (ML) NASAL DAILY PRN
Status: DISCONTINUED | OUTPATIENT
Start: 2025-03-15 | End: 2025-03-17 | Stop reason: HOSPADM

## 2025-03-15 RX ORDER — METOCLOPRAMIDE 10 MG/1
10 TABLET ORAL
Status: DISCONTINUED | OUTPATIENT
Start: 2025-03-16 | End: 2025-03-16

## 2025-03-15 RX ORDER — ONDANSETRON 4 MG/1
4 TABLET, ORALLY DISINTEGRATING ORAL EVERY 8 HOURS PRN
Status: DISCONTINUED | OUTPATIENT
Start: 2025-03-15 | End: 2025-03-17 | Stop reason: HOSPADM

## 2025-03-15 RX ORDER — PROMETHAZINE HYDROCHLORIDE 25 MG/1
25 TABLET ORAL 4 TIMES DAILY PRN
Status: DISCONTINUED | OUTPATIENT
Start: 2025-03-15 | End: 2025-03-17 | Stop reason: HOSPADM

## 2025-03-15 RX ORDER — CALCIUM CARBONATE 500(1250)
500 TABLET ORAL DAILY
Status: DISCONTINUED | OUTPATIENT
Start: 2025-03-16 | End: 2025-03-17 | Stop reason: HOSPADM

## 2025-03-15 RX ORDER — SUCRALFATE 1 G/1
1 TABLET ORAL
Status: DISCONTINUED | OUTPATIENT
Start: 2025-03-15 | End: 2025-03-17 | Stop reason: HOSPADM

## 2025-03-15 RX ORDER — ACETAMINOPHEN 325 MG/1
650 TABLET ORAL EVERY 6 HOURS PRN
Status: DISCONTINUED | OUTPATIENT
Start: 2025-03-15 | End: 2025-03-17 | Stop reason: HOSPADM

## 2025-03-15 RX ORDER — MEGESTROL ACETATE 40 MG/ML
200 SUSPENSION ORAL 2 TIMES DAILY
Status: DISCONTINUED | OUTPATIENT
Start: 2025-03-15 | End: 2025-03-17 | Stop reason: HOSPADM

## 2025-03-15 RX ORDER — HYDROCODONE BITARTRATE AND ACETAMINOPHEN 10; 325 MG/1; MG/1
1 TABLET ORAL EVERY 6 HOURS PRN
Status: DISCONTINUED | OUTPATIENT
Start: 2025-03-15 | End: 2025-03-17 | Stop reason: HOSPADM

## 2025-03-15 RX ADMIN — CEFEPIME 2000 MG: 2 INJECTION, POWDER, FOR SOLUTION INTRAVENOUS at 20:12

## 2025-03-15 RX ADMIN — DRONABINOL 2.5 MG: 2.5 CAPSULE ORAL at 23:28

## 2025-03-15 RX ADMIN — HYDROMORPHONE HYDROCHLORIDE 0.5 MG: 1 INJECTION, SOLUTION INTRAMUSCULAR; INTRAVENOUS; SUBCUTANEOUS at 19:21

## 2025-03-15 RX ADMIN — HYDROCODONE BITARTRATE AND ACETAMINOPHEN 1 TABLET: 10; 325 TABLET ORAL at 22:20

## 2025-03-15 RX ADMIN — SUCRALFATE 1 G: 1 TABLET ORAL at 23:28

## 2025-03-15 RX ADMIN — LORAZEPAM 0.5 MG: 0.5 TABLET ORAL at 23:28

## 2025-03-15 RX ADMIN — VANCOMYCIN HYDROCHLORIDE 1500 MG: 500 INJECTION, POWDER, LYOPHILIZED, FOR SOLUTION INTRAVENOUS at 20:48

## 2025-03-15 RX ADMIN — SENNOSIDES AND DOCUSATE SODIUM 1 TABLET: 50; 8.6 TABLET ORAL at 23:28

## 2025-03-15 RX ADMIN — SACUBITRIL AND VALSARTAN 0.5 TABLET: 24; 26 TABLET, FILM COATED ORAL at 23:28

## 2025-03-15 RX ADMIN — BUMETANIDE 2 MG: 0.25 INJECTION INTRAMUSCULAR; INTRAVENOUS at 23:28

## 2025-03-15 RX ADMIN — MIRTAZAPINE 15 MG: 15 TABLET, FILM COATED ORAL at 23:28

## 2025-03-15 RX ADMIN — SODIUM CHLORIDE 250 ML: 9 INJECTION, SOLUTION INTRAVENOUS at 20:11

## 2025-03-15 RX ADMIN — APIXABAN 5 MG: 5 TABLET, FILM COATED ORAL at 23:28

## 2025-03-15 RX ADMIN — MEGESTROL ACETATE 200 MG: 40 SUSPENSION ORAL at 23:28

## 2025-03-15 RX ADMIN — SODIUM CHLORIDE, PRESERVATIVE FREE 10 ML: 5 INJECTION INTRAVENOUS at 23:28

## 2025-03-15 ASSESSMENT — PAIN SCALES - GENERAL: PAINLEVEL_OUTOF10: 3

## 2025-03-15 ASSESSMENT — PAIN DESCRIPTION - LOCATION: LOCATION: GENERALIZED

## 2025-03-15 NOTE — ED PROVIDER NOTES
[facility] 3 ANGÉLICA/VAS/MED  eMERGENCYdEPARTMENT eNCOUnter      Pt Name: Nick Jain  MRN: [id number]  Birthdate 1950  Date of evaluation: 3/15/2025  Provider:EDISON Burnham    CHIEF COMPLAINT       Chief Complaint   Patient presents with    Swelling     Generalized, discharged from here yesterday         HISTORY OF PRESENT ILLNESS  (Location/Symptom, Timing/Onset, Context/Setting, Quality, Duration, Modifying Factors, Severity.)   Nick Jain is a 74 y.o. male who presents to the emergency department with complaints of generalized swelling. Discharged yesterday. He has no CHF listed. Has CAD hx. On eliquis. He has colon CA. Denies abdominal swelling or pain. This is a fluid concern with low oxygen SOA issue. Has COPD. Normally 1 L per spouse ABG done shows oxygen 87 has been bumped to 4L here in ER acutely now at 91 percent. Has chest congestion with productive sputum cough. Was admitted 3/4/25 for Left lower lobe pneumonia. Hx of sepsis.     HPI    Nursing Notes were reviewed and I agree.    REVIEW OF SYSTEMS    (2-9 systems for level 4, 10 or more for level 5)     Review of Systems     Except as noted above the remainder of the review of systems was reviewed and negative.       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 Max Daily Amount: 60 mg  Qty: 18 tablet, Refills: 0    Comments: Reduce doses taken as pain becomes manageable  Associated Diagnoses: Neoplasm related pain; Hospice care      cefdinir (OMNICEF) 300 MG capsule Take 1 capsule by mouth 2 times daily for 3 days  Qty: 6 capsule, Refills: 0      sennosides-docusate sodium (SENOKOT-S) 8.6-50 MG tablet Take 1 tablet by mouth in the morning and at bedtime  Qty: 60 tablet, Refills: 1      LORazepam (ATIVAN) 0.5 MG tablet Take 1 tablet by mouth every 8 hours as needed for Anxiety for up to 3 days. Max Daily Amount: 1.5 mg  Qty: 9 tablet, Refills: 0    Associated Diagnoses: Neoplasm related pain; Hospice care; Palliative care patient      fentaNYL (DURAGESIC) 50 MCG/HR Place 1 patch onto the skin every 72 hours for 3 days. Max Daily Amount: 1 patch  Qty: 1 patch, Refills: 0    Comments: Reduce doses taken as pain becomes manageable  Associated Diagnoses: Neoplasm related pain; Hospice care      docusate sodium (COLACE) 100 MG capsule Take 1 capsule by mouth daily      montelukast (SINGULAIR) 10 MG tablet Take 1 tablet by mouth daily  Qty: 30 tablet, Refills: 0      Simethicone (GAS-X EXTRA STRENGTH) 125 MG CAPS Take 1 capsule by mouth as needed      metoprolol succinate (TOPROL XL) 25 MG extended release tablet Take 1 tablet by mouth daily  Qty: 90 tablet, Refills: 1      metoclopramide (REGLAN) 10 MG tablet Take 1 tablet by mouth 3 times daily (with meals)  Qty: 120 tablet, Refills: 3    Associated Diagnoses: Nausea      ondansetron (ZOFRAN-ODT) 4 MG disintegrating tablet Take 1 tablet by mouth 3 times daily as needed for Nausea or Vomiting  Qty: 21 tablet, Refills: 0      calcium carbonate (CALCIUM 600) 600 MG TABS tablet Take 1 tablet by mouth daily  Qty: 30 tablet, Refills: 3      megestrol (MEGACE) 40 MG/ML suspension Take 5 mLs by mouth 2 times daily  Qty: 480 mL, Refills: 3      clopidogrel (PLAVIX) 75 MG tablet Take 1 tablet by mouth

## 2025-03-16 LAB
ALBUMIN SERPL-MCNC: 2 G/DL (ref 3.5–5.2)
ALP SERPL-CCNC: 296 U/L (ref 40–129)
ALT SERPL-CCNC: <5 U/L (ref 10–50)
ANION GAP SERPL CALCULATED.3IONS-SCNC: 12 MMOL/L (ref 8–16)
ANION GAP SERPL CALCULATED.3IONS-SCNC: 17 MMOL/L (ref 8–16)
AST SERPL-CCNC: 28 U/L (ref 10–50)
BASOPHILS # BLD: 0 K/UL (ref 0–0.2)
BASOPHILS NFR BLD: 0.2 % (ref 0–1)
BILIRUB DIRECT SERPL-MCNC: 0.2 MG/DL (ref 0–0.3)
BILIRUB INDIRECT SERPL-MCNC: 0.1 MG/DL (ref 0–1)
BILIRUB SERPL-MCNC: 0.3 MG/DL (ref 0.2–1.2)
BNP BLD-MCNC: 2754 PG/ML (ref 0–449)
BUN SERPL-MCNC: 43 MG/DL (ref 8–23)
BUN SERPL-MCNC: 49 MG/DL (ref 8–23)
CALCIUM SERPL-MCNC: 7.5 MG/DL (ref 8.8–10.2)
CALCIUM SERPL-MCNC: 7.8 MG/DL (ref 8.8–10.2)
CHLORIDE SERPL-SCNC: 104 MMOL/L (ref 98–107)
CHLORIDE SERPL-SCNC: 105 MMOL/L (ref 98–107)
CHOLEST SERPL-MCNC: 100 MG/DL (ref 0–199)
CO2 SERPL-SCNC: 15 MMOL/L (ref 22–29)
CO2 SERPL-SCNC: 19 MMOL/L (ref 22–29)
CREAT SERPL-MCNC: 1.8 MG/DL (ref 0.7–1.2)
CREAT SERPL-MCNC: 2 MG/DL (ref 0.7–1.2)
EOSINOPHIL # BLD: 0.1 K/UL (ref 0–0.6)
EOSINOPHIL NFR BLD: 0.7 % (ref 0–5)
ERYTHROCYTE [DISTWIDTH] IN BLOOD BY AUTOMATED COUNT: 17.8 % (ref 11.5–14.5)
GLUCOSE SERPL-MCNC: 101 MG/DL (ref 70–99)
GLUCOSE SERPL-MCNC: 92 MG/DL (ref 70–99)
HCT VFR BLD AUTO: 29.9 % (ref 42–52)
HDLC SERPL-MCNC: 24 MG/DL (ref 40–60)
HGB BLD-MCNC: 9.2 G/DL (ref 14–18)
IMM GRANULOCYTES # BLD: 0.1 K/UL
LDLC SERPL CALC-MCNC: 46 MG/DL
LYMPHOCYTES # BLD: 1.1 K/UL (ref 1.1–4.5)
LYMPHOCYTES NFR BLD: 6.7 % (ref 20–40)
MAGNESIUM SERPL-MCNC: 2.4 MG/DL (ref 1.6–2.4)
MAGNESIUM SERPL-MCNC: 2.5 MG/DL (ref 1.6–2.4)
MCH RBC QN AUTO: 28.7 PG (ref 27–31)
MCHC RBC AUTO-ENTMCNC: 30.8 G/DL (ref 33–37)
MCV RBC AUTO: 93.1 FL (ref 80–94)
MONOCYTES # BLD: 0.6 K/UL (ref 0–0.9)
MONOCYTES NFR BLD: 3.7 % (ref 0–10)
MRSA DNA SPEC QL NAA+PROBE: NOT DETECTED
NEUTROPHILS # BLD: 14.1 K/UL (ref 1.5–7.5)
NEUTS SEG NFR BLD: 88.1 % (ref 50–65)
PLATELET # BLD AUTO: 245 K/UL (ref 130–400)
PMV BLD AUTO: 11.4 FL (ref 9.4–12.4)
POTASSIUM SERPL-SCNC: 4.3 MMOL/L (ref 3.5–5.1)
POTASSIUM SERPL-SCNC: 4.4 MMOL/L (ref 3.5–5.1)
PROT SERPL-MCNC: 5.6 G/DL (ref 6.4–8.3)
RBC # BLD AUTO: 3.21 M/UL (ref 4.7–6.1)
SODIUM SERPL-SCNC: 136 MMOL/L (ref 136–145)
SODIUM SERPL-SCNC: 136 MMOL/L (ref 136–145)
TRIGL SERPL-MCNC: 148 MG/DL (ref 0–149)
WBC # BLD AUTO: 16 K/UL (ref 4.8–10.8)

## 2025-03-16 PROCEDURE — 83880 ASSAY OF NATRIURETIC PEPTIDE: CPT

## 2025-03-16 PROCEDURE — 85025 COMPLETE CBC W/AUTO DIFF WBC: CPT

## 2025-03-16 PROCEDURE — 1200000000 HC SEMI PRIVATE

## 2025-03-16 PROCEDURE — 2700000000 HC OXYGEN THERAPY PER DAY

## 2025-03-16 PROCEDURE — 80076 HEPATIC FUNCTION PANEL: CPT

## 2025-03-16 PROCEDURE — 51798 US URINE CAPACITY MEASURE: CPT

## 2025-03-16 PROCEDURE — 80061 LIPID PANEL: CPT

## 2025-03-16 PROCEDURE — 6360000002 HC RX W HCPCS: Performed by: PHYSICIAN ASSISTANT

## 2025-03-16 PROCEDURE — 6370000000 HC RX 637 (ALT 250 FOR IP)

## 2025-03-16 PROCEDURE — 51702 INSERT TEMP BLADDER CATH: CPT

## 2025-03-16 PROCEDURE — 36415 COLL VENOUS BLD VENIPUNCTURE: CPT

## 2025-03-16 PROCEDURE — 2580000003 HC RX 258: Performed by: PHYSICIAN ASSISTANT

## 2025-03-16 PROCEDURE — 94640 AIRWAY INHALATION TREATMENT: CPT

## 2025-03-16 PROCEDURE — 99223 1ST HOSP IP/OBS HIGH 75: CPT | Performed by: INTERNAL MEDICINE

## 2025-03-16 PROCEDURE — 83735 ASSAY OF MAGNESIUM: CPT

## 2025-03-16 PROCEDURE — 2500000003 HC RX 250 WO HCPCS: Performed by: STUDENT IN AN ORGANIZED HEALTH CARE EDUCATION/TRAINING PROGRAM

## 2025-03-16 PROCEDURE — 6360000002 HC RX W HCPCS

## 2025-03-16 PROCEDURE — 87641 MR-STAPH DNA AMP PROBE: CPT

## 2025-03-16 PROCEDURE — 6370000000 HC RX 637 (ALT 250 FOR IP): Performed by: STUDENT IN AN ORGANIZED HEALTH CARE EDUCATION/TRAINING PROGRAM

## 2025-03-16 PROCEDURE — 94760 N-INVAS EAR/PLS OXIMETRY 1: CPT

## 2025-03-16 PROCEDURE — 80048 BASIC METABOLIC PNL TOTAL CA: CPT

## 2025-03-16 PROCEDURE — 36591 DRAW BLOOD OFF VENOUS DEVICE: CPT

## 2025-03-16 PROCEDURE — 6360000002 HC RX W HCPCS: Performed by: STUDENT IN AN ORGANIZED HEALTH CARE EDUCATION/TRAINING PROGRAM

## 2025-03-16 RX ORDER — FENTANYL 75 UG/1
1 PATCH TRANSDERMAL
Refills: 0 | Status: DISCONTINUED | OUTPATIENT
Start: 2025-03-16 | End: 2025-03-17 | Stop reason: HOSPADM

## 2025-03-16 RX ORDER — ARFORMOTEROL TARTRATE 15 UG/2ML
15 SOLUTION RESPIRATORY (INHALATION)
Status: DISCONTINUED | OUTPATIENT
Start: 2025-03-16 | End: 2025-03-17 | Stop reason: HOSPADM

## 2025-03-16 RX ORDER — BUDESONIDE 0.25 MG/2ML
0.25 INHALANT ORAL
Status: DISCONTINUED | OUTPATIENT
Start: 2025-03-16 | End: 2025-03-17 | Stop reason: HOSPADM

## 2025-03-16 RX ORDER — METOCLOPRAMIDE 10 MG/1
5 TABLET ORAL
Status: DISCONTINUED | OUTPATIENT
Start: 2025-03-16 | End: 2025-03-17 | Stop reason: HOSPADM

## 2025-03-16 RX ADMIN — SODIUM CHLORIDE 1000 MG: 9 INJECTION, SOLUTION INTRAVENOUS at 20:18

## 2025-03-16 RX ADMIN — LORAZEPAM 0.5 MG: 0.5 TABLET ORAL at 23:05

## 2025-03-16 RX ADMIN — DRONABINOL 2.5 MG: 2.5 CAPSULE ORAL at 20:19

## 2025-03-16 RX ADMIN — SUCRALFATE 1 G: 1 TABLET ORAL at 09:20

## 2025-03-16 RX ADMIN — FLUCONAZOLE 100 MG: 100 TABLET ORAL at 20:19

## 2025-03-16 RX ADMIN — HYDROCODONE BITARTRATE AND ACETAMINOPHEN 1 TABLET: 10; 325 TABLET ORAL at 12:07

## 2025-03-16 RX ADMIN — CALCIUM 500 MG: 500 TABLET ORAL at 09:19

## 2025-03-16 RX ADMIN — ACETAMINOPHEN 650 MG: 325 TABLET ORAL at 14:49

## 2025-03-16 RX ADMIN — IPRATROPIUM BROMIDE 0.5 MG: 0.5 SOLUTION RESPIRATORY (INHALATION) at 15:14

## 2025-03-16 RX ADMIN — SENNOSIDES AND DOCUSATE SODIUM 1 TABLET: 50; 8.6 TABLET ORAL at 20:19

## 2025-03-16 RX ADMIN — MIRTAZAPINE 15 MG: 15 TABLET, FILM COATED ORAL at 20:19

## 2025-03-16 RX ADMIN — ATORVASTATIN CALCIUM 80 MG: 80 TABLET, FILM COATED ORAL at 09:20

## 2025-03-16 RX ADMIN — CLOPIDOGREL BISULFATE 75 MG: 75 TABLET ORAL at 09:19

## 2025-03-16 RX ADMIN — SUCRALFATE 1 G: 1 TABLET ORAL at 16:54

## 2025-03-16 RX ADMIN — ASPIRIN 81 MG: 81 TABLET, COATED ORAL at 09:19

## 2025-03-16 RX ADMIN — METOCLOPRAMIDE 10 MG: 10 TABLET ORAL at 09:20

## 2025-03-16 RX ADMIN — HYDROCODONE BITARTRATE AND ACETAMINOPHEN 1 TABLET: 10; 325 TABLET ORAL at 21:00

## 2025-03-16 RX ADMIN — APIXABAN 5 MG: 5 TABLET, FILM COATED ORAL at 09:19

## 2025-03-16 RX ADMIN — ARFORMOTEROL TARTRATE 15 MCG: 15 SOLUTION RESPIRATORY (INHALATION) at 15:14

## 2025-03-16 RX ADMIN — SUCRALFATE 1 G: 1 TABLET ORAL at 20:19

## 2025-03-16 RX ADMIN — METOCLOPRAMIDE 5 MG: 10 TABLET ORAL at 16:54

## 2025-03-16 RX ADMIN — TIOTROPIUM BROMIDE AND OLODATEROL 2 PUFF: 3.124; 2.736 SPRAY, METERED RESPIRATORY (INHALATION) at 07:31

## 2025-03-16 RX ADMIN — SENNOSIDES AND DOCUSATE SODIUM 1 TABLET: 50; 8.6 TABLET ORAL at 09:20

## 2025-03-16 RX ADMIN — MEGESTROL ACETATE 200 MG: 40 SUSPENSION ORAL at 20:17

## 2025-03-16 RX ADMIN — ARFORMOTEROL TARTRATE 15 MCG: 15 SOLUTION RESPIRATORY (INHALATION) at 18:55

## 2025-03-16 RX ADMIN — CEFEPIME 2000 MG: 2 INJECTION, POWDER, FOR SOLUTION INTRAVENOUS at 08:27

## 2025-03-16 RX ADMIN — SUCRALFATE 1 G: 1 TABLET ORAL at 04:55

## 2025-03-16 RX ADMIN — ONDANSETRON 4 MG: 2 INJECTION INTRAMUSCULAR; INTRAVENOUS at 23:01

## 2025-03-16 RX ADMIN — APIXABAN 5 MG: 5 TABLET, FILM COATED ORAL at 20:19

## 2025-03-16 RX ADMIN — METOCLOPRAMIDE 10 MG: 10 TABLET ORAL at 12:07

## 2025-03-16 RX ADMIN — BUDESONIDE 250 MCG: 0.25 SUSPENSION RESPIRATORY (INHALATION) at 15:14

## 2025-03-16 RX ADMIN — MEGESTROL ACETATE 200 MG: 40 SUSPENSION ORAL at 09:20

## 2025-03-16 RX ADMIN — SODIUM CHLORIDE, PRESERVATIVE FREE 10 ML: 5 INJECTION INTRAVENOUS at 20:17

## 2025-03-16 RX ADMIN — IPRATROPIUM BROMIDE 0.5 MG: 0.5 SOLUTION RESPIRATORY (INHALATION) at 18:55

## 2025-03-16 RX ADMIN — DRONABINOL 2.5 MG: 2.5 CAPSULE ORAL at 09:20

## 2025-03-16 RX ADMIN — BUDESONIDE 250 MCG: 0.25 SUSPENSION RESPIRATORY (INHALATION) at 18:55

## 2025-03-16 ASSESSMENT — PAIN - FUNCTIONAL ASSESSMENT
PAIN_FUNCTIONAL_ASSESSMENT: PREVENTS OR INTERFERES WITH MANY ACTIVE NOT PASSIVE ACTIVITIES
PAIN_FUNCTIONAL_ASSESSMENT: PREVENTS OR INTERFERES WITH ALL ACTIVE AND SOME PASSIVE ACTIVITIES
PAIN_FUNCTIONAL_ASSESSMENT: PREVENTS OR INTERFERES WITH MANY ACTIVE NOT PASSIVE ACTIVITIES
PAIN_FUNCTIONAL_ASSESSMENT: PREVENTS OR INTERFERES WITH MANY ACTIVE NOT PASSIVE ACTIVITIES

## 2025-03-16 ASSESSMENT — PAIN DESCRIPTION - LOCATION
LOCATION: BACK

## 2025-03-16 ASSESSMENT — PAIN DESCRIPTION - ORIENTATION
ORIENTATION: LOWER
ORIENTATION: LOWER;UPPER
ORIENTATION: LOWER;UPPER

## 2025-03-16 ASSESSMENT — PAIN SCALES - GENERAL
PAINLEVEL_OUTOF10: 7
PAINLEVEL_OUTOF10: 7
PAINLEVEL_OUTOF10: 6
PAINLEVEL_OUTOF10: 7

## 2025-03-16 ASSESSMENT — PAIN DESCRIPTION - DESCRIPTORS
DESCRIPTORS: ACHING;DISCOMFORT;SORE
DESCRIPTORS: ACHING;DISCOMFORT
DESCRIPTORS: OTHER (COMMENT)
DESCRIPTORS: ACHING;DISCOMFORT

## 2025-03-16 NOTE — CONSULTS
MEDICAL ONCOLOGY CONSULTATION    Pt Name: Nick Jain  MRN: [id number]  YOB: 1950  Date of evaluation: 3/16/2025    REASON FOR CONSULTATION: Colon cancer stage IV, continuity of care  REQUESTING PHYSICIAN: Hospitalist    History Obtained From:    patient, electronic medical record    HISTORY OF PRESENT ILLNESS:   The patient is well-known to our practice for a new diagnosis of stage IV colonic adenocarcinoma with intra-abdominal and mediastinal adenopathy.  Patient has a very poor performance status, protein calorie malnutrition.  He was recently hospitalized here at [facility] with influenza A and pneumonia.  He has severe physical deconditioning.  Patient has severe abdominal pain, nausea that improved with antiemetics and adjustment of pain regimen.  He is currently on fentanyl 50 mcg every 72 hours.  Percocet as needed.  Due to his very poor performance status I decision was made not to proceed with any palliative treatment during last visit.  The patient was discharged home with hospice care.  Patient returned to the emergency department on 3/15/2025 after being discharged on 3/14/2025 with symptoms of generalized swelling, shortness of breath, low O2 sats.  A chest x-ray was was performed that showed bilateral lung base opacity.  Patient was started on broad-spectrum to biotics with vancomycin and cefepime.  I was consulted for continuity of care.  Laboratory studies at admission showed WBC 16.7, hemoglobin 10, platelet count 230,000, ANC 14.7.  Chemistry was remarkable creatinine 1.7/GFR 39, BUN 43, normal sodium potassium, low protein and albumin.  He was admitted under the hospitalist service.  I was consulted for continuity of care and further discussion of his underlying malignancy and overall goals.    PRIOR ONCOLOGICAL HISTORY    The patient is well-known to our clinic.  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  He was recently

## 2025-03-16 NOTE — PROGRESS NOTES
4 Eyes Skin Assessment     NAME:  Nick Jain  YOB: 1950  MEDICAL RECORD NUMBER:  [id number]    The patient is being assessed for  Admission    I agree that at least one RN has performed a thorough Head to Toe Skin Assessment on the patient. ALL assessment sites listed below have been assessed.      Areas assessed by both nurses:    Head, Face, Ears, Shoulders, Back, Chest, Arms, Elbows, Hands, Sacrum. Buttock, Coccyx, Ischium, Legs. Feet and Heels, and Under Medical Devices         Does the Patient have a Wound? Yes wound(s) were present on assessment. LDA wound assessment was Initiated and completed by RN       Teo Prevention initiated by RN: Yes  Wound Care Orders initiated by RN: Yes    Pressure Injury (Stage 3,4, Unstageable, DTI, NWPT, and Complex wounds) if present, place Wound referral order by RN under : No    New Ostomies, if present place, Ostomy referral order under : No     Nurse 1 eSignature: Electronically signed by Demetria Awad RN on 3/16/25 at 12:37 AM CDT    **SHARE this note so that the co-signing nurse can place an eSignature**    Nurse 2 eSignature: Electronically signed by Henrietta Martines LPN on 3/16/25 at 12:38 AM CDT

## 2025-03-16 NOTE — ED NOTES
Pt discharged from this facility yesterday (3/14) on oxygen at 3lpm, this is new for the pt. Pt arrives today on 3L via NC with sat reading 84%.   Pt O2 increased to 4L via [place] and sat now reading 89-92%.

## 2025-03-16 NOTE — H&P
[facility]      Hospitalist - History & Physical      PCP: Tesfaye Gipson MD    Date of Admission: 3/15/2025    Date of Service: 3/15/2025    Chief Complaint: Shortness of breath and swelling     History Of Present Illness:   The patient is a 74 y.o. male who presented to [facility] ED for evaluation of shortness of breath and swelling. Pt has history of metastatic adenocarcinoma of colon s/p right colectomy initated on palliative chemotherapy, HTN, copd, CKD and CAD s/p cabg.    Pt was discharged home yesterday having had evaluation and treatment of left lower lobe pneumonia. At that time, CT abd pelvis with nodular groundglass infiltrate within the lower aspect of both lungs, consolidation within the left lower lobe, concern for combination of pneumonia and metastasis, circumferential mucosal thickening of the distal esophagus compatible with esophagitis progression of hepatic metastasis, progression of mesenteric and retroperitoneal lymphadenopathy, mild intrahepatic and hepatic biliary ductal dilatation, distended gallbladder mild peripancreatic stranding complete or near occlusion of superior mesenteric vein. Lymphadenopathy causing severe stenosis and near complete occlusion of inferior vena cava, coronavirus NL 63 and influenza A positive.     Palliative care was consulted with plan for home hospice to evaluate after this weekend. Pt awoke this morning around 4am short of breath with generalized body edema precipitating reevaluation. Pt has required increased supplemental oxygen use due to hypoxia in ED now on 4L per nasal cannula discharged yesterday on 1L per family.    In ED, ABG-pH o2 sat 87.5% pH 7.5, pCO2 26, pO2 54, bnp 2,696, wbc 15.7k, hgb 10, platelets 230k, sodium 135, potassium 4.4, creatinine 1.7/bun 43, alk phos 299, lactic acid 1.7, INR 2.2, CXR-Suspected bilateral pleural effusions increased from 03/08/2025. Increased opacity in the medial left midlung possibly representing focal  vancomycin    -pt requiring 4L supplemental oxygen per nc   -incentive spirometry q2hrs wa   -ferritin   -iron/tibc   -tsh    -lipid panel   -magnesium   -bnp   -hepatic panel   -follow blood culture    Metastatic adenocarcinoma of colon   -consult oncology   -pain control     CKD stage IIIb  -phosphorus   -monitor renal fxn/lytes  -avoid nephrotoxic agents  -I's and O's  -daily weight  Signed:  AGUSTINA Abdul - CNP, 3/15/2025 9:35 PM

## 2025-03-16 NOTE — PROGRESS NOTES
[facility]      Patient:  Nick Jain  YOB: 1950  Date of Service: 3/16/2025  MRN: [id number]   Acct: [id number]   Primary Care Physician: Tesfaye Gipson MD  Advance Directive: DNR  Admit Date: 3/15/2025       Hospital Day: 1  Portions of this note have been copied forward, however, changed to reflect the most current clinical status of this patient.    CHIEF COMPLAINT  shortness of breath     Cumulative hospital course   74-year-old male with CAD s/p CABG March 2024, recent PCI, metastatic colonic adenocarcinoma s/p right colectomy in January 2025, palliative chemotherapy with modified FOLFOX, Cetuximab and BRAF inhibitor that was not yet started, COPD, HTN, with complaints of shortness of breath. Recently admitted 3/4 due to Influenza A, pneumonia. In addition found to have infrarenal aorta ulceration, SMV occlusion. Vascular surgery evaluated not surgical candidate initiated on Eliquis. GI consulted nausea, regurgitation.  EGD 3/10 severe esophagitis LA grade D, 3 to 4 cm hiatal hernia, candida esophagitis. Initiated on Protonix twice daily and Carafate 4 times daily. Diflucan x 10 days. Agreeable to home hospice and discharged on 3/14.   Patient returned to [facility] ER due to shortness of breath generalized weakness.  Workup in ER CXR medial left mid lung pneumonia, bilateral effusions, BNP 2696, WBC 16.7 Hgb 9.2, creatinine 1.7.  Initiated on cefepime and vancomycin, Admitted to hospitalist service with consultation to oncology and palliative care.  Prognosis poor and guarded.  Objective:   VITALS:  BP (!) 90/50   Pulse (!) 120   Temp 97.5 °F (36.4 °C) (Oral)   Resp 18   Ht 1.753 m (5' 9\")   Wt 61.9 kg (136 lb 6.4 oz)   SpO2 92%   BMI 20.14 kg/m²   24HR INTAKE/OUTPUT:    Intake/Output Summary (Last 24 hours) at 3/16/2025 1403  Last data filed at 3/15/2025 2042  Gross per 24 hour   Intake 95.06 ml   Output --   Net 95.06 ml       Physical Exam  Vitals and nursing note reviewed.  fentaNYL  1 patch TransDERmal Q72H    megestrol  200 mg Oral BID    metoprolol succinate  25 mg Oral Daily    mirtazapine  15 mg Oral Nightly    sacubitril-valsartan  0.5 tablet Oral BID    sennosides-docusate sodium  1 tablet Oral BID    sucralfate  1 g Oral 4x Daily AC & HS    sodium chloride flush  5-40 mL IntraVENous 2 times per day    bumetanide  2 mg IntraVENous Daily    vancomycin (VANCOCIN) intermittent dosing (placeholder)   Other RX Placeholder    fluconazole  100 mg Oral Daily    droNABinol  2.5 mg Oral BID     fluticasone, HYDROcodone-acetaminophen, lidocaine, LORazepam, promethazine, simethicone, sodium chloride flush, sodium chloride, ondansetron **OR** ondansetron, polyethylene glycol, acetaminophen **OR** acetaminophen, potassium chloride **OR** potassium alternative oral replacement **OR** potassium chloride, magnesium sulfate, magic (miracle) mouthwash  ADULT ORAL NUTRITION SUPPLEMENT; Breakfast, Lunch, Dinner; Standard High Calorie/High Protein Oral Supplement  ADULT DIET; Full Liquid; No Added Salt (3-4 gm)     Lab and other Data:     Recent Labs     03/14/25  0619 03/15/25  1903 03/16/25  0936   WBC 14.2* 16.7* 16.0*   HGB 8.7* 10.0* 9.2*    230 245     Recent Labs     03/14/25  0619 03/15/25  1812 03/15/25  1903 03/16/25  0936     --  136  135* 136   K 4.1   < > 4.4  4.4 4.3   *  --  104  103 105   CO2 22  --  15*  19* 19*   BUN 36*  --  43*  43* 49*   CREATININE 1.6*  --  1.8*  1.7* 2.0*   GLUCOSE 101*  --  92  96 101*    < > = values in this interval not displayed.     Recent Labs     03/15/25  1903   AST 28  27   ALT <5*  <5*   BILITOT 0.3  0.3   ALKPHOS 296*  299*     Troponin T: No results for input(s): \"TROPONINI\" in the last 72 hours.  Pro-BNP: No results for input(s): \"BNP\" in the last 72 hours.  INR:   Recent Labs     03/15/25  1903   INR 2.20*     UA:No results for input(s): \"NITRITE\", \"COLORU\", \"PHUR\", \"LABCAST\", \"WBCUA\", \"RBCUA\", \"MUCUS\", \"TRICHOMONAS\",

## 2025-03-16 NOTE — PROGRESS NOTES
Pharmacy Adjustment per [facility] protocol    Nick Jain is a 74 y.o. male. Pharmacy has adjusted medications per [facility] protocol.    Recent Labs     03/15/25  1903 03/16/25  0936   BUN 43*  43* 49*       Recent Labs     03/15/25  1903 03/16/25  0936   CREATININE 1.8*  1.7* 2.0*       Estimated Creatinine Clearance: 28 mL/min (A) (based on SCr of 2 mg/dL (H)).    Height:   Ht Readings from Last 1 Encounters:   03/15/25 1.753 m (5' 9\")     Weight:  Wt Readings from Last 1 Encounters:   03/15/25 61.9 kg (136 lb 6.4 oz)    BMI:  BMI Readings from Last 1 Encounters:   03/15/25 20.14 kg/m²         Plan: Adjust the following medications based on [facility] protocol:           Cefepime to 1000 mg IV every 12 hours extended infusion over 240 minutes      Electronically signed by Jesika Hutchison RPH on 3/16/2025 at 1:23 PM

## 2025-03-16 NOTE — ED NOTES
3rd floor RN notified that patients dot phrase is in.   Spoke with patient son and explained results, he wanted to let doctor that is mother is having urinary frequency in the night, please  Advice.

## 2025-03-16 NOTE — PROGRESS NOTES
Nick Jain arrived to room # 332.   Presented with: Fluid overload, pneumonia  Mental Status: Patient is oriented, alert, coherent, logical, thought processes intact, and able to concentrate and follow conversation.   Vitals:    03/16/25 0023   BP:    Pulse: (!) 102   Resp:    Temp:    SpO2:      Patient safety contract and falls prevention contract reviewed with patient Yes.  Oriented Patient and Family to room.  Call light within reach. Yes.  Needs, issues or concerns expressed at this time: no.      Electronically signed by Demetria Awad RN on 3/16/2025 at 12:36 AM

## 2025-03-16 NOTE — PROGRESS NOTES
Pharmacy Adjustment per [facility] protocol    Nick Jain is a 74 y.o. male. Pharmacy has adjusted medications per [facility] protocol.    Recent Labs     03/15/25  1903 03/16/25  0936   BUN 43*  43* 49*       Recent Labs     03/15/25  1903 03/16/25  0936   CREATININE 1.8*  1.7* 2.0*       Estimated Creatinine Clearance: 28 mL/min (A) (based on SCr of 2 mg/dL (H)).    Height:   Ht Readings from Last 1 Encounters:   03/15/25 1.753 m (5' 9\")     Weight:  Wt Readings from Last 1 Encounters:   03/15/25 61.9 kg (136 lb 6.4 oz)    BMI:  BMI Readings from Last 1 Encounters:   03/15/25 20.14 kg/m²         Plan: Adjust the following medications based on [facility] protocol:           Metoclopramide 10 mg to 5 mg by mouth three times daily with meals    Electronically signed by Jesika Hutchison RPH on 3/16/2025 at 1:26 PM

## 2025-03-16 NOTE — PROGRESS NOTES
DIS Nurse Note  SUBJECTIVE: Patient assessed secondary to elevated Deterioration Index Score.      Deterioration Index Score:  Predictive Model Details          57 (Warning)  Factor Value    Calculated 3/16/2025 06:31 25% Systolic 76    Deterioration Index Model 21% Age 74 years old     19% Supplemental oxygen Oxygen     7% Cardiac rhythm Sinus tachy     6% Pulse 107     6% WBC count abnormal (16.7 K/uL)     5% Hematocrit abnormal (32.9 %)     4% Potassium 4.4 mmol/L     3% Blood pH abnormal (7.500)     2% Sodium 136 mmol/L     2% BUN abnormal (43 mg/dL)     0% Temperature 97.3 °F (36.3 °C)     0% Pulse oximetry 95 %     0% Respiratory rate 16        Vital Signs:  Vitals:    03/15/25 2251 03/15/25 2255 03/16/25 0023 03/16/25 0520   BP: 118/73   (!) 76/50   Pulse: (!) 111  (!) 102 (!) 107   Resp: 16   16   Temp: 97.3 °F (36.3 °C)   97.3 °F (36.3 °C)   TempSrc: Temporal   Temporal   SpO2: 94%   95%   Weight:  61.9 kg (136 lb 6.4 oz)     Height:            Provider Notified: Reviewed patient status  & DIS score with Provider Dr. Banda    ASSESSMENT:   patient manual BP 76/50. Patient is aysmptomatic. Alert and oriented. Patient has had no urine output in canister. Bladder scan showed 284ml.      Plan of Care: Dr. Banda is hesitant to give fluids given patient is currently in FVO. Orders given to place burgos catheter. Increase vitals signs to every 4 hours.     Electronically signed by Demetria Awad RN

## 2025-03-16 NOTE — PROGRESS NOTES
[facility]   Pharmacy Pharmacokinetic Monitoring Service - Vancomycin     Nick Jain is a 74 y.o. male starting on vancomycin therapy for Pneumonia. Pharmacy consulted by Ruben Zhang for monitoring and adjustment.    Target Concentration:  Tr: 15-20    Additional Antimicrobials: Maxipime    Pertinent Laboratory Values:   Wt Readings from Last 1 Encounters:   03/15/25 59.9 kg (132 lb)     Temp Readings from Last 1 Encounters:   03/15/25 97.3 °F (36.3 °C) (Temporal)     Estimated Creatinine Clearance: 32 mL/min (A) (based on SCr of 1.7 mg/dL (H)).  Recent Labs     03/14/25  0619 03/15/25  1903   CREATININE 1.6* 1.7*   BUN 36* 43*   WBC 14.2* 16.7*     Procalcitonin: N/A    Pertinent Cultures: No current cultures at this time  Culture Date Source Results        MRSA Nasal Swab: not ordered. Order placed by pharmacy.    Plan:  Concentration-guided dosing due to renal impairment/insufficiency  Start vancomycin 1500mg x1 dose given in ED  Renal labs as indicated   Vancomycin concentration ordered for 03/17 @ 0200   Pharmacy will continue to monitor patient and adjust therapy as indicated    Thank you for the consult,  Tres Martinez RPH  3/15/2025 9:52 PM

## 2025-03-16 NOTE — ED NOTES
ED TO INPATIENT SBAR HANDOFF    Patient Name: Nick Jain   : 1950  74 y.o.   Family/Caregiver Present: Yes  Code Status Order: Prior    C-SSRS: Risk of Suicide: No Risk  Sitter No  Restraints:         Situation  Chief Complaint:   Chief Complaint   Patient presents with    Swelling     Generalized, discharged from here yesterday     Patient Diagnosis: Fluid overload [E87.70]     Brief Description of Patient's Condition: Nick Jain is a 74 y.o. male who presents to the emergency department with complaints of generalized swelling. Discharged yesterday. He has no CHF listed. Has CAD hx. On eliquis. He has colon CA. Denies abdominal swelling or pain. This is a fluid concern with low oxygen SOA issue. Has COPD. Normally 1 L per spouse ABG done shows oxygen 87 has been bumped to 4L here in ER acutely now at 91 percent. Has chest congestion with productive sputum cough. Was admitted 3/4/25 for Left lower lobe pneumonia. Hx of sepsis.       Mental Status: oriented, alert, and able to concentrate and follow conversation  Arrived from: home    Imaging:   XR CHEST PORTABLE   Final Result       Suspected bilateral pleural effusions increased from 2025.       Increased opacity in the medial left midlung possibly representing focal pneumonia.               ______________________________________    Electronically signed by: DAVIDE ORANTES M.D.   Date:     03/15/2025   Time:    19:15         COVID-19 Results:   Internal Administration   First Dose COVID-19, MODERNA BLUE border, Primary or Immunocompromised, (age 12y+), IM, 100 mcg/0.5mL  2021   Second Dose COVID-19, MODERNA BLUE border, Primary or Immunocompromised, (age 12y+), IM, 100 mcg/0.5mL   2021       Last COVID Lab SARS-CoV-2, PCR (no units)   Date Value   2025 Not Detected           Abnormal labs:   Abnormal Labs Reviewed   BLOOD GAS, ARTERIAL - Abnormal; Notable for the following components:       Result Value    pH, Arterial  Bag Dose  250 mL Rate  124 mL/hr Route  IntraVENous Documented By  Neha Roberto RN        vancomycin (VANCOCIN) 1500 mg in sodium chloride 0.9 % 250 mL IVPB Admin Date  03/15/2025 Action  New Bag Dose  1,500 mg Rate  166.7 mL/hr Route  IntraVENous Documented By  Neha Roberto RN            History:   Past Medical History:   Diagnosis Date    B12 deficiency 03/05/2025    COPD (chronic obstructive pulmonary disease) (HCC)     Heart attack (HCC)     two    Hypertension     Metastatic adenocarcinoma (HCC) 02/27/2025    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    Mini stroke     2    NSTEMI (non-ST elevated myocardial infarction) (HCC) 03/12/2024    Palliative care patient 01/20/2025    Small bowel obstruction (HCC) 01/11/2025       Assessment  Vitals: Level of Consciousness: Alert (0)   Vitals:    03/15/25 2015 03/15/25 2030 03/15/25 2045 03/15/25 2130   BP: (!) 78/55 93/61 98/66 99/63   Pulse: (!) 107 (!) 107 (!) 106 (!) 102   Resp: 27 23 20 21   Temp:       TempSrc:       SpO2: (!) 89% 91% 90% 91%   Weight:       Height:         Predictive Model Details          51 (Warning)  Factor Value    Calculated 3/15/2025 21:45 24% Age 74 years old    Deterioration Index Model 22% Supplemental oxygen Nasal cannula     13% Respiratory rate 21     7% WBC count abnormal (16.7 K/uL)     7% Systolic 99     5% Hematocrit abnormal (32.9 %)     5% Pulse 102     5% Pulse oximetry 91 %     5% Potassium 4.4 mmol/L     3% Blood pH abnormal (7.500)     3% Sodium abnormal (135 mmol/L)     2% BUN abnormal (43 mg/dL)     0% Temperature 97.3 °F (36.3 °C)       NPO? No  O2 Flow Rate: O2 Device: Nasal cannula O2 Flow Rate (L/min): 4 L/min  Cardiac Rhythm:   NIH Score: NIH     Active LDA's:   Peripheral IV 03/15/25 Distal;Left;Posterior Forearm (Active)     Pertinent or High Risk Medications/Drips: no   If Yes, please provide details:   Blood Product Administration: no  If Yes, please provide details:   Sepsis Risk Score

## 2025-03-17 ENCOUNTER — HOSPITAL ENCOUNTER (INPATIENT)
Age: 75
LOS: 1 days | DRG: 951 | End: 2025-03-17
Attending: INTERNAL MEDICINE | Admitting: INTERNAL MEDICINE
Payer: MEDICARE

## 2025-03-17 VITALS
OXYGEN SATURATION: 93 % | BODY MASS INDEX: 20.41 KG/M2 | DIASTOLIC BLOOD PRESSURE: 52 MMHG | RESPIRATION RATE: 22 BRPM | SYSTOLIC BLOOD PRESSURE: 72 MMHG | HEIGHT: 69 IN | TEMPERATURE: 97.4 F | WEIGHT: 137.8 LBS | HEART RATE: 113 BPM

## 2025-03-17 VITALS
RESPIRATION RATE: 22 BRPM | DIASTOLIC BLOOD PRESSURE: 41 MMHG | OXYGEN SATURATION: 90 % | TEMPERATURE: 97.9 F | HEART RATE: 1 BPM | SYSTOLIC BLOOD PRESSURE: 61 MMHG

## 2025-03-17 PROBLEM — C18.9 COLON CANCER (HCC): Status: ACTIVE | Noted: 2025-03-17

## 2025-03-17 PROBLEM — R06.03 ACUTE RESPIRATORY DISTRESS: Status: ACTIVE | Noted: 2025-03-17

## 2025-03-17 LAB
ANION GAP SERPL CALCULATED.3IONS-SCNC: 12 MMOL/L (ref 8–16)
BASOPHILS # BLD: 0 K/UL (ref 0–0.2)
BASOPHILS NFR BLD: 0.2 % (ref 0–1)
BUN SERPL-MCNC: 58 MG/DL (ref 8–23)
CALCIUM SERPL-MCNC: 7.4 MG/DL (ref 8.8–10.2)
CHLORIDE SERPL-SCNC: 106 MMOL/L (ref 98–107)
CO2 SERPL-SCNC: 20 MMOL/L (ref 22–29)
CREAT SERPL-MCNC: 2.5 MG/DL (ref 0.7–1.2)
EOSINOPHIL # BLD: 0.1 K/UL (ref 0–0.6)
EOSINOPHIL NFR BLD: 0.7 % (ref 0–5)
ERYTHROCYTE [DISTWIDTH] IN BLOOD BY AUTOMATED COUNT: 17.6 % (ref 11.5–14.5)
GLUCOSE SERPL-MCNC: 124 MG/DL (ref 70–99)
HCT VFR BLD AUTO: 26.9 % (ref 42–52)
HGB BLD-MCNC: 8.3 G/DL (ref 14–18)
IMM GRANULOCYTES # BLD: 0.1 K/UL
LYMPHOCYTES # BLD: 0.9 K/UL (ref 1.1–4.5)
LYMPHOCYTES NFR BLD: 6.8 % (ref 20–40)
MAGNESIUM SERPL-MCNC: 2.5 MG/DL (ref 1.6–2.4)
MCH RBC QN AUTO: 28.5 PG (ref 27–31)
MCHC RBC AUTO-ENTMCNC: 30.9 G/DL (ref 33–37)
MCV RBC AUTO: 92.4 FL (ref 80–94)
MONOCYTES # BLD: 0.6 K/UL (ref 0–0.9)
MONOCYTES NFR BLD: 4.3 % (ref 0–10)
NEUTROPHILS # BLD: 11.5 K/UL (ref 1.5–7.5)
NEUTS SEG NFR BLD: 87.3 % (ref 50–65)
PLATELET # BLD AUTO: 218 K/UL (ref 130–400)
PMV BLD AUTO: 11.2 FL (ref 9.4–12.4)
POTASSIUM SERPL-SCNC: 4.4 MMOL/L (ref 3.5–5.1)
PROCALCITONIN: 3.97 NG/ML (ref 0–0.09)
RBC # BLD AUTO: 2.91 M/UL (ref 4.7–6.1)
SODIUM SERPL-SCNC: 138 MMOL/L (ref 136–145)
WBC # BLD AUTO: 13.2 K/UL (ref 4.8–10.8)

## 2025-03-17 PROCEDURE — 6370000000 HC RX 637 (ALT 250 FOR IP): Performed by: STUDENT IN AN ORGANIZED HEALTH CARE EDUCATION/TRAINING PROGRAM

## 2025-03-17 PROCEDURE — 99232 SBSQ HOSP IP/OBS MODERATE 35: CPT | Performed by: INTERNAL MEDICINE

## 2025-03-17 PROCEDURE — 2500000003 HC RX 250 WO HCPCS: Performed by: STUDENT IN AN ORGANIZED HEALTH CARE EDUCATION/TRAINING PROGRAM

## 2025-03-17 PROCEDURE — 85025 COMPLETE CBC W/AUTO DIFF WBC: CPT

## 2025-03-17 PROCEDURE — 84145 PROCALCITONIN (PCT): CPT

## 2025-03-17 PROCEDURE — 80048 BASIC METABOLIC PNL TOTAL CA: CPT

## 2025-03-17 PROCEDURE — 83735 ASSAY OF MAGNESIUM: CPT

## 2025-03-17 PROCEDURE — 6360000002 HC RX W HCPCS: Performed by: PHYSICIAN ASSISTANT

## 2025-03-17 PROCEDURE — 6360000002 HC RX W HCPCS

## 2025-03-17 PROCEDURE — 94640 AIRWAY INHALATION TREATMENT: CPT

## 2025-03-17 PROCEDURE — 6360000002 HC RX W HCPCS: Performed by: INTERNAL MEDICINE

## 2025-03-17 PROCEDURE — 94760 N-INVAS EAR/PLS OXIMETRY 1: CPT

## 2025-03-17 PROCEDURE — 6370000000 HC RX 637 (ALT 250 FOR IP): Performed by: INTERNAL MEDICINE

## 2025-03-17 PROCEDURE — 2700000000 HC OXYGEN THERAPY PER DAY

## 2025-03-17 PROCEDURE — 99497 ADVNCD CARE PLAN 30 MIN: CPT

## 2025-03-17 PROCEDURE — 99223 1ST HOSP IP/OBS HIGH 75: CPT

## 2025-03-17 PROCEDURE — 2580000003 HC RX 258: Performed by: INTERNAL MEDICINE

## 2025-03-17 PROCEDURE — 2580000003 HC RX 258: Performed by: PHYSICIAN ASSISTANT

## 2025-03-17 PROCEDURE — 6560000002 HC HOSPICE GENERAL INPATIENT

## 2025-03-17 PROCEDURE — 6360000002 HC RX W HCPCS: Performed by: STUDENT IN AN ORGANIZED HEALTH CARE EDUCATION/TRAINING PROGRAM

## 2025-03-17 PROCEDURE — 36415 COLL VENOUS BLD VENIPUNCTURE: CPT

## 2025-03-17 RX ORDER — FENTANYL 75 UG/1
1 PATCH TRANSDERMAL
Refills: 0 | OUTPATIENT
Start: 2025-03-19

## 2025-03-17 RX ORDER — HYDROMORPHONE HYDROCHLORIDE 1 MG/ML
0.25 INJECTION, SOLUTION INTRAMUSCULAR; INTRAVENOUS; SUBCUTANEOUS ONCE
Refills: 0 | OUTPATIENT
Start: 2025-03-17

## 2025-03-17 RX ORDER — FLUTICASONE PROPIONATE 50 MCG
2 SPRAY, SUSPENSION (ML) NASAL DAILY PRN
Status: DISCONTINUED | OUTPATIENT
Start: 2025-03-17 | End: 2025-03-17 | Stop reason: HOSPADM

## 2025-03-17 RX ORDER — ARFORMOTEROL TARTRATE 15 UG/2ML
15 SOLUTION RESPIRATORY (INHALATION)
OUTPATIENT
Start: 2025-03-17

## 2025-03-17 RX ORDER — LIDOCAINE 4 G/G
2 PATCH TOPICAL EVERY 12 HOURS PRN
OUTPATIENT
Start: 2025-03-17

## 2025-03-17 RX ORDER — SCOPOLAMINE 1 MG/3D
1 PATCH, EXTENDED RELEASE TRANSDERMAL
Status: DISCONTINUED | OUTPATIENT
Start: 2025-03-17 | End: 2025-03-17 | Stop reason: HOSPADM

## 2025-03-17 RX ORDER — BUDESONIDE 0.25 MG/2ML
0.25 INHALANT ORAL
Status: DISCONTINUED | OUTPATIENT
Start: 2025-03-17 | End: 2025-03-17 | Stop reason: HOSPADM

## 2025-03-17 RX ORDER — ONDANSETRON 4 MG/1
4 TABLET, ORALLY DISINTEGRATING ORAL EVERY 8 HOURS PRN
OUTPATIENT
Start: 2025-03-17

## 2025-03-17 RX ORDER — ASPIRIN 81 MG/1
81 TABLET ORAL DAILY
OUTPATIENT
Start: 2025-03-18

## 2025-03-17 RX ORDER — LORAZEPAM 0.5 MG/1
0.5 TABLET ORAL EVERY 8 HOURS PRN
OUTPATIENT
Start: 2025-03-17

## 2025-03-17 RX ORDER — MAGNESIUM SULFATE IN WATER 40 MG/ML
2000 INJECTION, SOLUTION INTRAVENOUS PRN
OUTPATIENT
Start: 2025-03-17

## 2025-03-17 RX ORDER — SENNA AND DOCUSATE SODIUM 50; 8.6 MG/1; MG/1
1 TABLET, FILM COATED ORAL 2 TIMES DAILY
OUTPATIENT
Start: 2025-03-17

## 2025-03-17 RX ORDER — ACETAMINOPHEN 325 MG/1
650 TABLET ORAL EVERY 6 HOURS PRN
OUTPATIENT
Start: 2025-03-17

## 2025-03-17 RX ORDER — GLYCOPYRROLATE 0.2 MG/ML
0.2 INJECTION INTRAMUSCULAR; INTRAVENOUS EVERY 6 HOURS PRN
Status: DISCONTINUED | OUTPATIENT
Start: 2025-03-17 | End: 2025-03-17 | Stop reason: HOSPADM

## 2025-03-17 RX ORDER — ACETAMINOPHEN 650 MG/1
650 SUPPOSITORY RECTAL EVERY 6 HOURS PRN
OUTPATIENT
Start: 2025-03-17

## 2025-03-17 RX ORDER — CALCIUM CARBONATE 500(1250)
500 TABLET ORAL DAILY
OUTPATIENT
Start: 2025-03-18

## 2025-03-17 RX ORDER — DRONABINOL 2.5 MG/1
2.5 CAPSULE ORAL 2 TIMES DAILY
OUTPATIENT
Start: 2025-03-17

## 2025-03-17 RX ORDER — FLUCONAZOLE 100 MG/1
100 TABLET ORAL DAILY
OUTPATIENT
Start: 2025-03-17 | End: 2025-03-22

## 2025-03-17 RX ORDER — METOCLOPRAMIDE 10 MG/1
5 TABLET ORAL
OUTPATIENT
Start: 2025-03-17

## 2025-03-17 RX ORDER — SIMETHICONE 80 MG
125 TABLET,CHEWABLE ORAL AS NEEDED
OUTPATIENT
Start: 2025-03-17

## 2025-03-17 RX ORDER — LORAZEPAM 2 MG/ML
1 INJECTION INTRAMUSCULAR EVERY 4 HOURS PRN
Status: DISCONTINUED | OUTPATIENT
Start: 2025-03-17 | End: 2025-03-17 | Stop reason: HOSPADM

## 2025-03-17 RX ORDER — BUDESONIDE 0.25 MG/2ML
0.25 INHALANT ORAL
OUTPATIENT
Start: 2025-03-17

## 2025-03-17 RX ORDER — PROMETHAZINE HYDROCHLORIDE 25 MG/1
25 TABLET ORAL 4 TIMES DAILY PRN
OUTPATIENT
Start: 2025-03-17

## 2025-03-17 RX ORDER — FENTANYL 75 UG/1
1 PATCH TRANSDERMAL
Refills: 0 | Status: DISCONTINUED | OUTPATIENT
Start: 2025-03-19 | End: 2025-03-17 | Stop reason: HOSPADM

## 2025-03-17 RX ORDER — MORPHINE SULFATE 2 MG/ML
1 INJECTION, SOLUTION INTRAMUSCULAR; INTRAVENOUS
Status: DISCONTINUED | OUTPATIENT
Start: 2025-03-17 | End: 2025-03-17 | Stop reason: HOSPADM

## 2025-03-17 RX ORDER — CLOPIDOGREL BISULFATE 75 MG/1
75 TABLET ORAL DAILY
OUTPATIENT
Start: 2025-03-18

## 2025-03-17 RX ORDER — HYDROCODONE BITARTRATE AND ACETAMINOPHEN 10; 325 MG/1; MG/1
1 TABLET ORAL EVERY 6 HOURS PRN
Refills: 0 | OUTPATIENT
Start: 2025-03-17

## 2025-03-17 RX ORDER — SUCRALFATE 1 G/1
1 TABLET ORAL
OUTPATIENT
Start: 2025-03-17

## 2025-03-17 RX ORDER — GLYCOPYRROLATE 0.2 MG/ML
0.2 INJECTION INTRAMUSCULAR; INTRAVENOUS EVERY 6 HOURS PRN
OUTPATIENT
Start: 2025-03-17

## 2025-03-17 RX ORDER — MEGESTROL ACETATE 40 MG/ML
200 SUSPENSION ORAL 2 TIMES DAILY
OUTPATIENT
Start: 2025-03-17

## 2025-03-17 RX ORDER — PROCHLORPERAZINE EDISYLATE 5 MG/ML
10 INJECTION INTRAMUSCULAR; INTRAVENOUS EVERY 6 HOURS PRN
Status: DISCONTINUED | OUTPATIENT
Start: 2025-03-17 | End: 2025-03-17 | Stop reason: HOSPADM

## 2025-03-17 RX ORDER — GLYCOPYRROLATE 0.2 MG/ML
0.2 INJECTION INTRAMUSCULAR; INTRAVENOUS EVERY 4 HOURS PRN
Status: DISCONTINUED | OUTPATIENT
Start: 2025-03-17 | End: 2025-03-17 | Stop reason: HOSPADM

## 2025-03-17 RX ORDER — HYDROMORPHONE HYDROCHLORIDE 1 MG/ML
0.25 INJECTION, SOLUTION INTRAMUSCULAR; INTRAVENOUS; SUBCUTANEOUS ONCE
Status: COMPLETED | OUTPATIENT
Start: 2025-03-17 | End: 2025-03-17

## 2025-03-17 RX ORDER — POLYETHYLENE GLYCOL 3350 17 G/17G
17 POWDER, FOR SOLUTION ORAL DAILY PRN
OUTPATIENT
Start: 2025-03-17

## 2025-03-17 RX ORDER — MIRTAZAPINE 15 MG/1
15 TABLET, FILM COATED ORAL NIGHTLY
OUTPATIENT
Start: 2025-03-17

## 2025-03-17 RX ORDER — BISACODYL 10 MG
10 SUPPOSITORY, RECTAL RECTAL DAILY PRN
Status: DISCONTINUED | OUTPATIENT
Start: 2025-03-17 | End: 2025-03-17 | Stop reason: HOSPADM

## 2025-03-17 RX ORDER — FLUTICASONE PROPIONATE 50 MCG
2 SPRAY, SUSPENSION (ML) NASAL DAILY PRN
OUTPATIENT
Start: 2025-03-17

## 2025-03-17 RX ORDER — ATORVASTATIN CALCIUM 80 MG/1
80 TABLET, FILM COATED ORAL DAILY
OUTPATIENT
Start: 2025-03-18

## 2025-03-17 RX ORDER — SODIUM CHLORIDE 9 MG/ML
INJECTION, SOLUTION INTRAVENOUS CONTINUOUS
Status: DISCONTINUED | OUTPATIENT
Start: 2025-03-17 | End: 2025-03-17 | Stop reason: HOSPADM

## 2025-03-17 RX ORDER — ONDANSETRON 2 MG/ML
4 INJECTION INTRAMUSCULAR; INTRAVENOUS EVERY 6 HOURS PRN
Status: DISCONTINUED | OUTPATIENT
Start: 2025-03-17 | End: 2025-03-17 | Stop reason: HOSPADM

## 2025-03-17 RX ORDER — IPRATROPIUM BROMIDE AND ALBUTEROL SULFATE 2.5; .5 MG/3ML; MG/3ML
1 SOLUTION RESPIRATORY (INHALATION)
Status: DISCONTINUED | OUTPATIENT
Start: 2025-03-17 | End: 2025-03-17 | Stop reason: HOSPADM

## 2025-03-17 RX ORDER — ONDANSETRON 2 MG/ML
4 INJECTION INTRAMUSCULAR; INTRAVENOUS EVERY 6 HOURS PRN
OUTPATIENT
Start: 2025-03-17

## 2025-03-17 RX ORDER — LIDOCAINE 4 G/G
2 PATCH TOPICAL EVERY 12 HOURS PRN
Status: DISCONTINUED | OUTPATIENT
Start: 2025-03-17 | End: 2025-03-17 | Stop reason: HOSPADM

## 2025-03-17 RX ORDER — ACETAMINOPHEN 650 MG/1
650 SUPPOSITORY RECTAL EVERY 6 HOURS PRN
Status: DISCONTINUED | OUTPATIENT
Start: 2025-03-17 | End: 2025-03-17 | Stop reason: HOSPADM

## 2025-03-17 RX ADMIN — HYDROCODONE BITARTRATE AND ACETAMINOPHEN 1 TABLET: 10; 325 TABLET ORAL at 03:08

## 2025-03-17 RX ADMIN — ONDANSETRON 4 MG: 2 INJECTION INTRAMUSCULAR; INTRAVENOUS at 09:12

## 2025-03-17 RX ADMIN — ASPIRIN 81 MG: 81 TABLET, COATED ORAL at 07:47

## 2025-03-17 RX ADMIN — ATORVASTATIN CALCIUM 80 MG: 80 TABLET, FILM COATED ORAL at 07:48

## 2025-03-17 RX ADMIN — GLYCOPYRROLATE 0.2 MG: 0.2 INJECTION INTRAMUSCULAR; INTRAVENOUS at 15:27

## 2025-03-17 RX ADMIN — HYDROMORPHONE HYDROCHLORIDE 0.25 MG: 1 INJECTION, SOLUTION INTRAMUSCULAR; INTRAVENOUS; SUBCUTANEOUS at 11:32

## 2025-03-17 RX ADMIN — IPRATROPIUM BROMIDE AND ALBUTEROL SULFATE 1 DOSE: 2.5; .5 SOLUTION RESPIRATORY (INHALATION) at 15:24

## 2025-03-17 RX ADMIN — DRONABINOL 2.5 MG: 2.5 CAPSULE ORAL at 07:47

## 2025-03-17 RX ADMIN — APIXABAN 5 MG: 5 TABLET, FILM COATED ORAL at 07:48

## 2025-03-17 RX ADMIN — IPRATROPIUM BROMIDE 0.5 MG: 0.5 SOLUTION RESPIRATORY (INHALATION) at 07:42

## 2025-03-17 RX ADMIN — SACUBITRIL AND VALSARTAN 0.5 TABLET: 24; 26 TABLET, FILM COATED ORAL at 07:48

## 2025-03-17 RX ADMIN — LORAZEPAM 1 MG: 2 INJECTION INTRAMUSCULAR; INTRAVENOUS at 15:27

## 2025-03-17 RX ADMIN — SODIUM CHLORIDE 1000 MG: 9 INJECTION, SOLUTION INTRAVENOUS at 07:59

## 2025-03-17 RX ADMIN — PROMETHAZINE HYDROCHLORIDE 25 MG: 25 TABLET ORAL at 04:01

## 2025-03-17 RX ADMIN — BUMETANIDE 2 MG: 0.25 INJECTION INTRAMUSCULAR; INTRAVENOUS at 07:48

## 2025-03-17 RX ADMIN — BUDESONIDE 250 MCG: 0.25 SUSPENSION RESPIRATORY (INHALATION) at 07:42

## 2025-03-17 RX ADMIN — HYDROMORPHONE HYDROCHLORIDE 0.25 MG: 1 INJECTION, SOLUTION INTRAMUSCULAR; INTRAVENOUS; SUBCUTANEOUS at 12:37

## 2025-03-17 RX ADMIN — GLYCOPYRROLATE 0.2 MG: 0.2 INJECTION INTRAMUSCULAR; INTRAVENOUS at 11:31

## 2025-03-17 RX ADMIN — CLOPIDOGREL BISULFATE 75 MG: 75 TABLET ORAL at 07:48

## 2025-03-17 RX ADMIN — SODIUM CHLORIDE, PRESERVATIVE FREE 10 ML: 5 INJECTION INTRAVENOUS at 07:49

## 2025-03-17 RX ADMIN — SODIUM CHLORIDE: 9 INJECTION, SOLUTION INTRAVENOUS at 15:12

## 2025-03-17 RX ADMIN — CALCIUM 500 MG: 500 TABLET ORAL at 07:48

## 2025-03-17 RX ADMIN — ACETAMINOPHEN 650 MG: 325 TABLET ORAL at 00:29

## 2025-03-17 RX ADMIN — SUCRALFATE 1 G: 1 TABLET ORAL at 03:09

## 2025-03-17 RX ADMIN — SENNOSIDES AND DOCUSATE SODIUM 1 TABLET: 50; 8.6 TABLET ORAL at 07:47

## 2025-03-17 RX ADMIN — METOCLOPRAMIDE 5 MG: 10 TABLET ORAL at 07:48

## 2025-03-17 RX ADMIN — ARFORMOTEROL TARTRATE 15 MCG: 15 SOLUTION RESPIRATORY (INHALATION) at 07:42

## 2025-03-17 RX ADMIN — MEGESTROL ACETATE 200 MG: 40 SUSPENSION ORAL at 07:48

## 2025-03-17 RX ADMIN — MORPHINE SULFATE 1 MG/HR: 10 INJECTION INTRAVENOUS at 15:12

## 2025-03-17 ASSESSMENT — PAIN DESCRIPTION - DESCRIPTORS
DESCRIPTORS: PATIENT UNABLE TO DESCRIBE
DESCRIPTORS: PATIENT UNABLE TO DESCRIBE

## 2025-03-17 ASSESSMENT — PAIN SCALES - GENERAL
PAINLEVEL_OUTOF10: 3
PAINLEVEL_OUTOF10: 7

## 2025-03-17 ASSESSMENT — PAIN - FUNCTIONAL ASSESSMENT
PAIN_FUNCTIONAL_ASSESSMENT: PREVENTS OR INTERFERES WITH ALL ACTIVE AND SOME PASSIVE ACTIVITIES
PAIN_FUNCTIONAL_ASSESSMENT: PREVENTS OR INTERFERES WITH ALL ACTIVE AND SOME PASSIVE ACTIVITIES

## 2025-03-17 ASSESSMENT — PAIN DESCRIPTION - LOCATION
LOCATION: BACK
LOCATION: BACK

## 2025-03-17 NOTE — ACP (ADVANCE CARE PLANNING)
Advance Care Planning      Palliative Medicine Provider (MD/NP)  Advance Care Planning (ACP) Conversation      Date of Conversation: 03/17/25  The patient and/or authorized decision maker consented to a voluntary Advance Care Planning conversation.   Individuals present for the conversation:   Patient and Spouse at bedside    Legal Healthcare Agent(s):    Primary Decision Maker: Doreen Jain - Spouse - [phone]    ACP documents available in EMR prior to discussion:  -None    Primary Palliative Diagnosis(es):  Metastatic ileocecal adenocarcinoma  HFrEF  Failure to thrive    Conversation Summary: Met with patient and spouse at bedside to update on status and discuss plan of care. Further conversations regarding goals and hospice care. Pt and spouse do not want to prolong suffering and want him comfortable with aggressive symptom management. They are agreeable for another hospice consult and eval for inpatient HCC admission. Hospice team has been notified. DNR remains per their request for ongoing terminal care. Pts children are having a more difficult time coping with pts overall condition and decline. They have been encouraged to come see patient at bedside and to assist with communication. No changes in emergency contacts at this time.       Resuscitation Status:    Code Status: DNR    I spent 18 minutes providing ACP services with the patient and/or surrogate decision maker in a voluntary, in-person conversation discussing the patient's wishes and goals as detailed in the above note.       Kenyatta Knapp, AGUSTINA - CNP

## 2025-03-17 NOTE — CARE COORDINATION
03/17/25 1234   IMM Letter   IMM Letter given to Patient/Family/Significant other/Guardian/POA/by: dcing to [facility]- imm not appropriate     Electronically signed by Dorcas Benavides RN on 3/17/2025 at 12:34 PM

## 2025-03-17 NOTE — CARE COORDINATION
Pt had just dc'd home with hospice. Came back in for worsening symptoms. May need more teaching about hospice care at home    03/17/25 0912   Readmission Assessment   Number of Days since last admission? 1-7 days   Previous Disposition Other (comment)  (went home with hospice)   Who is being Interviewed Caregiver   What was the patient's/caregiver's perception as to why they think they needed to return back to the hospital? Other (Comment)  (pt came back in with pain, sob, and  edema)   Did you visit your Primary Care Physician after you left the hospital, before you returned this time? No   Why weren't you able to visit your PCP? Other (Comment)  (was only out for 1 day)   Did you see a specialist, such as Cardiac, Pulmonary, Orthopedic Physician, etc. after you left the hospital? No   Who advised the patient to return to the hospital? Self-referral   Does the patient report anything that got in the way of taking their medications? No   In our efforts to provide the best possible care to you and others like you, can you think of anything that we could have done to help you after you left the hospital the first time, so that you might not have needed to return so soon? Arrange for more help when leaving the hospital;Teach back instructions regarding management of illness;Identify patient's health literacy needs     Electronically signed by Dorcas Benavides RN on 3/17/2025 at 9:51 AM

## 2025-03-17 NOTE — PLAN OF CARE
Problem: Discharge Planning  Goal: Discharge to home or other facility with appropriate resources  3/16/2025 2149 by Demetria Awad, RN  Outcome: Progressing  3/16/2025 0751 by Demetria Awad RN  Outcome: Progressing     Problem: Safety - Adult  Goal: Free from fall injury  3/16/2025 2149 by Demetria Awad, RN  Outcome: Progressing  3/16/2025 0751 by Demetria Awad RN  Outcome: Progressing     Problem: ABCDS Injury Assessment  Goal: Absence of physical injury  3/16/2025 2149 by Demetria Awad, RN  Outcome: Progressing  3/16/2025 0751 by Demetria Awad RN  Outcome: Progressing     Problem: Skin/Tissue Integrity  Goal: Skin integrity remains intact  Description: 1.  Monitor for areas of redness and/or skin breakdown  2.  Assess vascular access sites hourly  3.  Every 4-6 hours minimum:  Change oxygen saturation probe site  4.  Every 4-6 hours:  If on nasal continuous positive airway pressure, respiratory therapy assess nares and determine need for appliance change or resting period  3/16/2025 2149 by Demetria Awad, RN  Outcome: Progressing  3/16/2025 0751 by Demetria Awad, RN  Outcome: Progressing     Problem: Pain  Goal: Verbalizes/displays adequate comfort level or baseline comfort level  3/16/2025 2149 by eDmetria Awad, RN  Outcome: Progressing  3/16/2025 0751 by Demetria Awad, RN  Outcome: Progressing     Problem: Nutrition Deficit:  Goal: Optimize nutritional status  Outcome: Progressing

## 2025-03-17 NOTE — SIGNIFICANT EVENT
DIS Nurse Note  SUBJECTIVE: Patient assessed secondary to elevated Deterioration Index Score.      Deterioration Index Score:  Predictive Model Details          66 (Warning)  Factor Value    Calculated 3/17/2025 08:32 26% Systolic 70    Deterioration Index Model 18% Age 74 years old     17% Supplemental oxygen Oxygen humidified     8% Respiratory rate 20     7% Hematocrit abnormal (26.9 %)     6% Cardiac rhythm Sinus tachy     6% Pulse 107     5% WBC count abnormal (13.2 K/uL)     3% Potassium 4.4 mmol/L     2% Blood pH abnormal (7.500)     1% BUN abnormal (58 mg/dL)     1% Sodium 138 mmol/L     0% Temperature 97 °F (36.1 °C)     0% Pulse oximetry 95 %        Vital Signs:  Vitals:    03/17/25 0338 03/17/25 0515 03/17/25 0528 03/17/25 0742   BP:  (!) 70/40     Pulse:       Resp: 20      Temp:       TempSrc:       SpO2:    95%   Weight:   62.5 kg (137 lb 12.8 oz)    Height:            Provider Notified: Reviewed patient status  & DIS score with Provider Claudino    ASSESSMENT:   low BP    Plan of Care: Prognosis very poor and guarded.  Palliative care consultation today to further discuss goals and expectations     Electronically signed by Paty Grande RN

## 2025-03-17 NOTE — PLAN OF CARE
Nutrition Problem #1: Inadequate protein-energy intake  Intervention: Food and/or Nutrient Delivery: Continue Current Diet, Modify Oral Nutrition Supplement  Nutritional

## 2025-03-17 NOTE — CONSULTS
nose  Neck/Throat: supple, symmetrical, trachea midline  Pulmonary: diminished to auscultation bilaterally, unlabored on NC O2  Cardiovascular: Tachycardic and regular rhythm   Gastrointestinal: soft, minimally TTP throughout, bowel sounds present  Musculoskeletal: Generalized edema,  No erythema, no tenderness to palpation  Skin: Warm, dry, pale  Neurologic: Drowsy, wakes to voice but falls asleep easily, follows commands, generalized weakness  Psychiatric: Calm and cooperative    Diagnostic Data:  CBC:  Recent Labs     03/15/25  1903 03/16/25  0936 03/17/25  0035   WBC 16.7* 16.0* 13.2*   HGB 10.0* 9.2* 8.3*   HCT 32.9* 29.9* 26.9*    245 218     BMP:  Recent Labs     03/15/25  1903 03/16/25  0936 03/17/25  0035     135* 136 138   K 4.4  4.4 4.3 4.4     103 105 106   CO2 15*  19* 19* 20*   BUN 43*  43* 49* 58*   CREATININE 1.8*  1.7* 2.0* 2.5*   CALCIUM 7.8*  8.0* 7.5* 7.4*     Recent Labs     03/15/25  1903   AST 28  27   ALT <5*  <5*   BILIDIR 0.2   BILITOT 0.3  0.3   ALKPHOS 296*  299*       XR CHEST PORTABLE  Result Date: 3/15/2025  EXAM: CHEST, ONE-VIEW  HISTORY:  Shortness of breath  FINDINGS: Right approach Port-A-Cath with the tip at the superior atriocaval junction level.  Prior mediastinotomy and coronary artery bypass.  Cardiac and mediastinal contours are normal.  Pulmonary vasculature is normal. Least moderate sized bilateral pleural effusions. Increased opacity in the medial left midlung.  Bony thorax is unremarkable.       Suspected bilateral pleural effusions increased from 03/08/2025.  Increased opacity in the medial left midlung possibly representing focal pneumonia.    ______________________________________ Electronically signed by: DAVIDE ORANTES M.D. Date:     03/15/2025 Time:    19:15       Palliative Review of Advance Directives:     Surrogate Decision Maker: Doreen Jain, spouse/legal NOK    Durable Power of :None on file    Advanced  aorta- noted on imaging workup.  Evaluated by vascular surgery with no immediate intervention recommended  Opoid induced constipation- continue bowel regimen as warranted.   Failure to thrive- ongoing supportive care. Comfort feed as pt tolerates    Thank you for consulting palliative care and allowing us to participate in the care of the patient.      CounselingTopics: Goals of care, Code Status, Disease process education, pt/family support                                     Completed patient assessment, interview of independent historian/HCS, workup/treatment review, discussion with medical team, review of current and home medications, review of care everywhere and placement of orders/preparation of this note  Multiple complex medical problems  Morbidity mortality high risk  Medical decision making High complexity     Electronically signed by AGUSTINA Chapman CNP on 3/17/2025 at 8:40 AM    (Please note that portions of this note were completed with a voice recognition program.  Efforts were made to edit the dictations but occasionally words are mis-transcribed.)

## 2025-03-17 NOTE — PROGRESS NOTES
Prominent amount of fluid with air-fluid levels but no abnormal distension of portions of the small bowel, potentially mild ileus.  No evidence of obstruction.  Stable colonic diverticulosis.    No evidence of diverticulitis.    Interval development of free air, as expected given the recent surgery.    Trace fluid along both pericolic gutters and in the pelvis.     Essentially, expected postoperative changes with interval increase in the size of several mesenteric lymph nodes and retroperitoneal nodes when compared to prior CT scan on 1/1/2025.     Nick has a history of CAD.  During the hospitalization he developed cardiac symptoms and required evaluation by cardiologist, Dr. Bernard.       History of CAD/systolic heart failure (Takotsubo's cardiomyopathy)-managed by cardiology     1/20/2025-Cardiac catheterization, [facility] by Dr. Bernard, cardiology:  Findings of :severe triple-vessel disease.  RCA proximal to mid diffuse severe 95% in-stent restenosis that feeds a large RPL with occluded proximal RPDA.  SVG to RPDA is patent but fails to backfill RCA.  Left main with diffuse nonobstructive disease.  LAD proximal diffuse nonobstructive with mid 75% stenosis with IFR 0.75 localizing to lesion consistent with obstructive lesion.  LIMA to distal LAD is atretic and basically nonfunctional with diffuse distal disease.  First diagonal backfills LAD but is occluded after graft touchdown.  SVG to first diagonal is patent with diagonal occluded at touchdown with anastomotic disease.  Circumflex with occluded OM1.  Patent SVG to OM1.  Severe LV systolic dysfunction with severe mid to distal anterolateral and apical hypokinesis with moderate inferior and lateral hypokinesis.  Findings not consistent with Takotsubo's cardiomyopathy and more likely related to ischemic cardiomyopathy with loss of diagonal post bypass, obstructive LAD and RCA.  Successful PCI of proximal to mid RCA with SUSAN x 1.  Successful PCI of first diagonal with

## 2025-03-17 NOTE — DISCHARGE SUMMARY
[facility]    Discharge Summary      Nick Jain  :  1950  MRN:  [id number]    Admit date:  3/15/2025  Discharge date:    3/17/2025    Discharging Physician:  Dr. Chris Mahmood     Advance Directive: DNR    Consults: hematology/oncology and Palliative care    Primary Care Physician:  Tesfaye Gipson MD    Discharge Diagnoses:  Principal Problem:    Fluid overload  Active Problems:    Severe malnutrition    Palliative care patient    Community acquired pneumonia of left lower lobe of lung  Resolved Problems:    * No resolved hospital problems. *      Portions of this note have been copied forward, however, changed to reflect the most current clinical status of this patient.    Hospital Course:     [Copied from Previous Provider]  74-year-old male with CAD s/p CABG 2024, recent PCI, metastatic colonic adenocarcinoma s/p right colectomy in 2025, palliative chemotherapy with modified FOLFOX, Cetuximab and BRAF inhibitor that was not yet started, COPD, HTN, with complaints of shortness of breath. Recently admitted 3/4 due to Influenza A, pneumonia. In addition found to have infrarenal aorta ulceration, SMV occlusion. Vascular surgery evaluated not surgical candidate initiated on Eliquis. GI consulted nausea, regurgitation.  EGD 3/10 severe esophagitis LA grade D, 3 to 4 cm hiatal hernia, candida esophagitis. Initiated on Protonix twice daily and Carafate 4 times daily. Diflucan x 10 days. Agreeable to home hospice and discharged on 3/14.   Patient returned to [facility] ER due to shortness of breath generalized weakness.  Workup in ER CXR medial left mid lung pneumonia, bilateral effusions, BNP 2696, WBC 16.7 Hgb 9.2, creatinine 1.7.  Initiated on cefepime and vancomycin, Admitted to hospitalist service with consultation to oncology and palliative care.  Prognosis poor and guarded.   [END of copied Section]    3/17/2025: Discussed with patient and wife at bedside. Decision was made to keep  lidocaine 4 % external patch 2 patch  2 patch TransDERmal Q12H PRN Mummaneni, Sundeep, DO        LORazepam (ATIVAN) tablet 0.5 mg  0.5 mg Oral Q8H PRN Mummaneni, Sundeep, DO   0.5 mg at 03/16/25 2305    megestrol (MEGACE) 40 MG/ML suspension 200 mg  200 mg Oral BID Mummaneni, Sundeep, DO   200 mg at 03/17/25 0748    [Held by provider] metoprolol succinate (TOPROL XL) extended release tablet 25 mg  25 mg Oral Daily Mummaneni, Sundeep, DO        mirtazapine (REMERON) tablet 15 mg  15 mg Oral Nightly Mummaneni, Sundeep, DO   15 mg at 03/16/25 2019    promethazine (PHENERGAN) tablet 25 mg  25 mg Oral 4x Daily PRN Mummaneni, Sundeep, DO   25 mg at 03/17/25 0401    [Held by provider] sacubitril-valsartan (ENTRESTO) 24-26 MG per tablet 0.5 tablet  0.5 tablet Oral BID Mummaneni, Sundeep, DO   0.5 tablet at 03/17/25 0748    sennosides-docusate sodium (SENOKOT-S) 8.6-50 MG tablet 1 tablet  1 tablet Oral BID Mummaneni, Sundeep, DO   1 tablet at 03/17/25 0747    simethicone (MYLICON) chewable tablet 120 mg  120 mg Oral PRN [facility], [facility]        sucralfate (CARAFATE) tablet 1 g  1 g Oral 4x Daily AC & HS Mummaneni, Sundeep, DO   1 g at 03/17/25 0309    sodium chloride flush 0.9 % injection 5-40 mL  5-40 mL IntraVENous 2 times per day Mummaneni, Sundeep, DO   10 mL at 03/17/25 0749    sodium chloride flush 0.9 % injection 5-40 mL  5-40 mL IntraVENous PRN Mummaneni, Sundeep, DO        0.9 % sodium chloride infusion   IntraVENous PRN [facility], Sundeep, DO        ondansetron (ZOFRAN-ODT) disintegrating tablet 4 mg  4 mg Oral Q8H PRN [facility] Maverick, DO        Or    ondansetron (ZOFRAN) injection 4 mg  4 mg IntraVENous Q6H PRN Mummaneni, Sundeep, DO   4 mg at 03/17/25 0912    polyethylene glycol (GLYCOLAX) packet 17 g  17 g Oral Daily PRN Mummaneni, Sundeep, DO        acetaminophen (TYLENOL) tablet 650 mg  650 mg Oral Q6H PRN Mummaneni, Sundeep, DO   650 mg at 03/17/25 0029    Or    acetaminophen (TYLENOL) suppository 650

## 2025-03-17 NOTE — PROGRESS NOTES
Comprehensive Nutrition Assessment    Type and Reason for Visit:  Initial, Positive nutrition screen    Nutrition Recommendations/Plan:   Follow for advancing diet, po intake, acceptance of ONS     Malnutrition Assessment:  Malnutrition Status:  Severe malnutrition (03/16/25 2118)    Context:  Chronic Illness     Findings of the 6 clinical characteristics of malnutrition:  Energy Intake:  Unable to assess  Weight Loss:  Greater than 7.5% over 3 months     Body Fat Loss:  Unable to assess     Muscle Mass Loss:  Unable to assess    Fluid Accumulation:  Severe Extremities   Strength:  Not Performed    Nutrition Assessment:    +NS for decreased weight ad po intake.  Aware pt justg recently discharged.  Receiving a Full liquid DOC diet.  Modified ONS to prior requests. Weight has increased slightly in the past , but still below UBW    Nutrition Related Findings:    GFR 34, Glucose    +2 pitting LUE & BLE edema Wound Type: Skin Tears       Current Nutrition Intake & Therapies:    Average Meal Intake: Unable to assess  Average Supplements Intake: Unable to assess  ADULT DIET; Full Liquid; No Added Salt (3-4 gm)  ADULT ORAL NUTRITION SUPPLEMENT; Breakfast; Clear Liquid Oral Supplement  ADULT ORAL NUTRITION SUPPLEMENT; Lunch, Dinner; Frozen Oral Supplement    Anthropometric Measures:  Height: 175.3 cm (5' 9.02\")  Ideal Body Weight (IBW): 160 lbs (73 kg)    Admission Body Weight: 61.9 kg (136 lb 7.4 oz)  Current Body Weight: 61.9 kg (136 lb 7.4 oz), 85.3 % IBW. Weight Source: Bed scale  Current BMI (kg/m2): 20.1  Usual Body Weight: 67.1 kg (148 lb)     % Weight Change (Calculated): -7.8  Weight Adjustment For: No Adjustment                 BMI Categories: Underweight (BMI less than 22) age over 65    Estimated Daily Nutrient Needs:  Energy Requirements Based On: Kcal/kg  Weight Used for Energy Requirements: Current  Energy (kcal/day): 5227-5526 kcals (30-35 kcals/kg)  Weight Used for Protein Requirements:  Current  Protein (g/day): 93g  Method Used for Fluid Requirements: 1 ml/kcal  Fluid (ml/day): 5346-9996 ml    Nutrition Diagnosis:   Inadequate protein-energy intake related to acute injury/trauma, impaired respiratory function as evidenced by weight loss    Nutrition Interventions:   Food and/or Nutrient Delivery: Continue Current Diet, Modify Oral Nutrition Supplement  Nutrition Education/Counseling: No recommendation at this time  Coordination of Nutrition Care: Continue to monitor while inpatient       Goals:  Goals: Meet at least 75% of estimated needs, PO intake 50% or greater, Maintain adequate nutrition status  Type of Goal: New goal       Nutrition Monitoring and Evaluation:      Food/Nutrient Intake Outcomes: None Identified  Physical Signs/Symptoms Outcomes: Biochemical Data, Fluid Status or Edema, Skin, Weight    Discharge Planning:    Too soon to determine     Shayna Azevedo MS, RD, LD  Contact: [phone]

## 2025-03-17 NOTE — CARE COORDINATION
Pt had previously went home with hospice to [facility]. Pt came back to hospital prior to Hospice could admit pt at home.  Pt now going to Hospice care center   03/17/25 [id number]   Service Assessment   Patient Orientation Alert and Oriented;Person;Place   Cognition Alert   History Provided By Spouse   Primary Caregiver Spouse   Accompanied By/Relationship spouse   Support Systems Spouse/Significant Other;Children;Family Members   Patient's Healthcare Decision Maker is: Legal Next of Kin   PCP Verified by CM Yes   Last Visit to PCP Within last 3 months   Prior Functional Level Assistance with the following:;Bathing;Mobility;Toileting   Current Functional Level Assistance with the following:;Toileting   Can patient return to prior living arrangement No   Ability to make needs known: Fair   Family able to assist with home care needs: Yes   Would you like for me to discuss the discharge plan with any other family members/significant others, and if so, who? No   Social/Functional History   Lives With Spouse   Type of Home House   Home Layout One level   Home Access Stairs to enter without rails   Entrance Stairs - Number of Steps 2   Bathroom Shower/Tub Tub/Shower unit   Bathroom Toilet Standard   Bathroom Equipment Grab bars in shower;Grab bars around toilet;Shower chair   Bathroom Accessibility Accessible   Home Equipment Cane;Walker - Rolling;Wheelchair - Manual   Receives Help From Family   Prior Level of Assist for ADLs Needs assistance   Toileting Needs assistance   Prior Level of Assist for Homemaking Needs assistance   Homemaking Responsibilities No   Ambulation Assistance Needs assistance   Prior Level of Assist for Transfers Needs assistance   Active  No   Patient's  Info spouse   Mode of Transportation Car   Occupation Retired   Discharge Planning   Living Arrangements Spouse/Significant Other   Potential Assistance Needed Other (Comment)  (hospice)   DME Ordered? No   Potential Assistance Purchasing

## 2025-03-19 ENCOUNTER — HOSPITAL ENCOUNTER (OUTPATIENT)
Dept: INFUSION THERAPY | Age: 75
End: 2025-03-19

## 2025-03-19 LAB
EKG P AXIS: 60 DEGREES
EKG P-R INTERVAL: 112 MS
EKG Q-T INTERVAL: 328 MS
EKG QRS DURATION: 80 MS
EKG QTC CALCULATION (BAZETT): 405 MS
EKG T AXIS: -3 DEGREES

## 2025-03-20 LAB
BACTERIA BLD CULT ORG #2: NORMAL
BACTERIA BLD CULT: NORMAL

## 2025-03-26 NOTE — PROGRESS NOTES
Physician Progress Note      PATIENT:               SHERRY JOHNSON  CSN #:                  [id number]  :                       1950  ADMIT DATE:       3/15/2025 5:55 PM  DISCH DATE:        3/17/2025 1:50 PM  RESPONDING  PROVIDER #:        Chris Mahmood MD MPH          QUERY TEXT:    Pt admitted with acute on chronic respiratory failure and pneumonia. Pt noted   to have WBC 16.7 and . If possible, please document if you evaluated and   /or treated any of the following:    The medical record reflects the following:  Risk Factors: pneumonia, metastatic malignancy  Clinical Indicators: H&P- Patient was seen and examined at bedside, very   cachectic and ill-appearing gentleman with metastatic cancer presents to the   hospital with chief complaint of worsening shortness of breath and found to   have a left lower lobe pneumonia as well as bilateral pleural effusions.  He   has significant metastases of his cancer and was recently discharged on home   hospice. Assessment: Acute on chronic respiratory failure with   hypoxia/PNA/Anasarca.  3/15 CXR- Increased opacity in the medial left midlung possibly representing   focal pneumonia.  WBC- 16.7 (3/15), 16.0 (3/16), 13.2 (3/17)  3/15 VS- HR max 111  Treatment: Palliative care consult, Cefepime, vanco, blood cultures, imaging,   laboratory studies    Thank you,  Siobhan Kenyon, MSN, RN  Clinical Documentation Integrity  [phone]  Options provided:  -- Sepsis due to pneumonia, present on admission  -- Pneumonia without Sepsis  -- Other - I will add my own diagnosis  -- Disagree - Not applicable / Not valid  -- Disagree - Clinically unable to determine / Unknown  -- Refer to Clinical Documentation Reviewer    PROVIDER RESPONSE TEXT:    This patient has sepsis due to pneumonia which was present on admission.    Query created by: Siobhan Kenyon on 3/24/2025 1:59 PM      QUERY TEXT:    Pt admitted with acute on chronic respiratory failure/PNA.  Pt noted to

## (undated) DEVICE — SOLIDIFIER LIQUI LOC PLUS 2000CC

## (undated) DEVICE — GUIDEWIRE VASC L260CM DIA0038IN TIP L3MM PTFE J TIP FIX COR

## (undated) DEVICE — OPTIFOAM GENTLE SA, POSTOP, 4X12: Brand: MEDLINE

## (undated) DEVICE — PK SET UP ANES OPN HEART 30

## (undated) DEVICE — SUTURE VICRYL + SZ 0 L27IN ABSRB UD CT-1 L36MM 1/2 CIR TAPR VCP260H

## (undated) DEVICE — KIT COR DIAG CATH ANGIO 5FR CURVES JL 4.0 100CM JR 4.0

## (undated) DEVICE — KIT,ANTI FOG,W/SPONGE & FLUID,SOFT PACK: Brand: MEDLINE

## (undated) DEVICE — DRSNG SURESITE123 4X10IN

## (undated) DEVICE — SYR LUERLOK 20CC BX/50

## (undated) DEVICE — CATH BLLN ANGIO 2X12MM SC EUPHORA RX

## (undated) DEVICE — PINNACLE INTRODUCER SHEATH: Brand: PINNACLE

## (undated) DEVICE — SUT ETHIB 2/0 SH SH 36IN X523H

## (undated) DEVICE — HI-TORQUE WHISPER MS GUIDE WIRE .014 STRAIGHT TIP 3.0 CM X 190 CM: Brand: HI-TORQUE WHISPER

## (undated) DEVICE — ROTATING SURGICAL PUNCHES, 1 PER POUCH: Brand: A&E MEDICAL / ROTATING SURGICAL PUNCHES

## (undated) DEVICE — E-PACK PROCEDURE KIT: Brand: E-PACK

## (undated) DEVICE — ST TBG PNEUMOCLEAR EVAC SMOKE HIFLO

## (undated) DEVICE — TB SXN SURG DLP MALL/CARD SFT/TP 6F 6IN

## (undated) DEVICE — GLV SURG BIOGEL M LTX PF 7 1/2

## (undated) DEVICE — BAPTIST TURNOVER KIT: Brand: MEDLINE INDUSTRIES, INC.

## (undated) DEVICE — 1LYRTR 16FR10ML100%SIL UMS SNP: Brand: MEDLINE INDUSTRIES, INC.

## (undated) DEVICE — GLV SURG BIOGEL LTX PF 6 1/2

## (undated) DEVICE — ELECTRODE ES L 6.5IN BLDE MPLR OPN APPRCH EZ TO CLN

## (undated) DEVICE — SUTURE VICRYL COAT  + LIGAPAK LIG REEL 54 IN SZ 0 UD BRAID VCP287G

## (undated) DEVICE — ANGIO-SEAL VIP VASCULAR CLOSURE DEVICE: Brand: ANGIO-SEAL

## (undated) DEVICE — ANTIBACTERIAL UNDYED BRAIDED (POLYGLACTIN 910), SYNTHETIC ABSORBABLE SUTURE: Brand: COATED VICRYL

## (undated) DEVICE — TIBURON LAPAROTOMY DRAPE: Brand: CONVERTORS

## (undated) DEVICE — SNARE ENDOSCP L240CM OD24MM LOOP W10MM RND INSUL STIFF BRAID

## (undated) DEVICE — SYS VASOVIEW HEMOPRO ENDOSCOPIC HARVST VESL

## (undated) DEVICE — MARKER,SKIN,WI/RULER AND LABELS: Brand: MEDLINE

## (undated) DEVICE — DRAPE, SLUSH XL, 44X66, STERILE: Brand: MEDLINE

## (undated) DEVICE — NEPTUNE E-SEP SMOKE EVACUATION PENCIL, COATED, 70MM BLADE, ROCKER SWITCH: Brand: NEPTUNE E-SEP

## (undated) DEVICE — SUT PROLN 4/0 RB1 36IN 4PK M8557

## (undated) DEVICE — OASIS DRAIN, SINGLE, INLINE & ATS COMPATIBLE: Brand: OASIS

## (undated) DEVICE — FORCEPS BX L240CM JAW DIA2.4MM ORNG L CAP W/ NDL DISP RAD

## (undated) DEVICE — BLAKE CARDIO CONNECTOR 1:1: Brand: J-VAC

## (undated) DEVICE — MODEL BT2000 P/N 700287-012KIT CONTENTS: MANIFOLD WITH SALINE AND CONTRAST PORTS, SALINE TUBING WITH SPIKE AND HAND SYRINGE, TRANSDUCER: Brand: BT2000 AUTOMATED MANIFOLD KIT

## (undated) DEVICE — HI-TORQUE BALANCE MIDDLEWEIGHT UNIVERSAL GUIDE WIRE .014 STRAIGHT TIP 3.0 CM X 190 CM: Brand: HI-TORQUE BALANCE MIDDLEWEIGHT UNIVERSAL

## (undated) DEVICE — FR5 INFINITI MULTIPAC: Brand: INFINITI

## (undated) DEVICE — PK OPN HEART 30

## (undated) DEVICE — KIT MFLD ISOLATN NACL CNTRST PRT TBNG SPIK W/ PRSS TRNSDUC

## (undated) DEVICE — PERCUTANEOUS ENTRY THINWALL NEEDLE  ONE-PART: Brand: COOK

## (undated) DEVICE — BG OR ZSUT SADDLE 20IN CLR STRL

## (undated) DEVICE — SUT SILK 2/0 FS BLK 18IN 685G

## (undated) DEVICE — MYNXGRIP 6F/7F: Brand: MYNXGRIP

## (undated) DEVICE — GLOVE ORTHO 8   MSG9480

## (undated) DEVICE — SEALER TISS L20CM DIA13MM ADV BPLR L CRV JAW OPN APPRCH

## (undated) DEVICE — ENDO KIT,LOURDES HOSPITAL: Brand: MEDLINE INDUSTRIES, INC.

## (undated) DEVICE — CATH BLLN ANGIO 2.50X12MM NC EUPHORIA RX

## (undated) DEVICE — SUTURE PDS + SZ 1 L96IN ABSRB VLT L65MM TP-1 1/2 CIR PDP880G

## (undated) DEVICE — INFLATION DEVICE KIT: Brand: ENCORE™ 26 ADVANTAGE KIT

## (undated) DEVICE — SYS DISTNTION VEIN BONCHEK 300MMHG

## (undated) DEVICE — CATHETER GUID 6FR 0.071IN COR AMPLATZ R 2 MID FLEXIBILITY

## (undated) DEVICE — RELOAD STPL L75MM OPN H3.8MM CLS 1.5MM WIRE DIA0.2MM REG

## (undated) DEVICE — VALVE HEMSTAT 8FR INNR LUMN CRSS SLT SEAL DSGN WATCHDOG

## (undated) DEVICE — ELECTRD BLD MEGADYNE EZCLEAN STD 2.75IN XLNG

## (undated) DEVICE — DRP SLUSH MACH OM-ORS-320

## (undated) DEVICE — TORQUE DEVICE: Brand: TORQUE DEVICE

## (undated) DEVICE — SCANLAN® SURG-I-PAW® INSTRUMENT COVERS - RED, 1/10" X 5"/ 3 MMX13 CM (2 - 5" PCS /PKG): Brand: SCANLAN® SURG-I-PAW® INSTRUMENT COVERS

## (undated) DEVICE — ELECTRD BLD EZ CLN MOD 4IN

## (undated) DEVICE — CANN NASL ETCO2 LO/FLO A/

## (undated) DEVICE — CATH BLLN ANGIO 3.50X15MM NC EUPHORA RX

## (undated) DEVICE — BLD SCLPL BEAVR MINI STR 2BVL 180D LF

## (undated) DEVICE — 1/2 FORCE SURGICAL SPRING CLIP: Brand: STEALTH® SPRING CLIP

## (undated) DEVICE — ADHS LIQ MASTISOL 2/3ML

## (undated) DEVICE — STAPLER INT L60MM REG TISS BLU B FRM 8 FIRING 2 ROW AUTO

## (undated) DEVICE — PAD, DEFIB, ADULT, RADIOTRANS, PHYSIO: Brand: MEDLINE

## (undated) DEVICE — CLTH CLENS READYCLEANSE PERI CARE PK/5

## (undated) DEVICE — MASK VENTILATOR MED AD SUPERNOVA ET

## (undated) DEVICE — Device

## (undated) DEVICE — STAPLER INT L75MM CUT LN L73MM STPL LN L77MM BLU B FRM 8

## (undated) DEVICE — COVER LT HNDL BLU PLAS

## (undated) DEVICE — TRAP FLD MINIVAC MEGADYNE 100ML

## (undated) DEVICE — SUREFIT, DUAL DISPERSIVE ELECTRODE, CONTACT QUALITY MONITOR: Brand: SUREFIT

## (undated) DEVICE — YANKAUER,POOLE TIP,STERILE,50/CS: Brand: MEDLINE

## (undated) DEVICE — CATH BLLN ANGIO 2.50X12MM SC EUPHORA RX

## (undated) DEVICE — MODEL AT P65, P/N 701554-001KIT CONTENTS: HAND CONTROLLER, 3-WAY HIGH-PRESSURE STOPCOCK WITH ROTATING END AND PREMIUM HIGH-PRESSURE TUBING: Brand: ANGIOTOUCH® KIT

## (undated) DEVICE — Device: Brand: OMNIWIRE PRESSURE GUIDE WIRE

## (undated) DEVICE — CATHETER GUID 6FR L100CM DIA0.071IN NYL SHFT EBU3.5

## (undated) DEVICE — STRIP,CLOSURE,WOUND,MEDI-STRIP,1/2X4: Brand: MEDLINE

## (undated) DEVICE — BLAKE SILICONE DRAINS CARDIO CONNECTOR 2:1: Brand: BLAKE

## (undated) DEVICE — KIT ANGIO W/ AT P65 PREM HND CTRL FOR CNTRST DEL ANGIOTOUCH

## (undated) DEVICE — DRAIN,WOUND,ROUND,24FR,5/16",FULL-FLUTED: Brand: MEDLINE

## (undated) DEVICE — SOL IRR NACL 0.9PCT BT 1000ML

## (undated) DEVICE — DRSNG WND SIL OPTIFOAM GNTL BRDR ADHS 1.6X2IN

## (undated) DEVICE — STERNUM BLADE, OFFSET (32.0 X 0.8 X 6.3MM)

## (undated) DEVICE — PK CATH CARD 30 CA/4

## (undated) DEVICE — SUT SILK 2/0 SH CR8 18IN CR8 C012D

## (undated) DEVICE — GW STARTER FXD CORE J .035 3X150CM 3MM